# Patient Record
Sex: FEMALE | Race: WHITE | Employment: FULL TIME | URBAN - NONMETROPOLITAN AREA
[De-identification: names, ages, dates, MRNs, and addresses within clinical notes are randomized per-mention and may not be internally consistent; named-entity substitution may affect disease eponyms.]

---

## 2017-04-27 ENCOUNTER — HOSPITAL ENCOUNTER (EMERGENCY)
Age: 42
Discharge: HOME | End: 2017-04-27
Attending: EMERGENCY MEDICINE
Payer: COMMERCIAL

## 2017-04-27 VITALS
OXYGEN SATURATION: 100 % | TEMPERATURE: 98 F | WEIGHT: 155 LBS | DIASTOLIC BLOOD PRESSURE: 85 MMHG | SYSTOLIC BLOOD PRESSURE: 134 MMHG | HEIGHT: 69 IN | HEART RATE: 107 BPM | BODY MASS INDEX: 22.96 KG/M2 | RESPIRATION RATE: 18 BRPM

## 2017-04-27 DIAGNOSIS — M25.572 ACUTE BILATERAL ANKLE PAIN: Primary | ICD-10-CM

## 2017-04-27 DIAGNOSIS — M25.571 ACUTE BILATERAL ANKLE PAIN: Primary | ICD-10-CM

## 2017-04-27 PROCEDURE — 99282 EMERGENCY DEPT VISIT SF MDM: CPT | Performed by: EMERGENCY MEDICINE

## 2017-04-27 PROCEDURE — 99282 EMERGENCY DEPT VISIT SF MDM: CPT

## 2017-04-27 PROCEDURE — 6370000000 HC RX 637 (ALT 250 FOR IP): Performed by: EMERGENCY MEDICINE

## 2017-04-27 RX ORDER — HYDROCODONE BITARTRATE AND ACETAMINOPHEN 5; 325 MG/1; MG/1
2 TABLET ORAL EVERY 6 HOURS PRN
Qty: 10 TABLET | Refills: 0 | Status: SHIPPED | OUTPATIENT
Start: 2017-04-27 | End: 2017-05-04

## 2017-04-27 RX ORDER — PREDNISONE 10 MG/1
TABLET ORAL
Qty: 20 TABLET | Refills: 0 | Status: SHIPPED | OUTPATIENT
Start: 2017-04-27 | End: 2017-06-02 | Stop reason: ALTCHOICE

## 2017-04-27 RX ORDER — HYDROCODONE BITARTRATE AND ACETAMINOPHEN 7.5; 325 MG/1; MG/1
1 TABLET ORAL ONCE
Status: COMPLETED | OUTPATIENT
Start: 2017-04-27 | End: 2017-04-27

## 2017-04-27 RX ORDER — PREDNISONE 20 MG/1
40 TABLET ORAL ONCE
Status: COMPLETED | OUTPATIENT
Start: 2017-04-27 | End: 2017-04-27

## 2017-04-27 RX ORDER — INDOMETHACIN 25 MG/1
50 CAPSULE ORAL ONCE
Status: COMPLETED | OUTPATIENT
Start: 2017-04-27 | End: 2017-04-27

## 2017-04-27 RX ORDER — INDOMETHACIN 50 MG/1
50 CAPSULE ORAL 2 TIMES DAILY WITH MEALS
Qty: 20 CAPSULE | Refills: 0 | Status: SHIPPED | OUTPATIENT
Start: 2017-04-27 | End: 2017-06-02 | Stop reason: ALTCHOICE

## 2017-04-27 RX ADMIN — INDOMETHACIN 50 MG: 25 CAPSULE ORAL at 01:37

## 2017-04-27 RX ADMIN — HYDROCODONE BITARTRATE AND ACETAMINOPHEN 1 TABLET: 7.5; 325 TABLET ORAL at 01:32

## 2017-04-27 RX ADMIN — PREDNISONE 40 MG: 20 TABLET ORAL at 01:32

## 2017-04-27 ASSESSMENT — ENCOUNTER SYMPTOMS
DIARRHEA: 0
PHOTOPHOBIA: 0
NAUSEA: 0
SHORTNESS OF BREATH: 0
ABDOMINAL DISTENTION: 0
CONSTIPATION: 0
BACK PAIN: 0
VOMITING: 0
WHEEZING: 0
COUGH: 0
CHEST TIGHTNESS: 0
COLOR CHANGE: 0
EYE PAIN: 0
SORE THROAT: 0
RHINORRHEA: 0
ABDOMINAL PAIN: 0
TROUBLE SWALLOWING: 0

## 2017-04-27 ASSESSMENT — PAIN DESCRIPTION - FREQUENCY: FREQUENCY: CONTINUOUS

## 2017-04-27 ASSESSMENT — PAIN SCALES - GENERAL
PAINLEVEL_OUTOF10: 8

## 2017-04-27 ASSESSMENT — PAIN DESCRIPTION - LOCATION: LOCATION: FOOT

## 2017-04-27 ASSESSMENT — PAIN DESCRIPTION - PAIN TYPE: TYPE: ACUTE PAIN

## 2017-04-27 ASSESSMENT — PAIN DESCRIPTION - ORIENTATION: ORIENTATION: RIGHT;LEFT

## 2017-06-02 ENCOUNTER — APPOINTMENT (OUTPATIENT)
Dept: GENERAL RADIOLOGY | Age: 42
End: 2017-06-02
Payer: COMMERCIAL

## 2017-06-02 ENCOUNTER — HOSPITAL ENCOUNTER (EMERGENCY)
Age: 42
Discharge: HOME | End: 2017-06-02
Attending: EMERGENCY MEDICINE
Payer: COMMERCIAL

## 2017-06-02 VITALS
HEART RATE: 102 BPM | BODY MASS INDEX: 22.22 KG/M2 | TEMPERATURE: 98.6 F | WEIGHT: 150 LBS | OXYGEN SATURATION: 98 % | DIASTOLIC BLOOD PRESSURE: 98 MMHG | SYSTOLIC BLOOD PRESSURE: 131 MMHG | HEIGHT: 69 IN | RESPIRATION RATE: 24 BRPM

## 2017-06-02 DIAGNOSIS — F17.210 CIGARETTE SMOKER: ICD-10-CM

## 2017-06-02 DIAGNOSIS — R07.9 CHEST PAIN, UNSPECIFIED TYPE: Primary | ICD-10-CM

## 2017-06-02 LAB
ALBUMIN SERPL-MCNC: 4.4 G/DL (ref 3.5–5.2)
ALP BLD-CCNC: 61 U/L (ref 35–104)
ALT SERPL-CCNC: 9 U/L (ref 5–33)
ANION GAP SERPL CALCULATED.3IONS-SCNC: 16 MMOL/L (ref 7–19)
AST SERPL-CCNC: 16 U/L (ref 5–32)
BASOPHILS ABSOLUTE: 0.1 K/UL (ref 0–0.2)
BASOPHILS RELATIVE PERCENT: 1.3 % (ref 0–1)
BILIRUB SERPL-MCNC: <0.2 MG/DL (ref 0.2–1.2)
BUN BLDV-MCNC: 12 MG/DL (ref 6–20)
CALCIUM SERPL-MCNC: 9.6 MG/DL (ref 8.6–10)
CHLORIDE BLD-SCNC: 102 MMOL/L (ref 98–111)
CO2: 19 MMOL/L (ref 22–29)
CREAT SERPL-MCNC: 0.8 MG/DL (ref 0.5–0.9)
D DIMER: <0.27 NG/ML DDU (ref 0–0.48)
EOSINOPHILS ABSOLUTE: 0.9 K/UL (ref 0–0.6)
EOSINOPHILS RELATIVE PERCENT: 9.4 % (ref 0–5)
GFR NON-AFRICAN AMERICAN: >60
GLUCOSE BLD-MCNC: 83 MG/DL (ref 74–109)
HCT VFR BLD CALC: 38.8 % (ref 37–47)
HEMOGLOBIN: 12.9 G/DL (ref 12–16)
LYMPHOCYTES ABSOLUTE: 2.4 K/UL (ref 1.1–4.5)
LYMPHOCYTES RELATIVE PERCENT: 25.1 % (ref 20–40)
MCH RBC QN AUTO: 31.9 PG (ref 27–31)
MCHC RBC AUTO-ENTMCNC: 33.2 G/DL (ref 33–37)
MCV RBC AUTO: 96 FL (ref 81–99)
MONOCYTES ABSOLUTE: 0.8 K/UL (ref 0–0.9)
MONOCYTES RELATIVE PERCENT: 8.4 % (ref 0–10)
NEUTROPHILS ABSOLUTE: 5.3 K/UL (ref 1.5–7.5)
NEUTROPHILS RELATIVE PERCENT: 55.4 % (ref 50–65)
PDW BLD-RTO: 13.6 % (ref 11.5–14.5)
PERFORMED ON: NORMAL
PERFORMED ON: NORMAL
PLATELET # BLD: 386 K/UL (ref 130–400)
PMV BLD AUTO: 9.8 FL (ref 9.4–12.3)
POC TROPONIN I: 0.01 NG/ML (ref 0–0.08)
POC TROPONIN I: 0.03 NG/ML (ref 0–0.08)
POTASSIUM SERPL-SCNC: 4.4 MMOL/L (ref 3.5–5)
RBC # BLD: 4.04 M/UL (ref 4.2–5.4)
SODIUM BLD-SCNC: 137 MMOL/L (ref 136–145)
TOTAL PROTEIN: 7 G/DL (ref 6.6–8.7)
WBC # BLD: 9.6 K/UL (ref 4.8–10.8)

## 2017-06-02 PROCEDURE — 99282 EMERGENCY DEPT VISIT SF MDM: CPT | Performed by: EMERGENCY MEDICINE

## 2017-06-02 PROCEDURE — 36415 COLL VENOUS BLD VENIPUNCTURE: CPT

## 2017-06-02 PROCEDURE — 85025 COMPLETE CBC W/AUTO DIFF WBC: CPT

## 2017-06-02 PROCEDURE — 80053 COMPREHEN METABOLIC PANEL: CPT

## 2017-06-02 PROCEDURE — 85379 FIBRIN DEGRADATION QUANT: CPT

## 2017-06-02 PROCEDURE — 93005 ELECTROCARDIOGRAM TRACING: CPT

## 2017-06-02 PROCEDURE — 71010 XR CHEST PORTABLE: CPT

## 2017-06-02 PROCEDURE — 99285 EMERGENCY DEPT VISIT HI MDM: CPT

## 2017-06-02 PROCEDURE — 84484 ASSAY OF TROPONIN QUANT: CPT

## 2017-06-02 RX ORDER — LEVOTHYROXINE SODIUM 0.15 MG/1
150 TABLET ORAL DAILY
COMMUNITY

## 2017-06-02 RX ORDER — BUPROPION HYDROCHLORIDE 100 MG/1
100 TABLET ORAL DAILY
COMMUNITY

## 2017-06-02 RX ORDER — ASPIRIN 325 MG
325 TABLET ORAL ONCE
Status: DISCONTINUED | OUTPATIENT
Start: 2017-06-02 | End: 2017-06-02

## 2017-06-02 ASSESSMENT — ENCOUNTER SYMPTOMS: SHORTNESS OF BREATH: 1

## 2017-06-02 ASSESSMENT — PAIN SCALES - GENERAL: PAINLEVEL_OUTOF10: 6

## 2017-06-06 LAB
EKG P AXIS: 45 DEGREES
EKG P-R INTERVAL: 114 MS
EKG Q-T INTERVAL: 362 MS
EKG QRS DURATION: 84 MS
EKG QTC CALCULATION (BAZETT): 412 MS
EKG T AXIS: 40 DEGREES

## 2017-09-20 ENCOUNTER — HOSPITAL ENCOUNTER (OUTPATIENT)
Dept: GENERAL RADIOLOGY | Age: 42
Discharge: HOME | End: 2017-09-20
Payer: COMMERCIAL

## 2017-09-20 DIAGNOSIS — M25.511 RIGHT SHOULDER PAIN, UNSPECIFIED CHRONICITY: ICD-10-CM

## 2017-09-20 DIAGNOSIS — M96.1 POSTLAMINECTOMY SYNDROME, UNSPECIFIED REGION: ICD-10-CM

## 2017-09-20 DIAGNOSIS — M47.817 FACET DEGENERATION OF LUMBOSACRAL REGION: ICD-10-CM

## 2017-09-20 DIAGNOSIS — M47.816 OSTEOARTHRITIS OF LUMBAR SPINE, UNSPECIFIED SPINAL OSTEOARTHRITIS COMPLICATION STATUS: ICD-10-CM

## 2017-09-20 PROCEDURE — 72100 X-RAY EXAM L-S SPINE 2/3 VWS: CPT

## 2017-09-20 PROCEDURE — 73030 X-RAY EXAM OF SHOULDER: CPT

## 2017-10-19 ENCOUNTER — HOSPITAL ENCOUNTER (EMERGENCY)
Facility: HOSPITAL | Age: 42
Discharge: HOME | End: 2017-10-19
Attending: EMERGENCY MEDICINE | Admitting: EMERGENCY MEDICINE

## 2017-10-19 VITALS
SYSTOLIC BLOOD PRESSURE: 152 MMHG | HEART RATE: 85 BPM | HEIGHT: 69 IN | OXYGEN SATURATION: 100 % | DIASTOLIC BLOOD PRESSURE: 94 MMHG | RESPIRATION RATE: 15 BRPM | BODY MASS INDEX: 21.48 KG/M2 | WEIGHT: 145 LBS | TEMPERATURE: 98.5 F

## 2017-10-19 DIAGNOSIS — S46.811A STRAIN OF RIGHT TRAPEZIUS MUSCLE, INITIAL ENCOUNTER: Primary | ICD-10-CM

## 2017-10-19 PROCEDURE — 99283 EMERGENCY DEPT VISIT LOW MDM: CPT

## 2017-10-19 RX ORDER — INDOMETHACIN 50 MG/1
50 CAPSULE ORAL
Qty: 21 CAPSULE | Refills: 0 | Status: SHIPPED | OUTPATIENT
Start: 2017-10-19 | End: 2023-02-19

## 2017-10-19 RX ORDER — LEVOTHYROXINE SODIUM 0.15 MG/1
175 TABLET ORAL
COMMUNITY

## 2017-10-19 RX ORDER — HYDROCODONE BITARTRATE AND ACETAMINOPHEN 7.5; 325 MG/1; MG/1
1 TABLET ORAL EVERY 4 HOURS PRN
Qty: 15 TABLET | Refills: 0 | Status: SHIPPED | OUTPATIENT
Start: 2017-10-19 | End: 2023-02-19

## 2017-10-19 RX ORDER — BUPROPION HYDROCHLORIDE 100 MG/1
100 TABLET ORAL
COMMUNITY
End: 2023-02-19

## 2017-10-19 NOTE — ED PROVIDER NOTES
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  Pain is in area on upper back between shoulder and neck.  Finished shift and went home to sleep but when got up and went to work pain worse.  Aches all time but has sudden worsening and stabbing pain when moves neck or shoulder though pain not directly in those areas.    Patient is a 42 y.o. female presenting with shoulder problem.   History provided by:  Patient   used: No    Shoulder Problem   Location:  Shoulder  Shoulder location:  R shoulder  Injury: yes    Time since incident:  1 day  Mechanism of injury comment:  Throwing garbage bags  Pain details:     Quality:  Aching    Radiates to:  Does not radiate    Severity:  Severe    Onset quality:  Sudden    Duration:  1 day    Timing:  Constant    Progression:  Worsening  Dislocation: no    Foreign body present:  No foreign bodies  Tetanus status:  Unknown  Prior injury to area:  No  Relieved by:  Nothing  Worsened by:  Movement  Ineffective treatments:  None tried  Associated symptoms: decreased range of motion    Associated symptoms: no back pain, no fatigue, no fever, no muscle weakness, no neck pain, no numbness, no stiffness, no swelling and no tingling    Risk factors: no concern for non-accidental trauma, no known bone disorder, no frequent fractures and no recent illness        Review of Systems   Constitutional: Negative.  Negative for fatigue and fever.   HENT: Negative.    Respiratory: Negative.    Cardiovascular: Negative.    Genitourinary: Negative.    Musculoskeletal: Positive for myalgias. Negative for back pain, neck pain and stiffness.   Skin: Negative.    Neurological: Negative.    Hematological: Negative.    Psychiatric/Behavioral: Negative.    All other systems reviewed and are negative.      History reviewed. No pertinent past medical history.    No Known Allergies    Past Surgical History:   Procedure  Laterality Date   • APPENDECTOMY     • HYSTERECTOMY     • TOTAL THYROIDECTOMY         History reviewed. No pertinent family history.    Social History     Social History   • Marital status:      Spouse name: N/A   • Number of children: N/A   • Years of education: N/A     Social History Main Topics   • Smoking status: Heavy Tobacco Smoker   • Smokeless tobacco: None   • Alcohol use No   • Drug use: None   • Sexual activity: Not Asked     Other Topics Concern   • None     Social History Narrative   • None       Prior to Admission medications    Medication Sig Start Date End Date Taking? Authorizing Provider   buPROPion (WELLBUTRIN) 100 MG tablet Take 100 mg by mouth.    Historical Provider, MD   HYDROcodone-acetaminophen (NORCO) 7.5-325 MG per tablet Take 1 tablet by mouth Every 4 (Four) Hours As Needed for Moderate Pain . 10/19/17   Savage Javier Jr., MD   indomethacin (INDOCIN) 50 MG capsule Take 1 capsule by mouth 3 (Three) Times a Day With Meals. 10/19/17   Savage Javier Jr., MD   levothyroxine (SYNTHROID, LEVOTHROID) 150 MCG tablet Take  by mouth.    Historical Provider, MD       Medications - No data to display    Vitals:    10/19/17 0700   BP: 152/94   Pulse: 85   Resp: 15   Temp: 98.5 °F (36.9 °C)   SpO2: 100%         Objective   Physical Exam   Constitutional: She is oriented to person, place, and time. She appears well-developed and well-nourished.   HENT:   Head: Normocephalic and atraumatic.   Neck: Normal range of motion. Neck supple.   Musculoskeletal: She exhibits tenderness.   Tender to palpation in upper musculature of right back between shoulder and neck.  ROM in shoulder limited by that pain.  No deformity.   Neurological: She is alert and oriented to person, place, and time. She displays normal reflexes. No cranial nerve deficit. She exhibits normal muscle tone. Coordination normal.   Skin: Skin is warm and dry.   Psychiatric: She has a normal mood and affect. Her behavior is normal.    Nursing note and vitals reviewed.      Procedures         Lab Results (last 24 hours)     ** No results found for the last 24 hours. **          No orders to display       ED Course  ED Course          MDM  Number of Diagnoses or Management Options  Strain of right trapezius muscle, initial encounter: new and does not require workup  Risk of Complications, Morbidity, and/or Mortality  Presenting problems: moderate  Diagnostic procedures: low  Management options: low    Patient Progress  Patient progress: stable      Final diagnoses:   Strain of right trapezius muscle, initial encounter          Savage Javier Jr., MD  10/19/17 0704

## 2017-11-04 ENCOUNTER — APPOINTMENT (OUTPATIENT)
Dept: GENERAL RADIOLOGY | Facility: HOSPITAL | Age: 42
End: 2017-11-04

## 2017-11-04 ENCOUNTER — HOSPITAL ENCOUNTER (INPATIENT)
Facility: HOSPITAL | Age: 42
LOS: 2 days | Discharge: HOME | End: 2017-11-06
Attending: EMERGENCY MEDICINE | Admitting: INTERNAL MEDICINE

## 2017-11-04 DIAGNOSIS — R07.2 PRECORDIAL PAIN: ICD-10-CM

## 2017-11-04 DIAGNOSIS — I21.4 NSTEMI (NON-ST ELEVATED MYOCARDIAL INFARCTION) (HCC): Primary | ICD-10-CM

## 2017-11-04 DIAGNOSIS — I24.9 ACUTE CORONARY SYNDROME (HCC): ICD-10-CM

## 2017-11-04 PROBLEM — Z72.0 TOBACCO ABUSE: Status: ACTIVE | Noted: 2017-11-04

## 2017-11-04 PROBLEM — E78.2 MIXED HYPERLIPIDEMIA: Status: ACTIVE | Noted: 2017-11-04

## 2017-11-04 PROBLEM — I73.00 RAYNAUD'S DISEASE WITHOUT GANGRENE: Status: ACTIVE | Noted: 2017-11-04

## 2017-11-04 LAB
ALBUMIN SERPL-MCNC: 4.5 G/DL (ref 3.5–5)
ALBUMIN/GLOB SERPL: 1.5 G/DL (ref 1.1–2.5)
ALP SERPL-CCNC: 74 U/L (ref 24–120)
ALT SERPL W P-5'-P-CCNC: 24 U/L (ref 0–54)
ANION GAP SERPL CALCULATED.3IONS-SCNC: 18 MMOL/L (ref 4–13)
AST SERPL-CCNC: 29 U/L (ref 7–45)
BASOPHILS # BLD AUTO: 0.01 10*3/MM3 (ref 0–0.2)
BASOPHILS NFR BLD AUTO: 0.1 % (ref 0–2)
BILIRUB SERPL-MCNC: 0.1 MG/DL (ref 0.1–1)
BUN BLD-MCNC: 20 MG/DL (ref 5–21)
BUN/CREAT SERPL: 32.8 (ref 7–25)
CALCIUM SPEC-SCNC: 9.8 MG/DL (ref 8.4–10.4)
CHLORIDE SERPL-SCNC: 105 MMOL/L (ref 98–110)
CO2 SERPL-SCNC: 21 MMOL/L (ref 24–31)
CREAT BLD-MCNC: 0.61 MG/DL (ref 0.5–1.4)
DEPRECATED RDW RBC AUTO: 44.7 FL (ref 40–54)
EOSINOPHIL # BLD AUTO: 0 10*3/MM3 (ref 0–0.7)
EOSINOPHIL NFR BLD AUTO: 0 % (ref 0–4)
ERYTHROCYTE [DISTWIDTH] IN BLOOD BY AUTOMATED COUNT: 12.8 % (ref 12–15)
GFR SERPL CREATININE-BSD FRML MDRD: 107 ML/MIN/1.73
GLOBULIN UR ELPH-MCNC: 3 GM/DL
GLUCOSE BLD-MCNC: 171 MG/DL (ref 70–100)
HCT VFR BLD AUTO: 35.2 % (ref 37–47)
HGB BLD-MCNC: 11.5 G/DL (ref 12–16)
HOLD SPECIMEN: NORMAL
HOLD SPECIMEN: NORMAL
IMM GRANULOCYTES # BLD: 0.05 10*3/MM3 (ref 0–0.03)
IMM GRANULOCYTES NFR BLD: 0.3 % (ref 0–5)
LYMPHOCYTES # BLD AUTO: 0.91 10*3/MM3 (ref 0.72–4.86)
LYMPHOCYTES NFR BLD AUTO: 5.8 % (ref 15–45)
MCH RBC QN AUTO: 31.3 PG (ref 28–32)
MCHC RBC AUTO-ENTMCNC: 32.7 G/DL (ref 33–36)
MCV RBC AUTO: 95.7 FL (ref 82–98)
MONOCYTES # BLD AUTO: 0.49 10*3/MM3 (ref 0.19–1.3)
MONOCYTES NFR BLD AUTO: 3.1 % (ref 4–12)
NEUTROPHILS # BLD AUTO: 14.33 10*3/MM3 (ref 1.87–8.4)
NEUTROPHILS NFR BLD AUTO: 90.7 % (ref 39–78)
PLATELET # BLD AUTO: 362 10*3/MM3 (ref 130–400)
PMV BLD AUTO: 10.9 FL (ref 6–12)
POTASSIUM BLD-SCNC: 3.6 MMOL/L (ref 3.5–5.3)
PROT SERPL-MCNC: 7.5 G/DL (ref 6.3–8.7)
RBC # BLD AUTO: 3.68 10*6/MM3 (ref 4.2–5.4)
SODIUM BLD-SCNC: 144 MMOL/L (ref 135–145)
TROPONIN I SERPL-MCNC: 0.32 NG/ML (ref 0–0.03)
TROPONIN I SERPL-MCNC: 0.34 NG/ML (ref 0–0.03)
WBC NRBC COR # BLD: 15.79 10*3/MM3 (ref 4.8–10.8)
WHOLE BLOOD HOLD SPECIMEN: NORMAL
WHOLE BLOOD HOLD SPECIMEN: NORMAL

## 2017-11-04 PROCEDURE — C1894 INTRO/SHEATH, NON-LASER: HCPCS | Performed by: INTERNAL MEDICINE

## 2017-11-04 PROCEDURE — 84484 ASSAY OF TROPONIN QUANT: CPT | Performed by: EMERGENCY MEDICINE

## 2017-11-04 PROCEDURE — 93458 L HRT ARTERY/VENTRICLE ANGIO: CPT | Performed by: INTERNAL MEDICINE

## 2017-11-04 PROCEDURE — 71010 HC CHEST PA OR AP: CPT

## 2017-11-04 PROCEDURE — 84484 ASSAY OF TROPONIN QUANT: CPT | Performed by: INTERNAL MEDICINE

## 2017-11-04 PROCEDURE — 4A023N7 MEASUREMENT OF CARDIAC SAMPLING AND PRESSURE, LEFT HEART, PERCUTANEOUS APPROACH: ICD-10-PCS | Performed by: INTERNAL MEDICINE

## 2017-11-04 PROCEDURE — 99284 EMERGENCY DEPT VISIT MOD MDM: CPT

## 2017-11-04 PROCEDURE — 0 IOPAMIDOL PER 1 ML: Performed by: INTERNAL MEDICINE

## 2017-11-04 PROCEDURE — B2111ZZ FLUOROSCOPY OF MULTIPLE CORONARY ARTERIES USING LOW OSMOLAR CONTRAST: ICD-10-PCS | Performed by: INTERNAL MEDICINE

## 2017-11-04 PROCEDURE — 25010000002 DIPHENHYDRAMINE PER 50 MG: Performed by: INTERNAL MEDICINE

## 2017-11-04 PROCEDURE — 85025 COMPLETE CBC W/AUTO DIFF WBC: CPT | Performed by: EMERGENCY MEDICINE

## 2017-11-04 PROCEDURE — 93010 ELECTROCARDIOGRAM REPORT: CPT | Performed by: INTERNAL MEDICINE

## 2017-11-04 PROCEDURE — 93005 ELECTROCARDIOGRAM TRACING: CPT

## 2017-11-04 PROCEDURE — 94799 UNLISTED PULMONARY SVC/PX: CPT

## 2017-11-04 PROCEDURE — B2151ZZ FLUOROSCOPY OF LEFT HEART USING LOW OSMOLAR CONTRAST: ICD-10-PCS | Performed by: INTERNAL MEDICINE

## 2017-11-04 PROCEDURE — 25010000002 HEPARIN (PORCINE) PER 1000 UNITS: Performed by: INTERNAL MEDICINE

## 2017-11-04 PROCEDURE — 80053 COMPREHEN METABOLIC PANEL: CPT | Performed by: EMERGENCY MEDICINE

## 2017-11-04 PROCEDURE — 25010000002 FENTANYL CITRATE (PF) 100 MCG/2ML SOLUTION: Performed by: INTERNAL MEDICINE

## 2017-11-04 PROCEDURE — 25010000002 MIDAZOLAM PER 1 MG: Performed by: INTERNAL MEDICINE

## 2017-11-04 PROCEDURE — C1769 GUIDE WIRE: HCPCS | Performed by: INTERNAL MEDICINE

## 2017-11-04 PROCEDURE — 99223 1ST HOSP IP/OBS HIGH 75: CPT | Performed by: INTERNAL MEDICINE

## 2017-11-04 PROCEDURE — 99152 MOD SED SAME PHYS/QHP 5/>YRS: CPT | Performed by: INTERNAL MEDICINE

## 2017-11-04 RX ORDER — INDOMETHACIN 50 MG/1
50 CAPSULE ORAL
Status: DISCONTINUED | OUTPATIENT
Start: 2017-11-05 | End: 2017-11-05

## 2017-11-04 RX ORDER — PRAVASTATIN SODIUM 40 MG
40 TABLET ORAL NIGHTLY
Status: DISCONTINUED | OUTPATIENT
Start: 2017-11-05 | End: 2017-11-05 | Stop reason: CLARIF

## 2017-11-04 RX ORDER — SODIUM CHLORIDE 0.9 % (FLUSH) 0.9 %
10 SYRINGE (ML) INJECTION AS NEEDED
Status: DISCONTINUED | OUTPATIENT
Start: 2017-11-04 | End: 2017-11-06 | Stop reason: HOSPADM

## 2017-11-04 RX ORDER — MIDAZOLAM HYDROCHLORIDE 1 MG/ML
INJECTION INTRAMUSCULAR; INTRAVENOUS AS NEEDED
Status: DISCONTINUED | OUTPATIENT
Start: 2017-11-04 | End: 2017-11-04 | Stop reason: HOSPADM

## 2017-11-04 RX ORDER — SODIUM CHLORIDE 9 MG/ML
100 INJECTION, SOLUTION INTRAVENOUS CONTINUOUS
Status: DISCONTINUED | OUTPATIENT
Start: 2017-11-04 | End: 2017-11-06 | Stop reason: HOSPADM

## 2017-11-04 RX ORDER — NITROGLYCERIN 20 MG/100ML
INJECTION INTRAVENOUS CONTINUOUS PRN
Status: DISCONTINUED | OUTPATIENT
Start: 2017-11-04 | End: 2017-11-04 | Stop reason: HOSPADM

## 2017-11-04 RX ORDER — FENTANYL CITRATE 50 UG/ML
INJECTION, SOLUTION INTRAMUSCULAR; INTRAVENOUS AS NEEDED
Status: DISCONTINUED | OUTPATIENT
Start: 2017-11-04 | End: 2017-11-04 | Stop reason: HOSPADM

## 2017-11-04 RX ORDER — SODIUM CHLORIDE 9 MG/ML
125 INJECTION, SOLUTION INTRAVENOUS CONTINUOUS
Status: DISCONTINUED | OUTPATIENT
Start: 2017-11-04 | End: 2017-11-05

## 2017-11-04 RX ORDER — DIPHENHYDRAMINE HYDROCHLORIDE 50 MG/ML
INJECTION INTRAMUSCULAR; INTRAVENOUS AS NEEDED
Status: DISCONTINUED | OUTPATIENT
Start: 2017-11-04 | End: 2017-11-04 | Stop reason: HOSPADM

## 2017-11-04 RX ORDER — NITROGLYCERIN 0.4 MG/1
0.4 TABLET SUBLINGUAL
COMMUNITY
End: 2017-11-06 | Stop reason: HOSPADM

## 2017-11-04 RX ORDER — NITROGLYCERIN 20 MG/100ML
10-50 INJECTION INTRAVENOUS
Status: DISCONTINUED | OUTPATIENT
Start: 2017-11-04 | End: 2017-11-05

## 2017-11-04 RX ORDER — LEVOTHYROXINE SODIUM 0.15 MG/1
150 TABLET ORAL
Status: DISCONTINUED | OUTPATIENT
Start: 2017-11-05 | End: 2017-11-06 | Stop reason: HOSPADM

## 2017-11-04 RX ORDER — HYDROCODONE BITARTRATE AND ACETAMINOPHEN 7.5; 325 MG/1; MG/1
1 TABLET ORAL EVERY 4 HOURS PRN
Status: DISCONTINUED | OUTPATIENT
Start: 2017-11-04 | End: 2017-11-06 | Stop reason: HOSPADM

## 2017-11-04 RX ORDER — LIDOCAINE HYDROCHLORIDE 20 MG/ML
INJECTION, SOLUTION INFILTRATION; PERINEURAL AS NEEDED
Status: DISCONTINUED | OUTPATIENT
Start: 2017-11-04 | End: 2017-11-04 | Stop reason: HOSPADM

## 2017-11-04 RX ORDER — METHYLPREDNISOLONE 4 MG/1
TABLET ORAL DAILY
COMMUNITY
End: 2023-02-19

## 2017-11-04 RX ORDER — ASPIRIN 81 MG/1
324 TABLET, CHEWABLE ORAL ONCE
Status: COMPLETED | OUTPATIENT
Start: 2017-11-04 | End: 2017-11-05

## 2017-11-04 RX ORDER — NITROGLYCERIN 0.4 MG/1
0.4 TABLET SUBLINGUAL
Status: DISCONTINUED | OUTPATIENT
Start: 2017-11-04 | End: 2017-11-06 | Stop reason: HOSPADM

## 2017-11-04 RX ADMIN — SODIUM CHLORIDE 125 ML/HR: 9 INJECTION, SOLUTION INTRAVENOUS at 20:25

## 2017-11-04 RX ADMIN — NITROGLYCERIN 10 MCG/MIN: 20 INJECTION INTRAVENOUS at 20:29

## 2017-11-05 ENCOUNTER — APPOINTMENT (OUTPATIENT)
Dept: CARDIOLOGY | Facility: HOSPITAL | Age: 42
End: 2017-11-05
Attending: INTERNAL MEDICINE

## 2017-11-05 LAB
AMPHET+METHAMPHET UR QL: POSITIVE
ANION GAP SERPL CALCULATED.3IONS-SCNC: 9 MMOL/L (ref 4–13)
ARTICHOKE IGE QN: 74 MG/DL (ref 0–99)
BARBITURATES UR QL SCN: NEGATIVE
BENZODIAZ UR QL SCN: POSITIVE
BH CV ECHO MEAS - AO MAX PG (FULL): 3.6 MMHG
BH CV ECHO MEAS - AO MAX PG: 10.1 MMHG
BH CV ECHO MEAS - AO MEAN PG (FULL): 2 MMHG
BH CV ECHO MEAS - AO MEAN PG: 5 MMHG
BH CV ECHO MEAS - AO ROOT AREA (BSA CORRECTED): 1.8
BH CV ECHO MEAS - AO ROOT AREA: 8 CM^2
BH CV ECHO MEAS - AO ROOT DIAM: 3.2 CM
BH CV ECHO MEAS - AO V2 MAX: 159 CM/SEC
BH CV ECHO MEAS - AO V2 MEAN: 106 CM/SEC
BH CV ECHO MEAS - AO V2 VTI: 33.4 CM
BH CV ECHO MEAS - AVA(I,A): 2.5 CM^2
BH CV ECHO MEAS - AVA(I,D): 2.5 CM^2
BH CV ECHO MEAS - AVA(V,A): 2.5 CM^2
BH CV ECHO MEAS - AVA(V,D): 2.5 CM^2
BH CV ECHO MEAS - BSA(HAYCOCK): 1.8 M^2
BH CV ECHO MEAS - BSA: 1.8 M^2
BH CV ECHO MEAS - BZI_BMI: 21.4 KILOGRAMS/M^2
BH CV ECHO MEAS - BZI_METRIC_HEIGHT: 175.3 CM
BH CV ECHO MEAS - BZI_METRIC_WEIGHT: 65.8 KG
BH CV ECHO MEAS - CONTRAST EF 4CH: 65.4 ML/M^2
BH CV ECHO MEAS - EDV(CUBED): 93.6 ML
BH CV ECHO MEAS - EDV(MOD-SP4): 72 ML
BH CV ECHO MEAS - EDV(TEICH): 94.4 ML
BH CV ECHO MEAS - EF(CUBED): 77.5 %
BH CV ECHO MEAS - EF(MOD-SP4): 65.4 %
BH CV ECHO MEAS - EF(TEICH): 69.8 %
BH CV ECHO MEAS - ESV(CUBED): 21 ML
BH CV ECHO MEAS - ESV(MOD-SP4): 24.9 ML
BH CV ECHO MEAS - ESV(TEICH): 28.5 ML
BH CV ECHO MEAS - FS: 39.2 %
BH CV ECHO MEAS - IVS/LVPW: 1.1
BH CV ECHO MEAS - IVSD: 0.96 CM
BH CV ECHO MEAS - LA DIMENSION: 3.6 CM
BH CV ECHO MEAS - LA/AO: 1.1
BH CV ECHO MEAS - LAT PEAK E' VEL: 12 CM/SEC
BH CV ECHO MEAS - LV DIASTOLIC VOL/BSA (35-75): 40 ML/M^2
BH CV ECHO MEAS - LV MASS(C)D: 141.4 GRAMS
BH CV ECHO MEAS - LV MASS(C)DI: 78.5 GRAMS/M^2
BH CV ECHO MEAS - LV MAX PG: 6.6 MMHG
BH CV ECHO MEAS - LV MEAN PG: 3 MMHG
BH CV ECHO MEAS - LV SYSTOLIC VOL/BSA (12-30): 13.8 ML/M^2
BH CV ECHO MEAS - LV V1 MAX: 128 CM/SEC
BH CV ECHO MEAS - LV V1 MEAN: 79.7 CM/SEC
BH CV ECHO MEAS - LV V1 VTI: 26.1 CM
BH CV ECHO MEAS - LVIDD: 4.5 CM
BH CV ECHO MEAS - LVIDS: 2.8 CM
BH CV ECHO MEAS - LVLD AP4: 7.8 CM
BH CV ECHO MEAS - LVLS AP4: 5.8 CM
BH CV ECHO MEAS - LVOT AREA (M): 3.1 CM^2
BH CV ECHO MEAS - LVOT AREA: 3.1 CM^2
BH CV ECHO MEAS - LVOT DIAM: 2 CM
BH CV ECHO MEAS - LVPWD: 0.9 CM
BH CV ECHO MEAS - MV A MAX VEL: 74.7 CM/SEC
BH CV ECHO MEAS - MV DEC TIME: 0.16 SEC
BH CV ECHO MEAS - MV E MAX VEL: 97 CM/SEC
BH CV ECHO MEAS - MV E/A: 1.3
BH CV ECHO MEAS - PA MAX PG: 2.7 MMHG
BH CV ECHO MEAS - PA V2 MAX: 82 CM/SEC
BH CV ECHO MEAS - SI(AO): 149.1 ML/M^2
BH CV ECHO MEAS - SI(CUBED): 40.3 ML/M^2
BH CV ECHO MEAS - SI(LVOT): 45.5 ML/M^2
BH CV ECHO MEAS - SI(MOD-SP4): 26.1 ML/M^2
BH CV ECHO MEAS - SI(TEICH): 36.6 ML/M^2
BH CV ECHO MEAS - SV(AO): 268.6 ML
BH CV ECHO MEAS - SV(CUBED): 72.6 ML
BH CV ECHO MEAS - SV(LVOT): 82 ML
BH CV ECHO MEAS - SV(MOD-SP4): 47.1 ML
BH CV ECHO MEAS - SV(TEICH): 65.9 ML
BUN BLD-MCNC: 20 MG/DL (ref 5–21)
BUN/CREAT SERPL: 34.5 (ref 7–25)
CALCIUM SPEC-SCNC: 8.6 MG/DL (ref 8.4–10.4)
CANNABINOIDS SERPL QL: NEGATIVE
CHLORIDE SERPL-SCNC: 108 MMOL/L (ref 98–110)
CHOLEST SERPL-MCNC: 139 MG/DL (ref 130–200)
CO2 SERPL-SCNC: 26 MMOL/L (ref 24–31)
COCAINE UR QL: NEGATIVE
CREAT BLD-MCNC: 0.58 MG/DL (ref 0.5–1.4)
DEPRECATED RDW RBC AUTO: 45.1 FL (ref 40–54)
E/E' RATIO: 8.1
ERYTHROCYTE [DISTWIDTH] IN BLOOD BY AUTOMATED COUNT: 12.9 % (ref 12–15)
GFR SERPL CREATININE-BSD FRML MDRD: 113 ML/MIN/1.73
GLUCOSE BLD-MCNC: 127 MG/DL (ref 70–100)
HCT VFR BLD AUTO: 33.7 % (ref 37–47)
HDLC SERPL-MCNC: 58 MG/DL
HGB BLD-MCNC: 10.9 G/DL (ref 12–16)
LDLC/HDLC SERPL: 1.13 {RATIO}
LEFT ATRIUM VOLUME INDEX: 36 ML/M2
LEFT ATRIUM VOLUME: 57 CM3
MAXIMAL PREDICTED HEART RATE: 176 BPM
MCH RBC QN AUTO: 31.3 PG (ref 28–32)
MCHC RBC AUTO-ENTMCNC: 32.3 G/DL (ref 33–36)
MCV RBC AUTO: 96.8 FL (ref 82–98)
METHADONE UR QL SCN: POSITIVE
OPIATES UR QL: POSITIVE
PCP UR QL SCN: NEGATIVE
PLATELET # BLD AUTO: 327 10*3/MM3 (ref 130–400)
PMV BLD AUTO: 11 FL (ref 6–12)
POTASSIUM BLD-SCNC: 3.6 MMOL/L (ref 3.5–5.3)
RBC # BLD AUTO: 3.48 10*6/MM3 (ref 4.2–5.4)
SODIUM BLD-SCNC: 143 MMOL/L (ref 135–145)
STRESS TARGET HR: 150 BPM
TRIGL SERPL-MCNC: 77 MG/DL (ref 0–149)
TROPONIN I SERPL-MCNC: 0.22 NG/ML (ref 0–0.03)
TROPONIN I SERPL-MCNC: 0.25 NG/ML (ref 0–0.03)
TROPONIN I SERPL-MCNC: 0.29 NG/ML (ref 0–0.03)
WBC NRBC COR # BLD: 12.08 10*3/MM3 (ref 4.8–10.8)

## 2017-11-05 PROCEDURE — 93306 TTE W/DOPPLER COMPLETE: CPT

## 2017-11-05 PROCEDURE — 80307 DRUG TEST PRSMV CHEM ANLYZR: CPT | Performed by: INTERNAL MEDICINE

## 2017-11-05 PROCEDURE — 93010 ELECTROCARDIOGRAM REPORT: CPT | Performed by: INTERNAL MEDICINE

## 2017-11-05 PROCEDURE — 80048 BASIC METABOLIC PNL TOTAL CA: CPT | Performed by: INTERNAL MEDICINE

## 2017-11-05 PROCEDURE — 93306 TTE W/DOPPLER COMPLETE: CPT | Performed by: INTERNAL MEDICINE

## 2017-11-05 PROCEDURE — 85027 COMPLETE CBC AUTOMATED: CPT | Performed by: INTERNAL MEDICINE

## 2017-11-05 PROCEDURE — 80061 LIPID PANEL: CPT | Performed by: INTERNAL MEDICINE

## 2017-11-05 PROCEDURE — 93005 ELECTROCARDIOGRAM TRACING: CPT | Performed by: INTERNAL MEDICINE

## 2017-11-05 PROCEDURE — 84484 ASSAY OF TROPONIN QUANT: CPT | Performed by: INTERNAL MEDICINE

## 2017-11-05 PROCEDURE — 99232 SBSQ HOSP IP/OBS MODERATE 35: CPT | Performed by: INTERNAL MEDICINE

## 2017-11-05 PROCEDURE — 83036 HEMOGLOBIN GLYCOSYLATED A1C: CPT | Performed by: INTERNAL MEDICINE

## 2017-11-05 RX ORDER — ISOSORBIDE MONONITRATE 30 MG/1
30 TABLET, EXTENDED RELEASE ORAL
Status: DISCONTINUED | OUTPATIENT
Start: 2017-11-05 | End: 2017-11-06 | Stop reason: HOSPADM

## 2017-11-05 RX ORDER — LIDOCAINE 50 MG/G
1 PATCH TOPICAL
Status: DISCONTINUED | OUTPATIENT
Start: 2017-11-05 | End: 2017-11-06 | Stop reason: HOSPADM

## 2017-11-05 RX ORDER — ASPIRIN 81 MG/1
81 TABLET ORAL DAILY
Status: DISCONTINUED | OUTPATIENT
Start: 2017-11-05 | End: 2017-11-06 | Stop reason: HOSPADM

## 2017-11-05 RX ORDER — ATORVASTATIN CALCIUM 10 MG/1
10 TABLET, FILM COATED ORAL NIGHTLY
Status: DISCONTINUED | OUTPATIENT
Start: 2017-11-05 | End: 2017-11-06 | Stop reason: HOSPADM

## 2017-11-05 RX ADMIN — NITROGLYCERIN 0.4 MG: 0.4 TABLET SUBLINGUAL at 05:19

## 2017-11-05 RX ADMIN — HYDROCODONE BITARTRATE AND ACETAMINOPHEN 1 TABLET: 7.5; 325 TABLET ORAL at 22:08

## 2017-11-05 RX ADMIN — NITROGLYCERIN 0.4 MG: 0.4 TABLET SUBLINGUAL at 10:11

## 2017-11-05 RX ADMIN — HYDROCODONE BITARTRATE AND ACETAMINOPHEN 1 TABLET: 7.5; 325 TABLET ORAL at 18:21

## 2017-11-05 RX ADMIN — HYDROCODONE BITARTRATE AND ACETAMINOPHEN 1 TABLET: 7.5; 325 TABLET ORAL at 13:59

## 2017-11-05 RX ADMIN — ASPIRIN 81 MG: 81 TABLET ORAL at 09:52

## 2017-11-05 RX ADMIN — HYDROCODONE BITARTRATE AND ACETAMINOPHEN 1 TABLET: 7.5; 325 TABLET ORAL at 09:50

## 2017-11-05 RX ADMIN — ATORVASTATIN CALCIUM 10 MG: 10 TABLET, FILM COATED ORAL at 21:23

## 2017-11-05 RX ADMIN — SODIUM CHLORIDE 100 ML/HR: 9 INJECTION, SOLUTION INTRAVENOUS at 00:19

## 2017-11-05 RX ADMIN — ASPIRIN 81 MG 324 MG: 81 TABLET ORAL at 00:19

## 2017-11-05 RX ADMIN — ISOSORBIDE MONONITRATE 30 MG: 30 TABLET ORAL at 10:50

## 2017-11-05 RX ADMIN — LIDOCAINE 1 PATCH: 50 PATCH CUTANEOUS at 13:37

## 2017-11-05 RX ADMIN — LEVOTHYROXINE SODIUM 150 MCG: 150 TABLET ORAL at 06:25

## 2017-11-05 NOTE — H&P
[facility] - CARDIOLOGY  HISTORY AND PHYSICAL    Date of Admission: 11/4/2017  Primary Care Physician: Juan Dahl DO    Subjective     Chief Complaint: chest pain    History of Present Illness    42-year-old with hyperlipidemia and tobacco abuse, along with significant family history of coronary disease, who presented to the emergency room after recurrent episodes of chest discomfort since [place] morning.  Was described as substernal bubble burning that would have acute onset and intense, severe pain.  Patient attempted antacids for the pain to no avail.  She then took her fiancé's nitroglycerin, with immediate relief.  Pain recurred 3 separate times yesterday requiring nitroglycerin, then more frequently today.  Upon arrival emergency department, she was still having pain requiring nitroglycerin, ultimately being placed on an infusion.  When I evaluated her on an infusion of NTG, she was still complaining of mild and ongoing chest discomfort.    Review of Systems   Constitutional: Negative for fatigue and unexpected weight change.   HENT: Negative for nosebleeds and trouble swallowing.    Respiratory: Negative for wheezing.    Cardiovascular: Negative for leg swelling.   Gastrointestinal: Negative for abdominal distention and blood in stool.   Endocrine: Negative for cold intolerance, heat intolerance, polydipsia, polyphagia and polyuria.   Genitourinary: Negative for hematuria and vaginal bleeding.   Musculoskeletal: Negative for arthralgias and joint swelling.   Skin: Negative for color change and pallor.   Neurological: Negative for syncope.   Hematological: Does not bruise/bleed easily.   Psychiatric/Behavioral: Negative for confusion. The patient is not nervous/anxious.       Otherwise complete ROS reviewed and negative except as mentioned in the HPI.    Past Medical History:   Past Medical History:   Diagnosis Date   • COPD (chronic obstructive pulmonary disease)        Past Surgical History:  Past  "Surgical History:   Procedure Laterality Date   • APPENDECTOMY     • HYSTERECTOMY     • THYROID SURGERY     • TOTAL THYROIDECTOMY         Family History: family history is not on file.    Social History:  reports that she has been smoking Cigarettes.  She has been smoking about 1.00 pack per day. She does not have any smokeless tobacco history on file. She reports that she does not drink alcohol or use illicit drugs.    Medications:  Prior to Admission medications    Medication Sig Start Date End Date Taking? Authorizing Provider   MethylPREDNISolone (MEDROL, TUTU,) 4 MG tablet Take  by mouth Daily. Take as directed on package instructions.   Yes Historical Provider, MD   nitroglycerin (NITROSTAT) 0.4 MG SL tablet Place 0.4 mg under the tongue Every 5 (Five) Minutes As Needed for Chest Pain. Take no more than 3 doses in 15 minutes.   Yes Historical Provider, MD   buPROPion (WELLBUTRIN) 100 MG tablet Take 100 mg by mouth.    Historical Provider, MD   HYDROcodone-acetaminophen (NORCO) 7.5-325 MG per tablet Take 1 tablet by mouth Every 4 (Four) Hours As Needed for Moderate Pain . 10/19/17   Savage Javier Jr., MD   indomethacin (INDOCIN) 50 MG capsule Take 1 capsule by mouth 3 (Three) Times a Day With Meals. 10/19/17   Savage Javier Jr., MD   levothyroxine (SYNTHROID, LEVOTHROID) 150 MCG tablet Take  by mouth.    Historical Provider, MD     Allergies:  No Known Allergies    Objective     Vital Signs: /94  Pulse 85  Temp 97.8 °F (36.6 °C)  Resp 16  Ht 69\" (175.3 cm)  Wt 139 lb (63 kg)  SpO2 99%  BMI 20.53 kg/m2    Physical Exam   Constitutional: No distress.   HENT:   Mouth/Throat: Oropharynx is clear. Pharynx is normal.   Neck: Normal range of motion and thyroid normal. Neck supple. No JVD present. No thyromegaly present.   Cardiovascular: Normal rate, regular rhythm, S1 normal, S2 normal, intact distal pulses and normal pulses.   No extrasystoles are present. PMI is not displaced.    Murmur heard.   " Blowing systolic murmur is present with a grade of 2/6  at the lower left sternal border  Pulmonary/Chest: Effort normal. She has bibasilar rales.   Abdominal: Soft. Bowel sounds are normal. She exhibits no distension. There is no splenomegaly or hepatomegaly. There is no tenderness.   Musculoskeletal: She exhibits no edema or tenderness.   Neurological: She is alert and oriented to person, place, and time.   Skin: Skin is warm and dry.       Results Reviewed:  I have reviewed all laboratory data, with pertinent findings: initial TrI 0.345, glc 171, WBC 15.8 and o/w unremarkable    I have reviewed the chest x-ray report: no acute CP process    I have reviewed telemetry, which shows NSR    I have visualized all the electrocardiograms performed, with my interpretations to follow: NSR, no ischemic changes      Assessment / Plan        Hospital Problem List     * (Principal)NSTEMI (non-ST elevated myocardial infarction)    Overview Signed 11/4/2017  9:01 PM by Luis Colvin MD     Added automatically from request for surgery [id number]         Tobacco abuse    Mixed hyperlipidemia    Raynaud's disease without gangrene        Given on-going discomfort on NTG infusion, will proceed urgently to cardiac catheterization lab  -Further recommendations pending findings of procedure    Code Status: full     I discussed the patients findings and my recommendations with: patient    Estimated length of stay: 1-2 days    Luis Colvin MD   11/04/17   9:24 PM

## 2017-11-05 NOTE — PROGRESS NOTES
[facility] -  Progress Note     LOS: 1 day   Patient Care Team:  Juan Dahl DO as PCP - General (Internal Medicine)    Chief Complaint: Chest pain    Subjective     Interval History: Patient underwent coronary angiography last via radial approach and tolerated the procedure well.  She did have one recurrent bout of chest discomfort overnight, requiring more sublingual nitroglycerin with complete resolution.  She has remained on nitroglycerin infusion all night long.  My initial evaluation this morning, the patient had been doing well and our plans were to send her out to the floor.  However, she then had another acute onset of chest discomfort.  It was noted that her blood pressure was extremely elevated, but was unclear whether despite prior to the onset of pain or came directly afterwards as result of the pain.  Nonetheless, one sublingual nitroglycerin was administered with complete and prompt resolution of the discomfort.  ECG was obtained during pain and showed no obvious ST elevation, but did have nonspecific ST abnormalities in the form of diffuse ST depression, along with multiple PACs accompanying the sinus tachycardia (no malignant arrhythmias noted).    When patient is not having pain, she feels completely normally.  During episodes, she has acute onset of severe burning across her chest associated with jaw pain and dyspnea.    Review of Systems:  Review of Systems   Constitution: Negative for malaise/fatigue.   Eyes: Positive for vision loss in left eye and vision loss in right eye.   Cardiovascular: Negative for chest pain, claudication, dyspnea on exertion, leg swelling, near-syncope, orthopnea, palpitations, paroxysmal nocturnal dyspnea and syncope.   Respiratory: Negative for shortness of breath.    Hematologic/Lymphatic: Does not bruise/bleed easily.   Neurological: Positive for headaches.       Vital Sign Min/Max for last 24 hours  Temp  Min: 97.3 °F (36.3 °C)   Max: 98.4 °F (36.9 °C)   BP  Min: 101/62  Max: 191/127   Pulse  Min: 53  Max: 111   Resp  Min: 14  Max: 18   SpO2  Min: 96 %  Max: 100 %   Flow (L/min)  Min: 3  Max: 3   Weight  Min: 139 lb (63 kg)  Max: 144 lb 4.8 oz (65.5 kg)     Last Weight    11/04/17  2206   Weight: 144 lb 4.8 oz (65.5 kg)       Physical Exam:   Physical Exam   Constitutional: No distress.   Neck: No JVD present.   Cardiovascular: Normal rate, regular rhythm, S1 normal, S2 normal, normal heart sounds, intact distal pulses and normal pulses.    Pulmonary/Chest: Effort normal and breath sounds normal.   Abdominal: Soft. There is no tenderness.   Neurological: She is alert and oriented to person, place, and time.   Skin: Skin is warm and dry.       Medication Review: yes  Current Facility-Administered Medications   Medication Dose Route Frequency Provider Last Rate Last Dose   • aspirin EC tablet 81 mg  81 mg Oral Daily Lusi Colvin MD   81 mg at 11/05/17 0952   • HYDROcodone-acetaminophen (NORCO) 7.5-325 MG per tablet 1 tablet  1 tablet Oral Q4H PRN Luis Colvin MD   1 tablet at 11/05/17 0950   • Influenza Vac Subunit Quad (FLUCELVAX) injection 0.5 mL  0.5 mL Intramuscular During Hospitalization Luis Colvin MD       • isosorbide mononitrate (IMDUR) 24 hr tablet 30 mg  30 mg Oral Q24H Luis Colvin MD   30 mg at 11/05/17 1050   • levothyroxine (SYNTHROID, LEVOTHROID) tablet 150 mcg  150 mcg Oral Q AM Luis Colvin MD   150 mcg at 11/05/17 0625   • nitroglycerin (NITROSTAT) SL tablet 0.4 mg  0.4 mg Sublingual Q5 Min PRN Luis Colvin MD   0.4 mg at 11/05/17 1011   • nitroglycerin 50 mg/250 mL (0.2 mg/mL) infusion  10-50 mcg/min Intravenous Titrated Edgar Barton MD   Stopped at 11/05/17 0830   • pravastatin (PRAVACHOL) tablet 40 mg  40 mg Oral Nightly Luis Colvin MD       • sodium chloride 0.9 % flush 10 mL  10 mL Intravenous PRN Edgar Barton MD       • sodium chloride 0.9 % infusion  125 mL/hr Intravenous Continuous  Edgar Barton  mL/hr at 11/04/17 2025 125 mL/hr at 11/04/17 2025   • sodium chloride 0.9 % infusion  100 mL/hr Intravenous Continuous Luis Colvin MD   Stopped at 11/05/17 0830         Results Review:   I have reviewed all laboratory data, with pertinent findings: Troponin has continued to trend down since admission from 0.345.  BMP within normal limits, the exception of glucose 127.  WBC 12.1, hemoglobin 10.9, hematocrit 33.7, platelets 327.    I have reviewed telemetry, which shows NSR with one 4 beat run of nonsustained VT overnight.  During episode of pain today, noted sinus tachycardia with frequent PACs    ECG performed while patient was having pain was personally interpreted by myself: Sinus tachycardia, frequent PACs.  Nonspecific ST depression diffusely.    Echocardiogram reviewed: Normal LVEF.  No wall motion noted.  Normal size right ventricle.  Assessment/Plan     Principal Problem:    NSTEMI (non-ST elevated myocardial infarction) - etiology still unclear.  Given the appearance on echocardiogram, and responsiveness to nitroglycerin, I suspect this could be vasospastic angina (Prinzmetal's angina).  However, her blood pressure does spike quite remarkably, and it is unclear whether this is the cause of the pain or a direct effect of the pain.  With her history of thyroid problems, pheochromocytoma comes to mind as a possible cause for acute spikes in blood pressure that that would cause chest pain, as part of a multiple endocrine neoplasia (MEN) syndrome.     -We will start Imdur 30 mg daily  -If patient does not respond to Imdur, then we will consider consultation with internal medicine or endocrinology to assist in workup for pheochromocytoma,  including urine catecholamine collection    Active Problems:    Tobacco abuse - emphasized the need for cessation.    Mixed hyperlipidemia LDL well controlled on pravastatin.  Continue pravastatin.    Raynaud's disease without gangrene        Luis  JANNA Colvin MD  11/05/17  12:18 PM

## 2017-11-05 NOTE — ED PROVIDER NOTES
"    [facility]  eMERGENCY dEPARTMENT eNCOUnter      Pt Name: Minnie Bang  MRN: [id number]  Birthdate 1975  Date of evaluation: 11/4/2017      CHIEF COMPLAINT       Chief Complaint   Patient presents with   • Chest Pain       Nurses Notes reviewed and I agree except as noted in the HPI.      HISTORY OF PRESENT ILLNESS    Minnie Bang is a 42 y.o. female who presents   Location/Symptom: Complaining of chest pain.  Timing/Onset: The chest pain started early today.  Context/Setting: Patient has had some chronic neck and shoulder pain.  She saw her the emergency physician here and it was felt to be musculoskeletal.  She was then referred back to her family doctor who also felt it was musculoskeletal.  She was given some trigger point injections but the discomfort continued.  Today she started having this pain in her chest.  It radiated up into her right jaw.  She was little bit short of breath with this.  Quality: Like heartburn, \"horrible pain\"  Duration: It was constant earlier today.  Modifying Factors: Relieved with nitroglycerin.  Severity: Currently a 0 however at worst it was a 10.   The patient actually took her fiancé's nitroglycerin.  She has taken a total of 7 tablets today because of the pain returning.  Nitroglycerin has relieved the pain each time.    She has had episodes of hypertension in the past.  She is a smoker and there is a strong family history of heart disease.    REVIEW OF SYSTEMS     Review of Systems   Constitutional: Negative for chills and fever.   HENT: Negative for ear pain, rhinorrhea and sore throat.    Eyes: Negative for pain, discharge and visual disturbance.   Respiratory: Positive for shortness of breath. Negative for cough.    Cardiovascular: Positive for chest pain. Negative for palpitations.   Gastrointestinal: Negative for abdominal pain, diarrhea, nausea and vomiting.   Genitourinary: Negative for difficulty urinating, dysuria and hematuria. " "  Musculoskeletal: Positive for neck pain. Negative for gait problem and neck stiffness.   Skin: Negative for rash and wound.   Neurological: Negative for dizziness, syncope and headaches.   Psychiatric/Behavioral: Negative for self-injury and suicidal ideas. The patient is not nervous/anxious.          PAST MEDICAL HISTORY     Past Medical History:   Diagnosis Date   • COPD (chronic obstructive pulmonary disease)        SURGICAL HISTORY      has a past surgical history that includes Total thyroidectomy; Hysterectomy; Appendectomy; and Thyroid surgery.    CURRENT MEDICATIONS        Medication List      ASK your doctor about these medications          buPROPion 100 MG tablet   Commonly known as:  WELLBUTRIN       HYDROcodone-acetaminophen 7.5-325 MG per tablet   Commonly known as:  NORCO   Take 1 tablet by mouth Every 4 (Four) Hours As Needed for Moderate Pain .       indomethacin 50 MG capsule   Commonly known as:  INDOCIN   Take 1 capsule by mouth 3 (Three) Times a Day With Meals.       levothyroxine 150 MCG tablet   Commonly known as:  SYNTHROID, LEVOTHROID       MethylPREDNISolone 4 MG tablet   Commonly known as:  MEDROL (TUTU)       nitroglycerin 0.4 MG SL tablet   Commonly known as:  NITROSTAT           ALLERGIES     has No Known Allergies.    FAMILY HISTORY     has no family status information on file.  family history is not on file.    SOCIAL HISTORY      reports that she has been smoking Cigarettes.  She has been smoking about 1.00 pack per day. She does not have any smokeless tobacco history on file. She reports that she does not drink alcohol or use illicit drugs.    PHYSICAL EXAM     INITIAL VITALS:  height is 69\" (175.3 cm) and weight is 139 lb (63 kg). Her temperature is 97.8 °F (36.6 °C). Her blood pressure is 144/89 and her pulse is 93. Her respiration is 16 and oxygen saturation is 98%.    Physical Exam   Constitutional: She is oriented to person, place, and time. She appears well-developed and " well-nourished.   HENT:   Head: Normocephalic and atraumatic.   Right Ear: External ear normal.   Left Ear: External ear normal.   Mouth/Throat: Oropharynx is clear and moist.   Eyes: EOM are normal. Pupils are equal, round, and reactive to light. Right eye exhibits no discharge. Left eye exhibits no discharge. No scleral icterus.   Neck: Normal range of motion. Neck supple.   Cardiovascular: Normal rate and regular rhythm.    No murmur heard.  Pulmonary/Chest: Effort normal and breath sounds normal. No stridor. No respiratory distress. She has no wheezes.   Abdominal: Soft. She exhibits no distension. There is no tenderness. There is no rebound and no guarding.   Musculoskeletal: Normal range of motion. She exhibits no edema or deformity.   Neurological: She is alert and oriented to person, place, and time. No cranial nerve deficit. Coordination normal.   Skin: Skin is warm and dry. No rash (On Exposed Surfaces) noted. She is not diaphoretic.   Psychiatric: She has a normal mood and affect. Her behavior is normal. Thought content normal.   Nursing note and vitals reviewed.        DIFFERENTIAL DIAGNOSIS:    Patient with atypical shoulder and neck pain that may or may not have been a cardiac equivalent.  Protocol was followed in triage and at the time I am seeing the patient the patient's troponin has come back at 0.354.    DIAGNOSTIC RESULTS     EKG: All EKG's are interpreted by the Emergency Department Physician who either signs or Co-signs this chart in the absence of a cardiologist.   EKG visualized interpreted by myself shows sinus rhythm at a rate is 77.  Axis 68, ST-T wave and intervals are all within normal limits.    RADIOLOGY: non-plain film images(s) such as CT, Ultrasound and MRI are read by the radiologist.  Plain radiographic images are visualized and preliminarily interpreted by the emergency physician unless otherwise stated below.  Xr Chest 1 View    Result Date: 11/4/2017  Narrative: EXAMINATION:    XR CHEST 1 VW-  11/4/2017 8:22 PM CDT  HISTORY: Chest pain protocol  Frontal upright radiograph of the chest 11/4/2017 8:00 PM CDT  COMPARISON: None.  FINDINGS: The lungs are clear. The cardiomediastinal silhouette and pulmonary vascularity are within normal limits.  The osseous structures and surrounding soft tissues demonstrate no acute abnormality.      Impression: 1. No radiographic evidence of acute cardiopulmonary process.   This report was finalized on 11/04/2017 20:22 by Dr. George Gonzalez MD.             LABS:   Lab Results (last 24 hours)     Procedure Component Value Units Date/Time    Troponin [[id number]]  (Abnormal) Collected:  11/04/17 1833    Specimen:  Blood from Arm, Left Updated:  11/04/17 1914     Troponin I 0.345 (H) ng/mL     CBC & Differential [[id number]] Collected:  11/04/17 1833    Specimen:  Blood Updated:  11/04/17 1857    Narrative:       The following orders were created for panel order CBC & Differential.  Procedure                               Abnormality         Status                     ---------                               -----------         ------                     CBC Auto Differential[[id number]]        Abnormal            Final result                 Please view results for these tests on the individual orders.    Comprehensive Metabolic Panel [[id number]]  (Abnormal) Collected:  11/04/17 1833    Specimen:  Blood from Arm, Left Updated:  11/04/17 1903     Glucose 171 (H) mg/dL      BUN 20 mg/dL      Creatinine 0.61 mg/dL      Sodium 144 mmol/L      Potassium 3.6 mmol/L      Chloride 105 mmol/L      CO2 21.0 (L) mmol/L      Calcium 9.8 mg/dL      Total Protein 7.5 g/dL      Albumin 4.50 g/dL      ALT (SGPT) 24 U/L      AST (SGOT) 29 U/L      Alkaline Phosphatase 74 U/L      Total Bilirubin 0.1 mg/dL      eGFR Non African Amer 107 mL/min/1.73      Globulin 3.0 gm/dL      A/G Ratio 1.5 g/dL      BUN/Creatinine Ratio 32.8 (H)     Anion Gap 18.0 (H) mmol/L     CBC Auto  "Differential [[id number]]  (Abnormal) Collected:  11/04/17 1833    Specimen:  Blood from Arm, Left Updated:  11/04/17 1857     WBC 15.79 (H) 10*3/mm3      RBC 3.68 (L) 10*6/mm3      Hemoglobin 11.5 (L) g/dL      Hematocrit 35.2 (L) %      MCV 95.7 fL      MCH 31.3 pg      MCHC 32.7 (L) g/dL      RDW 12.8 %      RDW-SD 44.7 fl      MPV 10.9 fL      Platelets 362 10*3/mm3      Neutrophil % 90.7 (H) %      Lymphocyte % 5.8 (L) %      Monocyte % 3.1 (L) %      Eosinophil % 0.0 %      Basophil % 0.1 %      Immature Grans % 0.3 %      Neutrophils, Absolute 14.33 (H) 10*3/mm3      Lymphocytes, Absolute 0.91 10*3/mm3      Monocytes, Absolute 0.49 10*3/mm3      Eosinophils, Absolute 0.00 10*3/mm3      Basophils, Absolute 0.01 10*3/mm3      Immature Grans, Absolute 0.05 (H) 10*3/mm3           EMERGENCY DEPARTMENT COURSE:   Vitals:    Vitals:    11/04/17 1843   BP: 144/89   Pulse: 93   Resp: 16   Temp: 97.8 °F (36.6 °C)   SpO2: 98%   Weight: 139 lb (63 kg)   Height: 69\" (175.3 cm)     Social course stable.  She was seen by cardiology and they will be taking her to the cardiac catheterization laboratory.  The patient was given the following medications:  Medications   aspirin chewable tablet 324 mg (not administered)   nitroglycerin 50 mg/250 mL (0.2 mg/mL) infusion (10 mcg/min Intravenous New Bag 11/4/17 2029)   sodium chloride 0.9 % infusion (125 mL/hr Intravenous New Bag 11/4/17 2025)   sodium chloride 0.9 % flush 10 mL (not administered)       CRITICAL CARE:  CRITICAL CARE: There was a high probability of clinically significant/life threatening deterioration in this patient's condition which required my urgent intervention.  Total critical care time was 30 minutes.  This excludes any time for separately reportable procedures.         CONSULTS:  Dr. Colvin    PROCEDURES:  None    FINAL IMPRESSION      1. NSTEMI (non-ST elevated myocardial infarction)    2. Precordial pain    3. Acute coronary syndrome          DISPOSITION/PLAN "   To cardiac cath lab      PATIENT REFERRED TO:  No follow-up provider specified.    DISCHARGE MEDICATIONS:     Medication List      ASK your doctor about these medications          buPROPion 100 MG tablet   Commonly known as:  WELLBUTRIN       HYDROcodone-acetaminophen 7.5-325 MG per tablet   Commonly known as:  NORCO   Take 1 tablet by mouth Every 4 (Four) Hours As Needed for Moderate Pain .       indomethacin 50 MG capsule   Commonly known as:  INDOCIN   Take 1 capsule by mouth 3 (Three) Times a Day With Meals.       levothyroxine 150 MCG tablet   Commonly known as:  SYNTHROID, LEVOTHROID       MethylPREDNISolone 4 MG tablet   Commonly known as:  MEDROL (TUTU)       nitroglycerin 0.4 MG SL tablet   Commonly known as:  NITROSTAT           (Please note that portions of this note were completed with a voice recognition program.  Efforts were made to edit the dictations but occasionally words are mis-transcribed.)    MD Edgar Gamez MD  11/04/17 7882

## 2017-11-05 NOTE — PLAN OF CARE
Problem: Patient Care Overview (Adult)  Goal: Plan of Care Review  Outcome: Ongoing (interventions implemented as appropriate)    11/05/17 1537   Coping/Psychosocial Response Interventions   Plan Of Care Reviewed With patient   Patient Care Overview   Progress no change   Outcome Evaluation   Outcome Summary/Follow up Plan VSS, s 64-69 on tele, no c/o chest pain;c/o chronic neck pain-prn meds as ordered with moderate relief, right radial cath site soft, pulses palpable, no new issues noted at this time/cont to monitor       Goal: Adult Individualization and Mutuality  Outcome: Ongoing (interventions implemented as appropriate)  Goal: Discharge Needs Assessment  Outcome: Ongoing (interventions implemented as appropriate)    Problem: Fall Risk (Adult)  Goal: Absence of Falls  Outcome: Ongoing (interventions implemented as appropriate)    Problem: Pain, Acute (Adult)  Goal: Acceptable Pain Control/Comfort Level  Outcome: Ongoing (interventions implemented as appropriate)    Problem: Skin Integrity Impairment, Risk/Actual (Adult)  Goal: Skin Integrity/Wound Healing  Outcome: Ongoing (interventions implemented as appropriate)    Problem: Acute Coronary Syndrome (ACS) (Adult)  Goal: Signs and Symptoms of Listed Potential Problems Will be Absent or Manageable (Acute Coronary Syndrome)  Outcome: Ongoing (interventions implemented as appropriate)

## 2017-11-05 NOTE — PLAN OF CARE
Problem: Patient Care Overview (Adult)  Goal: Plan of Care Review    11/05/17 0557   Coping/Psychosocial Response Interventions   Plan Of Care Reviewed With patient   Patient Care Overview   Progress improving         Problem: Fall Risk (Adult)  Goal: Identify Related Risk Factors and Signs and Symptoms  Outcome: Outcome(s) achieved Date Met:  11/05/17    Problem: Pain, Acute (Adult)  Goal: Identify Related Risk Factors and Signs and Symptoms  Outcome: Outcome(s) achieved Date Met:  11/05/17    Problem: Skin Integrity Impairment, Risk/Actual (Adult)  Goal: Identify Related Risk Factors and Signs and Symptoms  Outcome: Outcome(s) achieved Date Met:  11/05/17

## 2017-11-06 VITALS
OXYGEN SATURATION: 100 % | WEIGHT: 149.8 LBS | HEIGHT: 70 IN | DIASTOLIC BLOOD PRESSURE: 82 MMHG | SYSTOLIC BLOOD PRESSURE: 139 MMHG | RESPIRATION RATE: 18 BRPM | BODY MASS INDEX: 21.45 KG/M2 | TEMPERATURE: 98.7 F | HEART RATE: 65 BPM

## 2017-11-06 PROBLEM — I21.4 NSTEMI (NON-ST ELEVATED MYOCARDIAL INFARCTION) (HCC): Status: ACTIVE | Noted: 2017-11-04

## 2017-11-06 LAB
ANION GAP SERPL CALCULATED.3IONS-SCNC: 12 MMOL/L (ref 4–13)
BUN BLD-MCNC: 15 MG/DL (ref 5–21)
BUN/CREAT SERPL: 24.2 (ref 7–25)
CALCIUM SPEC-SCNC: 8.6 MG/DL (ref 8.4–10.4)
CHLORIDE SERPL-SCNC: 104 MMOL/L (ref 98–110)
CO2 SERPL-SCNC: 26 MMOL/L (ref 24–31)
CREAT BLD-MCNC: 0.62 MG/DL (ref 0.5–1.4)
GFR SERPL CREATININE-BSD FRML MDRD: 105 ML/MIN/1.73
GLUCOSE BLD-MCNC: 105 MG/DL (ref 70–100)
HBA1C MFR BLD: 4.8 %
POTASSIUM BLD-SCNC: 3.2 MMOL/L (ref 3.5–5.3)
SODIUM BLD-SCNC: 142 MMOL/L (ref 135–145)

## 2017-11-06 PROCEDURE — 90661 CCIIV3 VAC ABX FR 0.5 ML IM: CPT | Performed by: INTERNAL MEDICINE

## 2017-11-06 PROCEDURE — 99239 HOSP IP/OBS DSCHRG MGMT >30: CPT | Performed by: INTERNAL MEDICINE

## 2017-11-06 PROCEDURE — 80048 BASIC METABOLIC PNL TOTAL CA: CPT | Performed by: INTERNAL MEDICINE

## 2017-11-06 PROCEDURE — G0008 ADMIN INFLUENZA VIRUS VAC: HCPCS | Performed by: INTERNAL MEDICINE

## 2017-11-06 PROCEDURE — 25010000002 INFLUENZA VAC SUBUNIT QUAD 0.5 ML SUSPENSION PREFILLED SYRINGE: Performed by: INTERNAL MEDICINE

## 2017-11-06 RX ORDER — ASPIRIN 81 MG/1
81 TABLET ORAL DAILY
Qty: 30 TABLET | Refills: 11 | Status: SHIPPED | OUTPATIENT
Start: 2017-11-06 | End: 2023-03-06

## 2017-11-06 RX ORDER — NITROGLYCERIN 0.4 MG/1
0.4 TABLET SUBLINGUAL
Qty: 30 TABLET | Refills: 11 | OUTPATIENT
Start: 2017-11-06 | End: 2023-02-19

## 2017-11-06 RX ORDER — ISOSORBIDE MONONITRATE 30 MG/1
30 TABLET, EXTENDED RELEASE ORAL
Qty: 30 TABLET | Refills: 11 | OUTPATIENT
Start: 2017-11-06 | End: 2023-02-19

## 2017-11-06 RX ADMIN — LEVOTHYROXINE SODIUM 150 MCG: 150 TABLET ORAL at 05:07

## 2017-11-06 RX ADMIN — HYDROCODONE BITARTRATE AND ACETAMINOPHEN 1 TABLET: 7.5; 325 TABLET ORAL at 05:07

## 2017-11-06 RX ADMIN — ISOSORBIDE MONONITRATE 30 MG: 30 TABLET ORAL at 09:07

## 2017-11-06 RX ADMIN — A/SINGAPORE/GP1908/2015 IVR-180 (H1N1) (AN A/MICHIGAN/45/2015-LIKE VIRUS), A/SINGAPORE/GP2050/2015 (H3N2) (AN A/HONG KONG/4801/2014 - LIKE VIRUS), B/UTAH/9/2014 (A B/PHUKET/3073/2013-LIKE VIRUS), B/HONG KONG/259/2010 (A B/BRISBANE/60/08-LIKE VIRUS) 0.5 ML: 15; 15; 15; 15 INJECTION, SUSPENSION INTRAMUSCULAR at 09:07

## 2017-11-06 RX ADMIN — ASPIRIN 81 MG: 81 TABLET ORAL at 09:07

## 2017-11-06 RX ADMIN — HYDROCODONE BITARTRATE AND ACETAMINOPHEN 1 TABLET: 7.5; 325 TABLET ORAL at 09:07

## 2017-11-06 NOTE — PLAN OF CARE
Problem: Patient Care Overview (Adult)  Goal: Plan of Care Review  Outcome: Ongoing (interventions implemented as appropriate)    11/06/17 0220   Coping/Psychosocial Response Interventions   Plan Of Care Reviewed With patient   Patient Care Overview   Progress no change   Outcome Evaluation   Outcome Summary/Follow up Plan VSS. Pt has had no c/o chest pain this shift. Pt started on Imdur today. R radial cath site C/D/I with palpable pulses. Will continue to monitor and notify MD of changes.       Goal: Adult Individualization and Mutuality  Outcome: Ongoing (interventions implemented as appropriate)    Problem: Fall Risk (Adult)  Goal: Absence of Falls  Outcome: Ongoing (interventions implemented as appropriate)    Problem: Pain, Acute (Adult)  Goal: Acceptable Pain Control/Comfort Level  Outcome: Ongoing (interventions implemented as appropriate)    Problem: Skin Integrity Impairment, Risk/Actual (Adult)  Goal: Skin Integrity/Wound Healing  Outcome: Ongoing (interventions implemented as appropriate)    Problem: Acute Coronary Syndrome (ACS) (Adult)  Goal: Signs and Symptoms of Listed Potential Problems Will be Absent or Manageable (Acute Coronary Syndrome)  Outcome: Ongoing (interventions implemented as appropriate)

## 2017-11-06 NOTE — PAYOR COMM NOTE
"Robles Singh (42 y.o. Female)     Att  LAKEISHA   [id number]   [facility] phone    Fax          Date of Birth Social Security Number Address Home Phone MRN    1975  [address] [place] [id number] [phone] [id number]    Mu-ism Marital Status          Congregational        Admission Date Admission Type Admitting Provider Attending Provider Department, Room/Bed    11/4/17 Emergency Luis Colvin MD Rains, Martin G, MD [facility] 4B, 430/1    Discharge Date Discharge Disposition Discharge Destination         Home or Self Care             Attending Provider: Luis Colvin MD     Allergies:  No Known Allergies    Isolation:  None   Infection:  None   Code Status:  FULL    Ht:  70\" (177.8 cm)   Wt:  149 lb 12.8 oz (67.9 kg)    Admission Cmt:  None   Principal Problem:  NSTEMI (non-ST elevated myocardial infarction) [I21.4] More...                 Active Insurance as of 11/4/2017     Primary Coverage     Payor Plan Insurance Group Employer/Plan Group    PASSPORT PASSPORT MEDICAID     Payor Plan Address Payor Plan Phone Number Effective From Effective To    [address] [phone] 6/22/2017     [place] [address]       Subscriber Name Subscriber Birth Date Member ID       ROBLES SINGH 1975 [id number]                 Emergency Contacts      (Rel.) Home Phone Work Phone Mobile Phone    Alfredo Montgomery (Significant Other) [phone] -- --        Tavia Bryant RN Registered Nurse Signed  Nursing Note Date of Service: 11/5/2017  5:19 AM         []Hide copied text  []Hover for attribution information  Patient up to BSC, began to c/o chest pain \"just like it felt before,\" and right side jaw pain. PRN SL nitro administered., MD notified, stat EKG and stat troponin obtain. Pt reports pain of 8/10. SBP 180s-190s. CP has since resolved. No further c/o pain. Continue to assess.                      [place] " [facility] CATH LAB  [address] [place] [id number] [phone]             Patient Information   Patient Name MRN Sex  (Age)   Minnie Bang [id number] Female 1975 (42 y.o.)   Race Ethnicity Encounter Category   White or  Not  or  Emergency   Procedures   Coronary angiography   Percutaneous Coronary Intervention    Performed Date   2017      Physicians   Panel Physicians Referring Physician Case Authorizing Physician   Luis Colvin MD (Primary)  Luis Colvin MD   Indications   NSTEMI (non-ST elevated myocardial infarction) [I21.4 (ICD-10-CM)]       Conclusion     [facility]  Date of procedure: 2017      Procedures performed:      1. Selective coronary angiography  2. Left heart catheterization  3. Left ventriculography  4. Supervision of the administration of moderate sedation      Indication: high risk NSTEMI  Premedication: Versed, Fentanyl  Contrast: Isovue 370 - 120 ml to patient  Radiation: Flouro time= 3:38. Air Kerma= 220.66 mGy  Catheters: 5F Norm, 6Fr angled pigtail     Procedural details:  The patient was brought to the cath lab and prepped and draped in the usual fashion. A Seldinger technique was used to place a 5/6 Fr sheath in the right radial artery.  Intra-arterial verapamil, nitroglycerin, and heparin were administered. A 5 Fr Norm catheter was used to engage the left main coronary artery for left system angiography. That was  Then engaged in the ostium of the right coronary artery for right system angiography.  It was then exchanged over a long wire for a 6 Turkmen angled pigtail, which was advanced into the ventricle to perform left ventriculography and assess left ventricular pressures.  The catheter and sheath were then removed and arterial hemostasis was obtained using TR band. There were no obvious complications and the patient was transferred to the ICU in hemodynamically stable  condition.     I supervised the administration of conscious sedation by nursing staff throughout the case.  First dose was given at 2118 and the end of my face-to-face encounter was at 2148, accounting for a total of 30 minutes of supervision.  During the case, continuous pulse oximetry, heart rate, blood pressure, and patient status were monitored.      Findings:     Hemodynamics:  Ev=430/75, IX=255, LVEDP=16, AV grad=negligible     Left ventriculography:  1. The contractility of the left ventricle is normal.  Estimated ejection fraction 60 %.  2. The left ventricle is normal in wall thickness and chamber size.  3. There is no significant mitral regurgitation or aortic insufficiency.     Selective coronary angiography:   Dominance: Right  Left Main coronary artery: Short vessel arising normally from the left cusp that bifurcates.  Angiographically normal.   Left anterior descending artery: Moderate size vessel arising from the distal left main the supplies 3 very small diagonal branches and multiple septal perforators before wrapping the apex.  Angiographically normal.   Left circumflex:  Large vessel arising normally from the left main that is nondominant for posterior circulation, but supplies one small tortuous OM and then to large posterolateral branches.   Right coronary artery:  Moderate to large vessel arising normally from the right cusp that is dominant for posterior circulation, supplying the PDA.  Minor luminal irregularities.      Impression:  1.  Normal coronary arteries.  2.  Normal left ventricular ejection fraction with no wall motion abnormalities identified.  3.  Normal left-sided filling pressures.  4.  Unclear source for chest pain with troponin elevation.  Differential diagnosis includes vasospastic angina, myocarditis, or non-cardiac sources like PE.      Plan:   1.  TR band off in 2 hours  2.  Continue aggressive risk factor modification, including smoking cessation  3.  ICU overnight;  continue NTG infusion for now     Luis Colvin MD         Radiation      Event Details User   9:52 PM Radiation Tracking Cumulative Air Kerma: Total Dose (mGy) = 221.000  Physician: Luis Colvin MD  Dose (mGy) = 221.000  Fluoro Time (mins) = 3.4  DAP (Gy-cm2) = 0.120 CC   Stent Inflated      Event Details User   No information to display   Balloon Inflated      Event Details User   No information to display   Medications   As of 11/04/17 2155   (Filter: BH CV CONTRAST GROUPER Medications Shown)   iopamidol (ISOVUE-370) 76 % injection (mL)   Total dose:  200 mL    Dose Action Route Admin Date/Time    Admin User   200 mL Given Intra-arterial 11/04/17 2151  Luis Colvin MD         Printable Result Report   Result Report    Encounter   View Encounter      PACS Images   Show images for Cardiac Catheterization/Vascular Study   Signed   Electronically signed by Luis Colvin MD on 11/4/17 at 2213 CDT   Encounter-Level Cardiology Documents:   There are no encounter-level cardiology documents.    Link to Procedure Log   Procedure Log      Results Routing Tracking   View Results Routing Information   Cardiac Catheterization/Vascular Study [CATH01] (Order [id number])   Cardiac Cath   Date: 11/4/2017 Department: [address] Released By: Isaac Portlilo, RN Authorizing: Luis Colvin MD   Order History   Inpatient   Date/Time Action Taken User Additional Information   11/04/17 2102 Release Isaac Portillo RN From Order: [id number]   11/04/17 2112 Result Senia Coyle RN In process   11/04/17 2203 Result Luis Colvin MD Final   11/06/17 0630 Complete Ed Camacho    Order Details   Frequency Duration Priority Order Class   Once 1  occurrence STAT Hospital Performed   Start Date/Time   Start Date Start Time   11/04/17 2103   Procedures Performed   Code Procedure Name   CATH03 CORONARY ANGIOGRAPHY   CATH02 PERC CORONARY INTERVENTION   Completion Info   User Date/Time   Ed Camacho 11/06/17 0630    Clinical Indications      ICD-10-CM ICD-9-CM   NSTEMI (non-ST elevated myocardial infarction)  - Primary     I21.4 410.70   Acknowledgement Info   For At Acknowledged By Acknowledged On   Placing Order 11/04/17 2102 Isaac Portillo RN 11/04/17 2102   Reprint Order Requisition   Cardiac Catheterization/Vascular Study (Order #[id number]) on 11/4/17   Encounter-Level Cardiology Documents:   There are no encounter-level cardiology documents.   Encounter   View Encounter   Appointments for this Order   11/4/2017  [facility] Cath Lab   Order Provider Info       Office phone Pager E-mail   Ordering User Isaac Portillo RN -- -- --   Authorizing Provider Luis Colvin [phone] -- --   Attending Provider Luis Colvin [phone] -- --   Billing Provider Luis Colvin [phone] -- --   Linked Charges   No charges linked to this procedure   Order Transmittal Tracking   Cardiac Catheterization/Vascular Study (Order #[id number]) on 11/4/17   Authorized by:  Luis Colvin MD  (NPI: [id number])       Reviewed By List   Luis Colvin MD on 11/4/2017 10:47 PM            History & Physical      Luis Colvin MD at 11/4/2017  9:20 PM              [facility] - CARDIOLOGY  HISTORY AND PHYSICAL    Date of Admission: 11/4/2017  Primary Care Physician: Juan Dahl DO    Subjective     Chief Complaint: chest pain    History of Present Illness    42-year-old with hyperlipidemia and tobacco abuse, along with significant family history of coronary disease, who presented to the emergency room after recurrent episodes of chest discomfort since [place] morning.  Was described as substernal bubble burning that would have acute onset and intense, severe pain.  Patient attempted antacids for the pain to no avail.  She then took her fiancé's nitroglycerin, with immediate relief.  Pain recurred 3 separate times yesterday requiring nitroglycerin, then more frequently today.  Upon arrival emergency department, she was still  having pain requiring nitroglycerin, ultimately being placed on an infusion.  When I evaluated her on an infusion of NTG, she was still complaining of mild and ongoing chest discomfort.    Review of Systems   Constitutional: Negative for fatigue and unexpected weight change.   HENT: Negative for nosebleeds and trouble swallowing.    Respiratory: Negative for wheezing.    Cardiovascular: Negative for leg swelling.   Gastrointestinal: Negative for abdominal distention and blood in stool.   Endocrine: Negative for cold intolerance, heat intolerance, polydipsia, polyphagia and polyuria.   Genitourinary: Negative for hematuria and vaginal bleeding.   Musculoskeletal: Negative for arthralgias and joint swelling.   Skin: Negative for color change and pallor.   Neurological: Negative for syncope.   Hematological: Does not bruise/bleed easily.   Psychiatric/Behavioral: Negative for confusion. The patient is not nervous/anxious.       Otherwise complete ROS reviewed and negative except as mentioned in the HPI.    Past Medical History:   Past Medical History:   Diagnosis Date   • COPD (chronic obstructive pulmonary disease)        Past Surgical History:  Past Surgical History:   Procedure Laterality Date   • APPENDECTOMY     • HYSTERECTOMY     • THYROID SURGERY     • TOTAL THYROIDECTOMY         Family History: family history is not on file.    Social History:  reports that she has been smoking Cigarettes.  She has been smoking about 1.00 pack per day. She does not have any smokeless tobacco history on file. She reports that she does not drink alcohol or use illicit drugs.    Medications:  Prior to Admission medications    Medication Sig Start Date End Date Taking? Authorizing Provider   MethylPREDNISolone (MEDROL, TUTU,) 4 MG tablet Take  by mouth Daily. Take as directed on package instructions.   Yes Historical Provider, MD   nitroglycerin (NITROSTAT) 0.4 MG SL tablet Place 0.4 mg under the tongue Every 5 (Five) Minutes As  "Needed for Chest Pain. Take no more than 3 doses in 15 minutes.   Yes Historical Provider, MD   buPROPion (WELLBUTRIN) 100 MG tablet Take 100 mg by mouth.    Historical Provider, MD   HYDROcodone-acetaminophen (NORCO) 7.5-325 MG per tablet Take 1 tablet by mouth Every 4 (Four) Hours As Needed for Moderate Pain . 10/19/17   Savage Javier Jr., MD   indomethacin (INDOCIN) 50 MG capsule Take 1 capsule by mouth 3 (Three) Times a Day With Meals. 10/19/17   Savage Javier Jr., MD   levothyroxine (SYNTHROID, LEVOTHROID) 150 MCG tablet Take  by mouth.    Historical Provider, MD     Allergies:  No Known Allergies    Objective     Vital Signs: /94  Pulse 85  Temp 97.8 °F (36.6 °C)  Resp 16  Ht 69\" (175.3 cm)  Wt 139 lb (63 kg)  SpO2 99%  BMI 20.53 kg/m2    Physical Exam   Constitutional: No distress.   HENT:   Mouth/Throat: Oropharynx is clear. Pharynx is normal.   Neck: Normal range of motion and thyroid normal. Neck supple. No JVD present. No thyromegaly present.   Cardiovascular: Normal rate, regular rhythm, S1 normal, S2 normal, intact distal pulses and normal pulses.   No extrasystoles are present. PMI is not displaced.    Murmur heard.   Blowing systolic murmur is present with a grade of 2/6  at the lower left sternal border  Pulmonary/Chest: Effort normal. She has bibasilar rales.   Abdominal: Soft. Bowel sounds are normal. She exhibits no distension. There is no splenomegaly or hepatomegaly. There is no tenderness.   Musculoskeletal: She exhibits no edema or tenderness.   Neurological: She is alert and oriented to person, place, and time.   Skin: Skin is warm and dry.       Results Reviewed:  I have reviewed all laboratory data, with pertinent findings: initial TrI 0.345, glc 171, WBC 15.8 and o/w unremarkable    I have reviewed the chest x-ray report: no acute CP process    I have reviewed telemetry, which shows NSR    I have visualized all the electrocardiograms performed, with my interpretations to " "follow: NSR, no ischemic changes      Assessment / Plan        Hospital Problem List     * (Principal)NSTEMI (non-ST elevated myocardial infarction)    Overview Signed 11/4/2017  9:01 PM by Luis Colvin MD     Added automatically from request for surgery [id number]         Tobacco abuse    Mixed hyperlipidemia    Raynaud's disease without gangrene        Given on-going discomfort on NTG infusion, will proceed urgently to cardiac catheterization lab  -Further recommendations pending findings of procedure    Code Status: full     I discussed the patients findings and my recommendations with: patient    Estimated length of stay: 1-2 days    Luis Colvin MD   11/04/17   9:24 PM       Electronically signed by Luis Colvin MD at 11/4/2017  9:54 PM           Emergency Department Notes      Edgar Barton MD at 11/4/2017  8:01 PM              [facility]  eMERGENCY dEPARTMENT eNCOUnter      Pt Name: Minnie Bang  MRN: [id number]  Birthdate 1975  Date of evaluation: 11/4/2017      CHIEF COMPLAINT       Chief Complaint   Patient presents with   • Chest Pain       Nurses Notes reviewed and I agree except as noted in the HPI.      HISTORY OF PRESENT ILLNESS    Minnie Bang is a 42 y.o. female who presents   Location/Symptom: Complaining of chest pain.  Timing/Onset: The chest pain started early today.  Context/Setting: Patient has had some chronic neck and shoulder pain.  She saw her the emergency physician here and it was felt to be musculoskeletal.  She was then referred back to her family doctor who also felt it was musculoskeletal.  She was given some trigger point injections but the discomfort continued.  Today she started having this pain in her chest.  It radiated up into her right jaw.  She was little bit short of breath with this.  Quality: Like heartburn, \"horrible pain\"  Duration: It was constant earlier today.  Modifying Factors: Relieved with nitroglycerin.  Severity: " Currently a 0 however at worst it was a 10.   The patient actually took her fiancé's nitroglycerin.  She has taken a total of 7 tablets today because of the pain returning.  Nitroglycerin has relieved the pain each time.    She has had episodes of hypertension in the past.  She is a smoker and there is a strong family history of heart disease.    REVIEW OF SYSTEMS     Review of Systems   Constitutional: Negative for chills and fever.   HENT: Negative for ear pain, rhinorrhea and sore throat.    Eyes: Negative for pain, discharge and visual disturbance.   Respiratory: Positive for shortness of breath. Negative for cough.    Cardiovascular: Positive for chest pain. Negative for palpitations.   Gastrointestinal: Negative for abdominal pain, diarrhea, nausea and vomiting.   Genitourinary: Negative for difficulty urinating, dysuria and hematuria.   Musculoskeletal: Positive for neck pain. Negative for gait problem and neck stiffness.   Skin: Negative for rash and wound.   Neurological: Negative for dizziness, syncope and headaches.   Psychiatric/Behavioral: Negative for self-injury and suicidal ideas. The patient is not nervous/anxious.          PAST MEDICAL HISTORY     Past Medical History:   Diagnosis Date   • COPD (chronic obstructive pulmonary disease)        SURGICAL HISTORY      has a past surgical history that includes Total thyroidectomy; Hysterectomy; Appendectomy; and Thyroid surgery.    CURRENT MEDICATIONS        Medication List      ASK your doctor about these medications          buPROPion 100 MG tablet   Commonly known as:  WELLBUTRIN       HYDROcodone-acetaminophen 7.5-325 MG per tablet   Commonly known as:  NORCO   Take 1 tablet by mouth Every 4 (Four) Hours As Needed for Moderate Pain .       indomethacin 50 MG capsule   Commonly known as:  INDOCIN   Take 1 capsule by mouth 3 (Three) Times a Day With Meals.       levothyroxine 150 MCG tablet   Commonly known as:  SYNTHROID, LEVOTHROID        "MethylPREDNISolone 4 MG tablet   Commonly known as:  MEDROL (TUTU)       nitroglycerin 0.4 MG SL tablet   Commonly known as:  NITROSTAT           ALLERGIES     has No Known Allergies.    FAMILY HISTORY     has no family status information on file.  family history is not on file.    SOCIAL HISTORY      reports that she has been smoking Cigarettes.  She has been smoking about 1.00 pack per day. She does not have any smokeless tobacco history on file. She reports that she does not drink alcohol or use illicit drugs.    PHYSICAL EXAM     INITIAL VITALS:  height is 69\" (175.3 cm) and weight is 139 lb (63 kg). Her temperature is 97.8 °F (36.6 °C). Her blood pressure is 144/89 and her pulse is 93. Her respiration is 16 and oxygen saturation is 98%.    Physical Exam   Constitutional: She is oriented to person, place, and time. She appears well-developed and well-nourished.   HENT:   Head: Normocephalic and atraumatic.   Right Ear: External ear normal.   Left Ear: External ear normal.   Mouth/Throat: Oropharynx is clear and moist.   Eyes: EOM are normal. Pupils are equal, round, and reactive to light. Right eye exhibits no discharge. Left eye exhibits no discharge. No scleral icterus.   Neck: Normal range of motion. Neck supple.   Cardiovascular: Normal rate and regular rhythm.    No murmur heard.  Pulmonary/Chest: Effort normal and breath sounds normal. No stridor. No respiratory distress. She has no wheezes.   Abdominal: Soft. She exhibits no distension. There is no tenderness. There is no rebound and no guarding.   Musculoskeletal: Normal range of motion. She exhibits no edema or deformity.   Neurological: She is alert and oriented to person, place, and time. No cranial nerve deficit. Coordination normal.   Skin: Skin is warm and dry. No rash (On Exposed Surfaces) noted. She is not diaphoretic.   Psychiatric: She has a normal mood and affect. Her behavior is normal. Thought content normal.   Nursing note and vitals " reviewed.        DIFFERENTIAL DIAGNOSIS:    Patient with atypical shoulder and neck pain that may or may not have been a cardiac equivalent.  Protocol was followed in triage and at the time I am seeing the patient the patient's troponin has come back at 0.354.    DIAGNOSTIC RESULTS     EKG: All EKG's are interpreted by the Emergency Department Physician who either signs or Co-signs this chart in the absence of a cardiologist.   EKG visualized interpreted by myself shows sinus rhythm at a rate is 77.  Axis 68, ST-T wave and intervals are all within normal limits.    RADIOLOGY: non-plain film images(s) such as CT, Ultrasound and MRI are read by the radiologist.  Plain radiographic images are visualized and preliminarily interpreted by the emergency physician unless otherwise stated below.  Xr Chest 1 View    Result Date: 11/4/2017  Narrative: EXAMINATION:   XR CHEST 1 VW-  11/4/2017 8:22 PM CDT  HISTORY: Chest pain protocol  Frontal upright radiograph of the chest 11/4/2017 8:00 PM CDT  COMPARISON: None.  FINDINGS: The lungs are clear. The cardiomediastinal silhouette and pulmonary vascularity are within normal limits.  The osseous structures and surrounding soft tissues demonstrate no acute abnormality.      Impression: 1. No radiographic evidence of acute cardiopulmonary process.   This report was finalized on 11/04/2017 20:22 by Dr. George Gonzalez MD.             LABS:   Lab Results (last 24 hours)     Procedure Component Value Units Date/Time    Troponin [[id number]]  (Abnormal) Collected:  11/04/17 1833    Specimen:  Blood from Arm, Left Updated:  11/04/17 1914     Troponin I 0.345 (H) ng/mL     CBC & Differential [[id number]] Collected:  11/04/17 1833    Specimen:  Blood Updated:  11/04/17 1857    Narrative:       The following orders were created for panel order CBC & Differential.  Procedure                               Abnormality         Status                     ---------                                "-----------         ------                     CBC Auto Differential[[id number]]        Abnormal            Final result                 Please view results for these tests on the individual orders.    Comprehensive Metabolic Panel [[id number]]  (Abnormal) Collected:  11/04/17 1833    Specimen:  Blood from Arm, Left Updated:  11/04/17 1903     Glucose 171 (H) mg/dL      BUN 20 mg/dL      Creatinine 0.61 mg/dL      Sodium 144 mmol/L      Potassium 3.6 mmol/L      Chloride 105 mmol/L      CO2 21.0 (L) mmol/L      Calcium 9.8 mg/dL      Total Protein 7.5 g/dL      Albumin 4.50 g/dL      ALT (SGPT) 24 U/L      AST (SGOT) 29 U/L      Alkaline Phosphatase 74 U/L      Total Bilirubin 0.1 mg/dL      eGFR Non African Amer 107 mL/min/1.73      Globulin 3.0 gm/dL      A/G Ratio 1.5 g/dL      BUN/Creatinine Ratio 32.8 (H)     Anion Gap 18.0 (H) mmol/L     CBC Auto Differential [[id number]]  (Abnormal) Collected:  11/04/17 1833    Specimen:  Blood from Arm, Left Updated:  11/04/17 1857     WBC 15.79 (H) 10*3/mm3      RBC 3.68 (L) 10*6/mm3      Hemoglobin 11.5 (L) g/dL      Hematocrit 35.2 (L) %      MCV 95.7 fL      MCH 31.3 pg      MCHC 32.7 (L) g/dL      RDW 12.8 %      RDW-SD 44.7 fl      MPV 10.9 fL      Platelets 362 10*3/mm3      Neutrophil % 90.7 (H) %      Lymphocyte % 5.8 (L) %      Monocyte % 3.1 (L) %      Eosinophil % 0.0 %      Basophil % 0.1 %      Immature Grans % 0.3 %      Neutrophils, Absolute 14.33 (H) 10*3/mm3      Lymphocytes, Absolute 0.91 10*3/mm3      Monocytes, Absolute 0.49 10*3/mm3      Eosinophils, Absolute 0.00 10*3/mm3      Basophils, Absolute 0.01 10*3/mm3      Immature Grans, Absolute 0.05 (H) 10*3/mm3           EMERGENCY DEPARTMENT COURSE:   Vitals:    Vitals:    11/04/17 1843   BP: 144/89   Pulse: 93   Resp: 16   Temp: 97.8 °F (36.6 °C)   SpO2: 98%   Weight: 139 lb (63 kg)   Height: 69\" (175.3 cm)     Social course stable.  She was seen by cardiology and they will be taking her to the cardiac " catheterization laboratory.  The patient was given the following medications:  Medications   aspirin chewable tablet 324 mg (not administered)   nitroglycerin 50 mg/250 mL (0.2 mg/mL) infusion (10 mcg/min Intravenous New Bag 11/4/17 2029)   sodium chloride 0.9 % infusion (125 mL/hr Intravenous New Bag 11/4/17 2025)   sodium chloride 0.9 % flush 10 mL (not administered)       CRITICAL CARE:  CRITICAL CARE: There was a high probability of clinically significant/life threatening deterioration in this patient's condition which required my urgent intervention.  Total critical care time was 30 minutes.  This excludes any time for separately reportable procedures.         CONSULTS:  Dr. Colvin    PROCEDURES:  None    FINAL IMPRESSION      1. NSTEMI (non-ST elevated myocardial infarction)    2. Precordial pain    3. Acute coronary syndrome          DISPOSITION/PLAN   To cardiac cath lab      PATIENT REFERRED TO:  No follow-up provider specified.    DISCHARGE MEDICATIONS:     Medication List      ASK your doctor about these medications          buPROPion 100 MG tablet   Commonly known as:  WELLBUTRIN       HYDROcodone-acetaminophen 7.5-325 MG per tablet   Commonly known as:  NORCO   Take 1 tablet by mouth Every 4 (Four) Hours As Needed for Moderate Pain .       indomethacin 50 MG capsule   Commonly known as:  INDOCIN   Take 1 capsule by mouth 3 (Three) Times a Day With Meals.       levothyroxine 150 MCG tablet   Commonly known as:  SYNTHROID, LEVOTHROID       MethylPREDNISolone 4 MG tablet   Commonly known as:  MEDROL (TUTU)       nitroglycerin 0.4 MG SL tablet   Commonly known as:  NITROSTAT           (Please note that portions of this note were completed with a voice recognition program.  Efforts were made to edit the dictations but occasionally words are mis-transcribed.)    MD Edgar Gamez MD  11/04/17 2104       Electronically signed by Edgar Barton MD at 11/4/2017  9:04 PM     "    Hospital Medications (active)       Dose Frequency Start End    aspirin EC tablet 81 mg 81 mg Daily 11/5/2017     Sig - Route: Take 1 tablet by mouth Daily. - Oral    atorvastatin (LIPITOR) tablet 10 mg 10 mg Nightly 11/5/2017     Sig - Route: Take 1 tablet by mouth Every Night. - Oral    HYDROcodone-acetaminophen (NORCO) 7.5-325 MG per tablet 1 tablet 1 tablet Every 4 Hours PRN 11/4/2017     Sig - Route: Take 1 tablet by mouth Every 4 (Four) Hours As Needed for Moderate Pain . - Oral    Influenza Vac Subunit Quad (FLUCELVAX) injection 0.5 mL 0.5 mL During Hospitalization 11/5/2017 11/6/2017    Sig - Route: Inject 0.5 mL into the shoulder, thigh, or buttocks During Hospitalization for Immunization. - Intramuscular    Cosign for Ordering: Accepted by Luis Colvin MD on 11/5/2017  4:20 PM    isosorbide mononitrate (IMDUR) 24 hr tablet 30 mg 30 mg Every 24 Hours Scheduled 11/5/2017     Sig - Route: Take 1 tablet by mouth Daily. - Oral    levothyroxine (SYNTHROID, LEVOTHROID) tablet 150 mcg 150 mcg Every Early Morning 11/5/2017     Sig - Route: Take 1 tablet by mouth Every Morning. - Oral    lidocaine (LIDODERM) 5 % 1 patch 1 patch Every 24 Hours Scheduled 11/5/2017 11/8/2017    Sig - Route: Place 1 patch on the skin Daily. - Transdermal    nitroglycerin (NITROSTAT) SL tablet 0.4 mg 0.4 mg Every 5 Minutes PRN 11/4/2017     Sig - Route: Place 1 tablet under the tongue Every 5 (Five) Minutes As Needed for Chest Pain. - Sublingual    sodium chloride 0.9 % flush 10 mL 10 mL As Needed 11/4/2017     Sig - Route: Infuse 10 mL into a venous catheter As Needed for Line Care. - Intravenous    Linked Group 1:  \"And\" Linked Group Details        sodium chloride 0.9 % infusion 100 mL/hr Continuous 11/4/2017     Sig - Route: Infuse 100 mL/hr into a venous catheter Continuous. - Intravenous    nitroglycerin 50 mg/250 mL (0.2 mg/mL) infusion (Discontinued) 10-50 mcg/min Titrated 11/4/2017 11/5/2017    Sig - Route: Infuse 10-50 " mcg/min into a venous catheter Dose Adjusted By Provider As Needed. - Intravenous    pravastatin (PRAVACHOL) tablet 40 mg (Discontinued) 40 mg Nightly 11/5/2017 11/5/2017    Sig - Route: Take 1 tablet by mouth Every Night. - Oral    Reason for Discontinue: Formulary change    sodium chloride 0.9 % infusion (Discontinued) 125 mL/hr Continuous 11/4/2017 11/5/2017    Sig - Route: Infuse 125 mL/hr into a venous catheter Continuous. - Intravenous          Imaging Results (last 72 hours)     Procedure Component Value Units Date/Time    XR Chest 1 View [[id number]] Collected:  11/04/17 2022     Updated:  11/04/17 2025    Narrative:       EXAMINATION:   XR CHEST 1 VW-  11/4/2017 8:22 PM CDT     HISTORY: Chest pain protocol     Frontal upright radiograph of the chest 11/4/2017 8:00 PM CDT     COMPARISON: None.     FINDINGS:   The lungs are clear. The cardiomediastinal silhouette and pulmonary  vascularity are within normal limits.      The osseous structures and surrounding soft tissues demonstrate no acute  abnormality.       Impression:       1. No radiographic evidence of acute cardiopulmonary process.        This report was finalized on 11/04/2017 20:22 by Dr. George Gonzalez MD.             Physician Progress Notes (last 72 hours) (Notes from 11/3/2017  9:35 AM through 11/6/2017  9:35 AM)      Luis Colvin MD at 11/5/2017 12:18 PM  Version 1 of 1           [facility] -  Progress Note     LOS: 1 day   Patient Care Team:  Juan Dahl DO as PCP - General (Internal Medicine)    Chief Complaint: Chest pain    Subjective     Interval History: Patient underwent coronary angiography last via radial approach and tolerated the procedure well.  She did have one recurrent bout of chest discomfort overnight, requiring more sublingual nitroglycerin with complete resolution.  She has remained on nitroglycerin infusion all night long.  My initial evaluation this morning, the patient had been doing well  and our plans were to send her out to the floor.  However, she then had another acute onset of chest discomfort.  It was noted that her blood pressure was extremely elevated, but was unclear whether despite prior to the onset of pain or came directly afterwards as result of the pain.  Nonetheless, one sublingual nitroglycerin was administered with complete and prompt resolution of the discomfort.  ECG was obtained during pain and showed no obvious ST elevation, but did have nonspecific ST abnormalities in the form of diffuse ST depression, along with multiple PACs accompanying the sinus tachycardia (no malignant arrhythmias noted).    When patient is not having pain, she feels completely normally.  During episodes, she has acute onset of severe burning across her chest associated with jaw pain and dyspnea.    Review of Systems:  Review of Systems   Constitution: Negative for malaise/fatigue.   Eyes: Positive for vision loss in left eye and vision loss in right eye.   Cardiovascular: Negative for chest pain, claudication, dyspnea on exertion, leg swelling, near-syncope, orthopnea, palpitations, paroxysmal nocturnal dyspnea and syncope.   Respiratory: Negative for shortness of breath.    Hematologic/Lymphatic: Does not bruise/bleed easily.   Neurological: Positive for headaches.       Vital Sign Min/Max for last 24 hours  Temp  Min: 97.3 °F (36.3 °C)  Max: 98.4 °F (36.9 °C)   BP  Min: 101/62  Max: 191/127   Pulse  Min: 53  Max: 111   Resp  Min: 14  Max: 18   SpO2  Min: 96 %  Max: 100 %   Flow (L/min)  Min: 3  Max: 3   Weight  Min: 139 lb (63 kg)  Max: 144 lb 4.8 oz (65.5 kg)     Last Weight    11/04/17  2206   Weight: 144 lb 4.8 oz (65.5 kg)       Physical Exam:   Physical Exam   Constitutional: No distress.   Neck: No JVD present.   Cardiovascular: Normal rate, regular rhythm, S1 normal, S2 normal, normal heart sounds, intact distal pulses and normal pulses.    Pulmonary/Chest: Effort normal and breath sounds normal.    Abdominal: Soft. There is no tenderness.   Neurological: She is alert and oriented to person, place, and time.   Skin: Skin is warm and dry.       Medication Review: yes  Current Facility-Administered Medications   Medication Dose Route Frequency Provider Last Rate Last Dose   • aspirin EC tablet 81 mg  81 mg Oral Daily Luis Colvin MD   81 mg at 11/05/17 0952   • HYDROcodone-acetaminophen (NORCO) 7.5-325 MG per tablet 1 tablet  1 tablet Oral Q4H PRN Luis Colvin MD   1 tablet at 11/05/17 0950   • Influenza Vac Subunit Quad (FLUCELVAX) injection 0.5 mL  0.5 mL Intramuscular During Hospitalization Luis Colvin MD       • isosorbide mononitrate (IMDUR) 24 hr tablet 30 mg  30 mg Oral Q24H Luis Colvin MD   30 mg at 11/05/17 1050   • levothyroxine (SYNTHROID, LEVOTHROID) tablet 150 mcg  150 mcg Oral Q AM Luis Colvin MD   150 mcg at 11/05/17 0625   • nitroglycerin (NITROSTAT) SL tablet 0.4 mg  0.4 mg Sublingual Q5 Min PRN Luis Colvin MD   0.4 mg at 11/05/17 1011   • nitroglycerin 50 mg/250 mL (0.2 mg/mL) infusion  10-50 mcg/min Intravenous Titrated Edgar Barton MD   Stopped at 11/05/17 0830   • pravastatin (PRAVACHOL) tablet 40 mg  40 mg Oral Nightly Luis Colvin MD       • sodium chloride 0.9 % flush 10 mL  10 mL Intravenous PRN Edgar Barton MD       • sodium chloride 0.9 % infusion  125 mL/hr Intravenous Continuous Edgar Barton  mL/hr at 11/04/17 2025 125 mL/hr at 11/04/17 2025   • sodium chloride 0.9 % infusion  100 mL/hr Intravenous Continuous Luis Colvin MD   Stopped at 11/05/17 0830         Results Review:   I have reviewed all laboratory data, with pertinent findings: Troponin has continued to trend down since admission from 0.345.  BMP within normal limits, the exception of glucose 127.  WBC 12.1, hemoglobin 10.9, hematocrit 33.7, platelets 327.    I have reviewed telemetry, which shows NSR with one 4 beat run of nonsustained VT overnight.  During episode of pain  today, noted sinus tachycardia with frequent PACs    ECG performed while patient was having pain was personally interpreted by myself: Sinus tachycardia, frequent PACs.  Nonspecific ST depression diffusely.    Echocardiogram reviewed: Normal LVEF.  No wall motion noted.  Normal size right ventricle.  Assessment/Plan     Principal Problem:    NSTEMI (non-ST elevated myocardial infarction) - etiology still unclear.  Given the appearance on echocardiogram, and responsiveness to nitroglycerin, I suspect this could be vasospastic angina (Prinzmetal's angina).  However, her blood pressure does spike quite remarkably, and it is unclear whether this is the cause of the pain or a direct effect of the pain.  With her history of thyroid problems, pheochromocytoma comes to mind as a possible cause for acute spikes in blood pressure that that would cause chest pain, as part of a multiple endocrine neoplasia (MEN) syndrome.     -We will start Imdur 30 mg daily  -If patient does not respond to Imdur, then we will consider consultation with internal medicine or endocrinology to assist in workup for pheochromocytoma,  including urine catecholamine collection    Active Problems:    Tobacco abuse - emphasized the need for cessation.    Mixed hyperlipidemia LDL well controlled on pravastatin.  Continue pravastatin.    Raynaud's disease without gangrene        Luis Colvin MD  11/05/17  12:18 PM             Electronically signed by Luis Colvin MD at 11/5/2017 12:25 PM

## 2017-11-06 NOTE — PAYOR COMM NOTE
"[place] [facility] 11-6-17  UR  [phone]    Robles Singh (42 y.o. Female)     Date of Birth Social Security Number Address Home Phone MRN    1975  [address] [place] [id number] [phone] [id number]    Taoism Marital Status          Yazdanism        Admission Date Admission Type Admitting Provider Attending Provider Department, Room/Bed    11/4/17 Emergency Luis Colvin MD  [facility] 4B, 430/1    Discharge Date Discharge Disposition Discharge Destination        11/6/2017 Home or Self Care             Attending Provider: (none)    Allergies:  No Known Allergies    Isolation:  None   Infection:  None   Code Status:  Prior    Ht:  70\" (177.8 cm)   Wt:  149 lb 12.8 oz (67.9 kg)    Admission Cmt:  None   Principal Problem:  NSTEMI (non-ST elevated myocardial infarction) [I21.4] More...                 Active Insurance as of 11/4/2017     Primary Coverage     Payor Plan Insurance Group Employer/Plan Group    PASSPORT PASSPORT MEDICAID     Payor Plan Address Payor Plan Phone Number Effective From Effective To    [address] [phone] 6/22/2017     [place] [address]       Subscriber Name Subscriber Birth Date Member ID       ROBLES SINGH 1975 [id number]                 Emergency Contacts      (Rel.) Home Phone Work Phone Mobile Phone    Alfredo Montgomery (Significant Other) [phone] -- --               Discharge Summary      Luis Colvin MD at 11/6/2017  8:31 AM            [facility] DISCHARGE    Date of Discharge:  11/6/2017    Discharge Diagnosis:   1.  Non-ST elevation myocardial infarction, likely due to vasospastic angina from sinus medication use of decongestants  2.  Mixed hyperlipidemia   3.  Tobacco abuse    Presenting Problem/History of Present Illness  NSTEMI (non-ST elevated myocardial infarction) [I21.4]  NSTEMI (non-ST elevated myocardial infarction) [I21.4]  Patient is a 42-year-old tobacco abuse " and mixed hyperlipidemia who presented emergency department on the night of 11/4/17 after 2 days of stuttering, typical angina.  Pain episodes were severe but relieved with nitroglycerin.  Initial ECG was normal but biomarker was elevated, so due to ongoing pain she was taken urgently to the catheterization laboratory.     Hospital Course  Cardiac catheterization revealed nonobstructive coronary artery disease/near normal coronary arteries.  Left ventriculogram showed normal EF.  Patient was admitted back to the ICU for further monitoring thereafter.  She had 2 further recurrences of severe chest discomfort while on low-dose nitroglycerin infusion, which did respond then to something on nitroglycerin administration.  Echocardiogram showed normal left ventricular wall motion and EF.  She was started on Imdur with no further chest pain occurrences.  Urine drug screen was notable for amphetamines, in addition to medications given during cardiac catheterization.  However, upon interrogation, the patient reported that, due to chronic sinus issues, she had been taking prescription decongestant medications daily for 3 months.  I suspect that the vasoconstrictive component of these medications has induced her vasospastic angina, which should resolve once she has discontinued these medications.  In the meantime, I have instructed her to take daily Imdur as well as sublingual nitroglycerin as needed for any recurrent episodes of chest pain.  She is strongly encouraged to cease tobacco use, both for cardiovascular risk reduction in addition to lessened problems with sinuses.  I also recommended she start daily aspirin and continue her low potency statin, as her LDL is well controlled.  This, in combination with smoking cessation, healthy diet and exercise choices, should lead to adequate risk prevention from a cardiac mass or standpoint.    Procedures Performed  Procedure(s):  Coronary angiography  Percutaneous Coronary  Intervention  11/04 2156 Note By: Luis Colvin MD    Consults:   Consults     No orders found from 10/6/2017 to 11/5/2017.          Cath: normal coronaries  Echo: normal EF    Condition on Discharge:  stable    Physical Exam at Discharge    Vital Signs  Temp:  [97.8 °F (36.6 °C)-98.3 °F (36.8 °C)] 97.8 °F (36.6 °C)  Heart Rate:  [] 70  Resp:  [16-18] 18  BP: (118-161)/() 123/72    Physical Exam:  Physical Exam   Constitutional: No distress.   Neck: No JVD present.   Cardiovascular: Normal rate, regular rhythm, S1 normal, S2 normal, normal heart sounds, intact distal pulses and normal pulses.    Pulmonary/Chest: Effort normal and breath sounds normal.   Abdominal: Soft. There is no tenderness.   Neurological: She is alert and oriented to person, place, and time.   Skin: Skin is warm and dry.       Discharge Disposition  Home or Self Care    Discharge Medications   Minnie Bang   Home Medication Instructions MARSHA:[id number]    Printed on:11/06/17 3637   Medication Information                      aspirin 81 MG EC tablet  Take 1 tablet by mouth Daily.             buPROPion (WELLBUTRIN) 100 MG tablet  Take 100 mg by mouth.             HYDROcodone-acetaminophen (NORCO) 7.5-325 MG per tablet  Take 1 tablet by mouth Every 4 (Four) Hours As Needed for Moderate Pain .             indomethacin (INDOCIN) 50 MG capsule  Take 1 capsule by mouth 3 (Three) Times a Day With Meals.             Influenza Vac Subunit Quad (FLUCELVAX) 0.5 ML suspension prefilled syringe injection  Inject 0.5 mL into the shoulder, thigh, or buttocks 1 (One) Time for 1 dose.             isosorbide mononitrate (IMDUR) 30 MG 24 hr tablet  Take 1 tablet by mouth Daily.             levothyroxine (SYNTHROID, LEVOTHROID) 150 MCG tablet  Take  by mouth.             MethylPREDNISolone (MEDROL, TUTU,) 4 MG tablet  Take  by mouth Daily. Take as directed on package instructions.             nitroglycerin (NITROSTAT) 0.4 MG SL tablet  Place 1  tablet under the tongue Every 5 (Five) Minutes As Needed for Chest Pain. Take no more than 3 doses in 15 minutes.               **Avoid sinus meds with decongestants (pseudoephedrine and phenylephrine). Recommend continue nasal steroids (eg Flonase) but add PO antihistamines.     Discharge Diet: heart healthy    Activity at Discharge: ad buck    Follow-up Appointments  Dr. Luis Colvin, 4-6 weeks    Test Results Pending at Discharge   Order Current Status    Hemoglobin A1c In process        I personally participated in the discharge process with this patient for greater than 30 minutes.    Luis Colvin MD  11/06/17  8:31 AM                 Electronically signed by Luis Colvin MD at 11/6/2017  8:50 AM

## 2019-01-02 RX ORDER — ASPIRIN 81 MG/1
TABLET, DELAYED RELEASE ORAL
Qty: 30 TABLET | Refills: 11 | OUTPATIENT
Start: 2019-01-02

## 2021-03-11 ENCOUNTER — HOSPITAL ENCOUNTER (EMERGENCY)
Age: 46
Discharge: HOME | End: 2021-03-11
Attending: EMERGENCY MEDICINE
Payer: MEDICAID

## 2021-03-11 ENCOUNTER — APPOINTMENT (OUTPATIENT)
Dept: CT IMAGING | Age: 46
End: 2021-03-11
Payer: MEDICAID

## 2021-03-11 ENCOUNTER — APPOINTMENT (OUTPATIENT)
Dept: GENERAL RADIOLOGY | Age: 46
End: 2021-03-11
Payer: MEDICAID

## 2021-03-11 VITALS
HEIGHT: 69 IN | TEMPERATURE: 97.8 F | SYSTOLIC BLOOD PRESSURE: 163 MMHG | HEART RATE: 113 BPM | RESPIRATION RATE: 17 BRPM | OXYGEN SATURATION: 96 % | DIASTOLIC BLOOD PRESSURE: 101 MMHG | WEIGHT: 150 LBS | BODY MASS INDEX: 22.22 KG/M2

## 2021-03-11 DIAGNOSIS — R03.0 ELEVATED BLOOD PRESSURE READING: ICD-10-CM

## 2021-03-11 DIAGNOSIS — R20.2 NUMBNESS AND TINGLING: ICD-10-CM

## 2021-03-11 DIAGNOSIS — R51.9 NONINTRACTABLE HEADACHE, UNSPECIFIED CHRONICITY PATTERN, UNSPECIFIED HEADACHE TYPE: Primary | ICD-10-CM

## 2021-03-11 DIAGNOSIS — F15.10 AMPHETAMINE ABUSE (HCC): ICD-10-CM

## 2021-03-11 DIAGNOSIS — R20.0 NUMBNESS AND TINGLING: ICD-10-CM

## 2021-03-11 DIAGNOSIS — R42 DIZZINESS: ICD-10-CM

## 2021-03-11 DIAGNOSIS — F10.920 ACUTE ALCOHOLIC INTOXICATION WITHOUT COMPLICATION (HCC): ICD-10-CM

## 2021-03-11 DIAGNOSIS — E87.6 HYPOKALEMIA: ICD-10-CM

## 2021-03-11 LAB
ALBUMIN SERPL-MCNC: 4.6 G/DL (ref 3.5–5.2)
ALP BLD-CCNC: 90 U/L (ref 35–104)
ALT SERPL-CCNC: 8 U/L (ref 5–33)
AMPHETAMINE SCREEN, URINE: POSITIVE
ANION GAP SERPL CALCULATED.3IONS-SCNC: 15 MMOL/L (ref 7–19)
APTT: 26 SEC (ref 26–36.2)
AST SERPL-CCNC: 14 U/L (ref 5–32)
BARBITURATE SCREEN URINE: NEGATIVE
BASOPHILS ABSOLUTE: 0 K/UL (ref 0–0.2)
BASOPHILS RELATIVE PERCENT: 0 % (ref 0–1)
BENZODIAZEPINE SCREEN, URINE: NEGATIVE
BILIRUB SERPL-MCNC: <0.2 MG/DL (ref 0.2–1.2)
BUN BLDV-MCNC: 20 MG/DL (ref 6–20)
CALCIUM SERPL-MCNC: 9.3 MG/DL (ref 8.6–10)
CANNABINOID SCREEN URINE: NEGATIVE
CHLORIDE BLD-SCNC: 104 MMOL/L (ref 98–111)
CO2: 23 MMOL/L (ref 22–29)
COCAINE METABOLITE SCREEN URINE: NEGATIVE
CREAT SERPL-MCNC: 0.7 MG/DL (ref 0.5–0.9)
EKG P AXIS: 68 DEGREES
EKG P-R INTERVAL: 150 MS
EKG Q-T INTERVAL: 326 MS
EKG QRS DURATION: 94 MS
EKG QTC CALCULATION (BAZETT): 410 MS
EKG T AXIS: 13 DEGREES
EOSINOPHILS ABSOLUTE: 0 K/UL (ref 0–0.6)
EOSINOPHILS RELATIVE PERCENT: 0 % (ref 0–5)
ETHANOL: 46 MG/DL (ref 0–0.08)
GFR AFRICAN AMERICAN: >59
GFR NON-AFRICAN AMERICAN: >60
GLUCOSE BLD-MCNC: 86 MG/DL (ref 74–109)
GLUCOSE BLD-MCNC: 89 MG/DL (ref 70–99)
HCT VFR BLD CALC: 39.3 % (ref 37–47)
HEMOGLOBIN: 12.8 G/DL (ref 12–16)
IMMATURE GRANULOCYTES #: 0.2 K/UL
INR BLD: 0.94 (ref 0.88–1.18)
LYMPHOCYTES ABSOLUTE: 4 K/UL (ref 1.1–4.5)
LYMPHOCYTES RELATIVE PERCENT: 25 % (ref 20–40)
Lab: ABNORMAL
MCH RBC QN AUTO: 31.4 PG (ref 27–31)
MCHC RBC AUTO-ENTMCNC: 32.6 G/DL (ref 33–37)
MCV RBC AUTO: 96.3 FL (ref 81–99)
MONOCYTES ABSOLUTE: 1 K/UL (ref 0–0.9)
MONOCYTES RELATIVE PERCENT: 6 % (ref 0–10)
NEUTROPHILS ABSOLUTE: 11 K/UL (ref 1.5–7.5)
NEUTROPHILS RELATIVE PERCENT: 69 % (ref 50–65)
OPIATE SCREEN URINE: NEGATIVE
PDW BLD-RTO: 13.5 % (ref 11.5–14.5)
PERFORMED ON: NORMAL
PLATELET # BLD: 415 K/UL (ref 130–400)
PLATELET SLIDE REVIEW: ADEQUATE
PMV BLD AUTO: 9.8 FL (ref 9.4–12.3)
POTASSIUM SERPL-SCNC: 3.2 MMOL/L (ref 3.5–5)
PROTHROMBIN TIME: 12.4 SEC (ref 12–14.6)
RBC # BLD: 4.08 M/UL (ref 4.2–5.4)
RBC # BLD: NORMAL 10*6/UL
SODIUM BLD-SCNC: 142 MMOL/L (ref 136–145)
TOTAL PROTEIN: 7.6 G/DL (ref 6.6–8.7)
TROPONIN: <0.01 NG/ML (ref 0–0.03)
WBC # BLD: 16 K/UL (ref 4.8–10.8)

## 2021-03-11 PROCEDURE — 6360000004 HC RX CONTRAST MEDICATION: Performed by: EMERGENCY MEDICINE

## 2021-03-11 PROCEDURE — 6370000000 HC RX 637 (ALT 250 FOR IP): Performed by: EMERGENCY MEDICINE

## 2021-03-11 PROCEDURE — 85610 PROTHROMBIN TIME: CPT

## 2021-03-11 PROCEDURE — 70496 CT ANGIOGRAPHY HEAD: CPT

## 2021-03-11 PROCEDURE — 82947 ASSAY GLUCOSE BLOOD QUANT: CPT

## 2021-03-11 PROCEDURE — 85730 THROMBOPLASTIN TIME PARTIAL: CPT

## 2021-03-11 PROCEDURE — 82077 ASSAY SPEC XCP UR&BREATH IA: CPT

## 2021-03-11 PROCEDURE — 80307 DRUG TEST PRSMV CHEM ANLYZR: CPT

## 2021-03-11 PROCEDURE — 85025 COMPLETE CBC W/AUTO DIFF WBC: CPT

## 2021-03-11 PROCEDURE — 70450 CT HEAD/BRAIN W/O DYE: CPT

## 2021-03-11 PROCEDURE — 93005 ELECTROCARDIOGRAM TRACING: CPT | Performed by: EMERGENCY MEDICINE

## 2021-03-11 PROCEDURE — 36415 COLL VENOUS BLD VENIPUNCTURE: CPT

## 2021-03-11 PROCEDURE — 70498 CT ANGIOGRAPHY NECK: CPT

## 2021-03-11 PROCEDURE — 71045 X-RAY EXAM CHEST 1 VIEW: CPT

## 2021-03-11 PROCEDURE — 84484 ASSAY OF TROPONIN QUANT: CPT

## 2021-03-11 PROCEDURE — 99282 EMERGENCY DEPT VISIT SF MDM: CPT

## 2021-03-11 PROCEDURE — 80053 COMPREHEN METABOLIC PANEL: CPT

## 2021-03-11 RX ADMIN — IOPAMIDOL 90 ML: 755 INJECTION, SOLUTION INTRAVENOUS at 08:36

## 2021-03-11 RX ADMIN — POTASSIUM BICARBONATE 40 MEQ: 782 TABLET, EFFERVESCENT ORAL at 08:29

## 2021-03-11 ASSESSMENT — ENCOUNTER SYMPTOMS
VOMITING: 0
RHINORRHEA: 0
ABDOMINAL PAIN: 0
SHORTNESS OF BREATH: 0
BACK PAIN: 0
COUGH: 0
NAUSEA: 0
DIARRHEA: 0
PHOTOPHOBIA: 0
SORE THROAT: 0

## 2021-03-11 ASSESSMENT — PAIN DESCRIPTION - PAIN TYPE: TYPE: ACUTE PAIN

## 2021-03-11 NOTE — ED NOTES
Patient placed on cardiac monitor, continuous pulse oximeter, and NIBP monitor. Monitor alarms on.        Geovanny Kevin RN  03/11/21 1499

## 2021-03-11 NOTE — ED PROVIDER NOTES
[facility]  eMERGENCY dEPARTMENT eNCOUnter      Pt Name: Michael Linda  MRN: [id number]  [place] 1975  Date of evaluation: 3/11/2021  Provider: Tamara Valenzuela MD    [address] [place] [address]       Chief Complaint   Patient presents with    Dizziness     x 2 days    Altered Mental Status     pt states she was at work 2 days ago and became dizzy and altered. pt states she went to a clinic and was given 2 shots     Headache     x 2 days         HISTORY OF PRESENT ILLNESS   (Location/Symptom, Timing/Onset,Context/Setting, Quality, Duration, Modifying Factors, Severity)  Note limiting factors. Michael Linda is a 50 y.o. female who presents to the emergency department for headache, dizziness, and right arm numbness. She states 2 days ago she arrived to work at 7 AM and by 8 AM she left due to having a severe headache which she states \"is the worst headache of my life\". She denies any prior history of headaches. No head trauma. She describes his pain is in the right occipital region and has eased up somewhat since 2 days ago. She states later that evening she noticed some numbness type sensation in her right shoulder however she woke up in the middle the night last night noticed that seem that the numbness had extended down to her elbow. She denies any weakness of the extremity. Tells me she feels very off balance like she is drunk however she did ambulate into the ER unassisted. She was seen at [facility] care 2 days ago and given some motrin and 2 shots for HA and was told BP elevated there as well. Tells me she is supposed to take \"isosorbide\" but doesn't take it due to it making her have a HA. No chest pain. Tells me when she stands she sees spots in both her eyes but denies feeling like she is going to pass out or having really any vertigo type symptoms. HPI    NursingNotes were reviewed.     REVIEW OF SYSTEMS    (2-9 systems for level 4, 10 or more for level 5)     Review of Systems   Constitutional: Negative for chills and fever. HENT: Negative for rhinorrhea and sore throat. Eyes: Positive for visual disturbance. Negative for photophobia. Respiratory: Negative for cough and shortness of breath. Cardiovascular: Negative for chest pain and leg swelling. Gastrointestinal: Negative for abdominal pain, diarrhea, nausea and vomiting. Genitourinary: Negative for dysuria, frequency and urgency. Musculoskeletal: Negative for back pain and neck pain. Neurological: Positive for dizziness, numbness and headaches. Negative for syncope, facial asymmetry, speech difficulty and weakness. All other systems reviewed and are negative. PAST MEDICALHISTORY     Past Medical History:   Diagnosis Date    Anxiety     COPD (chronic obstructive pulmonary disease) ([facility] [address]     Thyroid disease          SURGICAL HISTORY       Past Surgical History:   Procedure Laterality Date    APPENDECTOMY      BACK SURGERY      HERNIA REPAIR      HYSTERECTOMY      SHOULDER SURGERY Left     with castro procedure    THYROID SURGERY           CURRENT MEDICATIONS     Discharge Medication List as of 3/11/2021  9:30 AM      CONTINUE these medications which have NOT CHANGED    Details   levothyroxine (SYNTHROID) 150 MCG tablet Take 150 mcg by mouth DailyHistorical Med      buPROPion (WELLBUTRIN) 100 MG tablet Take 100 mg by mouth dailyHistorical Med             ALLERGIES     Patient has no known allergies. FAMILY HISTORY     No family history on file.        SOCIAL HISTORY       Social History     Socioeconomic History    Marital status: Single     Spouse name: Not on file    Number of children: Not on file    Years of education: Not on file    Highest education level: Not on file   Occupational History    Not on file   Social Needs    Financial resource strain: Not on file    Food insecurity     Worry: Not on file     Inability: Not on file    Transportation needs     Medical: Not on file     Non-medical: Not on file   Tobacco Use    Smoking status: Current Every Day Smoker     Packs/day: 1.00     Types: Cigarettes   Substance and Sexual Activity    Alcohol use: Yes     Comment: occasional    Drug use: No    Sexual activity: Not on file   Lifestyle    Physical activity     Days per week: Not on file     Minutes per session: Not on file    Stress: Not on file   Relationships    Social connections     Talks on phone: Not on file     Gets together: Not on file     Attends [employer] service: Not on file     Active member of club or organization: Not on file     Attends meetings of clubs or organizations: Not on file     Relationship status: Not on file    Intimate partner violence     Fear of current or ex partner: Not on file     Emotionally abused: Not on file     Physically abused: Not on file     Forced sexual activity: Not on file   Other Topics Concern    Not on file   Social History Narrative    Not on file       SCREENINGS             PHYSICAL EXAM    (up to 7 for level 4, 8 or more for level 5)     ED Triage Vitals   BP Temp Temp Source Pulse Resp SpO2 Height Weight   03/11/21 0646 03/11/21 0646 03/11/21 0646 03/11/21 0646 03/11/21 0645 03/11/21 0646 03/11/21 0645 03/11/21 0645   (!) 221/127 97.8 °F (36.6 °C) Temporal 113 17 96 % 5' 9\" (1.753 m) 150 lb (68 kg)       Physical Exam  Vitals signs and nursing note reviewed. Constitutional:       General: She is not in acute distress. Appearance: Normal appearance. She is well-developed. She is not ill-appearing or diaphoretic. HENT:      Head: Normocephalic and atraumatic. Right Ear: External ear normal.      Left Ear: External ear normal.      Nose: Nose normal.      Mouth/Throat:      Mouth: Mucous membranes are moist.   Eyes:      Extraocular Movements: Extraocular movements intact. Conjunctiva/sclera: Conjunctivae normal.      Pupils: Pupils are equal, round, and reactive to light. Neck:      Musculoskeletal: Normal range of motion.  No neck rigidity. Trachea: No tracheal deviation. Comments: Unable to reproduce any obvious pain  Cardiovascular:      Rate and Rhythm: Normal rate and regular rhythm. Heart sounds: Normal heart sounds. No murmur. Pulmonary:      Effort: No respiratory distress. Breath sounds: Normal breath sounds. No wheezing or rales. Abdominal:      Palpations: Abdomen is soft. There is no mass. Tenderness: There is no abdominal tenderness. Musculoskeletal: Normal range of motion. Skin:     General: Skin is warm and dry. Neurological:      Mental Status: She is alert and oriented to person, place, and time. GCS: GCS eye subscore is 4. GCS verbal subscore is 5. GCS motor subscore is 6. Cranial Nerves: Cranial nerves are intact. No dysarthria or facial asymmetry. Sensory: Sensory deficit present. Motor: No weakness, abnormal muscle tone or pronator drift. Coordination: Coordination is intact. Finger-Nose-Finger Test normal.      Gait: Gait is intact. Comments: Right arm numbness         DIAGNOSTIC RESULTS     EKG: All EKG's areinterpreted by the Emergency Department Physician who either signs or Co-signs this chart in the absence of a cardiologist.    108 sinus tachycardia T wave inversions and lead III, nondiagnostic EKG    RADIOLOGY:  Non-plain film images such as CT, Ultrasound and MRI are read by the radiologist. Plain radiographic images are visualized and preliminarily interpreted bythe emergency physician with the below findings:        CTA HEAD W WO CONTRAST   Final Result   1. Negative CT angiography of the head. Signed by Dr Elisa Askew on 3/11/2021 8:46 AM      CTA NECK W CONTRAST   Final Result   1. No carotid occlusive disease. Signed by Dr Destiney Chand on 3/11/2021 8:46 AM      CT HEAD WO CONTRAST   Final Result   Impression:    1. No CT evidence of an acute intracranial process.        2. Paranasal sinus disease with right maxilla sinus opacification. Signed by Dr Jovi Schroeder on 3/11/2021 8:42 AM      XR CHEST PORTABLE   Final Result   No active cardiopulmonary disease. A moderate elevation right diaphragm which was not seen in the   previous study in June 2017. This may be secondary to diaphragmatic   paralysis? . Clinical correlation and further follow-up may be   obtained. Signed by Dr Armand Drake on 3/11/2021 7:41 AM              LABS:  Labs Reviewed   CBC WITH AUTO DIFFERENTIAL - Abnormal; Notable for the following components:       Result Value    WBC 16.0 (*)     RBC 4.08 (*)     MCH 31.4 (*)     MCHC 32.6 (*)     Platelets 367 (*)     Neutrophils % 69.0 (*)     Neutrophils Absolute 11.0 (*)     Monocytes Absolute 1.00 (*)     All other components within normal limits   COMPREHENSIVE METABOLIC PANEL - Abnormal; Notable for the following components:    Potassium 3.2 (*)     All other components within normal limits   URINE DRUG SCREEN - Abnormal; Notable for the following components:    Amphetamine Screen, Urine Positive (*)     All other components within normal limits   TROPONIN   PROTIME-INR   APTT   ETHANOL   POCT GLUCOSE       All other labs were within normal range or not returned as of this dictation.     EMERGENCY DEPARTMENT COURSE and DIFFERENTIAL DIAGNOSIS/MDM:   Vitals:    Vitals:    03/11/21 0645 03/11/21 0646 03/11/21 0735   BP:  (!) 221/127 (!) 163/101   Pulse:  113    Resp: 17     Temp:  97.8 °F (36.6 °C)    TempSrc:  Temporal    SpO2:  96%    Weight: 150 lb (68 kg)     Height: 5' 9\" (1.753 m)         MDM  Number of Diagnoses or Management Options     Amount and/or Complexity of Data Reviewed  Clinical lab tests: ordered and reviewed  Tests in the radiology section of CPT®: ordered and reviewed  Independent visualization of images, tracings, or specimens: yes      Quite hypertensive on arrival and anxious, BP now down to 162/98 from 200+, has some right arm numbness but no weakness and otherwise non focal exam, states feels off balance but walked into ER without issue, symptoms started 2 days ago with quite acute onset HA in the AM, given her overall well presentation [facility], but with her dizziness and numbness am proceeding with CTA imaging as well to further gail, 0717    Ct imaging neg, she remains well appearing, etoh 46 and UDS + for amphetamines but pt denies meth use, pt stable for DC and outpt follow up, understands return precautions      CONSULTS:  None    PROCEDURES:  Unless otherwise noted below, none     Procedures    FINAL IMPRESSION      1. Nonintractable headache, unspecified chronicity pattern, unspecified headache type    2. Dizziness    3. Elevated blood pressure reading    4. Acute alcoholic intoxication without complication ([facility] [address]    5. Amphetamine abuse ([facility] [address]    6. Numbness and tingling    7. Hypokalemia          DISPOSITION/PLAN   DISPOSITION Decision To Discharge 03/11/2021 09:30:35 AM      PATIENT REFERRED TO:  AMARIS Kramer - NP  [address] [facility]  [phone]    Schedule an appointment as soon as possible for a visit in 1 week        DISCHARGE MEDICATIONS:  Discharge Medication List as of 3/11/2021  9:30 AM             (Please note that portions of this note were completed with a voice recognition program.  Efforts were made to edit thedictations but occasionally words are mis-transcribed.)    Clyde Coleman MD (electronically signed)  Attending Emergency Physician        Merna Anglin MD  03/11/21 0565

## 2022-02-08 NOTE — DISCHARGE SUMMARY
[facility] HEART GROUP DISCHARGE    Date of Discharge:  11/6/2017    Discharge Diagnosis:   1.  Non-ST elevation myocardial infarction, likely due to vasospastic angina from sinus medication use of decongestants  2.  Mixed hyperlipidemia   3.  Tobacco abuse    Presenting Problem/History of Present Illness  NSTEMI (non-ST elevated myocardial infarction) [I21.4]  NSTEMI (non-ST elevated myocardial infarction) [I21.4]  Patient is a 42-year-old tobacco abuse and mixed hyperlipidemia who presented emergency department on the night of 11/4/17 after 2 days of stuttering, typical angina.  Pain episodes were severe but relieved with nitroglycerin.  Initial ECG was normal but biomarker was elevated, so due to ongoing pain she was taken urgently to the catheterization laboratory.     Hospital Course  Cardiac catheterization revealed nonobstructive coronary artery disease/near normal coronary arteries.  Left ventriculogram showed normal EF.  Patient was admitted back to the ICU for further monitoring thereafter.  She had 2 further recurrences of severe chest discomfort while on low-dose nitroglycerin infusion, which did respond then to something on nitroglycerin administration.  Echocardiogram showed normal left ventricular wall motion and EF.  She was started on Imdur with no further chest pain occurrences.  Urine drug screen was notable for amphetamines, in addition to medications given during cardiac catheterization.  However, upon interrogation, the patient reported that, due to chronic sinus issues, she had been taking prescription decongestant medications daily for 3 months.  I suspect that the vasoconstrictive component of these medications has induced her vasospastic angina, which should resolve once she has discontinued these medications.  In the meantime, I have instructed her to take daily Imdur as well as sublingual nitroglycerin as needed for any recurrent episodes of chest pain.  She is strongly  Recommended observation. encouraged to cease tobacco use, both for cardiovascular risk reduction in addition to lessened problems with sinuses.  I also recommended she start daily aspirin and continue her low potency statin, as her LDL is well controlled.  This, in combination with smoking cessation, healthy diet and exercise choices, should lead to adequate risk prevention from a cardiac mass or standpoint.    Of note, a beta blocker was not prescribed as her resting heart rate was in the low 60's and we do not want to precipitate bradycardia. Furthermore, there is no evidence that beta-blocker has any mortality or morbidity reduction in a patient with NSTEMI due to vasospastic angina.    Procedures Performed  Procedure(s):  Coronary angiography  Percutaneous Coronary Intervention  11/04 2156 Note By: Luis Colvin MD    Consults:   Consults     No orders found from 10/6/2017 to 11/5/2017.          Cath: normal coronaries  Echo: normal EF    Condition on Discharge:  stable    Physical Exam at Discharge    Vital Signs  Temp:  [97.8 °F (36.6 °C)-98.3 °F (36.8 °C)] 97.8 °F (36.6 °C)  Heart Rate:  [] 70  Resp:  [16-18] 18  BP: (118-161)/() 123/72    Physical Exam:  Physical Exam   Constitutional: No distress.   Neck: No JVD present.   Cardiovascular: Normal rate, regular rhythm, S1 normal, S2 normal, normal heart sounds, intact distal pulses and normal pulses.    Pulmonary/Chest: Effort normal and breath sounds normal.   Abdominal: Soft. There is no tenderness.   Neurological: She is alert and oriented to person, place, and time.   Skin: Skin is warm and dry.       Discharge Disposition  Home or Self Care    Discharge Medications   Minnie Bang   Home Medication Instructions MARSHA:[id number]    Printed on:11/06/17 0831   Medication Information                      aspirin 81 MG EC tablet  Take 1 tablet by mouth Daily.             buPROPion (WELLBUTRIN) 100 MG tablet  Take 100 mg by mouth.              HYDROcodone-acetaminophen (NORCO) 7.5-325 MG per tablet  Take 1 tablet by mouth Every 4 (Four) Hours As Needed for Moderate Pain .             indomethacin (INDOCIN) 50 MG capsule  Take 1 capsule by mouth 3 (Three) Times a Day With Meals.             Influenza Vac Subunit Quad (FLUCELVAX) 0.5 ML suspension prefilled syringe injection  Inject 0.5 mL into the shoulder, thigh, or buttocks 1 (One) Time for 1 dose.             isosorbide mononitrate (IMDUR) 30 MG 24 hr tablet  Take 1 tablet by mouth Daily.             levothyroxine (SYNTHROID, LEVOTHROID) 150 MCG tablet  Take  by mouth.             MethylPREDNISolone (MEDROL, TUTU,) 4 MG tablet  Take  by mouth Daily. Take as directed on package instructions.             nitroglycerin (NITROSTAT) 0.4 MG SL tablet  Place 1 tablet under the tongue Every 5 (Five) Minutes As Needed for Chest Pain. Take no more than 3 doses in 15 minutes.               **Avoid sinus meds with decongestants (pseudoephedrine and phenylephrine). Recommend continue nasal steroids (eg Flonase) but add PO antihistamines.     Discharge Diet: heart healthy    Activity at Discharge: ad buck    Follow-up Appointments  Dr. Luis Colvin, 4-6 weeks    Test Results Pending at Discharge   Order Current Status    Hemoglobin A1c In process        I personally participated in the discharge process with this patient for greater than 30 minutes.    Luis Colvin MD  11/06/17  8:31 AM

## 2023-02-03 ENCOUNTER — HOSPITAL ENCOUNTER (EMERGENCY)
Facility: HOSPITAL | Age: 48
Discharge: LEFT BEFORE DISCHARGE | End: 2023-02-03
Attending: FAMILY MEDICINE | Admitting: FAMILY MEDICINE
Payer: COMMERCIAL

## 2023-02-03 VITALS
OXYGEN SATURATION: 99 % | WEIGHT: 178 LBS | SYSTOLIC BLOOD PRESSURE: 130 MMHG | HEIGHT: 69 IN | BODY MASS INDEX: 26.36 KG/M2 | HEART RATE: 99 BPM | DIASTOLIC BLOOD PRESSURE: 89 MMHG | RESPIRATION RATE: 18 BRPM | TEMPERATURE: 97.9 F

## 2023-02-03 PROCEDURE — 99211 OFF/OP EST MAY X REQ PHY/QHP: CPT | Performed by: FAMILY MEDICINE

## 2023-02-03 RX ORDER — SODIUM CHLORIDE 0.9 % (FLUSH) 0.9 %
10 SYRINGE (ML) INJECTION AS NEEDED
Status: DISCONTINUED | OUTPATIENT
Start: 2023-02-03 | End: 2023-02-03 | Stop reason: HOSPADM

## 2023-02-04 NOTE — ED PROVIDER NOTES
Subjective   History of Present Illness    Patient left without being seen after triage.  I have not seen the patient or examine the patient.    Review of Systems    Past Medical History:   Diagnosis Date   • COPD (chronic obstructive pulmonary disease) ([facility])        No Known Allergies    Past Surgical History:   Procedure Laterality Date   • APPENDECTOMY     • CARDIAC CATHETERIZATION N/A 11/4/2017    Procedure: Coronary angiography;  Surgeon: Luis Colvin MD;  Location:  [facility] CATH INVASIVE LOCATION;  Service:    • HYSTERECTOMY     • MO RT/LT HEART CATHETERS N/A 11/4/2017    Procedure: Percutaneous Coronary Intervention;  Surgeon: Luis Colvin MD;  Location:  [facility] CATH INVASIVE LOCATION;  Service: Cardiovascular   • THYROID SURGERY     • TOTAL THYROIDECTOMY         No family history on file.    Social History     Socioeconomic History   • Marital status:    Tobacco Use   • Smoking status: Heavy Smoker     Packs/day: 1.00     Types: Cigarettes   Substance and Sexual Activity   • Alcohol use: No   • Drug use: No   • Sexual activity: Defer           Objective   Physical Exam    Procedures           ED Course                                           MDM    Final diagnoses:   None       ED Disposition  ED Disposition     ED Disposition   LWBS after Triage    Condition   --    Comment   --             No follow-up provider specified.       Medication List      No changes were made to your prescriptions during this visit.          Evert David MD  02/04/23 8855

## 2023-02-19 ENCOUNTER — APPOINTMENT (OUTPATIENT)
Dept: GENERAL RADIOLOGY | Facility: HOSPITAL | Age: 48
End: 2023-02-19
Payer: COMMERCIAL

## 2023-02-19 ENCOUNTER — HOSPITAL ENCOUNTER (EMERGENCY)
Facility: HOSPITAL | Age: 48
Discharge: HOME | End: 2023-02-19
Attending: EMERGENCY MEDICINE | Admitting: EMERGENCY MEDICINE
Payer: COMMERCIAL

## 2023-02-19 VITALS
TEMPERATURE: 98.2 F | WEIGHT: 180 LBS | BODY MASS INDEX: 26.66 KG/M2 | HEART RATE: 79 BPM | OXYGEN SATURATION: 99 % | SYSTOLIC BLOOD PRESSURE: 152 MMHG | DIASTOLIC BLOOD PRESSURE: 92 MMHG | HEIGHT: 69 IN | RESPIRATION RATE: 18 BRPM

## 2023-02-19 DIAGNOSIS — I10 PRIMARY HYPERTENSION: Primary | ICD-10-CM

## 2023-02-19 DIAGNOSIS — R06.00 DYSPNEA, UNSPECIFIED TYPE: ICD-10-CM

## 2023-02-19 LAB
ALBUMIN SERPL-MCNC: 4.4 G/DL (ref 3.5–5.2)
ALBUMIN/GLOB SERPL: 1.4 G/DL
ALP SERPL-CCNC: 118 U/L (ref 39–117)
ALT SERPL W P-5'-P-CCNC: 14 U/L (ref 1–33)
ANION GAP SERPL CALCULATED.3IONS-SCNC: 18 MMOL/L (ref 5–15)
AST SERPL-CCNC: 16 U/L (ref 1–32)
BASOPHILS # BLD AUTO: 0.1 10*3/MM3 (ref 0–0.2)
BASOPHILS NFR BLD AUTO: 0.9 % (ref 0–1.5)
BILIRUB SERPL-MCNC: 0.2 MG/DL (ref 0–1.2)
BUN SERPL-MCNC: 6 MG/DL (ref 6–20)
BUN/CREAT SERPL: 12 (ref 7–25)
CALCIUM SPEC-SCNC: 9.3 MG/DL (ref 8.6–10.5)
CHLORIDE SERPL-SCNC: 101 MMOL/L (ref 98–107)
CO2 SERPL-SCNC: 21 MMOL/L (ref 22–29)
CREAT SERPL-MCNC: 0.5 MG/DL (ref 0.57–1)
D DIMER PPP FEU-MCNC: 0.62 MCGFEU/ML (ref 0–0.5)
DEPRECATED RDW RBC AUTO: 47.2 FL (ref 37–54)
EGFRCR SERPLBLD CKD-EPI 2021: 115.1 ML/MIN/1.73
EOSINOPHIL # BLD AUTO: 0.4 10*3/MM3 (ref 0–0.4)
EOSINOPHIL NFR BLD AUTO: 3.4 % (ref 0.3–6.2)
ERYTHROCYTE [DISTWIDTH] IN BLOOD BY AUTOMATED COUNT: 13.1 % (ref 12.3–15.4)
GLOBULIN UR ELPH-MCNC: 3.1 GM/DL
GLUCOSE SERPL-MCNC: 96 MG/DL (ref 65–99)
HCT VFR BLD AUTO: 38.3 % (ref 34–46.6)
HGB BLD-MCNC: 12.5 G/DL (ref 12–15.9)
IMM GRANULOCYTES # BLD AUTO: 0.06 10*3/MM3 (ref 0–0.05)
IMM GRANULOCYTES NFR BLD AUTO: 0.5 % (ref 0–0.5)
LYMPHOCYTES # BLD AUTO: 3.32 10*3/MM3 (ref 0.7–3.1)
LYMPHOCYTES NFR BLD AUTO: 28.6 % (ref 19.6–45.3)
MAGNESIUM SERPL-MCNC: 1.8 MG/DL (ref 1.6–2.6)
MCH RBC QN AUTO: 31.8 PG (ref 26.6–33)
MCHC RBC AUTO-ENTMCNC: 32.6 G/DL (ref 31.5–35.7)
MCV RBC AUTO: 97.5 FL (ref 79–97)
MONOCYTES # BLD AUTO: 1.19 10*3/MM3 (ref 0.1–0.9)
MONOCYTES NFR BLD AUTO: 10.2 % (ref 5–12)
NEUTROPHILS NFR BLD AUTO: 56.4 % (ref 42.7–76)
NEUTROPHILS NFR BLD AUTO: 6.55 10*3/MM3 (ref 1.7–7)
NRBC BLD AUTO-RTO: 0 /100 WBC (ref 0–0.2)
NT-PROBNP SERPL-MCNC: 325.9 PG/ML (ref 0–450)
PLATELET # BLD AUTO: 430 10*3/MM3 (ref 140–450)
PMV BLD AUTO: 9.8 FL (ref 6–12)
POTASSIUM SERPL-SCNC: 3.3 MMOL/L (ref 3.5–5.2)
PROT SERPL-MCNC: 7.5 G/DL (ref 6–8.5)
RBC # BLD AUTO: 3.93 10*6/MM3 (ref 3.77–5.28)
SODIUM SERPL-SCNC: 140 MMOL/L (ref 136–145)
T4 FREE SERPL-MCNC: 1.11 NG/DL (ref 0.93–1.7)
TROPONIN T SERPL HS-MCNC: 6 NG/L
TSH SERPL DL<=0.05 MIU/L-ACNC: 1.6 UIU/ML (ref 0.27–4.2)
WBC NRBC COR # BLD: 11.62 10*3/MM3 (ref 3.4–10.8)

## 2023-02-19 PROCEDURE — 36415 COLL VENOUS BLD VENIPUNCTURE: CPT

## 2023-02-19 PROCEDURE — 83880 ASSAY OF NATRIURETIC PEPTIDE: CPT | Performed by: EMERGENCY MEDICINE

## 2023-02-19 PROCEDURE — 84439 ASSAY OF FREE THYROXINE: CPT | Performed by: EMERGENCY MEDICINE

## 2023-02-19 PROCEDURE — 80050 GENERAL HEALTH PANEL: CPT | Performed by: EMERGENCY MEDICINE

## 2023-02-19 PROCEDURE — 99283 EMERGENCY DEPT VISIT LOW MDM: CPT

## 2023-02-19 PROCEDURE — 83735 ASSAY OF MAGNESIUM: CPT | Performed by: EMERGENCY MEDICINE

## 2023-02-19 PROCEDURE — 85379 FIBRIN DEGRADATION QUANT: CPT | Performed by: EMERGENCY MEDICINE

## 2023-02-19 PROCEDURE — 71045 X-RAY EXAM CHEST 1 VIEW: CPT

## 2023-02-19 PROCEDURE — 84484 ASSAY OF TROPONIN QUANT: CPT | Performed by: EMERGENCY MEDICINE

## 2023-02-19 RX ORDER — CLONIDINE HYDROCHLORIDE 0.1 MG/1
0.1 TABLET ORAL EVERY 6 HOURS PRN
Qty: 20 TABLET | Refills: 0 | Status: SHIPPED | OUTPATIENT
Start: 2023-02-19

## 2023-02-19 RX ORDER — LISINOPRIL 40 MG/1
40 TABLET ORAL DAILY
COMMUNITY

## 2023-02-19 RX ORDER — CLONIDINE HYDROCHLORIDE 0.1 MG/1
0.2 TABLET ORAL ONCE
Status: COMPLETED | OUTPATIENT
Start: 2023-02-19 | End: 2023-02-19

## 2023-02-19 RX ORDER — SODIUM CHLORIDE 0.9 % (FLUSH) 0.9 %
10 SYRINGE (ML) INJECTION AS NEEDED
Status: DISCONTINUED | OUTPATIENT
Start: 2023-02-19 | End: 2023-02-19 | Stop reason: HOSPADM

## 2023-02-19 RX ORDER — ALBUTEROL SULFATE 90 UG/1
2 AEROSOL, METERED RESPIRATORY (INHALATION) EVERY 4 HOURS PRN
COMMUNITY
End: 2023-04-05 | Stop reason: SDUPTHER

## 2023-02-19 RX ORDER — PROPRANOLOL HYDROCHLORIDE 40 MG/1
40 TABLET ORAL 2 TIMES DAILY
COMMUNITY

## 2023-02-19 RX ADMIN — CLONIDINE HYDROCHLORIDE 0.2 MG: 0.1 TABLET ORAL at 15:34

## 2023-02-19 NOTE — ED PROVIDER NOTES
Subjective   History of Present Illness  Patient presents with a primary complaint of markedly elevated blood pressure and then also some shortness of breath.  She says her blood pressures been consistently high for the past several weeks.  She has been seeing her family doctor and has had medications added and changed over time but still does not seem to be getting her blood pressure under control.  She had blood pressures in the 220s over 100 over the day on Friday at work and then had a blood pressure last night of 250/120 at home.  She finally came today to get checked out.  She also says that she remains consistently short of breath.  She did smoke for 30 years but did quit about 6 to 8 months ago.  However she continues to be short of breath with any exertion.  She denies any cough or congestion or chest pains or palpitations.  She says she been on medications for that also but it has not helped.  She does occasional pain in her jaw and was prescribed nitroglycerin and Imdur by a cardiologist in the past but does not have any of that now.  Her doctor would not prescribe it tell her that she was going to get her in touch with cardiology and pulmonology.  She came in today because the blood pressure was still elevated and she is very short of breath.    History provided by:  Patient   used: No    Hypertension  Severity:  Severe  Onset quality:  Gradual  Timing:  Constant  Progression:  Unchanged  Chronicity:  Chronic  Time since last dose of antihypertensive:  6 hours  Notable PTA blood pressures:  250/120  Context: normal sodium, not caffeine, not drug abuse, not herbal remedies, not medication change, not noncompliance, not OTC medications used and not stress    Relieved by:  Nothing  Worsened by:  Nothing  Ineffective treatments:  None tried  Associated symptoms: anxiety and shortness of breath    Associated symptoms: no abdominal pain, no blurred vision, no chest pain, no confusion, no  dizziness, no ear pain, no epistaxis, no fatigue, no fever, no headaches, no hematuria, no hypokalemia, no loss of consciousness, no nausea, no neck pain, no palpitations, no peripheral edema, no syncope, no tinnitus, not vomiting and no weakness    Risk factors: no alcohol use, no cardiac disease, no cocaine use, no decongestant use, no diabetes, no family history of hypertension, no kidney disease, no obesity, no prior aneurysm, no prior stroke, no PVD and no tobacco use        Review of Systems   Constitutional: Negative.  Negative for fatigue and fever.   HENT: Negative.  Negative for ear pain, nosebleeds and tinnitus.    Eyes: Negative for blurred vision.   Respiratory: Positive for shortness of breath.    Cardiovascular: Negative.  Negative for chest pain, palpitations and syncope.   Gastrointestinal: Negative.  Negative for abdominal pain, nausea and vomiting.   Genitourinary: Negative.  Negative for hematuria.   Musculoskeletal: Negative.  Negative for neck pain.   Skin: Negative.    Neurological: Negative.  Negative for dizziness, loss of consciousness, weakness and headaches.   Hematological: Negative.    Psychiatric/Behavioral: Negative for confusion. The patient is nervous/anxious.    All other systems reviewed and are negative.      Past Medical History:   Diagnosis Date   • COPD (chronic obstructive pulmonary disease) (HCC)    • Hypertension        No Known Allergies    Past Surgical History:   Procedure Laterality Date   • APPENDECTOMY     • CARDIAC CATHETERIZATION N/A 11/4/2017    Procedure: Coronary angiography;  Surgeon: Luis Colvin MD;  Location:  [facility];  Service:    • HYSTERECTOMY     • [place] RT/LT HEART CATHETERS N/A 11/4/2017    Procedure: Percutaneous Coronary Intervention;  Surgeon: Luis Colvin MD;  Location:  [facility];  Service: Cardiovascular   • THYROID SURGERY     • TOTAL THYROIDECTOMY         History reviewed. No pertinent family  history.    Social History     Socioeconomic History   • Marital status:    Tobacco Use   • Smoking status: Heavy Smoker     Packs/day: 0.50     Types: Cigarettes   Substance and Sexual Activity   • Alcohol use: Yes     Comment: occ   • Drug use: No   • Sexual activity: Defer       Prior to Admission medications    Medication Sig Start Date End Date Taking? Authorizing Provider   albuterol sulfate  (90 Base) MCG/ACT inhaler Inhale 2 puffs Every 4 (Four) Hours As Needed for Wheezing.    Galdino Wei MD   aspirin 81 MG EC tablet Take 1 tablet by mouth Daily. 11/6/17   Luis Colvin MD   buPROPion (WELLBUTRIN) 100 MG tablet Take 100 mg by mouth.    Galdino Wei MD   HYDROcodone-acetaminophen (NORCO) 7.5-325 MG per tablet Take 1 tablet by mouth Every 4 (Four) Hours As Needed for Moderate Pain . 10/19/17   Savage Javier Jr., MD   indomethacin (INDOCIN) 50 MG capsule Take 1 capsule by mouth 3 (Three) Times a Day With Meals. 10/19/17   Savage Javier Jr., MD   isosorbide mononitrate (IMDUR) 30 MG 24 hr tablet Take 1 tablet by mouth Daily. 11/6/17   Luis Colvin MD   levothyroxine (SYNTHROID, LEVOTHROID) 150 MCG tablet Take 175 mcg by mouth.    Galdino Wei MD   lisinopril (PRINIVIL,ZESTRIL) 40 MG tablet Take 40 mg by mouth Daily.    Galdino Wei MD   MethylPREDNISolone (MEDROL, TUTU,) 4 MG tablet Take  by mouth Daily. Take as directed on package instructions.    Galdino Wei MD   nitroglycerin (NITROSTAT) 0.4 MG SL tablet Place 1 tablet under the tongue Every 5 (Five) Minutes As Needed for Chest Pain. Take no more than 3 doses in 15 minutes. 11/6/17   Luis Colvin MD   propranolol (INDERAL) 40 MG tablet Take 40 mg by mouth 2 (Two) Times a Day.    Galdino Wei MD       Medications   cloNIDine (CATAPRES) tablet 0.2 mg (0.2 mg Oral Given 2/19/23 1534)       Vitals:    02/19/23 1645   BP: 152/92   Pulse: 79   Resp: 18   Temp: 98.2 °F (36.8 °C)    SpO2: 99%         Objective   Physical Exam  Vitals and nursing note reviewed.   Constitutional:       Appearance: Normal appearance.   Eyes:      Extraocular Movements: Extraocular movements intact.      Pupils: Pupils are equal, round, and reactive to light.   Cardiovascular:      Rate and Rhythm: Normal rate and regular rhythm.   Pulmonary:      Effort: Pulmonary effort is normal.      Breath sounds: Normal breath sounds.   Abdominal:      Palpations: Abdomen is soft.      Tenderness: There is no abdominal tenderness.   Musculoskeletal:         General: Normal range of motion.   Skin:     General: Skin is warm and dry.   Neurological:      General: No focal deficit present.      Mental Status: She is alert and oriented to person, place, and time.   Psychiatric:         Mood and Affect: Mood normal.         Behavior: Behavior normal.         Procedures         Lab Results (last 24 hours)     Procedure Component Value Units Date/Time    CBC & Differential [[id number]]  (Abnormal) Collected: 02/19/23 1538    Specimen: Blood Updated: 02/19/23 1552    Narrative:      The following orders were created for panel order CBC & Differential.  Procedure                               Abnormality         Status                     ---------                               -----------         ------                     CBC Auto Differential[[id number]]        Abnormal            Final result                 Please view results for these tests on the individual orders.    Comprehensive Metabolic Panel [[id number]]  (Abnormal) Collected: 02/19/23 1538    Specimen: Blood Updated: 02/19/23 1614     Glucose 96 mg/dL      BUN 6 mg/dL      Creatinine 0.50 mg/dL      Sodium 140 mmol/L      Potassium 3.3 mmol/L      Chloride 101 mmol/L      CO2 21.0 mmol/L      Calcium 9.3 mg/dL      Total Protein 7.5 g/dL      Albumin 4.4 g/dL      ALT (SGPT) 14 U/L      AST (SGOT) 16 U/L      Alkaline Phosphatase 118 U/L      Total Bilirubin 0.2 mg/dL   "    Globulin 3.1 gm/dL      A/G Ratio 1.4 g/dL      BUN/Creatinine Ratio 12.0     Anion Gap 18.0 mmol/L      eGFR 115.1 mL/min/1.73     Narrative:      GFR Normal >60  Chronic Kidney Disease <60  Kidney Failure <15      BNP [[id number]]  (Normal) Collected: 02/19/23 1538    Specimen: Blood Updated: 02/19/23 1611     proBNP 325.9 pg/mL     Narrative:      Among patients with dyspnea, NT-proBNP is highly sensitive for the detection of acute congestive heart failure. In addition NT-proBNP of <300 pg/ml effectively rules out acute congestive heart failure with 99% negative predictive value.    Results may be falsely decreased if patient taking Biotin.      D-dimer, Quantitative [[id number]]  (Abnormal) Collected: 02/19/23 1538    Specimen: Blood Updated: 02/19/23 1605     D-Dimer, Quantitative 0.62 MCGFEU/mL     Narrative:      According to the assay 's published package insert, a normal (<0.50 MCGFEU/mL) D-dimer result in conjunction with a non-high clinical probability assessment, excludes deep vein thrombosis (DVT) and pulmonary embolism (PE) with high sensitivity.    D-dimer values increase with age and this can make VTE exclusion of an older population difficult. To address this, the American College [employer], based on best available evidence and recent guidelines, recommends that clinicians use age-adjusted D-dimer thresholds in patients greater than 50 years of age with: a) a low probability of PE who do not meet all Pulmonary Embolism Rule Out Criteria, or b) in those with intermediate probability of PE.   The formula for an age-adjusted D-dimer cut-off is \"age/100\".  For example, a 60 year old patient would have an age-adjusted cut-off of 0.60 MCGFEU/mL and an 80 year old 0.80 MCGFEU/mL.    Single High Sensitivity Troponin T [[id number]]  (Normal) Collected: 02/19/23 1538    Specimen: Blood Updated: 02/19/23 1610     HS Troponin T 6 ng/L     Narrative:      High Sensitive Troponin T Reference " Range:  <10.0 ng/L- Negative Female for AMI  <15.0 ng/L- Negative Male for AMI  >=10 - Abnormal Female indicating possible myocardial injury.  >=15 - Abnormal Male indicating possible myocardial injury.   Clinicians would have to utilize clinical acumen, EKG, Troponin, and serial changes to determine if it is an Acute Myocardial Infarction or myocardial injury due to an underlying chronic condition.         Magnesium [[id number]]  (Normal) Collected: 02/19/23 1538    Specimen: Blood Updated: 02/19/23 1608     Magnesium 1.8 mg/dL     TSH [[id number]]  (Normal) Collected: 02/19/23 1538    Specimen: Blood Updated: 02/19/23 1618     TSH 1.600 uIU/mL     T4, Free [[id number]]  (Normal) Collected: 02/19/23 1538    Specimen: Blood Updated: 02/19/23 1617     Free T4 1.11 ng/dL     Narrative:      Results may be falsely increased if patient taking Biotin.      CBC Auto Differential [[id number]]  (Abnormal) Collected: 02/19/23 1538    Specimen: Blood Updated: 02/19/23 1552     WBC 11.62 10*3/mm3      RBC 3.93 10*6/mm3      Hemoglobin 12.5 g/dL      Hematocrit 38.3 %      MCV 97.5 fL      MCH 31.8 pg      MCHC 32.6 g/dL      RDW 13.1 %      RDW-SD 47.2 fl      MPV 9.8 fL      Platelets 430 10*3/mm3      Neutrophil % 56.4 %      Lymphocyte % 28.6 %      Monocyte % 10.2 %      Eosinophil % 3.4 %      Basophil % 0.9 %      Immature Grans % 0.5 %      Neutrophils, Absolute 6.55 10*3/mm3      Lymphocytes, Absolute 3.32 10*3/mm3      Monocytes, Absolute 1.19 10*3/mm3      Eosinophils, Absolute 0.40 10*3/mm3      Basophils, Absolute 0.10 10*3/mm3      Immature Grans, Absolute 0.06 10*3/mm3      nRBC 0.0 /100 WBC           XR Chest 1 View   Final Result   1. No acute radiographic cardiopulmonary process.   This report was finalized on 02/19/2023 15:34 by Dr. Silvia Lawson MD.          ED Course  ED Course as of 02/19/23 1909   Sun Feb 19, 2023 1908 I told the patient her testing here all looks fine with no signs of anything  significantly wrong.  Her blood pressure has come down nicely with treatment here.  I will give her some Catapres to have at home to use as an on needed basis edition follow-up with her family doctor.  I told him that it sounds like she is being treated correctly in terms of gradual advancement of medication to get good control but just cannot take a while to get better control.  She is discharged in stable condition [TR]      ED Course User Index  [TR] Savage Javier Jr., MD          MDM  Number of Diagnoses or Management Options  Dyspnea, unspecified type: new and requires workup  Primary hypertension: new and requires workup     Amount and/or Complexity of Data Reviewed  Clinical lab tests: ordered and reviewed  Tests in the radiology section of CPT®: ordered and reviewed  Tests in the medicine section of CPT®: ordered and reviewed    Risk of Complications, Morbidity, and/or Mortality  Presenting problems: moderate  Diagnostic procedures: moderate  Management options: moderate    Patient Progress  Patient progress: stable      Final diagnoses:   Primary hypertension   Dyspnea, unspecified type          Savage Javier Jr., MD  02/19/23 5757

## 2023-03-03 NOTE — PROGRESS NOTES
"BREANA Mendoza  [facility]   Pulmonary and Critical Care  [address] [place] [address]  Phone: [phone]  Fax: [phone]           Chief Complaint  Shortness of Breath    Subjective    History of Present Illness     Minnie Bang presents to [facility] PULMONARY & CRITICAL CARE MEDICINE   History of Present Illness  Ms Bang is a 47 year old female referred by her PCP for shortness of breath. She has known history of arthritis, hypothyroidism, hypercholesterolemia, elevated triglycerides, hypertension, depression, anxiety, low vitamin D and history of Covid in Nov 2022 & mid Feb 2023. She is a current every day smoker of 1 PPD x 30 years. She quit Nov 6th and now vaping with nicotine. Labs reviewed from 8/2022 and Jan 2023. TSH is elevated with normal T4,free. Vitamin D is deficient on both labs. Total cholesterol is 264 and triglycerides were 417. Her CBC showed normal eosinophils on both labs however they are 300. She was seen by PCP on 2/16/2023 noting she felt she had an asthma attack at her new job.  She began feeling like something stuck in her throat which lead to a feeling of being stabbed in the throat. She then coughed for 20 minutes coughing up phlegm described as rubber consistency and black in color. It was also noted she has had uncontrolled blood pressure and angina with jaw pain. She was referred to the cardiologist as well. She has had a couple of Er visits in the last month as well. She was prescribed Catapres from the Er for her BP. She has never had a pulmonary function study. She has night time awakenings that occur 2-3 times a night. She has panic attacks as well. Once she sits up she coughs and can then get the \"stuff\" up. She reports it tastes like metal.  She denies fever, chills, night sweats. She has no seasonal allergies. She has a cough that is daily and only productive once she coughs long and hard. She has had no " "allergy testing. 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 She has known exposures as being a . She had second hand smoke exposure growing up. She has family history of Asthma with mother who had frequent asthma attacks.  She has no post nasal drip. She has some acid reflux. She dose eat or drinks 2-3 hours prior to bedtime. She does this frequently as she works midnights. Her chest xray last month showed no acute findings. She has tried Advair, Dulera and Airo duo and they all made her throat worse and she coughed all the time. She was rinsing her mouth out routinely with use. She has a rescue inhaler she will use 3-4 times a day. It does help her breathing but she also notes a scratchy feeling in the back of her throat. She gets wheezy and is told frequently they can hear her breathing. She also has jaw pain. She has two cats, a bird and two dogs in the home. The bird is an Amazon Yellow Nape. She has had this bird for 3-4 years.        Objective   Vital Signs:   /90   Pulse 84   Ht 175.3 cm (69\")   Wt 81.6 kg (180 lb)   SpO2 98% Comment: RA  BMI 26.58 kg/m²     Physical Exam  Vitals reviewed.   Constitutional:       Appearance: Normal appearance.   Cardiovascular:      Rate and Rhythm: Normal rate and regular rhythm.   Pulmonary:      Effort: Pulmonary effort is normal.      Breath sounds: Normal breath sounds.   Neurological:      General: No focal deficit present.      Mental Status: She is alert and oriented to person, place, and time.   Psychiatric:         Mood and Affect: Mood normal.         Behavior: Behavior normal.          Result Review :  The following data was reviewed by: BREANA Mendoza on 03/06/2023:    Data reviewed: Radiologic studies CXR   My interpretation of imaging:  As in HPI  My interpretation of labs: as in HPI  XR Chest 1 View (02/19/2023 15:32)      My interpretation of the PFT: none    No results found for this or any previous " visit.        Assessment and Plan   Diagnoses and all orders for this visit:    1. Dyspnea on exertion (Primary)  -     IgE + Allergens (22)  -     CBC & Differential  -     CT Chest Hi Resolution Diagnostic; Future    2. Cough, unspecified type  -     IgE + Allergens (22)  -     CBC & Differential  -     CT Chest Hi Resolution Diagnostic; Future    3. Tobacco abuse    4. Long-term exposure involving bird droppings  -     CT Chest Hi Resolution Diagnostic; Future    5. Family history of asthma    Other orders  -     Fluticasone-Umeclidin-Vilant (Trelegy Ellipta) 200-62.5-25 MCG/ACT aerosol powder ; Inhale 1 puff Daily.  Dispense: 2 each; Refill: 0  -     predniSONE (DELTASONE) 20 MG tablet; Take 1 tablet by mouth Daily.  Dispense: 5 tablet; Refill: 0    Patient had an attack while in the office and improved with use of her rescue inhaler. She also has some underlying issues with depression and anxiety. She is emotional today. She is going to discuss going on medication when she sees her PCP this week. She is also vaping now and encouraged to come off of that as well. She is congratulated about smoking cessation.     Patient is provided the following instructions. She is provided demonstration for Trelegy. She is provided information about pulmonary function study.   Prednisone 20 mg daily for 5 days  Continue rescue inhaler or nebulizer  Can pretreat with either rescue inhaler or Nebulizer prior to bedtime  Do not eat or drink 2-3 hours prior to bedtime   Get scheduled with cardiology and once they do a work up we will plan a pulmonary function study  Discuss with PCP or cardiology about coming off of the Propanolol as this can make pulmonary disease such as asthma worse   Begin Trelegy and do not use the other maintenance inhalers you have on hand. Continue to rinse your mouth after use.   Continue to take Mucinex with a full glass of water avoiding the DM as this can raise your blood pressure.   Schedule for HRCT  given bird exposure.     Follow Up   Return in about 4 weeks (around 4/3/2023).  Patient was given instructions and counseling regarding her condition or for health maintenance advice. Please see specific information pulled into the AVS if appropriate.     Sivan Wise, BREANA  3/6/2023  15:04 CST

## 2023-03-06 ENCOUNTER — OFFICE VISIT (OUTPATIENT)
Dept: PULMONOLOGY | Facility: CLINIC | Age: 48
End: 2023-03-06
Payer: COMMERCIAL

## 2023-03-06 VITALS
OXYGEN SATURATION: 98 % | DIASTOLIC BLOOD PRESSURE: 90 MMHG | HEIGHT: 69 IN | BODY MASS INDEX: 26.66 KG/M2 | SYSTOLIC BLOOD PRESSURE: 148 MMHG | HEART RATE: 84 BPM | WEIGHT: 180 LBS

## 2023-03-06 DIAGNOSIS — Z77.29 LONG-TERM EXPOSURE INVOLVING BIRD DROPPINGS: ICD-10-CM

## 2023-03-06 DIAGNOSIS — Z82.5 FAMILY HISTORY OF ASTHMA: ICD-10-CM

## 2023-03-06 DIAGNOSIS — R06.09 DYSPNEA ON EXERTION: Primary | ICD-10-CM

## 2023-03-06 DIAGNOSIS — R05.9 COUGH, UNSPECIFIED TYPE: ICD-10-CM

## 2023-03-06 DIAGNOSIS — Z72.0 TOBACCO ABUSE: ICD-10-CM

## 2023-03-06 PROCEDURE — 99203 OFFICE O/P NEW LOW 30 MIN: CPT | Performed by: NURSE PRACTITIONER

## 2023-03-06 RX ORDER — FLUTICASONE FUROATE, UMECLIDINIUM BROMIDE AND VILANTEROL TRIFENATATE 200; 62.5; 25 UG/1; UG/1; UG/1
1 POWDER RESPIRATORY (INHALATION) DAILY
Qty: 2 EACH | Refills: 0 | COMMUNITY
Start: 2023-03-06

## 2023-03-06 RX ORDER — ERGOCALCIFEROL (VITAMIN D2) 10 MCG
400 TABLET ORAL DAILY
COMMUNITY

## 2023-03-06 RX ORDER — PREDNISONE 20 MG/1
20 TABLET ORAL DAILY
Qty: 5 TABLET | Refills: 0 | OUTPATIENT
Start: 2023-03-06 | End: 2023-03-20

## 2023-03-06 RX ORDER — PREDNISONE 10 MG/1
TABLET ORAL
Qty: 31 TABLET | Refills: 0 | Status: CANCELLED | OUTPATIENT
Start: 2023-03-06

## 2023-03-06 NOTE — PATIENT INSTRUCTIONS
Prednisone Taper  Continue rescue inhaler or nebulizer  Can pretreat with either rescue inhaler or Nebulizer prior to bedtime  Do not eat or drink 2-3 hours prior to bedtime   Get scheduled with cardiology and once they do a work up we will plan a pulmonary function study  Discuss with PCP or cardiology about coming off of the Propanolol as this can make pulmonary disease such as asthma worse   Begin Trelegy and do not use the other maintenance inhalers you have on hand. Continue to rinse your mouth after use.   Continue to take Mucinex with a full glass of water avoiding the DM as this can raise your blood pressure.   Schedule for cat scan given bird exposure.

## 2023-03-09 LAB
A ALTERNATA IGE QN: <0.1 KU/L
A FUMIGATUS IGE QN: <0.1 KU/L
BASOPHILS # BLD AUTO: 0.1 X10E3/UL (ref 0–0.2)
BASOPHILS NFR BLD AUTO: 1 %
BERMUDA GRASS IGE QN: <0.1 KU/L
BOXELDER IGE QN: <0.1 KU/L
C HERBARUM IGE QN: <0.1 KU/L
CAT DANDER IGE QN: <0.1 KU/L
COMMON RAGWEED IGE QN: <0.1 KU/L
CONV CLASS DESCRIPTION: NORMAL
COTTONWOOD IGE QN: <0.1 KU/L
D FARINAE IGE QN: <0.1 KU/L
D PTERONYSS IGE QN: <0.1 KU/L
DOG DANDER IGE QN: <0.1 KU/L
EOSINOPHIL # BLD AUTO: 0.3 X10E3/UL (ref 0–0.4)
EOSINOPHIL NFR BLD AUTO: 3 %
ERYTHROCYTE [DISTWIDTH] IN BLOOD BY AUTOMATED COUNT: 12.6 % (ref 11.7–15.4)
HCT VFR BLD AUTO: 38 % (ref 34–46.6)
HGB BLD-MCNC: 12.7 G/DL (ref 11.1–15.9)
IGE SERPL-ACNC: 31 IU/ML (ref 6–495)
IMM GRANULOCYTES # BLD AUTO: 0.1 X10E3/UL (ref 0–0.1)
IMM GRANULOCYTES NFR BLD AUTO: 1 %
LYMPHOCYTES # BLD AUTO: 3.8 X10E3/UL (ref 0.7–3.1)
LYMPHOCYTES NFR BLD AUTO: 33 %
MCH RBC QN AUTO: 31.8 PG (ref 26.6–33)
MCHC RBC AUTO-ENTMCNC: 33.4 G/DL (ref 31.5–35.7)
MCV RBC AUTO: 95 FL (ref 79–97)
MONOCYTES # BLD AUTO: 0.9 X10E3/UL (ref 0.1–0.9)
MONOCYTES NFR BLD AUTO: 8 %
MT JUNIPER IGE QN: <0.1 KU/L
NETTLE IGE QN: <0.1 KU/L
NEUTROPHILS # BLD AUTO: 6.4 X10E3/UL (ref 1.4–7)
NEUTROPHILS NFR BLD AUTO: 54 %
P NOTATUM IGE QN: <0.1 KU/L
PLATELET # BLD AUTO: 363 X10E3/UL (ref 150–450)
RBC # BLD AUTO: 3.99 X10E6/UL (ref 3.77–5.28)
ROACH IGE QN: <0.1 KU/L
SALTWORT IGE QN: <0.1 KU/L
SHEEP SORREL IGE QN: <0.1 KU/L
TIMOTHY IGE QN: <0.1 KU/L
WBC # BLD AUTO: 11.6 X10E3/UL (ref 3.4–10.8)
WHITE ASH IGE QN: <0.1 KU/L
WHITE ELM IGE QN: <0.1 KU/L
WHITE MULBERRY IGE QN: <0.1 KU/L
WHITE OAK IGE QN: <0.1 KU/L

## 2023-03-10 ENCOUNTER — OFFICE VISIT (OUTPATIENT)
Dept: CARDIOLOGY | Facility: CLINIC | Age: 48
End: 2023-03-10
Payer: COMMERCIAL

## 2023-03-10 VITALS
DIASTOLIC BLOOD PRESSURE: 85 MMHG | BODY MASS INDEX: 26.81 KG/M2 | HEIGHT: 69 IN | WEIGHT: 181 LBS | SYSTOLIC BLOOD PRESSURE: 156 MMHG | HEART RATE: 64 BPM

## 2023-03-10 DIAGNOSIS — R42 VERTIGO: Primary | ICD-10-CM

## 2023-03-10 DIAGNOSIS — I73.00 RAYNAUD'S DISEASE WITHOUT GANGRENE: ICD-10-CM

## 2023-03-10 DIAGNOSIS — R51.9 NONINTRACTABLE HEADACHE, UNSPECIFIED CHRONICITY PATTERN, UNSPECIFIED HEADACHE TYPE: ICD-10-CM

## 2023-03-10 DIAGNOSIS — R07.9 CHEST PAIN IN ADULT: ICD-10-CM

## 2023-03-10 DIAGNOSIS — Z87.891 EX-SMOKER: ICD-10-CM

## 2023-03-10 DIAGNOSIS — R06.09 DOE (DYSPNEA ON EXERTION): ICD-10-CM

## 2023-03-10 DIAGNOSIS — E78.2 MIXED HYPERLIPIDEMIA: ICD-10-CM

## 2023-03-10 PROBLEM — Z72.0 TOBACCO ABUSE: Status: RESOLVED | Noted: 2017-11-04 | Resolved: 2023-03-10

## 2023-03-10 PROBLEM — I21.4 NSTEMI (NON-ST ELEVATED MYOCARDIAL INFARCTION): Status: RESOLVED | Noted: 2017-11-04 | Resolved: 2023-03-10

## 2023-03-10 PROCEDURE — 1160F RVW MEDS BY RX/DR IN RCRD: CPT | Performed by: INTERNAL MEDICINE

## 2023-03-10 PROCEDURE — 99204 OFFICE O/P NEW MOD 45 MIN: CPT | Performed by: INTERNAL MEDICINE

## 2023-03-10 PROCEDURE — 1159F MED LIST DOCD IN RCRD: CPT | Performed by: INTERNAL MEDICINE

## 2023-03-10 RX ORDER — MECLIZINE HYDROCHLORIDE 25 MG/1
25 TABLET ORAL 3 TIMES DAILY PRN
Qty: 42 TABLET | Refills: 2 | Status: SHIPPED | OUTPATIENT
Start: 2023-03-10

## 2023-03-10 RX ORDER — DILTIAZEM HYDROCHLORIDE 120 MG/1
120 CAPSULE, COATED, EXTENDED RELEASE ORAL DAILY
Qty: 30 CAPSULE | Refills: 11 | Status: SHIPPED | OUTPATIENT
Start: 2023-03-10

## 2023-03-10 RX ORDER — NITROGLYCERIN 0.4 MG/1
TABLET SUBLINGUAL
Qty: 25 TABLET | Refills: 3 | Status: SHIPPED | OUTPATIENT
Start: 2023-03-10

## 2023-03-10 NOTE — PROGRESS NOTES
Minnie Bang  [id number]  1975  47 y.o.  female    Referring Provider: Aidee Padilla APRN    Reason for  Visit:  Initial visit       Subjective     Chest pain both with exertion as well as at rest.  Feels episodes of chest pain occur more often with exertion  Moderate substernal,   Pressure like   Chest pain non pleuritic  Chest pain non positional and non gustatory   Lasts less than 5 minutes    Started more than 6 years ago  Occurs once or twice a week on the average but can be variable in frequency  No associated diaphoresis    No associated nausea  No radiation    Relieved with rest or spontaneously  Not positional    No change with intake of food or antacids  No change with breathing  Mild to moderate associated dyspnea    Similar chest pain episodes in the past     Gets chest pain at night especially if she gets up and goes to bathroom   's NTG helps chest pain     Wheezing like before     Dizzy spells with vertigo   Gets headaches and sees a halo around left eye   Easy fatiguability and increasing tired  Feels energy levels running low      1 PPD x 20 years ex smoker > 6 months ago   No bleeding, excessive bruising, gait instability or fall risks      History of present illness:  Minnie Bang is a 47 y.o. yo female with COPD who presents today for   Chief Complaint   Patient presents with   • Chest Pain     Establish Care - recent ER   .    History  Past Medical History:   Diagnosis Date   • Abnormal ECG 2/20/23   • Anxiety    • Arthritis     back   • Asthma    • COPD (chronic obstructive pulmonary disease) ([facility])    • Depression    • Diastolic dysfunction 2022   • Emphysema of lung ([facility]) 2020   • Hyperlipidemia    • Hypertension    • Pneumonia 2022   ,   Past Surgical History:   Procedure Laterality Date   • APPENDECTOMY     • CARDIAC CATHETERIZATION N/A 11/04/2017    Procedure: Coronary angiography;  Surgeon: Luis Colvin MD;  Location: [facility] CATH INVASIVE LOCATION;  Service:     • HYSTERECTOMY     • MO RT/LT HEART CATHETERS N/A 2017    Procedure: Percutaneous Coronary Intervention;  Surgeon: Luis Colvin MD;  Location:  [facility] CATH INVASIVE LOCATION;  Service: Cardiovascular   • THYROID SURGERY     • TOTAL THYROIDECTOMY     ,   Family History   Problem Relation Age of Onset   • Asthma Mother    • Cancer Mother    • Emphysema Mother    • Cancer Father    • Heart failure Father    • Hypertension Father    ,   Social History     Tobacco Use   • Smoking status: Some Days     Packs/day: 0.25     Years: 30.00     Pack years: 7.50     Types: Cigarettes     Start date: 1991     Last attempt to quit: 2022     Years since quittin.3     Passive exposure: Past   • Tobacco comments:     Patient vapes   Substance Use Topics   • Alcohol use: Not Currently     Comment: occ   • Drug use: No   ,     Medications  Current Outpatient Medications   Medication Sig Dispense Refill   • albuterol sulfate  (90 Base) MCG/ACT inhaler Inhale 2 puffs Every 4 (Four) Hours As Needed for Wheezing.     • Bacillus Coagulans-Inulin (PROBIOTIC FORMULA PO) Take  by mouth.     • cloNIDine (CATAPRES) 0.1 MG tablet Take 1 tablet by mouth Every 6 (Six) Hours As Needed for High Blood Pressure. 20 tablet 0   • Fluticasone-Umeclidin-Vilant (Trelegy Ellipta) 200-62.5-25 MCG/ACT aerosol powder  Inhale 1 puff Daily. 2 each 0   • levothyroxine (SYNTHROID, LEVOTHROID) 150 MCG tablet Take 175 mcg by mouth.     • lisinopril (PRINIVIL,ZESTRIL) 40 MG tablet Take 1 tablet by mouth Daily.     • Omega-3 Fatty Acids (FISH OIL PO) Take  by mouth.     • predniSONE (DELTASONE) 20 MG tablet Take 1 tablet by mouth Daily. 5 tablet 0   • propranolol (INDERAL) 40 MG tablet Take 1 tablet by mouth 2 (Two) Times a Day.     • Vitamin D, Cholecalciferol, (CHOLECALCIFEROL) 10 MCG (400 UNIT) tablet Take 1 tablet by mouth Daily.     • dilTIAZem CD (CARDIZEM CD) 120 MG 24 hr capsule Take 1 capsule by mouth Daily. 30 capsule 11   •  "meclizine (ANTIVERT) 25 MG tablet Take 1 tablet by mouth 3 (Three) Times a Day As Needed for Dizziness. 42 tablet 2   • nitroglycerin (NITROSTAT) 0.4 MG SL tablet 1 under the tongue as needed for angina, may repeat q5mins for up three doses 25 tablet 3     No current facility-administered medications for this visit.       Allergies:  Patient has no known allergies.    Review of Systems  Review of Systems   Constitutional: Positive for malaise/fatigue.   HENT: Negative.    Eyes: Negative.    Cardiovascular: Positive for chest pain and dyspnea on exertion. Negative for claudication, cyanosis, irregular heartbeat, leg swelling, near-syncope, orthopnea, palpitations, paroxysmal nocturnal dyspnea and syncope.   Respiratory: Positive for shortness of breath.    Endocrine: Negative.    Hematologic/Lymphatic: Negative.    Skin: Negative.    Gastrointestinal: Negative for anorexia.   Genitourinary: Negative.    Neurological: Negative.    Psychiatric/Behavioral: Negative.        Objective     Physical Exam:  /85   Pulse 64   Ht 175.3 cm (69\")   Wt 82.1 kg (181 lb)   LMP  (LMP Unknown)   BMI 26.73 kg/m²     Physical Exam  Constitutional:       Appearance: She is well-developed.   HENT:      Head: Normocephalic.   Neck:      Vascular: Normal carotid pulses. No carotid bruit or JVD.      Trachea: No tracheal tenderness or tracheal deviation.   Cardiovascular:      Rate and Rhythm: Regular rhythm.      Pulses: Normal pulses.      Heart sounds: Murmur heard.    Systolic murmur is present with a grade of 2/6.  Pulmonary:      Effort: Pulmonary effort is normal.      Breath sounds: No stridor.   Abdominal:      Palpations: Abdomen is soft.   Musculoskeletal:      Cervical back: No edema.      Right lower le+ Pitting Edema present.      Left lower le+ Pitting Edema present.   Skin:     General: Skin is warm.   Neurological:      Mental Status: She is alert.      Cranial Nerves: No cranial nerve deficit.      " Sensory: No sensory deficit.   Psychiatric:         Speech: Speech normal.         Behavior: Behavior normal.         Results Review:      Results for orders placed during the hospital encounter of 11/04/17    Adult Transthoracic Echo Complete W/ Cont if Necessary Per Protocol    Interpretation Summary  · Structurally normal heart.  · Normal LV wall motion, EF 65%.  · Normal size and function of RV.  · No valvular abnormalities.       Impression:  1.  Normal coronary arteries.  2.  Normal left ventricular ejection fraction with no wall motion abnormalities identified.  3.  Normal left-sided filling pressures.  4.  Unclear source for chest pain with troponin elevation.  Differential diagnosis includes vasospastic angina, myocarditis, or non-cardiac sources like PE.     Plan:   1.  TR band off in 2 hours  2.  Continue aggressive risk factor modification, including smoking cessation  3.  ICU overnight; continue NTG infusion for now     Luis Colvin MD        Time Under Physician Observation    Name Panel Role Time Period   Luis Colvin MD Panel 1 Primary 11/4/2017 2103 - 11/4/2017 2148    Total Sedation Time    Moderate sedation event time was not documented.   Vessel Access    Sheath inserted into right radial .   Sheath Disposition    Hemostasis started on the Artery.  Closure device deployed in the vessel and Radial compression device applied to vessel. Hemostasis achieved successfully. .              ____________________________________________________________________________________________________________________________________________  Health maintenance and recommendations    Low salt/ HTN/ Heart healthy carbohydrate restricted cardiac diet   The patient is advised to reduce or avoid caffeine or other cardiac stimulants.   Minimize or avoid  NSAID-type medications      Monitor for any signs of bleeding including red or dark stools. Fall precautions.   Advised staying uptodate with immunizations per  established standard guidelines.    Offered to give patient  a copy of my notes     Questions were encouraged, asked and answered to the patient's  understanding and satisfaction. Questions if any regarding current medications and side effects, need for refills and importance of compliance to medications stressed.    Reviewed available prior notes, consults, prior visits, laboratory findings, radiology and cardiology relevant reports. Updated chart as applicable. I have reviewed the patient's medical history in detail and updated the computerized patient record as relevant.      Updated patient regarding any new or relevant abnormalities on review of records or any new findings on physical exam. Mentioned to patient about purpose of visit and desirable health short and long term goals and objectives.    Primary to monitor CBC CMP Lipid panel and TSH as applicable    ___________________________________________________________________________________________________________________________________________   Procedures    Assessment & Plan   Diagnoses and all orders for this visit:    1. Vertigo (Primary)  -     Ambulatory Referral to ENT (Otolaryngology)    2. Mixed hyperlipidemia    3. Ex-smoker    4. Raynaud's disease without gangrene    5. Chest pain in adult  -     Adult Stress Echo W/ Cont or Stress Agent if Necessary Per Protocol; Future    6. SORTO (dyspnea on exertion)  -     Adult Transthoracic Echo Complete w/ Color, Spectral and Contrast if necessary per protocol; Future    7. Nonintractable headache, unspecified chronicity pattern, unspecified headache type  -     Ambulatory Referral to Neurology    Other orders  -     meclizine (ANTIVERT) 25 MG tablet; Take 1 tablet by mouth 3 (Three) Times a Day As Needed for Dizziness.  Dispense: 42 tablet; Refill: 2  -     nitroglycerin (NITROSTAT) 0.4 MG SL tablet; 1 under the tongue as needed for angina, may repeat q5mins for up three doses  Dispense: 25 tablet;  Refill: 3  -     dilTIAZem CD (CARDIZEM CD) 120 MG 24 hr capsule; Take 1 capsule by mouth Daily.  Dispense: 30 capsule; Refill: 11          Plan      Use inhaler PRN as wheezing now   She does not want to to ER     Orders Placed This Encounter   Procedures   • Ambulatory Referral to ENT (Otolaryngology)     Referral Priority:   Routine     Referral Type:   Consultation     Referral Reason:   Specialty Services Required     Requested Specialty:   Otolaryngology     Number of Visits Requested:   1   • Ambulatory Referral to Neurology     Referral Priority:   Routine     Referral Type:   Consultation     Referral Reason:   Specialty Services Required     Requested Specialty:   Neurology     Number of Visits Requested:   1   • Adult Transthoracic Echo Complete w/ Color, Spectral and Contrast if necessary per protocol     Standing Status:   Future     Standing Expiration Date:   3/10/2024     Scheduling Instructions:      Myocardial strain to be performed during echocardiogram as long as technically feasible     Order Specific Question:   Reason for exam?     Answer:   Chest Pain     Order Specific Question:   Reason for exam?     Answer:   Dyspnea     Order Specific Question:   Release to patient     Answer:   Routine Release   • Adult Stress Echo W/ Cont or Stress Agent if Necessary Per Protocol     Standing Status:   Future     Standing Expiration Date:   3/9/2024     Order Specific Question:   What stress agent will be used?     Answer:   Dobutamine     Order Specific Question:   Difficulty walking criteria?     Answer:   Musculoskeletal (hips, knees, feet, back, amputee)     Order Specific Question:   Reason for exam?     Answer:   Chest Pain      Requested Prescriptions     Signed Prescriptions Disp Refills   • meclizine (ANTIVERT) 25 MG tablet 42 tablet 2     Sig: Take 1 tablet by mouth 3 (Three) Times a Day As Needed for Dizziness.   • nitroglycerin (NITROSTAT) 0.4 MG SL tablet 25 tablet 3     Si under the  tongue as needed for angina, may repeat q5mins for up three doses   • dilTIAZem CD (CARDIZEM CD) 120 MG 24 hr capsule 30 capsule 11     Sig: Take 1 capsule by mouth Daily.      Patient expressed understanding  Encouraged and answered all questions   Discussed with the patient and all questioned fully answered. She will call me if any problems arise.   Discussed results of prior testing with patient : echo and cath  as well electrocardiogram from 2023    Ad calcium channel blocker both for elevated BP and also for Raynaud's  S/L NTG PRN for chest pain, call me or go to ER if has to use S/L nitroglycerin   Requested Prescriptions     Signed Prescriptions Disp Refills   • meclizine (ANTIVERT) 25 MG tablet 42 tablet 2     Sig: Take 1 tablet by mouth 3 (Three) Times a Day As Needed for Dizziness.   • nitroglycerin (NITROSTAT) 0.4 MG SL tablet 25 tablet 3     Si under the tongue as needed for angina, may repeat q5mins for up three doses   • dilTIAZem CD (CARDIZEM CD) 120 MG 24 hr capsule 30 capsule 11     Sig: Take 1 capsule by mouth Daily.      Check BP and heart rates twice daily initially till blood pressures and heart rates under good control and then at least 3x / week,   If blood pressures continue to be well-controlled then can check week a month  at home and bring a recording for review next visit  If BP >130/85 or < 100/60 persistently over 3 reading 30 mins apart or if heart rates persistently above 100 bpm or less than 55 bpm call sooner for evaluation and advise     Can take PO clonidine 0.1 mg BID prn for BP > 140/90 mm Hg             Return in about 6 weeks (around 2023).

## 2023-03-13 NOTE — PROGRESS NOTES
Spoke with patient and relayed test results. Patient voiced understanding. She is agreeable to having the [facility]PP labs drawn. She will go the same day as her CT scan.

## 2023-03-16 ENCOUNTER — PATIENT ROUNDING (BHMG ONLY) (OUTPATIENT)
Dept: PULMONOLOGY | Facility: CLINIC | Age: 48
End: 2023-03-16
Payer: COMMERCIAL

## 2023-03-16 DIAGNOSIS — Z77.29 LONG-TERM EXPOSURE INVOLVING BIRD DROPPINGS: Primary | ICD-10-CM

## 2023-03-16 DIAGNOSIS — R06.09 DYSPNEA ON EXERTION: ICD-10-CM

## 2023-03-16 NOTE — PROGRESS NOTES
March 16, 2023    Hello, may I speak with Minnie Bang?    My name is Lashon Dougherty     I am  with [facility]  [address]  [facility] [address] [phone].    Before we get started may I verify your date of birth? 1975    I am calling to officially welcome you to our practice and ask about your recent visit. Is this a good time to talk? LVM    Tell me about your visit with us. What things went well?         We're always looking for ways to make our patients' experiences even better. Do you have recommendations on ways we may improve?      Overall were you satisfied with your first visit to our practice?        I appreciate you taking the time to speak with me today. Is there anything else I can do for you?       Thank you, and have a great day.

## 2023-03-17 ENCOUNTER — HOSPITAL ENCOUNTER (OUTPATIENT)
Dept: CT IMAGING | Facility: HOSPITAL | Age: 48
Discharge: HOME | End: 2023-03-17
Admitting: NURSE PRACTITIONER
Payer: COMMERCIAL

## 2023-03-17 DIAGNOSIS — Z77.29 LONG-TERM EXPOSURE INVOLVING BIRD DROPPINGS: ICD-10-CM

## 2023-03-17 DIAGNOSIS — R05.9 COUGH, UNSPECIFIED TYPE: ICD-10-CM

## 2023-03-17 DIAGNOSIS — R06.09 DYSPNEA ON EXERTION: ICD-10-CM

## 2023-03-17 PROCEDURE — 71250 CT THORAX DX C-: CPT

## 2023-03-20 ENCOUNTER — HOSPITAL ENCOUNTER (EMERGENCY)
Facility: HOSPITAL | Age: 48
Discharge: HOME | End: 2023-03-20
Attending: STUDENT IN AN ORGANIZED HEALTH CARE EDUCATION/TRAINING PROGRAM | Admitting: STUDENT IN AN ORGANIZED HEALTH CARE EDUCATION/TRAINING PROGRAM
Payer: COMMERCIAL

## 2023-03-20 ENCOUNTER — APPOINTMENT (OUTPATIENT)
Dept: GENERAL RADIOLOGY | Facility: HOSPITAL | Age: 48
End: 2023-03-20
Payer: COMMERCIAL

## 2023-03-20 ENCOUNTER — APPOINTMENT (OUTPATIENT)
Dept: CT IMAGING | Facility: HOSPITAL | Age: 48
End: 2023-03-20
Payer: COMMERCIAL

## 2023-03-20 VITALS
BODY MASS INDEX: 27.59 KG/M2 | TEMPERATURE: 98.4 F | DIASTOLIC BLOOD PRESSURE: 81 MMHG | HEIGHT: 69 IN | RESPIRATION RATE: 18 BRPM | OXYGEN SATURATION: 96 % | SYSTOLIC BLOOD PRESSURE: 166 MMHG | HEART RATE: 88 BPM | WEIGHT: 186.3 LBS

## 2023-03-20 DIAGNOSIS — R06.02 SHORTNESS OF BREATH: ICD-10-CM

## 2023-03-20 DIAGNOSIS — I10 ESSENTIAL HYPERTENSION: ICD-10-CM

## 2023-03-20 DIAGNOSIS — F17.200 SMOKER: ICD-10-CM

## 2023-03-20 DIAGNOSIS — Z86.79 HISTORY OF HYPERTENSION: ICD-10-CM

## 2023-03-20 DIAGNOSIS — J44.1 COPD WITH ACUTE EXACERBATION: Primary | ICD-10-CM

## 2023-03-20 LAB
ALBUMIN SERPL-MCNC: 4.5 G/DL (ref 3.5–5.2)
ALBUMIN/GLOB SERPL: 1.6 G/DL
ALP SERPL-CCNC: 109 U/L (ref 39–117)
ALT SERPL W P-5'-P-CCNC: 18 U/L (ref 1–33)
ANION GAP SERPL CALCULATED.3IONS-SCNC: 14 MMOL/L (ref 5–15)
ARTERIAL PATENCY WRIST A: POSITIVE
AST SERPL-CCNC: 25 U/L (ref 1–32)
ATMOSPHERIC PRESS: 759 MMHG
BASE EXCESS BLDA CALC-SCNC: 3.6 MMOL/L (ref 0–2)
BDY SITE: ABNORMAL
BILIRUB SERPL-MCNC: 0.2 MG/DL (ref 0–1.2)
BODY TEMPERATURE: 37 C
BUN SERPL-MCNC: 7 MG/DL (ref 6–20)
BUN/CREAT SERPL: 15.6 (ref 7–25)
CALCIUM SPEC-SCNC: 9.2 MG/DL (ref 8.6–10.5)
CHLORIDE SERPL-SCNC: 104 MMOL/L (ref 98–107)
CO2 SERPL-SCNC: 26 MMOL/L (ref 22–29)
CREAT SERPL-MCNC: 0.45 MG/DL (ref 0.57–1)
D DIMER PPP FEU-MCNC: 0.58 MCGFEU/ML (ref 0–0.5)
DEPRECATED RDW RBC AUTO: 51.1 FL (ref 37–54)
EGFRCR SERPLBLD CKD-EPI 2021: 118.1 ML/MIN/1.73
EOSINOPHIL # BLD MANUAL: 0.42 10*3/MM3 (ref 0–0.4)
EOSINOPHIL NFR BLD MANUAL: 5.1 % (ref 0.3–6.2)
ERYTHROCYTE [DISTWIDTH] IN BLOOD BY AUTOMATED COUNT: 14.1 % (ref 12.3–15.4)
GEN 5 2HR TROPONIN T REFLEX: 6 NG/L
GIANT PLATELETS: ABNORMAL
GLOBULIN UR ELPH-MCNC: 2.8 GM/DL
GLUCOSE SERPL-MCNC: 94 MG/DL (ref 65–99)
HCO3 BLDA-SCNC: 26.9 MMOL/L (ref 20–26)
HCT VFR BLD AUTO: 40.5 % (ref 34–46.6)
HGB BLD-MCNC: 13.3 G/DL (ref 12–15.9)
HOLD SPECIMEN: NORMAL
HOLD SPECIMEN: NORMAL
LYMPHOCYTES # BLD MANUAL: 4.08 10*3/MM3 (ref 0.7–3.1)
LYMPHOCYTES NFR BLD MANUAL: 9.2 % (ref 5–12)
Lab: ABNORMAL
MAGNESIUM SERPL-MCNC: 1.8 MG/DL (ref 1.6–2.6)
MCH RBC QN AUTO: 32 PG (ref 26.6–33)
MCHC RBC AUTO-ENTMCNC: 32.8 G/DL (ref 31.5–35.7)
MCV RBC AUTO: 97.4 FL (ref 79–97)
MODALITY: ABNORMAL
MONOCYTES # BLD: 0.75 10*3/MM3 (ref 0.1–0.9)
NEUTROPHILS # BLD AUTO: 2.91 10*3/MM3 (ref 1.7–7)
NEUTROPHILS NFR BLD MANUAL: 35.7 % (ref 42.7–76)
NEUTS VAC BLD QL SMEAR: ABNORMAL
NT-PROBNP SERPL-MCNC: 122 PG/ML (ref 0–450)
PCO2 BLDA: 35.7 MM HG (ref 35–45)
PCO2 TEMP ADJ BLD: 35.7 MM HG (ref 35–45)
PH BLDA: 7.49 PH UNITS (ref 7.35–7.45)
PH, TEMP CORRECTED: 7.49 PH UNITS (ref 7.35–7.45)
PLATELET # BLD AUTO: 305 10*3/MM3 (ref 140–450)
PMV BLD AUTO: 10.2 FL (ref 6–12)
PO2 BLDA: 101 MM HG (ref 83–108)
PO2 TEMP ADJ BLD: 101 MM HG (ref 83–108)
POTASSIUM SERPL-SCNC: 3.5 MMOL/L (ref 3.5–5.2)
PROT SERPL-MCNC: 7.3 G/DL (ref 6–8.5)
QT INTERVAL: 376 MS
QTC INTERVAL: 457 MS
RBC # BLD AUTO: 4.16 10*6/MM3 (ref 3.77–5.28)
SAO2 % BLDCOA: 98.9 % (ref 94–99)
SODIUM SERPL-SCNC: 144 MMOL/L (ref 136–145)
STOMATOCYTES BLD QL SMEAR: ABNORMAL
TROPONIN T DELTA: NORMAL
TROPONIN T SERPL HS-MCNC: <6 NG/L
VARIANT LYMPHS NFR BLD MANUAL: 40.8 % (ref 19.6–45.3)
VARIANT LYMPHS NFR BLD MANUAL: 9.2 % (ref 0–5)
VENTILATOR MODE: ABNORMAL
WBC NRBC COR # BLD: 8.16 10*3/MM3 (ref 3.4–10.8)
WHOLE BLOOD HOLD COAG: NORMAL
WHOLE BLOOD HOLD SPECIMEN: NORMAL

## 2023-03-20 PROCEDURE — 71275 CT ANGIOGRAPHY CHEST: CPT

## 2023-03-20 PROCEDURE — 94664 DEMO&/EVAL PT USE INHALER: CPT

## 2023-03-20 PROCEDURE — 85379 FIBRIN DEGRADATION QUANT: CPT | Performed by: STUDENT IN AN ORGANIZED HEALTH CARE EDUCATION/TRAINING PROGRAM

## 2023-03-20 PROCEDURE — 82803 BLOOD GASES ANY COMBINATION: CPT

## 2023-03-20 PROCEDURE — 93005 ELECTROCARDIOGRAM TRACING: CPT | Performed by: STUDENT IN AN ORGANIZED HEALTH CARE EDUCATION/TRAINING PROGRAM

## 2023-03-20 PROCEDURE — 71045 X-RAY EXAM CHEST 1 VIEW: CPT

## 2023-03-20 PROCEDURE — 96374 THER/PROPH/DIAG INJ IV PUSH: CPT

## 2023-03-20 PROCEDURE — 99284 EMERGENCY DEPT VISIT MOD MDM: CPT

## 2023-03-20 PROCEDURE — 83735 ASSAY OF MAGNESIUM: CPT | Performed by: STUDENT IN AN ORGANIZED HEALTH CARE EDUCATION/TRAINING PROGRAM

## 2023-03-20 PROCEDURE — 80053 COMPREHEN METABOLIC PANEL: CPT | Performed by: STUDENT IN AN ORGANIZED HEALTH CARE EDUCATION/TRAINING PROGRAM

## 2023-03-20 PROCEDURE — 84484 ASSAY OF TROPONIN QUANT: CPT | Performed by: STUDENT IN AN ORGANIZED HEALTH CARE EDUCATION/TRAINING PROGRAM

## 2023-03-20 PROCEDURE — 85025 COMPLETE CBC W/AUTO DIFF WBC: CPT | Performed by: STUDENT IN AN ORGANIZED HEALTH CARE EDUCATION/TRAINING PROGRAM

## 2023-03-20 PROCEDURE — 83880 ASSAY OF NATRIURETIC PEPTIDE: CPT | Performed by: STUDENT IN AN ORGANIZED HEALTH CARE EDUCATION/TRAINING PROGRAM

## 2023-03-20 PROCEDURE — 25010000002 METHYLPREDNISOLONE PER 125 MG: Performed by: STUDENT IN AN ORGANIZED HEALTH CARE EDUCATION/TRAINING PROGRAM

## 2023-03-20 PROCEDURE — 36415 COLL VENOUS BLD VENIPUNCTURE: CPT

## 2023-03-20 PROCEDURE — 36600 WITHDRAWAL OF ARTERIAL BLOOD: CPT

## 2023-03-20 PROCEDURE — 85007 BL SMEAR W/DIFF WBC COUNT: CPT | Performed by: STUDENT IN AN ORGANIZED HEALTH CARE EDUCATION/TRAINING PROGRAM

## 2023-03-20 PROCEDURE — 94640 AIRWAY INHALATION TREATMENT: CPT

## 2023-03-20 PROCEDURE — 96375 TX/PRO/DX INJ NEW DRUG ADDON: CPT

## 2023-03-20 PROCEDURE — 93005 ELECTROCARDIOGRAM TRACING: CPT

## 2023-03-20 PROCEDURE — 25510000001 IOPAMIDOL PER 1 ML: Performed by: STUDENT IN AN ORGANIZED HEALTH CARE EDUCATION/TRAINING PROGRAM

## 2023-03-20 PROCEDURE — 93010 ELECTROCARDIOGRAM REPORT: CPT | Performed by: EMERGENCY MEDICINE

## 2023-03-20 RX ORDER — LABETALOL HYDROCHLORIDE 5 MG/ML
10 INJECTION, SOLUTION INTRAVENOUS ONCE
Status: COMPLETED | OUTPATIENT
Start: 2023-03-20 | End: 2023-03-20

## 2023-03-20 RX ORDER — PREDNISONE 50 MG/1
50 TABLET ORAL DAILY
Qty: 5 TABLET | Refills: 0 | Status: SHIPPED | OUTPATIENT
Start: 2023-03-20 | End: 2023-03-25

## 2023-03-20 RX ORDER — ACETAMINOPHEN 500 MG
1000 TABLET ORAL ONCE
Status: COMPLETED | OUTPATIENT
Start: 2023-03-20 | End: 2023-03-20

## 2023-03-20 RX ORDER — LISINOPRIL 20 MG/1
TABLET ORAL
COMMUNITY
Start: 2022-11-21

## 2023-03-20 RX ORDER — SODIUM CHLORIDE 0.9 % (FLUSH) 0.9 %
10 SYRINGE (ML) INJECTION AS NEEDED
Status: DISCONTINUED | OUTPATIENT
Start: 2023-03-20 | End: 2023-03-20 | Stop reason: HOSPADM

## 2023-03-20 RX ORDER — IPRATROPIUM BROMIDE AND ALBUTEROL SULFATE 2.5; .5 MG/3ML; MG/3ML
3 SOLUTION RESPIRATORY (INHALATION) ONCE
Status: COMPLETED | OUTPATIENT
Start: 2023-03-20 | End: 2023-03-20

## 2023-03-20 RX ORDER — METHYLPREDNISOLONE SODIUM SUCCINATE 125 MG/2ML
125 INJECTION, POWDER, LYOPHILIZED, FOR SOLUTION INTRAMUSCULAR; INTRAVENOUS ONCE
Status: COMPLETED | OUTPATIENT
Start: 2023-03-20 | End: 2023-03-20

## 2023-03-20 RX ADMIN — IPRATROPIUM BROMIDE AND ALBUTEROL SULFATE 3 ML: .5; 3 SOLUTION RESPIRATORY (INHALATION) at 02:05

## 2023-03-20 RX ADMIN — IOPAMIDOL 100 ML: 755 INJECTION, SOLUTION INTRAVENOUS at 03:40

## 2023-03-20 RX ADMIN — METHYLPREDNISOLONE SODIUM SUCCINATE 125 MG: 125 INJECTION, POWDER, FOR SOLUTION INTRAMUSCULAR; INTRAVENOUS at 02:03

## 2023-03-20 RX ADMIN — ACETAMINOPHEN 1000 MG: 500 TABLET, FILM COATED ORAL at 04:20

## 2023-03-20 RX ADMIN — LABETALOL HYDROCHLORIDE 10 MG: 5 INJECTION INTRAVENOUS at 02:29

## 2023-03-20 NOTE — ED PROVIDER NOTES
"EMERGENCY DEPARTMENT ATTENDING NOTE    Patient Name: Minnie Bang    Chief Complaint   Patient presents with   • Shortness of Breath       PATIENT PRESENTATION:  Minnie Bang is a 47 y.o. female with a documented history of COPD and emphysema who presents to the emergency department due to acute shortness of breath.    Patient states that when she woke up this morning she started feeling short of breath progressively worsened overnight which is why she came to the emergency department.  States she feels like she cannot catch her breath or breathe.  She reports she has not examined diagnosis COPD although the chart shows diagnosis and she has been worked up by her PCP for asthma per her report.  Patient denies any chest pain abdominal pain nausea vomiting fevers or chills or recent cough.    PHYSICAL EXAM:   VS: /81 (BP Location: Right arm, Patient Position: Sitting)   Pulse 88   Temp 98.4 °F (36.9 °C) (Oral)   Resp 18   Ht 175.3 cm (69\")   Wt 84.5 kg (186 lb 4.8 oz)   LMP  (LMP Unknown)   SpO2 96%   BMI 27.51 kg/m²   GENERAL: Uncomfortable appearing middle-aged woman sitting up in stretcher crying; rapid breathing; otherwise well-nourished, well-developed, awake, alert, in some distress  EYES: PERRL, sclerae anicteric, extraocular movements grossly intact, symmetric lids  EARS, NOSE, MOUTH, THROAT: atraumatic external nose and ears, moist mucous membranes  NECK: symmetric, trachea midline  RESPIRATORY: Patient clear respiratory distress I hear no lung sounds on the left; right lung sounds clear but some trace end of story wheezes  CARDIOVASCULAR: no murmurs, peripheral pulses 2+ and equal in all extremities  GI: soft, nontender, nondistended  MUSCULOSKELETAL/EXTREMITIES: No pitting lower extremity edema; extremities without obvious deformity  SKIN: warm and dry with no obvious rashes  NEUROLOGIC: moving all 4 extremities symmetrically, CN II-XII grossly intact  PSYCHIATRIC: alert, " "pleasant and cooperative. Appropriate mood and affect.      MEDICAL DECISION MAKING:    Minnie Bang is a 47 y.o. female with recently diagnosed COPD presents emerged department due to acute respiratory distress.    Differential Diagnosis Considered: COPD with acute exacerbation, spontaneous pneumothorax, pneumonia, pulmonary embolism    Labs Ordered:  Labs Reviewed   COMPREHENSIVE METABOLIC PANEL - Abnormal; Notable for the following components:       Result Value    Creatinine 0.45 (*)     All other components within normal limits    Narrative:     GFR Normal >60                  Chronic Kidney Disease <60                  Kidney Failure <15                     D-DIMER, QUANTITATIVE - Abnormal; Notable for the following components:    D-Dimer, Quantitative 0.58 (*)     All other components within normal limits    Narrative:     According to the assay 's published package insert, a normal (<0.50 MCGFEU/mL) D-dimer result in conjunction with a non-high clinical probability assessment, excludes deep vein thrombosis (DVT) and pulmonary embolism (PE) with high sensitivity.                                    D-dimer values increase with age and this can make VTE exclusion of an older population difficult. To address this, the [employer], based on best available evidence and recent guidelines, recommends that clinicians use age-adjusted D-dimer thresholds in patients greater than 50 years of age with: a) a low probability of PE who do not meet all Pulmonary Embolism Rule Out Criteria, or b) in those with intermediate probability of PE.                   The formula for an age-adjusted D-dimer cut-off is \"age/100\".                  For example, a 60 year old patient would have an age-adjusted cut-off of 0.60 MCGFEU/mL and an 80 year old 0.80 MCGFEU/mL.   CBC WITH AUTO DIFFERENTIAL - Abnormal; Notable for the following components:    MCV 97.4 (*)     All other components within " normal limits    Narrative:     The previously reported component NRBC is no longer being reported. Previous result was 0.0 /100 WBC (Reference Range: 0.0-0.2 /100 WBC) on 3/20/2023 at 0224 CDT.   MANUAL DIFFERENTIAL - Abnormal; Notable for the following components:    Neutrophil % 35.7 (*)     Atypical Lymphocyte % 9.2 (*)     Lymphocytes Absolute 4.08 (*)     Eosinophils Absolute 0.42 (*)     All other components within normal limits   BLOOD GAS, ARTERIAL - Abnormal; Notable for the following components:    pH, Arterial 7.485 (*)     HCO3, Arterial 26.9 (*)     Base Excess, Arterial 3.6 (*)     pH, Temp Corrected 7.485 (*)     All other components within normal limits   MAGNESIUM - Normal   TROPONIN - Normal    Narrative:     High Sensitive Troponin T Reference Range:                  <10.0 ng/L- Negative Female for AMI                  <15.0 ng/L- Negative Male for AMI                  >=10 - Abnormal Female indicating possible myocardial injury.                  >=15 - Abnormal Male indicating possible myocardial injury.                   Clinicians would have to utilize clinical acumen, EKG, Troponin, and serial changes to determine if it is an Acute Myocardial Infarction or myocardial injury due to an underlying chronic condition.                                        BNP (IN-HOUSE) - Normal    Narrative:     Among patients with dyspnea, NT-proBNP is highly sensitive for the detection of acute congestive heart failure. In addition NT-proBNP of <300 pg/ml effectively rules out acute congestive heart failure with 99% negative predictive value.                                    Results may be falsely decreased if patient taking Biotin.                     RAINBOW DRAW    Narrative:     The following orders were created for panel order Premier Draw.                  Procedure                               Abnormality         Status                                     ---------                                -----------         ------                                     Green Top (Gel)[[id number]]                                  Final result                               Lavender Top[[id number]]                                     Final result                               Red Top[[id number]]                                          Final result                               Light Blue Top[[id number]]                                   Final result                                                 Please view results for these tests on the individual orders.   BLOOD GAS, ARTERIAL   HIGH SENSITIVITIY TROPONIN T 2HR    Narrative:     High Sensitive Troponin T Reference Range:                  <10.0 ng/L- Negative Female for AMI                  <15.0 ng/L- Negative Male for AMI                  >=10 - Abnormal Female indicating possible myocardial injury.                  >=15 - Abnormal Male indicating possible myocardial injury.                   Clinicians would have to utilize clinical acumen, EKG, Troponin, and serial changes to determine if it is an Acute Myocardial Infarction or myocardial injury due to an underlying chronic condition.                                        GREEN TOP   LAVENDER TOP   RED TOP   LIGHT BLUE TOP   CBC AND DIFFERENTIAL    Narrative:     The following orders were created for panel order CBC & Differential.                  Procedure                               Abnormality         Status                                     ---------                               -----------         ------                                     CBC Auto Differential[[id number]]        Abnormal            Final result                                                 Please view results for these tests on the individual orders.        Imaging Ordered:   XR Chest 1 View   Final Result   1. No active cardiopulmonary disease.   This report was finalized on 03/20/2023 06:19 by Dr. Karmen Teague MD.      CT Angiogram  Chest    (Results Pending)       Internal chart review:   Past Medical History:   Diagnosis Date   • Abnormal ECG 2/20/23   • Anxiety    • Arthritis     back   • Asthma    • COPD (chronic obstructive pulmonary disease) ([facility])    • Depression    • Diastolic dysfunction 2022   • Emphysema of lung ([facility]) 2020   • Hyperlipidemia    • Hypertension    • Pneumonia 2022       Past Surgical History:   Procedure Laterality Date   • APPENDECTOMY     • CARDIAC CATHETERIZATION N/A 11/04/2017    Procedure: Coronary angiography;  Surgeon: Luis Colvin MD;  Location:  [facility] CATH INVASIVE LOCATION;  Service:    • HYSTERECTOMY     • [place] RT/LT HEART CATHETERS N/A 11/04/2017    Procedure: Percutaneous Coronary Intervention;  Surgeon: Luis Colvin MD;  Location:  [facility] CATH INVASIVE LOCATION;  Service: Cardiovascular   • THYROID SURGERY     • TOTAL THYROIDECTOMY         No Known Allergies    No current facility-administered medications for this encounter.    Current Outpatient Medications:   •  albuterol sulfate  (90 Base) MCG/ACT inhaler, Inhale 2 puffs Every 4 (Four) Hours As Needed for Wheezing., Disp: , Rfl:   •  Bacillus Coagulans-Inulin (PROBIOTIC FORMULA PO), Take  by mouth., Disp: , Rfl:   •  cloNIDine (CATAPRES) 0.1 MG tablet, Take 1 tablet by mouth Every 6 (Six) Hours As Needed for High Blood Pressure., Disp: 20 tablet, Rfl: 0  •  dilTIAZem CD (CARDIZEM CD) 120 MG 24 hr capsule, Take 1 capsule by mouth Daily., Disp: 30 capsule, Rfl: 11  •  Fluticasone-Umeclidin-Vilant (Trelegy Ellipta) 200-62.5-25 MCG/ACT aerosol powder , Inhale 1 puff Daily., Disp: 2 each, Rfl: 0  •  levothyroxine (SYNTHROID, LEVOTHROID) 150 MCG tablet, Take 175 mcg by mouth., Disp: , Rfl:   •  lisinopril (PRINIVIL,ZESTRIL) 20 MG tablet, , Disp: , Rfl:   •  lisinopril (PRINIVIL,ZESTRIL) 40 MG tablet, Take 1 tablet by mouth Daily., Disp: , Rfl:   •  meclizine (ANTIVERT) 25 MG tablet, Take 1 tablet by mouth 3 (Three) Times a Day As Needed for  Dizziness., Disp: 42 tablet, Rfl: 2  •  nitroglycerin (NITROSTAT) 0.4 MG SL tablet, 1 under the tongue as needed for angina, may repeat q5mins for up three doses, Disp: 25 tablet, Rfl: 3  •  Omega-3 Fatty Acids (FISH OIL PO), Take  by mouth., Disp: , Rfl:   •  predniSONE (DELTASONE) 50 MG tablet, Take 1 tablet by mouth Daily for 5 days., Disp: 5 tablet, Rfl: 0  •  propranolol (INDERAL) 40 MG tablet, Take 1 tablet by mouth 2 (Two) Times a Day., Disp: , Rfl:   •  Vitamin D, Cholecalciferol, (CHOLECALCIFEROL) 10 MCG (400 UNIT) tablet, Take 1 tablet by mouth Daily., Disp: , Rfl:     My EKG interpretation: Normal sinus rhythm with rate of 89.  No ST elevations ST depression or T wave inversions.    My lab interpretation: ABG with slight alkalosis 7.48 with normal CO2 of 35 and PO2 of 101.  Consistent with likely hyperventilation.  Elevated D-dimer 0.58.  Negative initial and high sensitive troponin.  Normal white blood cell count hemoglobin.  CMP unremarkable.    My imaging interpretation: Chest x-ray with no acute findings.  CT angiogram with no evidence of pulmonary embolism per overnight stat rad read.    Decision rules/scores evaluated: Wells low risk for D-dimer given positive ordered CT angiogram.     ED Course and Re-evaluation: 48yo F with recently diagnosed history of COPD presents emerged department due to acute respiratory distress. On initial physical exam patient clear distress I do not hear left sounds on the left I hear them on the right side concern for possible spontaneous pneumothorax.  Immediately called x-ray to the bedside.  On my review of the x-ray at the bedside I do not see evidence of pneumothorax there is good lung markings distally.  Also no evidence of pneumonia.  Proceed with COPD acutetreatment with IV methylprednisolone as well as DuoNeb.  Patient also is significantly hypertensive on arrival 180/123 so dosed with 10 mg of IV labetalol.  Her blood pressure improved.  During observation  emergency department and on reevaluations patient resting comfortably sleeping no wheezing on exam.  Remained stable without any additional breathing treatments.  Counseled patient regarding COPD exacerbation as well as her hypertension and importance of following up with her primary care provider as well as continue to follow-up with pulmonology as an outpatient which she expressed understanding.  No evidence of pulmonary embolism pneumonia or pneumothorax.  Patient was discharged home with 5 days of 50 mg of prednisone with plan to follow-up with primary care provider within 2 days for further management given return precautions to the emergency department for worsening symptoms.      ED Diagnosis:  COPD with acute exacerbation (HCC); Shortness of breath; Essential hypertension; History of hypertension; Smoker    Disposition: to home  Follow up plan: PCP follow up within 2 days, pulmonology within 2 weeks, return to ED immediately if symptoms worsen        Signed:  Tej Arthur MD  Emergency Medicine Physician    Please note that portions of this note were completed with a voice recognition program.      Tej Arthur MD  03/20/23 0709

## 2023-03-23 ENCOUNTER — HOSPITAL ENCOUNTER (OUTPATIENT)
Dept: CARDIOLOGY | Facility: HOSPITAL | Age: 48
Discharge: HOME | End: 2023-03-23
Admitting: INTERNAL MEDICINE
Payer: COMMERCIAL

## 2023-03-23 VITALS
BODY MASS INDEX: 27.59 KG/M2 | DIASTOLIC BLOOD PRESSURE: 43 MMHG | HEIGHT: 69 IN | WEIGHT: 186.29 LBS | HEART RATE: 62 BPM | SYSTOLIC BLOOD PRESSURE: 96 MMHG

## 2023-03-23 DIAGNOSIS — R07.9 CHEST PAIN IN ADULT: ICD-10-CM

## 2023-03-23 PROCEDURE — 93352 ADMIN ECG CONTRAST AGENT: CPT | Performed by: INTERNAL MEDICINE

## 2023-03-23 PROCEDURE — 93350 STRESS TTE ONLY: CPT

## 2023-03-23 PROCEDURE — 93018 CV STRESS TEST I&R ONLY: CPT | Performed by: INTERNAL MEDICINE

## 2023-03-23 PROCEDURE — 93350 STRESS TTE ONLY: CPT | Performed by: INTERNAL MEDICINE

## 2023-03-23 PROCEDURE — 25510000001 PERFLUTREN 6.52 MG/ML SUSPENSION: Performed by: INTERNAL MEDICINE

## 2023-03-23 PROCEDURE — 0 DOBUTAMINE PER 250 MG: Performed by: INTERNAL MEDICINE

## 2023-03-23 PROCEDURE — 25010000002 ATROPINE SULFATE: Performed by: INTERNAL MEDICINE

## 2023-03-23 PROCEDURE — 93017 CV STRESS TEST TRACING ONLY: CPT

## 2023-03-23 RX ORDER — DOBUTAMINE HYDROCHLORIDE 100 MG/100ML
10 INJECTION INTRAVENOUS CONTINUOUS
Status: DISCONTINUED | OUTPATIENT
Start: 2023-03-23 | End: 2023-03-23

## 2023-03-23 RX ADMIN — PERFLUTREN 8.48 MG: 6.52 INJECTION, SUSPENSION INTRAVENOUS at 14:59

## 2023-03-23 RX ADMIN — DOBUTAMINE HYDROCHLORIDE 10 MCG/KG/MIN: 100 INJECTION INTRAVENOUS at 15:00

## 2023-03-23 RX ADMIN — ATROPINE SULFATE 2 MG: 0.1 INJECTION INTRAVENOUS at 15:18

## 2023-03-24 ENCOUNTER — TELEPHONE (OUTPATIENT)
Dept: CARDIOLOGY | Facility: CLINIC | Age: 48
End: 2023-03-24
Payer: COMMERCIAL

## 2023-03-24 ENCOUNTER — TELEPHONE (OUTPATIENT)
Dept: PULMONOLOGY | Facility: CLINIC | Age: 48
End: 2023-03-24
Payer: COMMERCIAL

## 2023-03-24 LAB
BH CV STRESS BP STAGE 1: NORMAL
BH CV STRESS BP STAGE 2: NORMAL
BH CV STRESS BP STAGE 3: NORMAL
BH CV STRESS BP STAGE 4: NORMAL
BH CV STRESS BP STAGE 5: NORMAL
BH CV STRESS DOB - ATROPINE STAGE 3: 1
BH CV STRESS DOB - ATROPINE STAGE 4: 1
BH CV STRESS DOSE DOBUTAMINE STAGE 1: 10
BH CV STRESS DOSE DOBUTAMINE STAGE 2: 20
BH CV STRESS DOSE DOBUTAMINE STAGE 3: 30
BH CV STRESS DOSE DOBUTAMINE STAGE 4: 40
BH CV STRESS DOSE DOBUTAMINE STAGE 5: 50
BH CV STRESS DURATION MIN STAGE 1: 3
BH CV STRESS DURATION MIN STAGE 2: 3
BH CV STRESS DURATION MIN STAGE 3: 3
BH CV STRESS DURATION MIN STAGE 4: 3
BH CV STRESS DURATION MIN STAGE 5: 1
BH CV STRESS DURATION SEC STAGE 1: 0
BH CV STRESS DURATION SEC STAGE 2: 0
BH CV STRESS DURATION SEC STAGE 3: 0
BH CV STRESS DURATION SEC STAGE 4: 0
BH CV STRESS DURATION SEC STAGE 5: 0
BH CV STRESS ECHO POST STRESS EJECTION FRACTION EF: 65 %
BH CV STRESS HR STAGE 1: 60
BH CV STRESS HR STAGE 2: 58
BH CV STRESS HR STAGE 3: 87
BH CV STRESS HR STAGE 4: 93
BH CV STRESS HR STAGE 5: 96
BH CV STRESS PROTOCOL 1: NORMAL
BH CV STRESS RECOVERY BP: NORMAL MMHG
BH CV STRESS RECOVERY HR: 79 BPM
BH CV STRESS STAGE 1: 1
BH CV STRESS STAGE 2: 2
BH CV STRESS STAGE 3: 3
BH CV STRESS STAGE 4: 4
BH CV STRESS STAGE 5: 5
MAXIMAL PREDICTED HEART RATE: 171 BPM
PERCENT MAX PREDICTED HR: 56.14 %
STRESS BASELINE BP: NORMAL MMHG
STRESS BASELINE HR: 61 BPM
STRESS PERCENT HR: 66 %
STRESS POST EXERCISE DUR MIN: 13 MIN
STRESS POST EXERCISE DUR SEC: 6 SEC
STRESS POST PEAK BP: NORMAL MMHG
STRESS POST PEAK HR: 96 BPM
STRESS TARGET HR: 145 BPM

## 2023-03-24 NOTE — TELEPHONE ENCOUNTER
Steffen Rossi MD Mendiola, Isabella, MA; Jazlyn Bashir MA; Silvia Estevez  If you could let patient know that her stress test is uninterpretable due to failure to achieve target heart rate   Recommend coronary CT angiogram   If she is agreeable I will order this   Also she needs to have a follow-up appointment in our office in the next 4 to 6 weeks with midlevel       Call placed to pt, per Dr. Rossi, to notify her that her stress test came back equivocal (target heart rate was not achieved). Voicemail left letting pt know this, as well as the recommendation to get a CT angiogram. Pt was asked to call back & let us know if she is agreeable to proceed with additional testing & also to set up a f/u appt with a midlevel.      OK for [facility] to notify pt & set up f/u with APRN (needs appt scheduled after 4/5/23, once echo is complete)

## 2023-03-28 DIAGNOSIS — R07.9 CHEST PAIN IN ADULT: Primary | ICD-10-CM

## 2023-03-31 NOTE — PROGRESS NOTES
" BREANA Mendoza  [facility]   Pulmonary and Critical Care  [address] [place] [address]  Phone: [phone]  Fax: [phone]           Chief Complaint  Follow-up (Pt. Here for follow up/Pt. Said that she wakes up and feels like she's drowning and can't breathe)    Subjective    History of Present Illness     Minnie Bang presents to [facility] PULMONARY & CRITICAL CARE MEDICINE   History of Present Illness  Ms Bang is a 47 year old female referred at last visit by her PCP for shortness of breath. She has known history of arthritis, hypothyroidism, hypercholesterolemia, elevated triglycerides, hypertension, depression, anxiety, low vitamin D and history of Covid in Nov 2022 & mid Feb 2023. She is a former smoker of cigarettes now vaping with nicotine. She has night time awakenings that occur 2-3 times a night. She has panic attacks as well.  Once she sits up she coughs and can then get the \"stuff\" up. She reports it tastes like metal. She coughs up a small <pea size dark sputum in the office today. She denies fever, chills, night sweats. She has no seasonal allergies. She has a cough that is daily and only productive once she coughs long and hard.  She has had no allergy testing. She had second hand smoke exposure growing up and paint exposure. She has family history of Asthma with mother who had frequent asthma attacks.  She has no post nasal drip. She has some acid reflux. She has a rescue inhaler she will use 3-4 times a day. She has wheezing. She has two cats, a bird and two dogs in the home. The bird is an Amazon Yellow [place]. She has had this bird for 3-4 years. When she saw me she was given steroids for 5 days and advised to pretreat nightly with either albuterol HFA or nebulizer. Today she reports this is of benefit. She was provided Trelegy and reports it caused her to have a sore throat despite rinsing her mouth. Patient had indeterminate " "stress test as she was unable to reach target heart rate secondary to medications. She is scheduled for a Cardiac CTA. Her echo is to be done today at 1500. She has had two ER visits since last seeing me for \"copd\" exacerbations. She was treated with steroids and antibiotics. Her HRCT showed mild centrilobular emphysema & No CT evidence of interstitial fibrotic lung disease. Her IgE +22 allergens was normal. Her CBC did show an elevated white blood cell count. Her absolute eosinophils were normal however high normal.        Objective   Vital Signs:   /88 (BP Location: Left arm, Patient Position: Sitting, Cuff Size: Adult)   Pulse 77   Ht 175.3 cm (69.02\")   Wt 84 kg (185 lb 3.2 oz)   SpO2 99%   BMI 27.34 kg/m²     Physical Exam  Vitals reviewed.   Constitutional:       Appearance: Normal appearance.   Cardiovascular:      Rate and Rhythm: Normal rate and regular rhythm.   Pulmonary:      Effort: Pulmonary effort is normal.      Breath sounds: Normal breath sounds.      Comments: Coarse breath sounds lower lobes   Neurological:      General: No focal deficit present.      Mental Status: She is alert and oriented to person, place, and time.   Psychiatric:         Mood and Affect: Mood normal.         Behavior: Behavior normal.          Result Review :  The following data was reviewed by: BREANA Mendoza on 04/05/2023:  Common labs    Common Labs 3/6/23 3/20/23 3/20/23 4/3/23 4/3/23     0200 0200 0150 0150   Glucose  94   100 (A)   BUN  7   5 (A)   Creatinine  0.45 (A)   0.48 (A)   Sodium  144   137   Potassium  3.5   3.1 (A)   Chloride  104   98   Calcium  9.2   9.0   Albumin  4.5   4.2   Total Bilirubin  0.2   0.2   Alkaline Phosphatase  109   168 (A)   AST (SGOT)  25   37 (A)   ALT (SGPT)  18   23   WBC 11.6 (A)  8.16 13.75 (A)    Hemoglobin 12.7  13.3 12.0    Hematocrit 38.0  40.5 36.7    Platelets 363  305 279    (A) Abnormal value       Comments are available for some flowsheets but are not " being displayed.           Data reviewed: Radiologic studies HRCT   My interpretation of imaging:  As in HPI  My interpretation of labs: as in HPI   CT Chest Hi Resolution Diagnostic (03/17/2023 15:38)      My interpretation of the PFT: none    No results found for this or any previous visit.        Assessment and Plan   Diagnoses and all orders for this visit:    1. Dyspnea on exertion (Primary)    2. Chronic obstructive pulmonary disease, unspecified COPD type  Comments:  Likely Asthma with COPD overlap. Need complete PFT when not acutely ill and cardiac workup is complete.     3. Cough, unspecified type    4. Tobacco abuse    5. Family history of asthma    6. History of COVID-19    7. Long COVID    8. Pulmonary emphysema, unspecified emphysema type    Other orders  -     albuterol sulfate  (90 Base) MCG/ACT inhaler; Inhale 2 puffs Every 4 (Four) Hours As Needed for Wheezing.  Dispense: 18 g; Refill: 3      I believe she has multiple etiologies going on here.   1. She is on Propanolol that can worsen lung disease such as  Asthma   2. She had Covid twice in the last 6 months with most recent in Feb. She likely has propanolol aggravating underlying asthma, Long Covid aggravating underlying asthma and undiagnosed asthma.    3. Anxiety/ panic attacks that also adds to her symptoms. She has been started on Prozac in the last few weeks and hopeful this will help her with her anxiety / panic attacks. She has been afraid to go to sleep and therefore is not sleeping well at all.   4. ? Underlying heart disease. She had a stress test that was indeterminate. She is to have an Echo today as well as a Cardiac CTA tomorrow. She has 2+ pitting edema of the BLE, states she can not lay flat as she feels as though she is drowning. Of note however her BNP is normal and her CXR showed normal heart size and no pulmonary congestion. She does however had an elevated Right hemidiaphragm. Per report, this was noted on a CXR from  [facility] in March 2021.     She has tried Advair, Dulera and Airo duo and now Trelegy for which they all made her throat worse and she coughed all the time. She was rinsing her mouth out routinely with use. She is currently using albuterol HFA 2-3 times a day and Duonebs 3 times a day.   She has had two more ER visits since seeing me the one time. Most recent was two days ago. She was given Duonebs, Levaquin, Prednisone 50 mg daily x 7 days.     We will see what her Echo and Cardiac CTA show then plan a complete pre/post PFT at the hospital. This needs to be done when she is not acutely ill.   I advised I would reach out to discuss with cardiology about coming off of the Propanolol in favor of a more cardioselective beta blocker. She has a follow up appointment with  Saqib Liang NP on 4/18/23.     Her HRCT did show mild emphysema. Again, she likely has Asthma, long covid, and propanolol use keeping her exacerbated. She could have COPD/ Asthma overlap. She may require a longer tapering treatment of steroids. She may also benefit from diuretic. She is currently on antibiotic from the ER. If no better after current antibiotic and steroids we will plan sputum culture and/or discuss with Dr. Perez for bronchoscopy. I have asked her to return in 2 weeks.       Follow Up   Return in about 2 weeks (around 4/19/2023).  Patient was given instructions and counseling regarding her condition or for health maintenance advice. Please see specific information pulled into the AVS if appropriate.     Sivan Wise, BREANA  4/5/2023  15:22 CDT

## 2023-04-03 ENCOUNTER — HOSPITAL ENCOUNTER (EMERGENCY)
Facility: HOSPITAL | Age: 48
Discharge: HOME | End: 2023-04-03
Attending: FAMILY MEDICINE | Admitting: FAMILY MEDICINE
Payer: COMMERCIAL

## 2023-04-03 ENCOUNTER — APPOINTMENT (OUTPATIENT)
Dept: GENERAL RADIOLOGY | Facility: HOSPITAL | Age: 48
End: 2023-04-03
Payer: COMMERCIAL

## 2023-04-03 VITALS
WEIGHT: 180 LBS | TEMPERATURE: 97.8 F | SYSTOLIC BLOOD PRESSURE: 163 MMHG | OXYGEN SATURATION: 98 % | BODY MASS INDEX: 26.66 KG/M2 | HEIGHT: 69 IN | RESPIRATION RATE: 22 BRPM | DIASTOLIC BLOOD PRESSURE: 91 MMHG | HEART RATE: 69 BPM

## 2023-04-03 DIAGNOSIS — I10 HYPERTENSION, UNSPECIFIED TYPE: ICD-10-CM

## 2023-04-03 DIAGNOSIS — J44.1 COPD WITH ACUTE EXACERBATION: Primary | ICD-10-CM

## 2023-04-03 DIAGNOSIS — R06.02 SHORTNESS OF BREATH AT REST: ICD-10-CM

## 2023-04-03 LAB
ALBUMIN SERPL-MCNC: 4.2 G/DL (ref 3.5–5.2)
ALBUMIN/GLOB SERPL: 1.6 G/DL
ALP SERPL-CCNC: 168 U/L (ref 39–117)
ALT SERPL W P-5'-P-CCNC: 23 U/L (ref 1–33)
ANION GAP SERPL CALCULATED.3IONS-SCNC: 14 MMOL/L (ref 5–15)
AST SERPL-CCNC: 37 U/L (ref 1–32)
BASOPHILS # BLD AUTO: 0.08 10*3/MM3 (ref 0–0.2)
BASOPHILS NFR BLD AUTO: 0.6 % (ref 0–1.5)
BILIRUB SERPL-MCNC: 0.2 MG/DL (ref 0–1.2)
BUN SERPL-MCNC: 5 MG/DL (ref 6–20)
BUN/CREAT SERPL: 10.4 (ref 7–25)
CALCIUM SPEC-SCNC: 9 MG/DL (ref 8.6–10.5)
CHLORIDE SERPL-SCNC: 98 MMOL/L (ref 98–107)
CO2 SERPL-SCNC: 25 MMOL/L (ref 22–29)
CREAT SERPL-MCNC: 0.48 MG/DL (ref 0.57–1)
D-LACTATE SERPL-SCNC: 1.6 MMOL/L (ref 0.5–2)
D-LACTATE SERPL-SCNC: 2.1 MMOL/L (ref 0.5–2)
D-LACTATE SERPL-SCNC: 2.4 MMOL/L (ref 0.5–2)
DEPRECATED RDW RBC AUTO: 51.5 FL (ref 37–54)
EGFRCR SERPLBLD CKD-EPI 2021: 116.3 ML/MIN/1.73
EOSINOPHIL # BLD AUTO: 0.26 10*3/MM3 (ref 0–0.4)
EOSINOPHIL NFR BLD AUTO: 1.9 % (ref 0.3–6.2)
ERYTHROCYTE [DISTWIDTH] IN BLOOD BY AUTOMATED COUNT: 14.3 % (ref 12.3–15.4)
GLOBULIN UR ELPH-MCNC: 2.7 GM/DL
GLUCOSE SERPL-MCNC: 100 MG/DL (ref 65–99)
HCT VFR BLD AUTO: 36.7 % (ref 34–46.6)
HGB BLD-MCNC: 12 G/DL (ref 12–15.9)
HOLD SPECIMEN: NORMAL
IMM GRANULOCYTES # BLD AUTO: 0.06 10*3/MM3 (ref 0–0.05)
IMM GRANULOCYTES NFR BLD AUTO: 0.4 % (ref 0–0.5)
LYMPHOCYTES # BLD AUTO: 2.84 10*3/MM3 (ref 0.7–3.1)
LYMPHOCYTES NFR BLD AUTO: 20.7 % (ref 19.6–45.3)
MCH RBC QN AUTO: 32.1 PG (ref 26.6–33)
MCHC RBC AUTO-ENTMCNC: 32.7 G/DL (ref 31.5–35.7)
MCV RBC AUTO: 98.1 FL (ref 79–97)
MONOCYTES # BLD AUTO: 1.56 10*3/MM3 (ref 0.1–0.9)
MONOCYTES NFR BLD AUTO: 11.3 % (ref 5–12)
NEUTROPHILS NFR BLD AUTO: 65.1 % (ref 42.7–76)
NEUTROPHILS NFR BLD AUTO: 8.95 10*3/MM3 (ref 1.7–7)
NRBC BLD AUTO-RTO: 0 /100 WBC (ref 0–0.2)
NT-PROBNP SERPL-MCNC: 307.3 PG/ML (ref 0–450)
PLATELET # BLD AUTO: 279 10*3/MM3 (ref 140–450)
PMV BLD AUTO: 9.9 FL (ref 6–12)
POTASSIUM SERPL-SCNC: 3.1 MMOL/L (ref 3.5–5.2)
PROT SERPL-MCNC: 6.9 G/DL (ref 6–8.5)
RBC # BLD AUTO: 3.74 10*6/MM3 (ref 3.77–5.28)
SODIUM SERPL-SCNC: 137 MMOL/L (ref 136–145)
TROPONIN T SERPL HS-MCNC: 10 NG/L
WBC NRBC COR # BLD: 13.75 10*3/MM3 (ref 3.4–10.8)
WHOLE BLOOD HOLD COAG: NORMAL
WHOLE BLOOD HOLD SPECIMEN: NORMAL

## 2023-04-03 PROCEDURE — 94799 UNLISTED PULMONARY SVC/PX: CPT

## 2023-04-03 PROCEDURE — 83605 ASSAY OF LACTIC ACID: CPT | Performed by: FAMILY MEDICINE

## 2023-04-03 PROCEDURE — 36415 COLL VENOUS BLD VENIPUNCTURE: CPT

## 2023-04-03 PROCEDURE — 87040 BLOOD CULTURE FOR BACTERIA: CPT | Performed by: FAMILY MEDICINE

## 2023-04-03 PROCEDURE — 25010000002 CEFTRIAXONE PER 250 MG: Performed by: FAMILY MEDICINE

## 2023-04-03 PROCEDURE — 96365 THER/PROPH/DIAG IV INF INIT: CPT

## 2023-04-03 PROCEDURE — 83880 ASSAY OF NATRIURETIC PEPTIDE: CPT | Performed by: FAMILY MEDICINE

## 2023-04-03 PROCEDURE — 25010000002 MAGNESIUM SULFATE 2 GM/50ML SOLUTION: Performed by: FAMILY MEDICINE

## 2023-04-03 PROCEDURE — 25010000002 METHYLPREDNISOLONE PER 125 MG: Performed by: FAMILY MEDICINE

## 2023-04-03 PROCEDURE — 84484 ASSAY OF TROPONIN QUANT: CPT | Performed by: FAMILY MEDICINE

## 2023-04-03 PROCEDURE — 85025 COMPLETE CBC W/AUTO DIFF WBC: CPT | Performed by: FAMILY MEDICINE

## 2023-04-03 PROCEDURE — 80053 COMPREHEN METABOLIC PANEL: CPT | Performed by: FAMILY MEDICINE

## 2023-04-03 PROCEDURE — 94640 AIRWAY INHALATION TREATMENT: CPT

## 2023-04-03 PROCEDURE — 96367 TX/PROPH/DG ADDL SEQ IV INF: CPT

## 2023-04-03 PROCEDURE — 71045 X-RAY EXAM CHEST 1 VIEW: CPT

## 2023-04-03 PROCEDURE — 96375 TX/PRO/DX INJ NEW DRUG ADDON: CPT

## 2023-04-03 PROCEDURE — 99284 EMERGENCY DEPT VISIT MOD MDM: CPT

## 2023-04-03 RX ORDER — PREDNISONE 50 MG/1
50 TABLET ORAL DAILY
Qty: 7 TABLET | Refills: 0 | Status: SHIPPED | OUTPATIENT
Start: 2023-04-03 | End: 2023-04-21

## 2023-04-03 RX ORDER — IPRATROPIUM BROMIDE AND ALBUTEROL SULFATE 2.5; .5 MG/3ML; MG/3ML
3 SOLUTION RESPIRATORY (INHALATION) ONCE
Status: COMPLETED | OUTPATIENT
Start: 2023-04-03 | End: 2023-04-03

## 2023-04-03 RX ORDER — SODIUM CHLORIDE 0.9 % (FLUSH) 0.9 %
10 SYRINGE (ML) INJECTION AS NEEDED
Status: DISCONTINUED | OUTPATIENT
Start: 2023-04-03 | End: 2023-04-03 | Stop reason: HOSPADM

## 2023-04-03 RX ORDER — MAGNESIUM SULFATE HEPTAHYDRATE 40 MG/ML
2 INJECTION, SOLUTION INTRAVENOUS ONCE
Status: COMPLETED | OUTPATIENT
Start: 2023-04-03 | End: 2023-04-03

## 2023-04-03 RX ORDER — BUDESONIDE 0.5 MG/2ML
0.5 INHALANT ORAL ONCE
Status: COMPLETED | OUTPATIENT
Start: 2023-04-03 | End: 2023-04-03

## 2023-04-03 RX ORDER — BUDESONIDE 0.5 MG/2ML
0.5 INHALANT ORAL 2 TIMES DAILY
Qty: 12 ML | Refills: 0 | Status: SHIPPED | OUTPATIENT
Start: 2023-04-03 | End: 2023-04-21

## 2023-04-03 RX ORDER — LEVOFLOXACIN 500 MG/1
500 TABLET, FILM COATED ORAL DAILY
Qty: 7 TABLET | Refills: 0 | Status: SHIPPED | OUTPATIENT
Start: 2023-04-03

## 2023-04-03 RX ORDER — METHYLPREDNISOLONE SODIUM SUCCINATE 125 MG/2ML
125 INJECTION, POWDER, LYOPHILIZED, FOR SOLUTION INTRAMUSCULAR; INTRAVENOUS ONCE
Status: COMPLETED | OUTPATIENT
Start: 2023-04-03 | End: 2023-04-03

## 2023-04-03 RX ORDER — IPRATROPIUM BROMIDE AND ALBUTEROL SULFATE 2.5; .5 MG/3ML; MG/3ML
3 SOLUTION RESPIRATORY (INHALATION) EVERY 4 HOURS PRN
Qty: 360 ML | Refills: 0 | Status: SHIPPED | OUTPATIENT
Start: 2023-04-03

## 2023-04-03 RX ADMIN — METHYLPREDNISOLONE SODIUM SUCCINATE 125 MG: 125 INJECTION, POWDER, FOR SOLUTION INTRAMUSCULAR; INTRAVENOUS at 03:11

## 2023-04-03 RX ADMIN — MAGNESIUM SULFATE HEPTAHYDRATE 2 G: 2 INJECTION, SOLUTION INTRAVENOUS at 03:11

## 2023-04-03 RX ADMIN — BUDESONIDE 0.5 MG: 0.5 SUSPENSION RESPIRATORY (INHALATION) at 03:20

## 2023-04-03 RX ADMIN — CEFTRIAXONE 1 G: 1 INJECTION, POWDER, FOR SOLUTION INTRAMUSCULAR; INTRAVENOUS at 08:05

## 2023-04-03 RX ADMIN — IPRATROPIUM BROMIDE AND ALBUTEROL SULFATE 3 ML: 2.5; .5 SOLUTION RESPIRATORY (INHALATION) at 03:20

## 2023-04-03 NOTE — ED PROVIDER NOTES
HPI:      Patient is a 47-year-old white female with known history of COPD who presents to the emergency room with wheezing and shortness of breath that started at 8 PM on 2023.  Patient has tried home nebulizing treatment but no improvement.  Patient states he has a history of CHF as well.  There is no report of fever or chills.    REVIEW OF SYSTEMS  CONSTITUTIONAL:  No complaints of fever, chills,or weakness  EYES:  No complaints of discharge   ENT: No complaints of sore throat or ear pain  CARDIOVASCULAR:  No complaints of chest pain, palpitations, or swelling  RESPIRATORY: Positive for wheezing and shortness of breath.  GI:  No complaints of abdominal pain, nausea, vomiting, or diarrhea  MUSCULOSKELETAL:  No complaints of back pain  SKIN:  No complaints of rash  NEUROLOGIC:  No complaints of headache, focal weakness, or sensory changes  ENDOCRINE:  No complaints of polyuria or polydipsia  LYMPHATIC:  No complaints of swollen glands  GENITOURINARY: No complaints of urinary frequency or hematuria        PAST MEDICAL HISTORY  Past Medical History:   Diagnosis Date   • Abnormal ECG 23   • Anxiety    • Arthritis     back   • Asthma    • COPD (chronic obstructive pulmonary disease)    • Depression    • Diastolic dysfunction    • Emphysema of lung    • Hyperlipidemia    • Hypertension    • Pneumonia        FAMILY HISTORY  Family History   Problem Relation Age of Onset   • Asthma Mother    • Cancer Mother    • Emphysema Mother    • Cancer Father    • Heart failure Father    • Hypertension Father        SOCIAL HISTORY  Social History     Socioeconomic History   • Marital status:    Tobacco Use   • Smoking status: Some Days     Packs/day: 0.25     Years: 30.00     Pack years: 7.50     Types: Cigarettes     Start date: 1991     Last attempt to quit: 2022     Years since quittin.4     Passive exposure: Past   • Tobacco comments:     Patient vapes   Substance and Sexual Activity   •  Alcohol use: Not Currently     Comment: occ   • Drug use: No   • Sexual activity: Not Currently     Partners: Male     Birth control/protection: Hysterectomy       IMMUNIZATION HISTORY  Deferred to primary care physician.    SURGICAL HISTORY  Past Surgical History:   Procedure Laterality Date   • APPENDECTOMY     • CARDIAC CATHETERIZATION N/A 11/04/2017    Procedure: Coronary angiography;  Surgeon: Luis Colvin MD;  Location:  [facility];  Service:    • HYSTERECTOMY     • NE RT/LT HEART CATHETERS N/A 11/04/2017    Procedure: Percutaneous Coronary Intervention;  Surgeon: Luis Colvin MD;  Location:  [facility];  Service: Cardiovascular   • THYROID SURGERY     • TOTAL THYROIDECTOMY         CURRENT MEDICATIONS    Current Facility-Administered Medications:   •  cefTRIAXone (ROCEPHIN) 1 g in sodium chloride 0.9 % 100 mL IVPB, 1 g, Intravenous, Once, Savage Calderon Jr., MD  •  sodium chloride 0.9 % flush 10 mL, 10 mL, Intravenous, PRN, Savage Calderon Jr., MD  •  sodium chloride 0.9 % flush 10 mL, 10 mL, Intravenous, PRN, Savage Calderon Jr., MD    Current Outpatient Medications:   •  albuterol sulfate  (90 Base) MCG/ACT inhaler, Inhale 2 puffs Every 4 (Four) Hours As Needed for Wheezing., Disp: , Rfl:   •  Bacillus Coagulans-Inulin (PROBIOTIC FORMULA PO), Take  by mouth., Disp: , Rfl:   •  budesonide (Pulmicort) 0.5 MG/2ML nebulizer solution, Take 2 mL by nebulization 2 (Two) Times a Day for 3 days., Disp: 12 mL, Rfl: 0  •  cloNIDine (CATAPRES) 0.1 MG tablet, Take 1 tablet by mouth Every 6 (Six) Hours As Needed for High Blood Pressure., Disp: 20 tablet, Rfl: 0  •  dilTIAZem CD (CARDIZEM CD) 120 MG 24 hr capsule, Take 1 capsule by mouth Daily., Disp: 30 capsule, Rfl: 11  •  Fluticasone-Umeclidin-Vilant (Trelegy Ellipta) 200-62.5-25 MCG/ACT aerosol powder , Inhale 1 puff Daily., Disp: 2 each, Rfl: 0  •  ipratropium-albuterol (DUO-NEB) 0.5-2.5 mg/3 ml  "nebulizer, Take 3 mL by nebulization Every 4 (Four) Hours As Needed for Wheezing., Disp: 360 mL, Rfl: 0  •  levoFLOXacin (LEVAQUIN) 500 MG tablet, Take 1 tablet by mouth Daily., Disp: 7 tablet, Rfl: 0  •  levothyroxine (SYNTHROID, LEVOTHROID) 150 MCG tablet, Take 175 mcg by mouth., Disp: , Rfl:   •  lisinopril (PRINIVIL,ZESTRIL) 20 MG tablet, , Disp: , Rfl:   •  lisinopril (PRINIVIL,ZESTRIL) 40 MG tablet, Take 1 tablet by mouth Daily., Disp: , Rfl:   •  meclizine (ANTIVERT) 25 MG tablet, Take 1 tablet by mouth 3 (Three) Times a Day As Needed for Dizziness., Disp: 42 tablet, Rfl: 2  •  nitroglycerin (NITROSTAT) 0.4 MG SL tablet, 1 under the tongue as needed for angina, may repeat q5mins for up three doses, Disp: 25 tablet, Rfl: 3  •  Omega-3 Fatty Acids (FISH OIL PO), Take  by mouth., Disp: , Rfl:   •  predniSONE (DELTASONE) 50 MG tablet, Take 1 tablet by mouth Daily., Disp: 7 tablet, Rfl: 0  •  propranolol (INDERAL) 40 MG tablet, Take 1 tablet by mouth 2 (Two) Times a Day., Disp: , Rfl:   •  Vitamin D, Cholecalciferol, (CHOLECALCIFEROL) 10 MCG (400 UNIT) tablet, Take 1 tablet by mouth Daily., Disp: , Rfl:     ALLERGIES  No Known Allergies      Respiratory Exam    VITAL SIGNS:   BP (!) 162/108   Pulse 64   Temp 98 °F (36.7 °C) (Oral)   Resp 22   Ht 175.3 cm (69\")   Wt 81.6 kg (180 lb)   LMP  (LMP Unknown)   SpO2 94%   BMI 26.58 kg/m²     Constitutional: Patient is alert and in no distress.  Patient with mild generalized chest breathing discomfort.    ENT: There is a normal pharynx with no acute erythema or exudate and oral mucosa is moist.  Nose is clear with no drainage.  Tympanic membranes intact and non-erythemic    Cardiovascular: S1-S2 regular rate and rhythm no murmur rubs or gallops    Respiratory: Bilateral wheezing inspiratory and expiratory is noted.  With decreased breath sounds in both lung bases.    Abdomen: Soft nontender bowel sounds are normal in all 4 quadrants there is no rebound or guarding " noted.  There is no abdominal distention or hepatosplenomegaly.    Genitourinary: Patient is voiding appropriately.    Integument: No acute lesions noted color appears to be normal.    Corey Coma Scale: Total score 15    Neurological: Patient is alert and oriented x4 in no acute findings noted.  Speech is fluent and cognition is normal.  No evidence of acute CVA.  Cranial nerves II through XII intact.  Patient with normal motor function as well as reflexes and sensation    Psychiatric: Normal affect and mood      RADIOLOGY/PROCEDURES    XR Chest 1 View   Final Result   1. No active cardiopulmonary disease.   This report was finalized on 04/03/2023 06:08 by Dr. Karmen Teague MD.            FUTURE APPOINTMENTS     Future Appointments   Date Time Provider Department Center   4/5/2023  9:00 AM Sivan Wise APRN [facility]   4/5/2023  3:00 PM [facility] ECHO ROOM 3 [facility] CARDI [facility]   4/6/2023  2:00 PM P[facility] CT 2 [facility] CAT [facility]   4/18/2023  2:30 PM Saqib Liang APRN [facility]  [facility]   5/3/2023  9:15 AM Debbie Adam APRN [facility]   7/14/2023  1:00 PM Domitila Duncan MD [facility]              COURSE & MEDICAL DECISION MAKING  It was great improvement after DuoNeb plus Pulmicort with IV Solu-Medrol.  The wheezing has significantly decreased in nature.  There is still expiratory wheezing at the end of patient's expiration.  Patient does not have a pneumonia on x-ray.  And overall labs are stable.  Patient does not have an admission criteria.  We will treat the patient for a COPD exacerbation with a antibiotic and steroid.    Patient's level of risk: Moderate risk    CRITICAL CARE    CRITICAL CARE: No    CRITICAL CARE TIME: None      Also Old charts were reviewed per [facility] EMR.  Pertinent details are summarized above.  All laboratory, radiologic, and EKG studies that were performed in the Emergency Department were a necessary part of the evaluation needed to exclude unstable or  emergent medical  conditions:     Patient was hemodynamically and neurologically stable in the ED.   Pertinent studies were reviewed as above.     Recent Results (from the past 24 hour(s))   Green Top (Gel)    Collection Time: 04/03/23  1:50 AM   Result Value Ref Range    Extra Tube Hold for add-ons.    Lavender Top    Collection Time: 04/03/23  1:50 AM   Result Value Ref Range    Extra Tube hold for add-on    Red Top    Collection Time: 04/03/23  1:50 AM   Result Value Ref Range    Extra Tube Hold for add-ons.    Gray Top    Collection Time: 04/03/23  1:50 AM   Result Value Ref Range    Extra Tube Hold for add-ons.    Light Blue Top    Collection Time: 04/03/23  1:50 AM   Result Value Ref Range    Extra Tube Hold for add-ons.    Comprehensive Metabolic Panel    Collection Time: 04/03/23  1:50 AM    Specimen: Blood   Result Value Ref Range    Glucose 100 (H) 65 - 99 mg/dL    BUN 5 (L) 6 - 20 mg/dL    Creatinine 0.48 (L) 0.57 - 1.00 mg/dL    Sodium 137 136 - 145 mmol/L    Potassium 3.1 (L) 3.5 - 5.2 mmol/L    Chloride 98 98 - 107 mmol/L    CO2 25.0 22.0 - 29.0 mmol/L    Calcium 9.0 8.6 - 10.5 mg/dL    Total Protein 6.9 6.0 - 8.5 g/dL    Albumin 4.2 3.5 - 5.2 g/dL    ALT (SGPT) 23 1 - 33 U/L    AST (SGOT) 37 (H) 1 - 32 U/L    Alkaline Phosphatase 168 (H) 39 - 117 U/L    Total Bilirubin 0.2 0.0 - 1.2 mg/dL    Globulin 2.7 gm/dL    A/G Ratio 1.6 g/dL    BUN/Creatinine Ratio 10.4 7.0 - 25.0    Anion Gap 14.0 5.0 - 15.0 mmol/L    eGFR 116.3 >60.0 mL/min/1.73   BNP    Collection Time: 04/03/23  1:50 AM    Specimen: Blood   Result Value Ref Range    proBNP 307.3 0.0 - 450.0 pg/mL   Single High Sensitivity Troponin T    Collection Time: 04/03/23  1:50 AM    Specimen: Blood   Result Value Ref Range    HS Troponin T 10 (H) <10 ng/L   Lactic Acid, Plasma    Collection Time: 04/03/23  1:50 AM    Specimen: Blood   Result Value Ref Range    Lactate 2.4 (C) 0.5 - 2.0 mmol/L   CBC Auto Differential    Collection Time: 04/03/23  1:50 AM    Specimen:  Blood   Result Value Ref Range    WBC 13.75 (H) 3.40 - 10.80 10*3/mm3    RBC 3.74 (L) 3.77 - 5.28 10*6/mm3    Hemoglobin 12.0 12.0 - 15.9 g/dL    Hematocrit 36.7 34.0 - 46.6 %    MCV 98.1 (H) 79.0 - 97.0 fL    MCH 32.1 26.6 - 33.0 pg    MCHC 32.7 31.5 - 35.7 g/dL    RDW 14.3 12.3 - 15.4 %    RDW-SD 51.5 37.0 - 54.0 fl    MPV 9.9 6.0 - 12.0 fL    Platelets 279 140 - 450 10*3/mm3    Neutrophil % 65.1 42.7 - 76.0 %    Lymphocyte % 20.7 19.6 - 45.3 %    Monocyte % 11.3 5.0 - 12.0 %    Eosinophil % 1.9 0.3 - 6.2 %    Basophil % 0.6 0.0 - 1.5 %    Immature Grans % 0.4 0.0 - 0.5 %    Neutrophils, Absolute 8.95 (H) 1.70 - 7.00 10*3/mm3    Lymphocytes, Absolute 2.84 0.70 - 3.10 10*3/mm3    Monocytes, Absolute 1.56 (H) 0.10 - 0.90 10*3/mm3    Eosinophils, Absolute 0.26 0.00 - 0.40 10*3/mm3    Basophils, Absolute 0.08 0.00 - 0.20 10*3/mm3    Immature Grans, Absolute 0.06 (H) 0.00 - 0.05 10*3/mm3    nRBC 0.0 0.0 - 0.2 /100 WBC   STAT Lactic Acid, Reflex    Collection Time: 04/03/23  4:54 AM    Specimen: Blood   Result Value Ref Range    Lactate 2.1 (C) 0.5 - 2.0 mmol/L       The patient received:  Medications   sodium chloride 0.9 % flush 10 mL (has no administration in time range)   sodium chloride 0.9 % flush 10 mL (has no administration in time range)   cefTRIAXone (ROCEPHIN) 1 g in sodium chloride 0.9 % 100 mL IVPB (has no administration in time range)   ipratropium-albuterol (DUO-NEB) nebulizer solution 3 mL (3 mL Nebulization Given 4/3/23 0320)   budesonide (PULMICORT) nebulizer solution 0.5 mg (0.5 mg Nebulization Given 4/3/23 0320)   magnesium sulfate 2g/50 mL (PREMIX) infusion (0 g Intravenous Stopped 4/3/23 0331)   methylPREDNISolone sodium succinate (SOLU-Medrol) injection 125 mg (125 mg Intravenous Given 4/3/23 0311)            ED Disposition     ED Disposition   Discharge    Condition   Stable    Comment   --                 I have reviewed the patient’s prescription history via a prescription monitoring program.   This information is consistent with my knowledge of the patient’s controlled substance use history.    Patient evaluate during Coronavirus Pandemic. Isolation practices followed according to [facility] policy.     FINAL IMPRESSION   Diagnosis Plan   1. COPD with acute exacerbation        2. Hypertension, unspecified type        3. Shortness of breath at rest              MD Kvng Arredondo Jr, Thomas Mark Jr., MD  04/03/23 0755

## 2023-04-05 ENCOUNTER — OFFICE VISIT (OUTPATIENT)
Dept: PULMONOLOGY | Facility: CLINIC | Age: 48
End: 2023-04-05
Payer: COMMERCIAL

## 2023-04-05 ENCOUNTER — HOSPITAL ENCOUNTER (OUTPATIENT)
Dept: CARDIOLOGY | Facility: HOSPITAL | Age: 48
Discharge: HOME | End: 2023-04-05
Admitting: INTERNAL MEDICINE
Payer: COMMERCIAL

## 2023-04-05 VITALS
OXYGEN SATURATION: 99 % | WEIGHT: 185.2 LBS | HEIGHT: 69 IN | SYSTOLIC BLOOD PRESSURE: 156 MMHG | HEART RATE: 77 BPM | DIASTOLIC BLOOD PRESSURE: 88 MMHG | BODY MASS INDEX: 27.43 KG/M2

## 2023-04-05 DIAGNOSIS — R06.09 DOE (DYSPNEA ON EXERTION): ICD-10-CM

## 2023-04-05 DIAGNOSIS — Z72.0 TOBACCO ABUSE: ICD-10-CM

## 2023-04-05 DIAGNOSIS — Z86.16 HISTORY OF COVID-19: ICD-10-CM

## 2023-04-05 DIAGNOSIS — J44.9 CHRONIC OBSTRUCTIVE PULMONARY DISEASE, UNSPECIFIED COPD TYPE: Primary | ICD-10-CM

## 2023-04-05 DIAGNOSIS — R05.9 COUGH, UNSPECIFIED TYPE: ICD-10-CM

## 2023-04-05 DIAGNOSIS — J43.9 PULMONARY EMPHYSEMA, UNSPECIFIED EMPHYSEMA TYPE: ICD-10-CM

## 2023-04-05 DIAGNOSIS — U09.9 LONG COVID: ICD-10-CM

## 2023-04-05 DIAGNOSIS — Z82.5 FAMILY HISTORY OF ASTHMA: ICD-10-CM

## 2023-04-05 DIAGNOSIS — R06.09 DYSPNEA ON EXERTION: ICD-10-CM

## 2023-04-05 PROCEDURE — 93306 TTE W/DOPPLER COMPLETE: CPT

## 2023-04-05 PROCEDURE — 99215 OFFICE O/P EST HI 40 MIN: CPT | Performed by: NURSE PRACTITIONER

## 2023-04-05 PROCEDURE — 1160F RVW MEDS BY RX/DR IN RCRD: CPT | Performed by: NURSE PRACTITIONER

## 2023-04-05 PROCEDURE — 1159F MED LIST DOCD IN RCRD: CPT | Performed by: NURSE PRACTITIONER

## 2023-04-05 PROCEDURE — 93356 MYOCRD STRAIN IMG SPCKL TRCK: CPT

## 2023-04-05 PROCEDURE — 93306 TTE W/DOPPLER COMPLETE: CPT | Performed by: INTERNAL MEDICINE

## 2023-04-05 PROCEDURE — 93356 MYOCRD STRAIN IMG SPCKL TRCK: CPT | Performed by: INTERNAL MEDICINE

## 2023-04-05 RX ORDER — ALBUTEROL SULFATE 90 UG/1
2 AEROSOL, METERED RESPIRATORY (INHALATION) EVERY 4 HOURS PRN
Qty: 18 G | Refills: 3 | Status: SHIPPED | OUTPATIENT
Start: 2023-04-05

## 2023-04-06 ENCOUNTER — HOSPITAL ENCOUNTER (OUTPATIENT)
Dept: CT IMAGING | Facility: HOSPITAL | Age: 48
Discharge: HOME | End: 2023-04-06
Payer: COMMERCIAL

## 2023-04-06 VITALS
WEIGHT: 190.4 LBS | TEMPERATURE: 97.6 F | RESPIRATION RATE: 20 BRPM | HEART RATE: 60 BPM | DIASTOLIC BLOOD PRESSURE: 77 MMHG | BODY MASS INDEX: 28.2 KG/M2 | OXYGEN SATURATION: 96 % | HEIGHT: 69 IN | SYSTOLIC BLOOD PRESSURE: 118 MMHG

## 2023-04-06 DIAGNOSIS — R07.9 CHEST PAIN IN ADULT: ICD-10-CM

## 2023-04-06 DIAGNOSIS — R93.89 ABNORMAL FINDINGS ON DIAGNOSTIC IMAGING OF CARDIOVASCULAR SYSTEM: Primary | ICD-10-CM

## 2023-04-06 DIAGNOSIS — R93.89 ABNORMAL FINDINGS ON DIAGNOSTIC IMAGING OF CARDIOVASCULAR SYSTEM: ICD-10-CM

## 2023-04-06 LAB
CREAT SERPL-MCNC: 0.57 MG/DL (ref 0.57–1)
EGFRCR SERPLBLD CKD-EPI 2021: 111.6 ML/MIN/1.73

## 2023-04-06 PROCEDURE — 25510000001 IOPAMIDOL PER 1 ML: Performed by: INTERNAL MEDICINE

## 2023-04-06 PROCEDURE — 75574 CT ANGIO HRT W/3D IMAGE: CPT | Performed by: INTERNAL MEDICINE

## 2023-04-06 PROCEDURE — 75574 CT ANGIO HRT W/3D IMAGE: CPT

## 2023-04-06 PROCEDURE — 0502T HC CORONARY FFR DERIVED CTA DATA PREP & TRANSMIS: CPT

## 2023-04-06 PROCEDURE — 82565 ASSAY OF CREATININE: CPT | Performed by: INTERNAL MEDICINE

## 2023-04-06 PROCEDURE — 0503T HC CORONARY FFR CTA DATA ALYS & GNRJ ESTIMATED FFR MODEL: CPT

## 2023-04-06 PROCEDURE — 0504T CT CORONARY FFR CTA DATA ALYS & GNRJ ESTIMATED FFR MODEL: CPT | Performed by: INTERNAL MEDICINE

## 2023-04-06 RX ORDER — SODIUM CHLORIDE 0.9 % (FLUSH) 0.9 %
10 SYRINGE (ML) INJECTION AS NEEDED
Status: DISCONTINUED | OUTPATIENT
Start: 2023-04-06 | End: 2023-04-07 | Stop reason: HOSPADM

## 2023-04-06 RX ORDER — METOPROLOL TARTRATE 50 MG/1
50 TABLET, FILM COATED ORAL ONCE AS NEEDED
Status: DISCONTINUED | OUTPATIENT
Start: 2023-04-06 | End: 2023-04-07 | Stop reason: HOSPADM

## 2023-04-06 RX ORDER — SODIUM CHLORIDE 0.9 % (FLUSH) 0.9 %
3 SYRINGE (ML) INJECTION EVERY 12 HOURS SCHEDULED
Status: DISCONTINUED | OUTPATIENT
Start: 2023-04-06 | End: 2023-04-07 | Stop reason: HOSPADM

## 2023-04-06 RX ORDER — METOPROLOL TARTRATE 100 MG/1
100 TABLET ORAL ONCE AS NEEDED
Status: DISCONTINUED | OUTPATIENT
Start: 2023-04-06 | End: 2023-04-07 | Stop reason: HOSPADM

## 2023-04-06 RX ORDER — LIDOCAINE HYDROCHLORIDE 10 MG/ML
5 INJECTION, SOLUTION EPIDURAL; INFILTRATION; INTRACAUDAL; PERINEURAL AS NEEDED
Status: DISCONTINUED | OUTPATIENT
Start: 2023-04-06 | End: 2023-04-07 | Stop reason: HOSPADM

## 2023-04-06 RX ADMIN — IOPAMIDOL 100 ML: 755 INJECTION, SOLUTION INTRAVENOUS at 13:54

## 2023-04-07 LAB
BH CV ECHO LEFT VENTRICLE GLOBAL LONGITUDINAL STRAIN: -18.8 %
BH CV ECHO MEAS - AO MAX PG: 26.2 MMHG
BH CV ECHO MEAS - AO MEAN PG: 10.7 MMHG
BH CV ECHO MEAS - AO ROOT DIAM: 2.8 CM
BH CV ECHO MEAS - AO V2 MAX: 256 CM/SEC
BH CV ECHO MEAS - AO V2 VTI: 43.4 CM
BH CV ECHO MEAS - AVA(I,D): 2.27 CM2
BH CV ECHO MEAS - EDV(CUBED): 133.4 ML
BH CV ECHO MEAS - EDV(MOD-SP4): 93.2 ML
BH CV ECHO MEAS - EF(MOD-SP4): 68.2 %
BH CV ECHO MEAS - ESV(CUBED): 21.5 ML
BH CV ECHO MEAS - ESV(MOD-SP4): 29.6 ML
BH CV ECHO MEAS - FS: 45.6 %
BH CV ECHO MEAS - IVS/LVPW: 0.87 CM
BH CV ECHO MEAS - IVSD: 1.1 CM
BH CV ECHO MEAS - LA DIMENSION: 3.6 CM
BH CV ECHO MEAS - LAT PEAK E' VEL: 9.4 CM/SEC
BH CV ECHO MEAS - LV DIASTOLIC VOL/BSA (35-75): 46.6 CM2
BH CV ECHO MEAS - LV MASS(C)D: 236.4 GRAMS
BH CV ECHO MEAS - LV MAX PG: 6.7 MMHG
BH CV ECHO MEAS - LV MEAN PG: 4 MMHG
BH CV ECHO MEAS - LV SYSTOLIC VOL/BSA (12-30): 14.8 CM2
BH CV ECHO MEAS - LV V1 MAX: 129 CM/SEC
BH CV ECHO MEAS - LV V1 VTI: 28.4 CM
BH CV ECHO MEAS - LVIDD: 5.1 CM
BH CV ECHO MEAS - LVIDS: 2.8 CM
BH CV ECHO MEAS - LVOT AREA: 3.5 CM2
BH CV ECHO MEAS - LVOT DIAM: 2.1 CM
BH CV ECHO MEAS - LVPWD: 1.26 CM
BH CV ECHO MEAS - MED PEAK E' VEL: 10.3 CM/SEC
BH CV ECHO MEAS - MV A MAX VEL: 80.4 CM/SEC
BH CV ECHO MEAS - MV DEC TIME: 0.2 MSEC
BH CV ECHO MEAS - MV E MAX VEL: 103 CM/SEC
BH CV ECHO MEAS - MV E/A: 1.28
BH CV ECHO MEAS - RAP SYSTOLE: 5 MMHG
BH CV ECHO MEAS - RVSP: 25.4 MMHG
BH CV ECHO MEAS - SI(MOD-SP4): 31.8 ML/M2
BH CV ECHO MEAS - SV(LVOT): 98.4 ML
BH CV ECHO MEAS - SV(MOD-SP4): 63.6 ML
BH CV ECHO MEAS - TR MAX PG: 20.4 MMHG
BH CV ECHO MEAS - TR MAX VEL: 226 CM/SEC
BH CV ECHO MEASUREMENTS AVERAGE E/E' RATIO: 10.46
LEFT ATRIUM VOLUME INDEX: 32.4 ML/M2
LEFT ATRIUM VOLUME: 64.8 ML
MAXIMAL PREDICTED HEART RATE: 171 BPM
STRESS TARGET HR: 145 BPM

## 2023-04-08 LAB — BACTERIA SPEC AEROBE CULT: NORMAL

## 2023-04-10 ENCOUNTER — APPOINTMENT (OUTPATIENT)
Dept: GENERAL RADIOLOGY | Facility: HOSPITAL | Age: 48
End: 2023-04-10
Payer: COMMERCIAL

## 2023-04-10 ENCOUNTER — HOSPITAL ENCOUNTER (EMERGENCY)
Facility: HOSPITAL | Age: 48
Discharge: HOME | End: 2023-04-10
Admitting: EMERGENCY MEDICINE
Payer: COMMERCIAL

## 2023-04-10 VITALS
BODY MASS INDEX: 26.96 KG/M2 | HEART RATE: 84 BPM | SYSTOLIC BLOOD PRESSURE: 104 MMHG | HEIGHT: 69 IN | WEIGHT: 182 LBS | TEMPERATURE: 97.7 F | OXYGEN SATURATION: 100 % | DIASTOLIC BLOOD PRESSURE: 87 MMHG | RESPIRATION RATE: 19 BRPM

## 2023-04-10 DIAGNOSIS — R07.9 CHEST PAIN, UNSPECIFIED TYPE: Primary | ICD-10-CM

## 2023-04-10 DIAGNOSIS — E87.6 CHRONIC HYPOKALEMIA: ICD-10-CM

## 2023-04-10 DIAGNOSIS — J44.9 CHRONIC OBSTRUCTIVE PULMONARY DISEASE, UNSPECIFIED COPD TYPE: ICD-10-CM

## 2023-04-10 LAB
ALBUMIN SERPL-MCNC: 3.8 G/DL (ref 3.5–5.2)
ALBUMIN/GLOB SERPL: 1.5 G/DL
ALP SERPL-CCNC: 113 U/L (ref 39–117)
ALT SERPL W P-5'-P-CCNC: 18 U/L (ref 1–33)
ANION GAP SERPL CALCULATED.3IONS-SCNC: 14 MMOL/L (ref 5–15)
APTT PPP: 26.5 SECONDS (ref 24.1–35)
AST SERPL-CCNC: 23 U/L (ref 1–32)
BASOPHILS # BLD AUTO: 0.06 10*3/MM3 (ref 0–0.2)
BASOPHILS NFR BLD AUTO: 0.6 % (ref 0–1.5)
BILIRUB SERPL-MCNC: 0.4 MG/DL (ref 0–1.2)
BUN SERPL-MCNC: 8 MG/DL (ref 6–20)
BUN/CREAT SERPL: 17.4 (ref 7–25)
CALCIUM SPEC-SCNC: 8.5 MG/DL (ref 8.6–10.5)
CHLORIDE SERPL-SCNC: 95 MMOL/L (ref 98–107)
CO2 SERPL-SCNC: 30 MMOL/L (ref 22–29)
CREAT SERPL-MCNC: 0.46 MG/DL (ref 0.57–1)
DEPRECATED RDW RBC AUTO: 54.4 FL (ref 37–54)
EGFRCR SERPLBLD CKD-EPI 2021: 117.5 ML/MIN/1.73
EOSINOPHIL # BLD AUTO: 0.23 10*3/MM3 (ref 0–0.4)
EOSINOPHIL NFR BLD AUTO: 2.1 % (ref 0.3–6.2)
ERYTHROCYTE [DISTWIDTH] IN BLOOD BY AUTOMATED COUNT: 15.2 % (ref 12.3–15.4)
GEN 5 2HR TROPONIN T REFLEX: 7 NG/L
GLOBULIN UR ELPH-MCNC: 2.5 GM/DL
GLUCOSE SERPL-MCNC: 90 MG/DL (ref 65–99)
HCT VFR BLD AUTO: 37.4 % (ref 34–46.6)
HGB BLD-MCNC: 12 G/DL (ref 12–15.9)
IMM GRANULOCYTES # BLD AUTO: 0.22 10*3/MM3 (ref 0–0.05)
IMM GRANULOCYTES NFR BLD AUTO: 2 % (ref 0–0.5)
INR PPP: 0.88 (ref 0.91–1.09)
LYMPHOCYTES # BLD AUTO: 3.23 10*3/MM3 (ref 0.7–3.1)
LYMPHOCYTES NFR BLD AUTO: 30 % (ref 19.6–45.3)
MCH RBC QN AUTO: 31.7 PG (ref 26.6–33)
MCHC RBC AUTO-ENTMCNC: 32.1 G/DL (ref 31.5–35.7)
MCV RBC AUTO: 98.7 FL (ref 79–97)
MONOCYTES # BLD AUTO: 1.13 10*3/MM3 (ref 0.1–0.9)
MONOCYTES NFR BLD AUTO: 10.5 % (ref 5–12)
NEUTROPHILS NFR BLD AUTO: 5.89 10*3/MM3 (ref 1.7–7)
NEUTROPHILS NFR BLD AUTO: 54.8 % (ref 42.7–76)
NRBC BLD AUTO-RTO: 0 /100 WBC (ref 0–0.2)
PLATELET # BLD AUTO: 261 10*3/MM3 (ref 140–450)
PMV BLD AUTO: 9.7 FL (ref 6–12)
POTASSIUM SERPL-SCNC: 2.8 MMOL/L (ref 3.5–5.2)
PROT SERPL-MCNC: 6.3 G/DL (ref 6–8.5)
PROTHROMBIN TIME: 12 SECONDS (ref 11.8–14.8)
RBC # BLD AUTO: 3.79 10*6/MM3 (ref 3.77–5.28)
SODIUM SERPL-SCNC: 139 MMOL/L (ref 136–145)
TROPONIN T DELTA: -1 NG/L
TROPONIN T SERPL HS-MCNC: 8 NG/L
WBC NRBC COR # BLD: 10.76 10*3/MM3 (ref 3.4–10.8)

## 2023-04-10 PROCEDURE — 99284 EMERGENCY DEPT VISIT MOD MDM: CPT

## 2023-04-10 PROCEDURE — 85610 PROTHROMBIN TIME: CPT | Performed by: NURSE PRACTITIONER

## 2023-04-10 PROCEDURE — 93005 ELECTROCARDIOGRAM TRACING: CPT | Performed by: EMERGENCY MEDICINE

## 2023-04-10 PROCEDURE — 93005 ELECTROCARDIOGRAM TRACING: CPT | Performed by: NURSE PRACTITIONER

## 2023-04-10 PROCEDURE — 36415 COLL VENOUS BLD VENIPUNCTURE: CPT

## 2023-04-10 PROCEDURE — 93010 ELECTROCARDIOGRAM REPORT: CPT | Performed by: STUDENT IN AN ORGANIZED HEALTH CARE EDUCATION/TRAINING PROGRAM

## 2023-04-10 PROCEDURE — 80053 COMPREHEN METABOLIC PANEL: CPT | Performed by: NURSE PRACTITIONER

## 2023-04-10 PROCEDURE — 85730 THROMBOPLASTIN TIME PARTIAL: CPT | Performed by: NURSE PRACTITIONER

## 2023-04-10 PROCEDURE — 96374 THER/PROPH/DIAG INJ IV PUSH: CPT

## 2023-04-10 PROCEDURE — 84484 ASSAY OF TROPONIN QUANT: CPT | Performed by: NURSE PRACTITIONER

## 2023-04-10 PROCEDURE — 85025 COMPLETE CBC W/AUTO DIFF WBC: CPT | Performed by: NURSE PRACTITIONER

## 2023-04-10 PROCEDURE — 71045 X-RAY EXAM CHEST 1 VIEW: CPT

## 2023-04-10 PROCEDURE — 25010000002 DEXAMETHASONE PER 1 MG: Performed by: NURSE PRACTITIONER

## 2023-04-10 RX ORDER — SODIUM CHLORIDE 0.9 % (FLUSH) 0.9 %
10 SYRINGE (ML) INJECTION AS NEEDED
Status: DISCONTINUED | OUTPATIENT
Start: 2023-04-10 | End: 2023-04-10 | Stop reason: HOSPADM

## 2023-04-10 RX ORDER — POTASSIUM CHLORIDE 1.5 G/1.77G
40 POWDER, FOR SOLUTION ORAL ONCE
Status: COMPLETED | OUTPATIENT
Start: 2023-04-10 | End: 2023-04-10

## 2023-04-10 RX ORDER — DEXAMETHASONE SODIUM PHOSPHATE 10 MG/ML
8 INJECTION INTRAMUSCULAR; INTRAVENOUS ONCE
Status: COMPLETED | OUTPATIENT
Start: 2023-04-10 | End: 2023-04-10

## 2023-04-10 RX ADMIN — POTASSIUM CHLORIDE 40 MEQ: 1.5 POWDER, FOR SOLUTION ORAL at 18:09

## 2023-04-10 RX ADMIN — DEXAMETHASONE SODIUM PHOSPHATE 8 MG: 10 INJECTION INTRAMUSCULAR; INTRAVENOUS at 19:51

## 2023-04-11 ENCOUNTER — TELEPHONE (OUTPATIENT)
Dept: PULMONOLOGY | Facility: CLINIC | Age: 48
End: 2023-04-11
Payer: COMMERCIAL

## 2023-04-11 DIAGNOSIS — R06.09 DYSPNEA ON EXERTION: Primary | ICD-10-CM

## 2023-04-11 DIAGNOSIS — R05.9 COUGH, UNSPECIFIED TYPE: ICD-10-CM

## 2023-04-11 LAB
QT INTERVAL: 422 MS
QT INTERVAL: 438 MS
QT INTERVAL: 448 MS
QTC INTERVAL: 465 MS
QTC INTERVAL: 474 MS
QTC INTERVAL: 480 MS

## 2023-04-11 NOTE — TELEPHONE ENCOUNTER
Very complicated case. Was awaiting cardiac testing when Otilia saw her last week. She needs to be on maintenance therapy and not frequent prednisone. She has tried several inhalers already. She needs to discuss this with Otilia tomorrow when she returns so Otilia can review her other cardiac tests that were pending before. Please forward to Otilia to address tomorrow. See where she went to the ER and see if we can get records sent over too please. No [facility] or [facility] ER encounter that I could find.

## 2023-04-11 NOTE — TELEPHONE ENCOUNTER
Left message for patient to call back and sent My Chart message asking for the patient to call the office.

## 2023-04-11 NOTE — TELEPHONE ENCOUNTER
"Patient was at the ER last night because she had \"elevated BP and her chest was hurting\". She states she is having shortness of breath, \"feels like she is drowning\" and \"completely worn out\". She states she always has a cough and occasionally will cough up white sputum.  She was told by the ER to call here to see about getting a prescription for a longer tapering dose of steroids.   Patient states she is on DuoNebs three times a day and Albuterol HFA prn. She had to stop the Trelegy because it made her throat sore.   Advised patient to call her PCP regarding her Potassium levels and she voiced understanding.  Patient is aware the Provider is not in the office at this time. Patient informed this problem will be addressed as soon as possible but it may be the next day. Patient is aware to go to the ER for severe or urgent complaints. Patient voiced understanding and is agreeable.   Please advise.   "

## 2023-04-11 NOTE — TELEPHONE ENCOUNTER
----- Message from Minnie Bang sent at 4/11/2023  8:56 AM CDT -----  Regarding: Steroids  Contact: [phone]  That should have said Dr. Wise  Yes this is for Dr Esquivel this is Roro Bang I'm a patient of hers I was in the emergency room again last night with not being able to breathe my chest was hurting and they gave me only a shot of steroids and told me to contact Dr jackson about her maybe calling me in a longer dose of steroids that me and her had previously spoke about and I just wanted to see if she could call that in and they also said I needed potassium called in. Thank you. I do use [facility].

## 2023-04-12 NOTE — TELEPHONE ENCOUNTER
She needs to keep her follow up with Cardiology and discuss coming off of the Propanolol. I would like to go ahead and send her for a Complete pre/post PFT to be done at the hospital prior to follow up with me on 4/21/2023. She has tried numerous inhalers that have all seemed to cause her to cough and be worse. Make sure she has quit vaping.

## 2023-04-13 NOTE — TELEPHONE ENCOUNTER
Order placed yesterday.   Please schedule complete pre/post at the hospital prior to follow up. If unable to get in before she follows up with me then move my appointment back. Thank you.

## 2023-04-13 NOTE — TELEPHONE ENCOUNTER
Message relayed and patient voiced understanding. She states she has not been vaping or smoking. She is agreeable to have the PFT.

## 2023-04-15 NOTE — ED PROVIDER NOTES
Subjective   History of Present Illness  Patient is a 47-year-old female who presents to the ER with chief complaints of shortness of breath.  Patient has chronic shortness of breath and wheezing.  She has history of COPD and asthma.  She is followed by pulmonology for this issue.  Patient describes having worsening issues for the past 3 months.  She states that she has no energy.  She has had elevated blood pressures.  She describes having diffuse swelling at times.  She reports chest discomfort from her chronic coughing.  Patient states pulmonology believes some of her issue is related to her heart.  She had a recent CT coronary angiogram which revealed that a bicuspid aortic valve is recommended due to thickening of the aortic valve.  Patient's ejection fraction is 61 to 65%.  Please see complete report for details.  Patient states that she had COVID in November and 2021 as well as February of this year.  Patient states that she is just never fully improved.  She states that she is awaiting pulmonary function studies once all of her cardiac work-up has been performed.  She has been evaluated by Dr. Rossi with cardiology.  Patient denies any chest pain currently.  Past medical history significant for anxiety, arthritis, asthma, COPD, depression, diastolic dysfunction, emphysema, hyperlipidemia, hypertension.        Review of Systems   Constitutional: Negative.  Negative for fever.   HENT: Positive for congestion.    Eyes: Negative.    Respiratory: Positive for cough, shortness of breath and wheezing.    Cardiovascular: Positive for chest pain.   Gastrointestinal: Negative.  Negative for abdominal pain, diarrhea, nausea and vomiting.   Genitourinary: Negative.  Negative for dysuria.   Musculoskeletal: Negative.  Negative for back pain.   Skin: Negative.    All other systems reviewed and are negative.      Past Medical History:   Diagnosis Date   • Abnormal ECG 2/20/23   • Anxiety    • Arthritis     back   • Asthma     • COPD (chronic obstructive pulmonary disease)    • Depression    • Diastolic dysfunction    • Emphysema of lung    • Hyperlipidemia    • Hypertension    • Pneumonia        No Known Allergies    Past Surgical History:   Procedure Laterality Date   • APPENDECTOMY     • CARDIAC CATHETERIZATION N/A 2017    Procedure: Coronary angiography;  Surgeon: Luis Colvin MD;  Location:  [facility];  Service:    • HYSTERECTOMY     • SC RT/LT HEART CATHETERS N/A 2017    Procedure: Percutaneous Coronary Intervention;  Surgeon: Luis Colvin MD;  Location:  [facility];  Service: Cardiovascular   • THYROID SURGERY     • TOTAL THYROIDECTOMY         Family History   Problem Relation Age of Onset   • Asthma Mother    • Cancer Mother    • Emphysema Mother    • Cancer Father    • Heart failure Father    • Hypertension Father        Social History     Socioeconomic History   • Marital status:    Tobacco Use   • Smoking status: Some Days     Packs/day: 0.25     Years: 30.00     Pack years: 7.50     Types: Cigarettes     Start date: 1991     Last attempt to quit: 2022     Years since quittin.4     Passive exposure: Past   • Tobacco comments:     Patient vapes-2023 reports quit    Substance and Sexual Activity   • Alcohol use: Not Currently     Comment: occ   • Drug use: No   • Sexual activity: Not Currently     Partners: Male     Birth control/protection: Hysterectomy           Objective   Physical Exam  Vitals and nursing note reviewed.   Constitutional:       Appearance: She is well-developed.   HENT:      Head: Normocephalic and atraumatic.      Nose: Nose normal.      Mouth/Throat:      Mouth: Mucous membranes are moist.   Eyes:      Conjunctiva/sclera: Conjunctivae normal.      Pupils: Pupils are equal, round, and reactive to light.   Cardiovascular:      Rate and Rhythm: Normal rate and regular rhythm.      Heart sounds: Normal heart sounds.    Pulmonary:      Effort: Pulmonary effort is normal.      Breath sounds: Rales present.   Abdominal:      General: Bowel sounds are normal.      Palpations: Abdomen is soft.   Musculoskeletal:         General: Normal range of motion.      Cervical back: Normal range of motion and neck supple.   Skin:     General: Skin is warm and dry.      Capillary Refill: Capillary refill takes less than 2 seconds.   Neurological:      General: No focal deficit present.      Mental Status: She is alert and oriented to person, place, and time.   Psychiatric:         Mood and Affect: Mood normal.         Behavior: Behavior normal.         Procedures           ED Course                                           Medical Decision Making  Patient is a 47-year-old female who presents to the ER with chief complaints of shortness of breath.  Patient has chronic shortness of breath and wheezing.  She has history of COPD and asthma.  She is followed by pulmonology for this issue.  Patient describes having worsening issues for the past 3 months.  She states that she has no energy.  She has had elevated blood pressures.  She describes having diffuse swelling at times.  She reports chest discomfort from her chronic coughing.  Patient states pulmonology believes some of her issue is related to her heart.  She had a recent CT coronary angiogram which revealed that a bicuspid aortic valve is recommended due to thickening of the aortic valve.  Patient's ejection fraction is 61 to 65%.  Please see complete report for details.  Patient states that she had COVID in November and 2021 as well as February of this year.  Patient states that she is just never fully improved.  She states that she is awaiting pulmonary function studies once all of her cardiac work-up has been performed.  She has been evaluated by Dr. Rossi with cardiology.  Patient denies any chest pain currently.  Past medical history significant for anxiety, arthritis, asthma, COPD,  depression, diastolic dysfunction, emphysema, hyperlipidemia, hypertension.  Differential diagnosis: COPD exacerbation, pneumonia, ACS, and other    Labs Reviewed  COMPREHENSIVE METABOLIC PANEL - Abnormal; Notable for the following components:     Creatinine                    0.46 (*)               Potassium                     2.8 (*)                Chloride                      95 (*)                 CO2                           30.0 (*)               Calcium                       8.5 (*)             All other components within normal limits         Narrative: GFR Normal >60                  Chronic Kidney Disease <60                  Kidney Failure <15                    PROTIME-INR - Abnormal; Notable for the following components:     INR                           0.88 (*)            All other components within normal limits  CBC WITH AUTO DIFFERENTIAL - Abnormal; Notable for the following components:     MCV                           98.7 (*)               RDW-SD                        54.4 (*)               Immature Grans %              2.0 (*)                Lymphocytes, Absolute         3.23 (*)               Monocytes, Absolute           1.13 (*)               Immature Grans, Absolute      0.22 (*)            All other components within normal limits  APTT - Normal  TROPONIN - Normal         Narrative: High Sensitive Troponin T Reference Range:                  <10.0 ng/L- Negative Female for AMI                  <15.0 ng/L- Negative Male for AMI                  >=10 - Abnormal Female indicating possible myocardial injury.                  >=15 - Abnormal Male indicating possible myocardial injury.                   Clinicians would have to utilize clinical acumen, EKG, Troponin, and serial changes to determine if it is an Acute Myocardial Infarction or myocardial injury due to an underlying chronic condition.                                       HIGH SENSITIVITIY TROPONIN T 2HR - Normal          Narrative: High Sensitive Troponin T Reference Range:                  <10.0 ng/L- Negative Female for AMI                  <15.0 ng/L- Negative Male for AMI                  >=10 - Abnormal Female indicating possible myocardial injury.                  >=15 - Abnormal Male indicating possible myocardial injury.                   Clinicians would have to utilize clinical acumen, EKG, Troponin, and serial changes to determine if it is an Acute Myocardial Infarction or myocardial injury due to an underlying chronic condition.                                       CBC AND DIFFERENTIAL  XR Chest 1 View   Final Result    1. No acute disease.              This report was finalized on 04/10/2023 15:37 by Dr. Joce Mustafa MD.  Patient had 2 negative troponins.  Xray is negative for acute findings. Discussed with Dr. Colvin on call for cardiology. Feels patient is stable for discharge given the negative troponins and no active chest pain. She will need to f/u with Dr. Rossi for re-evaluation. Patient received a dose of steroids in the ER. She has hypokalemia which is chronic for her and she takes medication for this issue. We discussed to double her dose of potassium for a few days and then begin taking as prescribed. She will need to f/u with pcp for re-evaluation. She has been on steroids off and on for the past several months. We will let her pcp/pulmonology decide about further steroids. She will need to continue breathing treatments every 4 hours for the next few days and then prn.    Chest pain, unspecified type: chronic illness or injury  Chronic obstructive pulmonary disease, unspecified COPD type: chronic illness or injury  Amount and/or Complexity of Data Reviewed  Labs: ordered. Decision-making details documented in ED Course.  Radiology: ordered. Decision-making details documented in ED Course.  ECG/medicine tests: ordered. Decision-making details documented in ED Course.  Discussion of management or test  interpretation with external provider(s): Discussed case with Dr. Colvin on call for cardiology    Risk  Prescription drug management.          Final diagnoses:   Chest pain, unspecified type   Chronic obstructive pulmonary disease, unspecified COPD type   Chronic hypokalemia       ED Disposition  ED Disposition     ED Disposition   Discharge    Condition   Good    Comment   --             No follow-up provider specified.       Medication List      No changes were made to your prescriptions during this visit.          May Headley, APRN  04/15/23 1445

## 2023-04-18 ENCOUNTER — OFFICE VISIT (OUTPATIENT)
Dept: CARDIOLOGY | Facility: CLINIC | Age: 48
End: 2023-04-18
Payer: COMMERCIAL

## 2023-04-18 ENCOUNTER — TRANSCRIBE ORDERS (OUTPATIENT)
Dept: PULMONOLOGY | Facility: CLINIC | Age: 48
End: 2023-04-18
Payer: COMMERCIAL

## 2023-04-18 ENCOUNTER — PREP FOR SURGERY (OUTPATIENT)
Dept: OTHER | Facility: HOSPITAL | Age: 48
End: 2023-04-18
Payer: COMMERCIAL

## 2023-04-18 VITALS
WEIGHT: 185 LBS | HEART RATE: 76 BPM | BODY MASS INDEX: 27.4 KG/M2 | HEIGHT: 69 IN | SYSTOLIC BLOOD PRESSURE: 128 MMHG | DIASTOLIC BLOOD PRESSURE: 77 MMHG | OXYGEN SATURATION: 96 %

## 2023-04-18 DIAGNOSIS — R06.09 DOE (DYSPNEA ON EXERTION): ICD-10-CM

## 2023-04-18 DIAGNOSIS — R06.09 DYSPNEA ON EXERTION: Primary | ICD-10-CM

## 2023-04-18 DIAGNOSIS — I10 PRIMARY HYPERTENSION: ICD-10-CM

## 2023-04-18 DIAGNOSIS — E78.2 MIXED HYPERLIPIDEMIA: ICD-10-CM

## 2023-04-18 DIAGNOSIS — R07.9 CHEST PAIN IN ADULT: Primary | ICD-10-CM

## 2023-04-18 DIAGNOSIS — R05.9 COUGH, UNSPECIFIED TYPE: ICD-10-CM

## 2023-04-18 DIAGNOSIS — Z87.891 EX-SMOKER: ICD-10-CM

## 2023-04-18 DIAGNOSIS — J41.8 MIXED SIMPLE AND MUCOPURULENT CHRONIC BRONCHITIS: ICD-10-CM

## 2023-04-18 RX ORDER — SODIUM CHLORIDE 9 MG/ML
40 INJECTION, SOLUTION INTRAVENOUS AS NEEDED
OUTPATIENT
Start: 2023-04-18

## 2023-04-18 RX ORDER — CARVEDILOL 12.5 MG/1
12.5 TABLET ORAL 2 TIMES DAILY
Qty: 60 TABLET | Refills: 11 | Status: SHIPPED | OUTPATIENT
Start: 2023-04-18

## 2023-04-18 RX ORDER — SODIUM CHLORIDE 0.9 % (FLUSH) 0.9 %
3 SYRINGE (ML) INJECTION EVERY 12 HOURS SCHEDULED
OUTPATIENT
Start: 2023-04-18

## 2023-04-18 RX ORDER — SODIUM CHLORIDE 0.9 % (FLUSH) 0.9 %
10 SYRINGE (ML) INJECTION AS NEEDED
OUTPATIENT
Start: 2023-04-18

## 2023-04-18 RX ORDER — ISOSORBIDE MONONITRATE 30 MG/1
30 TABLET, EXTENDED RELEASE ORAL DAILY
Qty: 30 TABLET | Refills: 11 | Status: SHIPPED | OUTPATIENT
Start: 2023-04-18

## 2023-04-18 RX ORDER — NITROGLYCERIN 0.4 MG/1
TABLET SUBLINGUAL
Qty: 25 TABLET | Refills: 3 | Status: SHIPPED | OUTPATIENT
Start: 2023-04-18

## 2023-04-18 NOTE — H&P (VIEW-ONLY)
Subjective:     Encounter Date: 04/18/2023      Patient ID: Minnie Bang is a 47 y.o. female with COPD, hypertension, hyperlipidemia and former smoker    Chief Complaint: ER follow up  Hypertension  This is a chronic problem. The current episode started more than 1 year ago. The problem is unchanged. Associated symptoms include chest pain, malaise/fatigue, peripheral edema and shortness of breath. Pertinent negatives include no orthopnea, palpitations or PND. Risk factors for coronary artery disease include dyslipidemia, obesity and smoking/tobacco exposure. Current antihypertension treatment includes calcium channel blockers, ACE inhibitors and beta blockers.        Patient presents today for management of chest pain. Patient had an admission to [facility] ER 2017 for NSTEMI. She was taken to cath lab and underwent a LHC that showed normal coronary arteries. She was most recently seen by Dr Rossi 3/10/2023 for evaluation of chest pain. Stress Test was done 3/23/2023 that showed LVEF 56-60% patient was unable to reach targeted heart rate so CTA coronary artery was ordered. CTA showed 40 to 50% underlying stenosis noted of the proximal left anterior descending coronary artery, Proximal right coronary artery has soft plaque with 50 to 60% stenosis. FFR was done that showed gradual tapering of CT FFR from mid to distal LAD, and abnormal CT FFR of the distal LCx that may suggest hemodynamically significant stenosis. Echo was done 4/7/2023 that showed LVEF 61-65%, normal LV diastolic dysfunction, bicuspid aortic valve suggested with no significant valve disease.   Patient has had about 5 ER visits most often her chief complaint is shortness of breath. She has had multiple HS troponin's run that have been normal, BNP has remained normal, CXR showed no acute disease.  She has a history of COPD and follows with pulmonology who suggests that part of her issues likely are related to asthma, long covid. They are hoping to  get her under better control and then plan to order PFTs.   Today she reports that she has continued to feel awful. She reports severe dizziness. She reports that rolling over in the bed she is very dizzy. She reports that she has been nauseated. She had two bites of breakfast this am then vomited up. She reports that she has continued to wake up feeling like she is drowning and not able to get a deep breath. She reports that she is waking herself up snoring in the middle of the night. She reports that she is unable to climb stairs due to the shortness of breath. She reports that she has quit vaping and smoking. She reports that she had some chest pain a couple. She reports that she woke up with chest pain that radiated up to her jaw last night. She took a NTG and it resolved in a couple of seconds after taking the NTG. She reports that her BP has been running elevated at home.       The following portions of the patient's history were reviewed and updated as appropriate: allergies, current medications, past family history, past medical history, past social history, past surgical history and problem list.    No Known Allergies    Current Outpatient Medications:   •  albuterol sulfate  (90 Base) MCG/ACT inhaler, Inhale 2 puffs Every 4 (Four) Hours As Needed for Wheezing., Disp: 18 g, Rfl: 3  •  Bacillus Coagulans-Inulin (PROBIOTIC FORMULA PO), Take  by mouth., Disp: , Rfl:   •  cloNIDine (CATAPRES) 0.1 MG tablet, Take 1 tablet by mouth Every 6 (Six) Hours As Needed for High Blood Pressure., Disp: 20 tablet, Rfl: 0  •  dilTIAZem CD (CARDIZEM CD) 120 MG 24 hr capsule, Take 1 capsule by mouth Daily., Disp: 30 capsule, Rfl: 11  •  Fluticasone-Umeclidin-Vilant (Trelegy Ellipta) 200-62.5-25 MCG/ACT aerosol powder , Inhale 1 puff Daily., Disp: 2 each, Rfl: 0  •  ipratropium-albuterol (DUO-NEB) 0.5-2.5 mg/3 ml nebulizer, Take 3 mL by nebulization Every 4 (Four) Hours As Needed for Wheezing., Disp: 360 mL, Rfl: 0  •   levoFLOXacin (LEVAQUIN) 500 MG tablet, Take 1 tablet by mouth Daily., Disp: 7 tablet, Rfl: 0  •  levothyroxine (SYNTHROID, LEVOTHROID) 150 MCG tablet, Take 175 mcg by mouth., Disp: , Rfl:   •  lisinopril (PRINIVIL,ZESTRIL) 40 MG tablet, Take 1 tablet by mouth Daily., Disp: , Rfl:   •  meclizine (ANTIVERT) 25 MG tablet, Take 1 tablet by mouth 3 (Three) Times a Day As Needed for Dizziness., Disp: 42 tablet, Rfl: 2  •  nitroglycerin (NITROSTAT) 0.4 MG SL tablet, 1 under the tongue as needed for angina, may repeat q5mins for up three doses, Disp: 25 tablet, Rfl: 3  •  Omega-3 Fatty Acids (FISH OIL PO), Take  by mouth., Disp: , Rfl:   •  predniSONE (DELTASONE) 50 MG tablet, Take 1 tablet by mouth Daily., Disp: 7 tablet, Rfl: 0  •  Vitamin D, Cholecalciferol, (CHOLECALCIFEROL) 10 MCG (400 UNIT) tablet, Take 1 tablet by mouth Daily., Disp: , Rfl:   •  budesonide (Pulmicort) 0.5 MG/2ML nebulizer solution, Take 2 mL by nebulization 2 (Two) Times a Day for 3 days., Disp: 12 mL, Rfl: 0  •  carvedilol (COREG) 12.5 MG tablet, Take 1 tablet by mouth 2 (Two) Times a Day., Disp: 60 tablet, Rfl: 11  •  isosorbide mononitrate (IMDUR) 30 MG 24 hr tablet, Take 1 tablet by mouth Daily., Disp: 30 tablet, Rfl: 11  Past Medical History:   Diagnosis Date   • Abnormal ECG 23   • Anxiety    • Arthritis     back   • Asthma    • COPD (chronic obstructive pulmonary disease)    • Depression    • Diastolic dysfunction    • Emphysema of lung    • Hyperlipidemia    • Hypertension    • Mixed simple and mucopurulent chronic bronchitis 2023   • Pneumonia      Social History     Socioeconomic History   • Marital status:    Tobacco Use   • Smoking status: Former     Packs/day: 1.00     Years: 30.00     Pack years: 30.00     Types: Cigarettes     Start date: 1991     Quit date: 2022     Years since quittin.4     Passive exposure: Past   • Tobacco comments:     Patient vapes-2023 reports quit    Vaping Use   •  Vaping Use: Never used   Substance and Sexual Activity   • Alcohol use: Not Currently     Comment: occ   • Drug use: No   • Sexual activity: Not Currently     Partners: Male     Birth control/protection: Hysterectomy       Review of Systems   Constitutional: Positive for malaise/fatigue.   HENT: Negative for nosebleeds.    Cardiovascular: Positive for chest pain, dyspnea on exertion and leg swelling. Negative for irregular heartbeat, near-syncope, orthopnea, palpitations, paroxysmal nocturnal dyspnea and syncope.   Respiratory: Positive for cough, shortness of breath and wheezing.    Hematologic/Lymphatic: Bruises/bleeds easily.   Gastrointestinal: Positive for nausea and vomiting.   Genitourinary: Negative for hematuria.   Neurological: Positive for dizziness. Negative for weakness.   Psychiatric/Behavioral: The patient is nervous/anxious.    All other systems reviewed and are negative.         Objective:     Vitals reviewed.   Constitutional:       General: Not in acute distress.     Appearance: Normal appearance. Well-developed. Obese.   Eyes:      Pupils: Pupils are equal, round, and reactive to light.   HENT:      Head: Normocephalic and atraumatic.      Nose: Nose normal.   Neck:      Vascular: No carotid bruit.   Pulmonary:      Effort: Pulmonary effort is normal. No respiratory distress.      Breath sounds: Normal breath sounds. No wheezing. No rales.   Cardiovascular:      Normal rate. Regular rhythm.      Murmurs: There is no murmur.   Edema:     Peripheral edema absent.   Abdominal:      General: There is no distension.      Palpations: Abdomen is soft.   Musculoskeletal: Normal range of motion.      Cervical back: Normal range of motion and neck supple. Skin:     General: Skin is warm.      Findings: No erythema or rash.   Neurological:      General: No focal deficit present.      Mental Status: Alert and oriented to person, place, and time.   Psychiatric:         Attention and Perception: Attention  "normal.         Mood and Affect: Mood is anxious. Affect is tearful.         Speech: Speech normal.         Behavior: Behavior normal.         Thought Content: Thought content normal.         Judgment: Judgment normal.         /77 (BP Location: Left arm, Patient Position: Sitting, Cuff Size: Large Adult)   Pulse 76   Ht 175.3 cm (69.02\")   Wt 83.9 kg (185 lb)   LMP  (LMP Unknown)   SpO2 96%   BMI 27.31 kg/m²     Procedures    Lab Review:       Results for orders placed during the hospital encounter of 04/05/23    Adult Transthoracic Echo Complete w/ Color, Spectral and Contrast if necessary per protocol    Interpretation Summary  •  Left ventricular systolic function is normal. Left ventricular ejection fraction appears to be 61 - 65%.  •  Left ventricular diastolic function was normal.  •  Normal global longitudinal LV strain (GLS) = -18.8%  •  The aortic valve is abnormal in structure. The aortic valve exhibits sclerosis. A bicuspid aortic valve is suggested. There is mild calcification of the aortic valve. There is thickening of the aortic valve  •  No aortic valve regurgitation or stenosis is present.  •  Estimated right ventricular systolic pressure from tricuspid regurgitation is normal (<35 mmHg).    Stress Test 3/23/2023:  Interpretation Summary     •  Left ventricular systolic function is normal. Left ventricular ejection fraction appears to be 56 - 60%.  •  Patient achieved only 56% of maximal predicted heart rate likely due to effective heart rate control from medications  •  Uninterpretable stress test due to failure to achieve heart rate anywhere close to target  •  Recommend other modalities for evaluation such as coronary CTA     Cardiac CT angiography  4/6/2023  Impression:      Total calcium score (using SUNSHINE calcium score calculator): 303.6  Left main 1.6, .8, LCx 0 and .2  This is markedly elevated and at the 99th percentile for matched population       CT coronary " angiogram     Normal left main coronary artery  Dense focal calcification of proximal left anterior descending coronary artery  40 to 50% underlying stenosis noted of the proximal left anterior descending coronary artery  No significant disease of diagonal branch noted  Moderate misregistration artifact noted of left circumflex coronary artery  No obstructive disease noted of left circumflex coronary artery or obtuse marginal branches  Proximal right coronary artery has soft plaque with 50 to 60% stenosis          Detailed findings with coronary flow modelling as referenced above      Left main: Normal  LAD: Gradual tapering of CT FFR from mid to distal left anterior descending coronary artery suggestive of stenosis and likely due to any focal disease  LCx: Abnormal CT FFR of distal left circumflex coronary artery that may suggest hemodynamically significant stenosis  Normal CT FFR of obtuse marginal branch  RCA: No significant abnormality        Impression: Gradual tapering of CT FFR from mid to distal left anterior descending coronary artery  Abnormal CT FFR distal left circumflex coronary artery that may suggest focal significant stenosis      Results for orders placed during the hospital encounter of 04/05/23    Adult Transthoracic Echo Complete w/ Color, Spectral and Contrast if necessary per protocol    Interpretation Summary  •  Left ventricular systolic function is normal. Left ventricular ejection fraction appears to be 61 - 65%.  •  Left ventricular diastolic function was normal.  •  Normal global longitudinal LV strain (GLS) = -18.8%  •  The aortic valve is abnormal in structure. The aortic valve exhibits sclerosis. A bicuspid aortic valve is suggested. There is mild calcification of the aortic valve. There is thickening of the aortic valve  •  No aortic valve regurgitation or stenosis is present.  •  Estimated right ventricular systolic pressure from tricuspid regurgitation is normal (<35 mmHg).    Lab  Results   Component Value Date    CHOL 139 11/05/2017    TRIG 77 11/05/2017    HDL 58 11/05/2017    LDL 74 11/05/2017       I have personally reviewed echo, stress test, CTA coronary artery with FFR, labs, ER notes and past office notes prior to patients visit  Assessment:          Diagnosis Plan   1. Chest pain in adult  Case Request Cath Lab: Left Heart Cath      2. SORTO (dyspnea on exertion)  Case Request Cath Lab: Left Heart Cath      3. Primary hypertension        4. Mixed simple and mucopurulent chronic bronchitis        5. Mixed hyperlipidemia  Lipid Panel      6. Ex-smoker               Plan:   1. Chest pain: Patient has had multiple rule out HS troponin levels that have remained normal over multiple ER visits. Stress Test was done 3/23/2023 that showed LVEF 56-60% patient was unable to reach targeted heart rate so CTA coronary artery was ordered. CTA showed 40 to 50% underlying stenosis noted of the proximal left anterior descending coronary artery, Proximal right coronary artery has soft plaque with 50 to 60% stenosis. FFR was done that showed gradual tapering of CT FFR from mid to distal LAD, and abnormal CT FFR of the distal LCx that may suggest hemodynamically significant stenosis. Patient has continued to have chest pain that is relieved by NTG. Will start imdur today. Will order LHC for patient. Will switch propanolol to carvedilol due to it being more cardioselective.    2. Dyspnea on exertion: Will evaluate further with LHC. Echo showed LVEF 61-65%, normal LV diastolic dysfunction, bicuspid aortic valve suggested with no significant valve disease. Patient has a history of COPD and is following with pulmonology who suggests that part of her issues likely are related to asthma, long covid. They are hoping to get her under better control and then plan to order PFTs.    3. Hypertension: well controlled in office today. Patient reports that it is not well controlled at home. Will switch propanolol to  carvedilol due to it being more cardioselective. Recommend to continue to monitor.     4. COPD: Follows with pulmonology who suggests that part of her issues likely are related to asthma, long covid. They are hoping to get her under better control and then plan to order PFTs.    5. Hyperlipidemia: No recent lipid panel. Will order to be done when patient presents for [facility].     6. Former smoker: patient reports that she has quit smoking and vaping.       I spent 44 minutes caring for Minnie on this date of service. This time includes time spent by me in the following activities:preparing for the visit, reviewing tests, obtaining and/or reviewing a separately obtained history, performing a medically appropriate examination and/or evaluation , counseling and educating the patient/family/caregiver, ordering medications, tests, or procedures and documenting information in the medical record     I spent 2 minutes on the separately reported service of EKG. This time is not included in the time used to support the E/M service also reported today.

## 2023-04-18 NOTE — PROGRESS NOTES
Subjective:     Encounter Date: 04/18/2023      Patient ID: Minnie Bang is a 47 y.o. female with COPD, hypertension, hyperlipidemia and former smoker    Chief Complaint: ER follow up  Hypertension  This is a chronic problem. The current episode started more than 1 year ago. The problem is unchanged. Associated symptoms include chest pain, malaise/fatigue, peripheral edema and shortness of breath. Pertinent negatives include no orthopnea, palpitations or PND. Risk factors for coronary artery disease include dyslipidemia, obesity and smoking/tobacco exposure. Current antihypertension treatment includes calcium channel blockers, ACE inhibitors and beta blockers.        Patient presents today for management of chest pain. Patient had an admission to [facility] ER 2017 for NSTEMI. She was taken to cath lab and underwent a LHC that showed normal coronary arteries. She was most recently seen by Dr Rossi 3/10/2023 for evaluation of chest pain. Stress Test was done 3/23/2023 that showed LVEF 56-60% patient was unable to reach targeted heart rate so CTA coronary artery was ordered. CTA showed 40 to 50% underlying stenosis noted of the proximal left anterior descending coronary artery, Proximal right coronary artery has soft plaque with 50 to 60% stenosis. FFR was done that showed gradual tapering of CT FFR from mid to distal LAD, and abnormal CT FFR of the distal LCx that may suggest hemodynamically significant stenosis. Echo was done 4/7/2023 that showed LVEF 61-65%, normal LV diastolic dysfunction, bicuspid aortic valve suggested with no significant valve disease.   Patient has had about 5 ER visits most often her chief complaint is shortness of breath. She has had multiple HS troponin's run that have been normal, BNP has remained normal, CXR showed no acute disease.  She has a history of COPD and follows with pulmonology who suggests that part of her issues likely are related to asthma, long covid. They are hoping to  get her under better control and then plan to order PFTs.   Today she reports that she has continued to feel awful. She reports severe dizziness. She reports that rolling over in the bed she is very dizzy. She reports that she has been nauseated. She had two bites of breakfast this am then vomited up. She reports that she has continued to wake up feeling like she is drowning and not able to get a deep breath. She reports that she is waking herself up snoring in the middle of the night. She reports that she is unable to climb stairs due to the shortness of breath. She reports that she has quit vaping and smoking. She reports that she had some chest pain a couple. She reports that she woke up with chest pain that radiated up to her jaw last night. She took a NTG and it resolved in a couple of seconds after taking the NTG. She reports that her BP has been running elevated at home.       The following portions of the patient's history were reviewed and updated as appropriate: allergies, current medications, past family history, past medical history, past social history, past surgical history and problem list.    No Known Allergies    Current Outpatient Medications:   •  albuterol sulfate  (90 Base) MCG/ACT inhaler, Inhale 2 puffs Every 4 (Four) Hours As Needed for Wheezing., Disp: 18 g, Rfl: 3  •  Bacillus Coagulans-Inulin (PROBIOTIC FORMULA PO), Take  by mouth., Disp: , Rfl:   •  cloNIDine (CATAPRES) 0.1 MG tablet, Take 1 tablet by mouth Every 6 (Six) Hours As Needed for High Blood Pressure., Disp: 20 tablet, Rfl: 0  •  dilTIAZem CD (CARDIZEM CD) 120 MG 24 hr capsule, Take 1 capsule by mouth Daily., Disp: 30 capsule, Rfl: 11  •  Fluticasone-Umeclidin-Vilant (Trelegy Ellipta) 200-62.5-25 MCG/ACT aerosol powder , Inhale 1 puff Daily., Disp: 2 each, Rfl: 0  •  ipratropium-albuterol (DUO-NEB) 0.5-2.5 mg/3 ml nebulizer, Take 3 mL by nebulization Every 4 (Four) Hours As Needed for Wheezing., Disp: 360 mL, Rfl: 0  •   levoFLOXacin (LEVAQUIN) 500 MG tablet, Take 1 tablet by mouth Daily., Disp: 7 tablet, Rfl: 0  •  levothyroxine (SYNTHROID, LEVOTHROID) 150 MCG tablet, Take 175 mcg by mouth., Disp: , Rfl:   •  lisinopril (PRINIVIL,ZESTRIL) 40 MG tablet, Take 1 tablet by mouth Daily., Disp: , Rfl:   •  meclizine (ANTIVERT) 25 MG tablet, Take 1 tablet by mouth 3 (Three) Times a Day As Needed for Dizziness., Disp: 42 tablet, Rfl: 2  •  nitroglycerin (NITROSTAT) 0.4 MG SL tablet, 1 under the tongue as needed for angina, may repeat q5mins for up three doses, Disp: 25 tablet, Rfl: 3  •  Omega-3 Fatty Acids (FISH OIL PO), Take  by mouth., Disp: , Rfl:   •  predniSONE (DELTASONE) 50 MG tablet, Take 1 tablet by mouth Daily., Disp: 7 tablet, Rfl: 0  •  Vitamin D, Cholecalciferol, (CHOLECALCIFEROL) 10 MCG (400 UNIT) tablet, Take 1 tablet by mouth Daily., Disp: , Rfl:   •  budesonide (Pulmicort) 0.5 MG/2ML nebulizer solution, Take 2 mL by nebulization 2 (Two) Times a Day for 3 days., Disp: 12 mL, Rfl: 0  •  carvedilol (COREG) 12.5 MG tablet, Take 1 tablet by mouth 2 (Two) Times a Day., Disp: 60 tablet, Rfl: 11  •  isosorbide mononitrate (IMDUR) 30 MG 24 hr tablet, Take 1 tablet by mouth Daily., Disp: 30 tablet, Rfl: 11  Past Medical History:   Diagnosis Date   • Abnormal ECG 23   • Anxiety    • Arthritis     back   • Asthma    • COPD (chronic obstructive pulmonary disease)    • Depression    • Diastolic dysfunction    • Emphysema of lung    • Hyperlipidemia    • Hypertension    • Mixed simple and mucopurulent chronic bronchitis 2023   • Pneumonia      Social History     Socioeconomic History   • Marital status:    Tobacco Use   • Smoking status: Former     Packs/day: 1.00     Years: 30.00     Pack years: 30.00     Types: Cigarettes     Start date: 1991     Quit date: 2022     Years since quittin.4     Passive exposure: Past   • Tobacco comments:     Patient vapes-2023 reports quit    Vaping Use   •  Vaping Use: Never used   Substance and Sexual Activity   • Alcohol use: Not Currently     Comment: occ   • Drug use: No   • Sexual activity: Not Currently     Partners: Male     Birth control/protection: Hysterectomy       Review of Systems   Constitutional: Positive for malaise/fatigue.   HENT: Negative for nosebleeds.    Cardiovascular: Positive for chest pain, dyspnea on exertion and leg swelling. Negative for irregular heartbeat, near-syncope, orthopnea, palpitations, paroxysmal nocturnal dyspnea and syncope.   Respiratory: Positive for cough, shortness of breath and wheezing.    Hematologic/Lymphatic: Bruises/bleeds easily.   Gastrointestinal: Positive for nausea and vomiting.   Genitourinary: Negative for hematuria.   Neurological: Positive for dizziness. Negative for weakness.   Psychiatric/Behavioral: The patient is nervous/anxious.    All other systems reviewed and are negative.         Objective:     Vitals reviewed.   Constitutional:       General: Not in acute distress.     Appearance: Normal appearance. Well-developed. Obese.   Eyes:      Pupils: Pupils are equal, round, and reactive to light.   HENT:      Head: Normocephalic and atraumatic.      Nose: Nose normal.   Neck:      Vascular: No carotid bruit.   Pulmonary:      Effort: Pulmonary effort is normal. No respiratory distress.      Breath sounds: Normal breath sounds. No wheezing. No rales.   Cardiovascular:      Normal rate. Regular rhythm.      Murmurs: There is no murmur.   Edema:     Peripheral edema absent.   Abdominal:      General: There is no distension.      Palpations: Abdomen is soft.   Musculoskeletal: Normal range of motion.      Cervical back: Normal range of motion and neck supple. Skin:     General: Skin is warm.      Findings: No erythema or rash.   Neurological:      General: No focal deficit present.      Mental Status: Alert and oriented to person, place, and time.   Psychiatric:         Attention and Perception: Attention  "normal.         Mood and Affect: Mood is anxious. Affect is tearful.         Speech: Speech normal.         Behavior: Behavior normal.         Thought Content: Thought content normal.         Judgment: Judgment normal.         /77 (BP Location: Left arm, Patient Position: Sitting, Cuff Size: Large Adult)   Pulse 76   Ht 175.3 cm (69.02\")   Wt 83.9 kg (185 lb)   LMP  (LMP Unknown)   SpO2 96%   BMI 27.31 kg/m²     Procedures    Lab Review:       Results for orders placed during the hospital encounter of 04/05/23    Adult Transthoracic Echo Complete w/ Color, Spectral and Contrast if necessary per protocol    Interpretation Summary  •  Left ventricular systolic function is normal. Left ventricular ejection fraction appears to be 61 - 65%.  •  Left ventricular diastolic function was normal.  •  Normal global longitudinal LV strain (GLS) = -18.8%  •  The aortic valve is abnormal in structure. The aortic valve exhibits sclerosis. A bicuspid aortic valve is suggested. There is mild calcification of the aortic valve. There is thickening of the aortic valve  •  No aortic valve regurgitation or stenosis is present.  •  Estimated right ventricular systolic pressure from tricuspid regurgitation is normal (<35 mmHg).    Stress Test 3/23/2023:  Interpretation Summary     •  Left ventricular systolic function is normal. Left ventricular ejection fraction appears to be 56 - 60%.  •  Patient achieved only 56% of maximal predicted heart rate likely due to effective heart rate control from medications  •  Uninterpretable stress test due to failure to achieve heart rate anywhere close to target  •  Recommend other modalities for evaluation such as coronary CTA     Cardiac CT angiography  4/6/2023  Impression:      Total calcium score (using SUNSHINE calcium score calculator): 303.6  Left main 1.6, .8, LCx 0 and .2  This is markedly elevated and at the 99th percentile for matched population       CT coronary " angiogram     Normal left main coronary artery  Dense focal calcification of proximal left anterior descending coronary artery  40 to 50% underlying stenosis noted of the proximal left anterior descending coronary artery  No significant disease of diagonal branch noted  Moderate misregistration artifact noted of left circumflex coronary artery  No obstructive disease noted of left circumflex coronary artery or obtuse marginal branches  Proximal right coronary artery has soft plaque with 50 to 60% stenosis          Detailed findings with coronary flow modelling as referenced above      Left main: Normal  LAD: Gradual tapering of CT FFR from mid to distal left anterior descending coronary artery suggestive of stenosis and likely due to any focal disease  LCx: Abnormal CT FFR of distal left circumflex coronary artery that may suggest hemodynamically significant stenosis  Normal CT FFR of obtuse marginal branch  RCA: No significant abnormality        Impression: Gradual tapering of CT FFR from mid to distal left anterior descending coronary artery  Abnormal CT FFR distal left circumflex coronary artery that may suggest focal significant stenosis      Results for orders placed during the hospital encounter of 04/05/23    Adult Transthoracic Echo Complete w/ Color, Spectral and Contrast if necessary per protocol    Interpretation Summary  •  Left ventricular systolic function is normal. Left ventricular ejection fraction appears to be 61 - 65%.  •  Left ventricular diastolic function was normal.  •  Normal global longitudinal LV strain (GLS) = -18.8%  •  The aortic valve is abnormal in structure. The aortic valve exhibits sclerosis. A bicuspid aortic valve is suggested. There is mild calcification of the aortic valve. There is thickening of the aortic valve  •  No aortic valve regurgitation or stenosis is present.  •  Estimated right ventricular systolic pressure from tricuspid regurgitation is normal (<35 mmHg).    Lab  Results   Component Value Date    CHOL 139 11/05/2017    TRIG 77 11/05/2017    HDL 58 11/05/2017    LDL 74 11/05/2017       I have personally reviewed echo, stress test, CTA coronary artery with FFR, labs, ER notes and past office notes prior to patients visit  Assessment:          Diagnosis Plan   1. Chest pain in adult  Case Request Cath Lab: Left Heart Cath      2. SORTO (dyspnea on exertion)  Case Request Cath Lab: Left Heart Cath      3. Primary hypertension        4. Mixed simple and mucopurulent chronic bronchitis        5. Mixed hyperlipidemia  Lipid Panel      6. Ex-smoker               Plan:   1. Chest pain: Patient has had multiple rule out HS troponin levels that have remained normal over multiple ER visits. Stress Test was done 3/23/2023 that showed LVEF 56-60% patient was unable to reach targeted heart rate so CTA coronary artery was ordered. CTA showed 40 to 50% underlying stenosis noted of the proximal left anterior descending coronary artery, Proximal right coronary artery has soft plaque with 50 to 60% stenosis. FFR was done that showed gradual tapering of CT FFR from mid to distal LAD, and abnormal CT FFR of the distal LCx that may suggest hemodynamically significant stenosis. Patient has continued to have chest pain that is relieved by NTG. Will start imdur today. Will order LHC for patient. Will switch propanolol to carvedilol due to it being more cardioselective.    2. Dyspnea on exertion: Will evaluate further with LHC. Echo showed LVEF 61-65%, normal LV diastolic dysfunction, bicuspid aortic valve suggested with no significant valve disease. Patient has a history of COPD and is following with pulmonology who suggests that part of her issues likely are related to asthma, long covid. They are hoping to get her under better control and then plan to order PFTs.    3. Hypertension: well controlled in office today. Patient reports that it is not well controlled at home. Will switch propanolol to  carvedilol due to it being more cardioselective. Recommend to continue to monitor.     4. COPD: Follows with pulmonology who suggests that part of her issues likely are related to asthma, long covid. They are hoping to get her under better control and then plan to order PFTs.    5. Hyperlipidemia: No recent lipid panel. Will order to be done when patient presents for [facility].     6. Former smoker: patient reports that she has quit smoking and vaping.       I spent 44 minutes caring for Minnie on this date of service. This time includes time spent by me in the following activities:preparing for the visit, reviewing tests, obtaining and/or reviewing a separately obtained history, performing a medically appropriate examination and/or evaluation , counseling and educating the patient/family/caregiver, ordering medications, tests, or procedures and documenting information in the medical record     I spent 2 minutes on the separately reported service of EKG. This time is not included in the time used to support the E/M service also reported today.

## 2023-04-20 NOTE — PROGRESS NOTES
BREANA Mendoza  [facility]   Pulmonary and Critical Care  [address] [place] [address]  Phone: [phone]  Fax: [phone]           Chief Complaint  COPD (Pt here for follow up) and Dizziness (Pt has been experiencing dizziness recently, more sob and feeling of light headedness )    Subjective    History of Present Illness     Minnie Bang presents to [facility] PULMONARY & CRITICAL CARE MEDICINE   History of Present Illness  Ms Bang is a 48 year old female with shortness of breath. She has known history of arthritis, hypothyroidism, hypercholesterolemia, elevated triglycerides, hypertension, depression, anxiety, panic attacks, low vitamin D and history of Covid in Nov 2022 & mid Feb 2023. She is a former smoker of cigarettes and she quit vaping two weeks ago. She has been to the ER again since last seen a couple of weeks ago. She has also followed up with Cardiology who has placed her on Imdur, changed her off of Propanolol and plans for heart cath on May 1st. HRCT showed mild centrilobular emphysema & No CT evidence of interstitial fibrotic lung disease. Her IgE +22 allergens was normal. Her CBC did show an elevated white blood cell count. Her absolute eosinophils were normal however high normal. Echo with EF of 61-65%. Cardiac CTA as below. She continues to have night time awakenings, productive cough- tastes like metal. She denies fever, chills, night sweats.  Family history of Asthma with mother who had frequent asthma attacks.  She has no post nasal drip.  She has some acid reflux.  She had her PFT today that showed normal spirometry with decrease in midlfows. Post bronchodilator she had worseing of her FEV1 and midflows. Lung volumes are normal. Diffusion capacity is mild-border moderately impaired and likely related to her mild emphysema noted on her PFT. Her Inspiratory curve is flat. She is noting swelling and skin cracking open on her  "feet.        Objective   Vital Signs:   BP (!) 190/88 (BP Location: Left arm, Patient Position: Sitting, Cuff Size: Adult)   Pulse 104   Ht 175.3 cm (69\")   Wt 81.6 kg (180 lb)   SpO2 98%   BMI 26.58 kg/m²     Physical Exam  Vitals reviewed.   Constitutional:       Appearance: Normal appearance.   Cardiovascular:      Rate and Rhythm: Normal rate and regular rhythm.   Pulmonary:      Effort: Pulmonary effort is normal.      Breath sounds: Normal breath sounds.   Neurological:      General: No focal deficit present.      Mental Status: She is alert and oriented to person, place, and time.   Psychiatric:         Mood and Affect: Mood normal.         Behavior: Behavior normal.          Result Review :  The following data was reviewed by: BREANA Mendoza on 04/21/2023:  Common labs        4/3/2023    01:50 4/6/2023    13:14 4/10/2023    16:16   Common Labs   Glucose 100    90     BUN 5    8     Creatinine 0.48   0.57   0.46     Sodium 137    139     Potassium 3.1    2.8     Chloride 98    95     Calcium 9.0    8.5     Albumin 4.2    3.8     Total Bilirubin 0.2    0.4     Alkaline Phosphatase 168    113     AST (SGOT) 37    23     ALT (SGPT) 23    18     WBC 13.75    10.76     Hemoglobin 12.0    12.0     Hematocrit 36.7    37.4     Platelets 279    261       Data reviewed: Radiologic studies Cardiac CTA and Echo    My interpretation of imaging:  As below and in HPI  My interpretation of labs: none   Adult Transthoracic Echo Complete w/ Color, Spectral and Contrast if necessary per protocol (04/05/2023 15:54)  CT Coronary FFR CTA Data DALLIN & [facility] Estimated FFR Model (04/06/2023 13:54)  Impression: Gradual tapering of CT FFR from mid to distal left anterior descending coronary artery  Abnormal CT FFR distal left circumflex coronary artery that may suggest focal significant stenosis    My interpretation of the PFT: as below    Results for orders placed during the hospital encounter of 04/21/23    Pulmonary " Function Test    [facility] - Pulmonary Function Test    [address]  [place]  [place]  [address]  [phone]    Patient : Minnie Bang  MRN : [id number]  CSN : [id number]  Pulmonologist : Ti Beavers MD  Date : 4/22/2023    ______________________________________________________________________    Interpretation :  1.  Spirometry reveals a decrease in midflows and peak expiratory flow, and otherwise is within normal limits.  2.  There is actually some worsening of spirometry postbronchodilator so that postbronchodilator reveals a low normal FVC and a mild decrease in the patient's FEV1.  There is also worsening of midflows.  3.  Lung volumes reveal a decrease in expiratory reserve volume, and otherwise are within normal limits.  4.  There is a moderate diffusion impairment which when corrected for alveolar volume is still a mild bordering on moderate diffusion impairment.  5.  There is some flattening of the inspiratory limb of the flow volume loop which could be seen with a variable extrathoracic upper airway obstruction, clinical correlation is advised.  6.  Arterial blood gases on room air are within normal limits.      Ti Beavers MD          Assessment and Plan   Diagnoses and all orders for this visit:    1. Family history of asthma (Primary)    2. Dyspnea on exertion    3. Cough, unspecified type    4. History of COVID-19    5. Abnormal PFT  -     Ambulatory Referral to ENT (Otolaryngology)    6. Elevated hemidiaphragm  Comments:  right         Given her persistent symptoms she was asked to return today with complete PFT. This has shown essentially normal with worsening post bronchodilator and mild bordering moderate diffusion impairment likely secondary to her emphysema. Inspiratory curve is flattened. She does feel like her symptoms are mostly related to her upper chest/ neck area. She is agreeable to be referred to ENT. I am appreciative of Cardiology adjusting  her beta blocker. We discussed waiting to see what her heart chat shows on 5/1/23. We discussed again that she likely has multiple factors playing a role in her symptoms. I do not feel, after her PFTs that she has Asthma given worsening post bronchodilator on spirometry. Her FEV1 dropped by 7 and her midflows had a significant decrease. She could still have some Long covid playing a roll. Her anxiety and panic attacks continue to add to her symptoms as well. Her PFTs would also lead to why she has not had any benefit from most inhalers and in fact they have made her throat sore/ worse. If nothing is discovered following her heart cath and visit with ENT then we can look at possible bronchoscopy with Dr. Perez.       Follow Up   Return in about 6 weeks (around 6/2/2023).  Patient was given instructions and counseling regarding her condition or for health maintenance advice. Please see specific information pulled into the AVS if appropriate.     Sivan Wise, BREANA  4/23/2023  12:16 CDT   no

## 2023-04-21 ENCOUNTER — HOSPITAL ENCOUNTER (OUTPATIENT)
Dept: PULMONOLOGY | Facility: HOSPITAL | Age: 48
Discharge: HOME | End: 2023-04-21
Payer: COMMERCIAL

## 2023-04-21 ENCOUNTER — OFFICE VISIT (OUTPATIENT)
Dept: PULMONOLOGY | Facility: CLINIC | Age: 48
End: 2023-04-21
Payer: COMMERCIAL

## 2023-04-21 VITALS
WEIGHT: 180 LBS | HEIGHT: 69 IN | OXYGEN SATURATION: 98 % | BODY MASS INDEX: 26.66 KG/M2 | DIASTOLIC BLOOD PRESSURE: 88 MMHG | SYSTOLIC BLOOD PRESSURE: 190 MMHG | HEART RATE: 104 BPM

## 2023-04-21 DIAGNOSIS — R05.9 COUGH, UNSPECIFIED TYPE: ICD-10-CM

## 2023-04-21 DIAGNOSIS — Z82.5 FAMILY HISTORY OF ASTHMA: Primary | ICD-10-CM

## 2023-04-21 DIAGNOSIS — R06.09 DYSPNEA ON EXERTION: ICD-10-CM

## 2023-04-21 DIAGNOSIS — Z86.16 HISTORY OF COVID-19: ICD-10-CM

## 2023-04-21 DIAGNOSIS — J98.6 ELEVATED HEMIDIAPHRAGM: Chronic | ICD-10-CM

## 2023-04-21 DIAGNOSIS — R94.2 ABNORMAL PFT: ICD-10-CM

## 2023-04-21 PROBLEM — J43.9 PULMONARY EMPHYSEMA: Chronic | Status: ACTIVE | Noted: 2023-04-21

## 2023-04-21 LAB
ARTERIAL PATENCY WRIST A: POSITIVE
ATMOSPHERIC PRESS: 749 MMHG
BASE EXCESS BLDA CALC-SCNC: -0.9 MMOL/L (ref 0–2)
BDY SITE: ABNORMAL
BODY TEMPERATURE: 37 C
HCO3 BLDA-SCNC: 24 MMOL/L (ref 20–26)
Lab: ABNORMAL
MODALITY: ABNORMAL
PCO2 BLDA: 39.8 MM HG (ref 35–45)
PCO2 TEMP ADJ BLD: 39.8 MM HG (ref 35–45)
PH BLDA: 7.39 PH UNITS (ref 7.35–7.45)
PH, TEMP CORRECTED: 7.39 PH UNITS (ref 7.35–7.45)
PO2 BLDA: 89.6 MM HG (ref 83–108)
PO2 TEMP ADJ BLD: 89.6 MM HG (ref 83–108)
SAO2 % BLDCOA: 98 % (ref 94–99)
VENTILATOR MODE: ABNORMAL

## 2023-04-21 PROCEDURE — 94726 PLETHYSMOGRAPHY LUNG VOLUMES: CPT | Performed by: INTERNAL MEDICINE

## 2023-04-21 PROCEDURE — 94729 DIFFUSING CAPACITY: CPT | Performed by: INTERNAL MEDICINE

## 2023-04-21 PROCEDURE — 94726 PLETHYSMOGRAPHY LUNG VOLUMES: CPT

## 2023-04-21 PROCEDURE — 94060 EVALUATION OF WHEEZING: CPT | Performed by: INTERNAL MEDICINE

## 2023-04-21 PROCEDURE — 94060 EVALUATION OF WHEEZING: CPT

## 2023-04-21 PROCEDURE — 36600 WITHDRAWAL OF ARTERIAL BLOOD: CPT

## 2023-04-21 PROCEDURE — 82803 BLOOD GASES ANY COMBINATION: CPT

## 2023-04-21 PROCEDURE — 94729 DIFFUSING CAPACITY: CPT

## 2023-04-21 RX ORDER — ALBUTEROL SULFATE 2.5 MG/3ML
2.5 SOLUTION RESPIRATORY (INHALATION) ONCE
Status: COMPLETED | OUTPATIENT
Start: 2023-04-21 | End: 2023-04-21

## 2023-04-21 RX ADMIN — ALBUTEROL SULFATE 2.5 MG: 2.5 SOLUTION RESPIRATORY (INHALATION) at 12:00

## 2023-04-23 ENCOUNTER — TELEPHONE (OUTPATIENT)
Dept: PULMONOLOGY | Facility: CLINIC | Age: 48
End: 2023-04-23
Payer: COMMERCIAL

## 2023-04-23 DIAGNOSIS — R05.9 COUGH, UNSPECIFIED TYPE: Primary | ICD-10-CM

## 2023-04-23 NOTE — TELEPHONE ENCOUNTER
Please call patient and let her know that while we are awaiting her ENT referral and heart cath, I would like to get a sputum culture on her as well as an Alpha 1. Please see if she could come by the office for those tests when she is back in [place] next. Place the orders and I will sign them. Thank you.

## 2023-04-24 ENCOUNTER — TELEPHONE (OUTPATIENT)
Dept: PULMONOLOGY | Facility: CLINIC | Age: 48
End: 2023-04-24
Payer: COMMERCIAL

## 2023-04-24 NOTE — TELEPHONE ENCOUNTER
Hypersentivity panel is an overdue result.   I don't see that she has went to get this done.       Can we cancel the order?

## 2023-05-01 ENCOUNTER — HOSPITAL ENCOUNTER (OUTPATIENT)
Facility: HOSPITAL | Age: 48
Setting detail: HOSPITAL OUTPATIENT SURGERY
Discharge: HOME | End: 2023-05-01
Attending: INTERNAL MEDICINE | Admitting: INTERNAL MEDICINE
Payer: COMMERCIAL

## 2023-05-01 VITALS
DIASTOLIC BLOOD PRESSURE: 73 MMHG | BODY MASS INDEX: 28.25 KG/M2 | HEIGHT: 68 IN | WEIGHT: 186.4 LBS | HEART RATE: 77 BPM | SYSTOLIC BLOOD PRESSURE: 115 MMHG | TEMPERATURE: 98.1 F | RESPIRATION RATE: 15 BRPM

## 2023-05-01 DIAGNOSIS — R07.9 CHEST PAIN IN ADULT: ICD-10-CM

## 2023-05-01 DIAGNOSIS — E78.2 MIXED HYPERLIPIDEMIA: ICD-10-CM

## 2023-05-01 DIAGNOSIS — R06.09 DOE (DYSPNEA ON EXERTION): ICD-10-CM

## 2023-05-01 LAB
ANION GAP SERPL CALCULATED.3IONS-SCNC: 13 MMOL/L (ref 5–15)
BASOPHILS # BLD AUTO: 0.07 10*3/MM3 (ref 0–0.2)
BASOPHILS NFR BLD AUTO: 0.6 % (ref 0–1.5)
BUN SERPL-MCNC: 6 MG/DL (ref 6–20)
BUN/CREAT SERPL: 7.7 (ref 7–25)
CALCIUM SPEC-SCNC: 8.5 MG/DL (ref 8.6–10.5)
CHLORIDE SERPL-SCNC: 99 MMOL/L (ref 98–107)
CHOLEST SERPL-MCNC: 305 MG/DL (ref 0–200)
CO2 SERPL-SCNC: 28 MMOL/L (ref 22–29)
CREAT SERPL-MCNC: 0.78 MG/DL (ref 0.57–1)
DEPRECATED RDW RBC AUTO: 55.3 FL (ref 37–54)
EGFRCR SERPLBLD CKD-EPI 2021: 92.7 ML/MIN/1.73
EOSINOPHIL # BLD AUTO: 0.25 10*3/MM3 (ref 0–0.4)
EOSINOPHIL NFR BLD AUTO: 2.3 % (ref 0.3–6.2)
ERYTHROCYTE [DISTWIDTH] IN BLOOD BY AUTOMATED COUNT: 14.9 % (ref 12.3–15.4)
GLUCOSE SERPL-MCNC: 119 MG/DL (ref 65–99)
HCT VFR BLD AUTO: 37.9 % (ref 34–46.6)
HDLC SERPL-MCNC: 62 MG/DL (ref 40–60)
HGB BLD-MCNC: 12.4 G/DL (ref 12–15.9)
IMM GRANULOCYTES # BLD AUTO: 0.07 10*3/MM3 (ref 0–0.05)
IMM GRANULOCYTES NFR BLD AUTO: 0.6 % (ref 0–0.5)
INR PPP: 1.02 (ref 0.91–1.09)
LDLC SERPL CALC-MCNC: 175 MG/DL (ref 0–100)
LDLC/HDLC SERPL: 2.79 {RATIO}
LYMPHOCYTES # BLD AUTO: 2.44 10*3/MM3 (ref 0.7–3.1)
LYMPHOCYTES NFR BLD AUTO: 22.3 % (ref 19.6–45.3)
MAGNESIUM SERPL-MCNC: 1.5 MG/DL (ref 1.6–2.6)
MCH RBC QN AUTO: 32.7 PG (ref 26.6–33)
MCHC RBC AUTO-ENTMCNC: 32.7 G/DL (ref 31.5–35.7)
MCV RBC AUTO: 100 FL (ref 79–97)
MONOCYTES # BLD AUTO: 1.2 10*3/MM3 (ref 0.1–0.9)
MONOCYTES NFR BLD AUTO: 11 % (ref 5–12)
NEUTROPHILS NFR BLD AUTO: 6.92 10*3/MM3 (ref 1.7–7)
NEUTROPHILS NFR BLD AUTO: 63.2 % (ref 42.7–76)
NRBC BLD AUTO-RTO: 0 /100 WBC (ref 0–0.2)
PLATELET # BLD AUTO: 338 10*3/MM3 (ref 140–450)
PMV BLD AUTO: 9.8 FL (ref 6–12)
POTASSIUM SERPL-SCNC: 2.7 MMOL/L (ref 3.5–5.2)
PROTHROMBIN TIME: 13.5 SECONDS (ref 11.8–14.8)
RBC # BLD AUTO: 3.79 10*6/MM3 (ref 3.77–5.28)
SODIUM SERPL-SCNC: 140 MMOL/L (ref 136–145)
TRIGL SERPL-MCNC: 350 MG/DL (ref 0–150)
VLDLC SERPL-MCNC: 68 MG/DL (ref 5–40)
WBC NRBC COR # BLD: 10.95 10*3/MM3 (ref 3.4–10.8)

## 2023-05-01 PROCEDURE — 80061 LIPID PANEL: CPT | Performed by: NURSE PRACTITIONER

## 2023-05-01 PROCEDURE — 85025 COMPLETE CBC W/AUTO DIFF WBC: CPT | Performed by: NURSE PRACTITIONER

## 2023-05-01 PROCEDURE — 83735 ASSAY OF MAGNESIUM: CPT | Performed by: INTERNAL MEDICINE

## 2023-05-01 PROCEDURE — 85610 PROTHROMBIN TIME: CPT | Performed by: NURSE PRACTITIONER

## 2023-05-01 PROCEDURE — 80048 BASIC METABOLIC PNL TOTAL CA: CPT | Performed by: NURSE PRACTITIONER

## 2023-05-01 RX ORDER — POTASSIUM CHLORIDE 1.5 G/1.77G
20 POWDER, FOR SOLUTION ORAL ONCE
Status: COMPLETED | OUTPATIENT
Start: 2023-05-01 | End: 2023-05-01

## 2023-05-01 RX ORDER — SODIUM CHLORIDE 0.9 % (FLUSH) 0.9 %
10 SYRINGE (ML) INJECTION AS NEEDED
Status: DISCONTINUED | OUTPATIENT
Start: 2023-05-01 | End: 2023-05-01 | Stop reason: HOSPADM

## 2023-05-01 RX ORDER — SODIUM CHLORIDE 9 MG/ML
40 INJECTION, SOLUTION INTRAVENOUS AS NEEDED
Status: DISCONTINUED | OUTPATIENT
Start: 2023-05-01 | End: 2023-05-01 | Stop reason: HOSPADM

## 2023-05-01 RX ORDER — POTASSIUM CHLORIDE 20 MEQ/1
20 TABLET, EXTENDED RELEASE ORAL 2 TIMES DAILY
Qty: 180 TABLET | Refills: 3 | Status: SHIPPED | OUTPATIENT
Start: 2023-05-01

## 2023-05-01 RX ORDER — ASPIRIN 81 MG/1
TABLET, CHEWABLE ORAL
Status: COMPLETED
Start: 2023-05-01 | End: 2023-05-01

## 2023-05-01 RX ORDER — ASPIRIN 81 MG/1
324 TABLET, CHEWABLE ORAL ONCE
Status: COMPLETED | OUTPATIENT
Start: 2023-05-01 | End: 2023-05-01

## 2023-05-01 RX ORDER — SODIUM CHLORIDE 0.9 % (FLUSH) 0.9 %
3 SYRINGE (ML) INJECTION EVERY 12 HOURS SCHEDULED
Status: DISCONTINUED | OUTPATIENT
Start: 2023-05-01 | End: 2023-05-01 | Stop reason: HOSPADM

## 2023-05-01 RX ADMIN — ASPIRIN 324 MG: 81 TABLET, CHEWABLE ORAL at 14:24

## 2023-05-01 RX ADMIN — POTASSIUM CHLORIDE 20 MEQ: 1.5 POWDER, FOR SOLUTION ORAL at 15:05

## 2023-05-01 NOTE — INTERVAL H&P NOTE
H&P reviewed. The patient was examined and there are no changes to the H&P.      Recommend cardiac catheterization, selective coronary angiography, left ventriculography and percutaneous coronary intervention with application of arteriotomy hemostatic closure device.    I discussed cardiac catheterization, the procedure, risks (including bleeding, infection, vascular damage [including minor oozing, bruising, bleeding, and up to and including but not limited to the need for vascular surgery, emergency cardiothoracic surgery, contrast reaction, renal failure, respiratory failure, heart attack, stroke, arrhythmia and even death), benefits, and alternatives and the patient has voiced understanding and is willing to proceed.    Adequate pre-hydration and post cardiac catheterization hydration.  Premedications as required and indicated for cardiac catheterization.    No contraindication to drug eluting stent placement if required  Further recommendations pending results of cardiac catheterization

## 2023-05-02 ENCOUNTER — PREP FOR SURGERY (OUTPATIENT)
Dept: OTHER | Facility: HOSPITAL | Age: 48
End: 2023-05-02
Payer: COMMERCIAL

## 2023-05-02 DIAGNOSIS — R93.1 ABNORMAL CARDIAC CT ANGIOGRAPHY: Primary | ICD-10-CM

## 2023-05-02 RX ORDER — SODIUM CHLORIDE 0.9 % (FLUSH) 0.9 %
10 SYRINGE (ML) INJECTION EVERY 12 HOURS SCHEDULED
OUTPATIENT
Start: 2023-05-02

## 2023-05-02 RX ORDER — ASPIRIN 325 MG
325 TABLET ORAL ONCE
OUTPATIENT
Start: 2023-05-02 | End: 2023-05-02

## 2023-05-02 RX ORDER — NITROGLYCERIN 0.4 MG/1
0.4 TABLET SUBLINGUAL
OUTPATIENT
Start: 2023-05-02

## 2023-05-02 RX ORDER — SODIUM CHLORIDE 0.9 % (FLUSH) 0.9 %
10 SYRINGE (ML) INJECTION AS NEEDED
OUTPATIENT
Start: 2023-05-02

## 2023-05-02 RX ORDER — ONDANSETRON 2 MG/ML
4 INJECTION INTRAMUSCULAR; INTRAVENOUS EVERY 6 HOURS PRN
OUTPATIENT
Start: 2023-05-02

## 2023-05-02 RX ORDER — SODIUM CHLORIDE 9 MG/ML
40 INJECTION, SOLUTION INTRAVENOUS AS NEEDED
OUTPATIENT
Start: 2023-05-02

## 2023-05-02 RX ORDER — ASPIRIN 81 MG/1
81 TABLET ORAL DAILY
OUTPATIENT
Start: 2023-05-03

## 2023-05-02 RX ORDER — SODIUM CHLORIDE 9 MG/ML
1-3 INJECTION, SOLUTION INTRAVENOUS CONTINUOUS
OUTPATIENT
Start: 2023-05-02

## 2023-05-03 ENCOUNTER — OFFICE VISIT (OUTPATIENT)
Dept: OTOLARYNGOLOGY | Facility: CLINIC | Age: 48
End: 2023-05-03
Payer: COMMERCIAL

## 2023-05-03 VITALS — HEART RATE: 88 BPM | DIASTOLIC BLOOD PRESSURE: 115 MMHG | SYSTOLIC BLOOD PRESSURE: 200 MMHG | TEMPERATURE: 97.2 F

## 2023-05-03 DIAGNOSIS — R94.2 ABNORMAL PFT: ICD-10-CM

## 2023-05-03 DIAGNOSIS — R42 VERTIGO: Primary | ICD-10-CM

## 2023-05-03 DIAGNOSIS — I10 HYPERTENSION, UNSPECIFIED TYPE: ICD-10-CM

## 2023-05-03 DIAGNOSIS — H91.93 DECREASED HEARING OF BOTH EARS: ICD-10-CM

## 2023-05-03 RX ORDER — CARVEDILOL 12.5 MG/1
25 TABLET ORAL 2 TIMES DAILY
Qty: 60 TABLET | Refills: 11 | Status: SHIPPED | OUTPATIENT
Start: 2023-05-03

## 2023-05-03 RX ORDER — CLONIDINE HYDROCHLORIDE 0.1 MG/1
0.1 TABLET ORAL 2 TIMES DAILY PRN
Qty: 60 TABLET | Refills: 3 | Status: SHIPPED | OUTPATIENT
Start: 2023-05-03

## 2023-05-03 NOTE — PROGRESS NOTES
BREANA Matos  W ENT [facility] EAR NOSE & THROAT  [address], [address]  [facility] [address]  Fax [phone]  Phone [phone]      Visit Type: NEW PATIENT   Chief Complaint   Patient presents with   • Dizziness        HPI  Minnie Bang is a 48 y.o. female who presents for evaluation of vertigo.  Her symptoms began in February.  She describes the vertigo as a spinning sensation.  Her symptoms are aggravated by laying down, sitting up, and rolling from one side to the other.  The vertigo lasts seconds to minutes in duration.  Laying still and closing her eyes improves her symptoms.  She reports some associated nausea.  She also reports some decrease in hearing over the last couple months.  This is localized to both the ears.  She denies otalgia, otorrhea, ear pressure, ear fullness, or tinnitus.    She was also referred for evaluation of a possible upper airway obstruction.  Abnormal PFT on 4/22/2023 showed some flattening of the inspiratory limb of the flow volume loop.  The patient has had complaints of globus sensation, throat clearing, and hoarseness for the last 6 months.  She has an appointment scheduled with Dr. Padilla on 6/22/2023 for further evaluation of this.      Past Medical History:   Diagnosis Date   • Abnormal ECG 2/20/23   • Anxiety    • Arthritis     back   • Asthma    • COPD (chronic obstructive pulmonary disease)    • Depression    • Diastolic dysfunction 2022   • Emphysema of lung 2020   • Hyperlipidemia    • Hypertension    • Mixed simple and mucopurulent chronic bronchitis 4/18/2023   • NSTEMI (non-ST elevated myocardial infarction) ([facility])    • Pneumonia 2022   • Pulmonary emphysema 4/21/2023       Past Surgical History:   Procedure Laterality Date   • APPENDECTOMY     • CARDIAC CATHETERIZATION N/A 11/04/2017    Procedure: Coronary angiography;  Surgeon: Luis Colvin MD;  Location: [facility] CATH INVASIVE LOCATION;   Service:    • HYSTERECTOMY     • UT RT/LT HEART CATHETERS N/A 11/04/2017    Procedure: Percutaneous Coronary Intervention;  Surgeon: Luis Colvin MD;  Location: [facility] CATH INVASIVE LOCATION;  Service: Cardiovascular   • THYROID SURGERY     • TOTAL THYROIDECTOMY         Family History: Her family history includes Asthma in her mother; Cancer in her father and mother; Emphysema in her mother; Heart failure in her father; Hypertension in her father.     Social History: She  reports that she quit smoking about 5 months ago. Her smoking use included cigarettes. She started smoking about 32 years ago. She has a 30.00 pack-year smoking history. She has been exposed to tobacco smoke. She does not have any smokeless tobacco history on file. She reports that she does not currently use alcohol. She reports that she does not use drugs.    Home Medications:  Vitamin D (Cholecalciferol), albuterol sulfate HFA, carvedilol, cloNIDine, dilTIAZem CD, ipratropium-albuterol, isosorbide mononitrate, levothyroxine, lisinopril, meclizine, nitroglycerin, and potassium chloride    Allergies:  She has No Known Allergies.       Vital Signs:   Temp:  [97.2 °F (36.2 °C)] 97.2 °F (36.2 °C)  Heart Rate:  [88] 88  BP: (200)/(115) 200/115  ENT Physical Exam  Constitutional  Appearance: patient appears well-developed, patient is cooperative;  Communication/Voice: communication appropriate for developmental age; voice quality is hoarse;  Constitutional comments: Tearful, anxious  Head and Face  Appearance: head appears normal and face appears atraumatic;  Ear  Auricles: right auricle normal; left auricle normal;  External Mastoids: right external mastoid normal; left external mastoid normal;  Ear Canals: right ear canal normal; left ear canal normal;  Tympanic Membranes: right tympanic membrane normal; left tympanic membrane normal;  Nose  External Nose: nares patent bilaterally;  Oral Cavity/Oropharynx  Lips: normal;  Respiratory  Inspection:  breathing unlabored;  Neurovestibular  Mental Status: alert and oriented;  Psychiatric: affect is appropriate;         Result Review    RESULTS REVIEW    I have reviewed the patients old records in the chart.          Assessment & Plan    Diagnoses and all orders for this visit:    1. Vertigo (Primary)  -     Ambulatory Referral to Physical Therapy Evaluate and treat, Vestibular    2. Decreased hearing of both ears  -     Comprehensive Hearing Test; Future    3. Abnormal PFT    4. Hypertension, unspecified type       Referral to vestibular rehab.  Probable BPPV.  We will also follow-up with audiogram for evaluation of complaints of decreased hearing.  She was instructed to keep her scheduled appointment with Dr. Padilla due to abnormal PFT findings.    The patient was extremely hypertensive while in the office today.  I have recommended that she proceed to the ER for further evaluation and management.  The patient reports she has been seen in the ER for this numerous times over the last few months and has been told to just take clonidine.  She refuses to be seen in the ER today and states that she will go home and take clonidine.  She reports her blood pressure is being monitored by her PCP and cardiologist.    Return in about 6 weeks (around 6/14/2023) for Recheck, Next scheduled follow up, With Audio.      Debbie Adam, APRN   05/03/23

## 2023-05-04 ENCOUNTER — TELEPHONE (OUTPATIENT)
Dept: PULMONOLOGY | Facility: CLINIC | Age: 48
End: 2023-05-04
Payer: COMMERCIAL

## 2023-05-04 PROBLEM — R93.1 ABNORMAL CARDIAC CT ANGIOGRAPHY: Status: ACTIVE | Noted: 2023-05-04

## 2023-05-04 NOTE — TELEPHONE ENCOUNTER
I noticed that the patient was scheduled for an alpha tomorrow morning but I have already done an alpha for this patient on 04/21/2023 but the results are not back yet.  I tried to call the patient to let her know that she isnt getting that test tomorrow but had to leave a message for her to call back. The sputum cups are up front for the patient to .

## 2023-05-05 DIAGNOSIS — Z82.5 FAMILY HISTORY OF ASTHMA: ICD-10-CM

## 2023-05-11 ENCOUNTER — PATIENT MESSAGE (OUTPATIENT)
Dept: CARDIOLOGY | Facility: CLINIC | Age: 48
End: 2023-05-11
Payer: COMMERCIAL

## 2023-05-11 RX ORDER — CLONIDINE HYDROCHLORIDE 0.1 MG/1
0.1 TABLET ORAL 4 TIMES DAILY PRN
Qty: 360 TABLET | Refills: 3 | Status: SHIPPED | OUTPATIENT
Start: 2023-05-11

## 2023-05-11 RX ORDER — CARVEDILOL 25 MG/1
25 TABLET ORAL 2 TIMES DAILY
Qty: 180 TABLET | Refills: 3 | Status: SHIPPED | OUTPATIENT
Start: 2023-05-11

## 2023-05-16 PROBLEM — I10 PRIMARY HYPERTENSION: Status: ACTIVE | Noted: 2023-05-16

## 2023-05-16 NOTE — PROGRESS NOTES
Chief Complaint  Hypertension (4wk F/U)    Subjective          Minnie Bang presents to [facility] CARDIOLOGY [id number] for complaints of uncontrolled hypertension.  She has hypertension, bicuspid aortic valve, COPD, asthma and former tobacco use.  Continues to report intermittent chest pain, increased shortness of breath and intermittent bilateral lower extremity edema.  Patient denies palpitations, dizziness, syncope, orthopnea, PND or decreased stamina.  Patient denies any signs of bleeding.  She had an abnormal CT angiogram of the coronary arteries on 4/6/2023 and is pending left heart catheterization on 5/31/2023.    Hypertension  This is a chronic problem. The current episode started more than 1 year ago. The problem is uncontrolled. Associated symptoms include anxiety, chest pain, peripheral edema and shortness of breath. Pertinent negatives include no blurred vision, headaches, malaise/fatigue, neck pain, orthopnea, palpitations, PND or sweats. Risk factors for coronary artery disease include smoking/tobacco exposure. Current antihypertension treatment includes calcium channel blockers, beta blockers, ACE inhibitors and central alpha agonists.     Objective     Current Outpatient Medications:     albuterol sulfate  (90 Base) MCG/ACT inhaler, Inhale 2 puffs Every 4 (Four) Hours As Needed for Wheezing., Disp: 18 g, Rfl: 3    carvedilol (COREG) 25 MG tablet, Take 1 tablet by mouth 2 (Two) Times a Day., Disp: 180 tablet, Rfl: 3    cloNIDine (CATAPRES) 0.1 MG tablet, Take 1 tablet by mouth 4 (Four) Times a Day As Needed for High Blood Pressure (Blood Pressure >140/90)., Disp: 360 tablet, Rfl: 3    dilTIAZem CD (CARDIZEM CD) 120 MG 24 hr capsule, Take 1 capsule by mouth Daily., Disp: 30 capsule, Rfl: 11    ipratropium-albuterol (DUO-NEB) 0.5-2.5 mg/3 ml nebulizer, Take 3 mL by nebulization Every 4 (Four) Hours As Needed for Wheezing., Disp: 360 mL, Rfl: 0    isosorbide mononitrate  "(IMDUR) 30 MG 24 hr tablet, Take 1 tablet by mouth Daily., Disp: 30 tablet, Rfl: 11    levothyroxine (SYNTHROID, LEVOTHROID) 150 MCG tablet, Take 175 mcg by mouth., Disp: , Rfl:     lisinopril (PRINIVIL,ZESTRIL) 40 MG tablet, Take 1 tablet by mouth Daily., Disp: , Rfl:     meclizine (ANTIVERT) 25 MG tablet, Take 1 tablet by mouth 3 (Three) Times a Day As Needed for Dizziness., Disp: 42 tablet, Rfl: 2    nitroglycerin (NITROSTAT) 0.4 MG SL tablet, 1 under the tongue as needed for angina, may repeat q5mins for up three doses, Disp: 25 tablet, Rfl: 3    potassium chloride (K-DUR,KLOR-CON) 20 MEQ CR tablet, Take 1 tablet by mouth 2 (Two) Times a Day., Disp: 180 tablet, Rfl: 3    Vitamin D, Cholecalciferol, (CHOLECALCIFEROL) 10 MCG (400 UNIT) tablet, Take 1 tablet by mouth Daily., Disp: , Rfl:     hydroCHLOROthiazide (HYDRODIURIL) 25 MG tablet, Take 1 tablet by mouth Daily., Disp: 30 tablet, Rfl: 11  Vital Signs:   BP (!) 202/110   Pulse 96   Ht 172.7 cm (68\")   Wt 83 kg (183 lb)   SpO2 99%   BMI 27.83 kg/m²     Vitals and nursing note reviewed.   Constitutional:       General: Not in acute distress.     Appearance: Normal and healthy appearance. Well-developed, normal weight and not in distress. Not diaphoretic.   Eyes:      General: Lids are normal.         Right eye: No discharge.         Left eye: No discharge.      Conjunctiva/sclera: Conjunctivae normal.      Pupils: Pupils are equal, round, and reactive to light.   HENT:      Head: Normocephalic and atraumatic.      Jaw: There is normal jaw occlusion.      Right Ear: External ear normal.      Left Ear: External ear normal.      Nose: Nose normal.   Neck:      Thyroid: No thyromegaly.      Vascular: No carotid bruit, JVD or JVR. JVD normal.      Trachea: Trachea normal. No tracheal deviation.   Pulmonary:      Effort: Pulmonary effort is normal. No respiratory distress.      Breath sounds: Normal breath sounds. No decreased breath sounds. No wheezing. No " rhonchi. No rales.   Chest:      Chest wall: Not tender to palpatation.   Cardiovascular:      PMI at left midclavicular line. Normal rate. Regular rhythm. Normal S1. Normal S2.       Murmurs: There is no murmur.      No gallop.  No click. No rub.   Pulses:     Intact distal pulses. No decreased pulses.   Edema:     Peripheral edema absent.   Abdominal:      General: Bowel sounds are normal. There is no distension.      Palpations: Abdomen is soft.      Tenderness: There is no abdominal tenderness.   Musculoskeletal: Normal range of motion.         General: No tenderness or deformity.      Cervical back: Normal range of motion and neck supple. Skin:     General: Skin is warm and dry.      Coloration: Skin is not pale.      Findings: No erythema or rash.   Neurological:      General: No focal deficit present.      Mental Status: Alert, oriented to person, place, and time and oriented to person, place and time.   Psychiatric:         Attention and Perception: Attention and perception normal.         Mood and Affect: Mood and affect normal.         Speech: Speech normal.         Behavior: Behavior normal.         Thought Content: Thought content normal.         Cognition and Memory: Cognition and memory normal.         Judgment: Judgment normal.      Result Review :   The following data was reviewed by: BREANA Jeff on 05/17/2023:  Common labs          4/6/2023    13:14 4/10/2023    16:16 5/1/2023    14:06   Common Labs   Glucose  90   119     BUN  8   6     Creatinine 0.57   0.46   0.78     Sodium  139   140     Potassium  2.8   2.7     Chloride  95   99     Calcium  8.5   8.5     Albumin  3.8      Total Bilirubin  0.4      Alkaline Phosphatase  113      AST (SGOT)  23      ALT (SGPT)  18      WBC  10.76   10.95     Hemoglobin  12.0   12.4     Hematocrit  37.4   37.9     Platelets  261   338     Total Cholesterol   305     Triglycerides   350     HDL Cholesterol   62     LDL Cholesterol    175       Data  reviewed : Cardiology studies 2d echo 4/7/23 and CT angiogram of the coronary arteries 4/6/23           Assessment and Plan    Diagnoses and all orders for this visit:    1. Primary hypertension (Primary)- blood pressures remain elevated. Start HCTZ 25 mg daily.  Continue carvedilol, lisinopril and diltiazem. Monitor and record daily blood pressure. Report readings consistently higher than 130/80 or consistently lower than 100/60.     2. Abnormal cardiac CT angiography- left heart catheterization scheduled for 5/31/23.         Follow Up   Return in 7 weeks (on 7/5/2023) for Next scheduled follow up.  Patient was given instructions and counseling regarding her condition or for health maintenance advice. Please see specific information pulled into the AVS if appropriate.

## 2023-05-16 NOTE — H&P (VIEW-ONLY)
Chief Complaint  Hypertension (4wk F/U)    Subjective          Minnie Bang presents to [facility] CARDIOLOGY [id number] for complaints of uncontrolled hypertension.  She has hypertension, bicuspid aortic valve, COPD, asthma and former tobacco use.  Continues to report intermittent chest pain, increased shortness of breath and intermittent bilateral lower extremity edema.  Patient denies palpitations, dizziness, syncope, orthopnea, PND or decreased stamina.  Patient denies any signs of bleeding.  She had an abnormal CT angiogram of the coronary arteries on 4/6/2023 and is pending left heart catheterization on 5/31/2023.    Hypertension  This is a chronic problem. The current episode started more than 1 year ago. The problem is uncontrolled. Associated symptoms include anxiety, chest pain, peripheral edema and shortness of breath. Pertinent negatives include no blurred vision, headaches, malaise/fatigue, neck pain, orthopnea, palpitations, PND or sweats. Risk factors for coronary artery disease include smoking/tobacco exposure. Current antihypertension treatment includes calcium channel blockers, beta blockers, ACE inhibitors and central alpha agonists.     Objective     Current Outpatient Medications:     albuterol sulfate  (90 Base) MCG/ACT inhaler, Inhale 2 puffs Every 4 (Four) Hours As Needed for Wheezing., Disp: 18 g, Rfl: 3    carvedilol (COREG) 25 MG tablet, Take 1 tablet by mouth 2 (Two) Times a Day., Disp: 180 tablet, Rfl: 3    cloNIDine (CATAPRES) 0.1 MG tablet, Take 1 tablet by mouth 4 (Four) Times a Day As Needed for High Blood Pressure (Blood Pressure >140/90)., Disp: 360 tablet, Rfl: 3    dilTIAZem CD (CARDIZEM CD) 120 MG 24 hr capsule, Take 1 capsule by mouth Daily., Disp: 30 capsule, Rfl: 11    ipratropium-albuterol (DUO-NEB) 0.5-2.5 mg/3 ml nebulizer, Take 3 mL by nebulization Every 4 (Four) Hours As Needed for Wheezing., Disp: 360 mL, Rfl: 0    isosorbide mononitrate  "(IMDUR) 30 MG 24 hr tablet, Take 1 tablet by mouth Daily., Disp: 30 tablet, Rfl: 11    levothyroxine (SYNTHROID, LEVOTHROID) 150 MCG tablet, Take 175 mcg by mouth., Disp: , Rfl:     lisinopril (PRINIVIL,ZESTRIL) 40 MG tablet, Take 1 tablet by mouth Daily., Disp: , Rfl:     meclizine (ANTIVERT) 25 MG tablet, Take 1 tablet by mouth 3 (Three) Times a Day As Needed for Dizziness., Disp: 42 tablet, Rfl: 2    nitroglycerin (NITROSTAT) 0.4 MG SL tablet, 1 under the tongue as needed for angina, may repeat q5mins for up three doses, Disp: 25 tablet, Rfl: 3    potassium chloride (K-DUR,KLOR-CON) 20 MEQ CR tablet, Take 1 tablet by mouth 2 (Two) Times a Day., Disp: 180 tablet, Rfl: 3    Vitamin D, Cholecalciferol, (CHOLECALCIFEROL) 10 MCG (400 UNIT) tablet, Take 1 tablet by mouth Daily., Disp: , Rfl:     hydroCHLOROthiazide (HYDRODIURIL) 25 MG tablet, Take 1 tablet by mouth Daily., Disp: 30 tablet, Rfl: 11  Vital Signs:   BP (!) 202/110   Pulse 96   Ht 172.7 cm (68\")   Wt 83 kg (183 lb)   SpO2 99%   BMI 27.83 kg/m²     Vitals and nursing note reviewed.   Constitutional:       General: Not in acute distress.     Appearance: Normal and healthy appearance. Well-developed, normal weight and not in distress. Not diaphoretic.   Eyes:      General: Lids are normal.         Right eye: No discharge.         Left eye: No discharge.      Conjunctiva/sclera: Conjunctivae normal.      Pupils: Pupils are equal, round, and reactive to light.   HENT:      Head: Normocephalic and atraumatic.      Jaw: There is normal jaw occlusion.      Right Ear: External ear normal.      Left Ear: External ear normal.      Nose: Nose normal.   Neck:      Thyroid: No thyromegaly.      Vascular: No carotid bruit, JVD or JVR. JVD normal.      Trachea: Trachea normal. No tracheal deviation.   Pulmonary:      Effort: Pulmonary effort is normal. No respiratory distress.      Breath sounds: Normal breath sounds. No decreased breath sounds. No wheezing. No " rhonchi. No rales.   Chest:      Chest wall: Not tender to palpatation.   Cardiovascular:      PMI at left midclavicular line. Normal rate. Regular rhythm. Normal S1. Normal S2.       Murmurs: There is no murmur.      No gallop.  No click. No rub.   Pulses:     Intact distal pulses. No decreased pulses.   Edema:     Peripheral edema absent.   Abdominal:      General: Bowel sounds are normal. There is no distension.      Palpations: Abdomen is soft.      Tenderness: There is no abdominal tenderness.   Musculoskeletal: Normal range of motion.         General: No tenderness or deformity.      Cervical back: Normal range of motion and neck supple. Skin:     General: Skin is warm and dry.      Coloration: Skin is not pale.      Findings: No erythema or rash.   Neurological:      General: No focal deficit present.      Mental Status: Alert, oriented to person, place, and time and oriented to person, place and time.   Psychiatric:         Attention and Perception: Attention and perception normal.         Mood and Affect: Mood and affect normal.         Speech: Speech normal.         Behavior: Behavior normal.         Thought Content: Thought content normal.         Cognition and Memory: Cognition and memory normal.         Judgment: Judgment normal.      Result Review :   The following data was reviewed by: BREANA Jeff on 05/17/2023:  Common labs          4/6/2023    13:14 4/10/2023    16:16 5/1/2023    14:06   Common Labs   Glucose  90   119     BUN  8   6     Creatinine 0.57   0.46   0.78     Sodium  139   140     Potassium  2.8   2.7     Chloride  95   99     Calcium  8.5   8.5     Albumin  3.8      Total Bilirubin  0.4      Alkaline Phosphatase  113      AST (SGOT)  23      ALT (SGPT)  18      WBC  10.76   10.95     Hemoglobin  12.0   12.4     Hematocrit  37.4   37.9     Platelets  261   338     Total Cholesterol   305     Triglycerides   350     HDL Cholesterol   62     LDL Cholesterol    175       Data  reviewed : Cardiology studies 2d echo 4/7/23 and CT angiogram of the coronary arteries 4/6/23           Assessment and Plan    Diagnoses and all orders for this visit:    1. Primary hypertension (Primary)- blood pressures remain elevated. Start HCTZ 25 mg daily.  Continue carvedilol, lisinopril and diltiazem. Monitor and record daily blood pressure. Report readings consistently higher than 130/80 or consistently lower than 100/60.     2. Abnormal cardiac CT angiography- left heart catheterization scheduled for 5/31/23.         Follow Up   Return in 7 weeks (on 7/5/2023) for Next scheduled follow up.  Patient was given instructions and counseling regarding her condition or for health maintenance advice. Please see specific information pulled into the AVS if appropriate.

## 2023-05-17 ENCOUNTER — OFFICE VISIT (OUTPATIENT)
Dept: CARDIOLOGY | Facility: CLINIC | Age: 48
End: 2023-05-17
Payer: COMMERCIAL

## 2023-05-17 VITALS
DIASTOLIC BLOOD PRESSURE: 110 MMHG | BODY MASS INDEX: 27.74 KG/M2 | SYSTOLIC BLOOD PRESSURE: 202 MMHG | HEART RATE: 96 BPM | OXYGEN SATURATION: 99 % | HEIGHT: 68 IN | WEIGHT: 183 LBS

## 2023-05-17 DIAGNOSIS — I10 PRIMARY HYPERTENSION: Primary | ICD-10-CM

## 2023-05-17 DIAGNOSIS — R93.1 ABNORMAL CARDIAC CT ANGIOGRAPHY: ICD-10-CM

## 2023-05-17 PROCEDURE — 1159F MED LIST DOCD IN RCRD: CPT | Performed by: NURSE PRACTITIONER

## 2023-05-17 PROCEDURE — 3077F SYST BP >= 140 MM HG: CPT | Performed by: NURSE PRACTITIONER

## 2023-05-17 PROCEDURE — 99214 OFFICE O/P EST MOD 30 MIN: CPT | Performed by: NURSE PRACTITIONER

## 2023-05-17 PROCEDURE — 3080F DIAST BP >= 90 MM HG: CPT | Performed by: NURSE PRACTITIONER

## 2023-05-17 PROCEDURE — 1160F RVW MEDS BY RX/DR IN RCRD: CPT | Performed by: NURSE PRACTITIONER

## 2023-05-17 RX ORDER — HYDROCHLOROTHIAZIDE 25 MG/1
25 TABLET ORAL DAILY
Qty: 30 TABLET | Refills: 11 | Status: SHIPPED | OUTPATIENT
Start: 2023-05-17

## 2023-05-31 ENCOUNTER — HOSPITAL ENCOUNTER (OUTPATIENT)
Facility: HOSPITAL | Age: 48
Setting detail: HOSPITAL OUTPATIENT SURGERY
Discharge: HOME | End: 2023-05-31
Attending: INTERNAL MEDICINE | Admitting: INTERNAL MEDICINE
Payer: COMMERCIAL

## 2023-05-31 VITALS
OXYGEN SATURATION: 100 % | HEIGHT: 69 IN | HEART RATE: 77 BPM | BODY MASS INDEX: 27.08 KG/M2 | WEIGHT: 182.8 LBS | DIASTOLIC BLOOD PRESSURE: 100 MMHG | SYSTOLIC BLOOD PRESSURE: 168 MMHG | TEMPERATURE: 97.8 F | RESPIRATION RATE: 17 BRPM

## 2023-05-31 DIAGNOSIS — R93.1 ABNORMAL CARDIAC CT ANGIOGRAPHY: ICD-10-CM

## 2023-05-31 LAB
ANION GAP SERPL CALCULATED.3IONS-SCNC: 22 MMOL/L (ref 5–15)
BASOPHILS # BLD AUTO: 0.12 10*3/MM3 (ref 0–0.2)
BASOPHILS NFR BLD AUTO: 1.1 % (ref 0–1.5)
BUN SERPL-MCNC: 6 MG/DL (ref 6–20)
BUN/CREAT SERPL: 9.8 (ref 7–25)
CALCIUM SPEC-SCNC: 8.8 MG/DL (ref 8.6–10.5)
CHLORIDE SERPL-SCNC: 100 MMOL/L (ref 98–107)
CO2 SERPL-SCNC: 19 MMOL/L (ref 22–29)
CREAT SERPL-MCNC: 0.61 MG/DL (ref 0.57–1)
DEPRECATED RDW RBC AUTO: 49.7 FL (ref 37–54)
EGFRCR SERPLBLD CKD-EPI 2021: 109.1 ML/MIN/1.73
EOSINOPHIL # BLD AUTO: 0.27 10*3/MM3 (ref 0–0.4)
EOSINOPHIL NFR BLD AUTO: 2.5 % (ref 0.3–6.2)
ERYTHROCYTE [DISTWIDTH] IN BLOOD BY AUTOMATED COUNT: 13.6 % (ref 12.3–15.4)
GLUCOSE SERPL-MCNC: 132 MG/DL (ref 65–99)
HCT VFR BLD AUTO: 38.4 % (ref 34–46.6)
HGB BLD-MCNC: 12.5 G/DL (ref 12–15.9)
IMM GRANULOCYTES # BLD AUTO: 0.07 10*3/MM3 (ref 0–0.05)
IMM GRANULOCYTES NFR BLD AUTO: 0.7 % (ref 0–0.5)
INR PPP: 0.94 (ref 0.91–1.09)
LYMPHOCYTES # BLD AUTO: 3.29 10*3/MM3 (ref 0.7–3.1)
LYMPHOCYTES NFR BLD AUTO: 30.7 % (ref 19.6–45.3)
MCH RBC QN AUTO: 32.8 PG (ref 26.6–33)
MCHC RBC AUTO-ENTMCNC: 32.6 G/DL (ref 31.5–35.7)
MCV RBC AUTO: 100.8 FL (ref 79–97)
MONOCYTES # BLD AUTO: 1.2 10*3/MM3 (ref 0.1–0.9)
MONOCYTES NFR BLD AUTO: 11.2 % (ref 5–12)
NEUTROPHILS NFR BLD AUTO: 5.77 10*3/MM3 (ref 1.7–7)
NEUTROPHILS NFR BLD AUTO: 53.8 % (ref 42.7–76)
NRBC BLD AUTO-RTO: 0 /100 WBC (ref 0–0.2)
PLATELET # BLD AUTO: 303 10*3/MM3 (ref 140–450)
PMV BLD AUTO: 10.7 FL (ref 6–12)
POTASSIUM SERPL-SCNC: 3.3 MMOL/L (ref 3.5–5.2)
PROTHROMBIN TIME: 12.7 SECONDS (ref 11.8–14.8)
RBC # BLD AUTO: 3.81 10*6/MM3 (ref 3.77–5.28)
SODIUM SERPL-SCNC: 141 MMOL/L (ref 136–145)
WBC NRBC COR # BLD: 10.72 10*3/MM3 (ref 3.4–10.8)

## 2023-05-31 PROCEDURE — 80048 BASIC METABOLIC PNL TOTAL CA: CPT | Performed by: INTERNAL MEDICINE

## 2023-05-31 PROCEDURE — C1760 CLOSURE DEV, VASC: HCPCS | Performed by: INTERNAL MEDICINE

## 2023-05-31 PROCEDURE — C1887 CATHETER, GUIDING: HCPCS | Performed by: INTERNAL MEDICINE

## 2023-05-31 PROCEDURE — 93571 IV DOP VEL&/PRESS C FLO 1ST: CPT | Performed by: INTERNAL MEDICINE

## 2023-05-31 PROCEDURE — 99153 MOD SED SAME PHYS/QHP EA: CPT | Performed by: INTERNAL MEDICINE

## 2023-05-31 PROCEDURE — C1769 GUIDE WIRE: HCPCS | Performed by: INTERNAL MEDICINE

## 2023-05-31 PROCEDURE — 93572 IV DOP VEL&/PRESS C FLO EA: CPT | Performed by: INTERNAL MEDICINE

## 2023-05-31 PROCEDURE — 99152 MOD SED SAME PHYS/QHP 5/>YRS: CPT | Performed by: INTERNAL MEDICINE

## 2023-05-31 PROCEDURE — 25010000002 MIDAZOLAM PER 1 MG: Performed by: INTERNAL MEDICINE

## 2023-05-31 PROCEDURE — C1894 INTRO/SHEATH, NON-LASER: HCPCS | Performed by: INTERNAL MEDICINE

## 2023-05-31 PROCEDURE — 85610 PROTHROMBIN TIME: CPT | Performed by: INTERNAL MEDICINE

## 2023-05-31 PROCEDURE — 93458 L HRT ARTERY/VENTRICLE ANGIO: CPT | Performed by: INTERNAL MEDICINE

## 2023-05-31 PROCEDURE — 25010000002 DIPHENHYDRAMINE PER 50 MG: Performed by: INTERNAL MEDICINE

## 2023-05-31 PROCEDURE — 85025 COMPLETE CBC W/AUTO DIFF WBC: CPT | Performed by: INTERNAL MEDICINE

## 2023-05-31 PROCEDURE — 25510000001 IOPAMIDOL PER 1 ML: Performed by: INTERNAL MEDICINE

## 2023-05-31 PROCEDURE — 25010000002 FENTANYL CITRATE (PF) 50 MCG/ML SOLUTION: Performed by: INTERNAL MEDICINE

## 2023-05-31 RX ORDER — ACETAMINOPHEN 325 MG/1
650 TABLET ORAL EVERY 4 HOURS PRN
Status: DISCONTINUED | OUTPATIENT
Start: 2023-05-31 | End: 2023-05-31 | Stop reason: HOSPADM

## 2023-05-31 RX ORDER — SODIUM CHLORIDE 9 MG/ML
40 INJECTION, SOLUTION INTRAVENOUS AS NEEDED
Status: DISCONTINUED | OUTPATIENT
Start: 2023-05-31 | End: 2023-05-31 | Stop reason: HOSPADM

## 2023-05-31 RX ORDER — FENTANYL CITRATE 50 UG/ML
INJECTION, SOLUTION INTRAMUSCULAR; INTRAVENOUS
Status: DISCONTINUED | OUTPATIENT
Start: 2023-05-31 | End: 2023-05-31 | Stop reason: HOSPADM

## 2023-05-31 RX ORDER — ASPIRIN 81 MG/1
81 TABLET ORAL DAILY
Status: DISCONTINUED | OUTPATIENT
Start: 2023-06-01 | End: 2023-05-31 | Stop reason: HOSPADM

## 2023-05-31 RX ORDER — SODIUM CHLORIDE 0.9 % (FLUSH) 0.9 %
10 SYRINGE (ML) INJECTION AS NEEDED
Status: DISCONTINUED | OUTPATIENT
Start: 2023-05-31 | End: 2023-05-31 | Stop reason: HOSPADM

## 2023-05-31 RX ORDER — LIDOCAINE HYDROCHLORIDE 20 MG/ML
INJECTION, SOLUTION INFILTRATION; PERINEURAL
Status: DISCONTINUED | OUTPATIENT
Start: 2023-05-31 | End: 2023-05-31 | Stop reason: HOSPADM

## 2023-05-31 RX ORDER — NITROGLYCERIN 0.4 MG/1
0.4 TABLET SUBLINGUAL
Status: DISCONTINUED | OUTPATIENT
Start: 2023-05-31 | End: 2023-05-31 | Stop reason: HOSPADM

## 2023-05-31 RX ORDER — SODIUM CHLORIDE 0.9 % (FLUSH) 0.9 %
10 SYRINGE (ML) INJECTION EVERY 12 HOURS SCHEDULED
Status: DISCONTINUED | OUTPATIENT
Start: 2023-05-31 | End: 2023-05-31 | Stop reason: HOSPADM

## 2023-05-31 RX ORDER — MIDAZOLAM HYDROCHLORIDE 1 MG/ML
INJECTION INTRAMUSCULAR; INTRAVENOUS
Status: DISCONTINUED | OUTPATIENT
Start: 2023-05-31 | End: 2023-05-31 | Stop reason: HOSPADM

## 2023-05-31 RX ORDER — SODIUM CHLORIDE 9 MG/ML
1-3 INJECTION, SOLUTION INTRAVENOUS CONTINUOUS
Status: DISCONTINUED | OUTPATIENT
Start: 2023-05-31 | End: 2023-05-31 | Stop reason: HOSPADM

## 2023-05-31 RX ORDER — ASPIRIN 325 MG
TABLET ORAL
Status: DISCONTINUED
Start: 2023-05-31 | End: 2023-05-31 | Stop reason: HOSPADM

## 2023-05-31 RX ORDER — DIPHENHYDRAMINE HYDROCHLORIDE 50 MG/ML
INJECTION INTRAMUSCULAR; INTRAVENOUS
Status: DISCONTINUED | OUTPATIENT
Start: 2023-05-31 | End: 2023-05-31 | Stop reason: HOSPADM

## 2023-05-31 RX ORDER — ONDANSETRON 2 MG/ML
4 INJECTION INTRAMUSCULAR; INTRAVENOUS EVERY 6 HOURS PRN
Status: DISCONTINUED | OUTPATIENT
Start: 2023-05-31 | End: 2023-05-31 | Stop reason: HOSPADM

## 2023-05-31 RX ORDER — ASPIRIN 325 MG
325 TABLET ORAL ONCE
Status: COMPLETED | OUTPATIENT
Start: 2023-05-31 | End: 2023-05-31

## 2023-05-31 RX ORDER — SODIUM CHLORIDE 9 MG/ML
100 INJECTION, SOLUTION INTRAVENOUS CONTINUOUS
Status: DISCONTINUED | OUTPATIENT
Start: 2023-05-31 | End: 2023-05-31 | Stop reason: HOSPADM

## 2023-05-31 RX ADMIN — SODIUM CHLORIDE 1 ML/KG/HR: 9 INJECTION, SOLUTION INTRAVENOUS at 11:32

## 2023-05-31 RX ADMIN — ASPIRIN 325 MG: 325 TABLET, FILM COATED ORAL at 11:32

## 2023-06-02 ENCOUNTER — OFFICE VISIT (OUTPATIENT)
Dept: PULMONOLOGY | Facility: CLINIC | Age: 48
End: 2023-06-02

## 2023-06-02 VITALS
HEART RATE: 91 BPM | BODY MASS INDEX: 27.25 KG/M2 | SYSTOLIC BLOOD PRESSURE: 110 MMHG | DIASTOLIC BLOOD PRESSURE: 72 MMHG | OXYGEN SATURATION: 98 % | WEIGHT: 184 LBS | HEIGHT: 69 IN

## 2023-06-02 DIAGNOSIS — J98.6 ELEVATED HEMIDIAPHRAGM: ICD-10-CM

## 2023-06-02 DIAGNOSIS — R05.9 COUGH, UNSPECIFIED TYPE: ICD-10-CM

## 2023-06-02 DIAGNOSIS — R06.09 DYSPNEA ON EXERTION: ICD-10-CM

## 2023-06-02 DIAGNOSIS — J43.9 PULMONARY EMPHYSEMA, UNSPECIFIED EMPHYSEMA TYPE: Primary | Chronic | ICD-10-CM

## 2023-06-02 DIAGNOSIS — R94.2 DIFFUSION CAPACITY OF LUNG (DL), DECREASED: ICD-10-CM

## 2023-06-02 PROCEDURE — 1160F RVW MEDS BY RX/DR IN RCRD: CPT | Performed by: NURSE PRACTITIONER

## 2023-06-02 PROCEDURE — 3074F SYST BP LT 130 MM HG: CPT | Performed by: NURSE PRACTITIONER

## 2023-06-02 PROCEDURE — 3078F DIAST BP <80 MM HG: CPT | Performed by: NURSE PRACTITIONER

## 2023-06-02 PROCEDURE — 99214 OFFICE O/P EST MOD 30 MIN: CPT | Performed by: NURSE PRACTITIONER

## 2023-06-02 PROCEDURE — 1159F MED LIST DOCD IN RCRD: CPT | Performed by: NURSE PRACTITIONER

## 2023-06-02 RX ORDER — LEVOTHYROXINE SODIUM 175 UG/1
TABLET ORAL
COMMUNITY
Start: 2023-05-26

## 2023-06-02 NOTE — PROGRESS NOTES
BREANA Mendoza  [facility]   Pulmonary and Critical Care  [address] [place] [address]  Phone: [phone]  Fax: [phone]           Chief Complaint  Chronic obstructive pulmonary disease, unspecified COPD type (Pt here for follow up. States there has been no impovement) and Shortness of Breath    Subjective    History of Present Illness     Minnie Bang presents to [facility] PULMONARY & CRITICAL CARE MEDICINE   History of Present Illness  Ms Bang is a 48 year old female with shortness of breath. She has known history of arthritis, hypothyroidism, hypercholesterolemia, elevated triglycerides, hypertension, depression, anxiety, panic attacks, low vitamin D and history of Covid in Nov 2022 & mid Feb 2023. Family history of asthma with mother. She is a former smoker of cigarettes and vapes. HRCT showed mild centrilobular emphysema no evidence of interstitial fibrotic lung disease. Her IgE +22 allergens was normal.  Her absolute eosinophils were normal however high normal. She continues to have night time awakenings, productive cough- tastes like metal. She takes her thyroid medication faithfully. She denies fever, chills. She has had night sweats in the last 3 weeks. She has no post nasal drip. She has acid reflux. She feels like she has something in her throat all the time. Her Inspiratory curve is flat and she was referred to ENT with appointment in July. Her heart cath showed coronary artery disease but did not required stenting. Most inhalers have made her throat sore/ worse. She has been throwing up every morning for the last two weeks. She has had diarrhea for the last 3 weeks. She has not been to the PCP about this. She has been having vertigo as well. She tells she is no better. She is very emotional today.            Objective   Vital Signs:   /72 (BP Location: Right arm, Patient Position: Sitting, Cuff Size: Adult)   Pulse 91   Ht  "175.3 cm (69\")   Wt 83.5 kg (184 lb)   SpO2 98%   BMI 27.17 kg/m²     Physical Exam  Vitals reviewed.   Constitutional:       Appearance: Normal appearance.   Cardiovascular:      Rate and Rhythm: Normal rate and regular rhythm.   Pulmonary:      Effort: Pulmonary effort is normal.      Breath sounds: Normal breath sounds.   Neurological:      General: No focal deficit present.      Mental Status: She is alert and oriented to person, place, and time.   Psychiatric:         Mood and Affect: Mood normal.         Behavior: Behavior normal.        Result Review :  The following data was reviewed by: BREANA Mendoza on 06/02/2023:    My interpretation of imaging:  No new   My interpretation of labs: No new   Cardiac Catheterization/Vascular Study (05/31/2023 14:07)     My interpretation of the PFT : no new     Results for orders placed during the hospital encounter of 04/21/23    Pulmonary Function Test    [facility] - Pulmonary Function Test    [address] [place] [address]  [id number]  [phone]    Patient : Minnie Bang  MRN : [id number]  CSN : [id number]  Pulmonologist : Ti Beavers MD  Date : 4/22/2023    ______________________________________________________________________    Interpretation :  1.  Spirometry reveals a decrease in midflows and peak expiratory flow, and otherwise is within normal limits.  2.  There is actually some worsening of spirometry postbronchodilator so that postbronchodilator reveals a low normal FVC and a mild decrease in the patient's FEV1.  There is also worsening of midflows.  3.  Lung volumes reveal a decrease in expiratory reserve volume, and otherwise are within normal limits.  4.  There is a moderate diffusion impairment which when corrected for alveolar volume is still a mild bordering on moderate diffusion impairment.  5.  There is some flattening of the inspiratory limb of the flow volume loop which could be seen with a variable " extrathoracic upper airway obstruction, clinical correlation is advised.  6.  Arterial blood gases on room air are within normal limits.      Ti Beavers MD          Assessment and Plan   Diagnoses and all orders for this visit:    1. Pulmonary emphysema, unspecified emphysema type (Primary)    2. Cough, unspecified type    3. Dyspnea on exertion    4. Elevated hemidiaphragm    5. Diffusion capacity of lung (dl), decreased      Her heart cath is noted to have shown no intervention needed with non-obstructive coronary disease however there is also mention of coronary artery with 70% stenosis and RCA with 50-60% stenosis. The report mentions being on Aspirin therapy however she has not started this as she states no one told her. I have advised her to contact their office in regards to treatment and to keep her follow up with them.     Her CTA showed only mild emphysema, PFTs normal with diffusion impairment. Lab work thus far has been negative. She actually had worsening on her spirometry following bronchodilator so no asthma. She is not improving symptom wise. She has seen ENT in regards to her Vertigo however the NP wants her to see Dr. Mendez Padilla for the flattened inspiratory curve on her PFT. This appointment has been moved to July. I would like for her to see Dr. Norris to see what else we can offer. Her symptoms seem to be more significant than what her findings are presenting. We had previously discussed that is If nothing is discovered following her heart cath and visit with ENT then we might consider a possible bronchoscopy. I would also defer work up of the metal taste in her mouth to her PCP and/or ENT as this could be related to her hypothyroidism. She also has a fullness in her throat that can be assessed by ENT.     She mentions today that she has had 2-3 weeks of diarrhea & vomiting but has not seen her PCP. She is encouraged to contact them today or Monday for a follow up on this.         Alpha 1: MM      Follow Up   Return in about 2 months (around 8/2/2023) for Must be with Dr. Norris .  Patient was given instructions and counseling regarding her condition or for health maintenance advice. Please see specific information pulled into the AVS if appropriate.     Sivan Wise, BREANA  6/2/2023  18:13 CDT

## 2023-06-08 ENCOUNTER — TRANSCRIBE ORDERS (OUTPATIENT)
Dept: ADMINISTRATIVE | Facility: HOSPITAL | Age: 48
End: 2023-06-08
Payer: COMMERCIAL

## 2023-06-08 DIAGNOSIS — R10.13 EPIGASTRIC PAIN: Primary | ICD-10-CM

## 2023-07-06 PROBLEM — I25.10 NON-OCCLUSIVE CORONARY ARTERY DISEASE: Status: ACTIVE | Noted: 2023-05-04

## 2023-07-06 PROBLEM — R60.0 BILATERAL LOWER EXTREMITY EDEMA: Status: ACTIVE | Noted: 2023-07-06

## 2023-07-06 PROBLEM — Q24.5 CORONARY-MYOCARDIAL BRIDGE: Status: ACTIVE | Noted: 2023-07-06

## 2023-07-25 ENCOUNTER — HOSPITAL ENCOUNTER (OUTPATIENT)
Dept: NUCLEAR MEDICINE | Facility: HOSPITAL | Age: 48
Discharge: HOME | End: 2023-07-25
Payer: COMMERCIAL

## 2023-07-25 DIAGNOSIS — R10.13 ABDOMINAL PAIN, EPIGASTRIC: ICD-10-CM

## 2023-07-25 PROCEDURE — 78226 HEPATOBILIARY SYSTEM IMAGING: CPT

## 2023-07-25 PROCEDURE — 0 TECHNETIUM TC 99M MEBROFENIN KIT: Performed by: NURSE PRACTITIONER

## 2023-07-25 PROCEDURE — A9537 TC99M MEBROFENIN: HCPCS | Performed by: NURSE PRACTITIONER

## 2023-07-25 RX ORDER — KIT FOR THE PREPARATION OF TECHNETIUM TC 99M MEBROFENIN 45 MG/10ML
1 INJECTION, POWDER, LYOPHILIZED, FOR SOLUTION INTRAVENOUS
Status: COMPLETED | OUTPATIENT
Start: 2023-07-25 | End: 2023-07-25

## 2023-07-25 RX ADMIN — MEBROFENIN 1 DOSE: 45 INJECTION, POWDER, LYOPHILIZED, FOR SOLUTION INTRAVENOUS at 12:55

## 2023-08-22 ENCOUNTER — OFFICE VISIT (OUTPATIENT)
Dept: SURGERY | Age: 48
End: 2023-08-22
Payer: MEDICAID

## 2023-08-22 VITALS
DIASTOLIC BLOOD PRESSURE: 70 MMHG | HEIGHT: 69 IN | WEIGHT: 186 LBS | OXYGEN SATURATION: 93 % | TEMPERATURE: 97.1 F | BODY MASS INDEX: 27.55 KG/M2 | SYSTOLIC BLOOD PRESSURE: 108 MMHG

## 2023-08-22 DIAGNOSIS — K21.9 CHEST PAIN DUE TO GERD: Primary | ICD-10-CM

## 2023-08-22 DIAGNOSIS — R07.9 CHEST PAIN DUE TO GERD: Primary | ICD-10-CM

## 2023-08-22 DIAGNOSIS — R10.13 EPIGASTRIC PAIN: ICD-10-CM

## 2023-08-22 PROCEDURE — 99204 OFFICE O/P NEW MOD 45 MIN: CPT | Performed by: SURGERY

## 2023-08-22 RX ORDER — ALBUTEROL SULFATE 90 UG/1
AEROSOL, METERED RESPIRATORY (INHALATION)
COMMUNITY
Start: 2023-08-09

## 2023-08-22 RX ORDER — DILTIAZEM HYDROCHLORIDE 120 MG/1
CAPSULE, EXTENDED RELEASE ORAL
COMMUNITY
Start: 2023-08-09

## 2023-08-22 RX ORDER — MECLIZINE HYDROCHLORIDE 25 MG/1
TABLET ORAL
COMMUNITY
Start: 2023-05-26

## 2023-08-22 RX ORDER — LISINOPRIL 40 MG/1
TABLET ORAL
COMMUNITY
Start: 2023-08-09

## 2023-08-22 RX ORDER — CARVEDILOL 25 MG/1
TABLET ORAL
COMMUNITY
Start: 2023-08-09

## 2023-08-22 RX ORDER — PANTOPRAZOLE SODIUM 40 MG/1
TABLET, DELAYED RELEASE ORAL
COMMUNITY
Start: 2023-08-09

## 2023-08-22 RX ORDER — POTASSIUM CHLORIDE 20 MEQ/1
TABLET, EXTENDED RELEASE ORAL
COMMUNITY
Start: 2023-08-09

## 2023-08-22 RX ORDER — CLONIDINE HYDROCHLORIDE 0.1 MG/1
TABLET ORAL
COMMUNITY
Start: 2023-08-09

## 2023-08-22 RX ORDER — HYDROCHLOROTHIAZIDE 25 MG/1
TABLET ORAL
COMMUNITY
Start: 2023-08-09

## 2023-08-22 RX ORDER — ONDANSETRON 8 MG/1
TABLET, ORALLY DISINTEGRATING ORAL
COMMUNITY
Start: 2023-06-06

## 2023-08-22 RX ORDER — SIMVASTATIN 10 MG
TABLET ORAL
COMMUNITY
Start: 2023-08-09

## 2023-08-22 RX ORDER — LEVOTHYROXINE SODIUM 175 UG/1
TABLET ORAL
COMMUNITY
Start: 2023-08-09

## 2023-08-22 RX ORDER — FLUOXETINE 10 MG/1
CAPSULE ORAL
COMMUNITY
Start: 2023-08-09

## 2023-08-22 RX ORDER — CEPHALEXIN 500 MG/1
CAPSULE ORAL
COMMUNITY
Start: 2023-08-08

## 2023-08-22 RX ORDER — NITROGLYCERIN 0.4 MG/1
TABLET SUBLINGUAL
COMMUNITY
Start: 2023-07-07

## 2023-08-22 ASSESSMENT — ENCOUNTER SYMPTOMS
ABDOMINAL DISTENTION: 0
EYE PAIN: 0
NAUSEA: 1
CHEST TIGHTNESS: 0
ABDOMINAL PAIN: 1
DIARRHEA: 1
VOMITING: 1
WHEEZING: 1
CONSTIPATION: 0
COUGH: 0
EYE REDNESS: 0
SORE THROAT: 0
COLOR CHANGE: 0
BACK PAIN: 1
SHORTNESS OF BREATH: 1

## 2023-08-24 PROBLEM — Q23.81 BICUSPID AORTIC VALVE: Status: ACTIVE | Noted: 2023-08-24

## 2023-08-24 PROBLEM — Q23.1 BICUSPID AORTIC VALVE: Status: ACTIVE | Noted: 2023-08-24

## 2023-09-05 ENCOUNTER — HOSPITAL ENCOUNTER (OUTPATIENT)
Dept: WOUND CARE | Age: 48
Discharge: HOME | End: 2023-09-05
Payer: MEDICAID

## 2023-09-05 ENCOUNTER — TELEPHONE (OUTPATIENT)
Dept: SURGERY | Age: 48
End: 2023-09-05

## 2023-09-05 VITALS
BODY MASS INDEX: 27.55 KG/M2 | WEIGHT: 186 LBS | DIASTOLIC BLOOD PRESSURE: 91 MMHG | TEMPERATURE: 97.5 F | HEIGHT: 69 IN | HEART RATE: 93 BPM | RESPIRATION RATE: 20 BRPM | SYSTOLIC BLOOD PRESSURE: 153 MMHG

## 2023-09-05 DIAGNOSIS — L97.212 NON-PRESSURE CHRONIC ULCER OF RIGHT CALF WITH FAT LAYER EXPOSED (HCC): Chronic | ICD-10-CM

## 2023-09-05 PROCEDURE — 99214 OFFICE O/P EST MOD 30 MIN: CPT | Performed by: SURGERY

## 2023-09-05 RX ORDER — ISOSORBIDE MONONITRATE 30 MG/1
30 TABLET, EXTENDED RELEASE ORAL DAILY
COMMUNITY
Start: 2023-08-09

## 2023-09-05 RX ORDER — DOXYCYCLINE HYCLATE 100 MG/1
100 CAPSULE ORAL 2 TIMES DAILY
COMMUNITY
Start: 2023-08-29

## 2023-09-05 ASSESSMENT — PAIN DESCRIPTION - ORIENTATION: ORIENTATION: RIGHT

## 2023-09-05 ASSESSMENT — PAIN - FUNCTIONAL ASSESSMENT: PAIN_FUNCTIONAL_ASSESSMENT: PREVENTS OR INTERFERES SOME ACTIVE ACTIVITIES AND ADLS

## 2023-09-05 ASSESSMENT — PAIN SCALES - GENERAL: PAINLEVEL_OUTOF10: 10

## 2023-09-05 ASSESSMENT — PAIN DESCRIPTION - PAIN TYPE: TYPE: ACUTE PAIN

## 2023-09-05 ASSESSMENT — PAIN DESCRIPTION - ONSET: ONSET: ON-GOING

## 2023-09-05 ASSESSMENT — PAIN DESCRIPTION - DESCRIPTORS: DESCRIPTORS: BURNING;STABBING

## 2023-09-05 ASSESSMENT — PAIN DESCRIPTION - LOCATION: LOCATION: LEG

## 2023-09-05 ASSESSMENT — PAIN DESCRIPTION - FREQUENCY: FREQUENCY: INTERMITTENT

## 2023-09-05 NOTE — PROGRESS NOTES
Status Old drainage noted 09/05/23 1048   Wound Cleansed Soap and water 09/05/23 1048   Wound Length (cm) 4.1 cm 09/05/23 1048   Wound Width (cm) 1 cm 09/05/23 1048   Wound Depth (cm) 0.2 cm 09/05/23 1048   Wound Surface Area (cm^2) 4.1 cm^2 09/05/23 1048   Wound Volume (cm^3) 0.82 cm^3 09/05/23 1048   Distance Tunneling (cm) 0 cm 09/05/23 1048   Tunneling Position ___ O'Clock 0 09/05/23 1048   Undermining Starts ___ O'Clock 0 09/05/23 1048   Undermining Ends___ O'Clock 0 09/05/23 1048   Undermining Maxium Distance (cm) 0 09/05/23 1048   Wound Assessment Slough;Pink/red 09/05/23 1048   Drainage Amount Moderate (25-50%) 09/05/23 1048   Drainage Description Serosanguinous;Purulent 09/05/23 1048   Odor None 09/05/23 1048   Rebekah-wound Assessment Blanchable erythema; Intact 09/05/23 1048   Margins Defined edges 09/05/23 1048   Wound Thickness Description not for Pressure Injury Full thickness 09/05/23 1048   Number of days: 0          The patients pain isPain Level: 10 Pain Type: Acute pain 5. Please refer to nursing measurements and assessment regarding wound pre and post debridement. Plan for wound - Dress per physician order    Treatment:     Compression : Yes   Offloading : No   Dressing : SEE AVS   Additional Therapy : aquacel AG+ coflex   Discussed appropriate home care of this wound. Wound redressed. Patient instructions were given. Follow up: 1 week.   Recommend no smoking  Offloading instructions given        Electronically signed by Bridget Moise MD on 9/5/23 at 11:26 AM CDT

## 2023-09-05 NOTE — TELEPHONE ENCOUNTER
Called patient and left vm that we has a message Jovi Tran had been trying to schedule an appt with her and unable to reach or have a call back. Told patient to call central scheduling to schedule appt. Left message for her to call our office with any questions regarding same.

## 2023-09-05 NOTE — DISCHARGE INSTRUCTIONS
[address] and [facility] Therapy   Physician Orders and Discharge Instructions  [address]  [place], [facility]  Telephone: [phone]    NAME:  Latha Moore  YOB: 1975  MEDICAL RECORD NUMBER:  [id number]  DATE:  9/5/2023    Discharge condition: Stable    Discharge to: Home    Left via:Private automobile    Accompanied by: Self    Dressing Orders: Right Lower Ext Ulcer  Aquacel Ag to open areas  Calamine Coflex Unnaboot, Keep clean and Dry  Elevate feet to level or above heart 3-4 times daily and as needed to for 30 minutes to reduce swelling  Remove wraps and call office if- Wraps get wet, Cause increased pain, Slide down or you are unable to make your next scheduled appointment   Multi Vitamin, High Protein diet as tolerated     [facility] follow up visit _____________1 week________________  (Please note your next appointment above and if you are unable to keep, kindly give a 24 hour notice. Thank you.)    If you experience any of the following, please call the [facility] during business hours:    * Increase in Pain  * Temperature over 101  * Increase in drainage from your wound  * Drainage with a foul odor  * Bleeding  * Increase in swelling  * Need for compression bandage changes due to slippage, breakthrough drainage. If you need medical attention outside of the business hours of the [address] [place], [place] [address] [employer] please contact your PCP or go to the nearest emergency room.

## 2023-09-05 NOTE — PLAN OF CARE
Problem: Discharge Planning  Goal: Discharge to home or other facility with appropriate resources  Outcome: Progressing     Problem: Pain  Goal: Verbalizes/displays adequate comfort level or baseline comfort level  Outcome: Progressing     Problem: Wound:  Goal: Will show signs of wound healing; wound closure and no evidence of infection  Description: Will show signs of wound healing; wound closure and no evidence of infection  Outcome: Progressing     Problem: Venous:  Goal: Signs of wound healing will improve  Description: Signs of wound healing will improve  Outcome: Progressing     Problem: Weight control:  Goal: Ability to maintain an optimal weight for height and age will be supported  Description: Ability to maintain an optimal weight for height and age will be supported  Outcome: Progressing     Problem: Falls - Risk of:  Goal: Will remain free from falls  Description: Will remain free from falls  Outcome: Progressing

## 2023-09-07 RX ORDER — LIDOCAINE HYDROCHLORIDE 20 MG/ML
JELLY TOPICAL ONCE
OUTPATIENT
Start: 2023-09-07 | End: 2023-09-07

## 2023-09-07 RX ORDER — CLOBETASOL PROPIONATE 0.5 MG/G
OINTMENT TOPICAL ONCE
OUTPATIENT
Start: 2023-09-07 | End: 2023-09-07

## 2023-09-07 RX ORDER — SODIUM CHLOR/HYPOCHLOROUS ACID 0.033 %
SOLUTION, IRRIGATION IRRIGATION ONCE
OUTPATIENT
Start: 2023-09-07 | End: 2023-09-07

## 2023-09-07 RX ORDER — GENTAMICIN SULFATE 1 MG/G
OINTMENT TOPICAL ONCE
OUTPATIENT
Start: 2023-09-07 | End: 2023-09-07

## 2023-09-07 RX ORDER — GINSENG 100 MG
CAPSULE ORAL ONCE
OUTPATIENT
Start: 2023-09-07 | End: 2023-09-07

## 2023-09-07 RX ORDER — IBUPROFEN 200 MG
TABLET ORAL ONCE
OUTPATIENT
Start: 2023-09-07 | End: 2023-09-07

## 2023-09-07 RX ORDER — BACITRACIN ZINC AND POLYMYXIN B SULFATE 500; 1000 [USP'U]/G; [USP'U]/G
OINTMENT TOPICAL ONCE
OUTPATIENT
Start: 2023-09-07 | End: 2023-09-07

## 2023-09-07 RX ORDER — LIDOCAINE HYDROCHLORIDE 40 MG/ML
SOLUTION TOPICAL ONCE
OUTPATIENT
Start: 2023-09-07 | End: 2023-09-07

## 2023-09-07 RX ORDER — LIDOCAINE 50 MG/G
OINTMENT TOPICAL ONCE
OUTPATIENT
Start: 2023-09-07 | End: 2023-09-07

## 2023-09-07 RX ORDER — BETAMETHASONE DIPROPIONATE 0.05 %
OINTMENT (GRAM) TOPICAL ONCE
OUTPATIENT
Start: 2023-09-07 | End: 2023-09-07

## 2023-09-07 RX ORDER — LIDOCAINE 40 MG/G
CREAM TOPICAL ONCE
OUTPATIENT
Start: 2023-09-07 | End: 2023-09-07

## 2023-09-13 ENCOUNTER — TELEPHONE (OUTPATIENT)
Dept: WOUND CARE | Age: 48
End: 2023-09-13

## 2023-09-13 NOTE — TELEPHONE ENCOUNTER
We have called and left messages for patient since she missed her appointment yesterday. She has a compression wrap on that needs to be removed.

## 2023-09-14 ENCOUNTER — OFFICE VISIT (OUTPATIENT)
Dept: GASTROENTEROLOGY | Age: 48
End: 2023-09-14
Payer: MEDICAID

## 2023-09-14 VITALS
OXYGEN SATURATION: 96 % | HEART RATE: 104 BPM | HEIGHT: 69 IN | WEIGHT: 181 LBS | BODY MASS INDEX: 26.81 KG/M2 | SYSTOLIC BLOOD PRESSURE: 135 MMHG | DIASTOLIC BLOOD PRESSURE: 80 MMHG

## 2023-09-14 DIAGNOSIS — K21.9 CHRONIC GERD: ICD-10-CM

## 2023-09-14 DIAGNOSIS — K52.9 CHRONIC DIARRHEA: Primary | ICD-10-CM

## 2023-09-14 DIAGNOSIS — R09.89 GLOBUS SENSATION: ICD-10-CM

## 2023-09-14 PROCEDURE — 99204 OFFICE O/P NEW MOD 45 MIN: CPT | Performed by: NURSE PRACTITIONER

## 2023-09-14 ASSESSMENT — ENCOUNTER SYMPTOMS
NAUSEA: 1
ABDOMINAL DISTENTION: 0
COUGH: 0
CONSTIPATION: 0
DIARRHEA: 1
SHORTNESS OF BREATH: 0
RECTAL PAIN: 0
ANAL BLEEDING: 0
VOMITING: 1
BLOOD IN STOOL: 0
ABDOMINAL PAIN: 1
TROUBLE SWALLOWING: 1
CHOKING: 0

## 2023-09-22 DIAGNOSIS — J44.9 CHRONIC OBSTRUCTIVE PULMONARY DISEASE, UNSPECIFIED COPD TYPE: ICD-10-CM

## 2023-09-22 DIAGNOSIS — J43.9 PULMONARY EMPHYSEMA, UNSPECIFIED EMPHYSEMA TYPE: Primary | ICD-10-CM

## 2023-09-22 RX ORDER — ALBUTEROL SULFATE 90 UG/1
2 AEROSOL, METERED RESPIRATORY (INHALATION) EVERY 4 HOURS PRN
Qty: 6.7 G | Refills: 0 | Status: SHIPPED | OUTPATIENT
Start: 2023-09-22

## 2023-09-22 RX ORDER — ALBUTEROL SULFATE 90 UG/1
AEROSOL, METERED RESPIRATORY (INHALATION)
Refills: 3 | Status: CANCELLED | OUTPATIENT
Start: 2023-09-22

## 2023-09-22 NOTE — TELEPHONE ENCOUNTER
Rx Refill Note  Requested Prescriptions     Pending Prescriptions Disp Refills    albuterol sulfate  (90 Base) MCG/ACT inhaler 6.7 g 2     Sig: Inhale 2 puffs Every 4 (Four) Hours As Needed for Wheezing.      Last office visit with prescribing clinician: 6/2/2023   Last telemedicine visit with prescribing clinician: Visit date not found   Next office visit with prescribing clinician: Visit date not found                         Would you like a call back once the refill request has been completed: [] Yes [] No    If the office needs to give you a call back, can they leave a voicemail: [] Yes [] No    Aidee Kwan MA  09/22/23, 13:11 CDT

## 2023-10-31 NOTE — PROGRESS NOTES
YOB: 1975  Location: [facility]  Location Address: [address] [place] [address], [facility], [address] [place] [address]  Location Phone: [phone]    Chief Complaint   Patient presents with    Abnormal PFT     Pt feels like she has something hung in throat, throat clearing, coughing, trouble swallowing, getting choked on foods, pills, and liquids        History of Present Illness  Minnie Bang is a 48 y.o. female.  Minnie Bang is here for evaluation of ENT complaints. The patient has had problems with hoarseness, dysphagia, a globus sensation, cough, and excessive mucous. The patient also has shortness of breath and wheezing.  The symptoms are not localized to a particular location. The patient has had moderate symptoms. The symptoms have been present for the last 1 year The symptoms are aggravated by tobacco use. The symptoms are improved by smoking cessation. The patient states she quit smoking for 8 months and now smokes a pack every 3-4 days. She denies reflux. She has had an abnormal PFT and Dr. Chase has referred for a scope.     [facility] - Pulmonary Function Test     [address] [place] [address].  [place] [id number]  [phone]     Patient : Minnie Bang   MRN : [id number]  CSN : [id number]  Pulmonologist : Ti Beavers MD  Date : 2023     ______________________________________________________________________     Interpretation :  1.  Spirometry reveals a decrease in midflows and peak expiratory flow, and otherwise is within normal limits.  2.  There is actually some worsening of spirometry postbronchodilator so that postbronchodilator reveals a low normal FVC and a mild decrease in the patient's FEV1.  There is also worsening of midflows.  3.  Lung volumes reveal a decrease in expiratory reserve volume, and otherwise are within normal limits.  4.  There is a moderate diffusion impairment which when corrected for alveolar volume is still a mild bordering on moderate  diffusion impairment.  5.  There is some flattening of the inspiratory limb of the flow volume loop which could be seen with a variable extrathoracic upper airway obstruction, clinical correlation is advised.  6.  Arterial blood gases on room air are within normal limits.        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    Depression     Diastolic dysfunction 2022    Emphysema of lung 2020    Hyperlipidemia     Hypertension     Mixed simple and mucopurulent chronic bronchitis 4/18/2023    NSTEMI (non-ST elevated myocardial infarction) (HCC)     Pneumonia 2022    Pulmonary emphysema 4/21/2023       Past Surgical History:   Procedure Laterality Date    APPENDECTOMY      CARDIAC CATHETERIZATION N/A 11/04/2017    Procedure: Coronary angiography;  Surgeon: Luis Colvin MD;  Location:  [facility] CATH INVASIVE LOCATION;  Service:     CARDIAC CATHETERIZATION N/A 5/31/2023    Procedure: Left Heart Cath;  Surgeon: Steffen Rossi MD;  Location:  [facility] CATH INVASIVE LOCATION;  Service: Cardiology;  Laterality: N/A;    HYSTERECTOMY      WY RT/LT HEART CATHETERS N/A 11/04/2017    Procedure: Percutaneous Coronary Intervention;  Surgeon: Luis Colvin MD;  Location:  [facility] CATH INVASIVE LOCATION;  Service: Cardiovascular    THYROID SURGERY      TOTAL THYROIDECTOMY         Outpatient Medications Marked as Taking for the 11/2/23 encounter (Office Visit) with Mendez Padilla MD   Medication Sig Dispense Refill    albuterol sulfate  (90 Base) MCG/ACT inhaler Inhale 2 puffs Every 4 (Four) Hours As Needed for Wheezing. 6.7 g 0    carvedilol (COREG) 25 MG tablet Take 1 tablet by mouth 2 (Two) Times a Day. 180 tablet 3    cloNIDine (CATAPRES) 0.1 MG tablet Take 1 tablet by mouth 4 (Four) Times a Day As  Needed for High Blood Pressure (Blood Pressure >140/90). 360 tablet 3    dilTIAZem CD (CARDIZEM CD) 120 MG 24 hr capsule Take 1 capsule by mouth Daily. 30 capsule 11    hydroCHLOROthiazide (HYDRODIURIL) 25 MG tablet Take 1 tablet by mouth Daily. 30 tablet 11    ipratropium-albuterol (DUO-NEB) 0.5-2.5 mg/3 ml nebulizer Take 3 mL by nebulization Every 4 (Four) Hours As Needed for Wheezing. 360 mL 0    isosorbide mononitrate (IMDUR) 30 MG 24 hr tablet Take 1 tablet by mouth Daily. 30 tablet 11    levothyroxine (SYNTHROID, LEVOTHROID) 150 MCG tablet Take 175 mcg by mouth.      levothyroxine (SYNTHROID, LEVOTHROID) 175 MCG tablet       lisinopril (PRINIVIL,ZESTRIL) 40 MG tablet Take 1 tablet by mouth Daily.      meclizine (ANTIVERT) 25 MG tablet Take 1 tablet by mouth 3 (Three) Times a Day As Needed for Dizziness. 42 tablet 2    nitroglycerin (NITROSTAT) 0.4 MG SL tablet 1 under the tongue as needed for angina, may repeat q5mins for up three doses 25 tablet 3    potassium chloride (K-DUR,KLOR-CON) 20 MEQ CR tablet Take 1 tablet by mouth 2 (Two) Times a Day. 180 tablet 3    Vitamin D, Cholecalciferol, (CHOLECALCIFEROL) 10 MCG (400 UNIT) tablet Take 1 tablet by mouth Daily.         Patient has no known allergies.    Family History   Problem Relation Age of Onset    Asthma Mother     Cancer Mother     Emphysema Mother     Cancer Father     Heart failure Father     Hypertension Father        Social History     Socioeconomic History    Marital status:    Tobacco Use    Smoking status: Every Day     Packs/day: 0.50     Years: 30.00     Additional pack years: 0.00     Total pack years: 15.00     Types: Cigarettes     Start date: 1991     Last attempt to quit: 2022     Years since quittin.9     Passive exposure: Past   Vaping Use    Vaping Use: Never used   Substance and Sexual Activity    Alcohol use: Yes     Comment: occ    Drug use: No    Sexual activity: Not Currently     Partners: Male     Birth  control/protection: Hysterectomy       Review of Systems   Constitutional: Negative.    HENT:  Positive for sore throat, trouble swallowing and voice change.    Eyes: Negative.    Respiratory:  Positive for shortness of breath and wheezing.    Cardiovascular: Negative.    Gastrointestinal: Negative.    Endocrine: Negative.    Genitourinary: Negative.    Musculoskeletal: Negative.    Skin: Negative.    Allergic/Immunologic: Negative.    Neurological: Negative.    Hematological: Negative.        Vitals:    11/02/23 1454   BP: 122/92   Pulse: 104   Temp: 98.4 °F (36.9 °C)       Body mass index is 25.55 kg/m².    Objective     Physical Exam  CONSTITUTIONAL: well nourished, well-developed, alert, oriented, in no acute distress     COMMUNICATION AND VOICE: able to communicate normally, normal voice quality    HEAD: normocephalic, no lesions, atraumatic, no tenderness, no masses     FACE: appearance normal, no lesions, no tenderness, no deformities, facial motion symmetric    EYES: ocular motility normal, eyelids normal, orbits normal, no proptosis, conjunctiva normal , pupils equal, round     EARS:  Hearing: hearing to conversational voice intact bilaterally   External Ears: normal bilaterally, no lesions    NOSE: External Nose: external nasal structure normal, no tenderness on palpation, no nasal discharge, no lesions, no evidence of trauma, nostrils patent     ORAL:  Lips: upper and lower lips without lesion     LARYNX:  See endoscopy    NECK:  Inspection and Palpation: neck appearance normal, no masses or tenderness    CHEST/RESPIRATORY: normal respiratory effort     CARDIOVASCULAR: no cyanosis or edema     NEUROLOGICAL/PSYCHIATRIC: oriented to time, place and person, mood normal, affect appropriate, CN II-XII intact grossly   Assessment & Plan   Diagnoses and all orders for this visit:    1. Laryngopharyngeal reflux (Primary)    2. Vocal cord polyps    3. Tobacco use disorder, continuous      * Surgery not found *  No  orders of the defined types were placed in this encounter.    No follow-ups on file.       There are no Patient Instructions on file for this visit.

## 2023-11-02 ENCOUNTER — OFFICE VISIT (OUTPATIENT)
Dept: OTOLARYNGOLOGY | Facility: CLINIC | Age: 48
End: 2023-11-02
Payer: COMMERCIAL

## 2023-11-02 VITALS
TEMPERATURE: 98.4 F | SYSTOLIC BLOOD PRESSURE: 122 MMHG | BODY MASS INDEX: 25.62 KG/M2 | DIASTOLIC BLOOD PRESSURE: 92 MMHG | HEART RATE: 104 BPM | WEIGHT: 173 LBS | HEIGHT: 69 IN

## 2023-11-02 DIAGNOSIS — J38.1 VOCAL CORD POLYPS: ICD-10-CM

## 2023-11-02 DIAGNOSIS — K21.9 LARYNGOPHARYNGEAL REFLUX: Primary | ICD-10-CM

## 2023-11-02 DIAGNOSIS — F17.209 TOBACCO USE DISORDER, CONTINUOUS: ICD-10-CM

## 2023-11-02 RX ORDER — OMEPRAZOLE 40 MG/1
40 CAPSULE, DELAYED RELEASE ORAL 2 TIMES DAILY
Qty: 60 CAPSULE | Refills: 3 | Status: SHIPPED | OUTPATIENT
Start: 2023-11-02 | End: 2024-03-01

## 2023-11-02 RX ORDER — VARENICLINE TARTRATE 0.5 MG/1
0.5 TABLET, FILM COATED ORAL 2 TIMES DAILY
Qty: 60 TABLET | Refills: 0 | Status: SHIPPED | OUTPATIENT
Start: 2023-11-02

## 2023-11-02 RX ORDER — VARENICLINE TARTRATE 1 MG/1
1 TABLET, FILM COATED ORAL 2 TIMES DAILY
Qty: 60 TABLET | Refills: 3 | Status: SHIPPED | OUTPATIENT
Start: 2023-11-02 | End: 2023-12-02

## 2023-11-02 NOTE — PROGRESS NOTES
OPERATIVE NOTE:  Minnie Bang    DATE OF PROCEDURE: 11/2/2023    PROCEDURE:   Flexible Fiberoptic Laryngoscopy    ANESTHESIA:  None    REASON FOR PROCEDURE:  Procedure was recommended for persistant hoarseness and suspicious clinical behavior  Risks, benefits and alternatives were discussed.      DETAILS of OPERATION:  The patient was seated in the exam chair.  A flexible fiberoptic laryngoscopy was performed through the oral cavity.  The scope was introduced into the oral cavity and directed to the level of the glottis, examining the structures of the oropharynx, base of tongue, vallecula, supraglottic larynx, glottic larynx, and hypopharynx.      FINDINGS:  Mucosal surfaces:   The mucosal surfaces demonstrated normal mucosa surfaces with moderately severe inflammation    Base of tongue:  The base of tongue was found to have no mass or lesion.    Epiglottis:  The epiglottis was found to have no mass or lesion.    Aryepiglottic fold:  The AE folds were found to have no mass or lesion.    False Vocal Fold:  The false cords were found to have no mass or lesion.    True Vocal Cord:  The true vocal cords were found to have bilateral polyposis right greater than left without obvious evidence of neoplasm otherwise.  Both true vocal cords adduct and abduct normally    Arytenoid:   The arytenoids were found to have Moderately severe interarytenoid edema with erythema.    Hypopharynx:  The hypopharynx was found to have no mass or lesion.    The patient tolerated procedure well.

## 2023-11-03 ENCOUNTER — TELEPHONE (OUTPATIENT)
Dept: OTOLARYNGOLOGY | Facility: CLINIC | Age: 48
End: 2023-11-03

## 2023-11-03 NOTE — TELEPHONE ENCOUNTER
The [facility] received a fax that requires your attention. The document has been indexed to the patient’s chart for your review.      Reason for sending:  RCVD PRIOR AUTH REQ FOR OMEPRAZOLE.     Documents Description: RX PRIOR AUTH 11-02-23    Name of Sender: [facility]     Date Indexed: 11/03/23    Notes (if needed):

## 2023-11-06 DIAGNOSIS — J43.9 PULMONARY EMPHYSEMA, UNSPECIFIED EMPHYSEMA TYPE: ICD-10-CM

## 2023-11-06 DIAGNOSIS — J44.9 CHRONIC OBSTRUCTIVE PULMONARY DISEASE, UNSPECIFIED COPD TYPE: ICD-10-CM

## 2023-11-06 RX ORDER — ALBUTEROL SULFATE 90 UG/1
2 AEROSOL, METERED RESPIRATORY (INHALATION) EVERY 4 HOURS PRN
Refills: 0 | OUTPATIENT
Start: 2023-11-06

## 2023-11-06 RX ORDER — NITROGLYCERIN 0.4 MG/1
TABLET SUBLINGUAL
Qty: 25 TABLET | Refills: 3 | Status: SHIPPED | OUTPATIENT
Start: 2023-11-06

## 2023-11-06 NOTE — TELEPHONE ENCOUNTER
Rx Refill Note  Requested Prescriptions     Refused Prescriptions Disp Refills    albuterol sulfate  (90 Base) MCG/ACT inhaler [Pharmacy Med Name: ALBUTEROL SULFATE HFA HFA AEROSOL SOLN]  0     Sig: Inhale 2 puffs Every 4 (Four) Hours As Needed for Wheezing.      Last office visit with prescribing clinician: 6/2/2023   Last telemedicine visit with prescribing clinician: Visit date not found   Next office visit with prescribing clinician: Visit date not found   {TIP  Encounters:23}    {TIP  Please add Last Relevant Lab Date if appropriate:23}              {TIP  Is Refill Pharmacy correct?:23}    Would you like a call back once the refill request has been completed: [] Yes [] No    If the office needs to give you a call back, can they leave a voicemail: [] Yes [] No    Aidee Kwan MA  11/06/23, 13:36 CST

## 2023-11-07 RX ORDER — CLONIDINE HYDROCHLORIDE 0.1 MG/1
TABLET ORAL
Qty: 60 TABLET | Refills: 0 | Status: SHIPPED | OUTPATIENT
Start: 2023-11-07

## 2023-11-22 ENCOUNTER — HOSPITAL ENCOUNTER (OUTPATIENT)
Dept: CT IMAGING | Facility: HOSPITAL | Age: 48
Discharge: HOME | End: 2023-11-22
Admitting: OTOLARYNGOLOGY
Payer: COMMERCIAL

## 2023-11-22 LAB — CREAT BLDA-MCNC: 0.9 MG/DL (ref 0.6–1.3)

## 2023-11-22 PROCEDURE — 25510000001 IOPAMIDOL 61 % SOLUTION: Performed by: OTOLARYNGOLOGY

## 2023-11-22 PROCEDURE — 82565 ASSAY OF CREATININE: CPT

## 2023-11-22 PROCEDURE — 70492 CT SFT TSUE NCK W/O & W/DYE: CPT

## 2023-11-22 RX ADMIN — IOPAMIDOL 100 ML: 612 INJECTION, SOLUTION INTRAVENOUS at 15:02

## 2023-11-28 NOTE — PROGRESS NOTES
YOB: 1975  Location: [facility]  Location Address: [address] [place] [address],  3, [address] [place] [address]  Location Phone: [phone]    Chief Complaint   Patient presents with    discuss Ct results     Cough     Non stop, and clearing of throat    Difficulty Swallowing       History of Present Illness  Minnie Bang is a 48 y.o. female.  Minnie Bang is here for follow up of ENT complaints. The patient has had problems with dysphagia associated with hoarseness and occasional awakening at night associated with difficulty breathing.  She is taking Chantix and has a quit date to stop smoking.  The patient has had moderate to severe symptoms. The symptoms have been present for the last several months she also complains of the cyst on her forehead and difficulty sleeping at night associated with snoring and questionable apneic periods.  She is wondered if she has sleep apnea.    EPWORTH 12      Study Result    Narrative & Impression   EXAMINATION: CT SOFT TISSUE NECK W WO CONTRAST-      2023 2:34 PM CST     HISTORY: Neck mass, nonpulsatile; K21.2-Nmgixf-mwcpjiqrbf reflux disease  without esophagitis; J38.1-Polyp of vocal cord and larynx;  F17.209-Nicotine dependence, unspecified, with unspecified  nicotine-induced disorders     In order to have a CT radiation dose as low as reasonably achievable  Automated Exposure Control was utilized for adjustment of the mA and/or  KV according to patient size.     DLP in mGycm= 378.     CT soft tissue neck without and with IV contrast injection.  Axial, sagittal, and coronal sequences.     No comparison.     Noncontrast:  Mild bilateral carotid artery calcification.  No soft tissue or salivary gland calcification is seen.  Moderate degenerative disc space narrowing and endplate spurring at  C5-C6 and C6/C7.  Moderate facet arthropathy greater on the RIGHT as compared with the  LEFT side.  LEFT convex scoliosis noted.      Postcontrast:  Essentially clear paranasal sinuses.  Mild mucosal thickening within the floor of the RIGHT maxillary sinus.  Clear mastoid air cells.  Normal nasopharynx and parapharyngeal spaces.  Normal parotid and submandibular glands.     Normal prevertebral soft tissues.  No soft tissue mass or lymphadenopathy.  The thyroid gland shows no abnormality.     IMPRESSION:  1. No acute abnormality is seen.                                      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    Depression     Diastolic dysfunction 2022    Emphysema of lung 2020    Hyperlipidemia     Hypertension     Mixed simple and mucopurulent chronic bronchitis 04/18/2023    Non-occlusive coronary artery disease 05/04/2023    NSTEMI (non-ST elevated myocardial infarction) (HCC)     Pneumonia 2022    Pulmonary emphysema 04/21/2023       Past Surgical History:   Procedure Laterality Date    APPENDECTOMY      CARDIAC CATHETERIZATION N/A 11/04/2017    Procedure: Coronary angiography;  Surgeon: Luis Colvin MD;  Location:  [facility];  Service:     CARDIAC CATHETERIZATION N/A 5/31/2023    Procedure: Left Heart Cath;  Surgeon: Steffen Rossi MD;  Location:  [facility];  Service: Cardiology;  Laterality: N/A;    HYSTERECTOMY      AL RT/LT HEART CATHETERS N/A 11/04/2017    Procedure: Percutaneous Coronary Intervention;  Surgeon: Luis Colvin MD;  Location:  [facility];  Service: Cardiovascular    THYROID SURGERY      TOTAL THYROIDECTOMY         Outpatient Medications Marked as Taking for the 11/30/23 encounter (Office Visit) with Mendez Padilla MD   Medication Sig Dispense Refill    albuterol sulfate  (90 Base) MCG/ACT inhaler Inhale 2 puffs Every 4  (Four) Hours As Needed for Wheezing. 6.7 g 0    carvedilol (COREG) 25 MG tablet Take 1 tablet by mouth 2 (Two) Times a Day. 180 tablet 3    cloNIDine (CATAPRES) 0.1 MG tablet TAKE 1 TABLET BY MOUTH 2 (TWO) TIMES A DAY AS NEEDED FOR HIGH BLOOD PRESSURE (BLOOD PRESSURE >140/90). 60 tablet 0    dilTIAZem CD (CARDIZEM CD) 120 MG 24 hr capsule Take 1 capsule by mouth Daily. 30 capsule 11    FLUoxetine (PROzac) 10 MG capsule       hydroCHLOROthiazide (HYDRODIURIL) 25 MG tablet Take 1 tablet by mouth Daily. 30 tablet 11    ipratropium-albuterol (DUO-NEB) 0.5-2.5 mg/3 ml nebulizer Take 3 mL by nebulization Every 4 (Four) Hours As Needed for Wheezing. 360 mL 0    isosorbide mononitrate (IMDUR) 30 MG 24 hr tablet Take 1 tablet by mouth Daily. 30 tablet 11    levothyroxine (SYNTHROID, LEVOTHROID) 150 MCG tablet Take 175 mcg by mouth.      levothyroxine (SYNTHROID, LEVOTHROID) 175 MCG tablet       lisinopril (PRINIVIL,ZESTRIL) 40 MG tablet Take 1 tablet by mouth Daily.      meclizine (ANTIVERT) 25 MG tablet Take 1 tablet by mouth 3 (Three) Times a Day As Needed for Dizziness. 42 tablet 2    nitroglycerin (NITROSTAT) 0.4 MG SL tablet PLACE 1 TABLET UNDER THE TONGUE AS NEEDED FOR ANGINA, MAY REPEAT EVERY 5 MINUTES FOR UP THREE DOSES 25 tablet 3    omeprazole (priLOSEC) 40 MG capsule Take 1 capsule by mouth 2 (Two) Times a Day for 120 days. Take 30 minutes to 1 hour before meals 60 capsule 3    pantoprazole (PROTONIX) 40 MG EC tablet       potassium chloride (K-DUR,KLOR-CON) 20 MEQ CR tablet Take 1 tablet by mouth 2 (Two) Times a Day. 180 tablet 3    simvastatin (ZOCOR) 10 MG tablet       varenicline (Chantix Continuing Month Jorge) 1 MG tablet Take 1 tablet by mouth 2 (Two) Times a Day for 30 days. 60 tablet 3    varenicline (Chantix) 0.5 MG tablet Take 1 tablet by mouth 2 (Two) Times a Day. 60 tablet 0    Vitamin D, Cholecalciferol, (CHOLECALCIFEROL) 10 MCG (400 UNIT) tablet Take 1 tablet by mouth Daily.         Patient has no  known allergies.    Family History   Problem Relation Age of Onset    Asthma Mother     Cancer Mother     Emphysema Mother     Cancer Father     Heart failure Father     Hypertension Father        Social History     Socioeconomic History    Marital status:    Tobacco Use    Smoking status: Every Day     Packs/day: 0.50     Years: 30.00     Additional pack years: 0.00     Total pack years: 15.00     Types: Cigarettes     Start date: 1991     Last attempt to quit: 2022     Years since quittin.0     Passive exposure: Past    Tobacco comments:     Less than 1/2 pack a day    Vaping Use    Vaping Use: Never used   Substance and Sexual Activity    Alcohol use: Yes     Comment: occ    Drug use: No    Sexual activity: Not Currently     Partners: Male     Birth control/protection: Hysterectomy       Review of Systems  Negative except for HPI.  Vitals:    23 1038   BP: 162/82   Pulse: 103   Temp: 98.2 °F (36.8 °C)       Body mass index is 25.1 kg/m².    Objective     Physical Exam  CONSTITUTIONAL: well nourished, well-developed, alert, oriented, in no acute distress     COMMUNICATION AND VOICE: able to communicate normally, normal voice quality    HEAD: normocephalic, no lesions, atraumatic, no tenderness, no masses     FACE: appearance normal, 1.5 cm cystic lesion of the right forehead/medial brow, no tenderness, no deformities, facial motion symmetric    EYES: ocular motility normal, eyelids normal, orbits normal, no proptosis, conjunctiva normal , pupils equal, round     EARS:  Hearing: hearing to conversational voice intact bilaterally   External Ears: normal bilaterally, no lesions    NOSE:  External Nose: external nasal structure normal, no tenderness on palpation, no nasal discharge, no lesions, no evidence of trauma, nostrils patent     ORAL:  Lips: upper and lower lips without lesion     LARYNX:  See endoscopy    NECK:  Inspection and Palpation: neck appearance normal, no masses or  tenderness    CHEST/RESPIRATORY: normal respiratory effort     CARDIOVASCULAR: no cyanosis or edema     NEUROLOGICAL/PSYCHIATRIC: oriented to time, place and person, mood normal, affect appropriate, CN II-XII intact grossly     Assessment & Plan     Diagnoses and all orders for this visit:    1. Vocal cord polyps (Primary)    2. Laryngopharyngeal reflux    3. Neoplasm of uncertain behavior    4. Tobacco use disorder, continuous        * Surgery not found *  No orders of the defined types were placed in this encounter.    Return in about 5 weeks (around 1/4/2024).       Patient Instructions   Continue PPI  Discontinue tobacco use-continue Chantix  Direct laryngoscopy with excision true vocal cord polyps/bx in January with follow-up preoperatively  Discontinue milk and dairy  Call return for problems

## 2023-11-30 ENCOUNTER — OFFICE VISIT (OUTPATIENT)
Dept: OTOLARYNGOLOGY | Facility: CLINIC | Age: 48
End: 2023-11-30
Payer: COMMERCIAL

## 2023-11-30 ENCOUNTER — OFFICE VISIT (OUTPATIENT)
Dept: NEUROLOGY | Facility: CLINIC | Age: 48
End: 2023-11-30
Payer: COMMERCIAL

## 2023-11-30 VITALS
HEIGHT: 69 IN | BODY MASS INDEX: 25.18 KG/M2 | WEIGHT: 170 LBS | SYSTOLIC BLOOD PRESSURE: 162 MMHG | DIASTOLIC BLOOD PRESSURE: 82 MMHG | TEMPERATURE: 98.2 F | HEART RATE: 103 BPM

## 2023-11-30 VITALS
HEIGHT: 69 IN | BODY MASS INDEX: 25.48 KG/M2 | WEIGHT: 172 LBS | HEART RATE: 108 BPM | DIASTOLIC BLOOD PRESSURE: 82 MMHG | OXYGEN SATURATION: 97 % | SYSTOLIC BLOOD PRESSURE: 134 MMHG

## 2023-11-30 DIAGNOSIS — D48.9 NEOPLASM OF UNCERTAIN BEHAVIOR: ICD-10-CM

## 2023-11-30 DIAGNOSIS — G43.809 OTHER MIGRAINE WITHOUT STATUS MIGRAINOSUS, NOT INTRACTABLE: ICD-10-CM

## 2023-11-30 DIAGNOSIS — F17.209 TOBACCO USE DISORDER, CONTINUOUS: ICD-10-CM

## 2023-11-30 DIAGNOSIS — G47.10 HYPERSOMNOLENCE: Primary | ICD-10-CM

## 2023-11-30 DIAGNOSIS — K21.9 LARYNGOPHARYNGEAL REFLUX: ICD-10-CM

## 2023-11-30 DIAGNOSIS — J38.1 VOCAL CORD POLYPS: Primary | ICD-10-CM

## 2023-11-30 DIAGNOSIS — G24.3 CERVICAL DYSTONIA: ICD-10-CM

## 2023-11-30 RX ORDER — SIMVASTATIN 10 MG
10 TABLET ORAL NIGHTLY
COMMUNITY
Start: 2023-11-06

## 2023-11-30 RX ORDER — TOPIRAMATE 50 MG/1
TABLET, FILM COATED ORAL
Qty: 60 TABLET | Refills: 2 | Status: SHIPPED | OUTPATIENT
Start: 2023-11-30

## 2023-11-30 RX ORDER — FLUOXETINE 10 MG/1
10 CAPSULE ORAL DAILY
COMMUNITY
Start: 2023-11-06

## 2023-11-30 RX ORDER — PANTOPRAZOLE SODIUM 40 MG/1
40 TABLET, DELAYED RELEASE ORAL DAILY
COMMUNITY
Start: 2023-11-06

## 2023-11-30 NOTE — PATIENT INSTRUCTIONS
Continue PPI  Discontinue tobacco use-continue Chantix  Direct laryngoscopy with excision true vocal cord polyps/bx in January with follow-up preoperatively  Discontinue milk and dairy  Call return for problems

## 2023-11-30 NOTE — PROGRESS NOTES
Subjective        Minnie Bang presents to [facility]    History of Present Illness    The patient is a 48-year-old female with a history of chronic obstructive pulmonary disease, depression, hyperlipidemia, hypertension, and nonocclusive coronary artery disease who was referred for an evaluation of migraines.    She has experienced headaches before, but they are always present. Everything makes it worse, but she can not find anything that makes them better. When she lies down to sleep, she feels like she has to cup her hand to keep her head from touching a pillow because it makes her scalp hurt. She can not brush her hair. The pain is mostly in the back of her head. She is scared she is going to mess her liver up because she takes a lot of ibuprofen. She takes 3 Aleve 3 times a day. She wakes up with headaches. It feels like a thousand needles shoot up the back of her head to the point that it brings her to her knees, but it lasts just for a second. The needle like pain occurs 3 to 4 times a day. She fell the other night becoming dizzy while coming out of the laundry mat and fell, hitting her face on the concrete. She hit both of her eyes and had a knot on her head. She fell in the garage last year in 2022, due to her feet slipping out from under her on the shop floor and as she fell backwards she hit her head. She believes she sustained whiplash when she fell. She has a lot of movement of her neck. Her pain has been going on since she had COVID-19 the second time. She did not experience her pain the first time she was diagnosed with COVID-19.     She denies any family history of headaches.     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    CTS (carpal tunnel syndrome) 2019    Depression      Diastolic dysfunction 2022    Emphysema of lung 2020    Hyperlipidemia     Hypertension     Mixed simple and mucopurulent chronic bronchitis 04/18/2023    Non-occlusive coronary artery disease 05/04/2023    NSTEMI (non-ST elevated myocardial infarction) (HCC)     Pneumonia 2022    Pulmonary emphysema 04/21/2023    Shingles 2018          Current Outpatient Medications:     levothyroxine (SYNTHROID, LEVOTHROID) 150 MCG tablet, Take 1 tablet by mouth Daily., Disp: , Rfl:     albuterol sulfate  (90 Base) MCG/ACT inhaler, Inhale 2 puffs Every 4 (Four) Hours As Needed for Wheezing., Disp: 6.7 g, Rfl: 0    carvedilol (COREG) 25 MG tablet, Take 1 tablet by mouth 2 (Two) Times a Day., Disp: 180 tablet, Rfl: 3    cloNIDine (CATAPRES) 0.1 MG tablet, TAKE 1 TABLET BY MOUTH 2 (TWO) TIMES A DAY AS NEEDED FOR HIGH BLOOD PRESSURE (BLOOD PRESSURE >140/90)., Disp: 60 tablet, Rfl: 0    dilTIAZem CD (CARDIZEM CD) 120 MG 24 hr capsule, Take 1 capsule by mouth Daily., Disp: 30 capsule, Rfl: 11    FLUoxetine (PROzac) 10 MG capsule, , Disp: , Rfl:     hydroCHLOROthiazide (HYDRODIURIL) 25 MG tablet, Take 1 tablet by mouth Daily., Disp: 30 tablet, Rfl: 11    ipratropium-albuterol (DUO-NEB) 0.5-2.5 mg/3 ml nebulizer, Take 3 mL by nebulization Every 4 (Four) Hours As Needed for Wheezing., Disp: 360 mL, Rfl: 0    isosorbide mononitrate (IMDUR) 30 MG 24 hr tablet, Take 1 tablet by mouth Daily., Disp: 30 tablet, Rfl: 11    lisinopril (PRINIVIL,ZESTRIL) 40 MG tablet, Take 1 tablet by mouth Daily., Disp: , Rfl:     meclizine (ANTIVERT) 25 MG tablet, Take 1 tablet by mouth 3 (Three) Times a Day As Needed for Dizziness., Disp: 42 tablet, Rfl: 2    nitroglycerin (NITROSTAT) 0.4 MG SL tablet, PLACE 1 TABLET UNDER THE TONGUE AS NEEDED FOR ANGINA, MAY REPEAT EVERY 5 MINUTES FOR UP THREE DOSES, Disp: 25 tablet, Rfl: 3    omeprazole (priLOSEC) 40 MG capsule, Take 1 capsule by mouth 2 (Two) Times a Day for 120 days. Take 30 minutes to 1 hour  "before meals, Disp: 60 capsule, Rfl: 3    pantoprazole (PROTONIX) 40 MG EC tablet, , Disp: , Rfl:     potassium chloride (K-DUR,KLOR-CON) 20 MEQ CR tablet, Take 1 tablet by mouth 2 (Two) Times a Day., Disp: 180 tablet, Rfl: 3    simvastatin (ZOCOR) 10 MG tablet, , Disp: , Rfl:     varenicline (Chantix Continuing Month Jorge) 1 MG tablet, Take 1 tablet by mouth 2 (Two) Times a Day for 30 days., Disp: 60 tablet, Rfl: 3    varenicline (Chantix) 0.5 MG tablet, Take 1 tablet by mouth 2 (Two) Times a Day., Disp: 60 tablet, Rfl: 0    Vitamin D, Cholecalciferol, (CHOLECALCIFEROL) 10 MCG (400 UNIT) tablet, Take 1 tablet by mouth Daily., Disp: , Rfl:        Objective   Vital Signs:   Ht 175.3 cm (69\")   Wt 78 kg (172 lb)   BMI 25.40 kg/m²     Physical Exam  Constitutional:       General: She is awake.   Eyes:      Extraocular Movements: Extraocular movements intact.      Pupils: Pupils are equal, round, and reactive to light.   Neurological:      Mental Status: She is alert.      Motor: Motor strength is normal.     Deep Tendon Reflexes: Reflexes are normal and symmetric.   Psychiatric:         Speech: Speech normal.        Neurological Exam  Mental Status  Awake and alert. Oriented to person, place and time. Recent and remote memory are intact. Speech is normal. Language is fluent with no aphasia. Attention and concentration are normal. Fund of knowledge is appropriate for level of education.    Cranial Nerves  CN II: Visual fields full to confrontation.  CN III, IV, VI: Extraocular movements intact bilaterally. Pupils equal round and reactive to light bilaterally.  CN V: Facial sensation is normal.  CN VII: Full and symmetric facial movement.  CN IX, X: Palate elevates symmetrically  CN XI: Shoulder shrug strength is normal.  CN XII: Tongue midline without atrophy or fasciculations.  Leftward torticollis with some anterocollis  Right shoulder elevation.    Motor  Normal muscle bulk throughout. Normal muscle tone. No " abnormal involuntary movements. Strength is 5/5 throughout all four extremities.    Sensory  Light touch is normal in upper and lower extremities.     Reflexes  Deep tendon reflexes are 2+ and symmetric in all four extremities.    Coordination  Right: Finger-to-nose normal.Left: Finger-to-nose normal.    Gait  Casual gait is normal including stance, stride, and arm swing.      Result Review :              CT HEAD WO CONTRAST (03/11/2021 09:34 EST)        Assessment and Plan     The patient is a 48-year-old female with hypertension, chronic obstructive pulmonary disease, and hyperlipidemia who was referred for headache. The pain is mostly posterior and on exam, she very clearly has cervical dystonia, which I think is likely causing pain in those muscles. I would like to do Botox to help her cervical dystonia and this may eventually help the head pain as well. However, I am also going to start her on some Topamax for now to see if this will help her headache. Her Hopkins sleepiness scale was 16. She does have hypersomnolence and snores. I am going to order a sleep study on her as this could also explain her waking up with headaches.    Plan:  1. Auth for Botox for cervical dystonia.   2. I will start topiramate 25 mg, 2 times a day for 1 week. Thereafter she will take topiramate 50 mg, 2 times a day.   3. I will order a sleep study to be completed.   4. She will follow-up in 6 weeks.       Follow Up   No follow-ups on file.  Patient was given instructions and counseling regarding her condition or for health maintenance advice. Please see specific information pulled into the AVS if appropriate.         Transcribed from ambient dictation for Domitila Duncan MD by Carlie Wheeler.  11/30/23   18:18 CST    Patient or patient representative verbalized consent to the visit recording.  I have personally performed the services described in this document as transcribed by the above individual, and it is both accurate and  complete.

## 2023-11-30 NOTE — PROGRESS NOTES
OPERATIVE NOTE:  Minnie Bang    DATE OF PROCEDURE: 11/30/2023    PROCEDURE:   Flexible Fiberoptic Laryngoscopy    ANESTHESIA:  None    REASON FOR PROCEDURE:  Procedure was recommend for persistant hoarseness  Risks, benefits and alternatives were discussed.      DETAILS of OPERATION:  The patient was seated in the exam chair.  A flexible fiberoptic laryngoscopy was performed through the oral cavity.  The scope was introduced into the oral cavity and directed to the level of the glottis, examining the structures of the oropharynx, base of tongue, vallecula, supraglottic larynx, glottic larynx, and hypopharynx.      FINDINGS:  Mucosal surfaces:   The mucosal surfaces demonstrated normal mucosa surfaces with moderate inflammation throughout with clear but thick secretions    Base of tongue:  The base of tongue was found to have no mass or lesion.    Epiglottis:  The epiglottis was found to have  no mass or lesion.    Aryepligottic fold:  The AE folds were found to have no mass or lesion.    False Vocal Fold:  The false cords were found to have mild erythema.    True Vocal Cord:  The true vocal cords were found to have bilateral polyposis right greater than left without obvious evidence of neoplasm otherwise.  Both true vocal cords adduct and abduct normally .    Arytenoid:   The arytenoids were found to have moderate inter-arytenoid edema with erythema.    Hypopharynx:  The hypopharynx was found to have no mass or lesion.    The patient tolerated procedure well.

## 2023-12-18 ENCOUNTER — TELEPHONE (OUTPATIENT)
Dept: NEUROLOGY | Facility: CLINIC | Age: 48
End: 2023-12-18
Payer: COMMERCIAL

## 2023-12-18 NOTE — TELEPHONE ENCOUNTER
I LEFT MS. SINGH A VOICEMAIL LETTING HER KNOW THAT HER BOTOX IS APPROVED & SCHEDULED FOR 02/02/2024 @ 11:15. I ALSO EXPLAINED THAT SHE IS STILL SCHEDULED FOR HER FOLLOW-UP ON 01/25/2024 AS WELL.

## 2024-01-17 RX ORDER — CLONIDINE HYDROCHLORIDE 0.1 MG/1
TABLET ORAL
Qty: 60 TABLET | Refills: 0 | Status: SHIPPED | OUTPATIENT
Start: 2024-01-17

## 2024-01-24 ENCOUNTER — TELEPHONE (OUTPATIENT)
Dept: NEUROLOGY | Facility: CLINIC | Age: 49
End: 2024-01-24
Payer: COMMERCIAL

## 2024-02-20 ENCOUNTER — TELEPHONE (OUTPATIENT)
Dept: NEUROLOGY | Facility: CLINIC | Age: 49
End: 2024-02-20
Payer: COMMERCIAL

## 2024-02-20 NOTE — TELEPHONE ENCOUNTER
I left a message asking if Ms. Bang would like to reschedule her Botox appt. I wanted to make sure she still wanted to have the injections before putting her back on the schedule; I noticed that she had cancelled her last 2 Botox appts.

## 2024-02-26 ENCOUNTER — APPOINTMENT (OUTPATIENT)
Dept: GENERAL RADIOLOGY | Facility: HOSPITAL | Age: 49
End: 2024-02-26
Payer: COMMERCIAL

## 2024-02-26 ENCOUNTER — APPOINTMENT (OUTPATIENT)
Dept: CT IMAGING | Facility: HOSPITAL | Age: 49
End: 2024-02-26
Payer: COMMERCIAL

## 2024-02-26 ENCOUNTER — HOSPITAL ENCOUNTER (EMERGENCY)
Facility: HOSPITAL | Age: 49
Discharge: HOME | End: 2024-02-26
Admitting: STUDENT IN AN ORGANIZED HEALTH CARE EDUCATION/TRAINING PROGRAM
Payer: COMMERCIAL

## 2024-02-26 VITALS
DIASTOLIC BLOOD PRESSURE: 118 MMHG | TEMPERATURE: 98.4 F | HEIGHT: 69 IN | WEIGHT: 175 LBS | BODY MASS INDEX: 25.92 KG/M2 | RESPIRATION RATE: 18 BRPM | OXYGEN SATURATION: 95 % | SYSTOLIC BLOOD PRESSURE: 153 MMHG | HEART RATE: 113 BPM

## 2024-02-26 DIAGNOSIS — S01.81XA FACIAL LACERATION, INITIAL ENCOUNTER: ICD-10-CM

## 2024-02-26 DIAGNOSIS — S20.212A CONTUSION OF LEFT CHEST WALL, INITIAL ENCOUNTER: ICD-10-CM

## 2024-02-26 DIAGNOSIS — S00.03XA HEMATOMA OF SCALP, INITIAL ENCOUNTER: Primary | ICD-10-CM

## 2024-02-26 DIAGNOSIS — S50.01XA CONTUSION OF RIGHT ELBOW, INITIAL ENCOUNTER: ICD-10-CM

## 2024-02-26 DIAGNOSIS — S80.00XA CONTUSION OF KNEE, UNSPECIFIED LATERALITY, INITIAL ENCOUNTER: ICD-10-CM

## 2024-02-26 DIAGNOSIS — S30.1XXA CONTUSION OF ABDOMINAL WALL, INITIAL ENCOUNTER: ICD-10-CM

## 2024-02-26 LAB
ALBUMIN SERPL-MCNC: 4.3 G/DL (ref 3.5–5.2)
ALBUMIN/GLOB SERPL: 1.4 G/DL
ALP SERPL-CCNC: 282 U/L (ref 39–117)
ALT SERPL W P-5'-P-CCNC: 54 U/L (ref 1–33)
ANION GAP SERPL CALCULATED.3IONS-SCNC: 16 MMOL/L (ref 5–15)
AST SERPL-CCNC: 155 U/L (ref 1–32)
BASOPHILS # BLD AUTO: 0.1 10*3/MM3 (ref 0–0.2)
BASOPHILS NFR BLD AUTO: 1.1 % (ref 0–1.5)
BILIRUB SERPL-MCNC: 0.3 MG/DL (ref 0–1.2)
BILIRUB UR QL STRIP: NEGATIVE
BUN SERPL-MCNC: 5 MG/DL (ref 6–20)
BUN/CREAT SERPL: 8.3 (ref 7–25)
CALCIUM SPEC-SCNC: 8.9 MG/DL (ref 8.6–10.5)
CHLORIDE SERPL-SCNC: 96 MMOL/L (ref 98–107)
CLARITY UR: CLEAR
CO2 SERPL-SCNC: 25 MMOL/L (ref 22–29)
COLOR UR: YELLOW
CREAT SERPL-MCNC: 0.6 MG/DL (ref 0.57–1)
DEPRECATED RDW RBC AUTO: 50.3 FL (ref 37–54)
EGFRCR SERPLBLD CKD-EPI 2021: 109.5 ML/MIN/1.73
EOSINOPHIL # BLD AUTO: 0.08 10*3/MM3 (ref 0–0.4)
EOSINOPHIL NFR BLD AUTO: 0.8 % (ref 0.3–6.2)
ERYTHROCYTE [DISTWIDTH] IN BLOOD BY AUTOMATED COUNT: 13.9 % (ref 12.3–15.4)
ETHANOL UR QL: 0.15 %
GLOBULIN UR ELPH-MCNC: 3 GM/DL
GLUCOSE SERPL-MCNC: 93 MG/DL (ref 65–99)
GLUCOSE UR STRIP-MCNC: NEGATIVE MG/DL
HCT VFR BLD AUTO: 39.3 % (ref 34–46.6)
HGB BLD-MCNC: 13.5 G/DL (ref 12–15.9)
HGB UR QL STRIP.AUTO: NEGATIVE
HOLD SPECIMEN: NORMAL
HOLD SPECIMEN: NORMAL
IMM GRANULOCYTES # BLD AUTO: 0.05 10*3/MM3 (ref 0–0.05)
IMM GRANULOCYTES NFR BLD AUTO: 0.5 % (ref 0–0.5)
INR PPP: 0.92 (ref 0.91–1.09)
KETONES UR QL STRIP: NEGATIVE
LEUKOCYTE ESTERASE UR QL STRIP.AUTO: NEGATIVE
LYMPHOCYTES # BLD AUTO: 2.74 10*3/MM3 (ref 0.7–3.1)
LYMPHOCYTES NFR BLD AUTO: 28.8 % (ref 19.6–45.3)
MAGNESIUM SERPL-MCNC: 1.6 MG/DL (ref 1.6–2.6)
MCH RBC QN AUTO: 33.8 PG (ref 26.6–33)
MCHC RBC AUTO-ENTMCNC: 34.4 G/DL (ref 31.5–35.7)
MCV RBC AUTO: 98.5 FL (ref 79–97)
MONOCYTES # BLD AUTO: 0.96 10*3/MM3 (ref 0.1–0.9)
MONOCYTES NFR BLD AUTO: 10.1 % (ref 5–12)
NEUTROPHILS NFR BLD AUTO: 5.58 10*3/MM3 (ref 1.7–7)
NEUTROPHILS NFR BLD AUTO: 58.7 % (ref 42.7–76)
NITRITE UR QL STRIP: NEGATIVE
NRBC BLD AUTO-RTO: 0.2 /100 WBC (ref 0–0.2)
PH UR STRIP.AUTO: 6 [PH] (ref 5–8)
PLATELET # BLD AUTO: 310 10*3/MM3 (ref 140–450)
PMV BLD AUTO: 10.2 FL (ref 6–12)
POTASSIUM SERPL-SCNC: 2.7 MMOL/L (ref 3.5–5.2)
PROT SERPL-MCNC: 7.3 G/DL (ref 6–8.5)
PROT UR QL STRIP: NEGATIVE
PROTHROMBIN TIME: 12.4 SECONDS (ref 11.8–14.8)
RBC # BLD AUTO: 3.99 10*6/MM3 (ref 3.77–5.28)
SODIUM SERPL-SCNC: 137 MMOL/L (ref 136–145)
SP GR UR STRIP: 1.01 (ref 1–1.03)
TROPONIN T SERPL HS-MCNC: 10 NG/L
UROBILINOGEN UR QL STRIP: NORMAL
WBC NRBC COR # BLD AUTO: 9.51 10*3/MM3 (ref 3.4–10.8)

## 2024-02-26 PROCEDURE — 72125 CT NECK SPINE W/O DYE: CPT

## 2024-02-26 PROCEDURE — 96375 TX/PRO/DX INJ NEW DRUG ADDON: CPT

## 2024-02-26 PROCEDURE — 25810000003 LACTATED RINGERS SOLUTION: Performed by: NURSE PRACTITIONER

## 2024-02-26 PROCEDURE — 25010000002 POTASSIUM CHLORIDE 10 MEQ/100ML SOLUTION: Performed by: NURSE PRACTITIONER

## 2024-02-26 PROCEDURE — 84484 ASSAY OF TROPONIN QUANT: CPT | Performed by: NURSE PRACTITIONER

## 2024-02-26 PROCEDURE — 93005 ELECTROCARDIOGRAM TRACING: CPT | Performed by: NURSE PRACTITIONER

## 2024-02-26 PROCEDURE — P9612 CATHETERIZE FOR URINE SPEC: HCPCS

## 2024-02-26 PROCEDURE — 80053 COMPREHEN METABOLIC PANEL: CPT | Performed by: NURSE PRACTITIONER

## 2024-02-26 PROCEDURE — 73700 CT LOWER EXTREMITY W/O DYE: CPT

## 2024-02-26 PROCEDURE — 99285 EMERGENCY DEPT VISIT HI MDM: CPT

## 2024-02-26 PROCEDURE — 25510000001 IOPAMIDOL 61 % SOLUTION: Performed by: NURSE PRACTITIONER

## 2024-02-26 PROCEDURE — 73080 X-RAY EXAM OF ELBOW: CPT

## 2024-02-26 PROCEDURE — 82077 ASSAY SPEC XCP UR&BREATH IA: CPT | Performed by: NURSE PRACTITIONER

## 2024-02-26 PROCEDURE — 81003 URINALYSIS AUTO W/O SCOPE: CPT | Performed by: NURSE PRACTITIONER

## 2024-02-26 PROCEDURE — 0 HYDROMORPHONE 1 MG/ML SOLUTION: Performed by: NURSE PRACTITIONER

## 2024-02-26 PROCEDURE — 71260 CT THORAX DX C+: CPT

## 2024-02-26 PROCEDURE — 96376 TX/PRO/DX INJ SAME DRUG ADON: CPT

## 2024-02-26 PROCEDURE — 93010 ELECTROCARDIOGRAM REPORT: CPT | Performed by: EMERGENCY MEDICINE

## 2024-02-26 PROCEDURE — 36415 COLL VENOUS BLD VENIPUNCTURE: CPT

## 2024-02-26 PROCEDURE — 83735 ASSAY OF MAGNESIUM: CPT | Performed by: NURSE PRACTITIONER

## 2024-02-26 PROCEDURE — 25010000002 ONDANSETRON PER 1 MG: Performed by: NURSE PRACTITIONER

## 2024-02-26 PROCEDURE — 73200 CT UPPER EXTREMITY W/O DYE: CPT

## 2024-02-26 PROCEDURE — 96365 THER/PROPH/DIAG IV INF INIT: CPT

## 2024-02-26 PROCEDURE — 71045 X-RAY EXAM CHEST 1 VIEW: CPT

## 2024-02-26 PROCEDURE — 85025 COMPLETE CBC W/AUTO DIFF WBC: CPT | Performed by: NURSE PRACTITIONER

## 2024-02-26 PROCEDURE — 85610 PROTHROMBIN TIME: CPT | Performed by: NURSE PRACTITIONER

## 2024-02-26 PROCEDURE — 74177 CT ABD & PELVIS W/CONTRAST: CPT

## 2024-02-26 PROCEDURE — 70450 CT HEAD/BRAIN W/O DYE: CPT

## 2024-02-26 PROCEDURE — 73562 X-RAY EXAM OF KNEE 3: CPT

## 2024-02-26 RX ORDER — CYCLOBENZAPRINE HCL 10 MG
10 TABLET ORAL 3 TIMES DAILY PRN
Qty: 20 TABLET | Refills: 0 | Status: SHIPPED | OUTPATIENT
Start: 2024-02-26

## 2024-02-26 RX ORDER — LIDOCAINE HYDROCHLORIDE AND EPINEPHRINE BITARTRATE 20; .01 MG/ML; MG/ML
10 INJECTION, SOLUTION SUBCUTANEOUS ONCE
Status: COMPLETED | OUTPATIENT
Start: 2024-02-26 | End: 2024-02-26

## 2024-02-26 RX ORDER — OXYCODONE HYDROCHLORIDE AND ACETAMINOPHEN 5; 325 MG/1; MG/1
1 TABLET ORAL EVERY 6 HOURS PRN
Qty: 12 TABLET | Refills: 0 | Status: SHIPPED | OUTPATIENT
Start: 2024-02-26

## 2024-02-26 RX ORDER — ONDANSETRON 2 MG/ML
4 INJECTION INTRAMUSCULAR; INTRAVENOUS ONCE
Status: COMPLETED | OUTPATIENT
Start: 2024-02-26 | End: 2024-02-26

## 2024-02-26 RX ORDER — OXYCODONE HYDROCHLORIDE AND ACETAMINOPHEN 5; 325 MG/1; MG/1
1 TABLET ORAL EVERY 6 HOURS PRN
Qty: 12 TABLET | Refills: 0 | Status: SHIPPED | OUTPATIENT
Start: 2024-02-26 | End: 2024-02-26

## 2024-02-26 RX ORDER — POTASSIUM CHLORIDE 7.45 MG/ML
10 INJECTION INTRAVENOUS ONCE
Status: COMPLETED | OUTPATIENT
Start: 2024-02-26 | End: 2024-02-26

## 2024-02-26 RX ADMIN — SODIUM CHLORIDE, POTASSIUM CHLORIDE, SODIUM LACTATE AND CALCIUM CHLORIDE 1000 ML: 600; 310; 30; 20 INJECTION, SOLUTION INTRAVENOUS at 15:42

## 2024-02-26 RX ADMIN — HYDROMORPHONE HYDROCHLORIDE 1 MG: 1 INJECTION, SOLUTION INTRAMUSCULAR; INTRAVENOUS; SUBCUTANEOUS at 15:40

## 2024-02-26 RX ADMIN — LIDOCAINE HYDROCHLORIDE,EPINEPHRINE BITARTRATE 10 ML: 20; .01 INJECTION, SOLUTION INFILTRATION; PERINEURAL at 18:44

## 2024-02-26 RX ADMIN — HYDROMORPHONE HYDROCHLORIDE 1 MG: 1 INJECTION, SOLUTION INTRAMUSCULAR; INTRAVENOUS; SUBCUTANEOUS at 17:52

## 2024-02-26 RX ADMIN — IOPAMIDOL 100 ML: 612 INJECTION, SOLUTION INTRAVENOUS at 17:21

## 2024-02-26 RX ADMIN — POTASSIUM CHLORIDE 10 MEQ: 10 INJECTION, SOLUTION INTRAVENOUS at 16:38

## 2024-02-26 RX ADMIN — ONDANSETRON 4 MG: 2 INJECTION INTRAMUSCULAR; INTRAVENOUS at 15:39

## 2024-02-26 NOTE — ED NOTES
Abrasions noted to right elbow, bilateral knees. Noted dressing to head, bleeding controlled at this time. Will address head wound after CT scans completed.

## 2024-02-26 NOTE — ED PROVIDER NOTES
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 0.2 cm likely degenerative. Prevertebral soft tissues are    normal.         The visualized soft tissues of the neck are unremarkable.         Visualized lung apices are clear.         IMPRESSION:         Multilevel mid and lower cervical spondylosis and facet arthropathy    right, greater than left. No fracture is seen.         This report was signed and finalized on 2/26/2024 5:21 PM by Nicho Barreto.          CT Cervical Spine Without Contrast   Final Result    1. Chronic changes and no acute intracranial abnormality.    2. Right frontal scalp soft tissue defect.              CT CERVICAL SPINE:         TECHNIQUE: Unenhanced CT images of the cervical spine were obtained with    multiplanar reformats.         FINDINGS:         The facet joints are aligned bilaterally. There is multilevel facet    arthropathy right greater than left. There is spondylosis at C5-C6 and    C6-C7 with disc base narrowing and spurring of the endplates. No    fracture is seen. There is mild degenerative anterior listhesis at C4-C5     measuring 0.2 cm likely degenerative. Prevertebral soft tissues are    normal.         The visualized soft tissues of the neck are unremarkable.         Visualized lung apices are clear.         IMPRESSION:         Multilevel mid and lower cervical spondylosis and facet arthropathy    right, greater than left. No fracture is seen.         This report was signed and finalized on 2/26/2024 5:21 PM by Nicho Barreto.          CT Abdomen Pelvis With Contrast   Final Result    1. No active disease in the chest and no visualized fracture.              ABDOMEN AND PELVIS:    LIVER/BILE DUCTS: There is diffuse fatty infiltration of the liver.    Gallbladder and bile ducts are unremarkable.         KIDNEYS/URETERS: Bilateral kidneys and ureters are unremarkable. There    is no hydronephrosis.         ADRENAL: Unremarkable.         SPLEEN: Unremarkable.         PANCREAS: Unremarkable.         MESENTERY: Unremarkable with no free fluid or free air.         VASCULATURE: There is atherosclerosis of the abdominal aorta without    aneurysm. Abdominal vasculature is grossly intact.         GI TRACT: There is no         PELVIS: Urinary bladder is distended without wall thickening. The pelvic    portion of the GI tract is unremarkable.         SOFT TISSUES: Normal appearance of the overlying abdominal  and pelvic    soft tissues.          BONES: No acute or aggressive bony lesion. No acute fracture identified.                   IMPRESSION:    1. No evidence of trauma to the abdomen or pelvis.         This report was signed and finalized on 2/26/2024 5:41 PM by Nicho Barreto.          CT Chest With Contrast Diagnostic   Final Result    1. No active disease in the chest and no visualized fracture.              ABDOMEN AND PELVIS:    LIVER/BILE DUCTS: There is diffuse fatty infiltration of the liver.    Gallbladder and bile ducts are unremarkable.         KIDNEYS/URETERS: Bilateral kidneys and ureters are unremarkable.  There    is no hydronephrosis.         ADRENAL: Unremarkable.         SPLEEN: Unremarkable.         PANCREAS: Unremarkable.         MESENTERY: Unremarkable with no free fluid or free air.         VASCULATURE: There is atherosclerosis of the abdominal aorta without    aneurysm. Abdominal vasculature is grossly intact.         GI TRACT: There is no         PELVIS: Urinary bladder is distended without wall thickening. The pelvic    portion of the GI tract is unremarkable.         SOFT TISSUES: Normal appearance of the overlying abdominal  and pelvic    soft tissues.          BONES: No acute or aggressive bony lesion. No acute fracture identified.                   IMPRESSION:    1. No evidence of trauma to the abdomen or pelvis.         This report was signed and finalized on 2/26/2024 5:41 PM by Nicho Barreto.          CT Upper Extremity Right Without Contrast   Final Result    1. No acute bony abnormality.         This report was signed and finalized on 2/26/2024 5:10 PM by Dr. Joce Mustafa MD.          CT Lower Extremity Left Without Contrast   Final Result         1.  No fracture or dislocation identified.    2.  Trace amount of joint fluid and edema in the subcutaneous soft    tissues anteriorly.    3.  Tiny linear radiodensities in the subcutaneous soft tissues    anteriorly.         This report was signed and finalized on 2/26/2024 5:28 PM by Nicho Barreto.          XR Elbow 3+ View Right   Final Result         1. Chronic appearing deformity at the distal humerus seen only on the    lateral view however if there is a concern for acute fracture a CT could    be performed for further evaluation.    2. Dorsal soft tissue swelling and edema.         This report was signed and finalized on 2/26/2024 4:15 PM by Nicho Barreto.          XR Knee 3 View Bilateral   Final Result    1. Subtle cortical irregularity along the lateral aspect of the lateral    tibial plateau may represent fracture.  Clinical correlation is    recommended. MRI could be performed for further evaluation.    2. Small 0.2 cm densities in the subcutaneous soft tissue anteriorly on    the lateral view.         Right knee: Mineralization is normal. No acute fracture or cortical    irregularity is seen. No joint effusion or radiopaque foreign body is    seen. Soft tissues are unremarkable.         IMPRESSION:    1. Unremarkable exam.         This report was signed and finalized on 2/26/2024 4:20 PM by Nicho Barreto.          XR Chest 1 View   Final Result    1. No acute cardiopulmonary findings.         This report was signed and finalized on 2/26/2024 4:16 PM by Nicho Barreto.          Labs Reviewed  COMPREHENSIVE METABOLIC PANEL - Abnormal; Notable for the following components:     BUN                           5 (*)                  Potassium                     2.7 (*)                Chloride                      96 (*)                 ALT (SGPT)                    54 (*)                 AST (SGOT)                    155 (*)                Alkaline Phosphatase          282 (*)                Anion Gap                     16.0 (*)            All other components within normal limits         Narrative: GFR Normal >60                  Chronic Kidney Disease <60                  Kidney Failure <15                    CBC WITH AUTO DIFFERENTIAL - Abnormal; Notable for the following components:     MCV                           98.5 (*)               MCH                           33.8 (*)               Monocytes, Absolute           0.96 (*)            All other components within normal limits  PROTIME-INR - Normal  URINALYSIS W/ MICROSCOPIC IF INDICATED (NO CULTURE) - Normal         Narrative: Urine microscopic not indicated.  SINGLE HSTROPONIN T - Normal         Narrative: High Sensitive Troponin T Reference Range:                  <14.0 ng/L- Negative Female for AMI                  <22.0 ng/L- Negative Male for AMI                   >=14 - Abnormal Female indicating possible myocardial injury.                  >=22 - Abnormal Male indicating possible myocardial injury.                   Clinicians would have to utilize clinical acumen, EKG, Troponin, and serial changes to determine if it is an Acute Myocardial Infarction or myocardial injury due to an underlying chronic condition.                                       MAGNESIUM - Normal  ETHANOL         Narrative: Not for legal purposes. Chain of Custody not followed.   RAINBOW DRAW         Narrative: The following orders were created for panel order [place] Draw.                  Procedure                               Abnormality         Status                                     ---------                               -----------         ------                                     Red Top[[id number]]                                          Final result                               [place]anc[place] Top[[id number]]                                         In process                                                   Please view results for these tests on the individual orders.  CBC AND DIFFERENTIAL         Narrative: The following orders were created for panel order CBC & Differential.                  Procedure                               Abnormality         Status                                     ---------                               -----------         ------                                     CBC Auto Differential[[id number]]        Abnormal            Final result                                                 Please view results for these tests on the individual orders.  RED TOP  GRAY TOP  Potassium was replaced with 10 mEq IV.  Patient is on potassium at home.CT of the head was negative for any acute intracranial abnormality.  CT cervical spine was negative for any acute fracture.  CT of the right elbow was negative for acute fracture.  CT of the left knee was negative for any acute  fracture.  CT of the abdomen pelvis negative for any acute intra-abdominal injury.  CT of the chest is negative for any acute findings.  No pneumothorax or rib fractures.  Laceration to the right forehead repaired without difficulty.  Patient has already had her tetanus immunization is up-to-date.  She is having a lot of pain to her left lateral chest wall.  She denies any shortness of breath.  She has increased pain with movement.  No pneumothorax noted.  Advised the patient care for her laceration.  Advised to ice all the affected areas.  She is to return the emergency department in 5 days for suture removal.  Return the emergency department before she has redness, drainage, increased swelling to the area.  Sidney report completed and there is no signs of suspicious activity.  Will write the patient some pain medication.  Reviewed side effects and potential for abuse.  Patient is in agreement with the care plan will be discharged shortly in stable condition.      Problems Addressed:  Contusion of abdominal wall, initial encounter: complicated acute illness or injury  Contusion of knee, unspecified laterality, initial encounter: complicated acute illness or injury  Contusion of left chest wall, initial encounter: complicated acute illness or injury  Contusion of right elbow, initial encounter: complicated acute illness or injury  Facial laceration, initial encounter: complicated acute illness or injury  Hematoma of scalp, initial encounter: complicated acute illness or injury    Amount and/or Complexity of Data Reviewed  Labs: ordered. Decision-making details documented in ED Course.  Radiology: ordered. Decision-making details documented in ED Course.  ECG/medicine tests: ordered. Decision-making details documented in ED Course.    Risk  Prescription drug management.         Final diagnoses:   Hematoma of scalp, initial encounter   Facial laceration, initial encounter   Contusion of left chest wall, initial  encounter   Contusion of abdominal wall, initial encounter   Contusion of knee, unspecified laterality, initial encounter   Contusion of right elbow, initial encounter          Leah Neal, APRN  02/26/24 1904

## 2024-02-27 LAB
QT INTERVAL: 394 MS
QTC INTERVAL: 489 MS

## 2024-02-27 NOTE — DISCHARGE INSTRUCTIONS
Return to ER if symptoms worsen   Ice to areas  Clean wound twice daily with soap and water and apply bacitracin and nonstick dressing.  Turn the emergency department in 5 days for suture removal.  Return the emergency department for has increased swelling, drainage, redness to the wound.  Follow-up with your primary care doctor this week.

## 2024-04-12 DIAGNOSIS — G43.809 OTHER MIGRAINE WITHOUT STATUS MIGRAINOSUS, NOT INTRACTABLE: ICD-10-CM

## 2024-04-12 RX ORDER — TOPIRAMATE 50 MG/1
50 TABLET, FILM COATED ORAL 2 TIMES DAILY
Qty: 60 TABLET | Refills: 1 | Status: SHIPPED | OUTPATIENT
Start: 2024-04-12

## 2024-04-13 RX ORDER — DILTIAZEM HYDROCHLORIDE 120 MG/1
120 CAPSULE, COATED, EXTENDED RELEASE ORAL DAILY
Qty: 30 CAPSULE | Refills: 0 | Status: SHIPPED | OUTPATIENT
Start: 2024-04-13

## 2024-04-15 ENCOUNTER — TELEPHONE (OUTPATIENT)
Dept: OTOLARYNGOLOGY | Facility: CLINIC | Age: 49
End: 2024-04-15
Payer: COMMERCIAL

## 2024-04-15 NOTE — TELEPHONE ENCOUNTER
----- Message from bA Sánchez sent at 4/15/2024  8:09 AM CDT -----  Regarding: FW: Appointment Request  Contact: [phone]    ----- Message -----  From: Minnie Bang  Sent: 4/12/2024   2:33 PM CDT  To: [facility]  Subject: Appointment Request                              Appointment Request From: Minnie Bang    With Provider: Mendez Padilla [[facility]]    Preferred Date Range: 4/15/2024 – 4/19/2024    Preferred Times: Monday Afternoon, Tuesday Afternoon, Wednesday Afternoon, Thursday Afternoon, Friday Afternoon    Reason for visit: Follow-up    Comments:  My throat and a surgical procedure he had scheduled for me. I had alot of things happe. All at once and was unable to get it done so I had to cancel but my throat is gotten much worse

## 2024-05-23 ENCOUNTER — HOSPITAL ENCOUNTER (INPATIENT)
Facility: HOSPITAL | Age: 49
LOS: 2 days | Discharge: LEFT BEFORE DISCHARGE | DRG: 641 | End: 2024-05-25
Attending: INTERNAL MEDICINE | Admitting: INTERNAL MEDICINE
Payer: COMMERCIAL

## 2024-05-23 ENCOUNTER — APPOINTMENT (OUTPATIENT)
Dept: CT IMAGING | Facility: HOSPITAL | Age: 49
DRG: 641 | End: 2024-05-23
Payer: COMMERCIAL

## 2024-05-23 DIAGNOSIS — R13.10 DYSPHAGIA, UNSPECIFIED TYPE: ICD-10-CM

## 2024-05-23 DIAGNOSIS — E83.42 HYPOMAGNESEMIA: ICD-10-CM

## 2024-05-23 DIAGNOSIS — E87.6 HYPOKALEMIA: Primary | ICD-10-CM

## 2024-05-23 DIAGNOSIS — R11.10 VOMITING, UNSPECIFIED VOMITING TYPE, UNSPECIFIED WHETHER NAUSEA PRESENT: ICD-10-CM

## 2024-05-23 LAB
ALBUMIN SERPL-MCNC: 3.7 G/DL (ref 3.5–5.2)
ALBUMIN/GLOB SERPL: 1.2 G/DL
ALP SERPL-CCNC: 440 U/L (ref 39–117)
ALT SERPL W P-5'-P-CCNC: 53 U/L (ref 1–33)
ANION GAP SERPL CALCULATED.3IONS-SCNC: 20 MMOL/L (ref 5–15)
APTT PPP: 28.5 SECONDS (ref 24.5–36)
AST SERPL-CCNC: 163 U/L (ref 1–32)
BASOPHILS # BLD AUTO: 0.13 10*3/MM3 (ref 0–0.2)
BASOPHILS NFR BLD AUTO: 1 % (ref 0–1.5)
BILIRUB SERPL-MCNC: 0.5 MG/DL (ref 0–1.2)
BUN SERPL-MCNC: 2 MG/DL (ref 6–20)
BUN/CREAT SERPL: 4.8 (ref 7–25)
CALCIUM SPEC-SCNC: 8.2 MG/DL (ref 8.6–10.5)
CHLORIDE SERPL-SCNC: 92 MMOL/L (ref 98–107)
CHOLEST SERPL-MCNC: 233 MG/DL (ref 0–200)
CO2 SERPL-SCNC: 26 MMOL/L (ref 22–29)
CREAT SERPL-MCNC: 0.42 MG/DL (ref 0.57–1)
CRP SERPL-MCNC: 1.11 MG/DL (ref 0–0.5)
DEPRECATED RDW RBC AUTO: 51.9 FL (ref 37–54)
EGFRCR SERPLBLD CKD-EPI 2021: 118.6 ML/MIN/1.73
EOSINOPHIL # BLD AUTO: 0.16 10*3/MM3 (ref 0–0.4)
EOSINOPHIL NFR BLD AUTO: 1.2 % (ref 0.3–6.2)
ERYTHROCYTE [DISTWIDTH] IN BLOOD BY AUTOMATED COUNT: 13.8 % (ref 12.3–15.4)
ERYTHROCYTE [SEDIMENTATION RATE] IN BLOOD: 22 MM/HR (ref 0–30)
ETHANOL UR QL: 0.15 %
GLOBULIN UR ELPH-MCNC: 3.1 GM/DL
GLUCOSE BLDC GLUCOMTR-MCNC: 150 MG/DL (ref 70–130)
GLUCOSE SERPL-MCNC: 91 MG/DL (ref 65–99)
HCT VFR BLD AUTO: 36.6 % (ref 34–46.6)
HDLC SERPL-MCNC: 49 MG/DL (ref 40–60)
HETEROPH AB SER QL LA: NEGATIVE
HGB BLD-MCNC: 12.6 G/DL (ref 12–15.9)
IMM GRANULOCYTES # BLD AUTO: 0.09 10*3/MM3 (ref 0–0.05)
IMM GRANULOCYTES NFR BLD AUTO: 0.7 % (ref 0–0.5)
INR PPP: 0.91 (ref 0.91–1.09)
LDLC SERPL CALC-MCNC: 93 MG/DL (ref 0–100)
LDLC/HDLC SERPL: 1.51 {RATIO}
LYMPHOCYTES # BLD AUTO: 3.26 10*3/MM3 (ref 0.7–3.1)
LYMPHOCYTES NFR BLD AUTO: 25 % (ref 19.6–45.3)
MAGNESIUM SERPL-MCNC: 1.4 MG/DL (ref 1.6–2.6)
MCH RBC QN AUTO: 35.1 PG (ref 26.6–33)
MCHC RBC AUTO-ENTMCNC: 34.4 G/DL (ref 31.5–35.7)
MCV RBC AUTO: 101.9 FL (ref 79–97)
MONOCYTES # BLD AUTO: 1.43 10*3/MM3 (ref 0.1–0.9)
MONOCYTES NFR BLD AUTO: 11 % (ref 5–12)
NEUTROPHILS NFR BLD AUTO: 61.1 % (ref 42.7–76)
NEUTROPHILS NFR BLD AUTO: 7.98 10*3/MM3 (ref 1.7–7)
NRBC BLD AUTO-RTO: 0 /100 WBC (ref 0–0.2)
PLATELET # BLD AUTO: 397 10*3/MM3 (ref 140–450)
PMV BLD AUTO: 10.2 FL (ref 6–12)
POTASSIUM SERPL-SCNC: 2.2 MMOL/L (ref 3.5–5.2)
PROCALCITONIN SERPL-MCNC: 0.07 NG/ML (ref 0–0.25)
PROT SERPL-MCNC: 6.8 G/DL (ref 6–8.5)
PROTHROMBIN TIME: 12.7 SECONDS (ref 11.8–14.8)
RBC # BLD AUTO: 3.59 10*6/MM3 (ref 3.77–5.28)
S PYO AG THROAT QL: NEGATIVE
SODIUM SERPL-SCNC: 138 MMOL/L (ref 136–145)
TRIGL SERPL-MCNC: 550 MG/DL (ref 0–150)
TSH SERPL DL<=0.05 MIU/L-ACNC: 0.25 UIU/ML (ref 0.27–4.2)
VLDLC SERPL-MCNC: 91 MG/DL (ref 5–40)
WBC NRBC COR # BLD AUTO: 13.05 10*3/MM3 (ref 3.4–10.8)

## 2024-05-23 PROCEDURE — 93005 ELECTROCARDIOGRAM TRACING: CPT

## 2024-05-23 PROCEDURE — 25010000002 ONDANSETRON PER 1 MG: Performed by: INTERNAL MEDICINE

## 2024-05-23 PROCEDURE — 80061 LIPID PANEL: CPT | Performed by: INTERNAL MEDICINE

## 2024-05-23 PROCEDURE — 93010 ELECTROCARDIOGRAM REPORT: CPT | Performed by: INTERNAL MEDICINE

## 2024-05-23 PROCEDURE — 70491 CT SOFT TISSUE NECK W/DYE: CPT

## 2024-05-23 PROCEDURE — 25810000003 LACTATED RINGERS PER 1000 ML: Performed by: INTERNAL MEDICINE

## 2024-05-23 PROCEDURE — 25010000002 POTASSIUM CHLORIDE 10 MEQ/100ML SOLUTION

## 2024-05-23 PROCEDURE — 25010000002 MAGNESIUM SULFATE 2 GM/50ML SOLUTION

## 2024-05-23 PROCEDURE — 85610 PROTHROMBIN TIME: CPT

## 2024-05-23 PROCEDURE — 25010000002 ENOXAPARIN PER 10 MG: Performed by: INTERNAL MEDICINE

## 2024-05-23 PROCEDURE — 82077 ASSAY SPEC XCP UR&BREATH IA: CPT | Performed by: INTERNAL MEDICINE

## 2024-05-23 PROCEDURE — 25010000002 DEXAMETHASONE PER 1 MG

## 2024-05-23 PROCEDURE — 85730 THROMBOPLASTIN TIME PARTIAL: CPT

## 2024-05-23 PROCEDURE — 87880 STREP A ASSAY W/OPTIC: CPT

## 2024-05-23 PROCEDURE — 86140 C-REACTIVE PROTEIN: CPT

## 2024-05-23 PROCEDURE — 86308 HETEROPHILE ANTIBODY SCREEN: CPT

## 2024-05-23 PROCEDURE — 99285 EMERGENCY DEPT VISIT HI MDM: CPT

## 2024-05-23 PROCEDURE — 82746 ASSAY OF FOLIC ACID SERUM: CPT | Performed by: INTERNAL MEDICINE

## 2024-05-23 PROCEDURE — 82607 VITAMIN B-12: CPT | Performed by: INTERNAL MEDICINE

## 2024-05-23 PROCEDURE — 25010000002 MAGNESIUM SULFATE 2 GM/50ML SOLUTION: Performed by: HOSPITALIST

## 2024-05-23 PROCEDURE — 87081 CULTURE SCREEN ONLY: CPT

## 2024-05-23 PROCEDURE — 85652 RBC SED RATE AUTOMATED: CPT

## 2024-05-23 PROCEDURE — 82948 REAGENT STRIP/BLOOD GLUCOSE: CPT

## 2024-05-23 PROCEDURE — 83735 ASSAY OF MAGNESIUM: CPT

## 2024-05-23 PROCEDURE — 84145 PROCALCITONIN (PCT): CPT

## 2024-05-23 PROCEDURE — 80050 GENERAL HEALTH PANEL: CPT

## 2024-05-23 PROCEDURE — 36415 COLL VENOUS BLD VENIPUNCTURE: CPT

## 2024-05-23 PROCEDURE — 25510000001 IOPAMIDOL 61 % SOLUTION

## 2024-05-23 RX ORDER — OXYCODONE HYDROCHLORIDE AND ACETAMINOPHEN 5; 325 MG/1; MG/1
1 TABLET ORAL EVERY 6 HOURS PRN
Status: DISCONTINUED | OUTPATIENT
Start: 2024-05-23 | End: 2024-05-25

## 2024-05-23 RX ORDER — DILTIAZEM HYDROCHLORIDE 120 MG/1
120 CAPSULE, COATED, EXTENDED RELEASE ORAL DAILY
Status: DISCONTINUED | OUTPATIENT
Start: 2024-05-23 | End: 2024-05-25 | Stop reason: HOSPADM

## 2024-05-23 RX ORDER — SODIUM CHLORIDE 9 MG/ML
40 INJECTION, SOLUTION INTRAVENOUS AS NEEDED
Status: DISCONTINUED | OUTPATIENT
Start: 2024-05-23 | End: 2024-05-25 | Stop reason: HOSPADM

## 2024-05-23 RX ORDER — BISACODYL 10 MG
10 SUPPOSITORY, RECTAL RECTAL DAILY PRN
Status: DISCONTINUED | OUTPATIENT
Start: 2024-05-23 | End: 2024-05-25 | Stop reason: HOSPADM

## 2024-05-23 RX ORDER — CLONIDINE HYDROCHLORIDE 0.1 MG/1
0.1 TABLET ORAL AS NEEDED
COMMUNITY

## 2024-05-23 RX ORDER — POTASSIUM CHLORIDE 7.45 MG/ML
10 INJECTION INTRAVENOUS
Status: DISPENSED | OUTPATIENT
Start: 2024-05-23 | End: 2024-05-23

## 2024-05-23 RX ORDER — PANTOPRAZOLE SODIUM 40 MG/1
40 TABLET, DELAYED RELEASE ORAL
Status: DISCONTINUED | OUTPATIENT
Start: 2024-05-24 | End: 2024-05-25 | Stop reason: HOSPADM

## 2024-05-23 RX ORDER — SODIUM CHLORIDE 0.9 % (FLUSH) 0.9 %
10 SYRINGE (ML) INJECTION EVERY 12 HOURS SCHEDULED
Status: DISCONTINUED | OUTPATIENT
Start: 2024-05-23 | End: 2024-05-25 | Stop reason: HOSPADM

## 2024-05-23 RX ORDER — TOPIRAMATE 25 MG/1
50 TABLET ORAL 2 TIMES DAILY
Status: DISCONTINUED | OUTPATIENT
Start: 2024-05-23 | End: 2024-05-25 | Stop reason: HOSPADM

## 2024-05-23 RX ORDER — CARVEDILOL 25 MG/1
25 TABLET ORAL 2 TIMES DAILY
Status: DISCONTINUED | OUTPATIENT
Start: 2024-05-23 | End: 2024-05-25 | Stop reason: HOSPADM

## 2024-05-23 RX ORDER — LEVOTHYROXINE SODIUM 0.15 MG/1
150 TABLET ORAL
Status: DISCONTINUED | OUTPATIENT
Start: 2024-05-24 | End: 2024-05-24

## 2024-05-23 RX ORDER — MAGNESIUM SULFATE HEPTAHYDRATE 40 MG/ML
2 INJECTION, SOLUTION INTRAVENOUS
Status: COMPLETED | OUTPATIENT
Start: 2024-05-23 | End: 2024-05-24

## 2024-05-23 RX ORDER — LEVOTHYROXINE SODIUM 0.15 MG/1
150 TABLET ORAL DAILY
Status: DISCONTINUED | OUTPATIENT
Start: 2024-05-23 | End: 2024-05-23 | Stop reason: SDUPTHER

## 2024-05-23 RX ORDER — POTASSIUM CHLORIDE 1.5 G/1.58G
40 POWDER, FOR SOLUTION ORAL EVERY 4 HOURS
Status: COMPLETED | OUTPATIENT
Start: 2024-05-23 | End: 2024-05-24

## 2024-05-23 RX ORDER — NITROGLYCERIN 0.4 MG/1
0.4 TABLET SUBLINGUAL
Status: DISCONTINUED | OUTPATIENT
Start: 2024-05-23 | End: 2024-05-25 | Stop reason: HOSPADM

## 2024-05-23 RX ORDER — PANTOPRAZOLE SODIUM 40 MG/1
40 TABLET, DELAYED RELEASE ORAL DAILY
Status: DISCONTINUED | OUTPATIENT
Start: 2024-05-23 | End: 2024-05-23

## 2024-05-23 RX ORDER — ENOXAPARIN SODIUM 100 MG/ML
40 INJECTION SUBCUTANEOUS NIGHTLY
Status: DISCONTINUED | OUTPATIENT
Start: 2024-05-23 | End: 2024-05-25 | Stop reason: HOSPADM

## 2024-05-23 RX ORDER — OMEGA-3S/DHA/EPA/FISH OIL/D3 300MG-1000
400 CAPSULE ORAL DAILY
Status: DISCONTINUED | OUTPATIENT
Start: 2024-05-23 | End: 2024-05-25 | Stop reason: HOSPADM

## 2024-05-23 RX ORDER — IPRATROPIUM BROMIDE AND ALBUTEROL SULFATE 2.5; .5 MG/3ML; MG/3ML
3 SOLUTION RESPIRATORY (INHALATION) EVERY 4 HOURS PRN
Status: DISCONTINUED | OUTPATIENT
Start: 2024-05-23 | End: 2024-05-25 | Stop reason: HOSPADM

## 2024-05-23 RX ORDER — LISINOPRIL 10 MG/1
40 TABLET ORAL DAILY
Status: DISCONTINUED | OUTPATIENT
Start: 2024-05-23 | End: 2024-05-25 | Stop reason: HOSPADM

## 2024-05-23 RX ORDER — SODIUM CHLORIDE 0.9 % (FLUSH) 0.9 %
10 SYRINGE (ML) INJECTION AS NEEDED
Status: DISCONTINUED | OUTPATIENT
Start: 2024-05-23 | End: 2024-05-25 | Stop reason: HOSPADM

## 2024-05-23 RX ORDER — AMOXICILLIN 250 MG
2 CAPSULE ORAL 2 TIMES DAILY PRN
Status: DISCONTINUED | OUTPATIENT
Start: 2024-05-23 | End: 2024-05-25 | Stop reason: HOSPADM

## 2024-05-23 RX ORDER — CLONIDINE HYDROCHLORIDE 0.1 MG/1
0.1 TABLET ORAL EVERY 12 HOURS PRN
Status: DISCONTINUED | OUTPATIENT
Start: 2024-05-23 | End: 2024-05-25 | Stop reason: HOSPADM

## 2024-05-23 RX ORDER — SODIUM CHLORIDE, SODIUM LACTATE, POTASSIUM CHLORIDE, CALCIUM CHLORIDE 600; 310; 30; 20 MG/100ML; MG/100ML; MG/100ML; MG/100ML
50 INJECTION, SOLUTION INTRAVENOUS CONTINUOUS
Status: DISCONTINUED | OUTPATIENT
Start: 2024-05-23 | End: 2024-05-25 | Stop reason: HOSPADM

## 2024-05-23 RX ORDER — ISOSORBIDE MONONITRATE 30 MG/1
30 TABLET, EXTENDED RELEASE ORAL DAILY
Status: DISCONTINUED | OUTPATIENT
Start: 2024-05-23 | End: 2024-05-25 | Stop reason: HOSPADM

## 2024-05-23 RX ORDER — POLYETHYLENE GLYCOL 3350 17 G/17G
17 POWDER, FOR SOLUTION ORAL DAILY PRN
Status: DISCONTINUED | OUTPATIENT
Start: 2024-05-23 | End: 2024-05-25 | Stop reason: HOSPADM

## 2024-05-23 RX ORDER — MECLIZINE HYDROCHLORIDE 25 MG/1
25 TABLET ORAL 3 TIMES DAILY PRN
Status: DISCONTINUED | OUTPATIENT
Start: 2024-05-23 | End: 2024-05-25 | Stop reason: HOSPADM

## 2024-05-23 RX ORDER — ONDANSETRON 2 MG/ML
4 INJECTION INTRAMUSCULAR; INTRAVENOUS EVERY 4 HOURS PRN
Status: DISCONTINUED | OUTPATIENT
Start: 2024-05-23 | End: 2024-05-25 | Stop reason: HOSPADM

## 2024-05-23 RX ORDER — FLUOXETINE 10 MG/1
10 CAPSULE ORAL DAILY
Status: DISCONTINUED | OUTPATIENT
Start: 2024-05-23 | End: 2024-05-25 | Stop reason: HOSPADM

## 2024-05-23 RX ORDER — CYCLOBENZAPRINE HCL 10 MG
10 TABLET ORAL 3 TIMES DAILY PRN
Status: DISCONTINUED | OUTPATIENT
Start: 2024-05-23 | End: 2024-05-25 | Stop reason: HOSPADM

## 2024-05-23 RX ORDER — MAGNESIUM SULFATE HEPTAHYDRATE 40 MG/ML
2 INJECTION, SOLUTION INTRAVENOUS ONCE
Status: COMPLETED | OUTPATIENT
Start: 2024-05-23 | End: 2024-05-23

## 2024-05-23 RX ORDER — DEXAMETHASONE SODIUM PHOSPHATE 10 MG/ML
10 INJECTION INTRAMUSCULAR; INTRAVENOUS ONCE
Status: COMPLETED | OUTPATIENT
Start: 2024-05-23 | End: 2024-05-23

## 2024-05-23 RX ORDER — ALUMINA, MAGNESIA, AND SIMETHICONE 2400; 2400; 240 MG/30ML; MG/30ML; MG/30ML
15 SUSPENSION ORAL EVERY 6 HOURS PRN
Status: DISCONTINUED | OUTPATIENT
Start: 2024-05-23 | End: 2024-05-25 | Stop reason: HOSPADM

## 2024-05-23 RX ORDER — ATORVASTATIN CALCIUM 10 MG/1
10 TABLET, FILM COATED ORAL DAILY
Status: DISCONTINUED | OUTPATIENT
Start: 2024-05-23 | End: 2024-05-25 | Stop reason: HOSPADM

## 2024-05-23 RX ORDER — BISACODYL 5 MG/1
5 TABLET, DELAYED RELEASE ORAL DAILY PRN
Status: DISCONTINUED | OUTPATIENT
Start: 2024-05-23 | End: 2024-05-25 | Stop reason: HOSPADM

## 2024-05-23 RX ADMIN — TOPICAL ANESTHETIC 2 SPRAY: 200 SPRAY DENTAL; PERIODONTAL at 14:35

## 2024-05-23 RX ADMIN — SODIUM CHLORIDE, POTASSIUM CHLORIDE, SODIUM LACTATE AND CALCIUM CHLORIDE 100 ML/HR: 600; 310; 30; 20 INJECTION, SOLUTION INTRAVENOUS at 19:52

## 2024-05-23 RX ADMIN — BENZOCAINE 6 MG-MENTHOL 10 MG LOZENGES 1 EACH: at 19:56

## 2024-05-23 RX ADMIN — Medication 10 ML: at 21:23

## 2024-05-23 RX ADMIN — CARVEDILOL 25 MG: 25 TABLET, FILM COATED ORAL at 21:22

## 2024-05-23 RX ADMIN — OXYCODONE HYDROCHLORIDE AND ACETAMINOPHEN 1 TABLET: 5; 325 TABLET ORAL at 19:56

## 2024-05-23 RX ADMIN — POTASSIUM CHLORIDE 40 MEQ: 1.5 POWDER, FOR SOLUTION ORAL at 19:52

## 2024-05-23 RX ADMIN — TOPIRAMATE 50 MG: 25 TABLET, FILM COATED ORAL at 21:22

## 2024-05-23 RX ADMIN — NYSTATIN 500000 UNITS: 100000 SUSPENSION ORAL at 21:23

## 2024-05-23 RX ADMIN — ENOXAPARIN SODIUM 40 MG: 100 INJECTION SUBCUTANEOUS at 21:21

## 2024-05-23 RX ADMIN — DEXAMETHASONE SODIUM PHOSPHATE 10 MG: 10 INJECTION INTRAMUSCULAR; INTRAVENOUS at 14:41

## 2024-05-23 RX ADMIN — MAGNESIUM SULFATE HEPTAHYDRATE 2 G: 2 INJECTION, SOLUTION INTRAVENOUS at 16:03

## 2024-05-23 RX ADMIN — POTASSIUM CHLORIDE 10 MEQ: 7.46 INJECTION, SOLUTION INTRAVENOUS at 16:03

## 2024-05-23 RX ADMIN — MAGNESIUM SULFATE HEPTAHYDRATE 2 G: 2 INJECTION, SOLUTION INTRAVENOUS at 23:11

## 2024-05-23 RX ADMIN — IOPAMIDOL 100 ML: 612 INJECTION, SOLUTION INTRAVENOUS at 15:43

## 2024-05-23 RX ADMIN — POTASSIUM CHLORIDE 40 MEQ: 1.5 POWDER, FOR SOLUTION ORAL at 23:21

## 2024-05-23 RX ADMIN — MECLIZINE HYDROCLORIDE 25 MG: 25 TABLET ORAL at 23:28

## 2024-05-23 RX ADMIN — CLONIDINE HYDROCHLORIDE 0.1 MG: 0.1 TABLET ORAL at 23:44

## 2024-05-23 RX ADMIN — ONDANSETRON 4 MG: 2 INJECTION INTRAMUSCULAR; INTRAVENOUS at 20:27

## 2024-05-23 NOTE — ED PROVIDER NOTES
Subjective   History of Present Illness  Patient is a 49-year-old female who presents emergency department with complaints of sore throat and dysphagia.  Patient reports that symptoms have been going on for a while, but have worsened since last night.  She states that she feels like she cannot swallow anything.  States that she has had some vomiting episodes.  She is supposed to be following with ENT on May 28 to discuss surgery for polyps in her throat.  She has followed with Dr. Padilla in the past regarding polyps.  Patient reports that she has felt feverish, but is unsure of any definite fevers.  Patient states that she was seen at [facility] this morning and they have sent her here for further evaluation.  She did test negative for strep at [facility].        Review of Systems   HENT:  Positive for sore throat and trouble swallowing.    Gastrointestinal:  Positive for vomiting.   All other systems reviewed and are negative.      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    CTS (carpal tunnel syndrome) 2019    Depression     Diastolic dysfunction 2022    Emphysema of lung 2020    Hyperlipidemia     Hypertension     Mixed simple and mucopurulent chronic bronchitis 04/18/2023    Non-occlusive coronary artery disease 05/04/2023    NSTEMI (non-ST elevated myocardial infarction) (HCC)     Pneumonia 2022    Pulmonary emphysema 04/21/2023    Shingles 2018       No Known Allergies    Past Surgical History:   Procedure Laterality Date    APPENDECTOMY      CARDIAC CATHETERIZATION N/A 11/04/2017    Procedure: Coronary angiography;  Surgeon: Luis Colvin MD;  Location:  [facility] CATH INVASIVE LOCATION;  Service:     CARDIAC CATHETERIZATION N/A 05/31/2023    Procedure: Left Heart Cath;  Surgeon: Steffen Rossi MD;  Location:   [facility] CATH INVAS[facility] LOCATION;  Service: Cardiology;  Laterality: N/A;    CARPAL TUNNEL RELEASE  2019    HYSTERECTOMY      NC RT/LT HEART CATHETERS N/A 2017    Procedure: Percutaneous Coronary Intervention;  Surgeon: Luis Colvin MD;  Location:  [facility] CATH INVASIVE LOCATION;  Service: Cardiovascular    THYROID SURGERY      TOTAL THYROIDECTOMY         Family History   Problem Relation Age of Onset    Asthma Mother     Cancer Mother     Emphysema Mother     Cancer Father     Heart failure Father     Hypertension Father        Social History     Socioeconomic History    Marital status:    Tobacco Use    Smoking status: Every Day     Current packs/day: 0.00     Average packs/day: 0.5 packs/day for 31.8 years (15.9 ttl pk-yrs)     Types: Cigarettes     Start date: 1991     Last attempt to quit: 2022     Years since quittin.5     Passive exposure: Past    Tobacco comments:     Less than 1/2 pack a day    Vaping Use    Vaping status: Never Used   Substance and Sexual Activity    Alcohol use: Yes     Comment: occ    Drug use: No    Sexual activity: Not Currently     Partners: Male     Birth control/protection: Hysterectomy           Objective   Physical Exam  Vitals and nursing note reviewed.   Constitutional:       Appearance: Normal appearance. She is normal weight. She is not ill-appearing or toxic-appearing.   HENT:      Head: Normocephalic.      Nose: Nose normal.      Mouth/Throat:      Pharynx: Uvula midline. Pharyngeal swelling and posterior oropharyngeal erythema present.      Tonsils: No tonsillar exudate. 2+ on the right. 2+ on the left.   Cardiovascular:      Rate and Rhythm: Regular rhythm. Tachycardia present.      Pulses: Normal pulses.      Heart sounds: Normal heart sounds.   Pulmonary:      Effort: Pulmonary effort is normal.      Breath sounds: Normal breath sounds.   Abdominal:      General: Abdomen is flat. Bowel sounds are normal. There is no distension.      Palpations:  Abdomen is soft.      Tenderness: There is no abdominal tenderness.   Musculoskeletal:         General: Normal range of motion.      Cervical back: Normal range of motion and neck supple.   Skin:     General: Skin is warm and dry.   Neurological:      General: No focal deficit present.      Mental Status: She is alert and oriented to person, place, and time. Mental status is at baseline.   Psychiatric:         Mood and Affect: Mood normal.         Behavior: Behavior normal.         Thought Content: Thought content normal.         Judgment: Judgment normal.         Procedures           ED Course  ED Course as of 05/23/24 1612   Thu May 23, 2024   1548 Labs reviewed.  CBC with leukocytosis of 13.05, stable H&H.  CMP significant for potassium 2.2.  ALT, AST, alk phos elevated which appears to be chronic.  Magnesium 1.4.  Sed rate normal, CRP mildly elevated.  Procalcitonin normal.  PTT and PT/INR unremarkable.  Negative strep screen, negative mono.   [KR]   1549 IV potassium and magnesium replacement has been ordered.  Patient has also been given IV Decadron and HurriCaine spray in the ER for symptomatic relief. [KR]   1549 CT soft tissue neck reviewed by radiologist.  This revealed no acute findings. [KR]   1604 Patient will need admission for hypokalemia. Call placed to on call hospitalist. Patient agreeable to plan. [KR]   1612 Spoke with on-call hospitalist, Dr. White.  He has agreed to except this patient for further management.  Patient will be admitted under Dr. Toney's service.  Patient is agreeable plan. [KR]      ED Course User Index  [KR] Cesia Hough APRN                                             Medical Decision Making  Problems Addressed:  Dysphagia, unspecified type: complicated acute illness or injury  Hypokalemia: complicated acute illness or injury  Hypomagnesemia: complicated acute illness or injury  Vomiting, unspecified vomiting type, unspecified whether nausea present: complicated  acute illness or injury    Amount and/or Complexity of Data Reviewed  Labs: ordered.  Radiology: ordered.  ECG/medicine tests: ordered.    Risk  OTC drugs.  Prescription drug management.  Decision regarding hospitalization.        Final diagnoses:   Hypokalemia   Dysphagia, unspecified type   Vomiting, unspecified vomiting type, unspecified whether nausea present   Hypomagnesemia       ED Disposition  ED Disposition       ED Disposition   Decision to Admit    Condition   --    Comment   Level of Care: Telemetry [5]   Diagnosis: Hypokalemia [286303]   Admitting Physician: CHINYERE HAMILTON [[id number]]   Certification: I Certify That Inpatient Hospital Services Are Medically Necessary For Greater Than 2 Midnights                 No follow-up provider specified.       Medication List      No changes were made to your prescriptions during this visit.            Cesia Hough, APRN  05/23/24 4113

## 2024-05-23 NOTE — H&P
"4         [facility] Medicine Services  HISTORY AND PHYSICAL    Date of Admission: 5/23/2024  Primary Care Physician: Aidee Padilla APRN    Subjective   Primary Historian: Patient    Chief Complaint: Sore throat with difficulty swallowing    History of Present Illness    Patient is a 49-year-old woman who I am asked to admit for hypokalemia.  Potassium is 2.2.  Her magnesium is also 1.4  Evaluation of the sore throat which patient came in includes a strep antigen test that is negative, beta strep culture throat swab has been sent but pending  Monospot test is negative  White count is 13 with predominance of neutrophils, patient has normal procalcitonin and C-reactive protein is slightly elevated at 1.1.  Calcium is 8.2.  Albumin is 3.7.  Patient was initiated on replacement of electrolytes.  EKG showed sinus tachycardia with nonspecific ST-T wave changes.    During my encounter did not appear to be forthright in giving me the information.  Patient basically said she has sore throat this has been going on for several weeks now.  She had seen ENT and she is scheduled supposedly to have surgery for a polyp in February but she put this to a back burner due to several health issues that occurred with family members.    I am told by NP Cesia that she has an appointment to see ENT this coming Tuesday.  The reason I am asked to admit this patient is because of multiple electrolyte abnormalities.  She told me that she has been unable to eat for 8 months but when I asked her if she lost weight, she could not tell me directly.  She also mentioned to me she feels puffy in her face.  She told me that she every day is like throwing up in the morning.  Laying down makes her feel drowning but she does not have any gross fluid retention neither  She exclaimed that she feels like something is in her her throat on the left side.  (Something stuck\"  She had to wake up nightly because of dry mouth.  She " "has to take sips of water.  She described it as having needles at the back of her throat.    She quit smoking on March 31.  She told me she hardly ever drink but when I asked details about what her last drink was last night and she told me that she drinks nightly.  She further told me she has \"whole list of medications\" she could not even name anything and could not tell me when she had last seen her primary care provider or cardiologist.    I got the impression that all her symptoms been going on chronically.      Review of Systems     No reported chest pain, shortness of breath or difficulty breathing  No urinary or bowel disturbance  No passing out  No dizziness or lightheadedness no complaint of headache    Otherwise complete ROS reviewed and negative except as mentioned in the HPI.    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    CTS (carpal tunnel syndrome) 2019    Depression     Diastolic dysfunction 2022    Emphysema of lung 2020    Hyperlipidemia     Hypertension     Mixed simple and mucopurulent chronic bronchitis 04/18/2023    Non-occlusive coronary artery disease 05/04/2023    NSTEMI (non-ST elevated myocardial infarction) (HCC)     Pneumonia 2022    Pulmonary emphysema 04/21/2023    Shingles 2018     Past Surgical History:  Past Surgical History:   Procedure Laterality Date    APPENDECTOMY      CARDIAC CATHETERIZATION N/A 11/04/2017    Procedure: Coronary angiography;  Surgeon: Luis Colvin MD;  Location:  [facility] CATH INVASIVE LOCATION;  Service:     CARDIAC CATHETERIZATION N/A 05/31/2023    Procedure: Left Heart Cath;  Surgeon: Steffen Rossi MD;  Location:  [facility] CATH INVASIVE LOCATION;  Service: Cardiology;  Laterality: N/A;    CARPAL TUNNEL RELEASE  2019    HYSTERECTOMY      MA RT/LT " HEART CATHETERS N/A 11/04/2017    Procedure: Percutaneous Coronary Intervention;  Surgeon: Luis Colvin MD;  Location: [facility] CATH INVASIVE LOCATION;  Service: Cardiovascular    THYROID SURGERY      TOTAL THYROIDECTOMY       Social History:  reports that she has been smoking cigarettes. She started smoking about 33 years ago. She has a 15.9 pack-year smoking history. She has been exposed to tobacco smoke. She does not have any smokeless tobacco history on file. She reports current alcohol use. She reports that she does not use drugs.    Family History: family history includes Asthma in her mother; Cancer in her father and mother; Emphysema in her mother; Heart failure in her father; Hypertension in her father.       Allergies:  No Known Allergies    Medications:  Prior to Admission medications    Medication Sig Start Date End Date Taking? Authorizing Provider   albuterol sulfate  (90 Base) MCG/ACT inhaler Inhale 2 puffs Every 4 (Four) Hours As Needed for Wheezing. 9/22/23   Sivan Wise APRN   carvedilol (COREG) 25 MG tablet Take 1 tablet by mouth 2 (Two) Times a Day. 5/11/23   Seema Hendricks APRN   cloNIDine (CATAPRES) 0.1 MG tablet TAKE 1 TABLET BY MOUTH 2 (TWO) TIMES A DAY AS NEEDED FOR HIGH BLOOD PRESSURE (BLOOD PRESSURE >140/90). 1/17/24   Steffen Rossi MD   cyclobenzaprine (FLEXERIL) 10 MG tablet Take 1 tablet by mouth 3 (Three) Times a Day As Needed for Muscle Spasms. 2/26/24   Leah Neal APRN   dilTIAZem CD (CARDIZEM CD) 120 MG 24 hr capsule TAKE 1 CAPSULE BY MOUTH DAILY. 4/13/24   Steffen Rossi MD   FLUoxetine (PROzac) 10 MG capsule Take 1 capsule by mouth Daily. 11/6/23   Provider, MD Galdino   hydroCHLOROthiazide (HYDRODIURIL) 25 MG tablet Take 1 tablet by mouth Daily. 5/17/23   Seema Hendricks APRN   ipratropium-albuterol (DUO-NEB) 0.5-2.5 mg/3 ml nebulizer Take 3 mL by nebulization Every 4 (Four) Hours As Needed for Wheezing. 4/3/23   Savage Calderon  MD Lele   isosorbide mononitrate (IMDUR) 30 MG 24 hr tablet Take 1 tablet by mouth Daily. 4/18/23   Saqib Liang APRN   levothyroxine (SYNTHROID, LEVOTHROID) 150 MCG tablet Take 1 tablet by mouth Daily.    Galdino Wei MD   lisinopril (PRINIVIL,ZESTRIL) 40 MG tablet Take 1 tablet by mouth Daily.    Galdino Wei MD   meclizine (ANTIVERT) 25 MG tablet Take 1 tablet by mouth 3 (Three) Times a Day As Needed for Dizziness. 3/10/23   Steffen Rossi MD   nitroglycerin (NITROSTAT) 0.4 MG SL tablet PLACE 1 TABLET UNDER THE TONGUE AS NEEDED FOR ANGINA, MAY REPEAT EVERY 5 MINUTES FOR UP THREE DOSES 11/6/23   Seema Hendricks APRN   oxyCODONE-acetaminophen (PERCOCET) 5-325 MG per tablet Take 1 tablet by mouth Every 6 (Six) Hours As Needed for Moderate Pain. 2/26/24   Leah Neal APRN   pantoprazole (PROTONIX) 40 MG EC tablet Take 1 tablet by mouth Daily. 11/6/23   Galdino Wei MD   potassium chloride (K-DUR,KLOR-CON) 20 MEQ CR tablet Take 1 tablet by mouth 2 (Two) Times a Day.  Patient not taking: Reported on 11/30/2023 5/1/23   Steffen Rossi MD   simvastatin (ZOCOR) 10 MG tablet Take 1 tablet by mouth Every Night. 11/6/23   Galdino Wei MD   topiramate (TOPAMAX) 50 MG tablet Take 1 tablet by mouth 2 (Two) Times a Day. 4/12/24   Domitila Duncan MD   varenicline (Chantix) 0.5 MG tablet Take 1 tablet by mouth 2 (Two) Times a Day. 11/2/23   Tin Barcenas APRN   Vitamin D, Cholecalciferol, (CHOLECALCIFEROL) 10 MCG (400 UNIT) tablet Take 1 tablet by mouth Daily.    Galdino Wei MD     I have utilized all available immediate resources to obtain, update, or review the patient's current medications (including all prescriptions, over-the-counter products, herbals, cannabis/cannabidiol products, and vitamin/mineral/dietary (nutritional) supplements).    Objective     Vital Signs: /90 (Patient Position: Sitting)   Pulse 113   Temp 98.1 °F (36.7 °C) (Oral)   Resp 18   " Ht 175.3 cm (69\")   Wt 79.8 kg (176 lb)   LMP  (LMP Unknown)   SpO2 96%   BMI 25.99 kg/m²   Physical Exam   GEN: Awake, alert, interactive, in NAD  HEENT: Atraumatic, PERRLA, EOMI, Anicteric, Trachea midline  No gross tonsillopharyngeal congestion, to side for tongue is whitish coat.  Lungs: CTAB, no wheezing/rales/rhonchi  Heart: RRR, +S1/s2, no rub  ABD: soft, nt/nd, +BS, no guarding/rebound  Extremities: atraumatic, no cyanosis, no edema  Skin: no rashes or lesions  Neuro: AAOx3, no focal deficits  Psych: normal mood & affect    Results Reviewed:  Lab Results (last 24 hours)       Procedure Component Value Units Date/Time    Magnesium [[id number]]  (Abnormal) Collected: 05/23/24 1440    Specimen: Blood Updated: 05/23/24 1524     Magnesium 1.4 mg/dL     Procalcitonin [[id number]]  (Normal) Collected: 05/23/24 1440    Specimen: Blood Updated: 05/23/24 1514     Procalcitonin 0.07 ng/mL     Narrative:      As a Marker for Sepsis (Non-Neonates):    1. <0.5 ng/mL represents a low risk of severe sepsis and/or septic shock.  2. >2 ng/mL represents a high risk of severe sepsis and/or septic shock.    As a Marker for Lower Respiratory Tract Infections that require antibiotic therapy:    PCT on Admission    Antibiotic Therapy       6-12 Hrs later    >0.5                Strongly Recommended  >0.25 - <0.5        Recommended   0.1 - 0.25          Discouraged              Remeasure/reassess PCT  <0.1                Strongly Discouraged     Remeasure/reassess PCT    As 28 day mortality risk marker: \"Change in Procalcitonin Result\" (>80% or <=80%) if Day 0 (or Day 1) and Day 4 values are available. Refer to http://www.[facility]-pct-calculator.com    Change in PCT <=80%  A decrease of PCT levels below or equal to 80% defines a positive change in PCT test result representing a higher risk for 28-day all-cause mortality of patients diagnosed with severe sepsis for septic shock.    Change in PCT >80%  A decrease of PCT levels of " more than 80% defines a negative change in PCT result representing a lower risk for 28-day all-cause mortality of patients diagnosed with severe sepsis or septic shock.       Rapid Strep A Screen - Swab, Throat [[id number]]  (Normal) Collected: 05/23/24 1446    Specimen: Swab from Throat Updated: 05/23/24 1513     Strep A Ag Negative    Beta Strep Culture, Throat - Swab, Throat [[id number]] Collected: 05/23/24 1446    Specimen: Swab from Throat Updated: 05/23/24 1513    Comprehensive Metabolic Panel [[id number]]  (Abnormal) Collected: 05/23/24 1440    Specimen: Blood Updated: 05/23/24 1512     Glucose 91 mg/dL      BUN 2 mg/dL      Creatinine 0.42 mg/dL      Sodium 138 mmol/L      Potassium 2.2 mmol/L      Comment: Slight hemolysis detected by analyzer. Result may be falsely elevated.        Chloride 92 mmol/L      CO2 26.0 mmol/L      Calcium 8.2 mg/dL      Total Protein 6.8 g/dL      Albumin 3.7 g/dL      ALT (SGPT) 53 U/L      AST (SGOT) 163 U/L      Alkaline Phosphatase 440 U/L      Total Bilirubin 0.5 mg/dL      Globulin 3.1 gm/dL      A/G Ratio 1.2 g/dL      BUN/Creatinine Ratio 4.8     Anion Gap 20.0 mmol/L      eGFR 118.6 mL/min/1.73     Narrative:      GFR Normal >60  Chronic Kidney Disease <60  Kidney Failure <15      Protime-INR [[id number]]  (Normal) Collected: 05/23/24 1440    Specimen: Blood Updated: 05/23/24 1512     Protime 12.7 Seconds      INR 0.91    aPTT [[id number]]  (Normal) Collected: 05/23/24 1440    Specimen: Blood Updated: 05/23/24 1512     PTT 28.5 seconds     C-reactive Protein [[id number]]  (Abnormal) Collected: 05/23/24 1440    Specimen: Blood Updated: 05/23/24 1508     C-Reactive Protein 1.11 mg/dL     Sedimentation Rate [[id number]]  (Normal) Collected: 05/23/24 1440    Specimen: Blood Updated: 05/23/24 1501     Sed Rate 22 mm/hr     Mononucleosis Screen [[id number]]  (Normal) Collected: 05/23/24 1440    Specimen: Blood Updated: 05/23/24 1457     Monospot Negative    CBC & Differential  [[id number]]  (Abnormal) Collected: 05/23/24 1440    Specimen: Blood Updated: 05/23/24 1450    Narrative:      The following orders were created for panel order CBC & Differential.  Procedure                               Abnormality         Status                     ---------                               -----------         ------                     CBC Auto Differential[[id number]]        Abnormal            Final result                 Please view results for these tests on the individual orders.    CBC Auto Differential [[id number]]  (Abnormal) Collected: 05/23/24 1440    Specimen: Blood Updated: 05/23/24 1450     WBC 13.05 10*3/mm3      RBC 3.59 10*6/mm3      Hemoglobin 12.6 g/dL      Hematocrit 36.6 %      .9 fL      MCH 35.1 pg      MCHC 34.4 g/dL      RDW 13.8 %      RDW-SD 51.9 fl      MPV 10.2 fL      Platelets 397 10*3/mm3      Neutrophil % 61.1 %      Lymphocyte % 25.0 %      Monocyte % 11.0 %      Eosinophil % 1.2 %      Basophil % 1.0 %      Immature Grans % 0.7 %      Neutrophils, Absolute 7.98 10*3/mm3      Lymphocytes, Absolute 3.26 10*3/mm3      Monocytes, Absolute 1.43 10*3/mm3      Eosinophils, Absolute 0.16 10*3/mm3      Basophils, Absolute 0.13 10*3/mm3      Immature Grans, Absolute 0.09 10*3/mm3      nRBC 0.0 /100 WBC           Imaging Results (Last 24 Hours)       Procedure Component Value Units Date/Time    CT Soft Tissue Neck With Contrast [[id number]] Collected: 05/23/24 1549     Updated: 05/23/24 5044    Narrative:      EXAMINATION: CT SOFT TISSUE NECK W CONTRAST-      5/23/2024 2:36 PM     HISTORY: dysphagia; E87.6-Hypokalemia; R13.10-Dysphagia, unspecified     In order to have a CT radiation dose as low as reasonably achievable  Automated Exposure Control was utilized for adjustment of the mA and/or  KV according to patient size.     CT Dose DLP = 185.01 mGy.cm.  (If there are multiple studies performed at the same time this  represents the total dose).     Neck CT with IV  contrast injection.  Axial, sagittal, and coronal sequences.     Clear paranasal sinuses.     Normal appearance of the nasopharynx.  Normal parapharyngeal spaces.     No discrete tonsillar mass or enlargement.  Normal epiglottis.  Normal prevertebral soft tissues.     Normal and symmetric parotid and submandibular glands.     Diminutive or absent thyroid gland.     Clear lung apices.     No soft tissue mass or lymphadenopathy.     Moderate cervical spine degenerative disc and endplate change at C5-C6  and C6/C7.       Impression:      1. No acute abnormality is seen.     This report was signed and finalized on 5/23/2024 3:51 PM by Dr. Joce Mustafa MD.             I have personally reviewed and interpreted the radiology studies and ECG obtained at time of admission.     Assessment / Plan   Assessment:   Active Hospital Problems    Diagnosis     **Hypokalemia          Medical Decision Making  Number and Complexity of problems:   Hypokalemia, hypomagnesemia, mild hypocalcemia  COPD - no exacerbation  Tobacco abuse in remission  Hyperlipidemia  Hypertension  Hypothyroidism  Macrocytosis without anemia  ? Thrush - trail of nystatin      Treatment Plan  The patient will be admitted to my service here at [facility].  Electrolyte replacement per protocol, monitor and follow  Continue telemetry-patient at risk for arrhythmia given abnormalities.    Will monitor for any airway compromise.  Will monitor for change in clinical status that will require immediate/emergent consultation with ENT or changing course of action.    I think the primary issue would be to correct electrolytes    I am not certain how she would have lost potassium and magnesium sides the fact that she is on hydrochlorothiazide if truly taking.  There is a potassium twice daily from November 30, 2023 but may not be accurate at this time.    Will give some fluids    Resume meds accordingly     bronchodilator    Conditions and Status  Fair  MDM  Data  External documents reviewed: Reviewed epic record  Cardiac tracing (EKG, telemetry) interpretation: See HPI  Radiology interpretation: Reviewed as outlined above  Labs reviewed: Yes  Any tests that were considered but not ordered: None     Decision rules/scores evaluated (example [id number], Wells, etc): None     Discussed with: Patient     Care Planning  Shared decision making: Patient  Code status and discussions: Full code    Disposition  Social Determinants of Health that impact treatment or disposition: None identified at this time  Estimated length of stay is to be determined  .     I confirmed that the patient's advanced care plan is present, code status is documented, and a surrogate decision maker is listed in the patient's medical record.     The patient's surrogate decision maker is family.     The patient was seen and examined by me on   Electronically signed by Johan Toney MD, 05/23/24, 16:26 CDT.

## 2024-05-24 LAB
FOLATE SERPL-MCNC: 2.67 NG/ML (ref 4.78–24.2)
MAGNESIUM SERPL-MCNC: 2.8 MG/DL (ref 1.6–2.6)
POTASSIUM SERPL-SCNC: 2.6 MMOL/L (ref 3.5–5.2)
POTASSIUM SERPL-SCNC: 2.7 MMOL/L (ref 3.5–5.2)
VIT B12 BLD-MCNC: 716 PG/ML (ref 211–946)

## 2024-05-24 PROCEDURE — 83735 ASSAY OF MAGNESIUM: CPT | Performed by: HOSPITALIST

## 2024-05-24 PROCEDURE — 25010000002 THIAMINE PER 100 MG: Performed by: HOSPITALIST

## 2024-05-24 PROCEDURE — 84132 ASSAY OF SERUM POTASSIUM: CPT | Performed by: INTERNAL MEDICINE

## 2024-05-24 PROCEDURE — 25010000002 MAGNESIUM SULFATE 2 GM/50ML SOLUTION: Performed by: HOSPITALIST

## 2024-05-24 PROCEDURE — 25010000002 ENOXAPARIN PER 10 MG: Performed by: INTERNAL MEDICINE

## 2024-05-24 PROCEDURE — 25810000003 LACTATED RINGERS PER 1000 ML: Performed by: INTERNAL MEDICINE

## 2024-05-24 PROCEDURE — 25010000002 POTASSIUM CHLORIDE 10 MEQ/100ML SOLUTION: Performed by: HOSPITALIST

## 2024-05-24 PROCEDURE — 25010000002 THIAMINE HCL 200 MG/2ML SOLUTION: Performed by: HOSPITALIST

## 2024-05-24 RX ORDER — FOLIC ACID 1 MG/1
1 TABLET ORAL DAILY
Status: DISCONTINUED | OUTPATIENT
Start: 2024-05-24 | End: 2024-05-25 | Stop reason: HOSPADM

## 2024-05-24 RX ORDER — LORAZEPAM 1 MG/1
1 TABLET ORAL
Status: DISCONTINUED | OUTPATIENT
Start: 2024-05-24 | End: 2024-05-25 | Stop reason: HOSPADM

## 2024-05-24 RX ORDER — THIAMINE HYDROCHLORIDE 100 MG/ML
200 INJECTION, SOLUTION INTRAMUSCULAR; INTRAVENOUS EVERY 8 HOURS SCHEDULED
Status: DISCONTINUED | OUTPATIENT
Start: 2024-05-24 | End: 2024-05-25 | Stop reason: HOSPADM

## 2024-05-24 RX ORDER — POTASSIUM CHLORIDE 7.45 MG/ML
10 INJECTION INTRAVENOUS
Status: DISPENSED | OUTPATIENT
Start: 2024-05-24 | End: 2024-05-24

## 2024-05-24 RX ORDER — MULTIPLE VITAMINS W/ MINERALS TAB 9MG-400MCG
1 TAB ORAL DAILY
Status: DISCONTINUED | OUTPATIENT
Start: 2024-05-24 | End: 2024-05-25 | Stop reason: HOSPADM

## 2024-05-24 RX ORDER — POTASSIUM CHLORIDE 750 MG/1
40 CAPSULE, EXTENDED RELEASE ORAL EVERY 4 HOURS
Status: COMPLETED | OUTPATIENT
Start: 2024-05-24 | End: 2024-05-25

## 2024-05-24 RX ORDER — ACETAMINOPHEN 325 MG/1
650 TABLET ORAL EVERY 6 HOURS PRN
Status: DISCONTINUED | OUTPATIENT
Start: 2024-05-24 | End: 2024-05-25 | Stop reason: HOSPADM

## 2024-05-24 RX ADMIN — POTASSIUM CHLORIDE 10 MEQ: 7.46 INJECTION, SOLUTION INTRAVENOUS at 10:49

## 2024-05-24 RX ADMIN — TOPIRAMATE 50 MG: 25 TABLET, FILM COATED ORAL at 20:19

## 2024-05-24 RX ADMIN — BENZOCAINE 6 MG-MENTHOL 10 MG LOZENGES 1 EACH: at 01:08

## 2024-05-24 RX ADMIN — CARVEDILOL 25 MG: 25 TABLET, FILM COATED ORAL at 08:42

## 2024-05-24 RX ADMIN — MAGNESIUM SULFATE HEPTAHYDRATE 2 G: 2 INJECTION, SOLUTION INTRAVENOUS at 01:08

## 2024-05-24 RX ADMIN — BENZOCAINE 6 MG-MENTHOL 10 MG LOZENGES 1 EACH: at 16:17

## 2024-05-24 RX ADMIN — Medication 10 ML: at 08:43

## 2024-05-24 RX ADMIN — LORAZEPAM 1 MG: 1 TABLET ORAL at 03:02

## 2024-05-24 RX ADMIN — POTASSIUM CHLORIDE 10 MEQ: 7.46 INJECTION, SOLUTION INTRAVENOUS at 06:27

## 2024-05-24 RX ADMIN — MAGNESIUM SULFATE HEPTAHYDRATE 2 G: 2 INJECTION, SOLUTION INTRAVENOUS at 03:01

## 2024-05-24 RX ADMIN — PANTOPRAZOLE SODIUM 40 MG: 40 TABLET, DELAYED RELEASE ORAL at 06:07

## 2024-05-24 RX ADMIN — THIAMINE HYDROCHLORIDE 200 MG: 100 INJECTION, SOLUTION INTRAMUSCULAR; INTRAVENOUS at 21:50

## 2024-05-24 RX ADMIN — OXYCODONE HYDROCHLORIDE AND ACETAMINOPHEN 1 TABLET: 5; 325 TABLET ORAL at 20:18

## 2024-05-24 RX ADMIN — POTASSIUM CHLORIDE 10 MEQ: 7.46 INJECTION, SOLUTION INTRAVENOUS at 12:14

## 2024-05-24 RX ADMIN — POTASSIUM CHLORIDE 40 MEQ: 750 CAPSULE, EXTENDED RELEASE ORAL at 21:50

## 2024-05-24 RX ADMIN — POTASSIUM CHLORIDE 40 MEQ: 750 CAPSULE, EXTENDED RELEASE ORAL at 17:51

## 2024-05-24 RX ADMIN — FLUOXETINE 10 MG: 10 CAPSULE ORAL at 08:43

## 2024-05-24 RX ADMIN — THIAMINE HYDROCHLORIDE 200 MG: 100 INJECTION, SOLUTION INTRAMUSCULAR; INTRAVENOUS at 17:57

## 2024-05-24 RX ADMIN — POTASSIUM CHLORIDE 40 MEQ: 1.5 POWDER, FOR SOLUTION ORAL at 03:02

## 2024-05-24 RX ADMIN — THIAMINE HYDROCHLORIDE 200 MG: 100 INJECTION, SOLUTION INTRAMUSCULAR; INTRAVENOUS at 06:07

## 2024-05-24 RX ADMIN — ATORVASTATIN CALCIUM 10 MG: 10 TABLET, FILM COATED ORAL at 08:42

## 2024-05-24 RX ADMIN — ACETAMINOPHEN 650 MG: 325 TABLET, FILM COATED ORAL at 12:14

## 2024-05-24 RX ADMIN — NYSTATIN 500000 UNITS: 100000 SUSPENSION ORAL at 20:19

## 2024-05-24 RX ADMIN — CHOLECALCIFEROL TAB 10 MCG (400 UNIT) 400 UNITS: 10 TAB at 08:42

## 2024-05-24 RX ADMIN — BENZOCAINE 6 MG-MENTHOL 10 MG LOZENGES 1 EACH: at 17:52

## 2024-05-24 RX ADMIN — Medication 10 ML: at 20:19

## 2024-05-24 RX ADMIN — FOLIC ACID 1 MG: 1 TABLET ORAL at 08:42

## 2024-05-24 RX ADMIN — ENOXAPARIN SODIUM 40 MG: 100 INJECTION SUBCUTANEOUS at 20:19

## 2024-05-24 RX ADMIN — DILTIAZEM HYDROCHLORIDE 120 MG: 120 CAPSULE, EXTENDED RELEASE ORAL at 08:42

## 2024-05-24 RX ADMIN — CARVEDILOL 25 MG: 25 TABLET, FILM COATED ORAL at 20:19

## 2024-05-24 RX ADMIN — BENZOCAINE 6 MG-MENTHOL 10 MG LOZENGES 1 EACH: at 20:19

## 2024-05-24 RX ADMIN — POTASSIUM CHLORIDE 10 MEQ: 7.46 INJECTION, SOLUTION INTRAVENOUS at 13:18

## 2024-05-24 RX ADMIN — SODIUM CHLORIDE, POTASSIUM CHLORIDE, SODIUM LACTATE AND CALCIUM CHLORIDE 100 ML/HR: 600; 310; 30; 20 INJECTION, SOLUTION INTRAVENOUS at 06:07

## 2024-05-24 RX ADMIN — OXYCODONE HYDROCHLORIDE AND ACETAMINOPHEN 1 TABLET: 5; 325 TABLET ORAL at 03:02

## 2024-05-24 RX ADMIN — POTASSIUM CHLORIDE 10 MEQ: 7.46 INJECTION, SOLUTION INTRAVENOUS at 08:42

## 2024-05-24 RX ADMIN — LORAZEPAM 1 MG: 1 TABLET ORAL at 01:08

## 2024-05-24 RX ADMIN — NYSTATIN 500000 UNITS: 100000 SUSPENSION ORAL at 17:52

## 2024-05-24 RX ADMIN — LEVOTHYROXINE SODIUM 150 MCG: 150 TABLET ORAL at 06:07

## 2024-05-24 RX ADMIN — LISINOPRIL 40 MG: 10 TABLET ORAL at 08:42

## 2024-05-24 RX ADMIN — Medication 1 TABLET: at 08:42

## 2024-05-24 RX ADMIN — NYSTATIN 500000 UNITS: 100000 SUSPENSION ORAL at 08:42

## 2024-05-24 RX ADMIN — TOPIRAMATE 50 MG: 25 TABLET, FILM COATED ORAL at 08:42

## 2024-05-24 NOTE — PLAN OF CARE
Goal Outcome Evaluation:      Pt A&O x4, RA, Up Ad Yarely. Pt denies any SI throughout the day, but does endorse feeling down about alcohol dependency and health issues. Pt potassium remains low, additional replacement per protocol ordered. Pt is not activly showing signs of withdrawal but is unable to state when last ETOH consumption occurred despite ETOH level on admission. HR NSR/ST

## 2024-05-24 NOTE — PLAN OF CARE
Goal Outcome Evaluation:  Plan of Care Reviewed With: patient        Progress: no change  Outcome Evaluation: Pt new admission from ER right before start of shift. Pt had multiple complaints throught night, nausea/vomiting, headache, painful throat. Treated with PRN medication with relief noted. Also during shift pt states that she does not remember driving herself to the ER and unsure of where she parked her car, patient blood alcohol 0.154, pt initially denied alcohol use but upon further questioning pt states that she normally drinks atleast 4-5 shots of whiskey everyday, but does not remember drinking anything yesterday. Pt also screened as a low risk for suicide upon admission, while do daily screenings. MD aware and orders placed accordingly.

## 2024-05-24 NOTE — PAYOR COMM NOTE
"ADMIT INPT 5-23-24  UR  [phone]    Robles Singh (49 y.o. Female)       Date of Birth   1975    Social Security Number       Address   [address] [facility] [address]    Home Phone   [phone]    MRN   [id number]       [facility]    Marital Status                               Admission Date   5/23/24    Admission Type   Emergency    Admitting Provider   Johan Toney MD    Attending Provider   Johan Toney MD    Department, Room/Bed   [facility] 4B, 402/1       Discharge Date       Discharge Disposition       Discharge Destination                                 Attending Provider: Johan Toney MD    Allergies: No Known Allergies    Isolation: None   Infection: None   Code Status: CPR    Ht: 175.3 cm (69\")   Wt: 77.4 kg (170 lb 9.6 oz)    Admission Cmt: None   Principal Problem: Hypokalemia [E87.6]                   Active Insurance as of 5/23/2024       Primary Coverage       Payor Plan Insurance Group Employer/Plan Group    [employer] [place] [employer] [place]ICA[place]        Payor Plan Address Payor Plan Phone Number Payor Plan Fax Number Effective Dates    [address] [address] [phone]  6/8/2020 - None Entered    [facility] [id number]         Subscriber Name Subscriber Birth Date Member ID       ROBLES SINGH 1975 [id number]                     Emergency Contacts        (Rel.) Home Phone Work Phone Mobile Phone    Kirill Montgomery (Spouse) [phone] -- --                 History & Physical        Johan Toney MD at 05/23/24 1626          4         [facility]  HISTORY AND PHYSICAL    Date of Admission: 5/23/2024  Primary Care Physician: Aidee Padilla APRN    Subjective   Primary Historian: Patient    Chief Complaint: Sore throat with difficulty swallowing    History of Present Illness    Patient is a 49-year-old woman who I am asked to admit for " "hypokalemia.  Potassium is 2.2.  Her magnesium is also 1.4  Evaluation of the sore throat which patient came in includes a strep antigen test that is negative, beta strep culture throat swab has been sent but pending  Monospot test is negative  White count is 13 with predominance of neutrophils, patient has normal procalcitonin and C-reactive protein is slightly elevated at 1.1.  Calcium is 8.2.  Albumin is 3.7.  Patient was initiated on replacement of electrolytes.  EKG showed sinus tachycardia with nonspecific ST-T wave changes.    During my encounter did not appear to be forthright in giving me the information.  Patient basically said she has sore throat this has been going on for several weeks now.  She had seen ENT and she is scheduled supposedly to have surgery for a polyp in February but she put this to a back burner due to several health issues that occurred with family members.    I am told by NANCY Callaway that she has an appointment to see ENT this coming Tuesday.  The reason I am asked to admit this patient is because of multiple electrolyte abnormalities.  She told me that she has been unable to eat for 8 months but when I asked her if she lost weight, she could not tell me directly.  She also mentioned to me she feels puffy in her face.  She told me that she every day is like throwing up in the morning.  Laying down makes her feel drowning but she does not have any gross fluid retention neither  She exclaimed that she feels like something is in her her throat on the left side.  (Something stuck\"  She had to wake up nightly because of dry mouth.  She has to take sips of water.  She described it as having needles at the back of her throat.    She quit smoking on March 31.  She told me she hardly ever drink but when I asked details about what her last drink was last night and she told me that she drinks nightly.  She further told me she has \"whole list of medications\" she could not even name anything and could " not tell me when she had last seen her primary care provider or cardiologist.    I got the impression that all her symptoms been going on chronically.      Review of Systems     No reported chest pain, shortness of breath or difficulty breathing  No urinary or bowel disturbance  No passing out  No dizziness or lightheadedness no complaint of headache    Otherwise complete ROS reviewed and negative except as mentioned in the HPI.    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    CTS (carpal tunnel syndrome) 2019    Depression     Diastolic dysfunction 2022    Emphysema of lung 2020    Hyperlipidemia     Hypertension     Mixed simple and mucopurulent chronic bronchitis 04/18/2023    Non-occlusive coronary artery disease 05/04/2023    NSTEMI (non-ST elevated myocardial infarction) (HCC)     Pneumonia 2022    Pulmonary emphysema 04/21/2023    Shingles 2018     Past Surgical History:  Past Surgical History:   Procedure Laterality Date    APPENDECTOMY      CARDIAC CATHETERIZATION N/A 11/04/2017    Procedure: Coronary angiography;  Surgeon: Luis Colvin MD;  Location:  [facility] CATH [facility]VASIVE LOCATION;  Service:     CARDIAC CATHETERIZATION N/A 05/31/2023    Procedure: Left Heart Cath;  Surgeon: Steffen Rossi MD;  Location:  [facility] CATH INVASIVE LOCATION;  Service: Cardiology;  Laterality: N/A;    CARPAL TUNNEL RELEASE  2019    HYSTERECTOMY      ME RT/LT HEART CATHETERS N/A 11/04/2017    Procedure: Percutaneous Coronary Intervention;  Surgeon: Luis Colvin MD;  Location:  [facility] CATH INVASIVE LOCATION;  Service: Cardiovascular    THYROID SURGERY      TOTAL THYROIDECTOMY       Social History:  reports that she has been smoking cigarettes. She started smoking about 33 years ago. She has a 15.9 pack-year smoking  history. She has been exposed to tobacco smoke. She does not have any smokeless tobacco history on file. She reports current alcohol use. She reports that she does not use drugs.    Family History: family history includes Asthma in her mother; Cancer in her father and mother; Emphysema in her mother; Heart failure in her father; Hypertension in her father.       Allergies:  No Known Allergies    Medications:  Prior to Admission medications    Medication Sig Start Date End Date Taking? Authorizing Provider   albuterol sulfate  (90 Base) MCG/ACT inhaler Inhale 2 puffs Every 4 (Four) Hours As Needed for Wheezing. 9/22/23   Sivan Wise APRN   carvedilol (COREG) 25 MG tablet Take 1 tablet by mouth 2 (Two) Times a Day. 5/11/23   Seema Hendricks APRN   cloNIDine (CATAPRES) 0.1 MG tablet TAKE 1 TABLET BY MOUTH 2 (TWO) TIMES A DAY AS NEEDED FOR HIGH BLOOD PRESSURE (BLOOD PRESSURE >140/90). 1/17/24   Steffen Rossi MD   cyclobenzaprine (FLEXERIL) 10 MG tablet Take 1 tablet by mouth 3 (Three) Times a Day As Needed for Muscle Spasms. 2/26/24   Leah Neal APRN   dilTIAZem CD (CARDIZEM CD) 120 MG 24 hr capsule TAKE 1 CAPSULE BY MOUTH DAILY. 4/13/24   Steffen Rossi MD   FLUoxetine (PROzac) 10 MG capsule Take 1 capsule by mouth Daily. 11/6/23   ProviderGaldino MD   hydroCHLOROthiazide (HYDRODIURIL) 25 MG tablet Take 1 tablet by mouth Daily. 5/17/23   Seema Hendricks APRN   ipratropium-albuterol (DUO-NEB) 0.5-2.5 mg/3 ml nebulizer Take 3 mL by nebulization Every 4 (Four) Hours As Needed for Wheezing. 4/3/23   Savage Calderon Jr., MD   isosorbide mononitrate (IMDUR) 30 MG 24 hr tablet Take 1 tablet by mouth Daily. 4/18/23   Saqib Liang APRN   levothyroxine (SYNTHROID, LEVOTHROID) 150 MCG tablet Take 1 tablet by mouth Daily.    Galdino Wei MD   lisinopril (PRINIVIL,ZESTRIL) 40 MG tablet Take 1 tablet by mouth Daily.    Provider, MD Galdino   meclizine (ANTIVERT) 25 MG  "tablet Take 1 tablet by mouth 3 (Three) Times a Day As Needed for Dizziness. 3/10/23   Steffen Rossi MD   nitroglycerin (NITROSTAT) 0.4 MG SL tablet PLACE 1 TABLET UNDER THE TONGUE AS NEEDED FOR ANGINA, MAY REPEAT EVERY 5 MINUTES FOR UP THREE DOSES 11/6/23   Seema Hendricks APRN   oxyCODONE-acetaminophen (PERCOCET) 5-325 MG per tablet Take 1 tablet by mouth Every 6 (Six) Hours As Needed for Moderate Pain. 2/26/24   Leah Neal APRN   pantoprazole (PROTONIX) 40 MG EC tablet Take 1 tablet by mouth Daily. 11/6/23   Galdino Wei MD   potassium chloride (K-DUR,KLOR-CON) 20 MEQ CR tablet Take 1 tablet by mouth 2 (Two) Times a Day.  Patient not taking: Reported on 11/30/2023 5/1/23   Steffen Rossi MD   simvastatin (ZOCOR) 10 MG tablet Take 1 tablet by mouth Every Night. 11/6/23   Galdino Wei MD   topiramate (TOPAMAX) 50 MG tablet Take 1 tablet by mouth 2 (Two) Times a Day. 4/12/24   Domitila Duncan MD   varenicline (Chantix) 0.5 MG tablet Take 1 tablet by mouth 2 (Two) Times a Day. 11/2/23   Tin Barcenas APRN   Vitamin D, Cholecalciferol, (CHOLECALCIFEROL) 10 MCG (400 UNIT) tablet Take 1 tablet by mouth Daily.    ProviderGaldino MD     I have utilized all available immediate resources to obtain, update, or review the patient's current medications (including all prescriptions, over-the-counter products, herbals, cannabis/cannabidiol products, and vitamin/mineral/dietary (nutritional) supplements).    Objective     Vital Signs: /90 (Patient Position: Sitting)   Pulse 113   Temp 98.1 °F (36.7 °C) (Oral)   Resp 18   Ht 175.3 cm (69\")   Wt 79.8 kg (176 lb)   LMP  (LMP Unknown)   SpO2 96%   BMI 25.99 kg/m²   Physical Exam   GEN: Awake, alert, interactive, in NAD  HEENT: Atraumatic, PERRLA, EOMI, Anicteric, Trachea midline  No gross tonsillopharyngeal congestion, to side for tongue is whitish coat.  Lungs: CTAB, no wheezing/rales/rhonchi  Heart: RRR, +S1/s2, no rub  ABD: " "soft, nt/nd, +BS, no guarding/rebound  Extremities: atraumatic, no cyanosis, no edema  Skin: no rashes or lesions  Neuro: AAOx3, no focal deficits  Psych: normal mood & affect    Results Reviewed:  Lab Results (last 24 hours)       Procedure Component Value Units Date/Time    Magnesium [[id number]]  (Abnormal) Collected: 05/23/24 1440    Specimen: Blood Updated: 05/23/24 1524     Magnesium 1.4 mg/dL     Procalcitonin [[id number]]  (Normal) Collected: 05/23/24 1440    Specimen: Blood Updated: 05/23/24 1514     Procalcitonin 0.07 ng/mL     Narrative:      As a Marker for Sepsis (Non-Neonates):    1. <0.5 ng/mL represents a low risk of severe sepsis and/or septic shock.  2. >2 ng/mL represents a high risk of severe sepsis and/or septic shock.    As a Marker for Lower Respiratory Tract Infections that require antibiotic therapy:    PCT on Admission    Antibiotic Therapy       6-12 Hrs later    >0.5                Strongly Recommended  >0.25 - <0.5        Recommended   0.1 - 0.25          Discouraged              Remeasure/reassess PCT  <0.1                Strongly Discouraged     Remeasure/reassess PCT    As 28 day mortality risk marker: \"Change in Procalcitonin Result\" (>80% or <=80%) if Day 0 (or Day 1) and Day 4 values are available. Refer to http://www.[employer]OUND[employer]-pct-calculator.com    Change in PCT <=80%  A decrease of PCT levels below or equal to 80% defines a positive change in PCT test result representing a higher risk for 28-day all-cause mortality of patients diagnosed with severe sepsis for septic shock.    Change in PCT >80%  A decrease of PCT levels of more than 80% defines a negative change in PCT result representing a lower risk for 28-day all-cause mortality of patients diagnosed with severe sepsis or septic shock.       Rapid Strep A Screen - Swab, Throat [[id number]]  (Normal) Collected: 05/23/24 1446    Specimen: Swab from Throat Updated: 05/23/24 1513     Strep A Ag Negative    Beta Strep Culture, Throat - " Swab, Throat [[id number]] Collected: 05/23/24 1446    Specimen: Swab from Throat Updated: 05/23/24 1513    Comprehensive Metabolic Panel [[id number]]  (Abnormal) Collected: 05/23/24 1440    Specimen: Blood Updated: 05/23/24 1512     Glucose 91 mg/dL      BUN 2 mg/dL      Creatinine 0.42 mg/dL      Sodium 138 mmol/L      Potassium 2.2 mmol/L      Comment: Slight hemolysis detected by analyzer. Result may be falsely elevated.        Chloride 92 mmol/L      CO2 26.0 mmol/L      Calcium 8.2 mg/dL      Total Protein 6.8 g/dL      Albumin 3.7 g/dL      ALT (SGPT) 53 U/L      AST (SGOT) 163 U/L      Alkaline Phosphatase 440 U/L      Total Bilirubin 0.5 mg/dL      Globulin 3.1 gm/dL      A/G Ratio 1.2 g/dL      BUN/Creatinine Ratio 4.8     Anion Gap 20.0 mmol/L      eGFR 118.6 mL/min/1.73     Narrative:      GFR Normal >60  Chronic Kidney Disease <60  Kidney Failure <15      Protime-INR [[id number]]  (Normal) Collected: 05/23/24 1440    Specimen: Blood Updated: 05/23/24 1512     Protime 12.7 Seconds      INR 0.91    aPTT [[id number]]  (Normal) Collected: 05/23/24 1440    Specimen: Blood Updated: 05/23/24 1512     PTT 28.5 seconds     C-reactive Protein [[id number]]  (Abnormal) Collected: 05/23/24 1440    Specimen: Blood Updated: 05/23/24 1508     C-Reactive Protein 1.11 mg/dL     Sedimentation Rate [[id number]]  (Normal) Collected: 05/23/24 1440    Specimen: Blood Updated: 05/23/24 1501     Sed Rate 22 mm/hr     Mononucleosis Screen [[id number]]  (Normal) Collected: 05/23/24 1440    Specimen: Blood Updated: 05/23/24 1457     Monospot Negative    CBC & Differential [[id number]]  (Abnormal) Collected: 05/23/24 1440    Specimen: Blood Updated: 05/23/24 1450    Narrative:      The following orders were created for panel order CBC & Differential.  Procedure                               Abnormality         Status                     ---------                               -----------         ------                     CBC Auto  Differential[[id number]]        Abnormal            Final result                 Please view results for these tests on the individual orders.    CBC Auto Differential [[id number]]  (Abnormal) Collected: 05/23/24 1440    Specimen: Blood Updated: 05/23/24 1450     WBC 13.05 10*3/mm3      RBC 3.59 10*6/mm3      Hemoglobin 12.6 g/dL      Hematocrit 36.6 %      .9 fL      MCH 35.1 pg      MCHC 34.4 g/dL      RDW 13.8 %      RDW-SD 51.9 fl      MPV 10.2 fL      Platelets 397 10*3/mm3      Neutrophil % 61.1 %      Lymphocyte % 25.0 %      Monocyte % 11.0 %      Eosinophil % 1.2 %      Basophil % 1.0 %      Immature Grans % 0.7 %      Neutrophils, Absolute 7.98 10*3/mm3      Lymphocytes, Absolute 3.26 10*3/mm3      Monocytes, Absolute 1.43 10*3/mm3      Eosinophils, Absolute 0.16 10*3/mm3      Basophils, Absolute 0.13 10*3/mm3      Immature Grans, Absolute 0.09 10*3/mm3      nRBC 0.0 /100 WBC           Imaging Results (Last 24 Hours)       Procedure Component Value Units Date/Time    CT Soft Tissue Neck With Contrast [[id number]] Collected: 05/23/24 1549     Updated: 05/23/24 1554    Narrative:      EXAMINATION: CT SOFT TISSUE NECK W CONTRAST-      5/23/2024 2:36 PM     HISTORY: dysphagia; E87.6-Hypokalemia; R13.10-Dysphagia, unspecified     In order to have a CT radiation dose as low as reasonably achievable  Automated Exposure Control was utilized for adjustment of the mA and/or  KV according to patient size.     CT Dose DLP = 185.01 mGy.cm.  (If there are multiple studies performed at the same time this  represents the total dose).     Neck CT with IV contrast injection.  Axial, sagittal, and coronal sequences.     Clear paranasal sinuses.     Normal appearance of the nasopharynx.  Normal parapharyngeal spaces.     No discrete tonsillar mass or enlargement.  Normal epiglottis.  Normal prevertebral soft tissues.     Normal and symmetric parotid and submandibular glands.     Diminutive or absent thyroid gland.      Clear lung apices.     No soft tissue mass or lymphadenopathy.     Moderate cervical spine degenerative disc and endplate change at C5-C6  and C6/C7.       Impression:      1. No acute abnormality is seen.     This report was signed and finalized on 5/23/2024 3:51 PM by Dr. Joce Mustafa MD.             I have personally reviewed and interpreted the radiology studies and ECG obtained at time of admission.     Assessment / Plan   Assessment:   Active Hospital Problems    Diagnosis     **Hypokalemia          Medical Decision Making  Number and Complexity of problems:   Hypokalemia, hypomagnesemia, mild hypocalcemia  COPD - no exacerbation  Tobacco abuse in remission  Hyperlipidemia  Hypertension  Hypothyroidism  Macrocytosis without anemia  ? Thrush - trail of nystatin      Treatment Plan  The patient will be admitted to my service here at [facility].  Electrolyte replacement per protocol, monitor and follow  Continue telemetry-patient at risk for arrhythmia given abnormalities.    Will monitor for any airway compromise.  Will monitor for change in clinical status that will require immediate/emergent consultation with ENT or changing course of action.    I think the primary issue would be to correct electrolytes    I am not certain how she would have lost potassium and magnesium sides the fact that she is on hydrochlorothiazide if truly taking.  There is a potassium twice daily from November 30, 2023 but may not be accurate at this time.    Will give some fluids    Resume meds accordingly     bronchodilator    Conditions and Status  Fair  MDM Data  External documents reviewed: Reviewed epic record  Cardiac tracing (EKG, telemetry) interpretation: See HPI  Radiology interpretation: Reviewed as outlined above  Labs reviewed: Yes  Any tests that were considered but not ordered: None     Decision rules/scores evaluated (example [id number], Wells, etc): None     Discussed with: Patient     Care  Planning  Shared decision making: Patient  Code status and discussions: Full code    Disposition  Social Determinants of Health that impact treatment or disposition: None identified at this time  Estimated length of stay is to be determined  .     I confirmed that the patient's advanced care plan is present, code status is documented, and a surrogate decision maker is listed in the patient's medical record.     The patient's surrogate decision maker is family.     The patient was seen and examined by me on   Electronically signed by Johan Toney MD, 05/23/24, 16:26 CDT.                Electronically signed by Johan Toney MD at 05/23/24 1722          Emergency Department Notes        Cesia Hough APRN at 05/23/24 1419          Subjective   History of Present Illness  Patient is a 49-year-old female who presents emergency department with complaints of sore throat and dysphagia.  Patient reports that symptoms have been going on for a while, but have worsened since last night.  She states that she feels like she cannot swallow anything.  States that she has had some vomiting episodes.  She is supposed to be following with ENT on May 28 to discuss surgery for polyps in her throat.  She has followed with Dr. Padilla in the past regarding polyps.  Patient reports that she has felt feverish, but is unsure of any definite fevers.  Patient states that she was seen at [facility] this morning and they have sent her here for further evaluation.  She did test negative for strep at [facility].        Review of Systems   HENT:  Positive for sore throat and trouble swallowing.    Gastrointestinal:  Positive for vomiting.   All other systems reviewed and are negative.      Past Medical History:   Diagnosis Date    Abnormal ECG 02/20/2023    Anxiety     Arthritis     back    Asthma     Bicuspid aortic valve 08/24/2023    COPD (chronic obstructive pulmonary disease)     Coronary-myocardial bridge  2023    CTS (carpal tunnel syndrome) 2019    Depression     Diastolic dysfunction     Emphysema of lung     Hyperlipidemia     Hypertension     Mixed simple and mucopurulent chronic bronchitis 2023    Non-occlusive coronary artery disease 2023    NSTEMI (non-ST elevated myocardial infarction) (HCC)     Pneumonia     Pulmonary emphysema 2023    Shingles        No Known Allergies    Past Surgical History:   Procedure Laterality Date    APPENDECTOMY      CARDIAC CATHETERIZATION N/A 2017    Procedure: Coronary angiography;  Surgeon: Luis Colvin MD;  Location:  [facility] CATH INVASIVE LOC[facility];  Service:     CARDIAC CATHETERIZATION N/A 2023    Procedure: Left Heart Cath;  Surgeon: Steffen Rossi MD;  Location:  [facility] CATH INVAS[facility] LOCATION;  Service: Cardiology;  Laterality: N/A;    CARPAL TUNNEL RELEASE  2019    HYSTERECTOMY      [place] RT/LT HEART CATHETERS N/A 2017    Procedure: Percutaneous Coronary Intervention;  Surgeon: Luis Colvin MD;  Location:  [facility] CATH INVASIVE LOCATION;  Service: Cardiovascular    THYROID SURGERY      TOTAL THYROIDECTOMY         Family History   Problem Relation Age of Onset    Asthma Mother     Cancer Mother     Emphysema Mother     Cancer Father     Heart failure Father     Hypertension Father        Social History     Socioeconomic History    Marital status:    Tobacco Use    Smoking status: Every Day     Current packs/day: 0.00     Average packs/day: 0.5 packs/day for 31.8 years (15.9 ttl pk-yrs)     Types: Cigarettes     Start date: 1991     Last attempt to quit: 2022     Years since quittin.5     Passive exposure: Past    Tobacco comments:     Less than 1/2 pack a day    Vaping Use    Vaping status: Never Used   Substance and Sexual Activity    Alcohol use: Yes     Comment: occ    Drug use: No    Sexual activity: Not Currently     Partners: Male     Birth control/protection: Hysterectomy           Objective    Physical Exam  Vitals and nursing note reviewed.   Constitutional:       Appearance: Normal appearance. She is normal weight. She is not ill-appearing or toxic-appearing.   HENT:      Head: Normocephalic.      Nose: Nose normal.      Mouth/Throat:      Pharynx: Uvula midline. Pharyngeal swelling and posterior oropharyngeal erythema present.      Tonsils: No tonsillar exudate. 2+ on the right. 2+ on the left.   Cardiovascular:      Rate and Rhythm: Regular rhythm. Tachycardia present.      Pulses: Normal pulses.      Heart sounds: Normal heart sounds.   Pulmonary:      Effort: Pulmonary effort is normal.      Breath sounds: Normal breath sounds.   Abdominal:      General: Abdomen is flat. Bowel sounds are normal. There is no distension.      Palpations: Abdomen is soft.      Tenderness: There is no abdominal tenderness.   Musculoskeletal:         General: Normal range of motion.      Cervical back: Normal range of motion and neck supple.   Skin:     General: Skin is warm and dry.   Neurological:      General: No focal deficit present.      Mental Status: She is alert and oriented to person, place, and time. Mental status is at baseline.   Psychiatric:         Mood and Affect: Mood normal.         Behavior: Behavior normal.         Thought Content: Thought content normal.         Judgment: Judgment normal.         Procedures          ED Course  ED Course as of 05/23/24 1612   Thu May 23, 2024   1548 Labs reviewed.  CBC with leukocytosis of 13.05, stable H&H.  CMP significant for potassium 2.2.  ALT, AST, alk phos elevated which appears to be chronic.  Magnesium 1.4.  Sed rate normal, CRP mildly elevated.  Procalcitonin normal.  PTT and PT/INR unremarkable.  Negative strep screen, negative mono.   [KR]   1549 IV potassium and magnesium replacement has been ordered.  Patient has also been given IV Decadron and HurriCaine spray in the ER for symptomatic relief. [KR]   1549 CT soft tissue neck reviewed by radiologist.   This revealed no acute findings. [KR]   1604 Patient will need admission for hypokalemia. Call placed to on call hospitalist. Patient agreeable to plan. [KR]   1612 Spoke with on-call hospitalist, Dr. White.  He has agreed to except this patient for further management.  Patient will be admitted under Dr. Toney's service.  Patient is agreeable plan. [KR]      ED Course User Index  [KR] Cesia Hough APRN                                             Medical Decision Making  Problems Addressed:  Dysphagia, unspecified type: complicated acute illness or injury  Hypokalemia: complicated acute illness or injury  Hypomagnesemia: complicated acute illness or injury  Vomiting, unspecified vomiting type, unspecified whether nausea present: complicated acute illness or injury    Amount and/or Complexity of Data Reviewed  Labs: ordered.  Radiology: ordered.  ECG/medicine tests: ordered.    Risk  OTC drugs.  Prescription drug management.  Decision regarding hospitalization.        Final diagnoses:   Hypokalemia   Dysphagia, unspecified type   Vomiting, unspecified vomiting type, unspecified whether nausea present   Hypomagnesemia       ED Disposition  ED Disposition       ED Disposition   Decision to Admit    Condition   --    Comment   Level of Care: Telemetry [5]   Diagnosis: Hypokalemia [756553]   Admitting Physician: CHINYERE TONEY [[id number]]   Certification: I Certify That Inpatient Hospital Services Are Medically Necessary For Greater Than 2 Midnights                 No follow-up provider specified.       Medication List      No changes were made to your prescriptions during this visit.            Cesia Hough APRN  05/23/24 1613      Electronically signed by Cesia Hough APRN at 05/23/24 1613       Facility-Administered Medications as of 5/24/2024   Medication Dose Route Frequency Provider Last Rate Last Admin    aluminum-magnesium hydroxide-simethicone (MAALOX MAX) 400-400-40 MG/5ML suspension 15 mL  15  mL Oral Q6H PRN Johan Toney MD        atorvastatin (LIPITOR) tablet 10 mg  10 mg Oral Daily Johan Toney MD        [COMPLETED] benzocaine (HURRICAINE) 20 % liquid solution 2 spray  2 spray Mouth/Throat Once Cesia Hough APRN   2 spray at 05/23/24 1435    benzocaine-menthol (CHLORASEPTIC) lozenge 1 each  1 lozenge Mouth/Throat Q2H PRN Cesia Hough APRN   1 each at 05/24/24 0108    sennosides-docusate (PERICOLACE) 8.6-50 MG per tablet 2 tablet  2 tablet Oral BID PRN Johan Toney MD        And    polyethylene glycol (MIRALAX) packet 17 g  17 g Oral Daily PRN Johan Toney MD        And    bisacodyl (DULCOLAX) EC tablet 5 mg  5 mg Oral Daily PRN Johan Toney MD        And    bisacodyl (DULCOLAX) suppository 10 mg  10 mg Rectal Daily PRN Johan Toney MD        Calcium Replacement - Follow Nurse / BPA Driven Protocol   Does not apply PRN Johan Toney MD        carvedilol (COREG) tablet 25 mg  25 mg Oral BID Johan Toney MD   25 mg at 05/23/24 2122    cholecalciferol (VITAMIN D3) tablet 400 Units  400 Units Oral Daily Johan Toney MD        cloNIDine (CATAPRES) tablet 0.1 mg  0.1 mg Oral Q12H PRN Johan Toney MD   0.1 mg at 05/23/24 2344    cyclobenzaprine (FLEXERIL) tablet 10 mg  10 mg Oral TID PRN Johan Toney MD        [COMPLETED] dexAMETHasone (DECADRON) injection 10 mg  10 mg Intravenous Once Cesia Hough APRN   10 mg at 05/23/24 1441    dilTIAZem CD (CARDIZEM CD) 24 hr capsule 120 mg  120 mg Oral Daily Johan Toney MD        Enoxaparin Sodium (LOVENOX) syringe 40 mg  40 mg Subcutaneous Nightly Johan Toney MD   40 mg at 05/23/24 2121    FLUoxetine (PROzac) capsule 10 mg  10 mg Oral Daily Johan Toney MD        folic acid (FOLVITE) tablet 1 mg  1 mg Oral Daily Masha Avina MD        [COMPLETED] iopamidol (ISOVUE-300) 61 %  injection 100 mL  100 mL Intravenous Once in imaging Cesia Hough APRN   100 mL at 05/23/24 1543    ipratropium-albuterol (DUO-NEB) nebulizer solution 3 mL  3 mL Nebulization Q4H PRN Johan Toney MD        isosorbide mononitrate (IMDUR) 24 hr tablet 30 mg  30 mg Oral Daily Johan Toney MD        lactated ringers infusion  100 mL/hr Intravenous Continuous Johan Toney  mL/hr at 05/23/24 1952 100 mL/hr at 05/23/24 1952    levothyroxine (SYNTHROID, LEVOTHROID) tablet 150 mcg  150 mcg Oral Q AM Johan Toney MD        lisinopril (PRINIVIL,ZESTRIL) tablet 40 mg  40 mg Oral Daily Johan Toney MD        LORazepam (ATIVAN) tablet 1 mg  1 mg Oral Q1H PRN Masha Avina MD   1 mg at 05/24/24 0302    Magnesium Standard Dose Replacement - Follow Nurse / BPA Driven Protocol   Does not apply PRN Johan Toney MD        [COMPLETED] magnesium sulfate 2g/50 mL (PREMIX) infusion  2 g Intravenous Once Cesia Hough APRN   2 g at 05/23/24 1603    [COMPLETED] magnesium sulfate 2g/50 mL (PREMIX) infusion  2 g Intravenous Q2H Masha Avina MD   2 g at 05/24/24 0301    meclizine (ANTIVERT) tablet 25 mg  25 mg Oral TID PRN Johan Toney MD   25 mg at 05/23/24 2328    multivitamin with minerals 1 tablet  1 tablet Oral Daily Masha Avina MD        nitroglycerin (NITROSTAT) SL tablet 0.4 mg  0.4 mg Sublingual Q5 Min PRN Johan Toney MD        nystatin (MYCOSTATIN) 100,000 unit/mL suspension 500,000 Units  5 mL Oral 4x Daily Johan Toney MD   500,000 Units at 05/23/24 2123    ondansetron (ZOFRAN) injection 4 mg  4 mg Intravenous Q4H PRN Feng Johan Riego, MD   4 mg at 05/23/24 2027    oxyCODONE-acetaminophen (PERCOCET) 5-325 MG per tablet 1 tablet  1 tablet Oral Q6H Johan Flanagan MD   1 tablet at 05/24/24 0302    pantoprazole (PROTONIX) EC tablet 40 mg  40 mg Oral Q AM Feng,  "Johan Escobedo MD        Phosphorus Replacement - Follow Nurse / BPA Driven Protocol   Does not apply Johan Flanagan MD        [COMPLETED] potassium chloride (KLOR-CON) packet 40 mEq  40 mEq Oral Q4H Johan Toney MD   40 mEq at 24 0302    [] potassium chloride 10 mEq in 100 mL IVPB  10 mEq Intravenous Q1H Cesia Hough APRN 100 mL/hr at 24 1603 10 mEq at 24 1603    Potassium Replacement - Follow Nurse / BPA Driven Protocol   Does not apply Johan Flanagan MD        sodium chloride 0.9 % flush 10 mL  10 mL Intravenous PRJohan Gonzalez MD        sodium chloride 0.9 % flush 10 mL  10 mL Intravenous Q12H Johan Toney MD   10 mL at 24    sodium chloride 0.9 % flush 10 mL  10 mL Intravenous PRN Johan Toney MD        sodium chloride 0.9 % infusion 40 mL  40 mL Intravenous PRN Johan Toney MD        thiamine (B-1) injection 200 mg  200 mg Intravenous Q8H Masha Avina MD        Followed by    [START ON 2024] thiamine (VITAMIN B-1) tablet 100 mg  100 mg Oral Daily Masha Avina MD        topiramate (TOPAMAX) tablet 50 mg  50 mg Oral BID Johan Toney MD   50 mg at 24     Orders (all)        Start     Ordered    24 0900  thiamine (VITAMIN B-1) tablet 100 mg  Daily        Placed in \"Followed by\" Linked Group    24 0004    24 0900  folic acid (FOLVITE) tablet 1 mg  Daily         24 0004    24 0900  multivitamin with minerals 1 tablet  Daily         24 0004    24 0600  levothyroxine (SYNTHROID, LEVOTHROID) tablet 150 mcg  Every Early Morning         24 1903    24 0600  pantoprazole (PROTONIX) EC tablet 40 mg  Every Early Morning         24 1903    24 0600  thiamine (B-1) injection 200 mg  Every 8 Hours Scheduled        Placed in \"Followed by\" Linked Group    24 0004    24 0536  " Potassium  Once         05/24/24 0536    05/24/24 0041  LORazepam (ATIVAN) tablet 1 mg  Every 1 Hour PRN         05/24/24 0041    05/24/24 0003  Initiate Alcohol Withdrawal Protocol  Once         05/24/24 0004    05/24/24 0003  Vital Signs  Per Hospital Policy         05/24/24 0004    05/24/24 0003  Continuous Pulse Oximetry  Continuous         05/24/24 0004    05/24/24 0003  Obtain Baseline Clinical [place] Withdrawal Assessment - Ar (CIWA-Ar), Sedation Scale & Vital Signs  Once        Comments: Follow CIWA Scoring Reference Guide    05/24/24 0004    05/24/24 0003  Clinical [place] Withdrawal Assessment (CIWA-Ar)  Per Hospital Policy         05/24/24 0004    05/24/24 0003  If CIWA-Ar Score Less Than 8 For 3 Consecutive Assessments, Monitor Every 4 Hours & Discontinue Assessment When CIWA-Ar Less Than 8 for 24 Hours  Per Hospital Policy        Comments: Contact Provider to Restart Protocol if Any Symptoms Reappear    05/24/24 0004    05/24/24 0003  Seizure Precautions  Continuous         05/24/24 0004    05/24/24 0003  Notify Provider - Withdrawal  Continuous        Comments: Open Order Report to View Parameters Requiring Provider Notification    05/24/24 0004    05/24/24 0003  Notify Provider of Abnormal Lab Results  Continuous        Comments: Open Order Report to View Parameters Requiring Provider Notification    05/24/24 0004    05/24/24 0003  Notify Provider - Vitals  Continuous        Comments: Open Order Report to View Parameters Requiring Provider Notification    05/24/24 0004    05/24/24 0003  Safety Precautions  Continuous         05/24/24 0004    05/24/24 0000  Magnesium  Morning Draw         05/23/24 2213 05/23/24 2300  magnesium sulfate 2g/50 mL (PREMIX) infusion  Every 2 Hours         05/23/24 2213 05/23/24 2100  carvedilol (COREG) tablet 25 mg  2 Times Daily         05/23/24 1735 05/23/24 2100  topiramate (TOPAMAX) tablet 50 mg  2 Times Daily         05/23/24 1735 05/23/24 2100  sodium  chloride 0.9 % flush 10 mL  Every 12 Hours Scheduled         05/23/24 1735    05/23/24 2100  Enoxaparin Sodium (LOVENOX) syringe 40 mg  Nightly         05/23/24 1919 05/23/24 2000  Vital Signs  Every 4 Hours       05/23/24 1735 05/23/24 1940  Inpatient Case Management  Consult  Once        Provider:  (Not yet assigned)    05/23/24 1940 05/23/24 1940  Inpatient Nutrition Consult  Once        Provider:  (Not yet assigned)    05/23/24 1940 05/23/24 1930  potassium chloride (KLOR-CON) packet 40 mEq  Every 4 Hours         05/23/24 1844 05/23/24 1851  POC Glucose Once  PROCEDURE ONCE        Comments: Complete no more than 45 minutes prior to patient eating      05/23/24 1840 05/23/24 1800  Oral Care  2 Times Daily       05/23/24 1735 05/23/24 1800  nystatin (MYCOSTATIN) 100,000 unit/mL suspension 500,000 Units  4 Times Daily         05/23/24 1735    05/23/24 1751  dilTIAZem CD (CARDIZEM CD) 24 hr capsule 120 mg  Daily         05/23/24 1735    05/23/24 1751  FLUoxetine (PROzac) capsule 10 mg  Daily         05/23/24 1735    05/23/24 1751  isosorbide mononitrate (IMDUR) 24 hr tablet 30 mg  Daily         05/23/24 1735    05/23/24 1751  levothyroxine (SYNTHROID, LEVOTHROID) tablet 150 mcg  Daily,   Status:  Discontinued         05/23/24 1735    05/23/24 1751  lisinopril (PRINIVIL,ZESTRIL) tablet 40 mg  Daily         05/23/24 1735    05/23/24 1751  pantoprazole (PROTONIX) EC tablet 40 mg  Daily,   Status:  Discontinued         05/23/24 1735    05/23/24 1751  atorvastatin (LIPITOR) tablet 10 mg  Daily         05/23/24 1735    05/23/24 1751  cholecalciferol (VITAMIN D3) tablet 400 Units  Daily         05/23/24 1735    05/23/24 1751  lactated ringers infusion  Continuous         05/23/24 1735    05/23/24 1736  Intake & Output  Every Shift       05/23/24 1735    05/23/24 1736  Weigh Patient  Once         05/23/24 1735    05/23/24 1736  Insert Peripheral IV  Once         05/23/24 1735    05/23/24  "1736  Saline Lock & Maintain IV Access  Continuous         05/23/24 1735    05/23/24 1736  Ethanol  Once         05/23/24 1735    05/23/24 1736  Diet: Liquid; Clear Liquid; Fluid Consistency: Thin (IDDSI 0)  Diet Effective Now         05/23/24 1735    05/23/24 1735  ipratropium-albuterol (DUO-NEB) nebulizer solution 3 mL  Every 4 Hours PRN         05/23/24 1735    05/23/24 1735  meclizine (ANTIVERT) tablet 25 mg  3 Times Daily PRN         05/23/24 1735    05/23/24 1735  nitroglycerin (NITROSTAT) SL tablet 0.4 mg  Every 5 Minutes PRN         05/23/24 1735    05/23/24 1735  oxyCODONE-acetaminophen (PERCOCET) 5-325 MG per tablet 1 tablet  Every 6 Hours PRN         05/23/24 1735    05/23/24 1735  sodium chloride 0.9 % flush 10 mL  As Needed         05/23/24 1735    05/23/24 1735  sodium chloride 0.9 % infusion 40 mL  As Needed         05/23/24 1735    05/23/24 1735  Pharmacy to Dose enoxaparin (LOVENOX)  Continuous PRN,   Status:  Discontinued         05/23/24 1735    05/23/24 1735  Potassium Replacement - Follow Nurse / BPA Driven Protocol  As Needed         05/23/24 1735    05/23/24 1735  Magnesium Standard Dose Replacement - Follow Nurse / BPA Driven Protocol  As Needed         05/23/24 1735    05/23/24 1735  Phosphorus Replacement - Follow Nurse / BPA Driven Protocol  As Needed         05/23/24 1735    05/23/24 1735  Calcium Replacement - Follow Nurse / BPA Driven Protocol  As Needed         05/23/24 1735    05/23/24 1735  sennosides-docusate (PERICOLACE) 8.6-50 MG per tablet 2 tablet  2 Times Daily PRN        Placed in \"And\" Linked Group    05/23/24 1735    05/23/24 1735  polyethylene glycol (MIRALAX) packet 17 g  Daily PRN        Placed in \"And\" Linked Group    05/23/24 1735    05/23/24 1735  bisacodyl (DULCOLAX) EC tablet 5 mg  Daily PRN        Placed in \"And\" Linked Group    05/23/24 1735    05/23/24 1735  bisacodyl (DULCOLAX) suppository 10 mg  Daily PRN        Placed in \"And\" Linked Group    05/23/24 1735    " 05/23/24 1735  aluminum-magnesium hydroxide-simethicone (MAALOX MAX) 400-400-40 MG/5ML suspension 15 mL  Every 6 Hours PRN         05/23/24 1735    05/23/24 1735  ondansetron (ZOFRAN) injection 4 mg  Every 4 Hours PRN         05/23/24 1735    05/23/24 1735  cloNIDine (CATAPRES) tablet 0.1 mg  Every 12 Hours PRN         05/23/24 1735    05/23/24 1735  cyclobenzaprine (FLEXERIL) tablet 10 mg  3 Times Daily PRN         05/23/24 1735    05/23/24 1720  TSH  Once         05/23/24 1719    05/23/24 1720  Lipid Panel  Once         05/23/24 1719    05/23/24 1720  Vitamin B12  Once         05/23/24 1719    05/23/24 1720  Folate  Once         05/23/24 1719    05/23/24 1706  Code Status and Medical Interventions:  Continuous         05/23/24 1708    05/23/24 1613  Cardiac Monitoring  Continuous        Comments: Follow Standing Orders As Outlined in Process Instructions (Open Order Report to View Full Instructions)    05/23/24 1612    05/23/24 1612  Inpatient Admission  Once         05/23/24 1612    05/23/24 1604  benzocaine-menthol (CHLORASEPTIC) lozenge 1 each  Every 2 Hours PRN         05/23/24 1604    05/23/24 1541  magnesium sulfate 2g/50 mL (PREMIX) infusion  Once         05/23/24 1525    05/23/24 1531  iopamidol (ISOVUE-300) 61 % injection 100 mL  Once in Imaging         05/23/24 1515    05/23/24 1530  potassium chloride 10 mEq in 100 mL IVPB  Every 1 Hour         05/23/24 1514    05/23/24 1515  ECG 12 Lead Electrolyte Imbalance  STAT         05/23/24 1514    05/23/24 1514  Beta Strep Culture, Throat - Swab, Throat  Once         05/23/24 1513    05/23/24 1514  Magnesium  Once         05/23/24 1514    05/23/24 1448  sodium chloride 0.9 % bolus 1,000 mL  Once,   Status:  Discontinued         05/23/24 1432    05/23/24 1438  dexAMETHasone (DECADRON) injection 10 mg  Once         05/23/24 1422    05/23/24 1434  benzocaine (HURRICAINE) 20 % liquid solution 2 spray  Once         05/23/24 1418    05/23/24 1419  Insert Peripheral  "IV  Once        Placed in \"And\" Linked Group    05/23/24 1418    05/23/24 1419  Mononucleosis Screen  STAT         05/23/24 1418    05/23/24 1419  Rapid Strep A Screen - Swab, Throat  Once         05/23/24 1418    05/23/24 1418  sodium chloride 0.9 % flush 10 mL  As Needed        Placed in \"And\" Linked Group    05/23/24 1418    05/23/24 1418  CBC & Differential  Once         05/23/24 1418    05/23/24 1418  Comprehensive Metabolic Panel  Once         05/23/24 1418    05/23/24 1418  Protime-INR  Once         05/23/24 1418    05/23/24 1418  aPTT  Once         05/23/24 1418    05/23/24 1418  C-reactive Protein  Once         05/23/24 1418    05/23/24 1418  Sedimentation Rate  Once         05/23/24 1418    05/23/24 1418  Procalcitonin  Once         05/23/24 1418    05/23/24 1418  CT Soft Tissue Neck With Contrast  1 Time Imaging         05/23/24 1418    05/23/24 1418  CBC Auto Differential  PROCEDURE ONCE         05/23/24 1418    05/23/24 0000  Telemetry Scan  Once         05/23/24 0000    Unscheduled  Up With Assistance  As Needed       05/23/24 1735    Unscheduled  Obtain Pre & Post Sedation Scores With Every Lorazepam Dose - Hold For POSS Greater Than 2 or RASS of -3 or Less  As Needed       05/24/24 0004    --  cloNIDine (CATAPRES) 0.1 MG tablet  As Needed         05/23/24 1749                  Physician Progress Notes (last 72 hours)  Notes from 05/21/24 0605 through 05/24/24 0605   No notes of this type exist for this encounter.       Consult Notes (last 72 hours)  Notes from 05/21/24 0605 through 05/24/24 0605   No notes of this type exist for this encounter.        Mariaelena Blunt, RN   Registered Nurse  Nursing     Plan of Care     Signed     Date of Service: 05/24/24 0422  Creation Time: 05/24/24 0422     Signed         Goal Outcome Evaluation:  Plan of Care Reviewed With: patient  Progress: no change  Outcome Evaluation: Pt new admission from ER right before start of shift. Pt had multiple complaints throught " night, nausea/vomiting, headache, painful throat. Treated with PRN medication with relief noted. Also during shift pt states that she does not remember driving herself to the ER and unsure of where she parked her car, patient blood alcohol 0.154, pt initially denied alcohol use but upon further questioning pt states that she normally drinks atleast 4-5 shots of whiskey everyday, but does not remember drinking anything yesterday. Pt also screened as a low risk for suicide upon admission, while do daily screenings. MD aware and orders placed accordingly.

## 2024-05-24 NOTE — CASE MANAGEMENT/SOCIAL WORK
"Continued Stay Note  [facility]     Patient Name: Minnie Bang  MRN: [id number]  Today's Date: 5/24/2024    Admit Date: 5/23/2024    Plan: Home   Discharge Plan       Row Name 05/24/24 1027       Plan    Plan Home    Patient/Family in Agreement with Plan yes    Final Discharge Disposition Code 01 - home or self-care    Final Note SW spoke to pt about low risk suicide consult.  She denies being suicidal or having a plan to harm herself.  She does state she is depressed due to multiple stress factors in her life (both in-laws are very sick/terminal).  She states she is living in a camper in their driveway.  Pt states she has issues with her spouse (\"alcoholic and mean\"); however, she denied feeling unsafe around him.  Pt did not want help finding another living situation. Pt is trying to quit drinking.  SW went over chemical dependency/rehab options however, she states she cannot commit to rehab at this time due to her responsiblities with family and dogs.  SW gave her information on depression/mental health.  Pt has multiple health complaints and no PCP.  SW gave her information on local PCP's and encouraged her to make an appt. to discuss health issues and mental health.  Pt's best friend was in room and is very supportive.                   Discharge Codes    No documentation.                       DARIELA Driscoll    "

## 2024-05-24 NOTE — PROGRESS NOTES
[facility]  INPATIENT PROGRESS NOTE    Patient Name: Minnie Bang  Date of Admission: 5/23/2024  Today's Date: 05/24/24  Length of Stay: 1  Primary Care Physician: Aidee Padilla APRN    Subjective   Chief Complaint: Follow-up  HPI   Potassium persistently low despite correction protocol.  Will do another round.  Magnesium normalized  Patient so far tolerating diet.    The earlier note I made today in anticipation of discharge as outlined below.  Discharge plan postponed because of persistent type Po kalemia  Patient was apologetic for not being forthright when I saw her yesterday in the emergency room.  She now admits that she drinks alcohol after testing her blood alcohol level at 0.154.  Social service Christine joined me at bedside.  She gave some community resources.  Her friend kenna at bedside.  She allowed her to listen as she is one of her support group.     She felt that this is the best time for her to go home because her  is not at home and she has to support through Medicaid.     My goal at present time is to get her potassium corrected.  She is on replacement protocol.  It is expected that around 1400-hour repeat level will be done based on completed protocol.  I will consider replacing her already if still requiring.  I am willing to write her Librium for short period.                   She had flexible fiberoptic laryngoscopy by Dr. Padilla on November 30, 2023.  FINDINGS:  Mucosal surfaces:   The mucosal surfaces demonstrated normal mucosa surfaces with moderate inflammation throughout with clear but thick secretions     Base of tongue:  The base of tongue was found to have no mass or lesion.     Epiglottis:  The epiglottis was found to have  no mass or lesion.     Aryepligottic fold:  The AE folds were found to have no mass or lesion.     False Vocal Fold:  The false cords were found to have mild erythema.     True Vocal Cord:  The  true vocal cords were found to have bilateral polyposis right greater than left without obvious evidence of neoplasm otherwise.  Both true vocal cords adduct and abduct normally .     Arytenoid:   The arytenoids were found to have moderate inter-arytenoid edema with erythema.     Hypopharynx:  The hypopharynx was found to have no mass or lesion.     The patient tolerated procedure well.        Review of Systems   All pertinent negatives and positives are as above. All other systems have been reviewed and are negative unless otherwise stated.     Objective    Temp:  [97.7 °F (36.5 °C)-98.6 °F (37 °C)] 98.6 °F (37 °C)  Heart Rate:  [] 92  Resp:  [16] 16  BP: (102-177)/() 109/77  Physical Exam  Tearful on encounter with  Christine  GEN: Awake, alert, interactive, in NAD  HEENT: Atraumatic, PERRLA, EOMI, Anicteric, Trachea midline  No gross tonsillopharyngeal congestion, to side for tongue is whitish coat.  Lungs: CTAB, no wheezing/rales/rhonchi  Heart: RRR, +S1/s2, no rub  ABD: soft, nt/nd, +BS, no guarding/rebound  Extremities: atraumatic, no cyanosis, no edema  Skin: no rashes or lesions  Neuro: AAOx3, no focal deficits  Psych: normal mood & affect  Results Review:  I have reviewed the labs, radiology results, and diagnostic studies.    Laboratory Data:   Results from last 7 days   Lab Units 05/23/24  1440   WBC 10*3/mm3 13.05*   HEMOGLOBIN g/dL 12.6   HEMATOCRIT % 36.6   PLATELETS 10*3/mm3 397        Results from last 7 days   Lab Units 05/24/24  1512 05/24/24  0319 05/23/24  1440   SODIUM mmol/L  --   --  138   POTASSIUM mmol/L 2.7* 2.6* 2.2*   CHLORIDE mmol/L  --   --  92*   CO2 mmol/L  --   --  26.0   BUN mg/dL  --   --  2*   CREATININE mg/dL  --   --  0.42*   CALCIUM mg/dL  --   --  8.2*   BILIRUBIN mg/dL  --   --  0.5   ALK PHOS U/L  --   --  440*   ALT (SGPT) U/L  --   --  53*   AST (SGOT) U/L  --   --  163*   GLUCOSE mg/dL  --   --  91       Culture Data:   No results found for:  "\"BLOODCX\", \"URINECX\", \"WOUNDCX\", \"MRSACX\", \"RESPCX\", \"STOOLCX\"    Radiology Data:   Imaging Results (Last 24 Hours)       ** No results found for the last 24 hours. **            I have reviewed the patient's current medications.     Assessment/Plan   Assessment  Active Hospital Problems    Diagnosis     **Hypokalemia            Medical Decision Making  Number and Complexity of problems:   Hypokalemia, hypomagnesemia, mild hypocalcemia  COPD - no exacerbation  Tobacco abuse in remission  Hyperlipidemia  Hypertension  Hypothyroidism-noted overcorrected TSH on levothyroxine (0.249) I will discontinue levothyroxine for now  Macrocytosis without anemia  ? Thrush - trial of nystatin  Folic acid deficiency  Alcoholism-no evidence of withdrawal while.  Monitor.  On CIWA protocol  Hoarseness of voice-history of true vocal cord polyposis; no evidence of airway compromise.           Treatment Plan  Blood pressure improved on current medication  As needed antiemetic  Continue on prophylaxis replacement of thiamine.  Replace folic acid    Medications reviewed  atorvastatin, 10 mg, Oral, Daily  carvedilol, 25 mg, Oral, BID  cholecalciferol, 400 Units, Oral, Daily  dilTIAZem CD, 120 mg, Oral, Daily  enoxaparin, 40 mg, Subcutaneous, Nightly  FLUoxetine, 10 mg, Oral, Daily  folic acid, 1 mg, Oral, Daily  isosorbide mononitrate, 30 mg, Oral, Daily  levothyroxine, 150 mcg, Oral, Q AM  lisinopril, 40 mg, Oral, Daily  multivitamin with minerals, 1 tablet, Oral, Daily  nystatin, 5 mL, Oral, 4x Daily  pantoprazole, 40 mg, Oral, Q AM  sodium chloride, 10 mL, Intravenous, Q12H  thiamine (B-1) IV, 200 mg, Intravenous, Q8H   Followed by  [START ON 5/29/2024] thiamine, 100 mg, Oral, Daily  topiramate, 50 mg, Oral, BID      Social service.  Patient community resources for alcoholism.      Conditions and Status  Fair  [facility] Data  External documents reviewed: Reviewed epic record  Cardiac tracing (EKG, telemetry) interpretation: See HPI  Radiology " interpretation: Reviewed as outlined above  Labs reviewed: Yes  Any tests that were considered but not ordered: None     Decision rules/scores evaluated (example [id number], Wells, etc): None     Discussed with: Patient, friend Gail POLK and social service Christine     Care Planning  Shared decision making: Patient  Code status and discussions: Full code     Disposition  Social Determinants of Health that impact treatment or disposition: None identified at this time  Estimated length of stay is to be determined  .      I confirmed that the patient's advanced care plan is present, code status is documented, and a surrogate decision maker is listed in the patient's medical record.      The patient's surrogate decision maker is family.      Electronically signed by Johan Toney MD, 05/24/24, 16:23 CDT.

## 2024-05-24 NOTE — CONSULTS
Inpatient Nutrition Services  Patient Name:  Minnie Bang  YOB: 1975  MRN: [id number]  Admit Date:  5/23/2024  Assessment Date:  5/24/2024     Reason for Assessment       Row Name 05/24/24 1143          Reason for Assessment    Reason For Assessment physician consult;other (see comments)  per Nurse Admission Screen     Diagnosis hematological/related complications               Nutrition/Diet History       Row Name 05/24/24 1143          Nutrition/Diet History    Typical Intake (Food/Fluid/EN/PN) Pt denied food allergies. Reported CLD tolerated today; no N/V. Pt advanced to full liquids for lunch; reviewed items provided on FLD. Pt reported no weight changes. Current weight 170.5lb. Pt reported BM immediately following PO this morning. Pt has been eating ice, popsicles, pickles, olives, spciy foods, and cake (once) in the last 2 months. Pt reported loss of taste with COVID and has not fully recovered since. RD advised MVI, vitamin C, and Zn. Pt aware that smoking cigarettes can alter and hinder taste and smell. Pt has nystatin on MAR as well to help with questionable thrush. Will continue to monitor while inpatient.     Food Intolerance(s) None     Factors Affecting Nutritional Intake appetite;altered gastrointestinal function;restricted diet;other (see comments)  altered taste, smell               Labs/Tests/Procedures/Meds       Row Name 05/24/24 1148          Labs/Procedures/Meds    Lab Results Reviewed reviewed     Lab Results Comments K+, Cl, Glu, Cr, Mg, LFTs, TG, cholesterol (HDL and LDL ok; total number high due to TG), WBC        Diagnostic Tests/Procedures    Diagnostic Test/Procedure Reviewed reviewed        Medications    Pertinent Medications Reviewed reviewed     Pertinent Medications Comments See MAR          Physical Findings       Row Name 05/24/24 1150          Physical Findings    Overall Physical Appearance Room air, BM 5/24 per pt, no edema, skin intact, Lex Score 22                Estimated/Assessed Needs - Anthropometrics       Row Name 05/24/24 1151          Anthropometrics    Weight for Calculation 77.4 kg (170 lb 9.6 oz)        Estimated/Assessed Needs    Additional Documentation Fluid Requirements (Group);KCAL/KG (Group);Protein Requirements (Group)        KCAL/KG    KCAL/KG 25 Kcal/Kg (kcal);30 Kcal/Kg (kcal)     25 Kcal/Kg (kcal) 1934.6     30 Kcal/Kg (kcal) 2321.52        Protein Requirements    Weight Used For Protein Calculations 77.4 kg (170 lb 10.2 oz)     Est Protein Requirement Amount (gms/kg) 1.2 gm protein     Estimated Protein Requirements (gms/day) 92.88        Fluid Requirements    Fluid Requirements (mL/day) 1935     RDA Method (mL) 1935               Nutrition Prescription Ordered       Row Name 05/24/24 1151          Nutrition Prescription PO    Current PO Diet Regular;Full Liquid                    Evaluation of Received Nutrient/Fluid Intake       Row Name 05/24/24 1151          Nutrient/Fluid Evaluation    Number of Days Evaluated Other (comment)  admission < 24 hr        Fluid Intake Evaluation    Oral Fluid (mL) 440  admit to present total        PO Evaluation    Number of Meals 1     % PO Intake 100 CLD breakfast today              Problem/Interventions:   Problem 1       Row Name 05/24/24 1152          Nutrition Diagnoses Problem 1    Problem 1 Inadequate Intake/Infusion     Inadequate Intake Type Oral     Macronutrient Kcal     Micronutrient Vitamin;Mineral     Etiology (related to) Medical Diagnosis;Factors Affecting Nutrition     Gastrointestinal Diarrhea;Vomiting;Nausea;Other (comment)  N/V prior to admit     Reported/Observed By Patient     Appetite Poor at this Time     Oral Altered Taste;Other (comment);Mouth Pain  altered smell     Best Intake of Salty Snacks;Other  pickles, olives, spicy fods, popsicles, ice     Signs/Symptoms (evidenced by) Report of Mnimal PO Intake;Clear Liquid Diet;Other (comment)  CLD on admit, transition to FLD at this  time                          Intervention Goal       Row Name 05/24/24 1153          Intervention Goal    General Disease management/therapy;Meet nutritional needs for age/condition;Reduce/improve symptoms     PO Establish PO;Tolerate PO;Advance diet;Meet estimated needs     Weight Maintain weight                    Nutrition Intervention       Row Name 05/24/24 1154          Nutrition Intervention    RD/Tech Action Follow Tx progress;Care plan reviewd;Encourage intake                    Nutrition Prescription       Row Name 05/24/24 1154          Nutrition Prescription PO    PO Prescription Other (comment)  advance diet as tolerated        Other Orders    Other MVI with minerals on MAR at present; recommended continuing Zn and vitamin C long-term and MVI daily until PO resumes to normal                    Education/Evaluation       Row Name 05/24/24 1150          Education    Education No discharge needs identified at this time        Monitor/Evaluation    Monitor Per protocol                     Electronically signed by:  Park Milton RDN, LD  05/24/24 11:54 CDT

## 2024-05-25 ENCOUNTER — APPOINTMENT (OUTPATIENT)
Dept: GENERAL RADIOLOGY | Facility: HOSPITAL | Age: 49
DRG: 641 | End: 2024-05-25
Payer: COMMERCIAL

## 2024-05-25 VITALS
SYSTOLIC BLOOD PRESSURE: 104 MMHG | BODY MASS INDEX: 25.27 KG/M2 | TEMPERATURE: 98.2 F | RESPIRATION RATE: 20 BRPM | HEIGHT: 69 IN | OXYGEN SATURATION: 97 % | WEIGHT: 170.6 LBS | DIASTOLIC BLOOD PRESSURE: 61 MMHG | HEART RATE: 80 BPM

## 2024-05-25 LAB
ANION GAP SERPL CALCULATED.3IONS-SCNC: 8 MMOL/L (ref 5–15)
ARTERIAL PATENCY WRIST A: POSITIVE
ATMOSPHERIC PRESS: 748 MMHG
BACTERIA SPEC AEROBE CULT: NORMAL
BASE EXCESS BLDA CALC-SCNC: 5.3 MMOL/L (ref 0–2)
BDY SITE: ABNORMAL
BODY TEMPERATURE: 37
BUN SERPL-MCNC: 4 MG/DL (ref 6–20)
BUN/CREAT SERPL: 8.3 (ref 7–25)
CALCIUM SPEC-SCNC: 8 MG/DL (ref 8.6–10.5)
CHLORIDE SERPL-SCNC: 103 MMOL/L (ref 98–107)
CO2 SERPL-SCNC: 34 MMOL/L (ref 22–29)
CREAT SERPL-MCNC: 0.48 MG/DL (ref 0.57–1)
EGFRCR SERPLBLD CKD-EPI 2021: 114.8 ML/MIN/1.73
GAS FLOW AIRWAY: 4.5 LPM
GLUCOSE SERPL-MCNC: 100 MG/DL (ref 65–99)
HCO3 BLDA-SCNC: 31 MMOL/L (ref 20–26)
Lab: ABNORMAL
MAGNESIUM SERPL-MCNC: 2.2 MG/DL (ref 1.6–2.6)
MODALITY: ABNORMAL
PCO2 BLDA: 49.5 MM HG (ref 35–45)
PCO2 TEMP ADJ BLD: 49.5 MM HG (ref 35–45)
PH BLDA: 7.41 PH UNITS (ref 7.35–7.45)
PH, TEMP CORRECTED: 7.41 PH UNITS (ref 7.35–7.45)
PO2 BLDA: 73.5 MM HG (ref 83–108)
PO2 TEMP ADJ BLD: 73.5 MM HG (ref 83–108)
POTASSIUM SERPL-SCNC: 3.1 MMOL/L (ref 3.5–5.2)
SAO2 % BLDCOA: 95.4 % (ref 94–99)
SODIUM SERPL-SCNC: 145 MMOL/L (ref 136–145)
VENTILATOR MODE: ABNORMAL

## 2024-05-25 PROCEDURE — 25010000002 THIAMINE HCL 200 MG/2ML SOLUTION: Performed by: HOSPITALIST

## 2024-05-25 PROCEDURE — 36600 WITHDRAWAL OF ARTERIAL BLOOD: CPT

## 2024-05-25 PROCEDURE — 83735 ASSAY OF MAGNESIUM: CPT | Performed by: INTERNAL MEDICINE

## 2024-05-25 PROCEDURE — 82803 BLOOD GASES ANY COMBINATION: CPT

## 2024-05-25 PROCEDURE — 25010000002 ONDANSETRON PER 1 MG: Performed by: INTERNAL MEDICINE

## 2024-05-25 PROCEDURE — 80048 BASIC METABOLIC PNL TOTAL CA: CPT | Performed by: INTERNAL MEDICINE

## 2024-05-25 RX ORDER — POTASSIUM CHLORIDE 750 MG/1
20 CAPSULE, EXTENDED RELEASE ORAL 2 TIMES DAILY
Qty: 8 CAPSULE | Refills: 0 | Status: SHIPPED | OUTPATIENT
Start: 2024-05-25 | End: 2024-05-27

## 2024-05-25 RX ORDER — LIDOCAINE HYDROCHLORIDE 20 MG/ML
5 SOLUTION OROPHARYNGEAL EVERY 4 HOURS PRN
Status: DISCONTINUED | OUTPATIENT
Start: 2024-05-25 | End: 2024-05-25 | Stop reason: HOSPADM

## 2024-05-25 RX ORDER — LIDOCAINE HYDROCHLORIDE 20 MG/ML
5 SOLUTION OROPHARYNGEAL ONCE
Status: COMPLETED | OUTPATIENT
Start: 2024-05-25 | End: 2024-05-25

## 2024-05-25 RX ADMIN — OXYCODONE HYDROCHLORIDE AND ACETAMINOPHEN 1 TABLET: 5; 325 TABLET ORAL at 02:19

## 2024-05-25 RX ADMIN — ONDANSETRON 4 MG: 2 INJECTION INTRAMUSCULAR; INTRAVENOUS at 04:22

## 2024-05-25 RX ADMIN — THIAMINE HYDROCHLORIDE 200 MG: 100 INJECTION, SOLUTION INTRAMUSCULAR; INTRAVENOUS at 06:21

## 2024-05-25 RX ADMIN — POTASSIUM CHLORIDE 40 MEQ: 750 CAPSULE, EXTENDED RELEASE ORAL at 02:19

## 2024-05-25 RX ADMIN — LIDOCAINE HYDROCHLORIDE 5 ML: 20 SOLUTION ORAL; TOPICAL at 03:53

## 2024-05-25 NOTE — PAYOR COMM NOTE
"Ref:   [id number]     [facility]  FAX  [phone]     Robles Singh (49 y.o. Female)       Date of Birth   1975    Social Security Number       Address   [address] [facility] [address]    Home Phone   [phone]    MRN   [id number]       [facility]    Marital Status                               Admission Date   5/23/24    Admission Type   Emergency    Admitting Provider   Johan Toney MD    Attending Provider       Department, Room/Bed   [facility] 4B, 402/1       Discharge Date   5/25/2024    Discharge Disposition   Left Against Medical Advice    Discharge Destination                                 Attending Provider: (none)   Allergies: No Known Allergies    Isolation: None   Infection: None   Code Status: Prior    Ht: 175.3 cm (69\")   Wt: 77.4 kg (170 lb 9.6 oz)    Admission Cmt: None   Principal Problem: Hypokalemia [E87.6]                   Active Insurance as of 5/23/2024       Primary Coverage       Payor Plan Insurance Group Employer/Plan Group    [employer] [place] [employer] [place]        Payor Plan Address Payor Plan Phone Number Payor Plan Fax Number Effective Dates    [address] [address] [phone]  6/8/2020 - None Entered    [facility] [address]         Subscriber Name Subscriber Birth Date Member ID       ROBLES SINGH 1975 [id number]                     Emergency Contacts        (Rel.) Home Phone Work Phone Mobile Phone    Kirill Montgomery (Spouse) [phone] -- --                 Discharge Summary        Johan Toney MD at 05/25/24 0918          Patient decided to leave AGAINST MEDICAL ADVICE  I had my encounter earlier today.  Potassium is improved but still has not normalized at 3.1.  I wrote her a prescription for potassium as courtesy.    During my encounter earlier due to the concerns raised by nursing staff and patient, I have ordered for speech therapy evaluation and ENT " evaluation.  Patient is very emotional and decided to leave AGAINST MEDICAL ADVICE.    Note that her symptoms described been going on for 8 months.  She has an appointment with Dr. Padilla this coming Tuesday as she narrated to me.    Patient is also an alcoholic with detectable alcohol level.  She was not in any withdrawal during this hospitalization.  Note that patient was not forthright on giving any information.    She also had hypoxia.  She was placed on supplemental oxygen.  She was 100% on 2 L at the time of visit.  She maintained her oxygen saturation in the mid to upper 90s during encounter on 2 L nasal cannula.    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  She complained of sore throat which cultures did not show any beta-hemolytic strep    She had hypomagnesemia which was corrected  TSH is abnormal.  Her record indicates she was taking levothyroxine at 150 mcg.  I stopped this while in the hospital.    Patient will require close follow-up with primary care provider.      Vitals:    05/25/24 0720   BP: 104/61   Pulse: 80   Resp: 20   Temp: 98.2 °F (36.8 °C)   SpO2: 97%   Her complaint is more than what actual physical exam can support  GEN: Awake, alert, interactive, in NAD  HEENT: Atraumatic, PERRLA, EOMI, Anicteric, Trachea midline  No gross tonsillopharyngeal congestion,  Lungs: CTAB, no wheezing/rales/rhonchi  Heart: RRR, +S1/s2, no rub  ABD: soft, nt/nd, +BS, no guarding/rebound  Extremities: atraumatic, no cyanosis, no edema  Skin: no rashes or lesions  Neuro: AAOx3, no focal deficits  Psych: normal mood & affect    Discharge AMA.  Risk of leaving AGAINST MEDICAL ADVICE explained by nursing staff.  She signed AMA form.  Potassium prescribed  Patient strongly advised to follow-up with primary care provider.  Total cumulative time spent for the day greater than 30 minutes.    Electronically signed by Johan Toney MD at 05/25/24 0967       Discharge  Order (From admission, onward)       Start     Ordered    05/25/24 0911  Discharge patient  Once        Expected Discharge Date: 05/25/24   Discharge Disposition: Left Against Medical Advice   Physician of Record for Attribution - Please select from Treatment Team: CHINYERE HAMILTON [[id number]]   Review needed by CMO to determine Physician of Record: No      Question Answer Comment   Physician of Record for Attribution - Please select from Treatment Team CHINYERE HAMILTON    Review needed by CMO to determine Physician of Record No        05/25/24 0911

## 2024-05-25 NOTE — SIGNIFICANT NOTE
Patient oxygen level noted to be 84% on room air, patient in no distress at this time. Dr Eng notified with orders received for ABG, portable chest xray, and PRN oxygen. Patient placed on oxygen and titrated to 5LPM via nasal cannula with oxygen saturation now between 92-97%.

## 2024-05-25 NOTE — NURSING NOTE
Patient tele box went missing. Patient went home AMA this early morning. We have found out that the tele monitor was not returned to the monitor room. Her Nurse, Silvia said she found the monitor at bedside table when she gave her AMA paper and forgot to take it out from the room. I have called the patient cell phone number and went to voice mail. I left a message to bring it back if she accidentally took it home with her.     Update on the case; I was able to speak with the patient on the phone for the second time I called her. Patient said, she did not took the monitor box home with her. Housekeeping did not see it on the bed when they cleaned the room. House supervisor made aware.  made aware on it.

## 2024-05-25 NOTE — NURSING NOTE
Pt became agitated this morning that she felt like we were not going to do anything for her throat pain. MD had just rounded on the pt and ordered a swallow eval and ENT consult. This was explained to the pt, she continued to rant that she wanted to go home to her dogs and she would just follow up with her ENT on Tuesday. Pt ripped her tele box off and set it on the night stand and began getting dressed. AMA paperwork filled out and explained to the patient. Pt signed and gathered her belongings. Pt walked to the main entrance where she went to her vehicle. After arriving back to the unit it was found that the pt's tele box was no longer in the room. EVS was already cleaning the room. Monitor room and Charge RN notified that tele box was missing.

## 2024-05-25 NOTE — PROGRESS NOTES
[facility]  INPATIENT PROGRESS NOTE    Patient Name: Minnie Bang  Date of Admission: 5/23/2024  Today's Date: 05/25/24  Length of Stay: 2  Primary Care Physician: Aidee Padilla APRN    Subjective   Chief Complaint: Follow-up  HPI   Overnight, patient reportedly had problem with swallowing.  Patient also had desaturation.  Blood gas reviewed showed PaO2 > 70 on 4.5 L supplemental oxygen  Potassium remains low.  She has been on replacement.    I reviewed the nurse's' message to me good Morning. Could you come see this patient first thing this morning please. Overnight pt's breathing breathing has become restricted and pt desaturated into the low 80's. required 5L to get her to rebound into the 90's. We have been able to wean her to 2L. Pt has complained of severe throat pain without relief from tylenol, percocet, nystatin, chloraseptic and minimal relief from viscous lidocaine. Pt is unable to swallow pills at this time. The last pill she had she felt it got stuck and struggled for 30 mins to get it to pass. Additionally, her potassium is now 3.1 however she didn't seem to respond to the 6 IV bags she got yesterday but did respond to the oral capsules. She says she can't even attempt to swallow the capsules this morning and did not tolerate the liquid previously without vomiting. How would you like to replace her potassium? Thanks Belt phone [phone]     Patient has a standing order for electrolyte replacement since admission.      Another nurse's message to me:  Re: Ms Bang Potassium only came up to 2.7. She did however have food delivered because she wanted more than full liquids. She was able to eat Soup, Baked Potato and part of a sandwich from [employer]. :)       Patient has not been forthright at the very beginning when I saw her in the emergency room.  She admits this yesterday.          Review of Systems   All pertinent negatives and  "positives are as above. All other systems have been reviewed and are negative unless otherwise stated.     Objective    Temp:  [97.7 °F (36.5 °C)-98.9 °F (37.2 °C)] 98 °F (36.7 °C)  Heart Rate:  [] 84  Resp:  [16-20] 20  BP: (102-134)/(64-92) 118/69  Physical Exam  Patient as previously described to have more symptoms than what is actually seen on physical exam.      GEN: Awake, alert, interactive, in NAD  HEENT: Atraumatic, PERRLA, EOMI, Anicteric, Trachea midline  No gross tonsillopharyngeal congestion,  Lungs: CTAB, no wheezing/rales/rhonchi  Heart: RRR, +S1/s2, no rub  ABD: soft, nt/nd, +BS, no guarding/rebound  Extremities: atraumatic, no cyanosis, no edema  Skin: no rashes or lesions  Neuro: AAOx3, no focal deficits  Psych: normal mood & affect    Results Review:  I have reviewed the labs, radiology results, and diagnostic studies.    Laboratory Data:   Results from last 7 days   Lab Units 05/23/24  1440   WBC 10*3/mm3 13.05*   HEMOGLOBIN g/dL 12.6   HEMATOCRIT % 36.6   PLATELETS 10*3/mm3 397        Results from last 7 days   Lab Units 05/25/24  0143 05/24/24  1512 05/24/24  0319 05/23/24  1440   SODIUM mmol/L 145  --   --  138   POTASSIUM mmol/L 3.1* 2.7* 2.6* 2.2*   CHLORIDE mmol/L 103  --   --  92*   CO2 mmol/L 34.0*  --   --  26.0   BUN mg/dL 4*  --   --  2*   CREATININE mg/dL 0.48*  --   --  0.42*   CALCIUM mg/dL 8.0*  --   --  8.2*   BILIRUBIN mg/dL  --   --   --  0.5   ALK PHOS U/L  --   --   --  440*   ALT (SGPT) U/L  --   --   --  53*   AST (SGOT) U/L  --   --   --  163*   GLUCOSE mg/dL 100*  --   --  91       Culture Data:   No results found for: \"BLOODCX\", \"URINECX\", \"WOUNDCX\", \"MRSACX\", \"RESPCX\", \"STOOLCX\"    Radiology Data:   Imaging Results (Last 24 Hours)       ** No results found for the last 24 hours. **            I have reviewed the patient's current medications.     Assessment/Plan   Assessment  Active Hospital Problems    Diagnosis     **Hypokalemia        Medical Decision " Making  Number and Complexity of problems:   Hypokalemia, hypomagnesemia, mild hypocalcemia  COPD - no exacerbation  Tobacco abuse in remission  Hyperlipidemia  Hypertension  Hypothyroidism-noted overcorrected TSH on levothyroxine (0.249) I will discontinue levothyroxine for now  Macrocytosis without anemia  ? Thrush - trial of nystatin  Folic acid deficiency  Alcoholism-no evidence of withdrawal while.  Monitor.  On CIWA protocol  Hoarseness of voice-history of true vocal cord polyposis; no evidence of airway compromise  Hypoxia-resolved    Treatment Plan  Blood pressure improved on current medication  As needed antiemetic  Continue on prophylaxis replacement of thiamine.  Replace folic acid    To address patient's concern, I consulted speech speech therapy for evaluation of swallowing  I also ordered for ENT consult.    I noticed during the entire encounter with her that her O2 saturation was 100% on oxygen supplement and maintained in the upper 90s without supplemental oxygen.    I reviewed the CT of soft tissue neck.  There is no acute process in.    Conditions and Status  Stable     MDM Data  External documents reviewed: Reviewed epic record  Cardiac tracing (EKG, telemetry) interpretation: -  Radiology interpretation: Reviewed as mentioned above  Labs reviewed: Reviewed  Any tests that were considered but not ordered: None     Decision rules/scores evaluated (example [id number], Wells, etc): None     Discussed with: Patient and nurse Silvia at bedside  Care Planning  Shared decision making: Patient  Code status and discussions: Full code    Disposition  Social Determinants of Health that impact treatment or disposition: None identified at this time  I expect the patient to be discharged to (to be determined following consultation with speech and ENT)     Electronically signed by Johan Toney MD, 05/25/24, 08:27 CDT.

## 2024-05-25 NOTE — SIGNIFICANT NOTE
When giving pt PO medication, pt felt like it got stuck in her throat. Pt was able to swallow, given carbonated beverage and states that it improved. Pt states that her throat has become more painful, voice sounds more hoarse and is having more upper airway wheezing. Pt able so swallow without any difficulty at this time. Dr Eng notified and orders received for one time dose of viscous lidocaine 5ml and if that is effective may place PRN order for every four hours.

## 2024-05-25 NOTE — DISCHARGE SUMMARY
Patient decided to leave AGAINST MEDICAL ADVICE  I had my encounter earlier today.  Potassium is improved but still has not normalized at 3.1.  I wrote her a prescription for potassium as courtesy.    During my encounter earlier due to the concerns raised by nursing staff and patient, I have ordered for speech therapy evaluation and ENT evaluation.  Patient is very emotional and decided to leave AGAINST MEDICAL ADVICE.    Note that her symptoms described been going on for 8 months.  She has an appointment with Dr. Padilla this coming Tuesday as she narrated to me.    Patient is also an alcoholic with detectable alcohol level.  She was not in any withdrawal during this hospitalization.  Note that patient was not forthright on giving any information.    She also had hypoxia.  She was placed on supplemental oxygen.  She was 100% on 2 L at the time of visit.  She maintained her oxygen saturation in the mid to upper 90s during encounter on 2 L nasal cannula.    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  She complained of sore throat which cultures did not show any beta-hemolytic strep    She had hypomagnesemia which was corrected  TSH is abnormal.  Her record indicates she was taking levothyroxine at 150 mcg.  I stopped this while in the hospital.    Patient will require close follow-up with primary care provider.      Vitals:    05/25/24 0720   BP: 104/61   Pulse: 80   Resp: 20   Temp: 98.2 °F (36.8 °C)   SpO2: 97%   Her complaint is more than what actual physical exam can support  GEN: Awake, alert, interactive, in NAD  HEENT: Atraumatic, PERRLA, EOMI, Anicteric, Trachea midline  No gross tonsillopharyngeal congestion,  Lungs: CTAB, no wheezing/rales/rhonchi  Heart: RRR, +S1/s2, no rub  ABD: soft, nt/nd, +BS, no guarding/rebound  Extremities: atraumatic, no cyanosis, no edema  Skin: no rashes or lesions  Neuro: AAOx3, no focal deficits  Psych: normal mood & affect    Discharge AMA.   Risk of leaving AGAINST MEDICAL ADVICE explained by nursing staff.  She signed AMA form.  Potassium prescribed  Patient strongly advised to follow-up with primary care provider.  Total cumulative time spent for the day greater than 30 minutes.

## 2024-05-27 LAB
QT INTERVAL: 384 MS
QTC INTERVAL: 504 MS

## 2024-05-28 ENCOUNTER — OFFICE VISIT (OUTPATIENT)
Dept: OTOLARYNGOLOGY | Facility: CLINIC | Age: 49
End: 2024-05-28
Payer: COMMERCIAL

## 2024-05-28 VITALS
HEART RATE: 100 BPM | HEIGHT: 69 IN | WEIGHT: 171.13 LBS | TEMPERATURE: 97.8 F | SYSTOLIC BLOOD PRESSURE: 160 MMHG | DIASTOLIC BLOOD PRESSURE: 110 MMHG | BODY MASS INDEX: 25.35 KG/M2

## 2024-05-28 DIAGNOSIS — J38.1 VOCAL CORD POLYPS: Primary | ICD-10-CM

## 2024-05-28 DIAGNOSIS — K21.9 LARYNGOPHARYNGEAL REFLUX: ICD-10-CM

## 2024-05-28 DIAGNOSIS — R13.10 DYSPHAGIA, UNSPECIFIED TYPE: ICD-10-CM

## 2024-05-28 DIAGNOSIS — D48.9 NEOPLASM OF UNCERTAIN BEHAVIOR: ICD-10-CM

## 2024-05-28 DIAGNOSIS — F17.209 TOBACCO USE DISORDER, CONTINUOUS: ICD-10-CM

## 2024-05-28 DIAGNOSIS — E89.0 S/P THYROIDECTOMY: ICD-10-CM

## 2024-05-28 PROCEDURE — 3077F SYST BP >= 140 MM HG: CPT | Performed by: NURSE PRACTITIONER

## 2024-05-28 PROCEDURE — 3080F DIAST BP >= 90 MM HG: CPT | Performed by: NURSE PRACTITIONER

## 2024-05-28 PROCEDURE — 99214 OFFICE O/P EST MOD 30 MIN: CPT | Performed by: NURSE PRACTITIONER

## 2024-05-28 PROCEDURE — 31575 DIAGNOSTIC LARYNGOSCOPY: CPT | Performed by: OTOLARYNGOLOGY

## 2024-05-28 PROCEDURE — 1160F RVW MEDS BY RX/DR IN RCRD: CPT | Performed by: NURSE PRACTITIONER

## 2024-05-28 PROCEDURE — 1159F MED LIST DOCD IN RCRD: CPT | Performed by: NURSE PRACTITIONER

## 2024-05-28 RX ORDER — LEVOTHYROXINE SODIUM 125 MCG
125 TABLET ORAL DAILY
Qty: 30 TABLET | Refills: 1 | Status: SHIPPED | OUTPATIENT
Start: 2024-05-28 | End: 2024-06-27

## 2024-05-28 NOTE — PATIENT INSTRUCTIONS
DIRECT LARYNGOSCOPY WITH BIOPSY: The risks and benefits were explained including but not limited to pain, bleeding, infection, (including possible mediastinitis), the risks of the general anesthesia, pain, temporary or permanent hoarseness, airway loss, and/or tooth injury. Questions were answered. No guarantees were made or implied.

## 2024-05-28 NOTE — PROGRESS NOTES
YOB: 1975  Location: [facility]  Location Address: [address] [place] [address], [facility] [address], [address] [place] [address]  Location Phone: [phone]    Chief Complaint   Patient presents with   • Throat problem     States throat is getting worse and has not gotten any better. She quit smoking.   • Difficulty Swallowing     Gets choked when drinking, throws up often, having issues eating        History of Present Illness  Minnie Bang is a 49 y.o. female.  Minnie Bang is here for follow up of ENT complaints. The patient has had problems with hoarseness, difficulty swallowing and nausea/vomiting  Patient has previously been diagnosed with vocal cord polyps, she was scheduled for direct laryngoscopy with biopsy in January, but states she had to cancel the appointment   She states the hoarseness and difficulty swallowing has progressively worsened     Patient has seen gastroenterology in the past, but has not had endoscopy performed        CT Soft Tissue Neck With Contrast (2024 15:43)     TSH (2024 14:40)      Past Medical History:   Diagnosis Date   • Abnormal ECG 2023   • Anxiety    • Arthritis     back   • Asthma    • Bicuspid aortic valve 2023   • COPD (chronic obstructive pulmonary disease)    • Coronary-myocardial bridge 2023   • CTS (carpal tunnel syndrome)    • Dental disease    • Depression    • Diastolic dysfunction    • Disease of thyroid gland    • Dizziness    • Emphysema of lung    • GERD (gastroesophageal reflux disease)    • Headache    • Hyperlipidemia    • Hypertension    • Mixed simple and mucopurulent chronic bronchitis 2023   • Non-occlusive coronary artery disease 2023   • NSTEMI (non-ST elevated myocardial infarction) (HCC)    • Pneumonia    • Pulmonary emphysema 2023   • Shingles        Past Surgical History:   Procedure Laterality Date   • APPENDECTOMY     • CARDIAC CATHETERIZATION N/A 2017    Procedure:  Coronary angiography;  Surgeon: Luis Colvin MD;  Location:  [facility];  Service:    • CARDIAC CATHETERIZATION N/A 2023    Procedure: Left Heart Cath;  Surgeon: Steffen Rossi MD;  Location:  [facility];  Service: Cardiology;  Laterality: N/A;   • CARPAL TUNNEL RELEASE     • HYSTERECTOMY     • LA RT/LT HEART CATHETERS N/A 2017    Procedure: Percutaneous Coronary Intervention;  Surgeon: Luis Colvin MD;  Location:  [facility];  Service: Cardiovascular   • THYROID SURGERY     • TOTAL THYROIDECTOMY         No outpatient medications have been marked as taking for the 24 encounter (Office Visit) with Mendez Padilla MD.       Patient has no known allergies.    Family History   Problem Relation Age of Onset   • Asthma Mother    • Cancer Mother    • Emphysema Mother    • Cancer Father    • Heart failure Father    • Hypertension Father        Social History     Socioeconomic History   • Marital status:    Tobacco Use   • Smoking status: Former     Current packs/day: 0.00     Average packs/day: 0.7 packs/day for 45.9 years (30.0 ttl pk-yrs)     Types: Cigarettes     Start date: 1991     Quit date: 3/1/2024     Years since quittin.2     Passive exposure: Past   • Tobacco comments:     Less than 1/2 pack a day    Vaping Use   • Vaping status: Never Used   Substance and Sexual Activity   • Alcohol use: Not Currently     Comment: occ   • Drug use: No   • Sexual activity: Not Currently     Partners: Male     Birth control/protection: Hysterectomy       Review of Systems   Constitutional:  Positive for fatigue.   HENT:  Positive for sore throat, trouble swallowing and voice change.    Respiratory:  Positive for shortness of breath.        Vitals:    24 1427   BP: (!) 160/110   Pulse: 100   Temp: 97.8 °F (36.6 °C)       Body mass index is 25.27 kg/m².    Objective     Physical Exam  Vitals reviewed.   HENT:      Head:  Normocephalic.      Comments: Voice raspy        Right Ear: External ear normal.      Left Ear: External ear normal.      Nose: Nose normal.      Mouth/Throat:      Lips: Pink.      Mouth: Mucous membranes are dry.   Neurological:      Mental Status: She is alert.     Dr. Padilla has examined and assessed the patient and agrees with current treatment plan      Assessment & Plan   Problems Addressed this Visit          ENT    Vocal cord polyps - Primary    Relevant Orders    Case Request (Completed)    CBC and Differential    ECG 12 Lead    XR Chest 2 View       Gastrointestinal Abdominal     Dysphagia    Relevant Orders    Case Request (Completed)    CBC and Differential    ECG 12 Lead    XR Chest 2 View    Ambulatory Referral to Gastroenterology       Hematology and Neoplasia    Neoplasm of uncertain behavior    Relevant Orders    Case Request (Completed)    CBC and Differential    ECG 12 Lead    XR Chest 2 View       Tobacco    Tobacco use disorder, continuous    Relevant Orders    Case Request (Completed)    CBC and Differential    ECG 12 Lead    XR Chest 2 View     Other Visit Diagnoses       Laryngopharyngeal reflux        Relevant Orders    Ambulatory Referral to Gastroenterology    S/P thyroidectomy        Relevant Orders    TSH          Diagnoses         Codes Comments    Vocal cord polyps    -  Primary ICD-10-CM: J38.1  ICD-9-CM: 478.4     Laryngopharyngeal reflux     ICD-10-CM: K21.9  ICD-9-CM: 478.79     Neoplasm of uncertain behavior     ICD-10-CM: D48.9  ICD-9-CM: 238.9     Tobacco use disorder, continuous     ICD-10-CM: F17.209  ICD-9-CM: 305.1     Dysphagia, unspecified type     ICD-10-CM: R13.10  ICD-9-CM: 787.20     S/P thyroidectomy     ICD-10-CM: E89.0  ICD-9-CM: 246.8           DIRECT LARYNGOSCOPY WITH BIOPSY OF VOCAL CORD POLYPS WITH POSSIBLE TRACHEOSTOMY (N/A), POSSIBLE TRACHEOSTOMY (N/A)  Orders Placed This Encounter   Procedures   • XR Chest 2 View     Standing Status:   Future     Standing  Expiration Date:   5/28/2025     Order Specific Question:   Reason for Exam:     Answer:   preop testing     Order Specific Question:   Release to patient     Answer:   Routine Release [[id number]]   • TSH     Standing Status:   Future     Standing Expiration Date:   5/28/2025     Order Specific Question:   Release to patient     Answer:   Routine Release [[id number]]   • Ambulatory Referral to Gastroenterology     Referral Priority:   Urgent     Referral Type:   Consultation     Referral Reason:   Specialty Services Required     Referred to Provider:   Gordy Wang MD     Requested Specialty:   Gastroenterology     Number of Visits Requested:   1   • Provide Patient With Instructions on NPO Status   • ECG 12 Lead     Standing Status:   Future     Standing Expiration Date:   5/28/2025     Order Specific Question:   Reason for Exam:     Answer:   preop testing     Order Specific Question:   Release to patient     Answer:   Routine Release [[id number]]   • CBC and Differential     Standing Status:   Future     Standing Expiration Date:   5/28/2025     Order Specific Question:   Manual Differential     Answer:   No     Order Specific Question:   Release to patient     Answer:   Routine Release [[id number]]     No follow-ups on file.     Risks vs benefits of direct laryngoscopy with biopsy discussed patient wishes to proceed     Patient Instructions   DIRECT LARYNGOSCOPY WITH BIOPSY: The risks and benefits were explained including but not limited to pain, bleeding, infection, (including possible mediastinitis), the risks of the general anesthesia, pain, temporary or permanent hoarseness, airway loss, and/or tooth injury. Questions were answered. No guarantees were made or implied.

## 2024-05-28 NOTE — H&P (VIEW-ONLY)
YOB: 1975  Location: [facility]  Location Address: [address] [place] [address], [facility] [address], [address] [place] [address]  Location Phone: [phone]    Chief Complaint   Patient presents with   • Throat problem     States throat is getting worse and has not gotten any better. She quit smoking.   • Difficulty Swallowing     Gets choked when drinking, throws up often, having issues eating        History of Present Illness  Minnie Bang is a 49 y.o. female.  Minnie Bang is here for follow up of ENT complaints. The patient has had problems with hoarseness, difficulty swallowing and nausea/vomiting  Patient has previously been diagnosed with vocal cord polyps, she was scheduled for direct laryngoscopy with biopsy in January, but states she had to cancel the appointment   She states the hoarseness and difficulty swallowing has progressively worsened     Patient has seen gastroenterology in the past, but has not had endoscopy performed        CT Soft Tissue Neck With Contrast (2024 15:43)     TSH (2024 14:40)      Past Medical History:   Diagnosis Date   • Abnormal ECG 2023   • Anxiety    • Arthritis     back   • Asthma    • Bicuspid aortic valve 2023   • COPD (chronic obstructive pulmonary disease)    • Coronary-myocardial bridge 2023   • CTS (carpal tunnel syndrome)    • Dental disease    • Depression    • Diastolic dysfunction    • Disease of thyroid gland    • Dizziness    • Emphysema of lung    • GERD (gastroesophageal reflux disease)    • Headache    • Hyperlipidemia    • Hypertension    • Mixed simple and mucopurulent chronic bronchitis 2023   • Non-occlusive coronary artery disease 2023   • NSTEMI (non-ST elevated myocardial infarction) (HCC)    • Pneumonia    • Pulmonary emphysema 2023   • Shingles        Past Surgical History:   Procedure Laterality Date   • APPENDECTOMY     • CARDIAC CATHETERIZATION N/A 2017    Procedure:  Coronary angiography;  Surgeon: Luis Colvin MD;  Location:  [facility];  Service:    • CARDIAC CATHETERIZATION N/A 2023    Procedure: Left Heart Cath;  Surgeon: Steffen Rossi MD;  Location:  [facility];  Service: Cardiology;  Laterality: N/A;   • CARPAL TUNNEL RELEASE     • HYSTERECTOMY     • [place] RT/LT HEART CATHETERS N/A 2017    Procedure: Percutaneous Coronary Intervention;  Surgeon: Luis Colvin MD;  Location:  [facility];  Service: Cardiovascular   • THYROID SURGERY     • TOTAL THYROIDECTOMY         No outpatient medications have been marked as taking for the 24 encounter (Office Visit) with Mendez Padilla MD.       Patient has no known allergies.    Family History   Problem Relation Age of Onset   • Asthma Mother    • Cancer Mother    • Emphysema Mother    • Cancer Father    • Heart failure Father    • Hypertension Father        Social History     Socioeconomic History   • Marital status:    Tobacco Use   • Smoking status: Former     Current packs/day: 0.00     Average packs/day: 0.7 packs/day for 45.9 years (30.0 ttl pk-yrs)     Types: Cigarettes     Start date: 1991     Quit date: 3/1/2024     Years since quittin.2     Passive exposure: Past   • Tobacco comments:     Less than 1/2 pack a day    Vaping Use   • Vaping status: Never Used   Substance and Sexual Activity   • Alcohol use: Not Currently     Comment: occ   • Drug use: No   • Sexual activity: Not Currently     Partners: Male     Birth control/protection: Hysterectomy       Review of Systems   Constitutional:  Positive for fatigue.   HENT:  Positive for sore throat, trouble swallowing and voice change.    Respiratory:  Positive for shortness of breath.        Vitals:    24 1427   BP: (!) 160/110   Pulse: 100   Temp: 97.8 °F (36.6 °C)       Body mass index is 25.27 kg/m².    Objective     Physical Exam  Vitals reviewed.   HENT:      Head:  Normocephalic.      Comments: Voice raspy        Right Ear: External ear normal.      Left Ear: External ear normal.      Nose: Nose normal.      Mouth/Throat:      Lips: Pink.      Mouth: Mucous membranes are dry.   Neurological:      Mental Status: She is alert.     Dr. Padilla has examined and assessed the patient and agrees with current treatment plan      Assessment & Plan   Problems Addressed this Visit          ENT    Vocal cord polyps - Primary    Relevant Orders    Case Request (Completed)    CBC and Differential    ECG 12 Lead    XR Chest 2 View       Gastrointestinal Abdominal     Dysphagia    Relevant Orders    Case Request (Completed)    CBC and Differential    ECG 12 Lead    XR Chest 2 View    Ambulatory Referral to Gastroenterology       Hematology and Neoplasia    Neoplasm of uncertain behavior    Relevant Orders    Case Request (Completed)    CBC and Differential    ECG 12 Lead    XR Chest 2 View       Tobacco    Tobacco use disorder, continuous    Relevant Orders    Case Request (Completed)    CBC and Differential    ECG 12 Lead    XR Chest 2 View     Other Visit Diagnoses       Laryngopharyngeal reflux        Relevant Orders    Ambulatory Referral to Gastroenterology    S/P thyroidectomy        Relevant Orders    TSH          Diagnoses         Codes Comments    Vocal cord polyps    -  Primary ICD-10-CM: J38.1  ICD-9-CM: 478.4     Laryngopharyngeal reflux     ICD-10-CM: K21.9  ICD-9-CM: 478.79     Neoplasm of uncertain behavior     ICD-10-CM: D48.9  ICD-9-CM: 238.9     Tobacco use disorder, continuous     ICD-10-CM: F17.209  ICD-9-CM: 305.1     Dysphagia, unspecified type     ICD-10-CM: R13.10  ICD-9-CM: 787.20     S/P thyroidectomy     ICD-10-CM: E89.0  ICD-9-CM: 246.8           DIRECT LARYNGOSCOPY WITH BIOPSY OF VOCAL CORD POLYPS WITH POSSIBLE TRACHEOSTOMY (N/A), POSSIBLE TRACHEOSTOMY (N/A)  Orders Placed This Encounter   Procedures   • XR Chest 2 View     Standing Status:   Future     Standing  Expiration Date:   5/28/2025     Order Specific Question:   Reason for Exam:     Answer:   preop testing     Order Specific Question:   Release to patient     Answer:   Routine Release [[id number]]   • TSH     Standing Status:   Future     Standing Expiration Date:   5/28/2025     Order Specific Question:   Release to patient     Answer:   Routine Release [[id number]]   • Ambulatory Referral to Gastroenterology     Referral Priority:   Urgent     Referral Type:   Consultation     Referral Reason:   Specialty Services Required     Referred to Provider:   Gordy Wang MD     Requested Specialty:   Gastroenterology     Number of Visits Requested:   1   • Provide Patient With Instructions on NPO Status   • ECG 12 Lead     Standing Status:   Future     Standing Expiration Date:   5/28/2025     Order Specific Question:   Reason for Exam:     Answer:   preop testing     Order Specific Question:   Release to patient     Answer:   Routine Release [[id number]]   • CBC and Differential     Standing Status:   Future     Standing Expiration Date:   5/28/2025     Order Specific Question:   Manual Differential     Answer:   No     Order Specific Question:   Release to patient     Answer:   Routine Release [[id number]]     No follow-ups on file.     Risks vs benefits of direct laryngoscopy with biopsy discussed patient wishes to proceed     Patient Instructions   DIRECT LARYNGOSCOPY WITH BIOPSY: The risks and benefits were explained including but not limited to pain, bleeding, infection, (including possible mediastinitis), the risks of the general anesthesia, pain, temporary or permanent hoarseness, airway loss, and/or tooth injury. Questions were answered. No guarantees were made or implied.

## 2024-05-28 NOTE — PROGRESS NOTES
OPERATIVE NOTE:  Minnie Bang    DATE OF PROCEDURE: 5/28/2024    PROCEDURE:   Flexible Fiberoptic Laryngoscopy    ANESTHESIA:  None    REASON FOR PROCEDURE:  Procedure was recommended for persistant hoarseness, suspicious clinical behavior, and globus sensation  Risks, benefits and alternatives were discussed.      DETAILS of OPERATION:  The patient was seated in the exam chair.  A flexible fiberoptic laryngoscopy was performed through the oral cavity.  The scope was introduced into the oral cavity and directed to the level of the glottis, examining the structures of the oropharynx, base of tongue, vallecula, supraglottic larynx, glottic larynx, and hypopharynx.      FINDINGS:  Mucosal surfaces:   The mucosal surfaces demonstrated normal mucosa surfaces with moderate inflammation with clear but thickened secretions    Base of tongue:  The base of tongue was found to have moderate lymphoid hyperplasia.    Epiglottis:  The epiglottis was found to have no mass or lesion.    Aryepiglottic fold:  The AE folds were found to have no mass or lesion.    False Vocal Fold:  The false cords were found to have no mass or lesion.    True Vocal Cord:  The true vocal cords were found to have bilateral polyposis right greater than left without obvious evidence of neoplasm otherwise but asymmetry was more significant than previous evaluation.  Both true vocal cords adduct and abduct normally .     Arytenoid:   The arytenoids were found to have moderate inter-arytenoid edema with erythema.    Hypopharynx:  The hypopharynx was found to have no mass or lesion.    The patient tolerated procedure well.

## 2024-05-30 ENCOUNTER — HOSPITAL ENCOUNTER (OUTPATIENT)
Facility: HOSPITAL | Age: 49
Setting detail: HOSPITAL OUTPATIENT SURGERY
Discharge: HOME | End: 2024-05-30
Attending: OTOLARYNGOLOGY | Admitting: OTOLARYNGOLOGY
Payer: COMMERCIAL

## 2024-05-30 ENCOUNTER — ANESTHESIA EVENT (OUTPATIENT)
Dept: PERIOP | Facility: HOSPITAL | Age: 49
End: 2024-05-30
Payer: COMMERCIAL

## 2024-05-30 ENCOUNTER — HOSPITAL ENCOUNTER (OUTPATIENT)
Dept: GENERAL RADIOLOGY | Facility: HOSPITAL | Age: 49
Discharge: HOME | End: 2024-05-30
Payer: COMMERCIAL

## 2024-05-30 ENCOUNTER — ANESTHESIA (OUTPATIENT)
Dept: PERIOP | Facility: HOSPITAL | Age: 49
End: 2024-05-30
Payer: COMMERCIAL

## 2024-05-30 VITALS
RESPIRATION RATE: 20 BRPM | SYSTOLIC BLOOD PRESSURE: 160 MMHG | BODY MASS INDEX: 26.92 KG/M2 | WEIGHT: 171.52 LBS | HEART RATE: 92 BPM | HEIGHT: 67 IN | TEMPERATURE: 99 F | OXYGEN SATURATION: 97 % | DIASTOLIC BLOOD PRESSURE: 101 MMHG

## 2024-05-30 DIAGNOSIS — D48.9 NEOPLASM OF UNCERTAIN BEHAVIOR: ICD-10-CM

## 2024-05-30 DIAGNOSIS — J38.1 VOCAL CORD POLYPS: ICD-10-CM

## 2024-05-30 DIAGNOSIS — F17.209 TOBACCO USE DISORDER, CONTINUOUS: ICD-10-CM

## 2024-05-30 DIAGNOSIS — R13.10 DYSPHAGIA, UNSPECIFIED TYPE: ICD-10-CM

## 2024-05-30 PROCEDURE — 88305 TISSUE EXAM BY PATHOLOGIST: CPT | Performed by: OTOLARYNGOLOGY

## 2024-05-30 PROCEDURE — 31540 LARYNGOSCOPY W/EXC OF TUMOR: CPT | Performed by: OTOLARYNGOLOGY

## 2024-05-30 PROCEDURE — 88321 CONSLTJ&REPRT SLD PREP ELSWR: CPT

## 2024-05-30 PROCEDURE — 25010000002 FENTANYL CITRATE (PF) 50 MCG/ML SOLUTION: Performed by: NURSE ANESTHETIST, CERTIFIED REGISTERED

## 2024-05-30 PROCEDURE — 25010000002 PROPOFOL 10 MG/ML EMULSION: Performed by: NURSE ANESTHETIST, CERTIFIED REGISTERED

## 2024-05-30 PROCEDURE — 25010000002 LABETALOL 5 MG/ML SOLUTION: Performed by: ANESTHESIOLOGY

## 2024-05-30 PROCEDURE — 25010000002 DROPERIDOL PER 5 MG: Performed by: NURSE ANESTHETIST, CERTIFIED REGISTERED

## 2024-05-30 PROCEDURE — 25010000002 MIDAZOLAM PER 1 MG: Performed by: ANESTHESIOLOGY

## 2024-05-30 PROCEDURE — 25010000002 EPINEPHRINE PER 0.1 MG: Performed by: OTOLARYNGOLOGY

## 2024-05-30 PROCEDURE — 71046 X-RAY EXAM CHEST 2 VIEWS: CPT

## 2024-05-30 PROCEDURE — 25810000003 LACTATED RINGERS PER 1000 ML: Performed by: OTOLARYNGOLOGY

## 2024-05-30 PROCEDURE — 25010000002 DEXAMETHASONE PER 1 MG: Performed by: NURSE ANESTHETIST, CERTIFIED REGISTERED

## 2024-05-30 PROCEDURE — 25010000002 ONDANSETRON PER 1 MG: Performed by: NURSE ANESTHETIST, CERTIFIED REGISTERED

## 2024-05-30 RX ORDER — ONDANSETRON 2 MG/ML
INJECTION INTRAMUSCULAR; INTRAVENOUS AS NEEDED
Status: DISCONTINUED | OUTPATIENT
Start: 2024-05-30 | End: 2024-05-30 | Stop reason: SURG

## 2024-05-30 RX ORDER — HYDROCODONE BITARTRATE AND ACETAMINOPHEN 5; 325 MG/1; MG/1
1 TABLET ORAL EVERY 8 HOURS PRN
Qty: 8 TABLET | Refills: 0 | Status: SHIPPED | OUTPATIENT
Start: 2024-05-30 | End: 2024-06-13

## 2024-05-30 RX ORDER — HYDROCODONE BITARTRATE AND ACETAMINOPHEN 5; 325 MG/1; MG/1
1 TABLET ORAL ONCE AS NEEDED
Status: DISCONTINUED | OUTPATIENT
Start: 2024-05-30 | End: 2024-05-30 | Stop reason: HOSPADM

## 2024-05-30 RX ORDER — FENTANYL CITRATE 50 UG/ML
INJECTION, SOLUTION INTRAMUSCULAR; INTRAVENOUS AS NEEDED
Status: DISCONTINUED | OUTPATIENT
Start: 2024-05-30 | End: 2024-05-30 | Stop reason: SURG

## 2024-05-30 RX ORDER — SODIUM CHLORIDE, SODIUM LACTATE, POTASSIUM CHLORIDE, CALCIUM CHLORIDE 600; 310; 30; 20 MG/100ML; MG/100ML; MG/100ML; MG/100ML
100 INJECTION, SOLUTION INTRAVENOUS CONTINUOUS
Status: DISCONTINUED | OUTPATIENT
Start: 2024-05-30 | End: 2024-05-30 | Stop reason: HOSPADM

## 2024-05-30 RX ORDER — FENTANYL CITRATE 50 UG/ML
50 INJECTION, SOLUTION INTRAMUSCULAR; INTRAVENOUS
Status: DISCONTINUED | OUTPATIENT
Start: 2024-05-30 | End: 2024-05-30 | Stop reason: HOSPADM

## 2024-05-30 RX ORDER — IBUPROFEN 600 MG/1
600 TABLET ORAL EVERY 6 HOURS PRN
Status: DISCONTINUED | OUTPATIENT
Start: 2024-05-30 | End: 2024-05-30 | Stop reason: HOSPADM

## 2024-05-30 RX ORDER — ONDANSETRON 4 MG/1
4 TABLET, ORALLY DISINTEGRATING ORAL ONCE AS NEEDED
Status: DISCONTINUED | OUTPATIENT
Start: 2024-05-30 | End: 2024-05-30 | Stop reason: HOSPADM

## 2024-05-30 RX ORDER — SODIUM CHLORIDE 9 MG/ML
40 INJECTION, SOLUTION INTRAVENOUS AS NEEDED
Status: DISCONTINUED | OUTPATIENT
Start: 2024-05-30 | End: 2024-05-30 | Stop reason: HOSPADM

## 2024-05-30 RX ORDER — DROPERIDOL 2.5 MG/ML
INJECTION, SOLUTION INTRAMUSCULAR; INTRAVENOUS AS NEEDED
Status: DISCONTINUED | OUTPATIENT
Start: 2024-05-30 | End: 2024-05-30 | Stop reason: SURG

## 2024-05-30 RX ORDER — ATRACURIUM BESYLATE 10 MG/ML
INJECTION, SOLUTION INTRAVENOUS AS NEEDED
Status: DISCONTINUED | OUTPATIENT
Start: 2024-05-30 | End: 2024-05-30 | Stop reason: SURG

## 2024-05-30 RX ORDER — IPRATROPIUM BROMIDE AND ALBUTEROL SULFATE 2.5; .5 MG/3ML; MG/3ML
3 SOLUTION RESPIRATORY (INHALATION) ONCE AS NEEDED
Status: COMPLETED | OUTPATIENT
Start: 2024-05-30 | End: 2024-05-30

## 2024-05-30 RX ORDER — SODIUM CHLORIDE 0.9 % (FLUSH) 0.9 %
3-10 SYRINGE (ML) INJECTION AS NEEDED
Status: DISCONTINUED | OUTPATIENT
Start: 2024-05-30 | End: 2024-05-30 | Stop reason: HOSPADM

## 2024-05-30 RX ORDER — DROPERIDOL 2.5 MG/ML
0.62 INJECTION, SOLUTION INTRAMUSCULAR; INTRAVENOUS ONCE AS NEEDED
Status: DISCONTINUED | OUTPATIENT
Start: 2024-05-30 | End: 2024-05-30 | Stop reason: HOSPADM

## 2024-05-30 RX ORDER — ONDANSETRON 2 MG/ML
4 INJECTION INTRAMUSCULAR; INTRAVENOUS ONCE AS NEEDED
Status: DISCONTINUED | OUTPATIENT
Start: 2024-05-30 | End: 2024-05-30 | Stop reason: HOSPADM

## 2024-05-30 RX ORDER — LABETALOL HYDROCHLORIDE 5 MG/ML
5 INJECTION, SOLUTION INTRAVENOUS
Status: DISCONTINUED | OUTPATIENT
Start: 2024-05-30 | End: 2024-05-30 | Stop reason: HOSPADM

## 2024-05-30 RX ORDER — SODIUM CHLORIDE, SODIUM LACTATE, POTASSIUM CHLORIDE, CALCIUM CHLORIDE 600; 310; 30; 20 MG/100ML; MG/100ML; MG/100ML; MG/100ML
1000 INJECTION, SOLUTION INTRAVENOUS CONTINUOUS
Status: DISCONTINUED | OUTPATIENT
Start: 2024-05-30 | End: 2024-05-30 | Stop reason: HOSPADM

## 2024-05-30 RX ORDER — FLUMAZENIL 0.1 MG/ML
0.2 INJECTION INTRAVENOUS AS NEEDED
Status: DISCONTINUED | OUTPATIENT
Start: 2024-05-30 | End: 2024-05-30 | Stop reason: HOSPADM

## 2024-05-30 RX ORDER — DEXAMETHASONE SODIUM PHOSPHATE 4 MG/ML
INJECTION, SOLUTION INTRA-ARTICULAR; INTRALESIONAL; INTRAMUSCULAR; INTRAVENOUS; SOFT TISSUE AS NEEDED
Status: DISCONTINUED | OUTPATIENT
Start: 2024-05-30 | End: 2024-05-30 | Stop reason: SURG

## 2024-05-30 RX ORDER — MIDAZOLAM HYDROCHLORIDE 1 MG/ML
1 INJECTION INTRAMUSCULAR; INTRAVENOUS
Status: DISCONTINUED | OUTPATIENT
Start: 2024-05-30 | End: 2024-05-30 | Stop reason: HOSPADM

## 2024-05-30 RX ORDER — EPINEPHRINE 1 MG/ML
INJECTION, SOLUTION, CONCENTRATE INTRAVENOUS AS NEEDED
Status: DISCONTINUED | OUTPATIENT
Start: 2024-05-30 | End: 2024-05-30 | Stop reason: HOSPADM

## 2024-05-30 RX ORDER — SODIUM CHLORIDE 0.9 % (FLUSH) 0.9 %
3 SYRINGE (ML) INJECTION EVERY 12 HOURS SCHEDULED
Status: DISCONTINUED | OUTPATIENT
Start: 2024-05-30 | End: 2024-05-30 | Stop reason: HOSPADM

## 2024-05-30 RX ORDER — HYDROCODONE BITARTRATE AND ACETAMINOPHEN 10; 325 MG/1; MG/1
1 TABLET ORAL EVERY 4 HOURS PRN
Status: DISCONTINUED | OUTPATIENT
Start: 2024-05-30 | End: 2024-05-30 | Stop reason: HOSPADM

## 2024-05-30 RX ORDER — SODIUM CHLORIDE 0.9 % (FLUSH) 0.9 %
3 SYRINGE (ML) INJECTION AS NEEDED
Status: DISCONTINUED | OUTPATIENT
Start: 2024-05-30 | End: 2024-05-30 | Stop reason: HOSPADM

## 2024-05-30 RX ORDER — LIDOCAINE HYDROCHLORIDE 10 MG/ML
0.5 INJECTION, SOLUTION EPIDURAL; INFILTRATION; INTRACAUDAL; PERINEURAL ONCE AS NEEDED
Status: DISCONTINUED | OUTPATIENT
Start: 2024-05-30 | End: 2024-05-30 | Stop reason: HOSPADM

## 2024-05-30 RX ORDER — AMOXICILLIN 500 MG/1
500 CAPSULE ORAL 3 TIMES DAILY
Qty: 21 CAPSULE | Refills: 0 | Status: SHIPPED | OUTPATIENT
Start: 2024-05-30 | End: 2024-06-06

## 2024-05-30 RX ORDER — LIDOCAINE HYDROCHLORIDE 20 MG/ML
INJECTION, SOLUTION EPIDURAL; INFILTRATION; INTRACAUDAL; PERINEURAL AS NEEDED
Status: DISCONTINUED | OUTPATIENT
Start: 2024-05-30 | End: 2024-05-30 | Stop reason: SURG

## 2024-05-30 RX ORDER — NALOXONE HCL 0.4 MG/ML
0.4 VIAL (ML) INJECTION AS NEEDED
Status: DISCONTINUED | OUTPATIENT
Start: 2024-05-30 | End: 2024-05-30 | Stop reason: HOSPADM

## 2024-05-30 RX ORDER — PROPOFOL 10 MG/ML
VIAL (ML) INTRAVENOUS AS NEEDED
Status: DISCONTINUED | OUTPATIENT
Start: 2024-05-30 | End: 2024-05-30 | Stop reason: SURG

## 2024-05-30 RX ORDER — SCOLOPAMINE TRANSDERMAL SYSTEM 1 MG/1
1 PATCH, EXTENDED RELEASE TRANSDERMAL ONCE
Status: DISCONTINUED | OUTPATIENT
Start: 2024-05-30 | End: 2024-05-30 | Stop reason: HOSPADM

## 2024-05-30 RX ORDER — HYDROCODONE BITARTRATE AND ACETAMINOPHEN 5; 325 MG/1; MG/1
1 TABLET ORAL EVERY 4 HOURS PRN
Status: DISCONTINUED | OUTPATIENT
Start: 2024-05-30 | End: 2024-05-30 | Stop reason: HOSPADM

## 2024-05-30 RX ADMIN — LABETALOL HYDROCHLORIDE 5 MG: 5 INJECTION, SOLUTION INTRAVENOUS at 13:23

## 2024-05-30 RX ADMIN — DROPERIDOL 0.62 MG: 2.5 INJECTION, SOLUTION INTRAMUSCULAR; INTRAVENOUS at 12:10

## 2024-05-30 RX ADMIN — FENTANYL CITRATE 100 MCG: 50 INJECTION, SOLUTION INTRAMUSCULAR; INTRAVENOUS at 12:14

## 2024-05-30 RX ADMIN — FENTANYL CITRATE 100 MCG: 50 INJECTION, SOLUTION INTRAMUSCULAR; INTRAVENOUS at 12:10

## 2024-05-30 RX ADMIN — ONDANSETRON 4 MG: 2 INJECTION INTRAMUSCULAR; INTRAVENOUS at 12:10

## 2024-05-30 RX ADMIN — DEXAMETHASONE SODIUM PHOSPHATE 4 MG: 4 INJECTION, SOLUTION INTRAMUSCULAR; INTRAVENOUS at 12:34

## 2024-05-30 RX ADMIN — SCOPALAMINE 1 PATCH: 1 PATCH, EXTENDED RELEASE TRANSDERMAL at 11:58

## 2024-05-30 RX ADMIN — PROPOFOL 200 MG: 10 INJECTION, EMULSION INTRAVENOUS at 12:10

## 2024-05-30 RX ADMIN — LABETALOL HYDROCHLORIDE 5 MG: 5 INJECTION, SOLUTION INTRAVENOUS at 13:05

## 2024-05-30 RX ADMIN — MIDAZOLAM 1 MG: 1 INJECTION INTRAMUSCULAR; INTRAVENOUS at 11:58

## 2024-05-30 RX ADMIN — DEXAMETHASONE SODIUM PHOSPHATE 8 MG: 4 INJECTION, SOLUTION INTRAMUSCULAR; INTRAVENOUS at 12:43

## 2024-05-30 RX ADMIN — SODIUM CHLORIDE, POTASSIUM CHLORIDE, SODIUM LACTATE AND CALCIUM CHLORIDE 1000 ML: 600; 310; 30; 20 INJECTION, SOLUTION INTRAVENOUS at 08:55

## 2024-05-30 RX ADMIN — IPRATROPIUM BROMIDE AND ALBUTEROL SULFATE 3 ML: .5; 3 SOLUTION RESPIRATORY (INHALATION) at 14:31

## 2024-05-30 RX ADMIN — ATRACURIUM BESYLATE 30 MG: 10 INJECTION, SOLUTION INTRAVENOUS at 12:10

## 2024-05-30 RX ADMIN — LIDOCAINE HYDROCHLORIDE 200 MG: 20 INJECTION, SOLUTION EPIDURAL; INFILTRATION; INTRACAUDAL; PERINEURAL at 12:10

## 2024-05-30 NOTE — OP NOTE
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     Direct laryngoscopy was performed and no masses, lesions, ulcerations or other abnormalities were noted throughout the upper aerodigestive tract examination except for the previously noted polypoid changes of the vocal folds.  Moderate edema was noted throughout with polypoid formation in both vocal folds right greater than left.  Also there was moderate edema of the right false vocal fold.  Multiple biopsies were taken and polyps removed and all was submitted for permanent pathologic examination including left true vocal cord, right true vocal cord and right false vocal cord.  Minimal bleeding was encountered which was controlled with 1-1000 epinephrine on [employer] neurosurgical pledgets.  Patient tolerated the procedure well.     The patient was transported upon extubation to the postanesthesia care unit in stable condition.    SPECIMENS:  Specimens        ID Source Type Tests Collected By Collected At Frozen?    A Larynx Tissue TISSUE PATHOLOGY EXAM   Mendez Padilla MD 5/30/24 1220 No    Description: RIGHT TRUE vocal cord    B Larynx Tissue TISSUE PATHOLOGY EXAM   Mendez Padilla MD 5/30/24 1221 No    Description: RIGHT FALSE vocal cord    C Larynx Tissue TISSUE PATHOLOGY EXAM   Mendez Padilla MD 5/30/24 1232 No    Description: LEFT TRUE vocal chord            COMPLICATIONS: NONE    ESTIMATED BLOOD LOSS:  Minimal    Mendez Padilla MD  5/30/2024

## 2024-05-30 NOTE — ANESTHESIA PROCEDURE NOTES
Airway  Urgency: elective    Date/Time: 5/30/2024 12:13 PM  Airway not difficult    General Information and Staff    Patient location during procedure: OR  CRNA/CAA: Cali Morrell CRNA    Indications and Patient Condition  Indications for airway management: airway protection    Preoxygenated: yes  MILS maintained throughout  Mask difficulty assessment: 2 - vent by mask + OA or adjuvant +/- NMBA    Final Airway Details  Final airway type: endotracheal airway      Successful airway: ETT  Cuffed: yes   Successful intubation technique: video laryngoscopy  Endotracheal tube insertion site: oral  Blade: Glidescope  Blade size: 3  ETT size (mm): 5.5  Cormack-Lehane Classification: grade I - full view of glottis  Placement verified by: chest auscultation and capnometry   Cuff volume (mL): 5  Measured from: lips  ETT/EBT  to lips (cm): 20  Number of attempts at approach: 1  Assessment: lips, teeth, and gum same as pre-op    Additional Comments  B [place], SRNA

## 2024-05-30 NOTE — ANESTHESIA POSTPROCEDURE EVALUATION
"Patient: Minnie Bang    Procedure Summary       Date: 05/30/24 Room / Location:  [facility] OR 02 /  [facility]    Anesthesia Start: 1208 Anesthesia Stop: 1255    Procedures:       DIRECT LARYNGOSCOPY WITH BIOPSY OF VOCAL CORD POLYPS WITH POSSIBLE TRACHEOSTOMY      POSSIBLE TRACHEOSTOMY (Neck) Diagnosis:       Vocal cord polyps      Neoplasm of uncertain behavior      Tobacco use disorder, continuous      Dysphagia, unspecified type      (Vocal cord polyps [J38.1])      (Neoplasm of uncertain behavior [D48.9])      (Tobacco use disorder, continuous [F17.209])      (Dysphagia, unspecified type [R13.10])    Surgeons: Mendez Padilla MD Provider: Cali Morrell CRNA    Anesthesia Type: general ASA Status: 3            Anesthesia Type: general    Vitals  No vitals data found for the desired time range.          Post Anesthesia Care and Evaluation    Patient location during evaluation: PACU  Patient participation: complete - patient participated  Level of consciousness: awake and alert  Pain management: adequate    Airway patency: patent  Anesthetic complications: No anesthetic complications    Cardiovascular status: acceptable  Respiratory status: acceptable  Hydration status: acceptable    Comments: Blood pressure (!) 177/119, pulse 98, temperature 97.6 °F (36.4 °C), temperature source Temporal, resp. rate 20, height 171 cm (67.32\"), weight 77.8 kg (171 lb 8.3 oz), SpO2 98%, not currently breastfeeding.    Pt discharged from PACU based on jazlyn score >8    "

## 2024-05-30 NOTE — ANESTHESIA PREPROCEDURE EVALUATION
Anesthesia Evaluation     Patient summary reviewed   no history of anesthetic complications:   NPO Solid Status: > 8 hours             Airway   Mallampati: II  Dental    (+) poor dentition        Pulmonary    (+) COPD, asthma,shortness of breath  Cardiovascular   Exercise tolerance: good (4-7 METS)    (+) hypertension, valvular problems/murmurs, past MI , CAD, SORTO    ROS comment: Cath with nonobstructive disease 2023: Mid left anterior descending coronary artery has moderate myocardial bridging  Mild atherosclerotic changes      Neuro/Psych  GI/Hepatic/Renal/Endo    (+) GERD, thyroid problem     Musculoskeletal     Abdominal    Substance History      OB/GYN          Other   arthritis,                       Anesthesia Plan    ASA 3     general     (Obvious increased inspiratory work of breathing; pt consents for trach-will discuss with surgeon )  intravenous induction     Anesthetic plan, risks, benefits, and alternatives have been provided, discussed and informed consent has been obtained with: patient.        CODE STATUS:

## 2024-06-03 ENCOUNTER — TELEPHONE (OUTPATIENT)
Dept: OTOLARYNGOLOGY | Facility: CLINIC | Age: 49
End: 2024-06-03

## 2024-06-03 NOTE — TELEPHONE ENCOUNTER
The [facility] received a fax that requires your attention. The document has been indexed to the patient’s chart for your review.      Reason for sending: NEEDS REVIEW    Documents Description: PRIOR AUTHORIZATION REQUEST    Name of Sender: MATT PEREIRA    Date Indexed: 06/03/24

## 2024-06-04 NOTE — TELEPHONE ENCOUNTER
The [facility] received a fax that requires your attention. The document has been indexed to the patient’s chart for your review.      Reason for sending: PRIOR AUTH FOLLOW UP FOR MEDICATION SYNTHROID    Documents Description: RX PA F/U [facility]_05/30/24    Name of Sender: [facility]    Date Indexed: 06/04/24

## 2024-06-05 LAB
CYTO UR: NORMAL
LAB AP CASE REPORT: NORMAL
LAB AP DIAGNOSIS COMMENT: NORMAL
Lab: NORMAL
PATH REPORT.FINAL DX SPEC: NORMAL
PATH REPORT.GROSS SPEC: NORMAL

## 2024-06-06 ENCOUNTER — TELEPHONE (OUTPATIENT)
Dept: OTOLARYNGOLOGY | Facility: CLINIC | Age: 49
End: 2024-06-06
Payer: COMMERCIAL

## 2024-06-06 NOTE — TELEPHONE ENCOUNTER
----- Message from Mendez Padilla sent at 6/6/2024  1:27 PM CDT -----  Please call the patient regarding her abnormal result.  Biopsies were benign with chronic inflammatory changes

## 2024-06-07 NOTE — TELEPHONE ENCOUNTER
The [facility] received a fax that requires your attention. The document has been indexed to the patient’s chart for your review.      Reason for sending: PRIOR AUTH REQUEST FOR SYNTHROID HAS BEEN APPROVED, AUTH EFFECTIVE 6/3/24-6/3/25, PRIOR AUTH REF# [id number]    Documents Description: RX PA [facility]_06/03/24    Name of Sender: [employer]    Date Indexed: 06/07/24

## 2024-06-11 ENCOUNTER — OFFICE VISIT (OUTPATIENT)
Dept: GASTROENTEROLOGY | Facility: CLINIC | Age: 49
End: 2024-06-11
Payer: COMMERCIAL

## 2024-06-11 VITALS
OXYGEN SATURATION: 99 % | DIASTOLIC BLOOD PRESSURE: 72 MMHG | BODY MASS INDEX: 26.52 KG/M2 | HEIGHT: 68 IN | HEART RATE: 100 BPM | WEIGHT: 175 LBS | TEMPERATURE: 97.3 F | SYSTOLIC BLOOD PRESSURE: 110 MMHG

## 2024-06-11 DIAGNOSIS — R10.10 PAIN OF UPPER ABDOMEN: ICD-10-CM

## 2024-06-11 DIAGNOSIS — R12 HEARTBURN: Primary | ICD-10-CM

## 2024-06-11 DIAGNOSIS — R19.7 DIARRHEA, UNSPECIFIED TYPE: ICD-10-CM

## 2024-06-11 DIAGNOSIS — Z80.0 FAMILY HX OF COLON CANCER: ICD-10-CM

## 2024-06-11 PROCEDURE — 3074F SYST BP LT 130 MM HG: CPT | Performed by: NURSE PRACTITIONER

## 2024-06-11 PROCEDURE — 1160F RVW MEDS BY RX/DR IN RCRD: CPT | Performed by: NURSE PRACTITIONER

## 2024-06-11 PROCEDURE — 99214 OFFICE O/P EST MOD 30 MIN: CPT | Performed by: NURSE PRACTITIONER

## 2024-06-11 PROCEDURE — 3078F DIAST BP <80 MM HG: CPT | Performed by: NURSE PRACTITIONER

## 2024-06-11 PROCEDURE — 1159F MED LIST DOCD IN RCRD: CPT | Performed by: NURSE PRACTITIONER

## 2024-06-11 RX ORDER — PANTOPRAZOLE SODIUM 40 MG/1
40 TABLET, DELAYED RELEASE ORAL 2 TIMES DAILY
Qty: 60 TABLET | Refills: 2 | Status: SHIPPED | OUTPATIENT
Start: 2024-06-11

## 2024-06-11 NOTE — H&P (VIEW-ONLY)
"[facility] GASTROENTEROLOGY - OFFICE NOTE    6/11/2024    Minnie Bang   1975    Primary Physician: Aidee Padilla APRN      Referring Provider: Qing Ray     Chief Complaint   Patient presents with    Heartburn         HISTORY OF PRESENT ILLNESS:     Minnie Bang is a 49 y.o. female presents  with reflux and dysphagia.         GERD  Heartburn occurs daily. This has been for at least 1 year. Occurs at night and when bending over.  Has been on protonix daily for at least 1 year.  Has also used tums that helps for a short period. Tried otc zantac that did not help.  Drinking milk helps. Has noted upper abdominal pain x 4 -5 days, pressure.   No tobacco, quit 3/2024. Caffeine: \" drink mountain dew all day\".  No certain food triggers. No weight loss.         Dysphagia   This started 1 year. This was with pills and solid foods that has resolved.        Diarrhea  This started 6 mo ago. Having 6 stools per day. Can't remember last solid stool. Tried imodium that does not help. Fiber supplement did not help.  No new meds.  Recent antibiotic. No rectal bleeding.         She was seen by [facility] 2023. EGD and colonoscopy was recommended but not done. Has history of chronic gerd and colon polyp.       No history of egd or colonoscopy.   Father had colon cancer in his 60's.       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    CTS (carpal tunnel syndrome) 2019    Dental disease     Depression     Diastolic dysfunction 2022    Dizziness     Emphysema of lung 2020    GERD (gastroesophageal reflux disease)     Headache     Hyperlipidemia     Hypertension     Hypothyroidism     Mixed simple and mucopurulent chronic bronchitis 04/18/2023    Non-occlusive coronary artery disease 05/04/2023    NSTEMI (non-ST " elevated myocardial infarction) (HCC)     Pneumonia 2022    Pulmonary emphysema 04/21/2023    Shingles 2018    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       Past Surgical History:   Procedure Laterality Date    APPENDECTOMY      CARDIAC CATHETERIZATION N/A 11/04/2017    Procedure: Coronary angiography;  Surgeon: Luis Colvin MD;  Location:  [facility] CATH [facility]VAS[facility];  Service:     CARDIAC CATHETERIZATION N/A 05/31/2023    Procedure: Left Heart Cath;  Surgeon: Steffen Rossi MD;  Location:  [facility] CATH [facility];  Service: Cardiology;  Laterality: N/A;    CARPAL TUNNEL RELEASE  2019    HYSTERECTOMY      PANENDOSCOPY N/A 5/30/2024    Procedure: DIRECT LARYNGOSCOPY WITH BIOPSY OF VOCAL CORD POLYPS WITH POSSIBLE TRACHEOSTOMY;  Surgeon: Mendez Padilla MD;  Location:  [facility] OR;  Service: ENT;  Laterality: N/A;    MD RT/LT HEART CATHETERS N/A 11/04/2017    Procedure: Percutaneous Coronary Intervention;  Surgeon: Luis Colvin MD;  Location:  [facility] CATH INVAS[facility];  Service: Cardiovascular    THYROID SURGERY      TOTAL THYROIDECTOMY      TRACHEOSTOMY N/A 5/30/2024    Procedure: POSSIBLE TRACHEOSTOMY;  Surgeon: Mendez Padilla MD;  Location:  [facility] OR;  Service: ENT;  Laterality: N/A;       Outpatient Medications Marked as Taking for the 6/11/24 encounter (Office Visit) with Rakel Padilla APRN   Medication Sig Dispense Refill    albuterol sulfate  (90 Base) MCG/ACT inhaler Inhale 2 puffs Every 4 (Four) Hours As Needed for Wheezing. 6.7 g 0    carvedilol (COREG) 25 MG tablet Take 1 tablet by mouth 2 (Two) Times a Day. 180 tablet 3    cloNIDine (CATAPRES) 0.1 MG tablet Take 1 tablet by mouth As Needed for High Blood Pressure (for blood pressure >140/90).      dilTIAZem CD (CARDIZEM CD) 120 MG 24 hr capsule TAKE 1 CAPSULE BY MOUTH DAILY. 30 capsule 0    FLUoxetine (PROzac) 20 MG capsule Take 1 capsule by mouth Daily.      ipratropium-albuterol (DUO-NEB) 0.5-2.5 mg/3 ml nebulizer Take 3 mL by  nebulization Every 4 (Four) Hours As Needed for Wheezing. 360 mL 0    lisinopril (PRINIVIL,ZESTRIL) 40 MG tablet Take 1 tablet by mouth Daily.      meclizine (ANTIVERT) 25 MG tablet Take 1 tablet by mouth 3 (Three) Times a Day As Needed for Dizziness. 42 tablet 2    nitroglycerin (NITROSTAT) 0.4 MG SL tablet PLACE 1 TABLET UNDER THE TONGUE AS NEEDED FOR ANGINA, MAY REPEAT EVERY 5 MINUTES FOR UP THREE DOSES 25 tablet 3    pantoprazole (PROTONIX) 40 MG EC tablet Take 1 tablet by mouth 2 (Two) Times a Day. 60 tablet 2    POTASSIUM PO Take  by mouth Daily.      simvastatin (ZOCOR) 10 MG tablet Take 1 tablet by mouth Every Night.      Synthroid 125 MCG tablet Take 1 tablet by mouth Daily for 30 days. 30 tablet 1    topiramate (TOPAMAX) 50 MG tablet Take 1 tablet by mouth 2 (Two) Times a Day. 60 tablet 1    [DISCONTINUED] pantoprazole (PROTONIX) 40 MG EC tablet Take 1 tablet by mouth Daily.         No Known Allergies    Social History     Socioeconomic History    Marital status:    Tobacco Use    Smoking status: Former     Current packs/day: 0.00     Average packs/day: 0.7 packs/day for 45.9 years (30.0 ttl pk-yrs)     Types: Cigarettes     Start date: 1991     Quit date: 3/1/2024     Years since quittin.2     Passive exposure: Past    Smokeless tobacco: Never    Tobacco comments:     Less than 1/2 pack a day    Vaping Use    Vaping status: Never Used   Substance and Sexual Activity    Alcohol use: Yes     Comment: daily    Drug use: No    Sexual activity: Not Currently     Partners: Male     Birth control/protection: Hysterectomy       Family History   Problem Relation Age of Onset    Asthma Mother     Cancer Mother     Emphysema Mother     Cancer Father     Heart failure Father     Hypertension Father        Review of Systems   Constitutional:  Negative for chills, fever and unexpected weight change.   Respiratory:  Negative for shortness of breath.    Cardiovascular:  Negative for chest pain.  "  Gastrointestinal:  Negative for abdominal distention, abdominal pain, nausea and vomiting.        Vitals:    06/11/24 1027   BP: 110/72   Pulse: 100   Temp: 97.3 °F (36.3 °C)   SpO2: 99%   Weight: 79.4 kg (175 lb)   Height: 171.5 cm (67.5\")      Body mass index is 27 kg/m².    Physical Exam  Vitals reviewed.   Constitutional:       General: She is not in acute distress.  Cardiovascular:      Rate and Rhythm: Normal rate and regular rhythm.      Heart sounds: Normal heart sounds.   Pulmonary:      Effort: Pulmonary effort is normal.      Breath sounds: Normal breath sounds.   Abdominal:      General: Bowel sounds are normal. There is no distension.      Palpations: Abdomen is soft.      Tenderness: There is abdominal tenderness (mild upper abdomen.).   Skin:     General: Skin is warm and dry.   Neurological:      Mental Status: She is alert.         Results for orders placed or performed during the hospital encounter of 05/30/24   Tissue Pathology Exam    Specimen: A: Larynx; Tissue    B: Larynx; Tissue    C: Larynx; Tissue   Result Value Ref Range    Note to Patients       This report may contain a detailed description of human tissue sent by a health care provider to the laboratory for pathologic evaluation. The content of this report is essential for diagnosis and may provide important critical findings. This information may be unfamiliar to patients to review without a medical professional present. It is advised that the patient review this report in the presence of a health care provider who can answer questions and explain the results.      Case Report       Surgical Pathology Report                         Case: [id number]                                  Authorizing Provider:  Mendez Padilla MD    Collected:           05/30/2024 12:20 PM          Ordering Location:     [facility] OR  Received:            05/31/2024 07:46 AM          Pathologist:           Raiza Wheeler MD              " "                                          Specimens:   1) - Larynx, RIGHT TRUE vocal cord                                                                  2) - Larynx, RIGHT FALSE vocal cord                                                                 3) - Larynx, LEFT TRUE vocal chord                                                         Final Diagnosis       1.  Right true vocal cord, biopsy:  A.  Fragments of benign squamous mucosa with submucosa exhibiting acanthosis, parakeratosis, acute and chronic inflammation and reactive squamous atypia..  B.  Areas of ulceration with associated granulation tissue response.    2.  Right false vocal cord, biopsy: Fragments of benign squamous mucosa with subepithelial fibrous stroma exhibiting acanthosis, acute and chronic inflammation and reactive squamous atypia.    3.  Left true vocal cord, biopsy: Fragments of benign squamous mucosa with subepithelial fibrous stroma exhibiting acanthosis, parakeratosis, acute and chronic inflammation and reactive squamous atypia.      Comment       To rule out the possibility of dysplasia, extradepartment of consultation was obtained from the [facility].  The consultant reports that the atypia present is compatible with reactive atypia.  Please refer to the complete pathology report from the [facility] which is appended.      Gross Description       1. Larynx.   Received in a formalin filled container labeled with the patient's name, date of birth, and \"right true vocal cord\".  The specimen consists of a Telfa pad with multiple mucosal covered tissue fragments aggregating to 0.9 x 0.8 x 0.2 cm.  The mucosal surfaces appear blue-gray, slightly raised and pale.  The fragments are totally submitted in block 1A.    2. Larynx.   Received in a formalin filled container labeled with the patient's name, date of birth, and \"right false vocal cord\".  The specimen consists of a Telfa pad with yellow-tan, mucosal covered tissue fragments " "measuring 0.1 x 0.1 by less than 0.1 cm and 0.4 x 0.3 x 0.2 cm.  The mucosal surface is marked by a yellow-tan, polypoid area measuring 0.4 x 0.3 cm.  The fragments are totally submitted in block 2A.    3. Larynx.   Received in a formalin filled container labeled with the patient's name, date of birth, and \"left true vocal cord\".  The specimen consists of 3 yellow to gray, mucosa covered tissue fragments aggregating to 0.6 x 0.4 x 0.1 cm.  The surfaces appear slightly pale and raised and the fragments are totally submitted in block 3A.        Microscopic Description       Microscopic examination was performed on all specimens.             ASSESSMENT AND PLAN    Assessment & Plan     Diagnoses and all orders for this visit:    1. Heartburn (Primary)  -     Case Request; Standing  -     Case Request    2. Pain of upper abdomen  -     Case Request; Standing  -     Case Request    3. Diarrhea, unspecified type  -     Clostridioides difficile Toxin - Stool, Per Rectum; Future  -     Gastrointestinal Panel, PCR - Stool, Per Rectum; Future  -     Ova & Parasite Examination - Stool, Per Rectum; Future    4. Family hx of colon cancer    Other orders  -     pantoprazole (PROTONIX) 40 MG EC tablet; Take 1 tablet by mouth 2 (Two) Times a Day.  Dispense: 60 tablet; Refill: 2  -     Implement Anesthesia Orders Day of Procedure; Standing  -     Obtain Informed Consent; Standing        ESOPHAGOGASTRODUODENOSCOPY WITH ANESTHESIA (N/A)  Risk, benefits, and alternatives of endoscopy were explained in full.  They understand that there is a risk of bleeding, perforation, and infection.  The risk of perforation goes up with esophageal dilation.  Other options to evaluate UGI complaints could involve barium swallow or UGI series, but these would be diagnostic tests only.  Patient was given time to ask questions.  I answered them to their satisfaction and they are agreeable to proceeding             In regards to heartburn, I discussed non " pharmaceutical treatment of gerd.  This includes gradually losing weight to achieve ideal body wt., elevation of the head of bed by 4-6 inches, nothing to eat or drink 3 hours prior to lying down, avoiding tight clothing, stress reduction, tobacco cessation, reduction of alcohol intake, and dietary restrictions (avoiding caffeine, coffee, fatty foods, mints, chocolate, spicy foods and tomato based sauces as much as possible). I recommend increase protonix to twice daily and take pepcid before bedtime.       In regards to upper abdominal pain, differential diagnoses discussed.   I recommend emergency room if worsening or severe symptoms. Increase ppi to twice daily.     In regards to diarrhea,  I recommend check stool studies. I recommend trial of probiotic daily. I also recommend colonoscopy. She wants to hold on colonoscopy for now. She will let us know when ready for colonoscopy.       No follow-ups on file.            There are no Patient Instructions on file for this visit.      Rakel Padilla, APRN

## 2024-06-11 NOTE — PROGRESS NOTES
"[facility] GASTROENTEROLOGY - OFFICE NOTE    6/11/2024    Minnie Bang   1975    Primary Physician: Aidee Padilla APRN      Referring Provider: Qing Ray     Chief Complaint   Patient presents with    Heartburn         HISTORY OF PRESENT ILLNESS:     Minnie Bang is a 49 y.o. female presents  with reflux and dysphagia.         GERD  Heartburn occurs daily. This has been for at least 1 year. Occurs at night and when bending over.  Has been on protonix daily for at least 1 year.  Has also used tums that helps for a short period. Tried otc zantac that did not help.  Drinking milk helps. Has noted upper abdominal pain x 4 -5 days, pressure.   No tobacco, quit 3/2024. Caffeine: \" drink mountain dew all day\".  No certain food triggers. No weight loss.         Dysphagia   This started 1 year. This was with pills and solid foods that has resolved.        Diarrhea  This started 6 mo ago. Having 6 stools per day. Can't remember last solid stool. Tried imodium that does not help. Fiber supplement did not help.  No new meds.  Recent antibiotic. No rectal bleeding.         She was seen by [facility] 2023. EGD and colonoscopy was recommended but not done. Has history of chronic gerd and colon polyp.       No history of egd or colonoscopy.   Father had colon cancer in his 60's.       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    CTS (carpal tunnel syndrome) 2019    Dental disease     Depression     Diastolic dysfunction 2022    Dizziness     Emphysema of lung 2020    GERD (gastroesophageal reflux disease)     Headache     Hyperlipidemia     Hypertension     Hypothyroidism     Mixed simple and mucopurulent chronic bronchitis 04/18/2023    Non-occlusive coronary artery disease 05/04/2023    NSTEMI (non-ST " elevated myocardial infarction) (HCC)     Pneumonia 2022    Pulmonary emphysema 04/21/2023    Shingles 2018    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       Past Surgical History:   Procedure Laterality Date    APPENDECTOMY      CARDIAC CATHETERIZATION N/A 11/04/2017    Procedure: Coronary angiography;  Surgeon: Luis Colvin MD;  Location:  [facility] CATH [facility]VASIVE LOC[facility];  Service:     CARDIAC CATHETERIZATION N/A 05/31/2023    Procedure: Left Heart Cath;  Surgeon: Steffen Rossi MD;  Location:  [facility] CATH INVASIVE LOCATION;  Service: Cardiology;  Laterality: N/A;    CARPAL TUNNEL RELEASE  2019    HYSTERECTOMY      PANENDOSCOPY N/A 5/30/2024    Procedure: DIRECT LARYNGOSCOPY WITH BIOPSY OF VOCAL CORD POLYPS WITH POSSIBLE TRACHEOSTOMY;  Surgeon: Mendez Padilla MD;  Location:  [facility] OR;  Service: ENT;  Laterality: N/A;    ME RT/LT HEART CATHETERS N/A 11/04/2017    Procedure: Percutaneous Coronary Intervention;  Surgeon: Luis Colvin MD;  Location:  [facility] CATH INVASIVE LOCATION;  Service: Cardiovascular    THYROID SURGERY      TOTAL THYROIDECTOMY      TRACHEOSTOMY N/A 5/30/2024    Procedure: POSSIBLE TRACHEOSTOMY;  Surgeon: Mendez Padilla MD;  Location:  [facility] OR;  Service: ENT;  Laterality: N/A;       Outpatient Medications Marked as Taking for the 6/11/24 encounter (Office Visit) with Rakel Padilla APRN   Medication Sig Dispense Refill    albuterol sulfate  (90 Base) MCG/ACT inhaler Inhale 2 puffs Every 4 (Four) Hours As Needed for Wheezing. 6.7 g 0    carvedilol (COREG) 25 MG tablet Take 1 tablet by mouth 2 (Two) Times a Day. 180 tablet 3    cloNIDine (CATAPRES) 0.1 MG tablet Take 1 tablet by mouth As Needed for High Blood Pressure (for blood pressure >140/90).      dilTIAZem CD (CARDIZEM CD) 120 MG 24 hr capsule TAKE 1 CAPSULE BY MOUTH DAILY. 30 capsule 0    FLUoxetine (PROzac) 20 MG capsule Take 1 capsule by mouth Daily.      ipratropium-albuterol (DUO-NEB) 0.5-2.5 mg/3 ml nebulizer Take 3 mL by  nebulization Every 4 (Four) Hours As Needed for Wheezing. 360 mL 0    lisinopril (PRINIVIL,ZESTRIL) 40 MG tablet Take 1 tablet by mouth Daily.      meclizine (ANTIVERT) 25 MG tablet Take 1 tablet by mouth 3 (Three) Times a Day As Needed for Dizziness. 42 tablet 2    nitroglycerin (NITROSTAT) 0.4 MG SL tablet PLACE 1 TABLET UNDER THE TONGUE AS NEEDED FOR ANGINA, MAY REPEAT EVERY 5 MINUTES FOR UP THREE DOSES 25 tablet 3    pantoprazole (PROTONIX) 40 MG EC tablet Take 1 tablet by mouth 2 (Two) Times a Day. 60 tablet 2    POTASSIUM PO Take  by mouth Daily.      simvastatin (ZOCOR) 10 MG tablet Take 1 tablet by mouth Every Night.      Synthroid 125 MCG tablet Take 1 tablet by mouth Daily for 30 days. 30 tablet 1    topiramate (TOPAMAX) 50 MG tablet Take 1 tablet by mouth 2 (Two) Times a Day. 60 tablet 1    [DISCONTINUED] pantoprazole (PROTONIX) 40 MG EC tablet Take 1 tablet by mouth Daily.         No Known Allergies    Social History     Socioeconomic History    Marital status:    Tobacco Use    Smoking status: Former     Current packs/day: 0.00     Average packs/day: 0.7 packs/day for 45.9 years (30.0 ttl pk-yrs)     Types: Cigarettes     Start date: 1991     Quit date: 3/1/2024     Years since quittin.2     Passive exposure: Past    Smokeless tobacco: Never    Tobacco comments:     Less than 1/2 pack a day    Vaping Use    Vaping status: Never Used   Substance and Sexual Activity    Alcohol use: Yes     Comment: daily    Drug use: No    Sexual activity: Not Currently     Partners: Male     Birth control/protection: Hysterectomy       Family History   Problem Relation Age of Onset    Asthma Mother     Cancer Mother     Emphysema Mother     Cancer Father     Heart failure Father     Hypertension Father        Review of Systems   Constitutional:  Negative for chills, fever and unexpected weight change.   Respiratory:  Negative for shortness of breath.    Cardiovascular:  Negative for chest pain.  "  Gastrointestinal:  Negative for abdominal distention, abdominal pain, nausea and vomiting.        Vitals:    06/11/24 1027   BP: 110/72   Pulse: 100   Temp: 97.3 °F (36.3 °C)   SpO2: 99%   Weight: 79.4 kg (175 lb)   Height: 171.5 cm (67.5\")      Body mass index is 27 kg/m².    Physical Exam  Vitals reviewed.   Constitutional:       General: She is not in acute distress.  Cardiovascular:      Rate and Rhythm: Normal rate and regular rhythm.      Heart sounds: Normal heart sounds.   Pulmonary:      Effort: Pulmonary effort is normal.      Breath sounds: Normal breath sounds.   Abdominal:      General: Bowel sounds are normal. There is no distension.      Palpations: Abdomen is soft.      Tenderness: There is abdominal tenderness (mild upper abdomen.).   Skin:     General: Skin is warm and dry.   Neurological:      Mental Status: She is alert.         Results for orders placed or performed during the hospital encounter of 05/30/24   Tissue Pathology Exam    Specimen: A: Larynx; Tissue    B: Larynx; Tissue    C: Larynx; Tissue   Result Value Ref Range    Note to Patients       This report may contain a detailed description of human tissue sent by a health care provider to the laboratory for pathologic evaluation. The content of this report is essential for diagnosis and may provide important critical findings. This information may be unfamiliar to patients to review without a medical professional present. It is advised that the patient review this report in the presence of a health care provider who can answer questions and explain the results.      Case Report       Surgical Pathology Report                         Case: [id number]                                  Authorizing Provider:  Mendez Padilla MD    Collected:           05/30/2024 12:20 PM          Ordering Location:     [facility] OR  Received:            05/31/2024 07:46 AM          Pathologist:           Raiza Wheeler MD              " "                                          Specimens:   1) - Larynx, RIGHT TRUE vocal cord                                                                  2) - Larynx, RIGHT FALSE vocal cord                                                                 3) - Larynx, LEFT TRUE vocal chord                                                         Final Diagnosis       1.  Right true vocal cord, biopsy:  A.  Fragments of benign squamous mucosa with submucosa exhibiting acanthosis, parakeratosis, acute and chronic inflammation and reactive squamous atypia..  B.  Areas of ulceration with associated granulation tissue response.    2.  Right false vocal cord, biopsy: Fragments of benign squamous mucosa with subepithelial fibrous stroma exhibiting acanthosis, acute and chronic inflammation and reactive squamous atypia.    3.  Left true vocal cord, biopsy: Fragments of benign squamous mucosa with subepithelial fibrous stroma exhibiting acanthosis, parakeratosis, acute and chronic inflammation and reactive squamous atypia.      Comment       To rule out the possibility of dysplasia, extradepartment of consultation was obtained from the [facility].  The consultant reports that the atypia present is compatible with reactive atypia.  Please refer to the complete pathology report from the [facility] which is appended.      Gross Description       1. Larynx.   Received in a formalin filled container labeled with the patient's name, date of birth, and \"right true vocal cord\".  The specimen consists of a Telfa pad with multiple mucosal covered tissue fragments aggregating to 0.9 x 0.8 x 0.2 cm.  The mucosal surfaces appear blue-gray, slightly raised and pale.  The fragments are totally submitted in block 1A.    2. Larynx.   Received in a formalin filled container labeled with the patient's name, date of birth, and \"right false vocal cord\".  The specimen consists of a Telfa pad with yellow-tan, mucosal covered tissue fragments " "measuring 0.1 x 0.1 by less than 0.1 cm and 0.4 x 0.3 x 0.2 cm.  The mucosal surface is marked by a yellow-tan, polypoid area measuring 0.4 x 0.3 cm.  The fragments are totally submitted in block 2A.    3. Larynx.   Received in a formalin filled container labeled with the patient's name, date of birth, and \"left true vocal cord\".  The specimen consists of 3 yellow to gray, mucosa covered tissue fragments aggregating to 0.6 x 0.4 x 0.1 cm.  The surfaces appear slightly pale and raised and the fragments are totally submitted in block 3A.        Microscopic Description       Microscopic examination was performed on all specimens.             ASSESSMENT AND PLAN    Assessment & Plan     Diagnoses and all orders for this visit:    1. Heartburn (Primary)  -     Case Request; Standing  -     Case Request    2. Pain of upper abdomen  -     Case Request; Standing  -     Case Request    3. Diarrhea, unspecified type  -     Clostridioides difficile Toxin - Stool, Per Rectum; Future  -     Gastrointestinal Panel, PCR - Stool, Per Rectum; Future  -     Ova & Parasite Examination - Stool, Per Rectum; Future    4. Family hx of colon cancer    Other orders  -     pantoprazole (PROTONIX) 40 MG EC tablet; Take 1 tablet by mouth 2 (Two) Times a Day.  Dispense: 60 tablet; Refill: 2  -     Implement Anesthesia Orders Day of Procedure; Standing  -     Obtain Informed Consent; Standing        ESOPHAGOGASTRODUODENOSCOPY WITH ANESTHESIA (N/A)  Risk, benefits, and alternatives of endoscopy were explained in full.  They understand that there is a risk of bleeding, perforation, and infection.  The risk of perforation goes up with esophageal dilation.  Other options to evaluate UGI complaints could involve barium swallow or UGI series, but these would be diagnostic tests only.  Patient was given time to ask questions.  I answered them to their satisfaction and they are agreeable to proceeding             In regards to heartburn, I discussed non " pharmaceutical treatment of gerd.  This includes gradually losing weight to achieve ideal body wt., elevation of the head of bed by 4-6 inches, nothing to eat or drink 3 hours prior to lying down, avoiding tight clothing, stress reduction, tobacco cessation, reduction of alcohol intake, and dietary restrictions (avoiding caffeine, coffee, fatty foods, mints, chocolate, spicy foods and tomato based sauces as much as possible). I recommend increase protonix to twice daily and take pepcid before bedtime.       In regards to upper abdominal pain, differential diagnoses discussed.   I recommend emergency room if worsening or severe symptoms. Increase ppi to twice daily.     In regards to diarrhea,  I recommend check stool studies. I recommend trial of probiotic daily. I also recommend colonoscopy. She wants to hold on colonoscopy for now. She will let us know when ready for colonoscopy.       No follow-ups on file.            There are no Patient Instructions on file for this visit.      Rakel Padilla, APRN

## 2024-06-13 ENCOUNTER — OFFICE VISIT (OUTPATIENT)
Dept: NEUROLOGY | Facility: CLINIC | Age: 49
End: 2024-06-13
Payer: COMMERCIAL

## 2024-06-13 VITALS
DIASTOLIC BLOOD PRESSURE: 84 MMHG | SYSTOLIC BLOOD PRESSURE: 146 MMHG | BODY MASS INDEX: 27.31 KG/M2 | OXYGEN SATURATION: 97 % | HEIGHT: 67 IN | WEIGHT: 174 LBS | HEART RATE: 110 BPM

## 2024-06-13 DIAGNOSIS — G24.3 CERVICAL DYSTONIA: ICD-10-CM

## 2024-06-13 DIAGNOSIS — G43.809 OTHER MIGRAINE WITHOUT STATUS MIGRAINOSUS, NOT INTRACTABLE: Primary | ICD-10-CM

## 2024-06-13 RX ORDER — TOPIRAMATE 50 MG/1
50 TABLET, FILM COATED ORAL 2 TIMES DAILY
Qty: 60 TABLET | Refills: 5 | Status: SHIPPED | OUTPATIENT
Start: 2024-06-13

## 2024-06-13 NOTE — PROGRESS NOTES
Subjective        Minnie Bang presents to [facility]    History of Present Illness  49-year-old female here for follow-up.  Topamax definitely helped her headaches initially.  However they have been worsening recently.  Happening at least 2-3 times a week.  They can wake her up at night.                   Current Outpatient Medications:     albuterol sulfate  (90 Base) MCG/ACT inhaler, Inhale 2 puffs Every 4 (Four) Hours As Needed for Wheezing., Disp: 6.7 g, Rfl: 0    carvedilol (COREG) 25 MG tablet, Take 1 tablet by mouth 2 (Two) Times a Day., Disp: 180 tablet, Rfl: 3    cloNIDine (CATAPRES) 0.1 MG tablet, Take 1 tablet by mouth As Needed for High Blood Pressure (for blood pressure >140/90)., Disp: , Rfl:     dilTIAZem CD (CARDIZEM CD) 120 MG 24 hr capsule, TAKE 1 CAPSULE BY MOUTH DAILY., Disp: 30 capsule, Rfl: 0    FLUoxetine (PROzac) 20 MG capsule, Take 1 capsule by mouth Daily., Disp: , Rfl:     ipratropium-albuterol (DUO-NEB) 0.5-2.5 mg/3 ml nebulizer, Take 3 mL by nebulization Every 4 (Four) Hours As Needed for Wheezing., Disp: 360 mL, Rfl: 0    lisinopril (PRINIVIL,ZESTRIL) 40 MG tablet, Take 1 tablet by mouth Daily., Disp: , Rfl:     meclizine (ANTIVERT) 25 MG tablet, Take 1 tablet by mouth 3 (Three) Times a Day As Needed for Dizziness., Disp: 42 tablet, Rfl: 2    nitroglycerin (NITROSTAT) 0.4 MG SL tablet, PLACE 1 TABLET UNDER THE TONGUE AS NEEDED FOR ANGINA, MAY REPEAT EVERY 5 MINUTES FOR UP THREE DOSES, Disp: 25 tablet, Rfl: 3    pantoprazole (PROTONIX) 40 MG EC tablet, Take 1 tablet by mouth 2 (Two) Times a Day., Disp: 60 tablet, Rfl: 2    POTASSIUM PO, Take  by mouth Daily., Disp: , Rfl:     simvastatin (ZOCOR) 10 MG tablet, Take 1 tablet by mouth Every Night., Disp: , Rfl:     Synthroid 125 MCG tablet, Take 1 tablet by mouth Daily for 30 days., Disp: 30 tablet, Rfl: 1    topiramate (TOPAMAX) 50 MG tablet, Take 1 tablet by mouth 2 (Two) Times a Day., Disp:  "60 tablet, Rfl: 1       Objective   Vital Signs:   /84   Pulse 110   Ht 170.2 cm (67\")   Wt 78.9 kg (174 lb)   SpO2 97%   BMI 27.25 kg/m²     Physical Exam  Constitutional:       General: She is awake.   Eyes:      Extraocular Movements: Extraocular movements intact.   Neurological:      Mental Status: She is alert.   Psychiatric:         Speech: Speech normal.      Neurological Exam  Mental Status  Awake and alert. Speech is normal. Language is fluent with no aphasia.    Cranial Nerves  CN III, IV, VI: Extraocular movements intact bilaterally.  CN VII: Full and symmetric facial movement.    Gait  Casual gait is normal including stance, stride, and arm swing.      Result Review :              Chemodenervation for Cervical Dystonia    ICD10: G24.3    Procedure:  Botox Injection   The patient was placed in a seated position. The site of pain and procedure were confirmed with the patient prior to starting the procedure. The affected muscles were identified and marked. The skin was prepped with isopropyl alcohol.  Following sterile technique and using EMG guidance and 26 gauge - 1 inch needle was slowly advanced through the skin and subcutaneous tissues toward the targeted muscles.    The following muscles were injected:    L SC 25 units  R SC 25 units  L trap 25 units  R trap 25 units    100 units injected.     EMG was performed and medically necessary to accurately identify the muscles that were injected. With accurate muscle identification the patient receives maximum benefit from the least amount of Botox. Accurate muscle localization is not possible without the use of EMG.          Assessment and Plan     The patient is a 48-year-old female with hypertension, chronic obstructive pulmonary disease, and hyperlipidemia who was referred for headache. The pain is mostly posterior and on exam, she very clearly has cervical dystonia, which I think is likely causing pain in those muscles.  Started Topamax at " last visit with a plan to get authorization for Botox for dystonia.  Topamax initially helped but now is not as effective.  Botox injections today for the cervical dystonia.  Will continue the Topamax for now and may change in the future depending on how well the Botox works.  Also she continues to have hypersomnolence.  I am concerned about NATHALY especially as she has headaches upon waking at times.  Sleep study was ordered previously but has not been scheduled.    Plan:     Botox today as above.  Continue Topamax 50 mg twice daily  Will ensure she gets scheduled for sleep study.  Follow-up 3 months    Follow Up   No follow-ups on file.  Patient was given instructions and counseling regarding her condition or for health maintenance advice. Please see specific information pulled into the AVS if appropriate.

## 2024-06-14 PROBLEM — R12 HEARTBURN: Status: ACTIVE | Noted: 2024-06-11

## 2024-06-14 PROBLEM — R10.10 PAIN OF UPPER ABDOMEN: Status: ACTIVE | Noted: 2024-06-11

## 2024-06-18 ENCOUNTER — TELEPHONE (OUTPATIENT)
Dept: GASTROENTEROLOGY | Facility: CLINIC | Age: 49
End: 2024-06-18
Payer: COMMERCIAL

## 2024-06-18 ENCOUNTER — PREP FOR SURGERY (OUTPATIENT)
Dept: OTHER | Facility: HOSPITAL | Age: 49
End: 2024-06-18
Payer: COMMERCIAL

## 2024-06-18 NOTE — TELEPHONE ENCOUNTER
Rosalva, see message below. Please tell her I tried to call but she did not answer. We discussed colonoscopy at her office visit but she wanted to hold on that. If she is wanting to go ahead and schedule then we can do that but I wanted her stool studies competed first and I see where she has not  turned those in. So she needs to go ahead and complete stool studies but we will need to move her procedures out  because I will not have the stool results before 6/20. Just let me know , thank you         ----- Message from Didier WOODSON sent at 6/18/2024 12:20 PM CDT -----  Regarding: FW: Colonoscopy  Contact: [phone]    ----- Message -----  From: Minnie Daily  Sent: 6/18/2024  12:08 PM CDT  To: [facility] Clinical Pool  Subject: Colonoscopy                                      I am suppose to be having a colonoscopy on the 20th yet I haven't received any instruction as far as a clean. I received one for the endoscopy but not the colonoscopy. Minnie daily

## 2024-06-18 NOTE — TELEPHONE ENCOUNTER
----- Message from Didier WOODSON sent at 6/18/2024 12:20 PM CDT -----  Regarding: FW: Colonoscopy  Contact: [phone]    ----- Message -----  From: Minnie Daily  Sent: 6/18/2024  12:08 PM CDT  To: [facility] Clinical Pool  Subject: Colonoscopy                                      I am suppose to be having a colonoscopy on the 20th yet I haven't received any instruction as far as a clean. I received one for the endoscopy but not the colonoscopy. Minnie daily

## 2024-06-28 ENCOUNTER — HOSPITAL ENCOUNTER (OUTPATIENT)
Dept: SLEEP CENTER | Age: 49
Discharge: HOME | End: 2024-06-30
Payer: MEDICAID

## 2024-06-28 NOTE — PROGRESS NOTES
Ms.Nichole Lafleur presented today in the sleep center for a Home Sleep Test (HST).  Ms. Lafleur was instructed on the device and was requested to wear the unit for two nights.  was asked to have the HST monitor back by 10AM on  07/01/2024. The patient acknowledged she understood. The HST device was tested and was in working order.

## 2024-06-29 PROCEDURE — G0399 HOME SLEEP TEST/TYPE 3 PORTA: HCPCS

## 2024-06-30 PROCEDURE — G0399 HOME SLEEP TEST/TYPE 3 PORTA: HCPCS

## 2024-07-02 ENCOUNTER — LAB (OUTPATIENT)
Dept: LAB | Facility: HOSPITAL | Age: 49
End: 2024-07-02
Payer: COMMERCIAL

## 2024-07-02 ENCOUNTER — TELEPHONE (OUTPATIENT)
Dept: GASTROENTEROLOGY | Facility: CLINIC | Age: 49
End: 2024-07-02
Payer: COMMERCIAL

## 2024-07-02 DIAGNOSIS — R19.7 DIARRHEA, UNSPECIFIED TYPE: ICD-10-CM

## 2024-07-02 LAB
ADV 40+41 DNA STL QL NAA+NON-PROBE: NOT DETECTED
ASTRO TYP 1-8 RNA STL QL NAA+NON-PROBE: NOT DETECTED
C CAYETANENSIS DNA STL QL NAA+NON-PROBE: NOT DETECTED
C COLI+JEJ+UPSA DNA STL QL NAA+NON-PROBE: NOT DETECTED
C DIFF GDH + TOXINS A+B STL QL IA.RAPID: NEGATIVE
C DIFF TOX GENS STL QL NAA+PROBE: POSITIVE
CRYPTOSP DNA STL QL NAA+NON-PROBE: NOT DETECTED
E HISTOLYT DNA STL QL NAA+NON-PROBE: NOT DETECTED
EAEC PAA PLAS AGGR+AATA ST NAA+NON-PRB: NOT DETECTED
EC STX1+STX2 GENES STL QL NAA+NON-PROBE: NOT DETECTED
EPEC EAE GENE STL QL NAA+NON-PROBE: NOT DETECTED
ETEC LTA+ST1A+ST1B TOX ST NAA+NON-PROBE: NOT DETECTED
G LAMBLIA DNA STL QL NAA+NON-PROBE: NOT DETECTED
NOROVIRUS GI+II RNA STL QL NAA+NON-PROBE: NOT DETECTED
P SHIGELLOIDES DNA STL QL NAA+NON-PROBE: NOT DETECTED
RVA RNA STL QL NAA+NON-PROBE: NOT DETECTED
S ENT+BONG DNA STL QL NAA+NON-PROBE: NOT DETECTED
SAPO I+II+IV+V RNA STL QL NAA+NON-PROBE: NOT DETECTED
SHIGELLA SP+EIEC IPAH ST NAA+NON-PROBE: NOT DETECTED
V CHOL+PARA+VUL DNA STL QL NAA+NON-PROBE: NOT DETECTED
V CHOLERAE DNA STL QL NAA+NON-PROBE: NOT DETECTED
Y ENTEROCOL DNA STL QL NAA+NON-PROBE: NOT DETECTED

## 2024-07-02 PROCEDURE — 87449 NOS EACH ORGANISM AG IA: CPT

## 2024-07-02 PROCEDURE — 87507 IADNA-DNA/RNA PROBE TQ 12-25: CPT

## 2024-07-02 PROCEDURE — 87493 C DIFF AMPLIFIED PROBE: CPT

## 2024-07-02 PROCEDURE — 87209 SMEAR COMPLEX STAIN: CPT

## 2024-07-02 PROCEDURE — 87177 OVA AND PARASITES SMEARS: CPT

## 2024-07-02 RX ORDER — VANCOMYCIN HYDROCHLORIDE 125 MG/1
125 CAPSULE ORAL 4 TIMES DAILY
Qty: 40 CAPSULE | Refills: 0 | Status: SHIPPED | OUTPATIENT
Start: 2024-07-02 | End: 2024-07-12

## 2024-07-02 NOTE — TELEPHONE ENCOUNTER
Kalee, please let her know that her stool sample came back positive for C. difficile.  This is an infection that needs to be treated.  This can be the cause of diarrhea.  I will send a prescription for vancomycin to her pharmacy.  She is scheduled for EGD and colonoscopy on July 8.  The colonoscopy will have to be canceled due to the infection.  We can have her follow-up in the office in 3 to 4 weeks in regards to the diarrhea.  Can you get her an appointment in 3 to 4 weeks?  Thank you

## 2024-07-08 ENCOUNTER — ANESTHESIA (OUTPATIENT)
Dept: GASTROENTEROLOGY | Facility: HOSPITAL | Age: 49
End: 2024-07-08
Payer: COMMERCIAL

## 2024-07-08 ENCOUNTER — HOSPITAL ENCOUNTER (OUTPATIENT)
Facility: HOSPITAL | Age: 49
Setting detail: HOSPITAL OUTPATIENT SURGERY
Discharge: HOME | End: 2024-07-08
Attending: INTERNAL MEDICINE | Admitting: INTERNAL MEDICINE
Payer: COMMERCIAL

## 2024-07-08 ENCOUNTER — ANESTHESIA EVENT (OUTPATIENT)
Dept: GASTROENTEROLOGY | Facility: HOSPITAL | Age: 49
End: 2024-07-08
Payer: COMMERCIAL

## 2024-07-08 VITALS
SYSTOLIC BLOOD PRESSURE: 163 MMHG | RESPIRATION RATE: 21 BRPM | BODY MASS INDEX: 25.18 KG/M2 | HEART RATE: 96 BPM | OXYGEN SATURATION: 99 % | TEMPERATURE: 97.2 F | HEIGHT: 69 IN | WEIGHT: 170 LBS | DIASTOLIC BLOOD PRESSURE: 109 MMHG

## 2024-07-08 LAB
O+P SPEC MICRO: NORMAL
O+P STL CONC: NORMAL

## 2024-07-08 PROCEDURE — 25810000003 SODIUM CHLORIDE 0.9 % SOLUTION: Performed by: ANESTHESIOLOGY

## 2024-07-08 PROCEDURE — 43248 EGD GUIDE WIRE INSERTION: CPT | Performed by: INTERNAL MEDICINE

## 2024-07-08 PROCEDURE — 25010000002 PROPOFOL 10 MG/ML EMULSION: Performed by: NURSE ANESTHETIST, CERTIFIED REGISTERED

## 2024-07-08 RX ORDER — SODIUM CHLORIDE 9 MG/ML
500 INJECTION, SOLUTION INTRAVENOUS CONTINUOUS PRN
Status: DISCONTINUED | OUTPATIENT
Start: 2024-07-08 | End: 2024-07-08 | Stop reason: HOSPADM

## 2024-07-08 RX ORDER — PROPOFOL 10 MG/ML
VIAL (ML) INTRAVENOUS AS NEEDED
Status: DISCONTINUED | OUTPATIENT
Start: 2024-07-08 | End: 2024-07-08 | Stop reason: SURG

## 2024-07-08 RX ORDER — LIDOCAINE HYDROCHLORIDE 10 MG/ML
0.5 INJECTION, SOLUTION EPIDURAL; INFILTRATION; INTRACAUDAL; PERINEURAL ONCE AS NEEDED
Status: DISCONTINUED | OUTPATIENT
Start: 2024-07-08 | End: 2024-07-08 | Stop reason: HOSPADM

## 2024-07-08 RX ORDER — DEXMEDETOMIDINE HYDROCHLORIDE 4 UG/ML
INJECTION, SOLUTION INTRAVENOUS AS NEEDED
Status: DISCONTINUED | OUTPATIENT
Start: 2024-07-08 | End: 2024-07-08 | Stop reason: SURG

## 2024-07-08 RX ORDER — SODIUM CHLORIDE 9 MG/ML
40 INJECTION, SOLUTION INTRAVENOUS AS NEEDED
Status: CANCELLED | OUTPATIENT
Start: 2024-07-08

## 2024-07-08 RX ORDER — SODIUM CHLORIDE 0.9 % (FLUSH) 0.9 %
10 SYRINGE (ML) INJECTION AS NEEDED
Status: DISCONTINUED | OUTPATIENT
Start: 2024-07-08 | End: 2024-07-08 | Stop reason: HOSPADM

## 2024-07-08 RX ORDER — SODIUM CHLORIDE 0.9 % (FLUSH) 0.9 %
10 SYRINGE (ML) INJECTION AS NEEDED
Status: CANCELLED | OUTPATIENT
Start: 2024-07-08

## 2024-07-08 RX ORDER — ONDANSETRON 2 MG/ML
4 INJECTION INTRAMUSCULAR; INTRAVENOUS ONCE AS NEEDED
Status: DISCONTINUED | OUTPATIENT
Start: 2024-07-08 | End: 2024-07-08 | Stop reason: HOSPADM

## 2024-07-08 RX ORDER — PANTOPRAZOLE SODIUM 40 MG/1
40 TABLET, DELAYED RELEASE ORAL 2 TIMES DAILY
Qty: 60 TABLET | Refills: 2 | Status: SHIPPED | OUTPATIENT
Start: 2024-07-08

## 2024-07-08 RX ORDER — SODIUM CHLORIDE 9 MG/ML
100 INJECTION, SOLUTION INTRAVENOUS CONTINUOUS
Status: CANCELLED | OUTPATIENT
Start: 2024-07-08

## 2024-07-08 RX ORDER — LIDOCAINE HYDROCHLORIDE 20 MG/ML
INJECTION, SOLUTION EPIDURAL; INFILTRATION; INTRACAUDAL; PERINEURAL AS NEEDED
Status: DISCONTINUED | OUTPATIENT
Start: 2024-07-08 | End: 2024-07-08 | Stop reason: SURG

## 2024-07-08 RX ORDER — SODIUM CHLORIDE 0.9 % (FLUSH) 0.9 %
10 SYRINGE (ML) INJECTION EVERY 12 HOURS SCHEDULED
Status: CANCELLED | OUTPATIENT
Start: 2024-07-08

## 2024-07-08 RX ADMIN — DEXMEDETOMIDINE HYDROCHLORIDE IN 0.9% SODIUM CHLORIDE 12 MCG: 4 INJECTION INTRAVENOUS at 11:21

## 2024-07-08 RX ADMIN — SODIUM CHLORIDE: 9 INJECTION, SOLUTION INTRAVENOUS at 11:21

## 2024-07-08 RX ADMIN — LIDOCAINE HYDROCHLORIDE 200 MG: 20 INJECTION, SOLUTION EPIDURAL; INFILTRATION; INTRACAUDAL; PERINEURAL at 11:26

## 2024-07-08 RX ADMIN — PROPOFOL 100 MG: 10 INJECTION, EMULSION INTRAVENOUS at 11:26

## 2024-07-08 RX ADMIN — SODIUM CHLORIDE 500 ML: 9 INJECTION, SOLUTION INTRAVENOUS at 10:27

## 2024-07-08 NOTE — ANESTHESIA POSTPROCEDURE EVALUATION
"Patient: Minnie Bang    Procedure Summary       Date: 07/08/24 Room / Location:  [facility] 5 /  [facility]    Anesthesia Start: 1121 Anesthesia Stop: 1136    Procedure: ESOPHAGOGASTRODUODENOSCOPY WITH ANESTHESIA Diagnosis:       Heartburn      Pain of upper abdomen      (Heartburn [R12])      (Pain of upper abdomen [R10.10])    Surgeons: Gordy Wang MD Provider: Cali Morrell CRNA    Anesthesia Type: MAC ASA Status: 3            Anesthesia Type: MAC    Vitals  Vitals Value Taken Time   /108 07/08/24 1136   Temp     Pulse 99 07/08/24 1136   Resp 22 07/08/24 1135   SpO2 98 % 07/08/24 1136   Vitals shown include unfiled device data.        Post Anesthesia Care and Evaluation    Patient location during evaluation: PHASE II  Patient participation: complete - patient participated  Level of consciousness: awake and alert  Pain management: adequate    Airway patency: patent  Anesthetic complications: No anesthetic complications    Cardiovascular status: acceptable  Respiratory status: acceptable  Hydration status: acceptable    Comments: Blood pressure (!) 159/116, pulse 101, temperature 97.2 °F (36.2 °C), temperature source Temporal, resp. rate 22, height 174 cm (68.5\"), weight 77.1 kg (170 lb), SpO2 99%, not currently breastfeeding.    Pt discharged from PACU based on jazlyn score >8    "

## 2024-07-08 NOTE — INTERVAL H&P NOTE
H&P reviewed. The patient was examined and there are no changes to the H&P.      She started taking vancomycin 3 days ago and still has similar symptoms.  That is diarrhea.  She states she was off and on antibiotics over the last 1 year for multiple different things.  She presents today for evaluation reflux and intermittent dysphagia.

## 2024-07-08 NOTE — ANESTHESIA PREPROCEDURE EVALUATION
Anesthesia Evaluation     Patient summary reviewed and Nursing notes reviewed   NPO Solid Status: > 8 hours  NPO Liquid Status: > 8 hours           Airway   Mallampati: I  Neck ROM: full  No difficulty expected  Dental - normal exam     Pulmonary - normal exam   (+) pneumonia resolved , COPD moderate, asthma,shortness of breath  Cardiovascular - normal exam  Exercise tolerance: poor (<4 METS)    Patient on routine beta blocker and Beta blocker given within 24 hours of surgery    (+) hypertension well controlled 2 medications or greater, valvular problems/murmurs AS, past MI , CAD, SORTO, hyperlipidemia    ROS comment: Bicuspic Aortic valve    Neuro/Psych  (+) headaches, dizziness/light headedness, numbness, psychiatric history Anxiety and Depression  GI/Hepatic/Renal/Endo    (+) GERD, thyroid problem hypothyroidism    Musculoskeletal     (+) arthralgias  Abdominal  - normal exam   Substance History      OB/GYN          Other   arthritis,                 Anesthesia Plan    ASA 3     MAC   total IV anesthesia  intravenous induction     Anesthetic plan, risks, benefits, and alternatives have been provided, discussed and informed consent has been obtained with: patient.    CODE STATUS:

## 2024-07-09 DIAGNOSIS — G47.33 OSA (OBSTRUCTIVE SLEEP APNEA): Primary | ICD-10-CM

## 2024-07-10 ENCOUNTER — TELEPHONE (OUTPATIENT)
Dept: SLEEP CENTER | Age: 49
End: 2024-07-10

## 2024-07-10 NOTE — TELEPHONE ENCOUNTER
Left voicemail with Ms.Nichole Lafleur about her HST performed  06/29/2024 and 06/30/2024.  The HST on 06/29/2024 revealed an AHI of 8.8 and the study on 06/30/2024 revealed an AHI of 7.0.  An AHI between 5 and 14.9 is considered to be within the mild range.    The interpreting physician wrote orders for an APAP with a minimum pressure of 8cm/H2O and a maximum pressure of 20cm/H2O.  Orders, documentation and insurance information were sent to [employer].  Results were routed to the ordering provider, Lea Alcantar M.D.

## 2024-07-11 DIAGNOSIS — G47.10 HYPERSOMNOLENCE: Primary | ICD-10-CM

## 2024-07-11 DIAGNOSIS — G47.10 HYPERSOMNOLENCE: ICD-10-CM

## 2024-07-15 ENCOUNTER — TELEPHONE (OUTPATIENT)
Dept: GASTROENTEROLOGY | Facility: CLINIC | Age: 49
End: 2024-07-15
Payer: COMMERCIAL

## 2024-07-15 NOTE — TELEPHONE ENCOUNTER
I recommend that she go to the ER today for evaluation today. Also ask who is her pcp.           ----- Message from Rosalva HERMOSILLO sent at 7/15/2024  8:18 AM CDT -----  Regarding: FW: C Diff  Contact: [phone]    ----- Message -----  From: Minnie Bang  Sent: 7/15/2024   8:08 AM CDT  To: [facility] Clinical Pool  Subject: C Diff                                           So I am now on my last day of meds for the c diff however I am still so very sick. I am throwing up, diarrhea 10 us times a day. I don't have the energy to get out of the bed or even shower. I am scared I'm going to die. I have 4 grandchildren under the age of one yet I don't have the energy to even go see them. When I do get up my whole body aches. All I drink is water and I can't even keep it down most the time. Please tell me something I can do to feel just a little better. I can't except that this is all I have left. I can't even go to the store,laugh,cough that I don't use the bathroom on myself. [place]  so you can understand where this is less than ideal. I'm so tired of being sick. I used to be so energetic. Thank you Minnie Bang

## 2024-07-29 ENCOUNTER — APPOINTMENT (OUTPATIENT)
Dept: GENERAL RADIOLOGY | Facility: HOSPITAL | Age: 49
DRG: 392 | End: 2024-07-29
Payer: COMMERCIAL

## 2024-07-29 ENCOUNTER — TELEPHONE (OUTPATIENT)
Dept: GASTROENTEROLOGY | Facility: CLINIC | Age: 49
End: 2024-07-29
Payer: COMMERCIAL

## 2024-07-29 ENCOUNTER — APPOINTMENT (OUTPATIENT)
Dept: ULTRASOUND IMAGING | Facility: HOSPITAL | Age: 49
DRG: 392 | End: 2024-07-29
Payer: COMMERCIAL

## 2024-07-29 ENCOUNTER — APPOINTMENT (OUTPATIENT)
Dept: CT IMAGING | Facility: HOSPITAL | Age: 49
DRG: 392 | End: 2024-07-29
Payer: COMMERCIAL

## 2024-07-29 ENCOUNTER — HOSPITAL ENCOUNTER (INPATIENT)
Facility: HOSPITAL | Age: 49
LOS: 8 days | Discharge: HOME | DRG: 392 | End: 2024-08-06
Attending: EMERGENCY MEDICINE | Admitting: INTERNAL MEDICINE
Payer: COMMERCIAL

## 2024-07-29 DIAGNOSIS — R19.7 DIARRHEA, UNSPECIFIED TYPE: ICD-10-CM

## 2024-07-29 DIAGNOSIS — K52.9 COLITIS: Primary | ICD-10-CM

## 2024-07-29 DIAGNOSIS — E87.6 HYPOKALEMIA: ICD-10-CM

## 2024-07-29 DIAGNOSIS — R16.0 HEPATOMEGALY: ICD-10-CM

## 2024-07-29 DIAGNOSIS — K51.00 PANCOLITIS: ICD-10-CM

## 2024-07-29 DIAGNOSIS — Z74.09 IMPAIRED MOBILITY: ICD-10-CM

## 2024-07-29 LAB
ALBUMIN SERPL-MCNC: 3.4 G/DL (ref 3.5–5.2)
ALBUMIN/GLOB SERPL: 1 G/DL
ALP SERPL-CCNC: 621 U/L (ref 39–117)
ALT SERPL W P-5'-P-CCNC: 43 U/L (ref 1–33)
ANION GAP SERPL CALCULATED.3IONS-SCNC: 20 MMOL/L (ref 5–15)
AST SERPL-CCNC: 125 U/L (ref 1–32)
BASOPHILS # BLD AUTO: 0.09 10*3/MM3 (ref 0–0.2)
BASOPHILS NFR BLD AUTO: 1.1 % (ref 0–1.5)
BILIRUB SERPL-MCNC: 0.9 MG/DL (ref 0–1.2)
BILIRUB UR QL STRIP: NEGATIVE
BUN SERPL-MCNC: 3 MG/DL (ref 6–20)
BUN/CREAT SERPL: 6.7 (ref 7–25)
CALCIUM SPEC-SCNC: 8.5 MG/DL (ref 8.6–10.5)
CHLORIDE SERPL-SCNC: 92 MMOL/L (ref 98–107)
CLARITY UR: CLEAR
CO2 SERPL-SCNC: 25 MMOL/L (ref 22–29)
COLOR UR: ABNORMAL
CREAT SERPL-MCNC: 0.45 MG/DL (ref 0.57–1)
D-LACTATE SERPL-SCNC: 4.2 MMOL/L (ref 0.5–2)
D-LACTATE SERPL-SCNC: 4.3 MMOL/L (ref 0.5–2)
D-LACTATE SERPL-SCNC: 5.3 MMOL/L (ref 0.5–2)
D-LACTATE SERPL-SCNC: 7.4 MMOL/L (ref 0.5–2)
DEPRECATED RDW RBC AUTO: 59.6 FL (ref 37–54)
EGFRCR SERPLBLD CKD-EPI 2021: 116.6 ML/MIN/1.73
EOSINOPHIL # BLD AUTO: 0.15 10*3/MM3 (ref 0–0.4)
EOSINOPHIL NFR BLD AUTO: 1.8 % (ref 0.3–6.2)
ERYTHROCYTE [DISTWIDTH] IN BLOOD BY AUTOMATED COUNT: 15.4 % (ref 12.3–15.4)
GLOBULIN UR ELPH-MCNC: 3.4 GM/DL
GLUCOSE SERPL-MCNC: 102 MG/DL (ref 65–99)
GLUCOSE UR STRIP-MCNC: NEGATIVE MG/DL
HCT VFR BLD AUTO: 36.7 % (ref 34–46.6)
HGB BLD-MCNC: 12.5 G/DL (ref 12–15.9)
HGB UR QL STRIP.AUTO: NEGATIVE
IMM GRANULOCYTES # BLD AUTO: 0.1 10*3/MM3 (ref 0–0.05)
IMM GRANULOCYTES NFR BLD AUTO: 1.2 % (ref 0–0.5)
INR PPP: 0.91 (ref 0.91–1.09)
KETONES UR QL STRIP: NEGATIVE
LEUKOCYTE ESTERASE UR QL STRIP.AUTO: NEGATIVE
LIPASE SERPL-CCNC: 37 U/L (ref 13–60)
LYMPHOCYTES # BLD AUTO: 2.35 10*3/MM3 (ref 0.7–3.1)
LYMPHOCYTES NFR BLD AUTO: 28.1 % (ref 19.6–45.3)
MAGNESIUM SERPL-MCNC: 1.6 MG/DL (ref 1.6–2.6)
MAGNESIUM SERPL-MCNC: 1.6 MG/DL (ref 1.6–2.6)
MCH RBC QN AUTO: 35.3 PG (ref 26.6–33)
MCHC RBC AUTO-ENTMCNC: 34.1 G/DL (ref 31.5–35.7)
MCV RBC AUTO: 103.7 FL (ref 79–97)
MONOCYTES # BLD AUTO: 0.94 10*3/MM3 (ref 0.1–0.9)
MONOCYTES NFR BLD AUTO: 11.2 % (ref 5–12)
NEUTROPHILS NFR BLD AUTO: 4.74 10*3/MM3 (ref 1.7–7)
NEUTROPHILS NFR BLD AUTO: 56.6 % (ref 42.7–76)
NITRITE UR QL STRIP: NEGATIVE
NRBC BLD AUTO-RTO: 0 /100 WBC (ref 0–0.2)
PH UR STRIP.AUTO: 6.5 [PH] (ref 5–8)
PLATELET # BLD AUTO: 285 10*3/MM3 (ref 140–450)
PMV BLD AUTO: 10.3 FL (ref 6–12)
POTASSIUM SERPL-SCNC: 2.3 MMOL/L (ref 3.5–5.2)
POTASSIUM SERPL-SCNC: 2.4 MMOL/L (ref 3.5–5.2)
POTASSIUM SERPL-SCNC: 2.6 MMOL/L (ref 3.5–5.2)
PROCALCITONIN SERPL-MCNC: 36.34 NG/ML (ref 0–0.25)
PROT SERPL-MCNC: 6.8 G/DL (ref 6–8.5)
PROT UR QL STRIP: NEGATIVE
PROTHROMBIN TIME: 12.7 SECONDS (ref 11.8–14.8)
RBC # BLD AUTO: 3.54 10*6/MM3 (ref 3.77–5.28)
SODIUM SERPL-SCNC: 137 MMOL/L (ref 136–145)
SP GR UR STRIP: 1.01 (ref 1–1.03)
UROBILINOGEN UR QL STRIP: ABNORMAL
WBC NRBC COR # BLD AUTO: 8.37 10*3/MM3 (ref 3.4–10.8)

## 2024-07-29 PROCEDURE — 25810000003 SODIUM CHLORIDE 0.9 % SOLUTION: Performed by: PHYSICIAN ASSISTANT

## 2024-07-29 PROCEDURE — 83690 ASSAY OF LIPASE: CPT | Performed by: EMERGENCY MEDICINE

## 2024-07-29 PROCEDURE — 93005 ELECTROCARDIOGRAM TRACING: CPT | Performed by: PHYSICIAN ASSISTANT

## 2024-07-29 PROCEDURE — 83605 ASSAY OF LACTIC ACID: CPT | Performed by: EMERGENCY MEDICINE

## 2024-07-29 PROCEDURE — 0 HYDROMORPHONE 1 MG/ML SOLUTION: Performed by: EMERGENCY MEDICINE

## 2024-07-29 PROCEDURE — 74174 CTA ABD&PLVS W/CONTRAST: CPT

## 2024-07-29 PROCEDURE — 25010000002 MORPHINE PER 10 MG: Performed by: EMERGENCY MEDICINE

## 2024-07-29 PROCEDURE — 85610 PROTHROMBIN TIME: CPT | Performed by: PHYSICIAN ASSISTANT

## 2024-07-29 PROCEDURE — 25010000002 ONDANSETRON PER 1 MG: Performed by: EMERGENCY MEDICINE

## 2024-07-29 PROCEDURE — 25010000002 METRONIDAZOLE 500 MG/100ML SOLUTION: Performed by: FAMILY MEDICINE

## 2024-07-29 PROCEDURE — 81003 URINALYSIS AUTO W/O SCOPE: CPT | Performed by: EMERGENCY MEDICINE

## 2024-07-29 PROCEDURE — 71045 X-RAY EXAM CHEST 1 VIEW: CPT

## 2024-07-29 PROCEDURE — 76705 ECHO EXAM OF ABDOMEN: CPT

## 2024-07-29 PROCEDURE — 25010000002 SODIUM CHLORIDE 0.9 % WITH KCL 20 MEQ 20-0.9 MEQ/L-% SOLUTION: Performed by: FAMILY MEDICINE

## 2024-07-29 PROCEDURE — 84132 ASSAY OF SERUM POTASSIUM: CPT | Performed by: PHYSICIAN ASSISTANT

## 2024-07-29 PROCEDURE — 87040 BLOOD CULTURE FOR BACTERIA: CPT | Performed by: EMERGENCY MEDICINE

## 2024-07-29 PROCEDURE — 25010000002 ONDANSETRON PER 1 MG: Performed by: FAMILY MEDICINE

## 2024-07-29 PROCEDURE — 25010000002 LABETALOL 5 MG/ML SOLUTION: Performed by: FAMILY MEDICINE

## 2024-07-29 PROCEDURE — 99285 EMERGENCY DEPT VISIT HI MDM: CPT

## 2024-07-29 PROCEDURE — 99222 1ST HOSP IP/OBS MODERATE 55: CPT | Performed by: INTERNAL MEDICINE

## 2024-07-29 PROCEDURE — 25010000002 MORPHINE SULFATE (PF) 2 MG/ML SOLUTION: Performed by: FAMILY MEDICINE

## 2024-07-29 PROCEDURE — 25010000002 VANCOMYCIN 10 G RECONSTITUTED SOLUTION: Performed by: PHYSICIAN ASSISTANT

## 2024-07-29 PROCEDURE — 93010 ELECTROCARDIOGRAM REPORT: CPT | Performed by: HOSPITALIST

## 2024-07-29 PROCEDURE — 83735 ASSAY OF MAGNESIUM: CPT | Performed by: PHYSICIAN ASSISTANT

## 2024-07-29 PROCEDURE — 36415 COLL VENOUS BLD VENIPUNCTURE: CPT | Performed by: EMERGENCY MEDICINE

## 2024-07-29 PROCEDURE — 25010000002 MAGNESIUM SULFATE 2 GM/50ML SOLUTION: Performed by: FAMILY MEDICINE

## 2024-07-29 PROCEDURE — 85025 COMPLETE CBC W/AUTO DIFF WBC: CPT | Performed by: EMERGENCY MEDICINE

## 2024-07-29 PROCEDURE — 25010000002 LABETALOL 5 MG/ML SOLUTION: Performed by: PHYSICIAN ASSISTANT

## 2024-07-29 PROCEDURE — 84132 ASSAY OF SERUM POTASSIUM: CPT | Performed by: FAMILY MEDICINE

## 2024-07-29 PROCEDURE — 84145 PROCALCITONIN (PCT): CPT | Performed by: EMERGENCY MEDICINE

## 2024-07-29 PROCEDURE — 83735 ASSAY OF MAGNESIUM: CPT | Performed by: FAMILY MEDICINE

## 2024-07-29 PROCEDURE — 25510000001 IOPAMIDOL PER 1 ML: Performed by: EMERGENCY MEDICINE

## 2024-07-29 PROCEDURE — 25010000002 PIPERACILLIN SOD-TAZOBACTAM PER 1 G: Performed by: PHYSICIAN ASSISTANT

## 2024-07-29 PROCEDURE — 80053 COMPREHEN METABOLIC PANEL: CPT | Performed by: EMERGENCY MEDICINE

## 2024-07-29 PROCEDURE — 25010000002 LORAZEPAM PER 2 MG: Performed by: FAMILY MEDICINE

## 2024-07-29 RX ORDER — MECLIZINE HYDROCHLORIDE 25 MG/1
25 TABLET ORAL 3 TIMES DAILY PRN
Status: DISCONTINUED | OUTPATIENT
Start: 2024-07-29 | End: 2024-08-06 | Stop reason: HOSPADM

## 2024-07-29 RX ORDER — CARVEDILOL 25 MG/1
25 TABLET ORAL 2 TIMES DAILY
Status: DISCONTINUED | OUTPATIENT
Start: 2024-07-29 | End: 2024-07-31

## 2024-07-29 RX ORDER — ONDANSETRON 2 MG/ML
4 INJECTION INTRAMUSCULAR; INTRAVENOUS EVERY 6 HOURS PRN
Status: DISCONTINUED | OUTPATIENT
Start: 2024-07-29 | End: 2024-08-06 | Stop reason: HOSPADM

## 2024-07-29 RX ORDER — CHLORDIAZEPOXIDE HYDROCHLORIDE 25 MG/1
25 CAPSULE, GELATIN COATED ORAL EVERY 8 HOURS SCHEDULED
Status: DISPENSED | OUTPATIENT
Start: 2024-07-29 | End: 2024-08-01

## 2024-07-29 RX ORDER — POTASSIUM CHLORIDE 750 MG/1
40 CAPSULE, EXTENDED RELEASE ORAL
Status: DISPENSED | OUTPATIENT
Start: 2024-07-29 | End: 2024-07-29

## 2024-07-29 RX ORDER — MAGNESIUM SULFATE HEPTAHYDRATE 40 MG/ML
2 INJECTION, SOLUTION INTRAVENOUS ONCE
Status: COMPLETED | OUTPATIENT
Start: 2024-07-29 | End: 2024-07-29

## 2024-07-29 RX ORDER — LORAZEPAM 1 MG/1
2 TABLET ORAL
Status: DISCONTINUED | OUTPATIENT
Start: 2024-07-29 | End: 2024-08-02

## 2024-07-29 RX ORDER — POTASSIUM CHLORIDE 20 MEQ/1
20 TABLET, EXTENDED RELEASE ORAL 2 TIMES DAILY
COMMUNITY

## 2024-07-29 RX ORDER — NITROGLYCERIN 0.4 MG/1
0.4 TABLET SUBLINGUAL
Status: DISCONTINUED | OUTPATIENT
Start: 2024-07-29 | End: 2024-08-06 | Stop reason: HOSPADM

## 2024-07-29 RX ORDER — ACETAMINOPHEN 325 MG/1
650 TABLET ORAL EVERY 4 HOURS PRN
Status: DISCONTINUED | OUTPATIENT
Start: 2024-07-29 | End: 2024-08-06 | Stop reason: HOSPADM

## 2024-07-29 RX ORDER — LORAZEPAM 2 MG/ML
2 INJECTION INTRAMUSCULAR
Status: DISCONTINUED | OUTPATIENT
Start: 2024-07-29 | End: 2024-08-02

## 2024-07-29 RX ORDER — DILTIAZEM HYDROCHLORIDE 120 MG/1
120 CAPSULE, COATED, EXTENDED RELEASE ORAL DAILY
Status: DISCONTINUED | OUTPATIENT
Start: 2024-07-30 | End: 2024-08-06 | Stop reason: HOSPADM

## 2024-07-29 RX ORDER — LORAZEPAM 1 MG/1
1 TABLET ORAL
Status: DISCONTINUED | OUTPATIENT
Start: 2024-07-29 | End: 2024-08-02

## 2024-07-29 RX ORDER — ATORVASTATIN CALCIUM 10 MG/1
10 TABLET, FILM COATED ORAL DAILY
Status: DISCONTINUED | OUTPATIENT
Start: 2024-07-30 | End: 2024-08-06 | Stop reason: HOSPADM

## 2024-07-29 RX ORDER — IPRATROPIUM BROMIDE AND ALBUTEROL SULFATE 2.5; .5 MG/3ML; MG/3ML
3 SOLUTION RESPIRATORY (INHALATION) EVERY 4 HOURS PRN
Status: DISCONTINUED | OUTPATIENT
Start: 2024-07-29 | End: 2024-08-06 | Stop reason: HOSPADM

## 2024-07-29 RX ORDER — FOLIC ACID 1 MG/1
1 TABLET ORAL DAILY
Status: DISCONTINUED | OUTPATIENT
Start: 2024-07-29 | End: 2024-08-06 | Stop reason: HOSPADM

## 2024-07-29 RX ORDER — NITROGLYCERIN 0.4 MG/1
0.4 TABLET SUBLINGUAL
COMMUNITY

## 2024-07-29 RX ORDER — SODIUM CHLORIDE AND POTASSIUM CHLORIDE 150; 900 MG/100ML; MG/100ML
50 INJECTION, SOLUTION INTRAVENOUS CONTINUOUS
Status: DISCONTINUED | OUTPATIENT
Start: 2024-07-29 | End: 2024-08-03

## 2024-07-29 RX ORDER — SODIUM CHLORIDE 0.9 % (FLUSH) 0.9 %
10 SYRINGE (ML) INJECTION EVERY 12 HOURS SCHEDULED
Status: DISCONTINUED | OUTPATIENT
Start: 2024-07-29 | End: 2024-08-06 | Stop reason: HOSPADM

## 2024-07-29 RX ORDER — MORPHINE SULFATE 2 MG/ML
2 INJECTION, SOLUTION INTRAMUSCULAR; INTRAVENOUS EVERY 4 HOURS PRN
Status: DISPENSED | OUTPATIENT
Start: 2024-07-29 | End: 2024-08-03

## 2024-07-29 RX ORDER — LABETALOL HYDROCHLORIDE 5 MG/ML
10 INJECTION, SOLUTION INTRAVENOUS ONCE
Status: COMPLETED | OUTPATIENT
Start: 2024-07-29 | End: 2024-07-29

## 2024-07-29 RX ORDER — HYDROCODONE BITARTRATE AND ACETAMINOPHEN 5; 325 MG/1; MG/1
1 TABLET ORAL EVERY 6 HOURS PRN
Status: DISCONTINUED | OUTPATIENT
Start: 2024-07-29 | End: 2024-08-06 | Stop reason: HOSPADM

## 2024-07-29 RX ORDER — HYDRALAZINE HYDROCHLORIDE 20 MG/ML
5 INJECTION INTRAMUSCULAR; INTRAVENOUS EVERY 4 HOURS PRN
Status: DISCONTINUED | OUTPATIENT
Start: 2024-07-29 | End: 2024-08-06 | Stop reason: HOSPADM

## 2024-07-29 RX ORDER — VANCOMYCIN/0.9 % SOD CHLORIDE 1.5G/250ML
20 PLASTIC BAG, INJECTION (ML) INTRAVENOUS ONCE
Status: DISCONTINUED | OUTPATIENT
Start: 2024-07-29 | End: 2024-07-29

## 2024-07-29 RX ORDER — ACETAMINOPHEN 160 MG/5ML
650 SOLUTION ORAL EVERY 4 HOURS PRN
Status: DISCONTINUED | OUTPATIENT
Start: 2024-07-29 | End: 2024-08-06 | Stop reason: HOSPADM

## 2024-07-29 RX ORDER — SODIUM CHLORIDE 0.9 % (FLUSH) 0.9 %
10 SYRINGE (ML) INJECTION AS NEEDED
Status: DISCONTINUED | OUTPATIENT
Start: 2024-07-29 | End: 2024-08-06 | Stop reason: HOSPADM

## 2024-07-29 RX ORDER — CLONIDINE HYDROCHLORIDE 0.1 MG/1
0.1 TABLET ORAL EVERY 6 HOURS PRN
Status: DISCONTINUED | OUTPATIENT
Start: 2024-07-29 | End: 2024-08-06 | Stop reason: HOSPADM

## 2024-07-29 RX ORDER — ACETAMINOPHEN 650 MG/1
650 SUPPOSITORY RECTAL EVERY 4 HOURS PRN
Status: DISCONTINUED | OUTPATIENT
Start: 2024-07-29 | End: 2024-08-06 | Stop reason: HOSPADM

## 2024-07-29 RX ORDER — LORAZEPAM 2 MG/ML
1 INJECTION INTRAMUSCULAR
Status: DISCONTINUED | OUTPATIENT
Start: 2024-07-29 | End: 2024-08-02

## 2024-07-29 RX ORDER — LISINOPRIL 20 MG/1
40 TABLET ORAL DAILY
Status: DISCONTINUED | OUTPATIENT
Start: 2024-07-30 | End: 2024-07-30

## 2024-07-29 RX ORDER — SODIUM CHLORIDE 9 MG/ML
40 INJECTION, SOLUTION INTRAVENOUS AS NEEDED
Status: DISCONTINUED | OUTPATIENT
Start: 2024-07-29 | End: 2024-08-06 | Stop reason: HOSPADM

## 2024-07-29 RX ORDER — FLUOXETINE HYDROCHLORIDE 20 MG/1
20 CAPSULE ORAL DAILY
Status: DISCONTINUED | OUTPATIENT
Start: 2024-07-30 | End: 2024-08-06 | Stop reason: HOSPADM

## 2024-07-29 RX ORDER — LISINOPRIL 20 MG/1
40 TABLET ORAL ONCE
Status: DISCONTINUED | OUTPATIENT
Start: 2024-07-29 | End: 2024-07-30

## 2024-07-29 RX ORDER — ONDANSETRON 4 MG/1
4 TABLET, ORALLY DISINTEGRATING ORAL EVERY 6 HOURS PRN
Status: DISCONTINUED | OUTPATIENT
Start: 2024-07-29 | End: 2024-08-06 | Stop reason: HOSPADM

## 2024-07-29 RX ORDER — HYDROCHLOROTHIAZIDE 25 MG/1
25 TABLET ORAL DAILY
COMMUNITY
End: 2024-08-06 | Stop reason: HOSPADM

## 2024-07-29 RX ORDER — MULTIPLE VITAMINS W/ MINERALS TAB 9MG-400MCG
1 TAB ORAL DAILY
Status: DISCONTINUED | OUTPATIENT
Start: 2024-07-29 | End: 2024-08-06 | Stop reason: HOSPADM

## 2024-07-29 RX ORDER — DILTIAZEM HYDROCHLORIDE 120 MG/1
120 CAPSULE, COATED, EXTENDED RELEASE ORAL DAILY
COMMUNITY

## 2024-07-29 RX ORDER — ONDANSETRON 2 MG/ML
4 INJECTION INTRAMUSCULAR; INTRAVENOUS ONCE
Status: COMPLETED | OUTPATIENT
Start: 2024-07-29 | End: 2024-07-29

## 2024-07-29 RX ORDER — TOPIRAMATE 25 MG/1
50 TABLET ORAL 2 TIMES DAILY
Status: DISCONTINUED | OUTPATIENT
Start: 2024-07-29 | End: 2024-08-06 | Stop reason: HOSPADM

## 2024-07-29 RX ORDER — PANTOPRAZOLE SODIUM 40 MG/1
40 TABLET, DELAYED RELEASE ORAL
Status: DISCONTINUED | OUTPATIENT
Start: 2024-07-29 | End: 2024-08-06 | Stop reason: HOSPADM

## 2024-07-29 RX ORDER — METRONIDAZOLE 500 MG/100ML
500 INJECTION, SOLUTION INTRAVENOUS EVERY 8 HOURS
Status: DISCONTINUED | OUTPATIENT
Start: 2024-07-29 | End: 2024-07-31

## 2024-07-29 RX ORDER — LEVOTHYROXINE SODIUM 0.12 MG/1
125 TABLET ORAL
Status: DISCONTINUED | OUTPATIENT
Start: 2024-07-30 | End: 2024-08-06 | Stop reason: HOSPADM

## 2024-07-29 RX ADMIN — ONDANSETRON 4 MG: 2 INJECTION INTRAMUSCULAR; INTRAVENOUS at 12:25

## 2024-07-29 RX ADMIN — PIPERACILLIN SODIUM AND TAZOBACTAM SODIUM 3.38 G: 3; .375 INJECTION, POWDER, LYOPHILIZED, FOR SOLUTION INTRAVENOUS at 13:28

## 2024-07-29 RX ADMIN — THIAMINE HCL TAB 100 MG 100 MG: 100 TAB at 22:55

## 2024-07-29 RX ADMIN — Medication 1 TABLET: at 20:55

## 2024-07-29 RX ADMIN — ONDANSETRON 4 MG: 2 INJECTION INTRAMUSCULAR; INTRAVENOUS at 20:17

## 2024-07-29 RX ADMIN — Medication 10 ML: at 20:56

## 2024-07-29 RX ADMIN — HYDROMORPHONE HYDROCHLORIDE 1 MG: 1 INJECTION, SOLUTION INTRAMUSCULAR; INTRAVENOUS; SUBCUTANEOUS at 14:51

## 2024-07-29 RX ADMIN — LABETALOL HYDROCHLORIDE 10 MG: 5 INJECTION, SOLUTION INTRAVENOUS at 15:46

## 2024-07-29 RX ADMIN — IOPAMIDOL 100 ML: 755 INJECTION, SOLUTION INTRAVENOUS at 13:49

## 2024-07-29 RX ADMIN — POTASSIUM CHLORIDE 40 MEQ: 750 CAPSULE, EXTENDED RELEASE ORAL at 20:56

## 2024-07-29 RX ADMIN — METRONIDAZOLE 500 MG: 500 INJECTION, SOLUTION INTRAVENOUS at 22:55

## 2024-07-29 RX ADMIN — PANTOPRAZOLE SODIUM 40 MG: 40 TABLET, DELAYED RELEASE ORAL at 20:56

## 2024-07-29 RX ADMIN — POTASSIUM CHLORIDE 40 MEQ: 750 CAPSULE, EXTENDED RELEASE ORAL at 15:48

## 2024-07-29 RX ADMIN — LORAZEPAM 1 MG: 2 INJECTION INTRAMUSCULAR; INTRAVENOUS at 15:46

## 2024-07-29 RX ADMIN — CHLORDIAZEPOXIDE HYDROCHLORIDE 25 MG: 25 CAPSULE ORAL at 22:55

## 2024-07-29 RX ADMIN — LABETALOL HYDROCHLORIDE 10 MG: 5 INJECTION, SOLUTION INTRAVENOUS at 13:27

## 2024-07-29 RX ADMIN — SODIUM CHLORIDE 1500 MG: 9 INJECTION, SOLUTION INTRAVENOUS at 14:56

## 2024-07-29 RX ADMIN — TOPIRAMATE 50 MG: 25 TABLET, FILM COATED ORAL at 20:56

## 2024-07-29 RX ADMIN — POTASSIUM CHLORIDE AND SODIUM CHLORIDE 125 ML/HR: 900; 150 INJECTION, SOLUTION INTRAVENOUS at 22:39

## 2024-07-29 RX ADMIN — SODIUM CHLORIDE, POTASSIUM CHLORIDE, SODIUM LACTATE AND CALCIUM CHLORIDE 2367 ML: 600; 310; 30; 20 INJECTION, SOLUTION INTRAVENOUS at 13:28

## 2024-07-29 RX ADMIN — MORPHINE SULFATE 4 MG: 4 INJECTION, SOLUTION INTRAMUSCULAR; INTRAVENOUS at 12:25

## 2024-07-29 RX ADMIN — FIDAXOMICIN 200 MG: 200 TABLET, FILM COATED ORAL at 22:55

## 2024-07-29 RX ADMIN — MORPHINE SULFATE 2 MG: 2 INJECTION, SOLUTION INTRAMUSCULAR; INTRAVENOUS at 23:02

## 2024-07-29 RX ADMIN — CARVEDILOL 25 MG: 25 TABLET, FILM COATED ORAL at 20:55

## 2024-07-29 RX ADMIN — POTASSIUM CHLORIDE AND SODIUM CHLORIDE 125 ML/HR: 900; 150 INJECTION, SOLUTION INTRAVENOUS at 15:45

## 2024-07-29 RX ADMIN — FOLIC ACID 1 MG: 1 TABLET ORAL at 22:55

## 2024-07-29 RX ADMIN — MAGNESIUM SULFATE HEPTAHYDRATE 2 G: 2 INJECTION, SOLUTION INTRAVENOUS at 15:43

## 2024-07-29 RX ADMIN — HYDROCODONE BITARTRATE AND ACETAMINOPHEN 1 TABLET: 5; 325 TABLET ORAL at 19:34

## 2024-07-29 NOTE — ED NOTES
Nursing report ED to floor  Minnie Bang  49 y.o.  female    HPI:   Chief Complaint   Patient presents with    Abdominal Pain       Admitting doctor:   Jc Murillo MD    Consulting provider(s):  Consults       Date and Time Order Name Status Description    7/29/2024  3:12 PM Inpatient Gastroenterology Consult               Admitting diagnosis:   The primary encounter diagnosis was Colitis. Diagnoses of Diarrhea, unspecified type, Hypokalemia, and Hepatomegaly were also pertinent to this visit.    Code status:   Current Code Status       Date Active Code Status Order ID Comments User Context       7/29/2024 1514 CPR (Attempt to Resuscitate) [id number]  Jc Murillo MD ED        Question Answer    Code Status (Patient has no pulse and is not breathing) CPR (Attempt to Resuscitate)    Medical Interventions (Patient has pulse or is breathing) Full Support    Level Of Support Discussed With Patient                    Allergies:   Patient has no known allergies.    Intake and Output  No intake or output data in the 24 hours ending 07/29/24 1515    Weight:       07/29/24  1141   Weight: 78.9 kg (174 lb)       Most recent vitals:   Vitals:    07/29/24 1137 07/29/24 1141 07/29/24 1247 07/29/24 1321   BP: (!) 183/119  (!) 163/117 (!) 142/101   BP Location:   Left arm    Patient Position:   Sitting    Pulse: 119  98 96   Resp:   23    Temp:       SpO2:   96% 93%   Weight:  78.9 kg (174 lb)     Height:         Oxygen Therapy: .    Active LDAs/IV Access:   Lines, Drains & Airways       Active LDAs       Name Placement date Placement time Site Days    Peripheral IV 07/29/24 1225 Anterior;Distal;Left Forearm 07/29/24  1225  Forearm  less than 1                    Labs (abnormal labs have a star):   Labs Reviewed   COMPREHENSIVE METABOLIC PANEL - Abnormal; Notable for the following components:       Result Value    Glucose 102 (*)     BUN 3 (*)     Creatinine 0.45 (*)     Potassium 2.3 (*)     Chloride 92 (*)      "Calcium 8.5 (*)     Albumin 3.4 (*)     ALT (SGPT) 43 (*)     AST (SGOT) 125 (*)     Alkaline Phosphatase 621 (*)     BUN/Creatinine Ratio 6.7 (*)     Anion Gap 20.0 (*)     All other components within normal limits    Narrative:     GFR Normal >60  Chronic Kidney Disease <60  Kidney Failure <15     URINALYSIS W/ CULTURE IF INDICATED - Abnormal; Notable for the following components:    Color, UA Dark Yellow (*)     All other components within normal limits    Narrative:     In absence of clinical symptoms, the presence of pyuria, bacteria, and/or nitrites on the urinalysis result does not correlate with infection.  Urine microscopic not indicated.   PROCALCITONIN - Abnormal; Notable for the following components:    Procalcitonin 36.34 (*)     All other components within normal limits    Narrative:     As a Marker for Sepsis (Non-Neonates):    1. <0.5 ng/mL represents a low risk of severe sepsis and/or septic shock.  2. >2 ng/mL represents a high risk of severe sepsis and/or septic shock.    As a Marker for Lower Respiratory Tract Infections that require antibiotic therapy:    PCT on Admission    Antibiotic Therapy       6-12 Hrs later    >0.5                Strongly Recommended  >0.25 - <0.5        Recommended   0.1 - 0.25          Discouraged              Remeasure/reassess PCT  <0.1                Strongly Discouraged     Remeasure/reassess PCT    As 28 day mortality risk marker: \"Change in Procalcitonin Result\" (>80% or <=80%) if Day 0 (or Day 1) and Day 4 values are available. Refer to http://www.[facility]-pct-calculator.com    Change in PCT <=80%  A decrease of PCT levels below or equal to 80% defines a positive change in PCT test result representing a higher risk for 28-day all-cause mortality of patients diagnosed with severe sepsis for septic shock.    Change in PCT >80%  A decrease of PCT levels of more than 80% defines a negative change in PCT result representing a lower risk for 28-day all-cause mortality of " patients diagnosed with severe sepsis or septic shock.      LACTIC ACID, PLASMA - Abnormal; Notable for the following components:    Lactate 7.4 (*)     All other components within normal limits   CBC WITH AUTO DIFFERENTIAL - Abnormal; Notable for the following components:    RBC 3.54 (*)     .7 (*)     MCH 35.3 (*)     RDW-SD 59.6 (*)     Immature Grans % 1.2 (*)     Monocytes, Absolute 0.94 (*)     Immature Grans, Absolute 0.10 (*)     All other components within normal limits   POTASSIUM - Abnormal; Notable for the following components:    Potassium 2.4 (*)     All other components within normal limits   LIPASE - Normal   MAGNESIUM - Normal   PROTIME-INR - Normal   BLOOD CULTURE   BLOOD CULTURE   GASTROINTESTINAL PANEL, PCR (PREFERRED) DOES NOT INCLUDE CDIFF   LACTIC ACID, REFLEX   POTASSIUM   MAGNESIUM   CBC AND DIFFERENTIAL    Narrative:     The following orders were created for panel order CBC & Differential.  Procedure                               Abnormality         Status                     ---------                               -----------         ------                     CBC Auto Differential[[id number]]        Abnormal            Final result                 Please view results for these tests on the individual orders.       Meds given in ED:   Medications   sodium chloride 0.9 % flush 10 mL (has no administration in time range)   Potassium Replacement - Follow Nurse / BPA Driven Protocol (has no administration in time range)   vancomycin IVPB 1500 mg in 0.9% NaCl (Premix) 500 mL (1,500 mg Intravenous New Bag 7/29/24 1456)   magnesium sulfate 2g/50 mL (PREMIX) infusion (has no administration in time range)   sodium chloride 0.9 % flush 10 mL (has no administration in time range)   sodium chloride 0.9 % flush 10 mL (has no administration in time range)   sodium chloride 0.9 % infusion 40 mL (has no administration in time range)   sodium chloride 0.9 % with KCl 20 mEq/L infusion (has no  administration in time range)   acetaminophen (TYLENOL) tablet 650 mg (has no administration in time range)     Or   acetaminophen (TYLENOL) 160 MG/5ML oral solution 650 mg (has no administration in time range)     Or   acetaminophen (TYLENOL) suppository 650 mg (has no administration in time range)   HYDROcodone-acetaminophen (NORCO) 5-325 MG per tablet 1 tablet (has no administration in time range)   ondansetron ODT (ZOFRAN-ODT) disintegrating tablet 4 mg (has no administration in time range)     Or   ondansetron (ZOFRAN) injection 4 mg (has no administration in time range)   hydrALAZINE (APRESOLINE) injection 5 mg (has no administration in time range)   morphine injection 4 mg (4 mg Intravenous Given 7/29/24 1225)   ondansetron (ZOFRAN) injection 4 mg (4 mg Intravenous Given 7/29/24 1225)   labetalol (NORMODYNE,TRANDATE) injection 10 mg (10 mg Intravenous Given 7/29/24 1327)   piperacillin-tazobactam (ZOSYN) 3.375 g IVPB in 100 mL NS MBP (CD) (0 g Intravenous Stopped 7/29/24 1451)   sepsis fluid LR bolus 2,367 mL (2,367 mL Intravenous New Bag 7/29/24 1328)   iopamidol (ISOVUE-370) 76 % injection 100 mL (100 mL Intravenous Given 7/29/24 1349)   HYDROmorphone (DILAUDID) injection 1 mg (1 mg Intravenous Given 7/29/24 1451)     sodium chloride 0.9 % with KCl 20 mEq, 100 mL/hr         NIH Stroke Scale:       Isolation/Infection(s):  No active isolations   C.difficile     COVID Testing  Collected .  Resulted .    Nursing report ED to floor:  Mental status: .  Ambulatory status: .  Precautions: .    ED nurse phone extentsion- ..

## 2024-07-29 NOTE — H&P
[facility] Medicine Services  HISTORY AND PHYSICAL    Date of Admission: 7/29/2024  Primary Care Physician: Provider, No Known    Subjective   Primary Historian: Patient    Chief Complaint: Diarrhea and vomiting    History of Present Illness  49-year-old female with history of hypertension, alcohol abuse disorder, coronary artery disease, chronic obstructive pulmonary disease, former smoker, comes to the hospital reporting diarrhea that has been present for approximately 2 months.  She describes diarrhea as liquid, several times per day, and occasional associated vomiting.  She states that approximately 2 weeks ago started developing abdominal pain, described as cramps, located mostly in the lower abdomen; she also reports stabbing quality pain over the right posterior rib cage that increases with inspiration and motion, and has been present since yesterday.  Patient states that she is trying to quit alcohol, but usually takes 1 or 2 drinks shots to avoid withdrawal tremors.      On 7/08/2024, patient had upper endoscopy that showed normal results.  She was having diarrhea and for this reason C. difficile testing was performed; toxin was positive but the antigen was negative.  He was treated with vancomycin oral for 10 days.  Diarrhea persisted.    Review of Systems   Otherwise complete ROS reviewed and negative except as mentioned in the HPI.    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    CTS (carpal tunnel syndrome) 2019    Dental disease     Depression     Diastolic dysfunction 2022    Dizziness     Emphysema of lung 2020    GERD (gastroesophageal reflux disease)     Headache     Hyperlipidemia     Hypertension     Hypothyroidism     Mixed simple and  mucopurulent chronic bronchitis 04/18/2023    Non-occlusive coronary artery disease 05/04/2023    NSTEMI (non-ST elevated myocardial infarction) (HCC)     Pneumonia 2022    Pulmonary emphysema 04/21/2023    Shingles 2018    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     Past Surgical History:  Past Surgical History:   Procedure Laterality Date    APPENDECTOMY      CARDIAC CATHETERIZATION N/A 11/04/2017    Procedure: Coronary angiography;  Surgeon: Luis Colvin MD;  Location: [facility] CATH INVASIVE LOCATION;  Service:     CARDIAC CATHETERIZATION N/A 05/31/2023    Procedure: Left Heart Cath;  Surgeon: Steffen Rossi MD;  Location: [facility] CATH INVASIVE LOCATION;  Service: Cardiology;  Laterality: N/A;    CARPAL TUNNEL RELEASE  2019    ENDOSCOPY N/A 7/8/2024    Procedure: ESOPHAGOGASTRODUODENOSCOPY WITH ANESTHESIA;  Surgeon: Gordy Wang MD;  Location: [facility] ENDOSCOPY;  Service: Gastroenterology;  Laterality: N/A;  pre: dysphagia.   post: esophagus dilated.   no PCP    HYSTERECTOMY      PANENDOSCOPY N/A 5/30/2024    Procedure: DIRECT LARYNGOSCOPY WITH BIOPSY OF VOCAL CORD POLYPS WITH POSSIBLE TRACHEOSTOMY;  Surgeon: Mendez Padilla MD;  Location: [facility] OR;  Service: ENT;  Laterality: N/A;    WI RT/LT HEART CATHETERS N/A 11/04/2017    Procedure: Percutaneous Coronary Intervention;  Surgeon: Luis Colvin MD;  Location: [facility] CATH INVASIVE LOCATION;  Service: Cardiovascular    THYROID SURGERY      TOTAL THYROIDECTOMY      TRACHEOSTOMY N/A 5/30/2024    Procedure: POSSIBLE TRACHEOSTOMY;  Surgeon: Mendez Padilla MD;  Location: [facility] OR;  Service: ENT;  Laterality: N/A;     Social History:  reports that she quit smoking about 4 months ago. Her smoking use included cigarettes. She started smoking about 33 years ago. She has a 30 pack-year smoking history. She has been exposed to tobacco smoke. She has never used smokeless tobacco. She reports current alcohol use. She reports that she does not use drugs.    Family  History: family history includes Asthma in her mother; Cancer in her father and mother; Emphysema in her mother; Heart failure in her father; Hypertension in her father.   Reviewed    Allergies:  No Known Allergies    Medications:  Prior to Admission medications    Medication Sig Start Date End Date Taking? Authorizing Provider   albuterol sulfate  (90 Base) MCG/ACT inhaler Inhale 2 puffs Every 4 (Four) Hours As Needed for Wheezing. 9/22/23   Sivan Wise APRN   carvedilol (COREG) 25 MG tablet Take 1 tablet by mouth 2 (Two) Times a Day. 5/11/23   Seema Hendricks APRN   cloNIDine (CATAPRES) 0.1 MG tablet Take 1 tablet by mouth As Needed for High Blood Pressure (for blood pressure >140/90).    Galdino Wei MD   dilTIAZem CD (CARDIZEM CD) 120 MG 24 hr capsule TAKE 1 CAPSULE BY MOUTH DAILY. 4/13/24   Steffen Rossi MD   FLUoxetine (PROzac) 20 MG capsule Take 1 capsule by mouth Daily. 11/6/23   Galdino Wei MD   ipratropium-albuterol (DUO-NEB) 0.5-2.5 mg/3 ml nebulizer Take 3 mL by nebulization Every 4 (Four) Hours As Needed for Wheezing. 4/3/23   Savage Calderon Jr., MD   lisinopril (PRINIVIL,ZESTRIL) 40 MG tablet Take 1 tablet by mouth Daily.    Galdino Wei MD   meclizine (ANTIVERT) 25 MG tablet Take 1 tablet by mouth 3 (Three) Times a Day As Needed for Dizziness. 3/10/23   Steffen Rossi MD   nitroglycerin (NITROSTAT) 0.4 MG SL tablet PLACE 1 TABLET UNDER THE TONGUE AS NEEDED FOR ANGINA, MAY REPEAT EVERY 5 MINUTES FOR UP THREE DOSES 11/6/23   Seema Hendricks APRN   pantoprazole (PROTONIX) 40 MG EC tablet Take 1 tablet by mouth 2 (Two) Times a Day. 7/8/24   Gordy Wang MD   POTASSIUM PO Take  by mouth Daily.    Galdino Wei MD   simvastatin (ZOCOR) 10 MG tablet Take 1 tablet by mouth Every Night. 11/6/23   Galdino Wei MD   Synthroid 125 MCG tablet Take 1 tablet by mouth Daily for 30 days. 5/28/24 7/8/24  Qing Ray APRN   topkevinmate  "(TOPAMAX) 50 MG tablet Take 1 tablet by mouth 2 (Two) Times a Day. 6/13/24   Domitila Duncan MD     I have utilized all available immediate resources to obtain, update, or review the patient's current medications (including all prescriptions, over-the-counter products, herbals, cannabis/cannabidiol products, and vitamin/mineral/dietary (nutritional) supplements).    Objective     Vital Signs: BP (!) 142/101   Pulse 96   Temp 98.1 °F (36.7 °C)   Resp 23   Ht 172.7 cm (68\")   Wt 78.9 kg (174 lb)   LMP  (LMP Unknown)   SpO2 93%   BMI 26.46 kg/m²   Physical Exam   Constitutional:       Appearance: Anxious, in distress due to pain, no respiratory distress, alert, oriented x 3.  HENT:      Head: Normocephalic and atraumatic.      Nose: Nose normal.      Mouth/Throat:      Mouth: Mucous membranes are moist.      Pharynx: Oropharynx is clear.   Eyes:      Extraocular Movements: Extraocular movements intact.      Conjunctiva/sclera: Conjunctivae normal.      Pupils: Pupils are equal, round, and reactive to light.   Cardiovascular:      Rate and Rhythm: Normal rate and regular rhythm.      Pulses: Normal pulses.   Pulmonary:      Effort: No respiratory distress.      Breath sounds: Normal breath sounds. No wheezing, rhonchi or rales.   Abdominal:      General: Abdomen is flat.  Tender to palpation in mesogastric area, with no peritoneal signs bowel sounds are normal.      Palpations: Abdomen is soft.      Tenderness: There is no guarding or rebound.   Musculoskeletal:         General: Normal range of motion.      Cervical back: Normal range of motion and neck supple.   Extremities:  No lower extremity edema.  Skin:     Capillary Refill: Capillary refill takes less than 2 seconds.      Coloration: Skin is not jaundiced.      Findings: No rash.   Neurological: No significant tremors.     General: No focal deficit present.      Mental Status: Patient is alert, oriented to place time and person.     Sensory: No sensory " deficit.      Motor: No weakness.      Coordination: Coordination normal.   Psychiatric:         Mood and Affect: Anxious     Behavior: Behavior normal.           Results Reviewed:  Lab Results (last 24 hours)       Procedure Component Value Units Date/Time    Magnesium [[id number]]  (Normal) Collected: 07/29/24 1334    Specimen: Blood Updated: 07/29/24 1520     Magnesium 1.6 mg/dL     Potassium [[id number]]  (Abnormal) Collected: 07/29/24 1334    Specimen: Blood Updated: 07/29/24 1356     Potassium 2.4 mmol/L      Comment: Slight hemolysis detected by analyzer. Result may be falsely elevated.       Protime-INR [[id number]]  (Normal) Collected: 07/29/24 1230    Specimen: Blood Updated: 07/29/24 1330     Protime 12.7 Seconds      INR 0.91    Magnesium [[id number]]  (Normal) Collected: 07/29/24 1230    Specimen: Blood Updated: 07/29/24 1328     Magnesium 1.6 mg/dL     Comprehensive Metabolic Panel [[id number]]  (Abnormal) Collected: 07/29/24 1230    Specimen: Blood Updated: 07/29/24 1318     Glucose 102 mg/dL      BUN 3 mg/dL      Creatinine 0.45 mg/dL      Sodium 137 mmol/L      Potassium 2.3 mmol/L      Chloride 92 mmol/L      CO2 25.0 mmol/L      Calcium 8.5 mg/dL      Total Protein 6.8 g/dL      Albumin 3.4 g/dL      ALT (SGPT) 43 U/L      AST (SGOT) 125 U/L      Alkaline Phosphatase 621 U/L      Total Bilirubin 0.9 mg/dL      Globulin 3.4 gm/dL      A/G Ratio 1.0 g/dL      BUN/Creatinine Ratio 6.7     Anion Gap 20.0 mmol/L      eGFR 116.6 mL/min/1.73     Narrative:      GFR Normal >60  Chronic Kidney Disease <60  Kidney Failure <15      Procalcitonin [[id number]]  (Abnormal) Collected: 07/29/24 1230    Specimen: Blood Updated: 07/29/24 1315     Procalcitonin 36.34 ng/mL     Narrative:      As a Marker for Sepsis (Non-Neonates):    1. <0.5 ng/mL represents a low risk of severe sepsis and/or septic shock.  2. >2 ng/mL represents a high risk of severe sepsis and/or septic shock.    As a Marker for Lower Respiratory Tract  "Infections that require antibiotic therapy:    PCT on Admission    Antibiotic Therapy       6-12 Hrs later    >0.5                Strongly Recommended  >0.25 - <0.5        Recommended   0.1 - 0.25          Discouraged              Remeasure/reassess PCT  <0.1                Strongly Discouraged     Remeasure/reassess PCT    As 28 day mortality risk marker: \"Change in Procalcitonin Result\" (>80% or <=80%) if Day 0 (or Day 1) and Day 4 values are available. Refer to http://www.[facility]-pct-calculator.com    Change in PCT <=80%  A decrease of PCT levels below or equal to 80% defines a positive change in PCT test result representing a higher risk for 28-day all-cause mortality of patients diagnosed with severe sepsis for septic shock.    Change in PCT >80%  A decrease of PCT levels of more than 80% defines a negative change in PCT result representing a lower risk for 28-day all-cause mortality of patients diagnosed with severe sepsis or septic shock.       Lactic Acid, Plasma [[id number]]  (Abnormal) Collected: 07/29/24 1230    Specimen: Blood Updated: 07/29/24 1310     Lactate 7.4 mmol/L     Lipase [[id number]]  (Normal) Collected: 07/29/24 1230    Specimen: Blood Updated: 07/29/24 1305     Lipase 37 U/L     Urinalysis With Culture If Indicated - Urine, Clean Catch [[id number]]  (Abnormal) Collected: 07/29/24 1246    Specimen: Urine, Clean Catch Updated: 07/29/24 1258     Color, UA Dark Yellow     Appearance, UA Clear     pH, UA 6.5     Specific Gravity, UA 1.010     Glucose, UA Negative     Ketones, UA Negative     Bilirubin, UA Negative     Blood, UA Negative     Protein, UA Negative     Leuk Esterase, UA Negative     Nitrite, UA Negative     Urobilinogen, UA 0.2 E.U./dL    Narrative:      In absence of clinical symptoms, the presence of pyuria, bacteria, and/or nitrites on the urinalysis result does not correlate with infection.  Urine microscopic not indicated.    Blood Culture - Blood, Arm, Right [[id number]] Collected: " 07/29/24 1230    Specimen: Blood from Arm, Right Updated: 07/29/24 1248    Blood Culture - Blood, Arm, Left [[id number]] Collected: 07/29/24 1230    Specimen: Blood from Arm, Left Updated: 07/29/24 1247    CBC & Differential [[id number]]  (Abnormal) Collected: 07/29/24 1230    Specimen: Blood Updated: 07/29/24 1246    Narrative:      The following orders were created for panel order CBC & Differential.  Procedure                               Abnormality         Status                     ---------                               -----------         ------                     CBC Auto Differential[[id number]]        Abnormal            Final result                 Please view results for these tests on the individual orders.    CBC Auto Differential [[id number]]  (Abnormal) Collected: 07/29/24 1230    Specimen: Blood Updated: 07/29/24 1246     WBC 8.37 10*3/mm3      RBC 3.54 10*6/mm3      Hemoglobin 12.5 g/dL      Hematocrit 36.7 %      .7 fL      MCH 35.3 pg      MCHC 34.1 g/dL      RDW 15.4 %      RDW-SD 59.6 fl      MPV 10.3 fL      Platelets 285 10*3/mm3      Neutrophil % 56.6 %      Lymphocyte % 28.1 %      Monocyte % 11.2 %      Eosinophil % 1.8 %      Basophil % 1.1 %      Immature Grans % 1.2 %      Neutrophils, Absolute 4.74 10*3/mm3      Lymphocytes, Absolute 2.35 10*3/mm3      Monocytes, Absolute 0.94 10*3/mm3      Eosinophils, Absolute 0.15 10*3/mm3      Basophils, Absolute 0.09 10*3/mm3      Immature Grans, Absolute 0.10 10*3/mm3      nRBC 0.0 /100 WBC           Imaging Results (Last 24 Hours)       Procedure Component Value Units Date/Time    CT Angiogram Abdomen Pelvis [[id number]] Collected: 07/29/24 1410     Updated: 07/29/24 1434    Narrative:      EXAMINATION: CT ANGIOGRAM ABDOMEN PELVIS-      7/29/2024 12:24 PM     HISTORY: ab pain elevated lactic acid     In order to have a CT radiation dose as low as reasonably achievable  Automated Exposure Control was utilized for adjustment of the mA  and/or  KV according to patient size.     Total DLP = 919.59 mGy.cm     The CT angiography of the abdominal pelvis is performed before and after  intravenous contrast enhancement.     The images are acquired in axial plane and subsequent 2D reconstruction  in coronal and sagittal planes and 3D maximum intensity projection image  reconstruction.     Comparison is made with the previous study dated 2/26/2024.     There are atheromatous changes of the abdominal aorta iliac and femoral  arteries.     There is no aneurysmal dilatation of the abdominal aorta or iliac  arteries.     A mixed plaque is seen at the origin of the celiac trunk with less than  50% diameter stenosis. There is subsequent normal branches.     There is normal origin course and caliber of the superior mesenteric  artery. Normal branches.     Small calcific plaques are seen in the proximal renal arteries  bilaterally. No flow-limiting stenosis. No aneurysmal dilatation. There  is an accessory small left renal artery arising below the level of the  main renal artery.     There is high-grade stenosis at the origin of the inferior mesenteric  artery from the left anterolateral margin of the distal abdominal aorta.  A small and attenuated inferior mesenteric artery is opacified with a  subsequent small and diminutive left colonic and superior rectal  arteries.     There are calcific plaques throughout the course of both common,  internal and external iliac arteries. No flow-limiting stenosis.     Normal size common femoral artery is seen dividing into normal appearing  superficial and deep femoral arteries.     There is no evidence of contrast extravasation seen in the stomach,  small and large bowel.     The lung bases included in the study appear unremarkable.     Limited visualized cardiomediastinal structures show atheromatous change  in the coronary arteries. No significant cardiomegaly.     There is severe fatty infiltration of the liver. There is  moderate  hepatomegaly. The liver measures 21.5 cm in craniocaudal dimension. No  focal intrinsic abnormality. The spleen is normal.     No radiopaque gallstones.     Pancreas is normal.     Moderate lobulation of the left adrenal gland. No discrete mass. Right  adrenal gland is normal.     Mild lobulation of the renal contour bilaterally. No mass. No calculi.  No hydronephrosis. Both ureters appear normal and nondilated. The  urinary bladder is poorly distended with moderate wall thickening. No  visible intrinsic abnormality.     There are small fat-containing femoral hernias bilaterally.     There is evidence of prior ventral hernia repair surgery.     The stomach is poorly distended. No focal abnormality. Duodenum is  normal. Fluid-filled nondilated nondistended small bowel is seen.  Moderate diffuse thickening of the wall and edema of the mucosa of the  entire colon most pronounced in the proximal colon including the  ascending and transverse colon. Distal colon is significantly  decompressed with moderate wall thickening. There is no significant  surrounding peritoneal fat infiltration.     No evidence of abdominal or pelvic lymphadenopathy.     Images reviewed in bone window show chronic degenerative changes of the  lumbar and limited visualized thoracic spine. Severe degeneration of  disc L5-S1.       Impression:      1. Acute or subacute nonspecific colitis.  2. Severe hepatic steatosis and hepatomegaly.  3. The remaining abdominal pelvis is unremarkable similar to the  previous study.     This report was signed and finalized on 7/29/2024 2:31 PM by Dr. Karmen Teague MD.             I have personally reviewed and interpreted the radiology studies and ECG obtained at time of admission.     Assessment / Plan   Assessment:   Active Hospital Problems    Diagnosis     **Pancolitis        Acute to subacute pancolitis  Questionable C. difficile colitis, possible recurrence?  Hypertensive urgency  Thoracic  pain  Alcohol abuse disorder at risk for withdrawal  Anxiety disorder  COPD  Former smoker  Macrocytosis, likely from alcohol use        Lactate 7.4.  Lipase 37.  Hemoglobin 12.5, white blood cell count 8370, hematocrit 36.7.  .7  Platelet count 285,000    Potassium 2.3.  Repeat value 2.4.    Creatinine 0.45, BUN 20.    Glucose 102.    Magnesium 1.6.      Alkaline phosphatase elevated 621.  Bilirubin normal.  Albumin 3.4.  , ALT 43.      CT scan report  1. Acute or subacute nonspecific colitis.  2. Severe hepatic steatosis and hepatomegaly.  3. The remaining abdominal pelvis is unremarkable similar to the  previous study.    Treatment Plan  The patient will be admitted to my service here at [facility].      Ordered to replace potassium 40 mill equivalents p.o. every 2 hours x 3 doses.  IV fluids, normal saline with 20 meq  KCl at 100 mL/h.  Magnesium sulfate 2 g IV 1 dose.    Due to thoracic pain, will order chest x-ray now.    Elevated alkaline phosphatase above 600, AST/ALT consistent with alcohol abuse.  Will order liver ultrasound.  Ordered PT/INR.    Regarding colitis, Flagyl IV.  Will consult infectious disease specialist for input on management of pancolitis.  Patient received 10 days of vancomycin p.o. for C. difficile toxin positive/antigen negative test.  Continue IV vancomycin.    Thiamine, folic acid, multivitamins, Librium taper, Ativan IV as needed for withdrawal signs or symptoms.  CIWA protocol.    For blood pressure management, may be a component of withdrawal associated.  Gave Ativan per protocol, labetalol IV 10 mg.  Clonidine will be added to her regimen as needed.  Restart antihypertensive medications.  Patient stated she vomited and is unsure if medications were taken properly.    SCDs for DVT prophylaxis.      Medical Decision Making  Number and Complexity of problems: 7, moderate complexity  Differential Diagnosis: See above    Conditions and Status        Condition  is unchanged.     [facility] Data  External documents reviewed: No  Cardiac tracing (EKG, telemetry) interpretation: Sinus rhythm  Radiology interpretation: Radiology reports reviewed  Labs reviewed: Yes  Any tests that were considered but not ordered: No     Decision rules/scores evaluated (example [id number], Wells, etc): None     Discussed with: Patient     Care Planning  Shared decision making: With patient  Code status and discussions: Full code    Disposition  Social Determinants of Health that impact treatment or disposition: Alcohol use  Estimated length of stay is 2 days.     I confirmed that the patient's advanced care plan is present, code status is documented, and a surrogate decision maker is listed in the patient's medical record.     The patient's surrogate decision maker is family member.     The patient was seen and examined by me on 7/29/2024 at 3:10 PM.    Electronically signed by Jc Murillo MD, 07/29/24, 15:38 CDT.

## 2024-07-29 NOTE — ED PROVIDER NOTES
Subjective   History of Present Illness    Patient is a pleasant 49-year-old female chief complaint of worsening abdominal pain with nausea, vomiting, diarrhea and now fever.  The patient describes that she has been symptomatic for at least 1 year.  She has had chronic diarrhea, abdominal pain with nausea and vomiting.  She sought medical attention with a gastroenterologist as of late and had a stool study completed.  She sees Dr. Wang's group.  Upon her stool study results, her C. difficile toxin is positive but her C. difficile antigen is negative.  Ova and parasites are negative.  She did receive a round of vancomycin orally which she completed about 10 days ago.  Patient reports her symptoms have worsened with increased amounts of vomiting and diarrhea.  In the past 5 days, she has had fever measured at 102 in the wee hours of the morning.  She last taken her temperature this morning.  She had taken acetaminophen to help.  She reports abdominal pain is severe.  With her worsening symptoms, she came to the ER to be further evaluated.  Patient complains that her urine looks like tea and she is concerned she is dehydrated.  She has had new onset bilateral flank pain as well.    Review of Systems   Constitutional:  Positive for activity change and fever.   HENT: Negative.     Respiratory: Negative.     Cardiovascular: Negative.    Gastrointestinal:  Positive for abdominal pain.   Genitourinary: Negative.    Neurological: Negative.    Psychiatric/Behavioral: Negative.     All other systems reviewed and are negative.      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    CTS (carpal tunnel syndrome) 2019    Dental disease     Depression     Diastolic dysfunction 2022    Dizziness     Emphysema of lung 2020    GERD  (gastroesophageal reflux disease)     Headache     Hyperlipidemia     Hypertension     Hypothyroidism     Mixed simple and mucopurulent chronic bronchitis 04/18/2023    Non-occlusive coronary artery disease 05/04/2023    NSTEMI (non-ST elevated myocardial infarction) (HCC)     Pneumonia 2022    Pulmonary emphysema 04/21/2023    Shingles 2018    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       No Known Allergies    Past Surgical History:   Procedure Laterality Date    APPENDECTOMY      CARDIAC CATHETERIZATION N/A 11/04/2017    Procedure: Coronary angiography;  Surgeon: Luis Colvin MD;  Location:  [facility] CATH INVASIVE LOCATION;  Service:     CARDIAC CATHETERIZATION N/A 05/31/2023    Procedure: Left Heart Cath;  Surgeon: Steffen Rossi MD;  Location: [facility] CATH INVASIVE LOCATION;  Service: Cardiology;  Laterality: N/A;    CARPAL TUNNEL RELEASE  2019    ENDOSCOPY N/A 7/8/2024    Procedure: ESOPHAGOGASTRODUODENOSCOPY WITH ANESTHESIA;  Surgeon: Gordy Wang MD;  Location: [facility] ENDOSCOPY;  Service: Gastroenterology;  Laterality: N/A;  pre: dysphagia.   post: esophagus dilated.   no PCP    HYSTERECTOMY      PANENDOSCOPY N/A 5/30/2024    Procedure: DIRECT LARYNGOSCOPY WITH BIOPSY OF VOCAL CORD POLYPS WITH POSSIBLE TRACHEOSTOMY;  Surgeon: Mendez Padilla MD;  Location: [facility] OR;  Service: ENT;  Laterality: N/A;    HI RT/LT HEART CATHETERS N/A 11/04/2017    Procedure: Percutaneous Coronary Intervention;  Surgeon: Luis Colvin MD;  Location: [facility] CATH INVASIVE LOCATION;  Service: Cardiovascular    THYROID SURGERY      TOTAL THYROIDECTOMY      TRACHEOSTOMY N/A 5/30/2024    Procedure: POSSIBLE TRACHEOSTOMY;  Surgeon: Mendez Padilla MD;  Location: [facility] OR;  Service: ENT;  Laterality: N/A;       Family History   Problem Relation Age of Onset    Asthma Mother     Cancer Mother     Emphysema Mother     Cancer Father     Heart failure Father     Hypertension Father        Social History     Socioeconomic History     Marital status:    Tobacco Use    Smoking status: Former     Current packs/day: 0.00     Average packs/day: 0.7 packs/day for 45.9 years (30.0 ttl pk-yrs)     Types: Cigarettes     Start date: 1991     Quit date: 3/1/2024     Years since quittin.4     Passive exposure: Past    Smokeless tobacco: Never    Tobacco comments:     Less than 1/2 pack a day    Vaping Use    Vaping status: Never Used   Substance and Sexual Activity    Alcohol use: Yes     Comment: daily    Drug use: No    Sexual activity: Not Currently     Partners: Male     Birth control/protection: Hysterectomy       Prior to Admission medications    Medication Sig Start Date End Date Taking? Authorizing Provider   albuterol sulfate  (90 Base) MCG/ACT inhaler Inhale 2 puffs Every 4 (Four) Hours As Needed for Wheezing. 23   Sivan Wise APRN   carvedilol (COREG) 25 MG tablet Take 1 tablet by mouth 2 (Two) Times a Day. 23   Seema Hendricks APRN   cloNIDine (CATAPRES) 0.1 MG tablet Take 1 tablet by mouth As Needed for High Blood Pressure (for blood pressure >140/90).    Galdino Wei MD   dilTIAZem CD (CARDIZEM CD) 120 MG 24 hr capsule TAKE 1 CAPSULE BY MOUTH DAILY. 24   Steffen Rossi MD   FLUoxetine (PROzac) 20 MG capsule Take 1 capsule by mouth Daily. 23   Galdino Wei MD   ipratropium-albuterol (DUO-NEB) 0.5-2.5 mg/3 ml nebulizer Take 3 mL by nebulization Every 4 (Four) Hours As Needed for Wheezing. 4/3/23   Savage Calderon Jr., MD   lisinopril (PRINIVIL,ZESTRIL) 40 MG tablet Take 1 tablet by mouth Daily.    Galdino Wei MD   meclizine (ANTIVERT) 25 MG tablet Take 1 tablet by mouth 3 (Three) Times a Day As Needed for Dizziness. 3/10/23   Steffen Rossi MD   nitroglycerin (NITROSTAT) 0.4 MG SL tablet PLACE 1 TABLET UNDER THE TONGUE AS NEEDED FOR ANGINA, MAY REPEAT EVERY 5 MINUTES FOR UP THREE DOSES 23   Seema Hendricks APRN   pantoprazole (PROTONIX) 40 MG EC  "tablet Take 1 tablet by mouth 2 (Two) Times a Day. 7/8/24   Gordy Wang MD   POTASSIUM PO Take  by mouth Daily.    ProviderGaldino MD   simvastatin (ZOCOR) 10 MG tablet Take 1 tablet by mouth Every Night. 11/6/23   ProviderGaldino MD   Synthroid 125 MCG tablet Take 1 tablet by mouth Daily for 30 days. 5/28/24 7/8/24  Qing Ray APRN   topiramate (TOPAMAX) 50 MG tablet Take 1 tablet by mouth 2 (Two) Times a Day. 6/13/24   Domitila Duncan MD       Medications   sodium chloride 0.9 % flush 10 mL (has no administration in time range)   Potassium Replacement - Follow Nurse / BPA Driven Protocol (has no administration in time range)   vancomycin IVPB 1500 mg in 0.9% NaCl (Premix) 500 mL (has no administration in time range)   morphine injection 4 mg (4 mg Intravenous Given 7/29/24 1225)   ondansetron (ZOFRAN) injection 4 mg (4 mg Intravenous Given 7/29/24 1225)   labetalol (NORMODYNE,TRANDATE) injection 10 mg (10 mg Intravenous Given 7/29/24 1327)   piperacillin-tazobactam (ZOSYN) 3.375 g IVPB in 100 mL NS MBP (CD) (0 g Intravenous Stopped 7/29/24 1451)   sepsis fluid LR bolus 2,367 mL (2,367 mL Intravenous New Bag 7/29/24 1328)   iopamidol (ISOVUE-370) 76 % injection 100 mL (100 mL Intravenous Given 7/29/24 1349)   HYDROmorphone (DILAUDID) injection 1 mg (1 mg Intravenous Given 7/29/24 1451)       BP (!) 142/101   Pulse 96   Temp 98.1 °F (36.7 °C)   Resp 23   Ht 172.7 cm (68\")   Wt 78.9 kg (174 lb)   LMP  (LMP Unknown)   SpO2 93%   BMI 26.46 kg/m²       Objective   Physical Exam  Vitals and nursing note reviewed.   Constitutional:       General: She is not in acute distress.     Appearance: She is well-developed. She is not diaphoretic.   HENT:      Head: Normocephalic and atraumatic.      Mouth/Throat:      Mouth: Mucous membranes are moist.   Eyes:      Extraocular Movements: Extraocular movements intact.      Conjunctiva/sclera: Conjunctivae normal.      Pupils: Pupils are equal, round, " and reactive to light.   Neck:      Trachea: No tracheal deviation.   Cardiovascular:      Rate and Rhythm: Regular rhythm. Tachycardia present.      Heart sounds: Normal heart sounds. No murmur heard.  Pulmonary:      Effort: Pulmonary effort is normal.      Breath sounds: Normal breath sounds.   Abdominal:      General: A surgical scar is present. Bowel sounds are normal. There is no distension.      Palpations: Abdomen is soft. There is no mass.      Tenderness: There is generalized no abdominal tenderness. There is no guarding or rebound.   Musculoskeletal:         General: Normal range of motion.      Cervical back: Normal range of motion and neck supple.   Skin:     General: Skin is warm and dry.   Neurological:      Mental Status: She is alert and oriented to person, place, and time.   Psychiatric:         Mood and Affect: Mood is anxious. Affect is tearful.         Behavior: Behavior normal.         Thought Content: Thought content normal.         Judgment: Judgment normal.         Procedures         Lab Results (last 24 hours)       Procedure Component Value Units Date/Time    CBC & Differential [[id number]]  (Abnormal) Collected: 07/29/24 1230    Specimen: Blood Updated: 07/29/24 1246    Narrative:      The following orders were created for panel order CBC & Differential.  Procedure                               Abnormality         Status                     ---------                               -----------         ------                     CBC Auto Differential[[id number]]        Abnormal            Final result                 Please view results for these tests on the individual orders.    Comprehensive Metabolic Panel [[id number]]  (Abnormal) Collected: 07/29/24 1230    Specimen: Blood Updated: 07/29/24 1318     Glucose 102 mg/dL      BUN 3 mg/dL      Creatinine 0.45 mg/dL      Sodium 137 mmol/L      Potassium 2.3 mmol/L      Chloride 92 mmol/L      CO2 25.0 mmol/L      Calcium 8.5 mg/dL      Total  "Protein 6.8 g/dL      Albumin 3.4 g/dL      ALT (SGPT) 43 U/L      AST (SGOT) 125 U/L      Alkaline Phosphatase 621 U/L      Total Bilirubin 0.9 mg/dL      Globulin 3.4 gm/dL      A/G Ratio 1.0 g/dL      BUN/Creatinine Ratio 6.7     Anion Gap 20.0 mmol/L      eGFR 116.6 mL/min/1.73     Narrative:      GFR Normal >60  Chronic Kidney Disease <60  Kidney Failure <15      Lipase [[id number]]  (Normal) Collected: 07/29/24 1230    Specimen: Blood Updated: 07/29/24 1305     Lipase 37 U/L     Procalcitonin [[id number]]  (Abnormal) Collected: 07/29/24 1230    Specimen: Blood Updated: 07/29/24 1315     Procalcitonin 36.34 ng/mL     Narrative:      As a Marker for Sepsis (Non-Neonates):    1. <0.5 ng/mL represents a low risk of severe sepsis and/or septic shock.  2. >2 ng/mL represents a high risk of severe sepsis and/or septic shock.    As a Marker for Lower Respiratory Tract Infections that require antibiotic therapy:    PCT on Admission    Antibiotic Therapy       6-12 Hrs later    >0.5                Strongly Recommended  >0.25 - <0.5        Recommended   0.1 - 0.25          Discouraged              Remeasure/reassess PCT  <0.1                Strongly Discouraged     Remeasure/reassess PCT    As 28 day mortality risk marker: \"Change in Procalcitonin Result\" (>80% or <=80%) if Day 0 (or Day 1) and Day 4 values are available. Refer to http://www.bras-pct-calculator.com    Change in PCT <=80%  A decrease of PCT levels below or equal to 80% defines a positive change in PCT test result representing a higher risk for 28-day all-cause mortality of patients diagnosed with severe sepsis for septic shock.    Change in PCT >80%  A decrease of PCT levels of more than 80% defines a negative change in PCT result representing a lower risk for 28-day all-cause mortality of patients diagnosed with severe sepsis or septic shock.       Lactic Acid, Plasma [[id number]]  (Abnormal) Collected: 07/29/24 1230    Specimen: Blood Updated: 07/29/24 " 1310     Lactate 7.4 mmol/L     Blood Culture - Blood, Arm, Right [[id number]] Collected: 07/29/24 1230    Specimen: Blood from Arm, Right Updated: 07/29/24 1248    Blood Culture - Blood, Arm, Left [[id number]] Collected: 07/29/24 1230    Specimen: Blood from Arm, Left Updated: 07/29/24 1247    CBC Auto Differential [[id number]]  (Abnormal) Collected: 07/29/24 1230    Specimen: Blood Updated: 07/29/24 1246     WBC 8.37 10*3/mm3      RBC 3.54 10*6/mm3      Hemoglobin 12.5 g/dL      Hematocrit 36.7 %      .7 fL      MCH 35.3 pg      MCHC 34.1 g/dL      RDW 15.4 %      RDW-SD 59.6 fl      MPV 10.3 fL      Platelets 285 10*3/mm3      Neutrophil % 56.6 %      Lymphocyte % 28.1 %      Monocyte % 11.2 %      Eosinophil % 1.8 %      Basophil % 1.1 %      Immature Grans % 1.2 %      Neutrophils, Absolute 4.74 10*3/mm3      Lymphocytes, Absolute 2.35 10*3/mm3      Monocytes, Absolute 0.94 10*3/mm3      Eosinophils, Absolute 0.15 10*3/mm3      Basophils, Absolute 0.09 10*3/mm3      Immature Grans, Absolute 0.10 10*3/mm3      nRBC 0.0 /100 WBC     Magnesium [[id number]]  (Normal) Collected: 07/29/24 1230    Specimen: Blood Updated: 07/29/24 1328     Magnesium 1.6 mg/dL     Protime-INR [[id number]]  (Normal) Collected: 07/29/24 1230    Specimen: Blood Updated: 07/29/24 1330     Protime 12.7 Seconds      INR 0.91    Urinalysis With Culture If Indicated - Urine, Clean Catch [[id number]]  (Abnormal) Collected: 07/29/24 1246    Specimen: Urine, Clean Catch Updated: 07/29/24 1258     Color, UA Dark Yellow     Appearance, UA Clear     pH, UA 6.5     Specific Gravity, UA 1.010     Glucose, UA Negative     Ketones, UA Negative     Bilirubin, UA Negative     Blood, UA Negative     Protein, UA Negative     Leuk Esterase, UA Negative     Nitrite, UA Negative     Urobilinogen, UA 0.2 E.U./dL    Narrative:      In absence of clinical symptoms, the presence of pyuria, bacteria, and/or nitrites on the urinalysis result does not correlate with  infection.  Urine microscopic not indicated.    Potassium [[id number]]  (Abnormal) Collected: 07/29/24 1334    Specimen: Blood Updated: 07/29/24 1356     Potassium 2.4 mmol/L      Comment: Slight hemolysis detected by analyzer. Result may be falsely elevated.               CT Angiogram Abdomen Pelvis    Result Date: 7/29/2024  Narrative: EXAMINATION: CT ANGIOGRAM ABDOMEN PELVIS-   7/29/2024 12:24 PM  HISTORY: ab pain elevated lactic acid  In order to have a CT radiation dose as low as reasonably achievable Automated Exposure Control was utilized for adjustment of the mA and/or KV according to patient size.  Total DLP = 919.59 mGy.cm  The CT angiography of the abdominal pelvis is performed before and after intravenous contrast enhancement.  The images are acquired in axial plane and subsequent 2D reconstruction in coronal and sagittal planes and 3D maximum intensity projection image reconstruction.  Comparison is made with the previous study dated 2/26/2024.  There are atheromatous changes of the abdominal aorta iliac and femoral arteries.  There is no aneurysmal dilatation of the abdominal aorta or iliac arteries.  A mixed plaque is seen at the origin of the celiac trunk with less than 50% diameter stenosis. There is subsequent normal branches.  There is normal origin course and caliber of the superior mesenteric artery. Normal branches.  Small calcific plaques are seen in the proximal renal arteries bilaterally. No flow-limiting stenosis. No aneurysmal dilatation. There is an accessory small left renal artery arising below the level of the main renal artery.  There is high-grade stenosis at the origin of the inferior mesenteric artery from the left anterolateral margin of the distal abdominal aorta. A small and attenuated inferior mesenteric artery is opacified with a subsequent small and diminutive left colonic and superior rectal arteries.  There are calcific plaques throughout the course of both common, internal  and external iliac arteries. No flow-limiting stenosis.  Normal size common femoral artery is seen dividing into normal appearing superficial and deep femoral arteries.  There is no evidence of contrast extravasation seen in the stomach, small and large bowel.  The lung bases included in the study appear unremarkable.  Limited visualized cardiomediastinal structures show atheromatous change in the coronary arteries. No significant cardiomegaly.  There is severe fatty infiltration of the liver. There is moderate hepatomegaly. The liver measures 21.5 cm in craniocaudal dimension. No focal intrinsic abnormality. The spleen is normal.  No radiopaque gallstones.  Pancreas is normal.  Moderate lobulation of the left adrenal gland. No discrete mass. Right adrenal gland is normal.  Mild lobulation of the renal contour bilaterally. No mass. No calculi. No hydronephrosis. Both ureters appear normal and nondilated. The urinary bladder is poorly distended with moderate wall thickening. No visible intrinsic abnormality.  There are small fat-containing femoral hernias bilaterally.  There is evidence of prior ventral hernia repair surgery.  The stomach is poorly distended. No focal abnormality. Duodenum is normal. Fluid-filled nondilated nondistended small bowel is seen. Moderate diffuse thickening of the wall and edema of the mucosa of the entire colon most pronounced in the proximal colon including the ascending and transverse colon. Distal colon is significantly decompressed with moderate wall thickening. There is no significant surrounding peritoneal fat infiltration.  No evidence of abdominal or pelvic lymphadenopathy.  Images reviewed in bone window show chronic degenerative changes of the lumbar and limited visualized thoracic spine. Severe degeneration of disc L5-S1.      Impression: 1. Acute or subacute nonspecific colitis. 2. Severe hepatic steatosis and hepatomegaly. 3. The remaining abdominal pelvis is unremarkable  similar to the previous study.  This report was signed and finalized on 7/29/2024 2:31 PM by Dr. Karmen Teague MD.       ED Course  ED Course as of 07/29/24 1454   Mon Jul 29, 2024   4314 I did speak with Dr. White, hospitalist on-call, who is gracious toadmit the patient under their services. [TK]      ED Course User Index  [TK] Humza Villanueva PA          [facility]      Final diagnoses:   Colitis   Diarrhea, unspecified type   Hypokalemia   Hepatomegaly       Disposition: Patient will be admitted under hospitalists' services.     Humza Villanueva PA  07/29/24 1459

## 2024-07-29 NOTE — TELEPHONE ENCOUNTER
I spoke with her this morning. She is still feeling really bad. Had been running temperature, diarrhea, n/v. Reports feeling so bad than she can't get to the Emergency Room. I recommended that she call an ambulance if she feels that bad.  I told her that she needs to be evaluated at the Emergency Room. She most likely could be dehydrated. She did tell me last week she was going to the Emergency Room. I had called her after that to check on her and left message for her to call us back. She agrees to got to emergency room today. I have asked her to call us back with progress after emergency room visit.

## 2024-07-30 ENCOUNTER — APPOINTMENT (OUTPATIENT)
Dept: GENERAL RADIOLOGY | Facility: HOSPITAL | Age: 49
DRG: 392 | End: 2024-07-30
Payer: COMMERCIAL

## 2024-07-30 LAB
ADV 40+41 DNA STL QL NAA+NON-PROBE: NOT DETECTED
ALBUMIN SERPL-MCNC: 2.8 G/DL (ref 3.5–5.2)
ALBUMIN/GLOB SERPL: 1.2 G/DL
ALP SERPL-CCNC: 473 U/L (ref 39–117)
ALT SERPL W P-5'-P-CCNC: 33 U/L (ref 1–33)
ANION GAP SERPL CALCULATED.3IONS-SCNC: 12 MMOL/L (ref 5–15)
ANISOCYTOSIS BLD QL: NORMAL
AST SERPL-CCNC: 120 U/L (ref 1–32)
ASTRO TYP 1-8 RNA STL QL NAA+NON-PROBE: NOT DETECTED
BASOPHILS # BLD MANUAL: 0 10*3/MM3 (ref 0–0.2)
BASOPHILS NFR BLD MANUAL: 0 % (ref 0–1.5)
BILIRUB SERPL-MCNC: 0.8 MG/DL (ref 0–1.2)
BUN SERPL-MCNC: 4 MG/DL (ref 6–20)
BUN/CREAT SERPL: 9.3 (ref 7–25)
C CAYETANENSIS DNA STL QL NAA+NON-PROBE: NOT DETECTED
C COLI+JEJ+UPSA DNA STL QL NAA+NON-PROBE: NOT DETECTED
C DIFF GDH + TOXINS A+B STL QL IA.RAPID: NEGATIVE
C DIFF TOX GENS STL QL NAA+PROBE: POSITIVE
CALCIUM SPEC-SCNC: 7.5 MG/DL (ref 8.6–10.5)
CHLORIDE SERPL-SCNC: 101 MMOL/L (ref 98–107)
CO2 SERPL-SCNC: 27 MMOL/L (ref 22–29)
CREAT SERPL-MCNC: 0.43 MG/DL (ref 0.57–1)
CRYPTOSP DNA STL QL NAA+NON-PROBE: NOT DETECTED
D-LACTATE SERPL-SCNC: 3.1 MMOL/L (ref 0.5–2)
D-LACTATE SERPL-SCNC: 3.5 MMOL/L (ref 0.5–2)
DEPRECATED RDW RBC AUTO: 63.1 FL (ref 37–54)
E HISTOLYT DNA STL QL NAA+NON-PROBE: NOT DETECTED
EAEC PAA PLAS AGGR+AATA ST NAA+NON-PRB: NOT DETECTED
EC STX1+STX2 GENES STL QL NAA+NON-PROBE: NOT DETECTED
EGFRCR SERPLBLD CKD-EPI 2021: 117.9 ML/MIN/1.73
EOSINOPHIL # BLD MANUAL: 0.24 10*3/MM3 (ref 0–0.4)
EOSINOPHIL NFR BLD MANUAL: 3 % (ref 0.3–6.2)
EPEC EAE GENE STL QL NAA+NON-PROBE: DETECTED
ERYTHROCYTE [DISTWIDTH] IN BLOOD BY AUTOMATED COUNT: 16 % (ref 12.3–15.4)
ETEC LTA+ST1A+ST1B TOX ST NAA+NON-PROBE: NOT DETECTED
G LAMBLIA DNA STL QL NAA+NON-PROBE: NOT DETECTED
GLOBULIN UR ELPH-MCNC: 2.4 GM/DL
GLUCOSE SERPL-MCNC: 103 MG/DL (ref 65–99)
HCT VFR BLD AUTO: 29.7 % (ref 34–46.6)
HGB BLD-MCNC: 9.7 G/DL (ref 12–15.9)
INR PPP: 1.02 (ref 0.91–1.09)
LYMPHOCYTES # BLD MANUAL: 2.98 10*3/MM3 (ref 0.7–3.1)
LYMPHOCYTES NFR BLD MANUAL: 11 % (ref 5–12)
MACROCYTES BLD QL SMEAR: NORMAL
MAGNESIUM SERPL-MCNC: 2 MG/DL (ref 1.6–2.6)
MCH RBC QN AUTO: 34.9 PG (ref 26.6–33)
MCHC RBC AUTO-ENTMCNC: 32.7 G/DL (ref 31.5–35.7)
MCV RBC AUTO: 106.8 FL (ref 79–97)
MONOCYTES # BLD: 0.89 10*3/MM3 (ref 0.1–0.9)
NEUTROPHILS # BLD AUTO: 3.95 10*3/MM3 (ref 1.7–7)
NEUTROPHILS NFR BLD MANUAL: 48 % (ref 42.7–76)
NEUTS BAND NFR BLD MANUAL: 1 % (ref 0–5)
NOROVIRUS GI+II RNA STL QL NAA+NON-PROBE: NOT DETECTED
P SHIGELLOIDES DNA STL QL NAA+NON-PROBE: NOT DETECTED
PLAT MORPH BLD: NORMAL
PLATELET # BLD AUTO: 217 10*3/MM3 (ref 140–450)
PMV BLD AUTO: 10.4 FL (ref 6–12)
POIKILOCYTOSIS BLD QL SMEAR: NORMAL
POTASSIUM SERPL-SCNC: 3.1 MMOL/L (ref 3.5–5.2)
POTASSIUM SERPL-SCNC: 3.6 MMOL/L (ref 3.5–5.2)
PROT SERPL-MCNC: 5.2 G/DL (ref 6–8.5)
PROTHROMBIN TIME: 13.8 SECONDS (ref 11.8–14.8)
QT INTERVAL: 406 MS
QTC INTERVAL: 512 MS
RBC # BLD AUTO: 2.78 10*6/MM3 (ref 3.77–5.28)
RVA RNA STL QL NAA+NON-PROBE: NOT DETECTED
S ENT+BONG DNA STL QL NAA+NON-PROBE: NOT DETECTED
SAPO I+II+IV+V RNA STL QL NAA+NON-PROBE: NOT DETECTED
SHIGELLA SP+EIEC IPAH ST NAA+NON-PROBE: NOT DETECTED
SODIUM SERPL-SCNC: 140 MMOL/L (ref 136–145)
STOMATOCYTES BLD QL SMEAR: NORMAL
V CHOL+PARA+VUL DNA STL QL NAA+NON-PROBE: NOT DETECTED
V CHOLERAE DNA STL QL NAA+NON-PROBE: NOT DETECTED
VARIANT LYMPHS NFR BLD MANUAL: 37 % (ref 19.6–45.3)
WBC MORPH BLD: NORMAL
WBC NRBC COR # BLD AUTO: 8.06 10*3/MM3 (ref 3.4–10.8)
Y ENTEROCOL DNA STL QL NAA+NON-PROBE: NOT DETECTED

## 2024-07-30 PROCEDURE — 97162 PT EVAL MOD COMPLEX 30 MIN: CPT

## 2024-07-30 PROCEDURE — 74018 RADEX ABDOMEN 1 VIEW: CPT

## 2024-07-30 PROCEDURE — 25010000002 LORAZEPAM PER 2 MG: Performed by: FAMILY MEDICINE

## 2024-07-30 PROCEDURE — 87507 IADNA-DNA/RNA PROBE TQ 12-25: CPT | Performed by: INTERNAL MEDICINE

## 2024-07-30 PROCEDURE — 25010000002 ONDANSETRON PER 1 MG: Performed by: FAMILY MEDICINE

## 2024-07-30 PROCEDURE — 85007 BL SMEAR W/DIFF WBC COUNT: CPT | Performed by: FAMILY MEDICINE

## 2024-07-30 PROCEDURE — 84132 ASSAY OF SERUM POTASSIUM: CPT | Performed by: FAMILY MEDICINE

## 2024-07-30 PROCEDURE — 83605 ASSAY OF LACTIC ACID: CPT | Performed by: EMERGENCY MEDICINE

## 2024-07-30 PROCEDURE — 25010000002 MORPHINE PER 10 MG: Performed by: FAMILY MEDICINE

## 2024-07-30 PROCEDURE — 85025 COMPLETE CBC W/AUTO DIFF WBC: CPT | Performed by: FAMILY MEDICINE

## 2024-07-30 PROCEDURE — 80053 COMPREHEN METABOLIC PANEL: CPT | Performed by: FAMILY MEDICINE

## 2024-07-30 PROCEDURE — 25010000002 MORPHINE SULFATE (PF) 2 MG/ML SOLUTION: Performed by: FAMILY MEDICINE

## 2024-07-30 PROCEDURE — 87493 C DIFF AMPLIFIED PROBE: CPT | Performed by: INTERNAL MEDICINE

## 2024-07-30 PROCEDURE — 99233 SBSQ HOSP IP/OBS HIGH 50: CPT | Performed by: INTERNAL MEDICINE

## 2024-07-30 PROCEDURE — 83735 ASSAY OF MAGNESIUM: CPT | Performed by: FAMILY MEDICINE

## 2024-07-30 PROCEDURE — 25010000002 SODIUM CHLORIDE 0.9 % WITH KCL 20 MEQ 20-0.9 MEQ/L-% SOLUTION: Performed by: FAMILY MEDICINE

## 2024-07-30 PROCEDURE — 25010000002 METRONIDAZOLE 500 MG/100ML SOLUTION: Performed by: FAMILY MEDICINE

## 2024-07-30 PROCEDURE — 99223 1ST HOSP IP/OBS HIGH 75: CPT | Performed by: INTERNAL MEDICINE

## 2024-07-30 PROCEDURE — 85610 PROTHROMBIN TIME: CPT | Performed by: FAMILY MEDICINE

## 2024-07-30 PROCEDURE — 87449 NOS EACH ORGANISM AG IA: CPT | Performed by: INTERNAL MEDICINE

## 2024-07-30 RX ORDER — LISINOPRIL 20 MG/1
20 TABLET ORAL DAILY
Status: DISCONTINUED | OUTPATIENT
Start: 2024-07-30 | End: 2024-08-06 | Stop reason: HOSPADM

## 2024-07-30 RX ORDER — POTASSIUM CHLORIDE 750 MG/1
40 CAPSULE, EXTENDED RELEASE ORAL ONCE
Status: COMPLETED | OUTPATIENT
Start: 2024-07-30 | End: 2024-07-30

## 2024-07-30 RX ADMIN — Medication 10 ML: at 22:13

## 2024-07-30 RX ADMIN — LEVOTHYROXINE SODIUM 125 MCG: 125 TABLET ORAL at 06:10

## 2024-07-30 RX ADMIN — PANTOPRAZOLE SODIUM 40 MG: 40 TABLET, DELAYED RELEASE ORAL at 16:54

## 2024-07-30 RX ADMIN — POTASSIUM CHLORIDE 40 MEQ: 750 CAPSULE, EXTENDED RELEASE ORAL at 08:34

## 2024-07-30 RX ADMIN — LORAZEPAM 1 MG: 2 INJECTION INTRAMUSCULAR; INTRAVENOUS at 12:29

## 2024-07-30 RX ADMIN — MORPHINE SULFATE 2 MG: 2 INJECTION, SOLUTION INTRAMUSCULAR; INTRAVENOUS at 11:54

## 2024-07-30 RX ADMIN — POTASSIUM CHLORIDE AND SODIUM CHLORIDE 100 ML/HR: 900; 150 INJECTION, SOLUTION INTRAVENOUS at 08:31

## 2024-07-30 RX ADMIN — Medication 10 ML: at 08:30

## 2024-07-30 RX ADMIN — FOLIC ACID 1 MG: 1 TABLET ORAL at 08:31

## 2024-07-30 RX ADMIN — MORPHINE SULFATE 2 MG: 2 INJECTION, SOLUTION INTRAMUSCULAR; INTRAVENOUS at 05:38

## 2024-07-30 RX ADMIN — TOPIRAMATE 50 MG: 25 TABLET, FILM COATED ORAL at 22:13

## 2024-07-30 RX ADMIN — CARVEDILOL 25 MG: 25 TABLET, FILM COATED ORAL at 22:13

## 2024-07-30 RX ADMIN — ONDANSETRON 4 MG: 2 INJECTION INTRAMUSCULAR; INTRAVENOUS at 05:50

## 2024-07-30 RX ADMIN — LORAZEPAM 2 MG: 2 INJECTION INTRAMUSCULAR; INTRAVENOUS at 19:20

## 2024-07-30 RX ADMIN — CHLORDIAZEPOXIDE HYDROCHLORIDE 25 MG: 25 CAPSULE ORAL at 13:03

## 2024-07-30 RX ADMIN — FIDAXOMICIN 200 MG: 200 TABLET, FILM COATED ORAL at 08:34

## 2024-07-30 RX ADMIN — PANTOPRAZOLE SODIUM 40 MG: 40 TABLET, DELAYED RELEASE ORAL at 05:38

## 2024-07-30 RX ADMIN — THIAMINE HCL TAB 100 MG 100 MG: 100 TAB at 08:34

## 2024-07-30 RX ADMIN — METRONIDAZOLE 500 MG: 500 INJECTION, SOLUTION INTRAVENOUS at 15:50

## 2024-07-30 RX ADMIN — ATORVASTATIN CALCIUM 10 MG: 10 TABLET, FILM COATED ORAL at 08:34

## 2024-07-30 RX ADMIN — MORPHINE SULFATE 2 MG: 2 INJECTION, SOLUTION INTRAMUSCULAR; INTRAVENOUS at 16:54

## 2024-07-30 RX ADMIN — FLUOXETINE HYDROCHLORIDE 20 MG: 20 CAPSULE ORAL at 08:31

## 2024-07-30 RX ADMIN — HYDROCODONE BITARTRATE AND ACETAMINOPHEN 1 TABLET: 5; 325 TABLET ORAL at 18:42

## 2024-07-30 RX ADMIN — CHLORDIAZEPOXIDE HYDROCHLORIDE 25 MG: 25 CAPSULE ORAL at 05:38

## 2024-07-30 RX ADMIN — METRONIDAZOLE 500 MG: 500 INJECTION, SOLUTION INTRAVENOUS at 06:10

## 2024-07-30 RX ADMIN — POTASSIUM CHLORIDE AND SODIUM CHLORIDE 125 ML/HR: 900; 150 INJECTION, SOLUTION INTRAVENOUS at 06:13

## 2024-07-30 RX ADMIN — FIDAXOMICIN 200 MG: 200 TABLET, FILM COATED ORAL at 22:12

## 2024-07-30 RX ADMIN — HYDROCODONE BITARTRATE AND ACETAMINOPHEN 1 TABLET: 5; 325 TABLET ORAL at 03:26

## 2024-07-30 RX ADMIN — Medication 1 TABLET: at 08:31

## 2024-07-30 RX ADMIN — TOPIRAMATE 50 MG: 25 TABLET, FILM COATED ORAL at 08:33

## 2024-07-30 RX ADMIN — CHLORDIAZEPOXIDE HYDROCHLORIDE 25 MG: 25 CAPSULE ORAL at 22:13

## 2024-07-30 RX ADMIN — LISINOPRIL 20 MG: 20 TABLET ORAL at 16:54

## 2024-07-30 RX ADMIN — METRONIDAZOLE 500 MG: 500 INJECTION, SOLUTION INTRAVENOUS at 22:13

## 2024-07-30 NOTE — PROGRESS NOTES
GI follow-up.     KUB this afternoon looks fairly unremarkable.  There is some mild distention of some small bowel loops but I do not see any evidence of incipient or impending megacolon, thankfully.    Marty Browning MD

## 2024-07-30 NOTE — PROGRESS NOTES
[facility] Medicine Services  INPATIENT PROGRESS NOTE    Patient Name: Minnie Bang  Date of Admission: 7/29/2024  Today's Date: 07/30/24  Length of Stay: 1  Primary Care Physician: Provider, No Known    Subjective   Chief Complaint: Diarrhea abdominal pain  HPI   49-year-old female with history of hypertension, alcohol abuse disorder, coronary artery disease, chronic obstructive pulmonary disease, former smoker, comes to the hospital reporting diarrhea that has been present for approximately 2 months.  She describes diarrhea as liquid, several times per day, and occasional associated vomiting.  She states that approximately 2 weeks ago started developing abdominal pain, described as cramps, located mostly in the lower abdomen; she also reports stabbing quality pain over the right posterior rib cage that increases with inspiration and motion, and has been present since yesterday.  Patient states that she is trying to quit alcohol, but usually takes 1 or 2 drinks shots to avoid withdrawal tremors.      Evaluated with nurse present.  Today, feeling slightly better patient states that she had 1 episode of diarrhea overnight.  Vomiting still.  Abdominal pain persists.  No fevers reported.  Yesterday hypertensive, today medications on hold due to low blood pressure.    Review of Systems   All pertinent negatives and positives are as above. All other systems have been reviewed and are negative unless otherwise stated.     Objective    Temp:  [97.4 °F (36.3 °C)-98.5 °F (36.9 °C)] 97.4 °F (36.3 °C)  Heart Rate:  [] 76  Resp:  [18-24] 18  BP: ()/() 94/64  Physical Exam  Constitutional:       Appearance: Today, less anxious, no respiratory distress, alert, oriented x 3.  HENT:      Head: Normocephalic and atraumatic.      Nose: Nose normal.      Mouth/Throat:      Mouth: Mucous membranes are moist.      Pharynx: Oropharynx is clear.   Eyes:      Extraocular  Movements: Extraocular movements intact.      Conjunctiva/sclera: Conjunctivae normal.      Pupils: Pupils are equal, round, and reactive to light.   Cardiovascular:      Rate and Rhythm: Normal rate and regular rhythm.      Pulses: Normal pulses.   Pulmonary:      Effort: No respiratory distress.      Breath sounds: Normal breath sounds. No wheezing, rhonchi or rales.   Abdominal:      General: Abdomen is flat.  Tender to deep palpation in mesogastric area, with no peritoneal signs bowel sounds are normal.      Palpations: Abdomen is soft.      Tenderness: There is no guarding or rebound.   Musculoskeletal:         General: Normal range of motion.      Cervical back: Normal range of motion and neck supple.   Extremities:  No lower extremity edema.  Skin:     Capillary Refill: Capillary refill takes less than 2 seconds.      Coloration: Skin is not jaundiced.      Findings: No rash.   Neurological: No significant tremors.     General: No focal deficit present.      Mental Status: Patient is alert, oriented to place time and person.     Sensory: No sensory deficit.      Motor: No weakness.      Coordination: Coordination normal.   Psychiatric:         Mood and Affect: Slightly anxious     Behavior: Behavior normal.       Results Review:  I have reviewed the labs, radiology results, and diagnostic studies.    Laboratory Data:   Results from last 7 days   Lab Units 07/30/24  0420 07/29/24  1230   WBC 10*3/mm3 8.06 8.37   HEMOGLOBIN g/dL 9.7* 12.5   HEMATOCRIT % 29.7* 36.7   PLATELETS 10*3/mm3 217 285        Results from last 7 days   Lab Units 07/30/24  0420 07/29/24  1900 07/29/24  1334 07/29/24  1230   SODIUM mmol/L 140  --   --  137   POTASSIUM mmol/L 3.1* 2.6* 2.4* 2.3*   CHLORIDE mmol/L 101  --   --  92*   CO2 mmol/L 27.0  --   --  25.0   BUN mg/dL 4*  --   --  3*   CREATININE mg/dL 0.43*  --   --  0.45*   CALCIUM mg/dL 7.5*  --   --  8.5*   BILIRUBIN mg/dL 0.8  --   --  0.9   ALK PHOS U/L 473*  --   --  621*  "  ALT (SGPT) U/L 33  --   --  43*   AST (SGOT) U/L 120*  --   --  125*   GLUCOSE mg/dL 103*  --   --  102*       Culture Data:   No results found for: \"BLOODCX\", \"URINECX\", \"WOUNDCX\", \"MRSACX\", \"RESPCX\", \"STOOLCX\"    Radiology Data:   Imaging Results (Last 24 Hours)       Procedure Component Value Units Date/Time    US Liver [[id number]] Collected: 07/29/24 1917     Updated: 07/29/24 1923    Narrative:      EXAMINATION:  US LIVER-  7/29/2024 4:35 PM     HISTORY: Elevated liver function test.     COMPARISON: 6/26/2023.     TECHNIQUE: Grayscale and color flow ultrasound was performed.     PANCREAS: The pancreas is echogenic without a focal abnormality. No  pancreatic duct dilatation is seen.     LIVER: There is increased liver echogenicity. No focal liver lesion is  seen.     GALLBLADDER: There are no gallstones. There is no gallbladder wall  thickening.     COMMON BILE DUCT: 2 mm.     RIGHT KIDNEY: The renal size, cortical thickness and echogenicity are  normal. There is no hydronephrosis.     OTHER: The visualized abdominal aorta and inferior vena cava are normal  caliber.          Impression:      1. No gallstones or ductal dilatation.  2. Fatty infiltration of the liver.  3. Echogenic pancreas may be a normal variant. It may also be seen in  patients with diabetes and prediabetes.           The images and report are stored on PAC's per institutional and state  guidelines.     This report was signed and finalized on 7/29/2024 7:20 PM by Dr. Jesus Manuel Lomax MD.       XR Chest 1 View [[id number]] Collected: 07/29/24 1820     Updated: 07/29/24 1825    Narrative:      EXAMINATION:  XR CHEST 1 VW-  7/29/2024 5:18 PM     HISTORY: thoracic pain; K52.9-Noninfective gastroenteritis and colitis,  unspecified; R19.7-Diarrhea, unspecified; E87.6-Hypokalemia;  R16.0-Hepatomegaly, not elsewhere classified. Hyperlipidemia. Pulmonary  hypertension. Tobacco use disorder.     COMPARISON: 5/30/2024.     TECHNIQUE: Single view AP " image.     FINDINGS: There is mild hypoventilation. There is minimal vague density  in the right cardiophrenic angle and in the left lung base laterally,  stable compared to the prior study. The heart is normal in size. No  acute bony abnormality is seen.          Impression:      1. Mild hypoventilation.  2. Vague densities in the right cardiophrenic angle and left lung base  could be related to the poor inspiration and atelectasis. There is a  similar appearance on the prior study and therefore this may be related  to prominent fat pads.           This report was signed and finalized on 7/29/2024 6:22 PM by Dr. Jesus Manuel Lomax MD.       CT Angiogram Abdomen Pelvis [[id number]] Collected: 07/29/24 1410     Updated: 07/29/24 1434    Narrative:      EXAMINATION: CT ANGIOGRAM ABDOMEN PELVIS-      7/29/2024 12:24 PM     HISTORY: ab pain elevated lactic acid     In order to have a CT radiation dose as low as reasonably achievable  Automated Exposure Control was utilized for adjustment of the mA and/or  KV according to patient size.     Total DLP = 919.59 mGy.cm     The CT angiography of the abdominal pelvis is performed before and after  intravenous contrast enhancement.     The images are acquired in axial plane and subsequent 2D reconstruction  in coronal and sagittal planes and 3D maximum intensity projection image  reconstruction.     Comparison is made with the previous study dated 2/26/2024.     There are atheromatous changes of the abdominal aorta iliac and femoral  arteries.     There is no aneurysmal dilatation of the abdominal aorta or iliac  arteries.     A mixed plaque is seen at the origin of the celiac trunk with less than  50% diameter stenosis. There is subsequent normal branches.     There is normal origin course and caliber of the superior mesenteric  artery. Normal branches.     Small calcific plaques are seen in the proximal renal arteries  bilaterally. No flow-limiting stenosis. No aneurysmal  dilatation. There  is an accessory small left renal artery arising below the level of the  main renal artery.     There is high-grade stenosis at the origin of the inferior mesenteric  artery from the left anterolateral margin of the distal abdominal aorta.  A small and attenuated inferior mesenteric artery is opacified with a  subsequent small and diminutive left colonic and superior rectal  arteries.     There are calcific plaques throughout the course of both common,  internal and external iliac arteries. No flow-limiting stenosis.     Normal size common femoral artery is seen dividing into normal appearing  superficial and deep femoral arteries.     There is no evidence of contrast extravasation seen in the stomach,  small and large bowel.     The lung bases included in the study appear unremarkable.     Limited visualized cardiomediastinal structures show atheromatous change  in the coronary arteries. No significant cardiomegaly.     There is severe fatty infiltration of the liver. There is moderate  hepatomegaly. The liver measures 21.5 cm in craniocaudal dimension. No  focal intrinsic abnormality. The spleen is normal.     No radiopaque gallstones.     Pancreas is normal.     Moderate lobulation of the left adrenal gland. No discrete mass. Right  adrenal gland is normal.     Mild lobulation of the renal contour bilaterally. No mass. No calculi.  No hydronephrosis. Both ureters appear normal and nondilated. The  urinary bladder is poorly distended with moderate wall thickening. No  visible intrinsic abnormality.     There are small fat-containing femoral hernias bilaterally.     There is evidence of prior ventral hernia repair surgery.     The stomach is poorly distended. No focal abnormality. Duodenum is  normal. Fluid-filled nondilated nondistended small bowel is seen.  Moderate diffuse thickening of the wall and edema of the mucosa of the  entire colon most pronounced in the proximal colon including  the  ascending and transverse colon. Distal colon is significantly  decompressed with moderate wall thickening. There is no significant  surrounding peritoneal fat infiltration.     No evidence of abdominal or pelvic lymphadenopathy.     Images reviewed in bone window show chronic degenerative changes of the  lumbar and limited visualized thoracic spine. Severe degeneration of  disc L5-S1.       Impression:      1. Acute or subacute nonspecific colitis.  2. Severe hepatic steatosis and hepatomegaly.  3. The remaining abdominal pelvis is unremarkable similar to the  previous study.     This report was signed and finalized on 7/29/2024 2:31 PM by Dr. Karmen Teague MD.               I have reviewed the patient's current medications.     Assessment/Plan   Assessment  Active Hospital Problems    Diagnosis     **Pancolitis      Acute to subacute pancolitis  Rule out C. difficile colitis,.  Hypertension  Thoracic pain  Alcohol abuse disorder at risk for withdrawal  Anxiety disorder  COPD  Former smoker  Macrocytosis, likely from alcohol use       7/08/2024, Upper endoscopy that showed normal results.      She was having diarrhea and for this reason C. difficile testing was performed; toxin was positive but the antigen was negative.  She was treated with vancomycin oral for 10 days.      Lactate 7.4, down to 3.1.   Hemoglobin down from 12.5 to 9.7.  Normal white blood cell count  .7  Platelet count 217,000     Potassium proved to 3.1 (admission 2.3).    Creatinine 0.43, BUN 4.    Glucose 103.    Magnesium 2.0.       Alkaline phosphatase elevated 621 on admission, today 473.  Bilirubin normal.  Albumin 2.8.  , ALT 33.  Normal PT/INR     CT scan report  1. Acute or subacute nonspecific colitis.  2. Severe hepatic steatosis and hepatomegaly.  3. The remaining abdominal pelvis is unremarkable similar to the  previous study.     Liver ultrasound report  1. No gallstones or ductal dilatation.  2. Fatty  infiltration of the liver.  3. Echogenic pancreas may be a normal variant. It may also be seen in  patients with diabetes and prediabetes.       Treatment Plan  Replace potassium.  Normal saline with 20 meq  KCl at 100 mL/h.  Monitor electrolytes     Elevated alkaline phosphatase; AST/ALT consistent with alcohol abuse.     Regarding colitis, patient received 10 days of vancomycin p.o. for C. difficile toxin positive/antigen negative test.  She was started on Dificid 200 mg p.o. every 12 hours on 7/29/2024; infectious disease specialist following patient.  Repeat C. difficile in stool and GI panel ordered.  Pending collection today     Thiamine, folic acid, multivitamins, Librium taper, Ativan IV as needed for withdrawal signs or symptoms.  CIWA protocol.     For blood pressure management, yesterday was significantly elevated, likely component of pain and withdrawal associated.  Will hold antihypertensives today due to low blood pressure.    SCDs for DVT prophylaxis.    She still requires inpatient management.  Gastroenterology evaluation requested.    Medical Decision Making  Number and Complexity of problems: 9, moderate to high complexity  Differential Diagnosis: See above    Conditions and Status        Condition is improving.     MDM Data  External documents reviewed: No  Cardiac tracing (EKG, telemetry) interpretation: Sinus rhythm  Radiology interpretation: Radiology reports reviewed  Labs reviewed: Yes  Any tests that were considered but not ordered: No     Decision rules/scores evaluated (example [id number], Wells, etc): None     Discussed with: Patient and nurse     Care Planning  Shared decision making: With patient  Code status and discussions: Full code    Disposition  Social Determinants of Health that impact treatment or disposition: Alcohol abuse  I expect the patient to be discharged to home in 2 days.     Electronically signed by Jc Murillo MD, 07/30/24, 09:53 CDT.

## 2024-07-30 NOTE — PLAN OF CARE
Goal Outcome Evaluation:  Plan of Care Reviewed With: patient        Progress: improving  Outcome Evaluation: PT eval complete. Pt alert and oriented x4. Pt found in semi-reclined position in bed with NG tube just placed by nsg. Pt reports being ind prior to this but uses FWW or SP cane on days where she feels more fatigued. Pt reports still driving and doing own grocery shopping but does not usually walk longer distances. Pt SBA for sup<>sit and MMT found to be knee ext 4/5, knee flex and hip flex 4-/5, and ankle PF/DF 4/5. Pt able to gabby socks on BLE with spv assist. Pt CGA for sit<>stand and CGA-min A for ambulating to BR. Pt demonstrated decreased gait speed with a step-to gait pattern with reliance on HHA or IV pole for balance intermittently. Pt with BM and ind for toileting and self hygeine. Pt CGA-min A back to bed and left in semi-reclined position with all needs met. Pt would benefit from skilled PT to address deficits in functional strength and endurance, balance, and gait/stair training. Anticipate d/c home with recommendation for OP PT.

## 2024-07-30 NOTE — PLAN OF CARE
Goal Outcome Evaluation:  Plan of Care Reviewed With: patient        Progress: no change  Outcome Evaluation: NG tube placed, waiting on x ray for placement. IV pain medication given x3. IVF and IV abx. Safety maintained.

## 2024-07-30 NOTE — THERAPY EVALUATION
Patient Name: Minnie Bang  : 1975    MRN: [id number]                              Today's Date: 2024       Admit Date: 2024    Visit Dx:     ICD-10-CM ICD-9-CM   1. Colitis  K52.9 558.9   2. Diarrhea, unspecified type  R19.7 787.91   3. Hypokalemia  E87.6 276.8   4. Hepatomegaly  R16.0 789.1   5. Impaired mobility [Z74.09]  Z74.09 799.89     Patient Active Problem List   Diagnosis    Mixed hyperlipidemia    Raynaud's disease without gangrene    Ex-smoker    Chest pain in adult    SORTO (dyspnea on exertion)    Mixed simple and mucopurulent chronic bronchitis    Pulmonary emphysema    Non-occlusive coronary artery disease    Primary hypertension    Coronary-myocardial bridge    Bilateral lower extremity edema    Bicuspid aortic valve    Vocal cord polyps    Tobacco use disorder, continuous    Neoplasm of uncertain behavior    Hypokalemia    Dysphagia    Heartburn    Pain of upper abdomen    Pancolitis     Past Medical History:   Diagnosis Date    Abnormal ECG 2023    Anxiety     Arthritis     back    Asthma     Bicuspid aortic valve 2023    COPD (chronic obstructive pulmonary disease)     Coronary-myocardial bridge 2023    CTS (carpal tunnel syndrome) 2019    Dental disease     Depression     Diastolic dysfunction     Dizziness     Emphysema of lung     GERD (gastroesophageal reflux disease)     Headache     Hyperlipidemia     Hypertension     Hypothyroidism     Mixed simple and mucopurulent chronic bronchitis 2023    Non-occlusive coronary artery disease 2023    NSTEMI (non-ST elevated myocardial infarction) (HCC)     Pneumonia     Pulmonary emphysema 2023    Shingles 2018    Vocal cord polyps 2024     Past Surgical History:   Procedure Laterality Date    APPENDECTOMY      CARDIAC CATHETERIZATION N/A 2017    Procedure: Coronary angiography;  Surgeon: Luis Colvin MD;  Location: [facility] CATH INVASIVE LOCATION;  Service:     CARDIAC  CATHETERIZATION N/A 05/31/2023    Procedure: Left Heart Cath;  Surgeon: Steffen Rossi MD;  Location:  [facility] CATH INVASIVE LOCATION;  Service: Cardiology;  Laterality: N/A;    CARPAL TUNNEL RELEASE  2019    ENDOSCOPY N/A 7/8/2024    Procedure: ESOPHAGOGASTRODUODENOSCOPY WITH ANESTHESIA;  Surgeon: Gordy Wang MD;  Location: [facility] ENDOSCOPY;  Service: Gastroenterology;  Laterality: N/A;  pre: dysphagia.   post: esophagus dilated.   no PCP    HYSTERECTOMY      PANENDOSCOPY N/A 5/30/2024    Procedure: DIRECT LARYNGOSCOPY WITH BIOPSY OF VOCAL CORD POLYPS WITH POSSIBLE TRACHEOSTOMY;  Surgeon: Mendez Padilla MD;  Location:  [facility] OR;  Service: ENT;  Laterality: N/A;    SC RT/LT HEART CATHETERS N/A 11/04/2017    Procedure: Percutaneous Coronary Intervention;  Surgeon: Luis Colvin MD;  Location:  [facility] CATH INVASIVE LOCATION;  Service: Cardiovascular    THYROID SURGERY      TOTAL THYROIDECTOMY      TRACHEOSTOMY N/A 5/30/2024    Procedure: POSSIBLE TRACHEOSTOMY;  Surgeon: Mendez Padilla MD;  Location:  [facility] OR;  Service: ENT;  Laterality: N/A;      General Information       Row Name 07/30/24 1428          Physical Therapy Time and Intention    Document Type evaluation  CC: diarrhea for 2 months, n/v; Dx: pancolitis  -HAIDER (r) CN (t) HAIDER (c)     Mode of Treatment physical therapy  -HAIDER (r) CN (t) HAIDER (c)       Row Name 07/30/24 1425          General Information    Patient Profile Reviewed yes  -HAIDER (r) CN (t) HAIDER (c)     Prior Level of Function independent:;feeding;grooming;dressing;bathing;all household mobility;home management;community mobility;ADL's  FWW and SP cane if needed, has shower chair for showers  -HAIDER (r) CN (t) HAIDER (c)     Existing Precautions/Restrictions fall  -HAIDER (r) CN (t) HAIDER (c)     Barriers to Rehab medically complex  -HAIDER       Row Name 07/30/24 1427          Living Environment    People in Home spouse  Pt reports living with spouse part-time  -HAIDER (r) CN (t) HAIDER (c)       Row Name 07/30/24  1428          Home Main Entrance    Number of Stairs, Main Entrance five  -HAIDER (r) CN (t) HAIDER (c)     Stair Railings, Main Entrance railings on both sides of stairs  -HAIDER (r) CN (t) HAIDER (c)       Row Name 07/30/24 1428          Stairs Within Home, Primary    Number of Stairs, Within Home, Primary none  -HAIDER (r) CN (t) HAIDER (c)       Row Name 07/30/24 1428          Cognition    Orientation Status (Cognition) oriented x 4  -HAIDER (r) CN (t) HAIDER (c)       Row Name 07/30/24 1428          Safety Issues, Functional Mobility    Impairments Affecting Function (Mobility) balance;endurance/activity tolerance;pain;strength  -HAIDER (r) CN (t) HAIDER (c)               User Key  (r) = Recorded By, (t) = Taken By, (c) = Cosigned By      Initials Name Provider Type    Marvin Smyth, PT DPT Physical Therapist    Elise Hogan, PT Student PT Student                   Mobility       Row Name 07/30/24 1428          Bed Mobility    Bed Mobility supine-sit;sit-supine  -HAIDER (r) CN (t) HAIDER (c)     Supine-Sit [place] (Bed Mobility) standby assist  -HAIDER (r) CN (t) HAIDER (c)     Sit-Supine [place] (Bed Mobility) standby assist  -HAIDER (r) CN (t) HAIDER (c)     Assistive Device (Bed Mobility) bed rails;head of bed elevated  -HAIDER (r) CN (t) HAIDER (c)       Row Name 07/30/24 1428          Sit-Stand Transfer    Sit-Stand [place] (Transfers) supervision;contact guard  -HAIDER (r) CN (t) HAIDER (c)     Assistive Device (Sit-Stand Transfers) other (see comments)  HHA  -HAIDER (r) CN (t) HAIDER (c)       Row Name 07/30/24 1428          Gait/Stairs (Locomotion)    [place] Level (Gait) contact guard;minimum assist (75% patient effort)  -HAIDER (r) CN (t) HAIDER (c)     Assistive Device (Gait) other (see comments)  HHA  -HAIDER (r) CN (t) HAIDER (c)     Distance in Feet (Gait) 40  -HAIDER (r) CN (t) HAIDER (c)     Deviations/Abnormal Patterns (Gait) base of support, wide;gait speed decreased;stride length decreased;[place] decreased  -HAIDER (r) CN (t) HAIDER (c)               User Key  (r) =  Recorded By, (t) = Taken By, (c) = Cosigned By      Initials Name Provider Type    Marvin Smyth PT DPT Physical Therapist    Elise Hogan, PT Student PT Student                   Obj/Interventions       Row Name 07/30/24 1428          Range of Motion Comprehensive    General Range of Motion bilateral upper extremity ROM WFL;bilateral lower extremity ROM WFL  -HAIDER (r) CN (t) HAIDER (c)       Row Name 07/30/24 1428          Strength Comprehensive (MMT)    Comment, General Manual Muscle Testing (MMT) Assessment BLE MMT found to be as follows: knee ext 4/5, knee flex and hip flex 4-/5, and ankle PF/DF 4/5  -HAIDER (r) CN (t) HAIDER (c)       Row Name 07/30/24 1428          Balance    Balance Assessment sitting static balance;sitting dynamic balance;standing static balance;standing dynamic balance  -HAIDER (r) CN (t) HAIDER (c)     Static Sitting Balance supervision  -HAIDER (r) CN (t) HAIDER (c)     Dynamic Sitting Balance supervision  -HAIDER (r) CN (t) HAIDER (c)     Position, Sitting Balance unsupported;sitting edge of bed  -HAIDER (r) CN (t) HAIDER (c)     Static Standing Balance supervision  -HAIDER (r) CN (t) HAIDER (c)     Dynamic Standing Balance supervision;contact guard  -HAIDER (r) CN (t) HAIDER (c)     Position/Device Used, Standing Balance supported;other (see comments)  HHA  -HAIDER (r) CN (t) HAIDER (c)       Row Name 07/30/24 1428          Sensory Assessment (Somatosensory)    Sensory Assessment (Somatosensory) LE sensation intact  -HAIDER (r) CN (t) HAIDER (c)               User Key  (r) = Recorded By, (t) = Taken By, (c) = Cosigned By      Initials Name Provider Type    Marvin Smyth PT DPT Physical Therapist    Elise Hogan, PT Student PT Student                   Goals/Plan       Row Name 07/30/24 1428          Gait Training Goal 1 (PT)    Activity/Assistive Device (Gait Training Goal 1, PT) gait (walking locomotion);decrease fall risk;forward stepping;improve balance and speed;increase endurance/gait distance;increase energy conservation   -HAIDER (r) CN (t) HAIDER (c)     [place] Level (Gait Training Goal 1, PT) independent  -HAIDER (r) CN (t) HAIDER (c)     Distance (Gait Training Goal 1, PT) 300  -HAIDER (r) CN (t) HAIDER (c)     Time Frame (Gait Training Goal 1, PT) long term goal (LTG);10 days  -HAIDER (r) CN (t) HAIDER (c)     Progress/Outcome (Gait Training Goal 1, PT) new goal  -HAIDER (r) CN (t) HAIDER (c)       Row Name 07/30/24 1426          Stairs Goal 1 (PT)    Activity/Assistive Device (Stairs Goal 1, PT) ascending stairs;descending stairs;decrease fall risk;improve balance and speed  -HAIDER (r) CN (t) HAIDER (c)     [place] Level/Cues Needed (Stairs Goal 1, PT) independent  -HAIDER (r) CN (t) HAIDER (c)     Number of Stairs (Stairs Goal 1, PT) 5  -HAIDER (r) CN (t) HAIDER (c)     Time Frame (Stairs Goal 1, PT) long term goal (LTG);10 days  -HAIDER (r) CN (t) HAIDER (c)     Progress/Outcome (Stairs Goal 1, PT) new goal  -HAIDER (r) CN (t) HAIDER (c)       Row Name 07/30/24 1421          Therapy Assessment/Plan (PT)    Planned Therapy Interventions (PT) balance training;bed mobility training;gait training;home exercise program;strengthening;ROM (range of motion);stair training;postural re-education;patient/family education;neuromuscular re-education;transfer training  -HAIDER (r) CN (t) HAIDER (c)               User Key  (r) = Recorded By, (t) = Taken By, (c) = Cosigned By      Initials Name Provider Type    Marvin Smyth, PT DPT Physical Therapist    Elise Hogan, PT Student PT Student                   Clinical Impression       Row Name 07/30/24 1421          Pain    Pretreatment Pain Rating 8/10  -HAIDER (r) CN (t) HAIDER (c)     Pain Location - abdomen  -HAIDER (r) CN (t) HAIDER (c)     Pain Intervention(s) Repositioned;Ambulation/increased activity  -HIADER (r) CN (t) HAIDER (c)       Row Name 07/30/24 1422          Plan of Care Review    Plan of Care Reviewed With patient  -HAIDER (carlie) FLYNN (t) HAIDER (c)     Progress improving  -HAIDER (carlie) FLYNN (t) HAIDER (ann marie)     Outcome Evaluation PT eval complete. Pt alert and oriented x4. Pt found  in semi-reclined position in bed with NG tube just placed by nsg. Pt reports being ind prior to this but uses FWW or SP cane on days where she feels more fatigued. Pt reports still driving and doing own grocery shopping but does not usually walk longer distances. Pt SBA for sup<>sit and MMT found to be knee ext 4/5, knee flex and hip flex 4-/5, and ankle PF/DF 4/5. Pt able to gabby socks on BLE with spv assist. Pt CGA for sit<>stand and CGA-min A for ambulating to BR. Pt demonstrated decreased gait speed with a step-to gait pattern with reliance on HHA or IV pole for balance intermittently. Pt with BM and ind for toileting and self hygeine. Pt CGA-min A back to bed and left in semi-reclined position with all needs met. Pt would benefit from skilled PT to address deficits in functional strength and endurance, balance, and gait/stair training. Anticipate d/c home with recommendation for OP PT.  -HAIDER (r) FLYNN (t) HAIDER (c)       Row Name 07/30/24 1428          Therapy Assessment/Plan (PT)    Patient/Family Therapy Goals Statement (PT) not stated  -HAIDER (r) FLYNN (t) HAIDER (c)     Rehab Potential (PT) good, to achieve stated therapy goals  -HAIDER (r) FLYNN (t) HAIDER (c)     Criteria for Skilled Interventions Met (PT) yes;skilled treatment is necessary  -HAIDER (r) CN (t) HAIDER (c)     Therapy Frequency (PT) 2 times/day  -HAIDER (r) CN (t) HAIDER (c)     Predicted Duration of Therapy Intervention (PT) until d/c  -HAIDER (r) CN (t) HAIDER (c)       Row Name 07/30/24 1428          Positioning and Restraints    Pre-Treatment Position in bed  -HAIDER (r) CN (t) HAIDER (c)     Post Treatment Position bed  -HAIDER (r) CN (t) HAIDER (c)     In Bed call light within reach;encouraged to call for assist;side rails up x2;legs elevated;fowlers  -HAIDER               User Key  (r) = Recorded By, (t) = Taken By, (c) = Cosigned By      Initials Name Provider Type    Marvin Smyth, PT DPT Physical Therapist    CN Teresa, Elise, PT Student PT Student                   Outcome Measures        Row Name 07/30/24 1428          How much help from another person do you currently need...    Turning from your back to your side while in flat bed without using bedrails? 4  -HAIDER (r) CN (t) HAIDER (c)     Moving from lying on back to sitting on the side of a flat bed without bedrails? 4  -HAIDER (r) CN (t) HAIDER (c)     Moving to and from a bed to a chair (including a wheelchair)? 4  -HAIDER (r) CN (t) HAIDER (c)     Standing up from a chair using your arms (e.g., wheelchair, bedside chair)? 3  -HAIDER (r) CN (t) HAIDER (c)     Climbing 3-5 steps with a railing? 3  -HAIDER (r) CN (t) HAIDER (c)     To walk in hospital room? 3  -HAIDER (r) CN (t) HAIDER (c)     AM-PAC 6 Clicks Score (PT) 21  -HAIDER (r) CN (t)     Highest Level of Mobility Goal 6 --> Walk 10 steps or more  -HAIDER (r) CN (t)       Row Name 07/30/24 1428          Functional Assessment    Outcome Measure Options AM-PAC 6 Clicks Basic Mobility (PT)  -HAIDER (r) CN (t) HAIDER (c)               User Key  (r) = Recorded By, (t) = Taken By, (c) = Cosigned By      Initials Name Provider Type    Marvin Smyth, PT DPT Physical Therapist    Elise Hogan, PT Student PT Student                                 Physical Therapy Education       Title: PT OT SLP Therapies (In Progress)       Topic: Physical Therapy (In Progress)       Point: Mobility training (Done)       Learning Progress Summary             Patient Acceptance, E,TB, VU,DU by FLYNN at 7/30/2024 1428    Comment: Educated on role of PT in pt care                         Point: Home exercise program (Not Started)       Learner Progress:  Not documented in this visit.              Point: Body mechanics (Not Started)       Learner Progress:  Not documented in this visit.              Point: Precautions (Not Started)       Learner Progress:  Not documented in this visit.                              User Key       Initials Effective Dates Name Provider Type Discipline     06/24/24 -  Elise Moore, PT Student PT Student PT                   PT Recommendation and Plan  Planned Therapy Interventions (PT): balance training, bed mobility training, gait training, home exercise program, strengthening, ROM (range of motion), stair training, postural re-education, patient/family education, neuromuscular re-education, transfer training  Plan of Care Reviewed With: patient  Progress: improving  Outcome Evaluation: PT eval complete. Pt alert and oriented x4. Pt found in semi-reclined position in bed with NG tube just placed by nsg. Pt reports being ind prior to this but uses FWW or SP cane on days where she feels more fatigued. Pt reports still driving and doing own grocery shopping but does not usually walk longer distances. Pt SBA for sup<>sit and MMT found to be knee ext 4/5, knee flex and hip flex 4-/5, and ankle PF/DF 4/5. Pt able to gabby socks on BLE with spv assist. Pt CGA for sit<>stand and CGA-min A for ambulating to BR. Pt demonstrated decreased gait speed with a step-to gait pattern with reliance on HHA or IV pole for balance intermittently. Pt with BM and ind for toileting and self hygeine. Pt CGA-min A back to bed and left in semi-reclined position with all needs met. Pt would benefit from skilled PT to address deficits in functional strength and endurance, balance, and gait/stair training. Anticipate d/c home with recommendation for OP PT.     Time Calculation:         PT Charges       Row Name 07/30/24 1428             Time Calculation    Start Time 1428  -HAIDER (r) CN (t) HAIDER (c)      Stop Time 1512  -HAIDER (r) CN (t) HAIDER (c)      Time Calculation (min) 44 min  -HAIDER (r) CN (t)      PT Received On 07/30/24  -HAIDER (r) CN (t) HAIDER (c)      PT Goal Re-Cert Due Date 08/09/24  -HAIDER (r) CN (t) HAIDER (c)                User Key  (r) = Recorded By, (t) = Taken By, (c) = Cosigned By      Initials Name Provider Type    Marvin Smyth, PT DPT Physical Therapist    Elise Hogan, PT Student PT Student                      PT G-Codes  Outcome Measure Options:  -[facility] 6 Clicks Basic Mobility (PT)  -[facility] 6 Clicks Score (PT): 21       Elise Moore, PT Student  7/30/2024

## 2024-07-30 NOTE — PLAN OF CARE
Goal Outcome Evaluation:  Plan of Care Reviewed With: patient        Progress: no change  Outcome Evaluation: Pt c/o pain and nausea PRN meds given per orders, CIWA protocol in place, isolation precautions in place, cont. IVF and IV ABX

## 2024-07-30 NOTE — CONSULTS
"                                Inpatient Gastroenterology Consult  Referring Provider: Jc Murillo MD  Gastroenterologist of Record: None  Reason for Consultation: Colitis    Subjective     History of present illness: I have reviewed the infectious disease consultant's note.  For the sake of brevity and accuracy I will quoted verbatim:    \"Pleasant 49-year-old woman. She indicates she has been having problems with her bowels for 4 to 6 months. She indicates she had been seeing clinicians at [facility]. She had been referred to GI at [facility]. She indicates she saw them on 1 occasion. She indicates they were going to schedule colonoscopy but for some reason that did not occur. She was referred to gastroenterology at [facility]. She indicates they were also going to pursue EGD and colonoscopy, but testing revealed evidence of C. difficile. She indicates she received a course of vancomycin treatment. She did not experience significant response to her treatment course with vancomycin. She indicates that is the only antimicrobial treatment she has had to date for C. difficile. She indicates a prior to her treatment with vancomycin there had been some other medicines attempted including Imodium. She presented to the hospital with ongoing problems with diarrhea. She has also had some nausea and vomiting. She indicates her stools have been liquid in nature. She has had some intermittent generalized abdominal pain. She presented to the hospital today and was admitted for further management. She indicates she has had some antibiotic treatment in the last 6 months. She had received amoxicillin around the time she had had ENT surgery.\"    Patient states that she felt pretty well this morning and indeed her nurse and hospitalist noted that she appeared to be much more comfortable.  However, she says just in the past hours or so she is felt distended, generalized discomfort over in her entire " abdomen, nausea, and vomiting.      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    CTS (carpal tunnel syndrome) 2019    Dental disease     Depression     Diastolic dysfunction 2022    Dizziness     Emphysema of lung 2020    GERD (gastroesophageal reflux disease)     Headache     Hyperlipidemia     Hypertension     Hypothyroidism     Mixed simple and mucopurulent chronic bronchitis 04/18/2023    Non-occlusive coronary artery disease 05/04/2023    NSTEMI (non-ST elevated myocardial infarction) (HCC)     Pneumonia 2022    Pulmonary emphysema 04/21/2023    Shingles 2018    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       Past Surgical History:  Past Surgical History:   Procedure Laterality Date    APPENDECTOMY      CARDIAC CATHETERIZATION N/A 11/04/2017    Procedure: Coronary angiography;  Surgeon: Luis Colvin MD;  Location: [facility] CATH INVASIVE LOCATION;  Service:     CARDIAC CATHETERIZATION N/A 05/31/2023    Procedure: Left Heart Cath;  Surgeon: Steffen Rossi MD;  Location: [facility] CATH INVASIVE LOCATION;  Service: Cardiology;  Laterality: N/A;    CARPAL TUNNEL RELEASE  2019    ENDOSCOPY N/A 7/8/2024    Procedure: ESOPHAGOGASTRODUODENOSCOPY WITH ANESTHESIA;  Surgeon: Gordy Wang MD;  Location: [facility] ENDOSCOPY;  Service: Gastroenterology;  Laterality: N/A;  pre: dysphagia.   post: esophagus dilated.   no PCP    HYSTERECTOMY      PANENDOSCOPY N/A 5/30/2024    Procedure: DIRECT LARYNGOSCOPY WITH BIOPSY OF VOCAL CORD POLYPS WITH POSSIBLE TRACHEOSTOMY;  Surgeon: Mendez Padilla MD;  Location: [facility] OR;  Service: ENT;  Laterality: N/A;    ND RT/LT HEART CATHETERS N/A 11/04/2017    Procedure: Percutaneous Coronary Intervention;  Surgeon: Luis Colvin MD;  Location: [facility] CATH INVASIVE LOCATION;  Service:  Cardiovascular    THYROID SURGERY      TOTAL THYROIDECTOMY      TRACHEOSTOMY N/A 2024    Procedure: POSSIBLE TRACHEOSTOMY;  Surgeon: Mendez Padilla MD;  Location: [facility];  Service: ENT;  Laterality: N/A;        Social History:   Social History     Tobacco Use    Smoking status: Former     Current packs/day: 0.00     Average packs/day: 0.7 packs/day for 45.9 years (30.0 ttl pk-yrs)     Types: Cigarettes     Start date: 1991     Quit date: 3/1/2024     Years since quittin.4     Passive exposure: Past    Smokeless tobacco: Never    Tobacco comments:     Less than 1/2 pack a day    Substance Use Topics    Alcohol use: Yes     Comment: daily        Family History:  Family History   Problem Relation Age of Onset    Asthma Mother     Cancer Mother     Emphysema Mother     Cancer Father     Heart failure Father     Hypertension Father        Home Meds:  Medications Prior to Admission   Medication Sig Dispense Refill Last Dose    carvedilol (COREG) 25 MG tablet Take 1 tablet by mouth 2 (Two) Times a Day. 180 tablet 3     dilTIAZem CD (CARDIZEM CD) 120 MG 24 hr capsule Take 1 capsule by mouth Daily.       FLUoxetine (PROzac) 20 MG capsule Take 1 capsule by mouth Daily.       hydroCHLOROthiazide 25 MG tablet Take 1 tablet by mouth Daily.       lisinopril (PRINIVIL,ZESTRIL) 40 MG tablet Take 1 tablet by mouth Daily.       meclizine (ANTIVERT) 25 MG tablet Take 1 tablet by mouth 3 (Three) Times a Day As Needed for Dizziness. 42 tablet 2     pantoprazole (PROTONIX) 40 MG EC tablet Take 1 tablet by mouth 2 (Two) Times a Day. 60 tablet 2     potassium chloride (KLOR-CON M20) 20 MEQ CR tablet Take 1 tablet by mouth 2 (Two) Times a Day.       simvastatin (ZOCOR) 10 MG tablet Take 1 tablet by mouth Every Night.       Synthroid 125 MCG tablet Take 1 tablet by mouth Daily for 30 days. 30 tablet 1     topiramate (TOPAMAX) 50 MG tablet Take 1 tablet by mouth 2 (Two) Times a Day. 60 tablet 5     albuterol sulfate HFA  108 (90 Base) MCG/ACT inhaler Inhale 2 puffs Every 4 (Four) Hours As Needed for Wheezing. 6.7 g 0     cloNIDine (CATAPRES) 0.1 MG tablet Take 1 tablet by mouth As Needed for High Blood Pressure (for blood pressure >140/90).       ipratropium-albuterol (DUO-NEB) 0.5-2.5 mg/3 ml nebulizer Take 3 mL by nebulization Every 4 (Four) Hours As Needed for Wheezing. 360 mL 0     nitroglycerin (NITROSTAT) 0.4 MG SL tablet Place 1 tablet under the tongue Every 5 (Five) Minutes As Needed for Chest Pain. Take no more than 3 doses in 15 minutes.          Current Meds:   atorvastatin, 10 mg, Oral, Daily  [Held by provider] carvedilol, 25 mg, Oral, BID  chlordiazePOXIDE, 25 mg, Oral, Q8H  dilTIAZem CD, 120 mg, Oral, Daily  fidaxomicin, 200 mg, Oral, Q12H  FLUoxetine, 20 mg, Oral, Daily  folic acid, 1 mg, Oral, Daily  levothyroxine, 125 mcg, Oral, Q AM  [Held by provider] lisinopril, 40 mg, Oral, Daily  lisinopril, 40 mg, Oral, Once  metroNIDAZOLE, 500 mg, Intravenous, Q8H  multivitamin with minerals, 1 tablet, Oral, Daily  pantoprazole, 40 mg, Oral, BID AC  sodium chloride, 10 mL, Intravenous, Q12H  thiamine, 100 mg, Oral, Daily  topiramate, 50 mg, Oral, BID        Allergies:  No Known Allergies    Review of Systems  Pertinent items are noted in HPI, all other systems reviewed and negative    Objective     Vital Signs  Temp:  [97.4 °F (36.3 °C)-98.5 °F (36.9 °C)] 97.8 °F (36.6 °C)  Heart Rate:  [76-98] 86  Resp:  [18-23] 18  BP: ()/() 151/105    Physical Exam:     General Appearance:  Alert, cooperative, she appears to be in acute distress   Head:  Normocephalic, without obvious abnormality, atraumatic   Eyes:  Lids and lashes normal, conjunctivae and sclerae pale, no icterus, corneas clear, PERRLA   Ears:  Ears appear intact with no abnormalities noted   Throat:  No oral lesions, no thrush, oral mucosa moist   Neck:  No adenopathy, supple, trachea midline, no thyromegaly, no carotid bruit, no JVD   Back:  No kyphosis  "present, no scoliosis present, no skin lesions, erythema or scars, no tenderness to percussion, or palpation, range of motion normal   Lungs:  Clear to auscultation,respirations regular, even and unlabored    Heart:  Regular rhythm and normal rate, normal S1 and S2, no murmur, no gallop, no rub, no click   Breast Exam:  Deferred   Abdomen:  Her abdomen is grossly distended.  She is particularly tympanitic in the epigastrium but she is tympanitic throughout her entire abdomen.  The slightest touch causes much tenderness.  This is even after she has received some IV opioid recently   Genitalia:  Deferred   Extremities:  Moves all extremities well, no edema, no cyanosis, no redness   Pulses:  Pulses palpable and equal bilaterally   Skin:  No bleeding, bruising or rash   Lymph nodes:  No palpable adenopathy   Neurologic:  Cranial nerves 2 - 12 grossly intact, sensation intact, DTR present and equal bilaterally       WBC No results found for: \"WBCS\"   HGB Hemoglobin   Date Value Ref Range Status   07/30/2024 9.7 (L) 12.0 - 15.9 g/dL Final   07/29/2024 12.5 12.0 - 15.9 g/dL Final      HCT Hematocrit   Date Value Ref Range Status   07/30/2024 29.7 (L) 34.0 - 46.6 % Final   07/29/2024 36.7 34.0 - 46.6 % Final      Platelets No results found for: \"LABPLAT\"   MCV MCV   Date Value Ref Range Status   07/30/2024 106.8 (H) 79.0 - 97.0 fL Final   07/29/2024 103.7 (H) 79.0 - 97.0 fL Final          Sodium Sodium   Date Value Ref Range Status   07/30/2024 140 136 - 145 mmol/L Final   07/29/2024 137 136 - 145 mmol/L Final      Potassium Potassium   Date Value Ref Range Status   07/30/2024 3.1 (L) 3.5 - 5.2 mmol/L Final   07/29/2024 2.6 (C) 3.5 - 5.2 mmol/L Final   07/29/2024 2.4 (C) 3.5 - 5.2 mmol/L Final     Comment:     Slight hemolysis detected by analyzer. Result may be falsely elevated.   07/29/2024 2.3 (C) 3.5 - 5.2 mmol/L Final      Chloride Chloride   Date Value Ref Range Status   07/30/2024 101 98 - 107 mmol/L Final "   07/29/2024 92 (L) 98 - 107 mmol/L Final      CO2 CO2   Date Value Ref Range Status   07/30/2024 27.0 22.0 - 29.0 mmol/L Final   07/29/2024 25.0 22.0 - 29.0 mmol/L Final      BUN BUN   Date Value Ref Range Status   07/30/2024 4 (L) 6 - 20 mg/dL Final   07/29/2024 3 (L) 6 - 20 mg/dL Final      Creatinine Creatinine   Date Value Ref Range Status   07/30/2024 0.43 (L) 0.57 - 1.00 mg/dL Final   07/29/2024 0.45 (L) 0.57 - 1.00 mg/dL Final      Calcium Calcium   Date Value Ref Range Status   07/30/2024 7.5 (L) 8.6 - 10.5 mg/dL Final   07/29/2024 8.5 (L) 8.6 - 10.5 mg/dL Final      Albumin Albumin   Date Value Ref Range Status   07/30/2024 2.8 (L) 3.5 - 5.2 g/dL Final   07/29/2024 3.4 (L) 3.5 - 5.2 g/dL Final      AST  ALT  PT/INR:   AST (SGOT)   Date Value Ref Range Status   07/30/2024 120 (H) 1 - 32 U/L Final   07/29/2024 125 (H) 1 - 32 U/L Final     ALT (SGPT)   Date Value Ref Range Status   07/30/2024 33 1 - 33 U/L Final   07/29/2024 43 (H) 1 - 33 U/L Final     Protime   Date Value Ref Range Status   07/30/2024 13.8 11.8 - 14.8 Seconds Final   07/29/2024 12.7 11.8 - 14.8 Seconds Final   /  INR   Date Value Ref Range Status   07/30/2024 1.02 0.91 - 1.09 Final   07/29/2024 0.91 0.91 - 1.09 Final            Imaging Results (Last 72 Hours)       Procedure Component Value Units Date/Time    US Liver [[id number]] Collected: 07/29/24 1917     Updated: 07/29/24 1923    Narrative:      EXAMINATION:  US LIVER-  7/29/2024 4:35 PM     HISTORY: Elevated liver function test.     COMPARISON: 6/26/2023.     TECHNIQUE: Grayscale and color flow ultrasound was performed.     PANCREAS: The pancreas is echogenic without a focal abnormality. No  pancreatic duct dilatation is seen.     LIVER: There is increased liver echogenicity. No focal liver lesion is  seen.     GALLBLADDER: There are no gallstones. There is no gallbladder wall  thickening.     COMMON BILE DUCT: 2 mm.     RIGHT KIDNEY: The renal size, cortical thickness and echogenicity  are  normal. There is no hydronephrosis.     OTHER: The visualized abdominal aorta and inferior vena cava are normal  caliber.          Impression:      1. No gallstones or ductal dilatation.  2. Fatty infiltration of the liver.  3. Echogenic pancreas may be a normal variant. It may also be seen in  patients with diabetes and prediabetes.           The images and report are stored on PAC's per institutional and state  guidelines.     This report was signed and finalized on 7/29/2024 7:20 PM by Dr. Jesus Manuel Lomax MD.       XR Chest 1 View [[id number]] Collected: 07/29/24 1820     Updated: 07/29/24 1825    Narrative:      EXAMINATION:  XR CHEST 1 VW-  7/29/2024 5:18 PM     HISTORY: thoracic pain; K52.9-Noninfective gastroenteritis and colitis,  unspecified; R19.7-Diarrhea, unspecified; E87.6-Hypokalemia;  R16.0-Hepatomegaly, not elsewhere classified. Hyperlipidemia. Pulmonary  hypertension. Tobacco use disorder.     COMPARISON: 5/30/2024.     TECHNIQUE: Single view AP image.     FINDINGS: There is mild hypoventilation. There is minimal vague density  in the right cardiophrenic angle and in the left lung base laterally,  stable compared to the prior study. The heart is normal in size. No  acute bony abnormality is seen.          Impression:      1. Mild hypoventilation.  2. Vague densities in the right cardiophrenic angle and left lung base  could be related to the poor inspiration and atelectasis. There is a  similar appearance on the prior study and therefore this may be related  to prominent fat pads.           This report was signed and finalized on 7/29/2024 6:22 PM by Dr. Jesus Manuel Lomax MD.       CT Angiogram Abdomen Pelvis [[id number]] Collected: 07/29/24 1410     Updated: 07/29/24 1434    Narrative:      EXAMINATION: CT ANGIOGRAM ABDOMEN PELVIS-      7/29/2024 12:24 PM     HISTORY: ab pain elevated lactic acid     In order to have a CT radiation dose as low as reasonably achievable  Automated Exposure Control  was utilized for adjustment of the mA and/or  KV according to patient size.     Total DLP = 919.59 mGy.cm     The CT angiography of the abdominal pelvis is performed before and after  intravenous contrast enhancement.     The images are acquired in axial plane and subsequent 2D reconstruction  in coronal and sagittal planes and 3D maximum intensity projection image  reconstruction.     Comparison is made with the previous study dated 2/26/2024.     There are atheromatous changes of the abdominal aorta iliac and femoral  arteries.     There is no aneurysmal dilatation of the abdominal aorta or iliac  arteries.     A mixed plaque is seen at the origin of the celiac trunk with less than  50% diameter stenosis. There is subsequent normal branches.     There is normal origin course and caliber of the superior mesenteric  artery. Normal branches.     Small calcific plaques are seen in the proximal renal arteries  bilaterally. No flow-limiting stenosis. No aneurysmal dilatation. There  is an accessory small left renal artery arising below the level of the  main renal artery.     There is high-grade stenosis at the origin of the inferior mesenteric  artery from the left anterolateral margin of the distal abdominal aorta.  A small and attenuated inferior mesenteric artery is opacified with a  subsequent small and diminutive left colonic and superior rectal  arteries.     There are calcific plaques throughout the course of both common,  internal and external iliac arteries. No flow-limiting stenosis.     Normal size common femoral artery is seen dividing into normal appearing  superficial and deep femoral arteries.     There is no evidence of contrast extravasation seen in the stomach,  small and large bowel.     The lung bases included in the study appear unremarkable.     Limited visualized cardiomediastinal structures show atheromatous change  in the coronary arteries. No significant cardiomegaly.     There is severe fatty  infiltration of the liver. There is moderate  hepatomegaly. The liver measures 21.5 cm in craniocaudal dimension. No  focal intrinsic abnormality. The spleen is normal.     No radiopaque gallstones.     Pancreas is normal.     Moderate lobulation of the left adrenal gland. No discrete mass. Right  adrenal gland is normal.     Mild lobulation of the renal contour bilaterally. No mass. No calculi.  No hydronephrosis. Both ureters appear normal and nondilated. The  urinary bladder is poorly distended with moderate wall thickening. No  visible intrinsic abnormality.     There are small fat-containing femoral hernias bilaterally.     There is evidence of prior ventral hernia repair surgery.     The stomach is poorly distended. No focal abnormality. Duodenum is  normal. Fluid-filled nondilated nondistended small bowel is seen.  Moderate diffuse thickening of the wall and edema of the mucosa of the  entire colon most pronounced in the proximal colon including the  ascending and transverse colon. Distal colon is significantly  decompressed with moderate wall thickening. There is no significant  surrounding peritoneal fat infiltration.     No evidence of abdominal or pelvic lymphadenopathy.     Images reviewed in bone window show chronic degenerative changes of the  lumbar and limited visualized thoracic spine. Severe degeneration of  disc L5-S1.       Impression:      1. Acute or subacute nonspecific colitis.  2. Severe hepatic steatosis and hepatomegaly.  3. The remaining abdominal pelvis is unremarkable similar to the  previous study.     This report was signed and finalized on 7/29/2024 2:31 PM by Dr. Karmen Teague MD.               Result Review:  I have personally reviewed the results from the time of this admission to 7/30/2024 12:17 CDT and agree with these findings:  [x]  Laboratory list / accordion  [x]  Microbiology  [x]  Radiology  []  EKG/Telemetry   []  Cardiology/Vascular   []  Pathology  [x]  Old  records  []  Other:  Most notable findings include: No independent interpretation      Assessment & Plan       Pancolitis      Her presentation right this moment is very worrisome for the development of toxic megacolon, or at least generalized ileus.  If she has generalized ileus it will render her Dificid ineffective for the time being.  I will make her strict n.p.o., NG tube aspiration, and stat KUB.  Further recommendations are pending these maneuvers.    I discussed the patients findings and my recommendations with patient and nursing staff    Marty Browning MD  07/30/24  12:17 CDT    DISCLAIMER:    This physician works through a [employer] as an inpatient consultant gastroenterologist only and has no outpatient clinic for patient follow up.  Any results not available at time of inpatient discharge and/or GI clinic follow up should be managed by the hospitalist team, PCP, or outpatient gastroenterologist.

## 2024-07-30 NOTE — PAYOR COMM NOTE
"ADMIT INPT 7-29-24  UR  106 [id number]    Robles Singh (49 y.o. Female)       Date of Birth   1975    Social Security Number       Address   [address] [facility] [address]    Home Phone   [phone]    MRN   [id number]       [facility]    Marital Status                               Admission Date   7/29/24    Admission Type   Emergency    Admitting Provider   Jc Murillo MD    Attending Provider   Jc Murillo MD    Department, Room/Bed   [facility] 3C, 371/1       Discharge Date       Discharge Disposition       Discharge Destination                                 Attending Provider: Jc Murillo MD    Allergies: No Known Allergies    Isolation: Spore   Infection: C.difficile (07/02/24), C.difficile (rule out) (07/29/24)   Code Status: CPR    Ht: 172.7 cm (68\")   Wt: 78.9 kg (174 lb)    Admission Cmt: None   Principal Problem: Pancolitis [K51.00]                   Active Insurance as of 7/29/2024       Primary Coverage       Payor Plan Insurance Group Employer/Plan Group    [employer] [place] [employer] [place]ICA[place]        Payor Plan Address Payor Plan Phone Number Payor Plan Fax Number Effective Dates    [address] [address] [phone]  6/8/2020 - None Entered    [place] [address]         Subscriber Name Subscriber Birth Date Member ID       ROBLES SINGH 1975 [id number]                     Emergency Contacts        (Rel.) Home Phone Work Phone Mobile Phone    Kirill Montgomery (Spouse) [phone] -- [phone]                 History & Physical        Jc Murillo MD at 07/29/24 [facility]              [facility] Medicine Services  HISTORY AND PHYSICAL    Date of Admission: 7/29/2024  Primary Care Physician: Provider, No Known    Subjective   Primary Historian: Patient    Chief Complaint: Diarrhea and vomiting    History of Present Illness  49-year-old female with history of hypertension, " alcohol abuse disorder, coronary artery disease, chronic obstructive pulmonary disease, former smoker, comes to the hospital reporting diarrhea that has been present for approximately 2 months.  She describes diarrhea as liquid, several times per day, and occasional associated vomiting.  She states that approximately 2 weeks ago started developing abdominal pain, described as cramps, located mostly in the lower abdomen; she also reports stabbing quality pain over the right posterior rib cage that increases with inspiration and motion, and has been present since yesterday.  Patient states that she is trying to quit alcohol, but usually takes 1 or 2 drinks shots to avoid withdrawal tremors.      On 7/08/2024, patient had upper endoscopy that showed normal results.  She was having diarrhea and for this reason C. difficile testing was performed; toxin was positive but the antigen was negative.  He was treated with vancomycin oral for 10 days.  Diarrhea persisted.    Review of Systems   Otherwise complete ROS reviewed and negative except as mentioned in the HPI.    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    CTS (carpal tunnel syndrome) 2019    Dental disease     Depression     Diastolic dysfunction 2022    Dizziness     Emphysema of lung 2020    GERD (gastroesophageal reflux disease)     Headache     Hyperlipidemia     Hypertension     Hypothyroidism     Mixed simple and mucopurulent chronic bronchitis 04/18/2023    Non-occlusive coronary artery disease 05/04/2023    NSTEMI (non-ST elevated myocardial infarction) (HCC)     Pneumonia 2022    Pulmonary emphysema 04/21/2023    Shingles 2018    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     Past Surgical History:  Past Surgical History:   Procedure Laterality Date     APPENDECTOMY      CARDIAC CATHETERIZATION N/A 11/04/2017    Procedure: Coronary angiography;  Surgeon: Luis Colvin MD;  Location: [facility] CATH INVASIVE LOCATION;  Service:     CARDIAC CATHETERIZATION N/A 05/31/2023    Procedure: Left Heart Cath;  Surgeon: Steffen Rossi MD;  Location: [facility] CATH INVASIVE LOCATION;  Service: Cardiology;  Laterality: N/A;    CARPAL TUNNEL RELEASE  2019    ENDOSCOPY N/A 7/8/2024    Procedure: ESOPHAGOGASTRODUODENOSCOPY WITH ANESTHESIA;  Surgeon: Gordy Wang MD;  Location: [facility] ENDOSCOPY;  Service: Gastroenterology;  Laterality: N/A;  pre: dysphagia.   post: esophagus dilated.   no PCP    HYSTERECTOMY      PANENDOSCOPY N/A 5/30/2024    Procedure: DIRECT LARYNGOSCOPY WITH BIOPSY OF VOCAL CORD POLYPS WITH POSSIBLE TRACHEOSTOMY;  Surgeon: Mendez Padilla MD;  Location: [facility] OR;  Service: ENT;  Laterality: N/A;    WA RT/LT HEART CATHETERS N/A 11/04/2017    Procedure: Percutaneous Coronary Intervention;  Surgeon: Luis Colvin MD;  Location: [facility] CATH INVASIVE LOCATION;  Service: Cardiovascular    THYROID SURGERY      TOTAL THYROIDECTOMY      TRACHEOSTOMY N/A 5/30/2024    Procedure: POSSIBLE TRACHEOSTOMY;  Surgeon: Mendez Padilla MD;  Location: [facility] OR;  Service: ENT;  Laterality: N/A;     Social History:  reports that she quit smoking about 4 months ago. Her smoking use included cigarettes. She started smoking about 33 years ago. She has a 30 pack-year smoking history. She has been exposed to tobacco smoke. She has never used smokeless tobacco. She reports current alcohol use. She reports that she does not use drugs.    Family History: family history includes Asthma in her mother; Cancer in her father and mother; Emphysema in her mother; Heart failure in her father; Hypertension in her father.   Reviewed    Allergies:  No Known Allergies    Medications:  Prior to Admission medications    Medication Sig Start Date End Date Taking? Authorizing Provider    albuterol sulfate  (90 Base) MCG/ACT inhaler Inhale 2 puffs Every 4 (Four) Hours As Needed for Wheezing. 9/22/23   Sivan Wise APRN   carvedilol (COREG) 25 MG tablet Take 1 tablet by mouth 2 (Two) Times a Day. 5/11/23   Seema Hendricks APRN   cloNIDine (CATAPRES) 0.1 MG tablet Take 1 tablet by mouth As Needed for High Blood Pressure (for blood pressure >140/90).    Galdino Wei MD   dilTIAZem CD (CARDIZEM CD) 120 MG 24 hr capsule TAKE 1 CAPSULE BY MOUTH DAILY. 4/13/24   Steffen Rossi MD   FLUoxetine (PROzac) 20 MG capsule Take 1 capsule by mouth Daily. 11/6/23   Galdino Wei MD   ipratropium-albuterol (DUO-NEB) 0.5-2.5 mg/3 ml nebulizer Take 3 mL by nebulization Every 4 (Four) Hours As Needed for Wheezing. 4/3/23   Savage Calderon Jr., MD   lisinopril (PRINIVIL,ZESTRIL) 40 MG tablet Take 1 tablet by mouth Daily.    Galdino Wei MD   meclizine (ANTIVERT) 25 MG tablet Take 1 tablet by mouth 3 (Three) Times a Day As Needed for Dizziness. 3/10/23   Steffen Rossi MD   nitroglycerin (NITROSTAT) 0.4 MG SL tablet PLACE 1 TABLET UNDER THE TONGUE AS NEEDED FOR ANGINA, MAY REPEAT EVERY 5 MINUTES FOR UP THREE DOSES 11/6/23   Seema Hendricks APRN   pantoprazole (PROTONIX) 40 MG EC tablet Take 1 tablet by mouth 2 (Two) Times a Day. 7/8/24   Gordy Wang MD   POTASSIUM PO Take  by mouth Daily.    Galdino Wei MD   simvastatin (ZOCOR) 10 MG tablet Take 1 tablet by mouth Every Night. 11/6/23   Galdino Wei MD   Synthroid 125 MCG tablet Take 1 tablet by mouth Daily for 30 days. 5/28/24 7/8/24  Qing Ray APRN   topiramate (TOPAMAX) 50 MG tablet Take 1 tablet by mouth 2 (Two) Times a Day. 6/13/24   Domitila Duncan MD     I have utilized all available immediate resources to obtain, update, or review the patient's current medications (including all prescriptions, over-the-counter products, herbals, cannabis/cannabidiol products, and  "vitamin/mineral/dietary (nutritional) supplements).    Objective     Vital Signs: BP (!) 142/101   Pulse 96   Temp 98.1 °F (36.7 °C)   Resp 23   Ht 172.7 cm (68\")   Wt 78.9 kg (174 lb)   LMP  (LMP Unknown)   SpO2 93%   BMI 26.46 kg/m²   Physical Exam   Constitutional:       Appearance: Anxious, in distress due to pain, no respiratory distress, alert, oriented x 3.  HENT:      Head: Normocephalic and atraumatic.      Nose: Nose normal.      Mouth/Throat:      Mouth: Mucous membranes are moist.      Pharynx: Oropharynx is clear.   Eyes:      Extraocular Movements: Extraocular movements intact.      Conjunctiva/sclera: Conjunctivae normal.      Pupils: Pupils are equal, round, and reactive to light.   Cardiovascular:      Rate and Rhythm: Normal rate and regular rhythm.      Pulses: Normal pulses.   Pulmonary:      Effort: No respiratory distress.      Breath sounds: Normal breath sounds. No wheezing, rhonchi or rales.   Abdominal:      General: Abdomen is flat.  Tender to palpation in mesogastric area, with no peritoneal signs bowel sounds are normal.      Palpations: Abdomen is soft.      Tenderness: There is no guarding or rebound.   Musculoskeletal:         General: Normal range of motion.      Cervical back: Normal range of motion and neck supple.   Extremities:  No lower extremity edema.  Skin:     Capillary Refill: Capillary refill takes less than 2 seconds.      Coloration: Skin is not jaundiced.      Findings: No rash.   Neurological: No significant tremors.     General: No focal deficit present.      Mental Status: Patient is alert, oriented to place time and person.     Sensory: No sensory deficit.      Motor: No weakness.      Coordination: Coordination normal.   Psychiatric:         Mood and Affect: Anxious     Behavior: Behavior normal.           Results Reviewed:  Lab Results (last 24 hours)       Procedure Component Value Units Date/Time    Magnesium [[id number]]  (Normal) Collected: 07/29/24 " 1334    Specimen: Blood Updated: 07/29/24 1520     Magnesium 1.6 mg/dL     Potassium [[id number]]  (Abnormal) Collected: 07/29/24 1334    Specimen: Blood Updated: 07/29/24 1356     Potassium 2.4 mmol/L      Comment: Slight hemolysis detected by analyzer. Result may be falsely elevated.       Protime-INR [[id number]]  (Normal) Collected: 07/29/24 1230    Specimen: Blood Updated: 07/29/24 1330     Protime 12.7 Seconds      INR 0.91    Magnesium [[id number]]  (Normal) Collected: 07/29/24 1230    Specimen: Blood Updated: 07/29/24 1328     Magnesium 1.6 mg/dL     Comprehensive Metabolic Panel [[id number]]  (Abnormal) Collected: 07/29/24 1230    Specimen: Blood Updated: 07/29/24 1318     Glucose 102 mg/dL      BUN 3 mg/dL      Creatinine 0.45 mg/dL      Sodium 137 mmol/L      Potassium 2.3 mmol/L      Chloride 92 mmol/L      CO2 25.0 mmol/L      Calcium 8.5 mg/dL      Total Protein 6.8 g/dL      Albumin 3.4 g/dL      ALT (SGPT) 43 U/L      AST (SGOT) 125 U/L      Alkaline Phosphatase 621 U/L      Total Bilirubin 0.9 mg/dL      Globulin 3.4 gm/dL      A/G Ratio 1.0 g/dL      BUN/Creatinine Ratio 6.7     Anion Gap 20.0 mmol/L      eGFR 116.6 mL/min/1.73     Narrative:      GFR Normal >60  Chronic Kidney Disease <60  Kidney Failure <15      Procalcitonin [[id number]]  (Abnormal) Collected: 07/29/24 1230    Specimen: Blood Updated: 07/29/24 1315     Procalcitonin 36.34 ng/mL     Narrative:      As a Marker for Sepsis (Non-Neonates):    1. <0.5 ng/mL represents a low risk of severe sepsis and/or septic shock.  2. >2 ng/mL represents a high risk of severe sepsis and/or septic shock.    As a Marker for Lower Respiratory Tract Infections that require antibiotic therapy:    PCT on Admission    Antibiotic Therapy       6-12 Hrs later    >0.5                Strongly Recommended  >0.25 - <0.5        Recommended   0.1 - 0.25          Discouraged              Remeasure/reassess PCT  <0.1                Strongly Discouraged      "Remeasure/reassess PCT    As 28 day mortality risk marker: \"Change in Procalcitonin Result\" (>80% or <=80%) if Day 0 (or Day 1) and Day 4 values are available. Refer to http://www.[facility]-pct-calculator.com    Change in PCT <=80%  A decrease of PCT levels below or equal to 80% defines a positive change in PCT test result representing a higher risk for 28-day all-cause mortality of patients diagnosed with severe sepsis for septic shock.    Change in PCT >80%  A decrease of PCT levels of more than 80% defines a negative change in PCT result representing a lower risk for 28-day all-cause mortality of patients diagnosed with severe sepsis or septic shock.       Lactic Acid, Plasma [[id number]]  (Abnormal) Collected: 07/29/24 1230    Specimen: Blood Updated: 07/29/24 1310     Lactate 7.4 mmol/L     Lipase [[id number]]  (Normal) Collected: 07/29/24 1230    Specimen: Blood Updated: 07/29/24 1305     Lipase 37 U/L     Urinalysis With Culture If Indicated - Urine, Clean Catch [[id number]]  (Abnormal) Collected: 07/29/24 1246    Specimen: Urine, Clean Catch Updated: 07/29/24 1258     Color, UA Dark Yellow     Appearance, UA Clear     pH, UA 6.5     Specific Gravity, UA 1.010     Glucose, UA Negative     Ketones, UA Negative     Bilirubin, UA Negative     Blood, UA Negative     Protein, UA Negative     Leuk Esterase, UA Negative     Nitrite, UA Negative     Urobilinogen, UA 0.2 E.U./dL    Narrative:      In absence of clinical symptoms, the presence of pyuria, bacteria, and/or nitrites on the urinalysis result does not correlate with infection.  Urine microscopic not indicated.    Blood Culture - Blood, Arm, Right [[id number]] Collected: 07/29/24 1230    Specimen: Blood from Arm, Right Updated: 07/29/24 1248    Blood Culture - Blood, Arm, Left [[id number]] Collected: 07/29/24 1230    Specimen: Blood from Arm, Left Updated: 07/29/24 1247    CBC & Differential [[id number]]  (Abnormal) Collected: 07/29/24 1230    Specimen: Blood " Updated: 07/29/24 1246    Narrative:      The following orders were created for panel order CBC & Differential.  Procedure                               Abnormality         Status                     ---------                               -----------         ------                     CBC Auto Differential[[id number]]        Abnormal            Final result                 Please view results for these tests on the individual orders.    CBC Auto Differential [[id number]]  (Abnormal) Collected: 07/29/24 1230    Specimen: Blood Updated: 07/29/24 1246     WBC 8.37 10*3/mm3      RBC 3.54 10*6/mm3      Hemoglobin 12.5 g/dL      Hematocrit 36.7 %      .7 fL      MCH 35.3 pg      MCHC 34.1 g/dL      RDW 15.4 %      RDW-SD 59.6 fl      MPV 10.3 fL      Platelets 285 10*3/mm3      Neutrophil % 56.6 %      Lymphocyte % 28.1 %      Monocyte % 11.2 %      Eosinophil % 1.8 %      Basophil % 1.1 %      Immature Grans % 1.2 %      Neutrophils, Absolute 4.74 10*3/mm3      Lymphocytes, Absolute 2.35 10*3/mm3      Monocytes, Absolute 0.94 10*3/mm3      Eosinophils, Absolute 0.15 10*3/mm3      Basophils, Absolute 0.09 10*3/mm3      Immature Grans, Absolute 0.10 10*3/mm3      nRBC 0.0 /100 WBC           Imaging Results (Last 24 Hours)       Procedure Component Value Units Date/Time    CT Angiogram Abdomen Pelvis [[id number]] Collected: 07/29/24 1410     Updated: 07/29/24 1434    Narrative:      EXAMINATION: CT ANGIOGRAM ABDOMEN PELVIS-      7/29/2024 12:24 PM     HISTORY: ab pain elevated lactic acid     In order to have a CT radiation dose as low as reasonably achievable  Automated Exposure Control was utilized for adjustment of the mA and/or  KV according to patient size.     Total DLP = 919.59 mGy.cm     The CT angiography of the abdominal pelvis is performed before and after  intravenous contrast enhancement.     The images are acquired in axial plane and subsequent 2D reconstruction  in coronal and sagittal planes and 3D  maximum intensity projection image  reconstruction.     Comparison is made with the previous study dated 2/26/2024.     There are atheromatous changes of the abdominal aorta iliac and femoral  arteries.     There is no aneurysmal dilatation of the abdominal aorta or iliac  arteries.     A mixed plaque is seen at the origin of the celiac trunk with less than  50% diameter stenosis. There is subsequent normal branches.     There is normal origin course and caliber of the superior mesenteric  artery. Normal branches.     Small calcific plaques are seen in the proximal renal arteries  bilaterally. No flow-limiting stenosis. No aneurysmal dilatation. There  is an accessory small left renal artery arising below the level of the  main renal artery.     There is high-grade stenosis at the origin of the inferior mesenteric  artery from the left anterolateral margin of the distal abdominal aorta.  A small and attenuated inferior mesenteric artery is opacified with a  subsequent small and diminutive left colonic and superior rectal  arteries.     There are calcific plaques throughout the course of both common,  internal and external iliac arteries. No flow-limiting stenosis.     Normal size common femoral artery is seen dividing into normal appearing  superficial and deep femoral arteries.     There is no evidence of contrast extravasation seen in the stomach,  small and large bowel.     The lung bases included in the study appear unremarkable.     Limited visualized cardiomediastinal structures show atheromatous change  in the coronary arteries. No significant cardiomegaly.     There is severe fatty infiltration of the liver. There is moderate  hepatomegaly. The liver measures 21.5 cm in craniocaudal dimension. No  focal intrinsic abnormality. The spleen is normal.     No radiopaque gallstones.     Pancreas is normal.     Moderate lobulation of the left adrenal gland. No discrete mass. Right  adrenal gland is normal.     Mild  lobulation of the renal contour bilaterally. No mass. No calculi.  No hydronephrosis. Both ureters appear normal and nondilated. The  urinary bladder is poorly distended with moderate wall thickening. No  visible intrinsic abnormality.     There are small fat-containing femoral hernias bilaterally.     There is evidence of prior ventral hernia repair surgery.     The stomach is poorly distended. No focal abnormality. Duodenum is  normal. Fluid-filled nondilated nondistended small bowel is seen.  Moderate diffuse thickening of the wall and edema of the mucosa of the  entire colon most pronounced in the proximal colon including the  ascending and transverse colon. Distal colon is significantly  decompressed with moderate wall thickening. There is no significant  surrounding peritoneal fat infiltration.     No evidence of abdominal or pelvic lymphadenopathy.     Images reviewed in bone window show chronic degenerative changes of the  lumbar and limited visualized thoracic spine. Severe degeneration of  disc L5-S1.       Impression:      1. Acute or subacute nonspecific colitis.  2. Severe hepatic steatosis and hepatomegaly.  3. The remaining abdominal pelvis is unremarkable similar to the  previous study.     This report was signed and finalized on 7/29/2024 2:31 PM by Dr. Karmen Teague MD.             I have personally reviewed and interpreted the radiology studies and ECG obtained at time of admission.     Assessment / Plan   Assessment:   Active Hospital Problems    Diagnosis     **Pancolitis        Acute to subacute pancolitis  Questionable C. difficile colitis, possible recurrence?  Hypertensive urgency  Thoracic pain  Alcohol abuse disorder at risk for withdrawal  Anxiety disorder  COPD  Former smoker  Macrocytosis, likely from alcohol use        Lactate 7.4.  Lipase 37.  Hemoglobin 12.5, white blood cell count 8370, hematocrit 36.7.  .7  Platelet count 285,000    Potassium 2.3.  Repeat value 2.4.     Creatinine 0.45, BUN 20.    Glucose 102.    Magnesium 1.6.      Alkaline phosphatase elevated 621.  Bilirubin normal.  Albumin 3.4.  , ALT 43.      CT scan report  1. Acute or subacute nonspecific colitis.  2. Severe hepatic steatosis and hepatomegaly.  3. The remaining abdominal pelvis is unremarkable similar to the  previous study.    Treatment Plan  The patient will be admitted to my service here at [facility].      Ordered to replace potassium 40 mill equivalents p.o. every 2 hours x 3 doses.  IV fluids, normal saline with 20 meq  KCl at 100 mL/h.  Magnesium sulfate 2 g IV 1 dose.    Due to thoracic pain, will order chest x-ray now.    Elevated alkaline phosphatase above 600, AST/ALT consistent with alcohol abuse.  Will order liver ultrasound.  Ordered PT/INR.    Regarding colitis, Flagyl IV.  Will consult infectious disease specialist for input on management of pancolitis.  Patient received 10 days of vancomycin p.o. for C. difficile toxin positive/antigen negative test.  Continue IV vancomycin.    Thiamine, folic acid, multivitamins, Librium taper, Ativan IV as needed for withdrawal signs or symptoms.  CIWA protocol.    For blood pressure management, may be a component of withdrawal associated.  Gave Ativan per protocol, labetalol IV 10 mg.  Clonidine will be added to her regimen as needed.  Restart antihypertensive medications.  Patient stated she vomited and is unsure if medications were taken properly.    SCDs for DVT prophylaxis.      Medical Decision Making  Number and Complexity of problems: 7, moderate complexity  Differential Diagnosis: See above    Conditions and Status        Condition is unchanged.     MDM Data  External documents reviewed: No  Cardiac tracing (EKG, telemetry) interpretation: Sinus rhythm  Radiology interpretation: Radiology reports reviewed  Labs reviewed: Yes  Any tests that were considered but not ordered: No     Decision rules/scores evaluated (example  [id number], Wells, etc): None     Discussed with: Patient     Care Planning  Shared decision making: With patient  Code status and discussions: Full code    Disposition  Social Determinants of Health that impact treatment or disposition: Alcohol use  Estimated length of stay is 2 days.     I confirmed that the patient's advanced care plan is present, code status is documented, and a surrogate decision maker is listed in the patient's medical record.     The patient's surrogate decision maker is family member.     The patient was seen and examined by me on 7/29/2024 at 3:10 PM.    Electronically signed by Jc Murillo MD, 07/29/24, 15:38 CDT.               Electronically signed by Jc Murillo MD at 07/29/24 1612          Emergency Department Notes        Lilia Viramontes, RN at 07/29/24 1515          Nursing report ED to floor  Minnie Bang  49 y.o.  female    HPI:   Chief Complaint   Patient presents with    Abdominal Pain       Admitting doctor:   Jc Murillo MD    Consulting provider(s):  Consults       Date and Time Order Name Status Description    7/29/2024  3:12 PM Inpatient Gastroenterology Consult               Admitting diagnosis:   The primary encounter diagnosis was Colitis. Diagnoses of Diarrhea, unspecified type, Hypokalemia, and Hepatomegaly were also pertinent to this visit.    Code status:   Current Code Status       Date Active Code Status Order ID Comments User Context       7/29/2024 1514 CPR (Attempt to Resuscitate) [id number]  Jc Murillo MD ED        Question Answer    Code Status (Patient has no pulse and is not breathing) CPR (Attempt to Resuscitate)    Medical Interventions (Patient has pulse or is breathing) Full Support    Level Of Support Discussed With Patient                    Allergies:   Patient has no known allergies.    Intake and Output  No intake or output data in the 24 hours ending 07/29/24 1515    Weight:       07/29/24  1141   Weight: 78.9 kg  (174 lb)       Most recent vitals:   Vitals:    07/29/24 1137 07/29/24 1141 07/29/24 1247 07/29/24 1321   BP: (!) 183/119  (!) 163/117 (!) 142/101   BP Location:   Left arm    Patient Position:   Sitting    Pulse: 119  98 96   Resp:   23    Temp:       SpO2:   96% 93%   Weight:  78.9 kg (174 lb)     Height:         Oxygen Therapy: .    Active LDAs/IV Access:   Lines, Drains & Airways       Active LDAs       Name Placement date Placement time Site Days    Peripheral IV 07/29/24 1225 Anterior;Distal;Left Forearm 07/29/24  1225  Forearm  less than 1                    Labs (abnormal labs have a star):   Labs Reviewed   COMPREHENSIVE METABOLIC PANEL - Abnormal; Notable for the following components:       Result Value    Glucose 102 (*)     BUN 3 (*)     Creatinine 0.45 (*)     Potassium 2.3 (*)     Chloride 92 (*)     Calcium 8.5 (*)     Albumin 3.4 (*)     ALT (SGPT) 43 (*)     AST (SGOT) 125 (*)     Alkaline Phosphatase 621 (*)     BUN/Creatinine Ratio 6.7 (*)     Anion Gap 20.0 (*)     All other components within normal limits    Narrative:     GFR Normal >60  Chronic Kidney Disease <60  Kidney Failure <15     URINALYSIS W/ CULTURE IF INDICATED - Abnormal; Notable for the following components:    Color, UA Dark Yellow (*)     All other components within normal limits    Narrative:     In absence of clinical symptoms, the presence of pyuria, bacteria, and/or nitrites on the urinalysis result does not correlate with infection.  Urine microscopic not indicated.   PROCALCITONIN - Abnormal; Notable for the following components:    Procalcitonin 36.34 (*)     All other components within normal limits    Narrative:     As a Marker for Sepsis (Non-Neonates):    1. <0.5 ng/mL represents a low risk of severe sepsis and/or septic shock.  2. >2 ng/mL represents a high risk of severe sepsis and/or septic shock.    As a Marker for Lower Respiratory Tract Infections that require antibiotic therapy:    PCT on Admission    Antibiotic  "Therapy       6-12 Hrs later    >0.5                Strongly Recommended  >0.25 - <0.5        Recommended   0.1 - 0.25          Discouraged              Remeasure/reassess PCT  <0.1                Strongly Discouraged     Remeasure/reassess PCT    As 28 day mortality risk marker: \"Change in Procalcitonin Result\" (>80% or <=80%) if Day 0 (or Day 1) and Day 4 values are available. Refer to [email or web address] [email or web address]    Change in PCT <=80%  A decrease of PCT levels below or equal to 80% defines a positive change in PCT test result representing a higher risk for 28-day all-cause mortality of patients diagnosed with severe sepsis for septic shock.    Change in PCT >80%  A decrease of PCT levels of more than 80% defines a negative change in PCT result representing a lower risk for 28-day all-cause mortality of patients diagnosed with severe sepsis or septic shock.      LACTIC ACID, PLASMA - Abnormal; Notable for the following components:    Lactate 7.4 (*)     All other components within normal limits   CBC WITH AUTO DIFFERENTIAL - Abnormal; Notable for the following components:    RBC 3.54 (*)     .7 (*)     MCH 35.3 (*)     RDW-SD 59.6 (*)     Immature Grans % 1.2 (*)     Monocytes, Absolute 0.94 (*)     Immature Grans, Absolute 0.10 (*)     All other components within normal limits   POTASSIUM - Abnormal; Notable for the following components:    Potassium 2.4 (*)     All other components within normal limits   LIPASE - Normal   MAGNESIUM - Normal   PROTIME-INR - Normal   BLOOD CULTURE   BLOOD CULTURE   GASTROINTESTINAL PANEL, PCR (PREFERRED) DOES NOT INCLUDE CDIFF   LACTIC ACID, REFLEX   POTASSIUM   MAGNESIUM   CBC AND DIFFERENTIAL    Narrative:     The following orders were created for panel order CBC & Differential.  Procedure                               Abnormality         Status                     ---------                               -----------         ------                     CBC Auto " Differential[[id number]]        Abnormal            Final result                 Please view results for these tests on the individual orders.       Meds given in ED:   Medications   sodium chloride 0.9 % flush 10 mL (has no administration in time range)   Potassium Replacement - Follow Nurse / BPA Driven Protocol (has no administration in time range)   vancomycin IVPB 1500 mg in 0.9% NaCl (Premix) 500 mL (1,500 mg Intravenous New Bag 7/29/24 1456)   magnesium sulfate 2g/50 mL (PREMIX) infusion (has no administration in time range)   sodium chloride 0.9 % flush 10 mL (has no administration in time range)   sodium chloride 0.9 % flush 10 mL (has no administration in time range)   sodium chloride 0.9 % infusion 40 mL (has no administration in time range)   sodium chloride 0.9 % with KCl 20 mEq/L infusion (has no administration in time range)   acetaminophen (TYLENOL) tablet 650 mg (has no administration in time range)     Or   acetaminophen (TYLENOL) 160 MG/5ML oral solution 650 mg (has no administration in time range)     Or   acetaminophen (TYLENOL) suppository 650 mg (has no administration in time range)   HYDROcodone-acetaminophen (NORCO) 5-325 MG per tablet 1 tablet (has no administration in time range)   ondansetron ODT (ZOFRAN-ODT) disintegrating tablet 4 mg (has no administration in time range)     Or   ondansetron (ZOFRAN) injection 4 mg (has no administration in time range)   hydrALAZINE (APRESOLINE) injection 5 mg (has no administration in time range)   morphine injection 4 mg (4 mg Intravenous Given 7/29/24 1225)   ondansetron (ZOFRAN) injection 4 mg (4 mg Intravenous Given 7/29/24 1225)   labetalol (NORMODYNE,TRANDATE) injection 10 mg (10 mg Intravenous Given 7/29/24 1327)   piperacillin-tazobactam (ZOSYN) 3.375 g IVPB in 100 mL NS MBP (CD) (0 g Intravenous Stopped 7/29/24 1451)   sepsis fluid LR bolus 2,367 mL (2,367 mL Intravenous New Bag 7/29/24 1328)   iopamidol (ISOVUE-370) 76 % injection 100 mL (100  mL Intravenous Given 7/29/24 1349)   HYDROmorphone (DILAUDID) injection 1 mg (1 mg Intravenous Given 7/29/24 1451)     sodium chloride 0.9 % with KCl 20 mEq, 100 mL/hr         NIH Stroke Scale:       Isolation/Infection(s):  No active isolations   C.difficile     COVID Testing  Collected .  Resulted .    Nursing report ED to floor:  Mental status: .  Ambulatory status: .  Precautions: .    ED nurse phone extentsion- ..      Electronically signed by Lilia Viramontes RN at 07/29/24 2675       Humza Villanueva PA at 07/29/24 120       Attestation signed by Yaya Welch MD at 07/29/24 0764        SUPERVISE: For this patient encounter, I reviewed the APC's documentation, treatment plan, and medical decision making.  Yaya Welch MD 7/29/2024 19:04 CDT                         Subjective   History of Present Illness    Patient is a pleasant 49-year-old female chief complaint of worsening abdominal pain with nausea, vomiting, diarrhea and now fever.  The patient describes that she has been symptomatic for at least 1 year.  She has had chronic diarrhea, abdominal pain with nausea and vomiting.  She sought medical attention with a gastroenterologist as of late and had a stool study completed.  She sees Dr. Wang's group.  Upon her stool study results, her C. difficile toxin is positive but her C. difficile antigen is negative.  Ova and parasites are negative.  She did receive a round of vancomycin orally which she completed about 10 days ago.  Patient reports her symptoms have worsened with increased amounts of vomiting and diarrhea.  In the past 5 days, she has had fever measured at 102 in the wee hours of the morning.  She last taken her temperature this morning.  She had taken acetaminophen to help.  She reports abdominal pain is severe.  With her worsening symptoms, she came to the ER to be further evaluated.  Patient complains that her urine looks like tea and she is concerned she is dehydrated.  She has had  new onset bilateral flank pain as well.    Review of Systems   Constitutional:  Positive for activity change and fever.   HENT: Negative.     Respiratory: Negative.     Cardiovascular: Negative.    Gastrointestinal:  Positive for abdominal pain.   Genitourinary: Negative.    Neurological: Negative.    Psychiatric/Behavioral: Negative.     All other systems reviewed and are negative.      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    CTS (carpal tunnel syndrome) 2019    Dental disease     Depression     Diastolic dysfunction 2022    Dizziness     Emphysema of lung 2020    GERD (gastroesophageal reflux disease)     Headache     Hyperlipidemia     Hypertension     Hypothyroidism     Mixed simple and mucopurulent chronic bronchitis 04/18/2023    Non-occlusive coronary artery disease 05/04/2023    NSTEMI (non-ST elevated myocardial infarction) (HCC)     Pneumonia 2022    Pulmonary emphysema 04/21/2023    Shingles 2018    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       No Known Allergies    Past Surgical History:   Procedure Laterality Date    APPENDECTOMY      CARDIAC CATHETERIZATION N/A 11/04/2017    Procedure: Coronary angiography;  Surgeon: Luis Colvin MD;  Location: [facility] CATH INVASIVE LOCATION;  Service:     CARDIAC CATHETERIZATION N/A 05/31/2023    Procedure: Left Heart Cath;  Surgeon: Steffen Rossi MD;  Location: [facility] CATH INVASIVE LOCATION;  Service: Cardiology;  Laterality: N/A;    CARPAL TUNNEL RELEASE  2019    ENDOSCOPY N/A 7/8/2024    Procedure: ESOPHAGOGASTRODUODENOSCOPY WITH ANESTHESIA;  Surgeon: Gordy Wang MD;  Location: [facility] ENDOSCOPY;  Service: Gastroenterology;  Laterality: N/A;  pre: dysphagia.   post: esophagus dilated.   no PCP    HYSTERECTOMY      PANENDOSCOPY N/A 5/30/2024    Procedure: DIRECT LARYNGOSCOPY  WITH BIOPSY OF VOCAL CORD POLYPS WITH POSSIBLE TRACHEOSTOMY;  Surgeon: Mendez Padilla MD;  Location:  [facility] OR;  Service: ENT;  Laterality: N/A;    ND RT/LT HEART CATHETERS N/A 2017    Procedure: Percutaneous Coronary Intervention;  Surgeon: Luis Colvin MD;  Location:  [facility] CATH INVASIVE LOCATION;  Service: Cardiovascular    THYROID SURGERY      TOTAL THYROIDECTOMY      TRACHEOSTOMY N/A 2024    Procedure: POSSIBLE TRACHEOSTOMY;  Surgeon: Mendez Padilla MD;  Location:  [facility] OR;  Service: ENT;  Laterality: N/A;       Family History   Problem Relation Age of Onset    Asthma Mother     Cancer Mother     Emphysema Mother     Cancer Father     Heart failure Father     Hypertension Father        Social History     Socioeconomic History    Marital status:    Tobacco Use    Smoking status: Former     Current packs/day: 0.00     Average packs/day: 0.7 packs/day for 45.9 years (30.0 ttl pk-yrs)     Types: Cigarettes     Start date: 1991     Quit date: 3/1/2024     Years since quittin.4     Passive exposure: Past    Smokeless tobacco: Never    Tobacco comments:     Less than 1/2 pack a day    Vaping Use    Vaping status: Never Used   Substance and Sexual Activity    Alcohol use: Yes     Comment: daily    Drug use: No    Sexual activity: Not Currently     Partners: Male     Birth control/protection: Hysterectomy       Prior to Admission medications    Medication Sig Start Date End Date Taking? Authorizing Provider   albuterol sulfate  (90 Base) MCG/ACT inhaler Inhale 2 puffs Every 4 (Four) Hours As Needed for Wheezing. 23   Sivan Wise APRN   carvedilol (COREG) 25 MG tablet Take 1 tablet by mouth 2 (Two) Times a Day. 23   Seema Hendricks APRN   cloNIDine (CATAPRES) 0.1 MG tablet Take 1 tablet by mouth As Needed for High Blood Pressure (for blood pressure >140/90).    Provider, MD Galdino   dilTIAZem CD (CARDIZEM CD) 120 MG 24 hr capsule TAKE 1  CAPSULE BY MOUTH DAILY. 4/13/24   Steffen Rossi MD   FLUoxetine (PROzac) 20 MG capsule Take 1 capsule by mouth Daily. 11/6/23   Galdino Wei MD   ipratropium-albuterol (DUO-NEB) 0.5-2.5 mg/3 ml nebulizer Take 3 mL by nebulization Every 4 (Four) Hours As Needed for Wheezing. 4/3/23   Savage Calderon Jr., MD   lisinopril (PRINIVIL,ZESTRIL) 40 MG tablet Take 1 tablet by mouth Daily.    Galdino Wei MD   meclizine (ANTIVERT) 25 MG tablet Take 1 tablet by mouth 3 (Three) Times a Day As Needed for Dizziness. 3/10/23   Steffen Rossi MD   nitroglycerin (NITROSTAT) 0.4 MG SL tablet PLACE 1 TABLET UNDER THE TONGUE AS NEEDED FOR ANGINA, MAY REPEAT EVERY 5 MINUTES FOR UP THREE DOSES 11/6/23   Seema Hendricks APRN   pantoprazole (PROTONIX) 40 MG EC tablet Take 1 tablet by mouth 2 (Two) Times a Day. 7/8/24   Gordy Wang MD   POTASSIUM PO Take  by mouth Daily.    Galdino Wei MD   simvastatin (ZOCOR) 10 MG tablet Take 1 tablet by mouth Every Night. 11/6/23   Galdino Wei MD   Synthroid 125 MCG tablet Take 1 tablet by mouth Daily for 30 days. 5/28/24 7/8/24  Qing Ray APRN   topiramate (TOPAMAX) 50 MG tablet Take 1 tablet by mouth 2 (Two) Times a Day. 6/13/24   Domitila Duncan MD       Medications   sodium chloride 0.9 % flush 10 mL (has no administration in time range)   Potassium Replacement - Follow Nurse / BPA Driven Protocol (has no administration in time range)   vancomycin IVPB 1500 mg in 0.9% NaCl (Premix) 500 mL (has no administration in time range)   morphine injection 4 mg (4 mg Intravenous Given 7/29/24 1225)   ondansetron (ZOFRAN) injection 4 mg (4 mg Intravenous Given 7/29/24 1225)   labetalol (NORMODYNE,TRANDATE) injection 10 mg (10 mg Intravenous Given 7/29/24 1327)   piperacillin-tazobactam (ZOSYN) 3.375 g IVPB in 100 mL NS MBP (CD) (0 g Intravenous Stopped 7/29/24 1451)   sepsis fluid LR bolus 2,367 mL (2,367 mL Intravenous New Bag 7/29/24 1328)   iopamidol  "(ISOVUE-370) 76 % injection 100 mL (100 mL Intravenous Given 7/29/24 1349)   HYDROmorphone (DILAUDID) injection 1 mg (1 mg Intravenous Given 7/29/24 1451)       BP (!) 142/101   Pulse 96   Temp 98.1 °F (36.7 °C)   Resp 23   Ht 172.7 cm (68\")   Wt 78.9 kg (174 lb)   LMP  (LMP Unknown)   SpO2 93%   BMI 26.46 kg/m²       Objective   Physical Exam  Vitals and nursing note reviewed.   Constitutional:       General: She is not in acute distress.     Appearance: She is well-developed. She is not diaphoretic.   HENT:      Head: Normocephalic and atraumatic.      Mouth/Throat:      Mouth: Mucous membranes are moist.   Eyes:      Extraocular Movements: Extraocular movements intact.      Conjunctiva/sclera: Conjunctivae normal.      Pupils: Pupils are equal, round, and reactive to light.   Neck:      Trachea: No tracheal deviation.   Cardiovascular:      Rate and Rhythm: Regular rhythm. Tachycardia present.      Heart sounds: Normal heart sounds. No murmur heard.  Pulmonary:      Effort: Pulmonary effort is normal.      Breath sounds: Normal breath sounds.   Abdominal:      General: A surgical scar is present. Bowel sounds are normal. There is no distension.      Palpations: Abdomen is soft. There is no mass.      Tenderness: There is generalized no abdominal tenderness. There is no guarding or rebound.   Musculoskeletal:         General: Normal range of motion.      Cervical back: Normal range of motion and neck supple.   Skin:     General: Skin is warm and dry.   Neurological:      Mental Status: She is alert and oriented to person, place, and time.   Psychiatric:         Mood and Affect: Mood is anxious. Affect is tearful.         Behavior: Behavior normal.         Thought Content: Thought content normal.         Judgment: Judgment normal.         Procedures        Lab Results (last 24 hours)       Procedure Component Value Units Date/Time    CBC & Differential [[id number]]  (Abnormal) Collected: 07/29/24 1230    " Specimen: Blood Updated: 07/29/24 1246    Narrative:      The following orders were created for panel order CBC & Differential.  Procedure                               Abnormality         Status                     ---------                               -----------         ------                     CBC Auto Differential[[id number]]        Abnormal            Final result                 Please view results for these tests on the individual orders.    Comprehensive Metabolic Panel [[id number]]  (Abnormal) Collected: 07/29/24 1230    Specimen: Blood Updated: 07/29/24 1318     Glucose 102 mg/dL      BUN 3 mg/dL      Creatinine 0.45 mg/dL      Sodium 137 mmol/L      Potassium 2.3 mmol/L      Chloride 92 mmol/L      CO2 25.0 mmol/L      Calcium 8.5 mg/dL      Total Protein 6.8 g/dL      Albumin 3.4 g/dL      ALT (SGPT) 43 U/L      AST (SGOT) 125 U/L      Alkaline Phosphatase 621 U/L      Total Bilirubin 0.9 mg/dL      Globulin 3.4 gm/dL      A/G Ratio 1.0 g/dL      BUN/Creatinine Ratio 6.7     Anion Gap 20.0 mmol/L      eGFR 116.6 mL/min/1.73     Narrative:      GFR Normal >60  Chronic Kidney Disease <60  Kidney Failure <15      Lipase [[id number]]  (Normal) Collected: 07/29/24 1230    Specimen: Blood Updated: 07/29/24 1305     Lipase 37 U/L     Procalcitonin [[id number]]  (Abnormal) Collected: 07/29/24 1230    Specimen: Blood Updated: 07/29/24 1315     Procalcitonin 36.34 ng/mL     Narrative:      As a Marker for Sepsis (Non-Neonates):    1. <0.5 ng/mL represents a low risk of severe sepsis and/or septic shock.  2. >2 ng/mL represents a high risk of severe sepsis and/or septic shock.    As a Marker for Lower Respiratory Tract Infections that require antibiotic therapy:    PCT on Admission    Antibiotic Therapy       6-12 Hrs later    >0.5                Strongly Recommended  >0.25 - <0.5        Recommended   0.1 - 0.25          Discouraged              Remeasure/reassess PCT  <0.1                Strongly Discouraged      "Remeasure/reassess PCT    As 28 day mortality risk marker: \"Change in Procalcitonin Result\" (>80% or <=80%) if Day 0 (or Day 1) and Day 4 values are available. Refer to [email or web address]    Change in PCT <=80%  A decrease of PCT levels below or equal to 80% defines a positive change in PCT test result representing a higher risk for 28-day all-cause mortality of patients diagnosed with severe sepsis for septic shock.    Change in PCT >80%  A decrease of PCT levels of more than 80% defines a negative change in PCT result representing a lower risk for 28-day all-cause mortality of patients diagnosed with severe sepsis or septic shock.       Lactic Acid, Plasma [[id number]]  (Abnormal) Collected: 07/29/24 1230    Specimen: Blood Updated: 07/29/24 1310     Lactate 7.4 mmol/L     Blood Culture - Blood, Arm, Right [[id number]] Collected: 07/29/24 1230    Specimen: Blood from Arm, Right Updated: 07/29/24 1248    Blood Culture - Blood, Arm, Left [[id number]] Collected: 07/29/24 1230    Specimen: Blood from Arm, Left Updated: 07/29/24 1247    CBC Auto Differential [[id number]]  (Abnormal) Collected: 07/29/24 1230    Specimen: Blood Updated: 07/29/24 1246     WBC 8.37 10*3/mm3      RBC 3.54 10*6/mm3      Hemoglobin 12.5 g/dL      Hematocrit 36.7 %      .7 fL      MCH 35.3 pg      MCHC 34.1 g/dL      RDW 15.4 %      RDW-SD 59.6 fl      MPV 10.3 fL      Platelets 285 10*3/mm3      Neutrophil % 56.6 %      Lymphocyte % 28.1 %      Monocyte % 11.2 %      Eosinophil % 1.8 %      Basophil % 1.1 %      Immature Grans % 1.2 %      Neutrophils, Absolute 4.74 10*3/mm3      Lymphocytes, Absolute 2.35 10*3/mm3      Monocytes, Absolute 0.94 10*3/mm3      Eosinophils, Absolute 0.15 10*3/mm3      Basophils, Absolute 0.09 10*3/mm3      Immature Grans, Absolute 0.10 10*3/mm3      nRBC 0.0 /100 WBC     Magnesium [[id number]]  (Normal) Collected: 07/29/24 1230    Specimen: Blood Updated: 07/29/24 1328     Magnesium 1.6 mg/dL "     Protime-INR [[id number]]  (Normal) Collected: 07/29/24 1230    Specimen: Blood Updated: 07/29/24 1330     Protime 12.7 Seconds      INR 0.91    Urinalysis With Culture If Indicated - Urine, Clean Catch [[id number]]  (Abnormal) Collected: 07/29/24 1246    Specimen: Urine, Clean Catch Updated: 07/29/24 1258     Color, UA Dark Yellow     Appearance, UA Clear     pH, UA 6.5     Specific Gravity, UA 1.010     Glucose, UA Negative     Ketones, UA Negative     Bilirubin, UA Negative     Blood, UA Negative     Protein, UA Negative     Leuk Esterase, UA Negative     Nitrite, UA Negative     Urobilinogen, UA 0.2 E.U./dL    Narrative:      In absence of clinical symptoms, the presence of pyuria, bacteria, and/or nitrites on the urinalysis result does not correlate with infection.  Urine microscopic not indicated.    Potassium [[id number]]  (Abnormal) Collected: 07/29/24 1334    Specimen: Blood Updated: 07/29/24 1356     Potassium 2.4 mmol/L      Comment: Slight hemolysis detected by analyzer. Result may be falsely elevated.               CT Angiogram Abdomen Pelvis    Result Date: 7/29/2024  Narrative: EXAMINATION: CT ANGIOGRAM ABDOMEN PELVIS-   7/29/2024 12:24 PM  HISTORY: ab pain elevated lactic acid  In order to have a CT radiation dose as low as reasonably achievable Automated Exposure Control was utilized for adjustment of the mA and/or KV according to patient size.  Total DLP = 919.59 mGy.cm  The CT angiography of the abdominal pelvis is performed before and after intravenous contrast enhancement.  The images are acquired in axial plane and subsequent 2D reconstruction in coronal and sagittal planes and 3D maximum intensity projection image reconstruction.  Comparison is made with the previous study dated 2/26/2024.  There are atheromatous changes of the abdominal aorta iliac and femoral arteries.  There is no aneurysmal dilatation of the abdominal aorta or iliac arteries.  A mixed plaque is seen at the origin of the  celiac trunk with less than 50% diameter stenosis. There is subsequent normal branches.  There is normal origin course and caliber of the superior mesenteric artery. Normal branches.  Small calcific plaques are seen in the proximal renal arteries bilaterally. No flow-limiting stenosis. No aneurysmal dilatation. There is an accessory small left renal artery arising below the level of the main renal artery.  There is high-grade stenosis at the origin of the inferior mesenteric artery from the left anterolateral margin of the distal abdominal aorta. A small and attenuated inferior mesenteric artery is opacified with a subsequent small and diminutive left colonic and superior rectal arteries.  There are calcific plaques throughout the course of both common, internal and external iliac arteries. No flow-limiting stenosis.  Normal size common femoral artery is seen dividing into normal appearing superficial and deep femoral arteries.  There is no evidence of contrast extravasation seen in the stomach, small and large bowel.  The lung bases included in the study appear unremarkable.  Limited visualized cardiomediastinal structures show atheromatous change in the coronary arteries. No significant cardiomegaly.  There is severe fatty infiltration of the liver. There is moderate hepatomegaly. The liver measures 21.5 cm in craniocaudal dimension. No focal intrinsic abnormality. The spleen is normal.  No radiopaque gallstones.  Pancreas is normal.  Moderate lobulation of the left adrenal gland. No discrete mass. Right adrenal gland is normal.  Mild lobulation of the renal contour bilaterally. No mass. No calculi. No hydronephrosis. Both ureters appear normal and nondilated. The urinary bladder is poorly distended with moderate wall thickening. No visible intrinsic abnormality.  There are small fat-containing femoral hernias bilaterally.  There is evidence of prior ventral hernia repair surgery.  The stomach is poorly distended.  No focal abnormality. Duodenum is normal. Fluid-filled nondilated nondistended small bowel is seen. Moderate diffuse thickening of the wall and edema of the mucosa of the entire colon most pronounced in the proximal colon including the ascending and transverse colon. Distal colon is significantly decompressed with moderate wall thickening. There is no significant surrounding peritoneal fat infiltration.  No evidence of abdominal or pelvic lymphadenopathy.  Images reviewed in bone window show chronic degenerative changes of the lumbar and limited visualized thoracic spine. Severe degeneration of disc L5-S1.      Impression: 1. Acute or subacute nonspecific colitis. 2. Severe hepatic steatosis and hepatomegaly. 3. The remaining abdominal pelvis is unremarkable similar to the previous study.  This report was signed and finalized on 7/29/2024 2:31 PM by Dr. Karmen Teague MD.       ED Course  ED Course as of 07/29/24 1454   Mon Jul 29, 2024 1454 I did speak with Dr. White, hospitalist on-call, who is gracious toadmit the patient under their services. [TK]      ED Course User Index  [TK] Humza Villanueva PA          [facility]      Final diagnoses:   Colitis   Diarrhea, unspecified type   Hypokalemia   Hepatomegaly       Disposition: Patient will be admitted under hospitalists' services.     Humza Villanueva PA  07/29/24 1454      Electronically signed by Yaya Welch MD at 07/29/24 1904       Facility-Administered Medications as of 7/30/2024   Medication Dose Route Frequency Provider Last Rate Last Admin    acetaminophen (TYLENOL) tablet 650 mg  650 mg Oral Q4H PRN Jc Murillo MD        Or    acetaminophen (TYLENOL) 160 MG/5ML oral solution 650 mg  650 mg Oral Q4H PRN Jc Murillo MD        Or    acetaminophen (TYLENOL) suppository 650 mg  650 mg Rectal Q4H PRN Jc Murillo MD        atorvastatin (LIPITOR) tablet 10 mg  10 mg Oral Daily Jc Murillo MD        carvedilol  (COREG) tablet 25 mg  25 mg Oral BID Jc Murillo MD   25 mg at 07/29/24 2055    chlordiazePOXIDE (LIBRIUM) capsule 25 mg  25 mg Oral Q8H Jc Murillo MD   25 mg at 07/30/24 0538    cloNIDine (CATAPRES) tablet 0.1 mg  0.1 mg Oral Q6H PRN Jc Murillo MD        dilTIAZem CD (CARDIZEM CD) 24 hr capsule 120 mg  120 mg Oral Daily cJ Murillo MD        fidaxomicin (DIFICID) tablet 200 mg  200 mg Oral Q12H Alexis Yanez MD   200 mg at 07/29/24 2255    FLUoxetine (PROzac) capsule 20 mg  20 mg Oral Daily Jc Murillo MD        folic acid (FOLVITE) tablet 1 mg  1 mg Oral Daily Jc Murillo MD   1 mg at 07/29/24 2255    hydrALAZINE (APRESOLINE) injection 5 mg  5 mg Intravenous Q4H PRN Jc Murillo MD        HYDROcodone-acetaminophen (NORCO) 5-325 MG per tablet 1 tablet  1 tablet Oral Q6H PRN Jc Murillo MD   1 tablet at 07/30/24 0326    [COMPLETED] HYDROmorphone (DILAUDID) injection 1 mg  1 mg Intravenous Once Yaya Welch MD   1 mg at 07/29/24 1451    [COMPLETED] iopamidol (ISOVUE-370) 76 % injection 100 mL  100 mL Intravenous Once in imaging Yaya Welch MD   100 mL at 07/29/24 1349    ipratropium-albuterol (DUO-NEB) nebulizer solution 3 mL  3 mL Nebulization Q4H PRN Jc Murillo MD        [COMPLETED] labetalol (NORMODYNE,TRANDATE) injection 10 mg  10 mg Intravenous Once Humza Villanueva PA   10 mg at 07/29/24 1327    [COMPLETED] labetalol (NORMODYNE,TRANDATE) injection 10 mg  10 mg Intravenous Once Jc Murillo MD   10 mg at 07/29/24 1546    levothyroxine (SYNTHROID, LEVOTHROID) tablet 125 mcg  125 mcg Oral Q AM Sorzano, Jc F, MD   125 mcg at 07/30/24 0610    lisinopril (PRINIVIL,ZESTRIL) tablet 40 mg  40 mg Oral Daily Jc Murillo MD        lisinopril (PRINIVIL,ZESTRIL) tablet 40 mg  40 mg Oral Once Jc Murillo MD        LORazepam (ATIVAN) tablet 1 mg  1 mg Oral Q1H PRN Jc Murillo MD        Or    LORazepam (ATIVAN)  injection 1 mg  1 mg Intravenous Q1H PRN Jc Murillo MD   1 mg at 24 1546    Or    LORazepam (ATIVAN) tablet 2 mg  2 mg Oral Q1H PRN Jc Murillo MD        Or    LORazepam (ATIVAN) injection 2 mg  2 mg Intravenous Q1H PRN Jc Murillo MD        Or    LORazepam (ATIVAN) injection 2 mg  2 mg Intravenous Q15 Min PRN Jc Murillo MD        Or    LORazepam (ATIVAN) injection 2 mg  2 mg Intramuscular Q15 Min PRN Jc Murillo MD        [COMPLETED] magnesium sulfate 2g/50 mL (PREMIX) infusion  2 g Intravenous Once Jc Murillo MD 25 mL/hr at 24 1543 2 g at 24 1543    meclizine (ANTIVERT) tablet 25 mg  25 mg Oral TID PRN Jc Murillo MD        metroNIDAZOLE (FLAGYL) IVPB 500 mg  500 mg Intravenous Q8H Jc Murillo  mL/hr at 24 0610 500 mg at 24 0610    [COMPLETED] morphine injection 4 mg  4 mg Intravenous Once Yaya Welch MD   4 mg at 24 1225    Morphine sulfate (PF) injection 2 mg  2 mg Intravenous Q4H PRN Jc Murillo MD   2 mg at 24 0538    multivitamin with minerals 1 tablet  1 tablet Oral Daily Jc Murillo MD   1 tablet at 24 2055    nitroglycerin (NITROSTAT) SL tablet 0.4 mg  0.4 mg Sublingual Q5 Min PRN Jc Murillo MD        [COMPLETED] ondansetron (ZOFRAN) injection 4 mg  4 mg Intravenous Once Yaya Welch MD   4 mg at 24 1225    ondansetron ODT (ZOFRAN-ODT) disintegrating tablet 4 mg  4 mg Oral Q6H PRN Jc Murillo MD        Or    ondansetron (ZOFRAN) injection 4 mg  4 mg Intravenous Q6H PRN Jc Murillo MD   4 mg at 24 0550    pantoprazole (PROTONIX) EC tablet 40 mg  40 mg Oral BID AC Jc Murillo MD   40 mg at 24 0538    [COMPLETED] piperacillin-tazobactam (ZOSYN) 3.375 g IVPB in 100 mL NS MBP (CD)  3.375 g Intravenous Once Humza Villanueva PA   Stopped at 24 1451    [] potassium chloride (MICRO-K/KLOR-CON) CR capsule  40 mEq Oral  Q2H Jc Murillo MD   40 mEq at 07/29/24 2056    [COMPLETED] sepsis fluid LR bolus 2,367 mL  30 mL/kg Intravenous Once Humza Villanueva PA   2,367 mL at 07/29/24 1328    sodium chloride 0.9 % flush 10 mL  10 mL Intravenous PRN Yaya Welch MD        sodium chloride 0.9 % flush 10 mL  10 mL Intravenous Q12H Jc Murillo MD   10 mL at 07/29/24 2056    sodium chloride 0.9 % flush 10 mL  10 mL Intravenous PRN Jc Murillo MD        sodium chloride 0.9 % infusion 40 mL  40 mL Intravenous PRN Jc Murillo MD        sodium chloride 0.9 % with KCl 20 mEq/L infusion  125 mL/hr Intravenous Continuous Jc Murillo  mL/hr at 07/30/24 0613 125 mL/hr at 07/30/24 0613    thiamine (VITAMIN B-1) tablet 100 mg  100 mg Oral Daily Jc Murillo MD   100 mg at 07/29/24 2255    topiramate (TOPAMAX) tablet 50 mg  50 mg Oral BID Jc Murillo MD   50 mg at 07/29/24 2056     Orders (all)        Start     Ordered    07/31/24 2113  Auto Discontinue GI Panel in 48 Hours if not Collected  ONCE GI PANEL         07/29/24 2113 07/31/24 2113  Auto Discontinue in 48 Hours if not Collected  ONCE CDIFF         07/29/24 2113 07/31/24 1320  Auto Discontinue GI Panel in 48 Hours if not Collected  ONCE GI PANEL         07/29/24 1320    07/30/24 1020  STAT Lactic Acid, Reflex  PROCEDURE ONCE         07/30/24 0539    07/30/24 0900  dilTIAZem CD (CARDIZEM CD) 24 hr capsule 120 mg  Daily         07/29/24 1953 07/30/24 0900  FLUoxetine (PROzac) capsule 20 mg  Daily         07/29/24 1953 07/30/24 0900  lisinopril (PRINIVIL,ZESTRIL) tablet 40 mg  Daily         07/29/24 1953 07/30/24 0900  atorvastatin (LIPITOR) tablet 10 mg  Daily         07/29/24 1953 07/30/24 0600  Magnesium  Morning Draw         07/29/24 1514    07/30/24 0600  Comprehensive Metabolic Panel  Morning Draw         07/29/24 1514    07/30/24 0600  CBC Auto Differential  Morning Draw         07/29/24 1514 07/30/24 0600   levothyroxine (SYNTHROID, LEVOTHROID) tablet 125 mcg  Every Early Morning         07/29/24 1953    07/30/24 0600  Protime-INR  Morning Draw         07/29/24 1604    07/30/24 0518  Manual Differential  Once         07/30/24 0517    07/30/24 0349  STAT Lactic Acid, Reflex  PROCEDURE ONCE         07/29/24 2212    07/29/24 2325  Initiate & Follow Hypercapnic Monitoring Guideline for Opioid Administration via EtCO2 and / or SpO2  Continuous        Comments: Follow Hypercapnic Monitoring Guideline As Outlined in Process Instructions (Open Order Report to View Full Instructions)    07/29/24 2325 07/29/24 2325  Opioid Administration - Document EtCO2 and / or SpO2 With Each Set of Vitals & Any Change in Patient Status  Per Order Details        Comments: With Each Set of Vitals & Any Change in Patient Status    07/29/24 2325 07/29/24 2325  Opioid Administration - Notify Provider Hypercapnic Monitoring  Continuous        Comments: Open Order Report to View Parameters Requiring Provider Notification    07/29/24 2325 07/29/24 2325  Opioid Administration - Continuous Pulse Oximetry (SpO2)  Continuous         07/29/24 2325 07/29/24 2300  metroNIDAZOLE (FLAGYL) IVPB 500 mg  Every 8 Hours         07/29/24 1605    07/29/24 2200  STAT Lactic Acid, Reflex  PROCEDURE ONCE         07/29/24 1940    07/29/24 2200  fidaxomicin (DIFICID) tablet 200 mg  Every 12 Hours Scheduled         07/29/24 2113 07/29/24 2113  Gastrointestinal Panel, PCR - Stool, Per Rectum  Once         07/29/24 2113 07/29/24 2113  Clostridioides difficile Toxin - Stool, Per Rectum  Once         07/29/24 2113 07/29/24 2113  Patient Isolation Contact Spore  Continuous        Comments: Isolation Precautions Per Clostridium Difficile (CDI) Infection Control Policy / Process    07/29/24 2113 07/29/24 2113  Clostridioides difficile Toxin, PCR - Stool, Per Rectum  PROCEDURE ONCE         07/29/24 2113 07/29/24 2100  sodium chloride 0.9 % flush 10 mL   Every 12 Hours Scheduled         07/29/24 1514 07/29/24 2100  carvedilol (COREG) tablet 25 mg  2 Times Daily         07/29/24 1953 07/29/24 2100  topiramate (TOPAMAX) tablet 50 mg  2 Times Daily         07/29/24 1953 07/29/24 2000  Potassium  Once         07/29/24 1512    07/29/24 2000  Magnesium  Once         07/29/24 1512 07/29/24 2000  pantoprazole (PROTONIX) EC tablet 40 mg  2 Times Daily Before Meals         07/29/24 1953 07/29/24 1953  ipratropium-albuterol (DUO-NEB) nebulizer solution 3 mL  Every 4 Hours PRN         07/29/24 1953 07/29/24 1953  meclizine (ANTIVERT) tablet 25 mg  3 Times Daily PRN         07/29/24 1953 07/29/24 1953  nitroglycerin (NITROSTAT) SL tablet 0.4 mg  Every 5 Minutes PRN         07/29/24 1953 07/29/24 1953  cloNIDine (CATAPRES) tablet 0.1 mg  Every 6 Hours PRN         07/29/24 1953 07/29/24 1936  STAT Lactic Acid, Reflex  PROCEDURE ONCE         07/29/24 1707    07/29/24 1800  Oral Care  2 Times Daily       07/29/24 1514    07/29/24 1620  thiamine (VITAMIN B-1) tablet 100 mg  Daily         07/29/24 1604    07/29/24 1611  XR Chest 1 View  1 Time Imaging         07/29/24 1610    07/29/24 1601  US Liver  1 Time Imaging         07/29/24 1600    07/29/24 1600  Vital Signs  Every 4 Hours       07/29/24 1514    07/29/24 1600  potassium chloride (MICRO-K/KLOR-CON) CR capsule  Every 2 Hours         07/29/24 1532    07/29/24 1554  lisinopril (PRINIVIL,ZESTRIL) tablet 40 mg  Once         07/29/24 1538    07/29/24 1547  folic acid (FOLVITE) tablet 1 mg  Daily         07/29/24 1531    07/29/24 1547  multivitamin with minerals 1 tablet  Daily         07/29/24 1531    07/29/24 1547  chlordiazePOXIDE (LIBRIUM) capsule 25 mg  Every 8 Hours Scheduled         07/29/24 1531    07/29/24 1546  labetalol (NORMODYNE,TRANDATE) injection 10 mg  Once         07/29/24 1530    07/29/24 1540  Inpatient Infectious Diseases Consult  Once        Specialty:  Infectious Diseases  Provider:   "Alexis Yanez MD    07/29/24 1540    07/29/24 1531  LORazepam (ATIVAN) tablet 1 mg  Every 1 Hour PRN        Placed in \"Or\" Linked Group    07/29/24 1531    07/29/24 1531  LORazepam (ATIVAN) injection 1 mg  Every 1 Hour PRN        Placed in \"Or\" Linked Group    07/29/24 1531    07/29/24 1531  LORazepam (ATIVAN) tablet 2 mg  Every 1 Hour PRN        Placed in \"Or\" Linked Group    07/29/24 1531    07/29/24 1531  LORazepam (ATIVAN) injection 2 mg  Every 1 Hour PRN        Placed in \"Or\" Linked Group    07/29/24 1531    07/29/24 1531  LORazepam (ATIVAN) injection 2 mg  Every 15 Minutes PRN        Placed in \"Or\" Linked Group    07/29/24 1531    07/29/24 1531  LORazepam (ATIVAN) injection 2 mg  Every 15 Minutes PRN        Placed in \"Or\" Linked Group    07/29/24 1531    07/29/24 1531  Initiate Alcohol Withdrawal Protocol  Once         07/29/24 1531    07/29/24 1531  Obtain Baseline Clinical [place] Withdrawal Assessment - Ar (CIWA-Ar), Sedation Scale & Vital Signs  Once        Comments: Follow CIWA Scoring Reference Guide    07/29/24 1531    07/29/24 1531  Clinical [place] Withdrawal Assessment (CIWA-Ar)  Per Hospital Policy         07/29/24 1531    07/29/24 1531  If CIWA-Ar Score Less Than 8 For 3 Consecutive Assessments, Monitor Every 4 Hours & Discontinue Assessment When CIWA-Ar Less Than 8 for 24 Hours  Per Hospital Policy        Comments: Contact Provider to Restart Protocol if Any Symptoms Reappear    07/29/24 1531    07/29/24 1531  Seizure Precautions  Continuous         07/29/24 1531    07/29/24 1531  Notify Provider - Withdrawal  Continuous        Comments: Open Order Report to View Parameters Requiring Provider Notification    07/29/24 1531    07/29/24 1531  Notify Provider of Abnormal Lab Results  Continuous        Comments: Open Order Report to View Parameters Requiring Provider Notification    07/29/24 1531    07/29/24 1531  Notify Provider - Vitals  Continuous        Comments: Open Order Report to View " "Parameters Requiring Provider Notification    07/29/24 1531    07/29/24 1531  Safety Precautions  Continuous         07/29/24 1531    07/29/24 1530  Morphine sulfate (PF) injection 2 mg  Every 4 Hours PRN         07/29/24 1530    07/29/24 1530  STAT Lactic Acid, Reflex  PROCEDURE ONCE         07/29/24 1309    07/29/24 1530  sodium chloride 0.9 % with KCl 20 mEq/L infusion  Continuous         07/29/24 1514    07/29/24 1526  magnesium sulfate 2g/50 mL (PREMIX) infusion  Once         07/29/24 1510    07/29/24 1515  Bowel Regimen Not Indicated  Once         07/29/24 1514    07/29/24 1514  hydrALAZINE (APRESOLINE) injection 5 mg  Every 4 Hours PRN         07/29/24 1514    07/29/24 1514  ondansetron ODT (ZOFRAN-ODT) disintegrating tablet 4 mg  Every 6 Hours PRN        Placed in \"Or\" Linked Group    07/29/24 1514    07/29/24 1514  ondansetron (ZOFRAN) injection 4 mg  Every 6 Hours PRN        Placed in \"Or\" Linked Group    07/29/24 1514    07/29/24 1514  HYDROcodone-acetaminophen (NORCO) 5-325 MG per tablet 1 tablet  Every 6 Hours PRN         07/29/24 1514    07/29/24 1514  acetaminophen (TYLENOL) tablet 650 mg  Every 4 Hours PRN        Placed in \"Or\" Linked Group    07/29/24 1514    07/29/24 1514  acetaminophen (TYLENOL) 160 MG/5ML oral solution 650 mg  Every 4 Hours PRN        Placed in \"Or\" Linked Group    07/29/24 1514    07/29/24 1514  acetaminophen (TYLENOL) suppository 650 mg  Every 4 Hours PRN        Placed in \"Or\" Linked Group    07/29/24 1514    07/29/24 1514  Code Status and Medical Interventions: CPR (Attempt to Resuscitate); Full Support  Continuous         07/29/24 1514    07/29/24 1514  Place Sequential Compression Device  Once         07/29/24 1514    07/29/24 1514  Maintain Sequential Compression Device  Continuous         07/29/24 1514    07/29/24 1514  Diet: Gastrointestinal; Low Irritant, Lactose-Controlled; Texture: Regular (IDDSI 7); Fluid Consistency: Thin (IDDSI 0)  Diet Effective Now         " 07/29/24 1514    07/29/24 1513  Intake & Output  Every Shift       07/29/24 1514    07/29/24 1513  Weigh Patient  Once         07/29/24 1514    07/29/24 1513  Insert Peripheral IV  Once         07/29/24 1514    07/29/24 1513  Saline Lock & Maintain IV Access  Continuous         07/29/24 1514    07/29/24 1512  sodium chloride 0.9 % flush 10 mL  As Needed         07/29/24 1514    07/29/24 1512  sodium chloride 0.9 % infusion 40 mL  As Needed         07/29/24 1514    07/29/24 1512  Inpatient Gastroenterology Consult  Once        Specialty:  Gastroenterology  Provider:  Marty Browning MD    07/29/24 1512    07/29/24 1504  vancomycin IVPB 1500 mg in 0.9% NaCl (Premix) 500 mL  Once,   Status:  Discontinued         07/29/24 1448    07/29/24 1502  HYDROmorphone (DILAUDID) injection 1 mg  Once         07/29/24 1446    07/29/24 1454  Inpatient Admission  Once         07/29/24 1454    07/29/24 1444  I attest that I reassessed tissue perfusion after fluid resuscitation was completed  Once        Comments: I attest that I reassessed tissue perfusion after fluid resuscitation was completed    07/29/24 1443    07/29/24 1341  iopamidol (ISOVUE-370) 76 % injection 100 mL  Once in Imaging         07/29/24 1325    07/29/24 1336  piperacillin-tazobactam (ZOSYN) 3.375 g IVPB in 100 mL NS MBP (CD)  Once         07/29/24 1320    07/29/24 1336  sepsis fluid LR bolus 2,367 mL  Once         07/29/24 1320    07/29/24 1325  labetalol (NORMODYNE,TRANDATE) injection 10 mg  Once         07/29/24 1309    07/29/24 1321  Potassium  STAT         07/29/24 1320    07/29/24 1320  Gastrointestinal Panel, PCR - Stool, Per Rectum  Once,   Status:  Canceled         07/29/24 1320    07/29/24 1320  Protime-INR  STAT         07/29/24 1320    07/29/24 1320  ECG 12 Lead Electrolyte Imbalance  Once         07/29/24 1320    07/29/24 1319  Potassium Replacement - Follow Nurse / BPA Driven Protocol  As Needed,   Status:  Discontinued         07/29/24 1320     "07/29/24 1319  Magnesium  Once         07/29/24 1320    07/29/24 1313  CT Angiogram Abdomen Pelvis  1 Time Imaging         07/29/24 1313    07/29/24 1225  morphine injection 4 mg  Once         07/29/24 1209    07/29/24 1225  ondansetron (ZOFRAN) injection 4 mg  Once         07/29/24 1209    07/29/24 1210  Procalcitonin  Once         07/29/24 1209    07/29/24 1210  Lactic Acid, Plasma  Once         07/29/24 1209    07/29/24 1210  Blood Culture - Blood, Arm, Right  Once         07/29/24 1209    07/29/24 1210  Blood Culture - Blood, Arm, Left  Once         07/29/24 1209    07/29/24 1209  Vital Signs Recheck  Per Hospital Policy         07/29/24 1209    07/29/24 1209  Insert Peripheral IV  Once        Placed in \"And\" Linked Group    07/29/24 1209    07/29/24 1209  Cardiac Monitoring  Continuous        Comments: Follow Standing Orders As Outlined in Process Instructions (Open Order Report to View Full Instructions)    07/29/24 1209    07/29/24 1209  CBC & Differential  Once         07/29/24 1209    07/29/24 1209  Comprehensive Metabolic Panel  Once         07/29/24 1209    07/29/24 1209  Lipase  Once         07/29/24 1209    07/29/24 1209  Urinalysis With Culture If Indicated - Urine, Clean Catch  Once         07/29/24 1209    07/29/24 1209  CBC Auto Differential  PROCEDURE ONCE         07/29/24 1209    07/29/24 1208  sodium chloride 0.9 % flush 10 mL  As Needed        Placed in \"And\" Linked Group    07/29/24 1209    Unscheduled  Up With Assistance  As Needed       07/29/24 1514    Unscheduled  Obtain Pre & Post Sedation Scores With Every Sedative Dose - Hold For POSS Greater Than 2 or RASS of -3 or Less  As Needed       07/29/24 1531    --  dilTIAZem CD (CARDIZEM CD) 120 MG 24 hr capsule  Daily         07/29/24 1804    --  nitroglycerin (NITROSTAT) 0.4 MG SL tablet  Every 5 Minutes PRN         07/29/24 1806    --  potassium chloride (KLOR-CON M20) 20 MEQ CR tablet  2 Times Daily         07/29/24 1809    --  " "hydroCHLOROthiazide 25 MG tablet  Daily         07/29/24 1809                  Physician Progress Notes (all)    No notes of this type exist for this encounter.          Consult Notes (all)        Alexis Yanez MD at 07/29/24 2050        Consult Orders    1. Inpatient Infectious Diseases Consult [[id number]] ordered by Jc Murillo MD at 07/29/24 1540                 INFECTIOUS DISEASES CONSULT NOTE    Patient:  Minnie Bang 49 y.o. female  ROOM # 371/1  YOB: 1975  MRN: [id number]  CSN:  [id number]  Admit date: 7/29/2024   Admitting Physician: Jc Murillo MD  Primary Care Physician: Provider, No Known  REFERRING PROVIDER: No ref. provider found    Reason for Consultation: \"Pancolitis, recent C. difficile positive treated\"    History of Present Illness/Chief Complaint: Pleasant 49-year-old woman.  She indicates she has been having problems with her bowels for 4 to 6 months.  She indicates she had been seeing clinicians at [facility].  She had been referred to GI at [facility].  She indicates she saw them on 1 occasion.  She indicates they were going to schedule colonoscopy but for some reason that did not occur.  She was referred to gastroenterology at [facility].  She indicates they were also going to pursue EGD and colonoscopy, but testing revealed evidence of C. difficile.  She indicates she received a course of vancomycin treatment.  She did not experience significant response to her treatment course with vancomycin.  She indicates that is the only antimicrobial treatment she has had to date for C. difficile.  She indicates a prior to her treatment with vancomycin there had been some other medicines attempted including Imodium.  She presented to the hospital with ongoing problems with diarrhea.  She has also had some nausea and vomiting.  She indicates her stools have been liquid in nature.  She has had some intermittent generalized abdominal pain.  " She presented to the hospital today and was admitted for further management.  She indicates she has had some antibiotic treatment in the last 6 months.  She had received amoxicillin around the time she had had ENT surgery.    Current Scheduled Medications:   [START ON 7/30/2024] atorvastatin, 10 mg, Oral, Daily  carvedilol, 25 mg, Oral, BID  chlordiazePOXIDE, 25 mg, Oral, Q8H  [START ON 7/30/2024] dilTIAZem CD, 120 mg, Oral, Daily  [START ON 7/30/2024] FLUoxetine, 20 mg, Oral, Daily  folic acid, 1 mg, Oral, Daily  [START ON 7/30/2024] levothyroxine, 125 mcg, Oral, Q AM  [START ON 7/30/2024] lisinopril, 40 mg, Oral, Daily  lisinopril, 40 mg, Oral, Once  metroNIDAZOLE, 500 mg, Intravenous, Q8H  multivitamin with minerals, 1 tablet, Oral, Daily  pantoprazole, 40 mg, Oral, BID AC  potassium chloride, 40 mEq, Oral, Q2H  sodium chloride, 10 mL, Intravenous, Q12H  thiamine, 100 mg, Oral, Daily  topiramate, 50 mg, Oral, BID    Current PRN Medications:    acetaminophen **OR** acetaminophen **OR** acetaminophen    cloNIDine    hydrALAZINE    HYDROcodone-acetaminophen    ipratropium-albuterol    LORazepam **OR** LORazepam **OR** LORazepam **OR** LORazepam **OR** LORazepam **OR** LORazepam    meclizine    Morphine    nitroglycerin    ondansetron ODT **OR** ondansetron    [COMPLETED] Insert Peripheral IV **AND** sodium chloride    sodium chloride    sodium chloride    Allergies:  No Known Allergies    Past Medical History: Bicuspid aortic valve.  Chronic obstructive pulmonary disease.  Coronary myocardial bridge.  Carpal tunnel syndrome.  Depression.  Diastolic dysfunction.  Chronic obstructive pulmonary disease.  Gastroesophageal reflux disease.  Hypertension.  Thyroid dysfunction.  Vocal cord polyps.  Anxiety.    Past Surgical History: Carpal tunnel release.  Appendectomy.  Hysterectomy.  Thyroid surgery.  Total thyroidectomy.  Tracheostomy.  She reports prior history of hernia repair.    Social History: She indicates she is  ".  She indicates she lives in a camper type situation on her in-laws property.  She indicates her  is staying with his parents because he only has 1 leg and she is not able to care for him.  She indicates she has a past history of smoking tobacco.  She quit earlier this year so that she could have ENT surgery for vocal cord polyps.  She indicates she had the surgery but has continued to have some hoarseness.  She previously drank alcohol fairly heavily.  She would have 6-8 shots of alcohol per day.  In early July she cut back to 1 shot per day in the mornings.  She indicates she needed the alcohol in the morning to help relieve shakes.  She has no illicit drug use.    Family History: Asthma.  Cancer.  Emphysema.  Hypertension.    Exposure History: She did not report any close contacts that have been ill.    Review of Systems see HPI.  No headache or other neurological symptoms.  No anginal type chest pain or chest pressure.  No productive cough or sputum production.    Vital Signs:  /84 (BP Location: Right arm, Patient Position: Lying)   Pulse 80   Temp 97.9 °F (36.6 °C) (Oral)   Resp 18   Ht 172.7 cm (68\")   Wt 78.9 kg (174 lb)   LMP  (LMP Unknown)   SpO2 98%   BMI 26.46 kg/m²  Temp (24hrs), Av.9 °F (36.6 °C), Min:97.6 °F (36.4 °C), Max:98.1 °F (36.7 °C)    Physical Exam  Vital signs - reviewed.  Line/IV site - No erythema or tenderness.  Anxious appearing  No respiratory distress  Lungs with prolonged expiratory phase  No crackles or wheezes  Heart regular rhythm, distant heart sounds, no murmur  Abdomen with bowel sounds present  Mild abdominal distention  Moderate diffuse abdominal tenderness without rebound  There is a vertical scar between the umbilicus and the suprapubic area.  She indicates that was from previous appendectomy.  She indicates she had a lower abdominal incision for hysterectomy.  Extremities trace edema  Skin without rash    Lab Results:  CBC:   Results from " last 7 days   Lab Units 07/29/24  1230   WBC 10*3/mm3 8.37   HEMOGLOBIN g/dL 12.5   HEMATOCRIT % 36.7   PLATELETS 10*3/mm3 285     CMP:   Results from last 7 days   Lab Units 07/29/24  1900 07/29/24  1334 07/29/24  1230   SODIUM mmol/L  --   --  137   POTASSIUM mmol/L 2.6* 2.4* 2.3*   CHLORIDE mmol/L  --   --  92*   CO2 mmol/L  --   --  25.0   BUN mg/dL  --   --  3*   CREATININE mg/dL  --   --  0.45*   CALCIUM mg/dL  --   --  8.5*   BILIRUBIN mg/dL  --   --  0.9   ALK PHOS U/L  --   --  621*   ALT (SGPT) U/L  --   --  43*   AST (SGOT) U/L  --   --  125*   GLUCOSE mg/dL  --   --  102*     Review of previous laboratory work in epic:  Stool studies July 2, 2024:  C. difficile toxigenic PCR screening-positive  Testing for toxigenic C. difficile via PCR-positive  GI pathogen PCR panel-negative  C. difficile toxin antigen-negative    Culture results:  Blood cultures drawn today-pending  Blood cultures drawn today-pending    Radiology:   CT angiogram of the abdomen done today:  IMPRESSION:  1. Acute or subacute nonspecific colitis.  2. Severe hepatic steatosis and hepatomegaly.  3. The remaining abdominal pelvis is unremarkable similar to the  previous study.  HISTORY: ab pain elevated lactic acid     In order to have a CT radiation dose as low as reasonably achievable  Automated Exposure Control was utilized for adjustment of the mA and/or  KV according to patient size.     Total DLP = 919.59 mGy.cm     The CT angiography of the abdominal pelvis is performed before and after  intravenous contrast enhancement.     The images are acquired in axial plane and subsequent 2D reconstruction  in coronal and sagittal planes and 3D maximum intensity projection image  reconstruction.     Comparison is made with the previous study dated 2/26/2024.     There are atheromatous changes of the abdominal aorta iliac and femoral  arteries.     There is no aneurysmal dilatation of the abdominal aorta or iliac  arteries.     A mixed plaque is  seen at the origin of the celiac trunk with less than  50% diameter stenosis. There is subsequent normal branches.     There is normal origin course and caliber of the superior mesenteric  artery. Normal branches.     Small calcific plaques are seen in the proximal renal arteries  bilaterally. No flow-limiting stenosis. No aneurysmal dilatation. There  is an accessory small left renal artery arising below the level of the  main renal artery.     There is high-grade stenosis at the origin of the inferior mesenteric  artery from the left anterolateral margin of the distal abdominal aorta.  A small and attenuated inferior mesenteric artery is opacified with a  subsequent small and diminutive left colonic and superior rectal  arteries.     There are calcific plaques throughout the course of both common,  internal and external iliac arteries. No flow-limiting stenosis.     Normal size common femoral artery is seen dividing into normal appearing  superficial and deep femoral arteries.     There is no evidence of contrast extravasation seen in the stomach,  small and large bowel.     The lung bases included in the study appear unremarkable.     Limited visualized cardiomediastinal structures show atheromatous change  in the coronary arteries. No significant cardiomegaly.     There is severe fatty infiltration of the liver. There is moderate  hepatomegaly. The liver measures 21.5 cm in craniocaudal dimension. No  focal intrinsic abnormality. The spleen is normal.     No radiopaque gallstones.     Pancreas is normal.     Moderate lobulation of the left adrenal gland. No discrete mass. Right  adrenal gland is normal.     Mild lobulation of the renal contour bilaterally. No mass. No calculi.  No hydronephrosis. Both ureters appear normal and nondilated. The  urinary bladder is poorly distended with moderate wall thickening. No  visible intrinsic abnormality.     There are small fat-containing femoral hernias bilaterally.      There is evidence of prior ventral hernia repair surgery.     The stomach is poorly distended. No focal abnormality. Duodenum is  normal. Fluid-filled nondilated nondistended small bowel is seen.  Moderate diffuse thickening of the wall and edema of the mucosa of the  entire colon most pronounced in the proximal colon including the  ascending and transverse colon. Distal colon is significantly  decompressed with moderate wall thickening. There is no significant  surrounding peritoneal fat infiltration.    Ultrasound of the liver done today:  IMPRESSION:  1. No gallstones or ductal dilatation.  2. Fatty infiltration of the liver.  3. Echogenic pancreas may be a normal variant. It may also be seen in  patients with diabetes and prediabetes.    Chest x-ray done today:  IMPRESSION:  1. Mild hypoventilation.  2. Vague densities in the right cardiophrenic angle and left lung base  could be related to the poor inspiration and atelectasis. There is a  similar appearance on the prior study and therefore this may be related  to prominent fat pads.    Additional Studies Reviewed:     Impression:   1.  Diarrhea-differential diagnosis broad at this point.  C. difficile colitis a possibility.  Other inflammatory bowel disease is a possibility.  Feel we should retest for other potential GI pathogens as well.  2.  Intermittent nausea/vomiting-possible gastritis.  Large differential diagnosis.  3.  History heavy alcohol use  4.  Hypokalemia likely related to poor p.o. intake and diarrhea  5.  Elevated ALT, AST, and alkaline phosphatase-likely related to a combination of alcohol related liver disease and fatty infiltration of the liver  6.  Evidence of vascular disease on CT angiogram of abdomen pelvis    Recommendations:    Although she does not have significant leukocytosis, she has had prolonged diarrheal symptoms/abdominal pain, lack of prior response to vancomycin treatment, and evidence of colitis on CT scan.  At present feel  we should treat for the possibility of severe C. difficile colitis.  Feel Dificid is indicated.  Would like to repeat stool studies including GI pathogen PCR panel, C. difficile toxin antigen testing  IV fluids  Would not start any broad antimicrobial treatment at present  Potassium replacement  Monitor clinical course  Colonoscopy for further evaluation if no improvement  Continue to follow    Alexis Manzano MD  07/29/24  20:51 CDT            Electronically signed by Alexis Manzano MD at 07/29/24 4221

## 2024-07-30 NOTE — PROGRESS NOTES
Infectious Diseases Progress Note    Patient:  Minnie Bang  YOB: 1975  MRN: [id number]   Admit date: 7/29/2024   Admitting Physician: Jc Murillo MD  Primary Care Physician: Provider, No Known    Chief Complaint/Interval History: She had diarrhea throughout the night.  She has had some diarrhea today.  She indicates she had some nausea with vomiting overnight as well.  She she is without fever.  She was tachycardic when she initially presented to the hospital.  She was also hypertensive.  Her blood pressure has improved.  She is no longer tachycardic after some IV fluids.  She has remained afebrile throughout hospital stay.  Interval notes including GI consultation reviewed.  She had KUB today that showed nonobstructive nonspecific bowel gas pattern.  She has had NG tube placed.  Currently it appears to be clamped.    Intake/Output Summary (Last 24 hours) at 7/30/2024 1804  Last data filed at 7/30/2024 0900  Gross per 24 hour   Intake 1990 ml   Output 750 ml   Net 1240 ml     Allergies: No Known Allergies  Current Scheduled Medications:   atorvastatin, 10 mg, Oral, Daily  carvedilol, 25 mg, Oral, BID  chlordiazePOXIDE, 25 mg, Oral, Q8H  dilTIAZem CD, 120 mg, Oral, Daily  fidaxomicin, 200 mg, Oral, Q12H  FLUoxetine, 20 mg, Oral, Daily  folic acid, 1 mg, Oral, Daily  levothyroxine, 125 mcg, Oral, Q AM  lisinopril, 20 mg, Oral, Daily  metroNIDAZOLE, 500 mg, Intravenous, Q8H  multivitamin with minerals, 1 tablet, Oral, Daily  pantoprazole, 40 mg, Oral, BID AC  sodium chloride, 10 mL, Intravenous, Q12H  thiamine, 100 mg, Oral, Daily  topiramate, 50 mg, Oral, BID      sodium chloride 0.9 % with KCl 20 mEq, 100 mL/hr, Last Rate: 100 mL/hr (07/30/24 0831)       Current PRN Medications:    acetaminophen **OR** acetaminophen **OR** acetaminophen    cloNIDine    hydrALAZINE    HYDROcodone-acetaminophen    ipratropium-albuterol    LORazepam **OR** LORazepam **OR** LORazepam **OR** LORazepam **OR**  "LORazepam **OR** LORazepam    meclizine    Morphine    nitroglycerin    ondansetron ODT **OR** ondansetron    [COMPLETED] Insert Peripheral IV **AND** sodium chloride    sodium chloride    sodium chloride    Review of Systems see HPI.    Vital Signs:  Temp (24hrs), Av.9 °F (36.6 °C), Min:97.4 °F (36.3 °C), Max:98.5 °F (36.9 °C)    /95 (BP Location: Right arm, Patient Position: Lying)   Pulse 82   Temp 97.6 °F (36.4 °C) (Oral)   Resp 18   Ht 172.7 cm (68\")   Wt 78.9 kg (174 lb)   LMP  (LMP Unknown)   SpO2 94%   BMI 26.46 kg/m²     Physical Exam  Vital signs - reviewed.  Line/IV site - No erythema, warmth, induration, or tenderness.  Lungs clear without crackles  Decreased breath sounds both bases  Heart without murmur.  Distant heart sounds.  Abdomen mild to moderate diffuse tenderness.  Bowel sounds are present.  No rebound  She has NG tube in place    Lab Results:  CBC:   Results from last 7 days   Lab Units 24  0420 24  1230   WBC 10*3/mm3 8.06 8.37   HEMOGLOBIN g/dL 9.7* 12.5   HEMATOCRIT % 29.7* 36.7   PLATELETS 10*3/mm3 217 285     Manual white blood cell differential 40% neutrophils, 37% lymphocytes, 11% monocytes, 3% eosinophils, 1% bands    BMP:  Results from last 7 days   Lab Units 24  1527 24  0420 24  1900 24  1334 24  1230   SODIUM mmol/L  --  140  --   --  137   POTASSIUM mmol/L 3.6 3.1* 2.6* 2.4* 2.3*   CHLORIDE mmol/L  --  101  --   --  92*   CO2 mmol/L  --  27.0  --   --  25.0   BUN mg/dL  --  4*  --   --  3*   CREATININE mg/dL  --  0.43*  --   --  0.45*   GLUCOSE mg/dL  --  103*  --   --  102*   CALCIUM mg/dL  --  7.5*  --   --  8.5*   ALT (SGPT) U/L  --  33  --   --  43*   Alkaline phosphatase 473 (yesterday 621)  AST today 120 (yesterday 125)  ALT 33 (was yesterday 43  Total bilirubin 0.8    Albumin 2.8    Gastrointestinal PCR panel-enteropathogenic E. coli detected (otherwise negative)  C. difficile toxin antigen-negative  Toxigenic C. " difficile by PCR-positive    Review of previous laboratory work in epic:  Stool studies July 2, 2024:  C. difficile toxigenic PCR testing-positive  GI pathogen PCR panel-negative  C. difficile toxin antigen-negative    Culture Results:     Blood Culture   Date Value Ref Range Status   07/29/2024 No growth at 24 hours  Preliminary   07/29/2024 No growth at 24 hours  Preliminary     Radiology:   Abdominal KUB:  FINDINGS:    Bowel gas pattern is nonspecific but nonobstructive. There are  borderline air-filled loops of small bowel. There is bowel gas in the  colon. Patient is status post ventral hernia repair.  IMPRESSION:  1. Nonspecific but nonobstructive bowel gas pattern.  Personally reviewed KUB:      KUB post NG tube placement:      CT scan abdomen and pelvis:  HISTORY: ab pain elevated lactic acid     In order to have a CT radiation dose as low as reasonably achievable  Automated Exposure Control was utilized for adjustment of the mA and/or  KV according to patient size.     Total DLP = 919.59 mGy.cm     The CT angiography of the abdominal pelvis is performed before and after  intravenous contrast enhancement.     The images are acquired in axial plane and subsequent 2D reconstruction  in coronal and sagittal planes and 3D maximum intensity projection image  reconstruction.     Comparison is made with the previous study dated 2/26/2024.     There are atheromatous changes of the abdominal aorta iliac and femoral  arteries.     There is no aneurysmal dilatation of the abdominal aorta or iliac  arteries.     A mixed plaque is seen at the origin of the celiac trunk with less than  50% diameter stenosis. There is subsequent normal branches.     There is normal origin course and caliber of the superior mesenteric  artery. Normal branches.     Small calcific plaques are seen in the proximal renal arteries  bilaterally. No flow-limiting stenosis. No aneurysmal dilatation. There  is an accessory small left renal artery  arising below the level of the  main renal artery.     There is high-grade stenosis at the origin of the inferior mesenteric  artery from the left anterolateral margin of the distal abdominal aorta.  A small and attenuated inferior mesenteric artery is opacified with a  subsequent small and diminutive left colonic and superior rectal  arteries.     There are calcific plaques throughout the course of both common,  internal and external iliac arteries. No flow-limiting stenosis.     Normal size common femoral artery is seen dividing into normal appearing  superficial and deep femoral arteries.     There is no evidence of contrast extravasation seen in the stomach,  small and large bowel.     The lung bases included in the study appear unremarkable.     Limited visualized cardiomediastinal structures show atheromatous change  in the coronary arteries. No significant cardiomegaly.     There is severe fatty infiltration of the liver. There is moderate  hepatomegaly. The liver measures 21.5 cm in craniocaudal dimension. No  focal intrinsic abnormality. The spleen is normal.     No radiopaque gallstones.     Pancreas is normal.     Moderate lobulation of the left adrenal gland. No discrete mass. Right  adrenal gland is normal.     Mild lobulation of the renal contour bilaterally. No mass. No calculi.  No hydronephrosis. Both ureters appear normal and nondilated. The  urinary bladder is poorly distended with moderate wall thickening. No  visible intrinsic abnormality.     There are small fat-containing femoral hernias bilaterally.     There is evidence of prior ventral hernia repair surgery.     The stomach is poorly distended. No focal abnormality. Duodenum is  normal. Fluid-filled nondilated nondistended small bowel is seen.  Moderate diffuse thickening of the wall and edema of the mucosa of the  entire colon most pronounced in the proximal colon including the  ascending and transverse colon. Distal colon is  significantly  decompressed with moderate wall thickening. There is no significant  surrounding peritoneal fat infiltration.     No evidence of abdominal or pelvic lymphadenopathy.     Images reviewed in bone window show chronic degenerative changes of the  lumbar and limited visualized thoracic spine. Severe degeneration of  disc L5-S1.     IMPRESSION:  1. Acute or subacute nonspecific colitis.  2. Severe hepatic steatosis and hepatomegaly.  3. The remaining abdominal pelvis is unremarkable similar to the  previous study.    Additional Studies Reviewed: None    Impression:   1.  She has had problems with diarrhea per her history for a few months.  Her stool test positive for enteropathogenic E. coli.  I do not think that organism is the cause of her ongoing symptoms.  She has PCR evidence of colonization with toxigenic strain of Clostridium difficile, but she has had 2 C. difficile toxin antigen screens in the last month that have been negative.  Although the toxin antigen screen is not as sensitive as the PCR test, the negative antigen screen may suggest that C. difficile is not the cause of her ongoing problems with diarrhea.  C. difficile not being the cause would also be supported by her lack of any prior response to her previous course of vancomycin treatment.  I continue to favor maximizing treatment for C. difficile given her ongoing symptoms, but feel we need to maintain a broader differential diagnosis in terms of cause of her diarrhea.  At this point given her lack of fever, lack of tachycardia, normal white blood cell count, lack of bandemia, nonspecific bowel gas pattern on KUB, CT findings from yesterday, normal renal function, and normal serum bicarbonate-I lean against toxic megacolon.  2.  She has had some nausea/vomiting-may have gastritis or peptic ulcer disease related to prior heavy alcohol use.  3.  Elevated ALT/AST and alkaline phosphatase-showing some improvement.  May be alcohol related.  4.   Evidence of vascular disease on CT of the abdomen and pelvis    Recommendations:  Going to repeat C. difficile antigen testing  Will continue IV fluids  Continue IV metronidazole  Would recommend continuing treatment with Dificid  In my opinion consideration of limited colonoscopy or flexible sigmoidoscopy would be a consideration to help explore for other potential causes for diarrhea/colitis in addition to C. difficile  Will continue to follow    60 minutes spent on care today-interval history and exam.  Review of interval labs.  Review of imaging studies.  Review of interval notes.  Formulation of treatment plan recommendations and documentation.    Alexis Manzano MD

## 2024-07-30 NOTE — PLAN OF CARE
Goal Outcome Evaluation:  Plan of Care Reviewed With: patient, other (see comments)        Progress: no change  Outcome Evaluation: Initial nutrition assessment. Pt presented to the ED with reports of 2 months of diarrhea several times a day and occasional vomiting. She says 2 weeks ago she started having lower abd pain. She is admitted with pancolitis. She had a KUB today that did show mild distention of some small bowel loops but no evidence of megacolon. She is NPO at present. Will follow for nutrition needs and diet advancement/tolerance.

## 2024-07-30 NOTE — CONSULTS
"INFECTIOUS DISEASES CONSULT NOTE    Patient:  Minnie Bang 49 y.o. female  ROOM # 371/1  YOB: 1975  MRN: [id number]  CSN:  [id number]  Admit date: 7/29/2024   Admitting Physician: Jc Murillo MD  Primary Care Physician: Provider, No Known  REFERRING PROVIDER: No ref. provider found    Reason for Consultation: \"Pancolitis, recent C. difficile positive treated\"    History of Present Illness/Chief Complaint: Pleasant 49-year-old woman.  She indicates she has been having problems with her bowels for 4 to 6 months.  She indicates she had been seeing clinicians at [facility].  She had been referred to GI at [facility].  She indicates she saw them on 1 occasion.  She indicates they were going to schedule colonoscopy but for some reason that did not occur.  She was referred to gastroenterology at [facility].  She indicates they were also going to pursue EGD and colonoscopy, but testing revealed evidence of C. difficile.  She indicates she received a course of vancomycin treatment.  She did not experience significant response to her treatment course with vancomycin.  She indicates that is the only antimicrobial treatment she has had to date for C. difficile.  She indicates a prior to her treatment with vancomycin there had been some other medicines attempted including Imodium.  She presented to the hospital with ongoing problems with diarrhea.  She has also had some nausea and vomiting.  She indicates her stools have been liquid in nature.  She has had some intermittent generalized abdominal pain.  She presented to the hospital today and was admitted for further management.  She indicates she has had some antibiotic treatment in the last 6 months.  She had received amoxicillin around the time she had had ENT surgery.    Current Scheduled Medications:   [START ON 7/30/2024] atorvastatin, 10 mg, Oral, Daily  carvedilol, 25 mg, Oral, BID  chlordiazePOXIDE, 25 mg, Oral, " Q8H  [START ON 7/30/2024] dilTIAZem CD, 120 mg, Oral, Daily  [START ON 7/30/2024] FLUoxetine, 20 mg, Oral, Daily  folic acid, 1 mg, Oral, Daily  [START ON 7/30/2024] levothyroxine, 125 mcg, Oral, Q AM  [START ON 7/30/2024] lisinopril, 40 mg, Oral, Daily  lisinopril, 40 mg, Oral, Once  metroNIDAZOLE, 500 mg, Intravenous, Q8H  multivitamin with minerals, 1 tablet, Oral, Daily  pantoprazole, 40 mg, Oral, BID AC  potassium chloride, 40 mEq, Oral, Q2H  sodium chloride, 10 mL, Intravenous, Q12H  thiamine, 100 mg, Oral, Daily  topiramate, 50 mg, Oral, BID    Current PRN Medications:    acetaminophen **OR** acetaminophen **OR** acetaminophen    cloNIDine    hydrALAZINE    HYDROcodone-acetaminophen    ipratropium-albuterol    LORazepam **OR** LORazepam **OR** LORazepam **OR** LORazepam **OR** LORazepam **OR** LORazepam    meclizine    Morphine    nitroglycerin    ondansetron ODT **OR** ondansetron    [COMPLETED] Insert Peripheral IV **AND** sodium chloride    sodium chloride    sodium chloride    Allergies:  No Known Allergies    Past Medical History: Bicuspid aortic valve.  Chronic obstructive pulmonary disease.  Coronary myocardial bridge.  Carpal tunnel syndrome.  Depression.  Diastolic dysfunction.  Chronic obstructive pulmonary disease.  Gastroesophageal reflux disease.  Hypertension.  Thyroid dysfunction.  Vocal cord polyps.  Anxiety.    Past Surgical History: Carpal tunnel release.  Appendectomy.  Hysterectomy.  Thyroid surgery.  Total thyroidectomy.  Tracheostomy.  She reports prior history of hernia repair.    Social History: She indicates she is .  She indicates she lives in a camper type situation on her in-laws property.  She indicates her  is staying with his parents because he only has 1 leg and she is not able to care for him.  She indicates she has a past history of smoking tobacco.  She quit earlier this year so that she could have ENT surgery for vocal cord polyps.  She indicates she had the  "surgery but has continued to have some hoarseness.  She previously drank alcohol fairly heavily.  She would have 6-8 shots of alcohol per day.  In early July she cut back to 1 shot per day in the mornings.  She indicates she needed the alcohol in the morning to help relieve shakes.  She has no illicit drug use.    Family History: Asthma.  Cancer.  Emphysema.  Hypertension.    Exposure History: She did not report any close contacts that have been ill.    Review of Systems see HPI.  No headache or other neurological symptoms.  No anginal type chest pain or chest pressure.  No productive cough or sputum production.    Vital Signs:  /84 (BP Location: Right arm, Patient Position: Lying)   Pulse 80   Temp 97.9 °F (36.6 °C) (Oral)   Resp 18   Ht 172.7 cm (68\")   Wt 78.9 kg (174 lb)   LMP  (LMP Unknown)   SpO2 98%   BMI 26.46 kg/m²  Temp (24hrs), Av.9 °F (36.6 °C), Min:97.6 °F (36.4 °C), Max:98.1 °F (36.7 °C)    Physical Exam  Vital signs - reviewed.  Line/IV site - No erythema or tenderness.  Anxious appearing  No respiratory distress  Lungs with prolonged expiratory phase  No crackles or wheezes  Heart regular rhythm, distant heart sounds, no murmur  Abdomen with bowel sounds present  Mild abdominal distention  Moderate diffuse abdominal tenderness without rebound  There is a vertical scar between the umbilicus and the suprapubic area.  She indicates that was from previous appendectomy.  She indicates she had a lower abdominal incision for hysterectomy.  Extremities trace edema  Skin without rash    Lab Results:  CBC:   Results from last 7 days   Lab Units 24  1230   WBC 10*3/mm3 8.37   HEMOGLOBIN g/dL 12.5   HEMATOCRIT % 36.7   PLATELETS 10*3/mm3 285     CMP:   Results from last 7 days   Lab Units 24  1900 24  1334 24  1230   SODIUM mmol/L  --   --  137   POTASSIUM mmol/L 2.6* 2.4* 2.3*   CHLORIDE mmol/L  --   --  92*   CO2 mmol/L  --   --  25.0   BUN mg/dL  --   --  3* "   CREATININE mg/dL  --   --  0.45*   CALCIUM mg/dL  --   --  8.5*   BILIRUBIN mg/dL  --   --  0.9   ALK PHOS U/L  --   --  621*   ALT (SGPT) U/L  --   --  43*   AST (SGOT) U/L  --   --  125*   GLUCOSE mg/dL  --   --  102*     Review of previous laboratory work in epic:  Stool studies July 2, 2024:  C. difficile toxigenic PCR screening-positive  Testing for toxigenic C. difficile via PCR-positive  GI pathogen PCR panel-negative  C. difficile toxin antigen-negative    Culture results:  Blood cultures drawn today-pending  Blood cultures drawn today-pending    Radiology:   CT angiogram of the abdomen done today:  IMPRESSION:  1. Acute or subacute nonspecific colitis.  2. Severe hepatic steatosis and hepatomegaly.  3. The remaining abdominal pelvis is unremarkable similar to the  previous study.  HISTORY: ab pain elevated lactic acid     In order to have a CT radiation dose as low as reasonably achievable  Automated Exposure Control was utilized for adjustment of the mA and/or  KV according to patient size.     Total DLP = 919.59 mGy.cm     The CT angiography of the abdominal pelvis is performed before and after  intravenous contrast enhancement.     The images are acquired in axial plane and subsequent 2D reconstruction  in coronal and sagittal planes and 3D maximum intensity projection image  reconstruction.     Comparison is made with the previous study dated 2/26/2024.     There are atheromatous changes of the abdominal aorta iliac and femoral  arteries.     There is no aneurysmal dilatation of the abdominal aorta or iliac  arteries.     A mixed plaque is seen at the origin of the celiac trunk with less than  50% diameter stenosis. There is subsequent normal branches.     There is normal origin course and caliber of the superior mesenteric  artery. Normal branches.     Small calcific plaques are seen in the proximal renal arteries  bilaterally. No flow-limiting stenosis. No aneurysmal dilatation. There  is an  accessory small left renal artery arising below the level of the  main renal artery.     There is high-grade stenosis at the origin of the inferior mesenteric  artery from the left anterolateral margin of the distal abdominal aorta.  A small and attenuated inferior mesenteric artery is opacified with a  subsequent small and diminutive left colonic and superior rectal  arteries.     There are calcific plaques throughout the course of both common,  internal and external iliac arteries. No flow-limiting stenosis.     Normal size common femoral artery is seen dividing into normal appearing  superficial and deep femoral arteries.     There is no evidence of contrast extravasation seen in the stomach,  small and large bowel.     The lung bases included in the study appear unremarkable.     Limited visualized cardiomediastinal structures show atheromatous change  in the coronary arteries. No significant cardiomegaly.     There is severe fatty infiltration of the liver. There is moderate  hepatomegaly. The liver measures 21.5 cm in craniocaudal dimension. No  focal intrinsic abnormality. The spleen is normal.     No radiopaque gallstones.     Pancreas is normal.     Moderate lobulation of the left adrenal gland. No discrete mass. Right  adrenal gland is normal.     Mild lobulation of the renal contour bilaterally. No mass. No calculi.  No hydronephrosis. Both ureters appear normal and nondilated. The  urinary bladder is poorly distended with moderate wall thickening. No  visible intrinsic abnormality.     There are small fat-containing femoral hernias bilaterally.     There is evidence of prior ventral hernia repair surgery.     The stomach is poorly distended. No focal abnormality. Duodenum is  normal. Fluid-filled nondilated nondistended small bowel is seen.  Moderate diffuse thickening of the wall and edema of the mucosa of the  entire colon most pronounced in the proximal colon including the  ascending and transverse  colon. Distal colon is significantly  decompressed with moderate wall thickening. There is no significant  surrounding peritoneal fat infiltration.    Ultrasound of the liver done today:  IMPRESSION:  1. No gallstones or ductal dilatation.  2. Fatty infiltration of the liver.  3. Echogenic pancreas may be a normal variant. It may also be seen in  patients with diabetes and prediabetes.    Chest x-ray done today:  IMPRESSION:  1. Mild hypoventilation.  2. Vague densities in the right cardiophrenic angle and left lung base  could be related to the poor inspiration and atelectasis. There is a  similar appearance on the prior study and therefore this may be related  to prominent fat pads.    Additional Studies Reviewed:     Impression:   1.  Diarrhea-differential diagnosis broad at this point.  C. difficile colitis a possibility.  Other inflammatory bowel disease is a possibility.  Feel we should retest for other potential GI pathogens as well.  2.  Intermittent nausea/vomiting-possible gastritis.  Large differential diagnosis.  3.  History heavy alcohol use  4.  Hypokalemia likely related to poor p.o. intake and diarrhea  5.  Elevated ALT, AST, and alkaline phosphatase-likely related to a combination of alcohol related liver disease and fatty infiltration of the liver  6.  Evidence of vascular disease on CT angiogram of abdomen pelvis    Recommendations:    Although she does not have significant leukocytosis, she has had prolonged diarrheal symptoms/abdominal pain, lack of prior response to vancomycin treatment, and evidence of colitis on CT scan.  At present feel we should treat for the possibility of severe C. difficile colitis.  Feel Dificid is indicated.  Would like to repeat stool studies including GI pathogen PCR panel, C. difficile toxin antigen testing  IV fluids  Would not start any broad antimicrobial treatment at present  Potassium replacement  Monitor clinical course  Colonoscopy for further evaluation if  no improvement  Continue to follow    Alexis Manzano MD  07/29/24  20:51 CDT

## 2024-07-31 LAB
ALBUMIN SERPL-MCNC: 2.8 G/DL (ref 3.5–5.2)
ALBUMIN/GLOB SERPL: 1.1 G/DL
ALP SERPL-CCNC: 523 U/L (ref 39–117)
ALT SERPL W P-5'-P-CCNC: 37 U/L (ref 1–33)
ANION GAP SERPL CALCULATED.3IONS-SCNC: 6 MMOL/L (ref 5–15)
AST SERPL-CCNC: 111 U/L (ref 1–32)
BILIRUB SERPL-MCNC: 1 MG/DL (ref 0–1.2)
BUN SERPL-MCNC: 4 MG/DL (ref 6–20)
BUN/CREAT SERPL: 10.3 (ref 7–25)
CALCIUM SPEC-SCNC: 7.7 MG/DL (ref 8.6–10.5)
CHLORIDE SERPL-SCNC: 106 MMOL/L (ref 98–107)
CO2 SERPL-SCNC: 26 MMOL/L (ref 22–29)
CREAT SERPL-MCNC: 0.39 MG/DL (ref 0.57–1)
DEPRECATED RDW RBC AUTO: 65.1 FL (ref 37–54)
EGFRCR SERPLBLD CKD-EPI 2021: 120.7 ML/MIN/1.73
ERYTHROCYTE [DISTWIDTH] IN BLOOD BY AUTOMATED COUNT: 16.2 % (ref 12.3–15.4)
GLOBULIN UR ELPH-MCNC: 2.5 GM/DL
GLUCOSE SERPL-MCNC: 83 MG/DL (ref 65–99)
HCT VFR BLD AUTO: 31.1 % (ref 34–46.6)
HGB BLD-MCNC: 10 G/DL (ref 12–15.9)
MCH RBC QN AUTO: 34.7 PG (ref 26.6–33)
MCHC RBC AUTO-ENTMCNC: 32.2 G/DL (ref 31.5–35.7)
MCV RBC AUTO: 108 FL (ref 79–97)
PLATELET # BLD AUTO: 225 10*3/MM3 (ref 140–450)
PMV BLD AUTO: 10.1 FL (ref 6–12)
POTASSIUM SERPL-SCNC: 3.4 MMOL/L (ref 3.5–5.2)
PROT SERPL-MCNC: 5.3 G/DL (ref 6–8.5)
RBC # BLD AUTO: 2.88 10*6/MM3 (ref 3.77–5.28)
SODIUM SERPL-SCNC: 138 MMOL/L (ref 136–145)
WBC NRBC COR # BLD AUTO: 9.25 10*3/MM3 (ref 3.4–10.8)

## 2024-07-31 PROCEDURE — 99232 SBSQ HOSP IP/OBS MODERATE 35: CPT | Performed by: INTERNAL MEDICINE

## 2024-07-31 PROCEDURE — 80053 COMPREHEN METABOLIC PANEL: CPT | Performed by: FAMILY MEDICINE

## 2024-07-31 PROCEDURE — 85027 COMPLETE CBC AUTOMATED: CPT | Performed by: FAMILY MEDICINE

## 2024-07-31 PROCEDURE — 97116 GAIT TRAINING THERAPY: CPT

## 2024-07-31 PROCEDURE — 63710000001 ONDANSETRON ODT 4 MG TABLET DISPERSIBLE: Performed by: FAMILY MEDICINE

## 2024-07-31 PROCEDURE — 25010000002 MORPHINE PER 10 MG: Performed by: FAMILY MEDICINE

## 2024-07-31 PROCEDURE — 25010000002 LORAZEPAM PER 2 MG: Performed by: FAMILY MEDICINE

## 2024-07-31 PROCEDURE — 25010000002 SODIUM CHLORIDE 0.9 % WITH KCL 20 MEQ 20-0.9 MEQ/L-% SOLUTION: Performed by: FAMILY MEDICINE

## 2024-07-31 PROCEDURE — 25010000002 METRONIDAZOLE 500 MG/100ML SOLUTION: Performed by: FAMILY MEDICINE

## 2024-07-31 PROCEDURE — 97110 THERAPEUTIC EXERCISES: CPT

## 2024-07-31 PROCEDURE — 99231 SBSQ HOSP IP/OBS SF/LOW 25: CPT | Performed by: INTERNAL MEDICINE

## 2024-07-31 RX ORDER — POTASSIUM CHLORIDE 750 MG/1
40 CAPSULE, EXTENDED RELEASE ORAL ONCE
Status: COMPLETED | OUTPATIENT
Start: 2024-07-31 | End: 2024-07-31

## 2024-07-31 RX ORDER — CARVEDILOL 6.25 MG/1
12.5 TABLET ORAL 2 TIMES DAILY WITH MEALS
Status: DISCONTINUED | OUTPATIENT
Start: 2024-07-31 | End: 2024-08-06 | Stop reason: HOSPADM

## 2024-07-31 RX ADMIN — THIAMINE HCL TAB 100 MG 100 MG: 100 TAB at 09:05

## 2024-07-31 RX ADMIN — LEVOTHYROXINE SODIUM 125 MCG: 125 TABLET ORAL at 07:34

## 2024-07-31 RX ADMIN — METRONIDAZOLE 500 MG: 500 INJECTION, SOLUTION INTRAVENOUS at 07:33

## 2024-07-31 RX ADMIN — FIDAXOMICIN 200 MG: 200 TABLET, FILM COATED ORAL at 21:07

## 2024-07-31 RX ADMIN — POTASSIUM CHLORIDE AND SODIUM CHLORIDE 100 ML/HR: 900; 150 INJECTION, SOLUTION INTRAVENOUS at 06:58

## 2024-07-31 RX ADMIN — CHLORDIAZEPOXIDE HYDROCHLORIDE 25 MG: 25 CAPSULE ORAL at 07:32

## 2024-07-31 RX ADMIN — FIDAXOMICIN 200 MG: 200 TABLET, FILM COATED ORAL at 07:33

## 2024-07-31 RX ADMIN — ONDANSETRON 4 MG: 4 TABLET, ORALLY DISINTEGRATING ORAL at 11:08

## 2024-07-31 RX ADMIN — HYDROCODONE BITARTRATE AND ACETAMINOPHEN 1 TABLET: 5; 325 TABLET ORAL at 11:12

## 2024-07-31 RX ADMIN — FLUOXETINE HYDROCHLORIDE 20 MG: 20 CAPSULE ORAL at 09:06

## 2024-07-31 RX ADMIN — Medication 10 ML: at 09:06

## 2024-07-31 RX ADMIN — PHENOL 1 SPRAY: 1.5 LIQUID ORAL at 11:00

## 2024-07-31 RX ADMIN — CHLORDIAZEPOXIDE HYDROCHLORIDE 25 MG: 25 CAPSULE ORAL at 17:50

## 2024-07-31 RX ADMIN — TOPIRAMATE 50 MG: 25 TABLET, FILM COATED ORAL at 21:06

## 2024-07-31 RX ADMIN — Medication 1 TABLET: at 09:06

## 2024-07-31 RX ADMIN — TOPIRAMATE 50 MG: 25 TABLET, FILM COATED ORAL at 09:05

## 2024-07-31 RX ADMIN — LISINOPRIL 20 MG: 20 TABLET ORAL at 09:06

## 2024-07-31 RX ADMIN — MORPHINE SULFATE 2 MG: 2 INJECTION, SOLUTION INTRAMUSCULAR; INTRAVENOUS at 06:58

## 2024-07-31 RX ADMIN — LORAZEPAM 2 MG: 2 INJECTION INTRAMUSCULAR; INTRAVENOUS at 01:48

## 2024-07-31 RX ADMIN — PANTOPRAZOLE SODIUM 40 MG: 40 TABLET, DELAYED RELEASE ORAL at 17:50

## 2024-07-31 RX ADMIN — CARVEDILOL 25 MG: 25 TABLET, FILM COATED ORAL at 09:06

## 2024-07-31 RX ADMIN — CARVEDILOL 12.5 MG: 25 TABLET, FILM COATED ORAL at 21:08

## 2024-07-31 RX ADMIN — ATORVASTATIN CALCIUM 10 MG: 10 TABLET, FILM COATED ORAL at 09:06

## 2024-07-31 RX ADMIN — PANTOPRAZOLE SODIUM 40 MG: 40 TABLET, DELAYED RELEASE ORAL at 07:33

## 2024-07-31 RX ADMIN — HYDROCODONE BITARTRATE AND ACETAMINOPHEN 1 TABLET: 5; 325 TABLET ORAL at 21:07

## 2024-07-31 RX ADMIN — FOLIC ACID 1 MG: 1 TABLET ORAL at 09:06

## 2024-07-31 RX ADMIN — POTASSIUM CHLORIDE 40 MEQ: 750 CAPSULE, EXTENDED RELEASE ORAL at 09:05

## 2024-07-31 RX ADMIN — POTASSIUM CHLORIDE AND SODIUM CHLORIDE 100 ML/HR: 900; 150 INJECTION, SOLUTION INTRAVENOUS at 17:50

## 2024-07-31 RX ADMIN — DILTIAZEM HYDROCHLORIDE 120 MG: 120 CAPSULE, EXTENDED RELEASE ORAL at 09:06

## 2024-07-31 RX ADMIN — CHLORDIAZEPOXIDE HYDROCHLORIDE 25 MG: 25 CAPSULE ORAL at 21:08

## 2024-07-31 NOTE — PROGRESS NOTES
[facility] Medicine Services  INPATIENT PROGRESS NOTE    Patient Name: Minnie Bang  Date of Admission: 7/29/2024  Today's Date: 07/31/24  Length of Stay: 2  Primary Care Physician: Provider, No Known    Subjective   Chief Complaint: Diarrhea abdominal pain  Abdominal Pain       49-year-old female with history of hypertension, alcohol abuse disorder, coronary artery disease, chronic obstructive pulmonary disease, former smoker, comes to the hospital reporting diarrhea that has been present for approximately 2 months.  She describes diarrhea as liquid, several times per day, and occasional associated vomiting.  She states that approximately 2 weeks ago started developing abdominal pain, described as cramps, located mostly in the lower abdomen; she also reports stabbing quality pain over the right posterior rib cage that increases with inspiration and motion, and has been present since yesterday.  Patient states that she is trying to quit alcohol, but usually takes 1 or 2 drinks shots to avoid withdrawal tremors.      Evaluated with nurse present.  Patient had an NG tube placed on 7/30/2024.    Found anxious today, complaining of sore throat and pain due to NG tube insertion.  He had a large bowel movement this morning.  Abdominal pain persists, but is not the main concern for her renal.  No fevers reported.        Review of Systems   Gastrointestinal:  Positive for abdominal pain.      All pertinent negatives and positives are as above. All other systems have been reviewed and are negative unless otherwise stated.     Objective    Temp:  [97.6 °F (36.4 °C)-98.2 °F (36.8 °C)] 98 °F (36.7 °C)  Heart Rate:  [76-86] 77  Resp:  [18-20] 20  BP: ()/() 147/89  Physical Exam  Constitutional:       Appearance: Today, less anxious, no respiratory distress, alert, oriented x 3.  HENT:      Head: Normocephalic and atraumatic.      Nose: Nose normal.  NG-tube in place.      Mouth/Throat:      Mouth: Mucous membranes are moist.      Pharynx: Oropharynx is clear.   Eyes:      Extraocular Movements: Extraocular movements intact.      Conjunctiva/sclera: Conjunctivae normal.      Pupils: Pupils are equal, round, and reactive to light.   Cardiovascular:      Rate and Rhythm: Normal rate and regular rhythm.      Pulses: Normal pulses.   Pulmonary:      Effort: No respiratory distress.      Breath sounds: Normal breath sounds. No wheezing, rhonchi or rales.   Abdominal:      General: Abdomen is flat.  Tender to deep palpation in mesogastric area, with no peritoneal signs bowel sounds are normal.      Palpations: Abdomen is soft.      Tenderness: There is no guarding or rebound.   Musculoskeletal:         General: Normal range of motion.      Cervical back: Normal range of motion and neck supple.   Extremities:  No lower extremity edema.  Skin:     Capillary Refill: Capillary refill takes less than 2 seconds.      Coloration: Skin is not jaundiced.      Findings: No rash.   Neurological: No significant tremors.     General: No focal deficit present.      Mental Status: Patient is alert, oriented to place time and person.     Sensory: No sensory deficit.      Motor: No weakness.      Coordination: Coordination normal.   Psychiatric:         Mood and Affect: Slightly anxious     Behavior: Behavior normal.       Results Review:  I have reviewed the labs, radiology results, and diagnostic studies.    Laboratory Data:   Results from last 7 days   Lab Units 07/31/24  0627 07/30/24  0420 07/29/24  1230   WBC 10*3/mm3 9.25 8.06 8.37   HEMOGLOBIN g/dL 10.0* 9.7* 12.5   HEMATOCRIT % 31.1* 29.7* 36.7   PLATELETS 10*3/mm3 225 217 285        Results from last 7 days   Lab Units 07/31/24  0627 07/30/24  1527 07/30/24  0420 07/29/24  1334 07/29/24  1230   SODIUM mmol/L 138  --  140  --  137   POTASSIUM mmol/L 3.4* 3.6 3.1*   < > 2.3*   CHLORIDE mmol/L 106  --  101  --  92*   CO2 mmol/L 26.0  --  27.0  --   "25.0   BUN mg/dL 4*  --  4*  --  3*   CREATININE mg/dL 0.39*  --  0.43*  --  0.45*   CALCIUM mg/dL 7.7*  --  7.5*  --  8.5*   BILIRUBIN mg/dL 1.0  --  0.8  --  0.9   ALK PHOS U/L 523*  --  473*  --  621*   ALT (SGPT) U/L 37*  --  33  --  43*   AST (SGOT) U/L 111*  --  120*  --  125*   GLUCOSE mg/dL 83  --  103*  --  102*    < > = values in this interval not displayed.       Culture Data:   No results found for: \"BLOODCX\", \"URINECX\", \"WOUNDCX\", \"MRSACX\", \"RESPCX\", \"STOOLCX\"    Radiology Data:   Imaging Results (Last 24 Hours)       Procedure Component Value Units Date/Time    XR Abdomen KUB [[id number]] Collected: 07/30/24 1819     Updated: 07/30/24 1823    Narrative:      EXAMINATION:  XR ABDOMEN KUB-  7/30/2024 4:54 PM     HISTORY: NG tube advanced.     COMPARISON: 7/30/2024 at 4:45 p.m.     TECHNIQUE: Supine image of the lower chest and abdomen.     FINDINGS:   The NG tube has been advanced and the tip and sideport are  now well within the stomach in good position.          Impression:      NG tube advanced and now in good position.           This report was signed and finalized on 7/30/2024 6:20 PM by Dr. Jesus Manuel Lomax MD.       XR Abdomen 1 View [[id number]] Collected: 07/30/24 1711     Updated: 07/30/24 1715    Narrative:      EXAMINATION:  XR ABDOMEN 1 VW-  7/30/2024 4:09 PM     HISTORY: NG tube placement; K52.9-Noninfective gastroenteritis and  colitis, unspecified; R19.7-Diarrhea, unspecified; E87.6-Hypokalemia;  R16.0-Hepatomegaly, not elsewhere classified; Z74.09-Other reduced  mobility.     COMPARISON: 7/30/2024.     TECHNIQUE: Supine image of the lower chest and abdomen.     FINDINGS:   Visualized small bowel is mildly dilated measuring 2.4 cm.  There is air in the stomach and visualized colon. NG tube tip is in the  stomach. The sideport is in the distal esophagus. The tube needs to be  advanced about 9 cm distally.          Impression:      NG tube needs to be advanced about 9 cm distally, " as  described.           This report was signed and finalized on 7/30/2024 5:12 PM by Dr. Jesus Manuel Lomax MD.       XR Abdomen KUB [[id number]] Collected: 07/30/24 1439     Updated: 07/30/24 1442    Narrative:      EXAM: XR ABDOMEN KUB-      DATE: 7/30/2024 12:50 PM     HISTORY: Colitis, possible megacolon; K52.9-Noninfective gastroenteritis  and colitis, unspecified; R19.7-Diarrhea, unspecified;  E87.6-Hypokalemia; R16.0-Hepatomegaly, not elsewhere classified       COMPARISON: None available.     TECHNIQUE:   Frontal view of the abdomen. 1 image.     FINDINGS:    Bowel gas pattern is nonspecific but nonobstructive. There are  borderline air-filled loops of small bowel. There is bowel gas in the  colon. Patient is status post ventral hernia repair.          Impression:         1. Nonspecific but nonobstructive bowel gas pattern.     This report was signed and finalized on 7/30/2024 2:39 PM by Nicho Barreto.               I have reviewed the patient's current medications.     Assessment/Plan   Assessment  Active Hospital Problems    Diagnosis     **Pancolitis      Acute to subacute pancolitis  C. difficile colitis.  Enteropathogenic E. coli positive  Hypertension  Thoracic pain  Alcohol abuse disorder at risk for withdrawal  Anxiety disorder  COPD  Former smoker  Macrocytosis, likely from alcohol use       7/08/2024, Upper endoscopy that showed normal results.         Lactate 7.4, down to 3.1.   Hemoglobin down from 12.5 to 10., stable today  Normal white blood cell count  .  Platelet count 225,000       Sodium 138  Potassium proved to 3.4 (admission 2.3).    Creatinine 0.39, BUN 4.    Glucose 111    Magnesium 2.0.       Alkaline phosphatase elevated 621 on admission, today 523.  Bilirubin normal.  Albumin 2.8.  , ALT 33.  Normal PT/INR     CT scan report  1. Acute or subacute nonspecific colitis.  2. Severe hepatic steatosis and hepatomegaly.  3. The remaining abdominal pelvis is unremarkable  similar to the  previous study.     Liver ultrasound report  1. No gallstones or ductal dilatation.  2. Fatty infiltration of the liver.  3. Echogenic pancreas may be a normal variant. It may also be seen in  patients with diabetes and prediabetes.       Treatment Plan  Replace potassium.  Normal saline with 20 meq  KCl at 100 mL/h.  Monitor electrolytes.     Elevated alkaline phosphatase; AST/ALT consistent with alcohol abuse.     Regarding colitis, patient was treated with 10 days of vancomycin p.o. for C. difficile toxin positive/antigen negative test.  She was started on Dificid 200 mg p.o. every 12 hours on 7/29/2024; infectious disease specialist following patient.  Repeat C. difficile toxin positive, antigen negative.  GI panel showed enteropathogenic E. coli.  Discontinue IV Flagyl.  I will attempt to remove NG tube today and then gradually restart diet.  Frequent abdominal examinations.    Thiamine, folic acid, multivitamins, Librium taper, Ativan IV as needed for withdrawal signs or symptoms.  CIWA protocol.     For blood pressure management, blood pressure is better controlled.  I decreased lisinopril from 40 to 20 mg p.o. daily.  Currently on Coreg 25 p.o. twice daily and Cardizem  mg daily.  SCDs for DVT prophylaxis.    She still requires inpatient management.      Medical Decision Making  Number and Complexity of problems: 9, moderate to high complexity  Differential Diagnosis: See above    Conditions and Status        Condition is improving.     MDM Data  External documents reviewed: No  Cardiac tracing (EKG, telemetry) interpretation: Sinus rhythm  Radiology interpretation: Radiology reports reviewed  Labs reviewed: Yes  Any tests that were considered but not ordered: No     Decision rules/scores evaluated (example [id number], Wells, etc): None     Discussed with: Patient and nurse     Care Planning  Shared decision making: With patient  Code status and discussions: Full  code    Disposition  Social Determinants of Health that impact treatment or disposition: Alcohol abuse  I expect the patient to be discharged to home in 2 days.     Electronically signed by Jc Murillo MD, 07/31/24, 10:47 CDT.

## 2024-07-31 NOTE — THERAPY TREATMENT NOTE
Acute Care - Physical Therapy Treatment Note  [facility]     Patient Name: Minnie Bang  : 1975  MRN: [id number]  Today's Date: 2024      Visit Dx:     ICD-10-CM ICD-9-CM   1. Colitis  K52.9 558.9   2. Diarrhea, unspecified type  R19.7 787.91   3. Hypokalemia  E87.6 276.8   4. Hepatomegaly  R16.0 789.1   5. Impaired mobility [Z74.09]  Z74.09 799.89     Patient Active Problem List   Diagnosis    Mixed hyperlipidemia    Raynaud's disease without gangrene    Ex-smoker    Chest pain in adult    SORTO (dyspnea on exertion)    Mixed simple and mucopurulent chronic bronchitis    Pulmonary emphysema    Non-occlusive coronary artery disease    Primary hypertension    Coronary-myocardial bridge    Bilateral lower extremity edema    Bicuspid aortic valve    Vocal cord polyps    Tobacco use disorder, continuous    Neoplasm of uncertain behavior    Hypokalemia    Dysphagia    Heartburn    Pain of upper abdomen    Pancolitis     Past Medical History:   Diagnosis Date    Abnormal ECG 2023    Anxiety     Arthritis     back    Asthma     Bicuspid aortic valve 2023    COPD (chronic obstructive pulmonary disease)     Coronary-myocardial bridge 2023    CTS (carpal tunnel syndrome) 2019    Dental disease     Depression     Diastolic dysfunction     Dizziness     Emphysema of lung     GERD (gastroesophageal reflux disease)     Headache     Hyperlipidemia     Hypertension     Hypothyroidism     Mixed simple and mucopurulent chronic bronchitis 2023    Non-occlusive coronary artery disease 2023    NSTEMI (non-ST elevated myocardial infarction) (HCC)     Pneumonia     Pulmonary emphysema 2023    Shingles 2018    Vocal cord polyps 2024     Past Surgical History:   Procedure Laterality Date    APPENDECTOMY      CARDIAC CATHETERIZATION N/A 2017    Procedure: Coronary angiography;  Surgeon: Luis Colvin MD;  Location: [facility] INVASIVE LOCATION;  Service:      CARDIAC CATHETERIZATION N/A 05/31/2023    Procedure: Left Heart Cath;  Surgeon: Steffen Rossi MD;  Location:  [facility] CATH INVASIVE LOCATION;  Service: Cardiology;  Laterality: N/A;    CARPAL TUNNEL RELEASE  2019    ENDOSCOPY N/A 7/8/2024    Procedure: ESOPHAGOGASTRODUODENOSCOPY WITH ANESTHESIA;  Surgeon: Gordy Wang MD;  Location: [facility] ENDOSCOPY;  Service: Gastroenterology;  Laterality: N/A;  pre: dysphagia.   post: esophagus dilated.   no PCP    HYSTERECTOMY      PANENDOSCOPY N/A 5/30/2024    Procedure: DIRECT LARYNGOSCOPY WITH BIOPSY OF VOCAL CORD POLYPS WITH POSSIBLE TRACHEOSTOMY;  Surgeon: Mendez Padilla MD;  Location: [facility] OR;  Service: ENT;  Laterality: N/A;    FL RT/LT HEART CATHETERS N/A 11/04/2017    Procedure: Percutaneous Coronary Intervention;  Surgeon: Luis Colvin MD;  Location:  [facility] CATH INVASIVE LOCATION;  Service: Cardiovascular    THYROID SURGERY      TOTAL THYROIDECTOMY      TRACHEOSTOMY N/A 5/30/2024    Procedure: POSSIBLE TRACHEOSTOMY;  Surgeon: Mendez Padilla MD;  Location: [facility] OR;  Service: ENT;  Laterality: N/A;     PT Assessment (Last 12 Hours)       PT Evaluation and Treatment       Row Name 07/31/24 0857          Physical Therapy Time and Intention    Subjective Information complains of;weakness;fatigue;pain  -AURELIO     Document Type therapy note (daily note)  -AURELIO     Mode of Treatment physical therapy  -AURELIO       Row Name 07/31/24 0857          General Information    Existing Precautions/Restrictions fall  NG tube  -AURELIO       Row Name 07/31/24 0857          Pain    Pretreatment Pain Rating 3/10  -AURELIO     Posttreatment Pain Rating 3/10  -AURELIO     Pain Location - throat  -AURELIO     Pain Intervention(s) --  notified nsg  -AURELIO       Row Name 07/31/24 0857          Bed Mobility    Supine-Sit [place] (Bed Mobility) standby assist  -AURELIO     Sit-Supine [place] (Bed Mobility) standby assist  -AURELIO       Row Name 07/31/24 0857          Transfers    Transfers sit-stand  transfer;stand-sit transfer;toilet transfer  -AURELIO       Row Name 07/31/24 0857          Sit-Stand Transfer    Sit-Stand [place] (Transfers) verbal cues;contact guard  -AURELIO       Row Name 07/31/24 0857          Stand-Sit Transfer    Stand-Sit [place] (Transfers) verbal cues;contact guard  -AURELIO       Row Name 07/31/24 0857          Toilet Transfer    Type (Toilet Transfer) sit-stand;stand-sit  -AURELIO     [place] Level (Toilet Transfer) verbal cues;contact guard  -AURELIO     Assistive Device (Toilet Transfer) commode;grab bars/safety frame  -AURELIO       Row Name 07/31/24 0857          Gait/Stairs (Locomotion)    [place] Level (Gait) verbal cues;contact guard;minimum assist (75% patient effort)  -AURELIO     Assistive Device (Gait) --  pt held IV pole  -AURELIO     Distance in Feet (Gait) 40  -AURELIO     Comment, (Gait/Stairs) pt would benefit from RWX due to weakness and decrease balance with amb  -AURELIO       Row Name 07/31/24 0857          Motor Skills    Comments, Therapeutic Exercise sitting AROM BLE X 15  -AURELIO     Additional Documentation Comments, Therapeutic Exercise (Row)  -AURELIO       Row Name 07/31/24 0857          Positioning and Restraints    Pre-Treatment Position in bed  -AURELIO     Post Treatment Position bed  -AURELIO     In Bed fowlers;call light within reach;encouraged to call for assist;side rails up x2  -AURELIO               User Key  (r) = Recorded By, (t) = Taken By, (c) = Cosigned By      Initials Name Provider Type    AURELIO Bandar Villa, PTA Physical Therapist Assistant                    Physical Therapy Education       Title: PT OT SLP Therapies (In Progress)       Topic: Physical Therapy (In Progress)       Point: Mobility training (Done)       Learning Progress Summary             Patient Acceptance, E,TB, VU,DU by FLYNN at 7/30/2024 7680    Comment: Educated on role of PT in pt care                         Point: Home exercise program (Not Started)       Learner Progress:  Not documented in this visit.               Point: Body mechanics (Not Started)       Learner Progress:  Not documented in this visit.              Point: Precautions (Not Started)       Learner Progress:  Not documented in this visit.                              User Key       Initials Effective Dates Name Provider Type Discipline    CN 06/24/24 -  Elise Moore, PT Student PT Student PT                  PT Recommendation and Plan     Plan of Care Reviewed With: patient  Progress: improving  Outcome Evaluation: Pt was in bed, upset about being NPO and c/o her throat hurting.  Pt was able to perform bed mobilty with SBA.  Transfered sit to stand with CGA.  Amb 40' with CGA/min assist while holding IVpole.  Pt was unsteady during gait, 2 LOB that required min assist to correct.  May benefit from RWX.  Will continue to work with pt to increase strength and progress amb as pt is able.   Outcome Measures       Row Name 07/31/24 0857             How much help from another person do you currently need...    Turning from your back to your side while in flat bed without using bedrails? 4  -AURELIO      Moving from lying on back to sitting on the side of a flat bed without bedrails? 4  -AURELIO      Moving to and from a bed to a chair (including a wheelchair)? 4  -AURELIO      Standing up from a chair using your arms (e.g., wheelchair, bedside chair)? 3  -AURELIO      Climbing 3-5 steps with a railing? 3  -AURELIO      To walk in hospital room? 3  -AURELIO      AM-PAC 6 Clicks Score (PT) 21  -AURELIO      Highest Level of Mobility Goal 6 --> Walk 10 steps or more  -AURELIO         Functional Assessment    Outcome Measure Options AM-PAC 6 Clicks Basic Mobility (PT)  -AURELIO                User Key  (r) = Recorded By, (t) = Taken By, (c) = Cosigned By      Initials Name Provider Type    AURELIO Bandar Villa PTA Physical Therapist Assistant                     Time Calculation:    PT Charges       Row Name 07/31/24 0857             Time Calculation    Start Time 0857  -AURELIO      Stop Time 0920  -AURELIO      Time  Calculation (min) 23 min  -AURELIO      PT Received On 07/31/24  -AURELIO         Time Calculation- PT    Total Timed Code Minutes- PT 23 minute(s)  -AURELIO         Timed Charges    79075 - PT Therapeutic Exercise Minutes 10  -AURELIO      62772 - Gait Training Minutes  13  -AURELIO         Total Minutes    Timed Charges Total Minutes 23  -AURELIO       Total Minutes 23  -AURELIO                User Key  (r) = Recorded By, (t) = Taken By, (c) = Cosigned By      Initials Name Provider Type    Bandar Gonzalez, PTA Physical Therapist Assistant                  Therapy Charges for Today       Code Description Service Date Service Provider Modifiers Qty    45320918871 HC GAIT TRAINING EA 15 MIN 7/31/2024 Bandar Villa, PTA GP 1    37913350076 HC PT THER PROC EA 15 MIN 7/31/2024 Bandar Villa, PTA GP 1            PT G-Codes  Outcome Measure Options: AM-PAC 6 Clicks Basic Mobility (PT)  AM-PAC 6 Clicks Score (PT): 21    Bandar Villa PTA  7/31/2024

## 2024-07-31 NOTE — PROGRESS NOTES
Gastroenterology Hospitalist follow-up  Patient Identification:  Name: Minnie Bang  Age: 49 y.o.  Sex: female  :  1975  MRN: [id number]    Attending: Jc Murillo MD  Gastroenterologist of Record: None  Information from: Records     CC: Colitis    History:   Patient had her NG tube removed earlier today.  She is now resting comfortably and I did not awaken.    Review of Systems:  Not obtained    Problem List:  Patient Active Problem List    Diagnosis     *Pancolitis [K51.00]     Heartburn [R12]     Pain of upper abdomen [R10.10]     Dysphagia [R13.10]     Hypokalemia [E87.6]     Neoplasm of uncertain behavior [D48.9]     Vocal cord polyps [J38.1]     Tobacco use disorder, continuous [F17.209]     Bicuspid aortic valve [Q23.1]     Coronary-myocardial bridge [Q24.5]     Bilateral lower extremity edema [R60.0]     Primary hypertension [I10]     Non-occlusive coronary artery disease [I25.10]     Pulmonary emphysema [J43.9]     Mixed simple and mucopurulent chronic bronchitis [J41.8]     Ex-smoker [Z87.891]     Chest pain in adult [R07.9]     SORTO (dyspnea on exertion) [R06.09]     Mixed hyperlipidemia [E78.2]     Raynaud's disease without gangrene [I73.00]      Current Meds:  MAR Reviewed  Scheduled Meds:atorvastatin, 10 mg, Oral, Daily  carvedilol, 25 mg, Oral, BID  chlordiazePOXIDE, 25 mg, Oral, Q8H  dilTIAZem CD, 120 mg, Oral, Daily  fidaxomicin, 200 mg, Oral, Q12H  FLUoxetine, 20 mg, Oral, Daily  folic acid, 1 mg, Oral, Daily  levothyroxine, 125 mcg, Oral, Q AM  lisinopril, 20 mg, Oral, Daily  multivitamin with minerals, 1 tablet, Oral, Daily  pantoprazole, 40 mg, Oral, BID AC  sodium chloride, 10 mL, Intravenous, Q12H  thiamine, 100 mg, Oral, Daily  topiramate, 50 mg, Oral, BID      Continuous Infusions:sodium chloride 0.9 % with KCl 20 mEq, 100 mL/hr, Last Rate: 100 mL/hr (24 0658)      PRN Meds:.  acetaminophen **OR** acetaminophen **OR** acetaminophen    cloNIDine    hydrALAZINE     "HYDROcodone-acetaminophen    ipratropium-albuterol    LORazepam **OR** LORazepam **OR** LORazepam **OR** LORazepam **OR** LORazepam **OR** LORazepam    meclizine    Morphine    nitroglycerin    ondansetron ODT **OR** ondansetron    phenol    [COMPLETED] Insert Peripheral IV **AND** sodium chloride    sodium chloride    sodium chloride  Allergies:  No Known Allergies    Intake/Output:     Intake/Output Summary (Last 24 hours) at 2024 1540  Last data filed at 2024 0900  Gross per 24 hour   Intake 0 ml   Output 0 ml   Net 0 ml     New allergies/reactions:  None    Physical Exam:  Vitals:   Temp (24hrs), Av °F (36.7 °C), Min:97.6 °F (36.4 °C), Max:98.3 °F (36.8 °C)    Temp:  [97.6 °F (36.4 °C)-98.3 °F (36.8 °C)] 98.3 °F (36.8 °C)  Heart Rate:  [76-85] 82  Resp:  [18-20] 20  BP: ()/() 142/97  /97 (BP Location: Right arm, Patient Position: Lying)   Pulse 82   Temp 98.3 °F (36.8 °C) (Oral)   Resp 20   Ht 172.7 cm (68\")   Wt 78.9 kg (174 lb)   LMP  (LMP Unknown)   SpO2 97%   BMI 26.46 kg/m²     Exam:  NAD  No respiratory distress.     DATA:  Radiology and Labs:   Recent Results (from the past 24 hour(s))   CBC (No Diff)    Collection Time: 24  6:27 AM    Specimen: Blood   Result Value Ref Range    WBC 9.25 3.40 - 10.80 10*3/mm3    RBC 2.88 (L) 3.77 - 5.28 10*6/mm3    Hemoglobin 10.0 (L) 12.0 - 15.9 g/dL    Hematocrit 31.1 (L) 34.0 - 46.6 %    .0 (H) 79.0 - 97.0 fL    MCH 34.7 (H) 26.6 - 33.0 pg    MCHC 32.2 31.5 - 35.7 g/dL    RDW 16.2 (H) 12.3 - 15.4 %    RDW-SD 65.1 (H) 37.0 - 54.0 fl    MPV 10.1 6.0 - 12.0 fL    Platelets 225 140 - 450 10*3/mm3   Comprehensive Metabolic Panel    Collection Time: 24  6:27 AM    Specimen: Blood   Result Value Ref Range    Glucose 83 65 - 99 mg/dL    BUN 4 (L) 6 - 20 mg/dL    Creatinine 0.39 (L) 0.57 - 1.00 mg/dL    Sodium 138 136 - 145 mmol/L    Potassium 3.4 (L) 3.5 - 5.2 mmol/L    Chloride 106 98 - 107 mmol/L    CO2 26.0 22.0 - " 29.0 mmol/L    Calcium 7.7 (L) 8.6 - 10.5 mg/dL    Total Protein 5.3 (L) 6.0 - 8.5 g/dL    Albumin 2.8 (L) 3.5 - 5.2 g/dL    ALT (SGPT) 37 (H) 1 - 33 U/L    AST (SGOT) 111 (H) 1 - 32 U/L    Alkaline Phosphatase 523 (H) 39 - 117 U/L    Total Bilirubin 1.0 0.0 - 1.2 mg/dL    Globulin 2.5 gm/dL    A/G Ratio 1.1 g/dL    BUN/Creatinine Ratio 10.3 7.0 - 25.0    Anion Gap 6.0 5.0 - 15.0 mmol/L    eGFR 120.7 >60.0 mL/min/1.73       Result Review:  I have personally reviewed the results from the time of this admission to 7/31/2024 15:40 CDT and agree with these findings:  [x]  Laboratory list / accordion  [x]  Microbiology  []  Radiology  []  EKG/Telemetry   []  Cardiology/Vascular   []  Pathology  [x]  Old records  []  Other:  Most notable findings include: I did notice that her alkaline phosphatase has been slowly and steadily going up over the past 4 to 5 months.  I do not know if this is an identified problem or if it has been evaluated.      Assessment/recommendations/plan:  Problem List:     Pancolitis    I agree with current assessments.  She may well have an infectious colitis in which cultures have been false negative.  We also have to consider the possibility that this is idiopathic inflammatory bowel disease.  Will continue to follow along.  I agree that she may require unprepped colonoscopy or similar procedure in the next couple of days.    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  Any results not available at time of inpatient discharge and/or GI clinic follow up should be managed by the hospitalist team, PCP, or outpatient gastroenterologist.

## 2024-07-31 NOTE — PROGRESS NOTES
Infectious Diseases Progress Note    Patient:  Minnie Bang  YOB: 1975  MRN: [id number]   Admit date: 7/29/2024   Admitting Physician: Jc Murillo MD  Primary Care Physician: Provider, No Known    Chief Complaint/Interval History: She has an NG tube in place.  Per nursing it was on suction overnight.  She indicates it was clamped this morning to administer medication.  She had large liquid bowel movement overnight.  Per discussion with nursing it was greenish-brown.  No blood or melena.  Patient indicates she awoke having passed a large liquid stool.  She would like some ice chips.    Intake/Output Summary (Last 24 hours) at 7/31/2024 0827  Last data filed at 7/30/2024 2210  Gross per 24 hour   Intake 240 ml   Output 0 ml   Net 240 ml     Allergies: No Known Allergies  Current Scheduled Medications:   atorvastatin, 10 mg, Oral, Daily  carvedilol, 25 mg, Oral, BID  chlordiazePOXIDE, 25 mg, Oral, Q8H  dilTIAZem CD, 120 mg, Oral, Daily  fidaxomicin, 200 mg, Oral, Q12H  FLUoxetine, 20 mg, Oral, Daily  folic acid, 1 mg, Oral, Daily  levothyroxine, 125 mcg, Oral, Q AM  lisinopril, 20 mg, Oral, Daily  metroNIDAZOLE, 500 mg, Intravenous, Q8H  multivitamin with minerals, 1 tablet, Oral, Daily  pantoprazole, 40 mg, Oral, BID AC  potassium chloride, 40 mEq, Oral, Once  sodium chloride, 10 mL, Intravenous, Q12H  thiamine, 100 mg, Oral, Daily  topiramate, 50 mg, Oral, BID      sodium chloride 0.9 % with KCl 20 mEq, 100 mL/hr, Last Rate: 100 mL/hr (07/31/24 0658)       Current PRN Medications:    acetaminophen **OR** acetaminophen **OR** acetaminophen    cloNIDine    hydrALAZINE    HYDROcodone-acetaminophen    ipratropium-albuterol    LORazepam **OR** LORazepam **OR** LORazepam **OR** LORazepam **OR** LORazepam **OR** LORazepam    meclizine    Morphine    nitroglycerin    ondansetron ODT **OR** ondansetron    [COMPLETED] Insert Peripheral IV **AND** sodium chloride    sodium chloride    sodium  "chloride    Review of Systems see HPI    Vital Signs:  Temp (24hrs), Av.9 °F (36.6 °C), Min:97.6 °F (36.4 °C), Max:98.2 °F (36.8 °C)    /89 (BP Location: Right arm, Patient Position: Lying)   Pulse 77   Temp 98 °F (36.7 °C) (Oral)   Resp 20   Ht 172.7 cm (68\")   Wt 78.9 kg (174 lb)   LMP  (LMP Unknown)   SpO2 97%   BMI 26.46 kg/m²     Physical Exam  Vital signs - reviewed.  Line/IV site - No erythema, warmth, induration, or tenderness.  Abdomen without increasing distention  She has bowel sounds present  No rebound  Mild to moderate diffuse tenderness  NG tube remains in place    Lab Results:  CBC:   Results from last 7 days   Lab Units 24  0627 24  0420 24  1230   WBC 10*3/mm3 9.25 8.06 8.37   HEMOGLOBIN g/dL 10.0* 9.7* 12.5   HEMATOCRIT % 31.1* 29.7* 36.7   PLATELETS 10*3/mm3 225 217 285     BMP:  Results from last 7 days   Lab Units 24  0627 24  1527 24  0420 24  1900 24  1334 24  1230   SODIUM mmol/L 138  --  140  --   --  137   POTASSIUM mmol/L 3.4* 3.6 3.1* 2.6* 2.4* 2.3*   CHLORIDE mmol/L 106  --  101  --   --  92*   CO2 mmol/L 26.0  --  27.0  --   --  25.0   BUN mg/dL 4*  --  4*  --   --  3*   CREATININE mg/dL 0.39*  --  0.43*  --   --  0.45*   GLUCOSE mg/dL 83  --  103*  --   --  102*   CALCIUM mg/dL 7.7*  --  7.5*  --   --  8.5*   ALT (SGPT) U/L 37*  --  33  --   --  43*     Culture Results:   Blood Culture   Date Value Ref Range Status   2024 No growth at 24 hours  Preliminary   2024 No growth at 24 hours  Preliminary     Radiology:     Abdominal KUB done on 2024:  FINDINGS:    Bowel gas pattern is nonspecific but nonobstructive. There are  borderline air-filled loops of small bowel. There is bowel gas in the  colon. Patient is status post ventral hernia repair.  IMPRESSION:  1. Nonspecific but nonobstructive bowel gas pattern.  Personally reviewed KUB:       KUB post NG tube placement:       CT scan abdomen and " pelvis done on July 29, 2024:  HISTORY: ab pain elevated lactic acid     In order to have a CT radiation dose as low as reasonably achievable  Automated Exposure Control was utilized for adjustment of the mA and/or  KV according to patient size.     Total DLP = 919.59 mGy.cm     The CT angiography of the abdominal pelvis is performed before and after  intravenous contrast enhancement.     The images are acquired in axial plane and subsequent 2D reconstruction  in coronal and sagittal planes and 3D maximum intensity projection image  reconstruction.     Comparison is made with the previous study dated 2/26/2024.     There are atheromatous changes of the abdominal aorta iliac and femoral  arteries.     There is no aneurysmal dilatation of the abdominal aorta or iliac  arteries.     A mixed plaque is seen at the origin of the celiac trunk with less than  50% diameter stenosis. There is subsequent normal branches.     There is normal origin course and caliber of the superior mesenteric  artery. Normal branches.     Small calcific plaques are seen in the proximal renal arteries  bilaterally. No flow-limiting stenosis. No aneurysmal dilatation. There  is an accessory small left renal artery arising below the level of the  main renal artery.     There is high-grade stenosis at the origin of the inferior mesenteric  artery from the left anterolateral margin of the distal abdominal aorta.  A small and attenuated inferior mesenteric artery is opacified with a  subsequent small and diminutive left colonic and superior rectal  arteries.     There are calcific plaques throughout the course of both common,  internal and external iliac arteries. No flow-limiting stenosis.     Normal size common femoral artery is seen dividing into normal appearing  superficial and deep femoral arteries.     There is no evidence of contrast extravasation seen in the stomach,  small and large bowel.     The lung bases included in the study appear  unremarkable.     Limited visualized cardiomediastinal structures show atheromatous change  in the coronary arteries. No significant cardiomegaly.     There is severe fatty infiltration of the liver. There is moderate  hepatomegaly. The liver measures 21.5 cm in craniocaudal dimension. No  focal intrinsic abnormality. The spleen is normal.     No radiopaque gallstones.     Pancreas is normal.     Moderate lobulation of the left adrenal gland. No discrete mass. Right  adrenal gland is normal.     Mild lobulation of the renal contour bilaterally. No mass. No calculi.  No hydronephrosis. Both ureters appear normal and nondilated. The  urinary bladder is poorly distended with moderate wall thickening. No  visible intrinsic abnormality.     There are small fat-containing femoral hernias bilaterally.     There is evidence of prior ventral hernia repair surgery.     The stomach is poorly distended. No focal abnormality. Duodenum is  normal. Fluid-filled nondilated nondistended small bowel is seen.  Moderate diffuse thickening of the wall and edema of the mucosa of the  entire colon most pronounced in the proximal colon including the  ascending and transverse colon. Distal colon is significantly  decompressed with moderate wall thickening. There is no significant  surrounding peritoneal fat infiltration.     No evidence of abdominal or pelvic lymphadenopathy.     Images reviewed in bone window show chronic degenerative changes of the  lumbar and limited visualized thoracic spine. Severe degeneration of  disc L5-S1.     IMPRESSION:  1. Acute or subacute nonspecific colitis.  2. Severe hepatic steatosis and hepatomegaly.  3. The remaining abdominal pelvis is unremarkable similar to the  previous study.      Additional Studies Reviewed: None    Impression:   1.  Diarrhea-although her stool PCR studies identified toxigenic strain of C. difficile, she has had 2 C. difficile toxin antigen studies that have been negative.  The C.  difficile toxin antigen test is not 100% sensitive and she could still have C. difficile with negative toxin antigen studies, however, feel we need to maintain differential diagnosis for other causes of colitis outside of C. difficile.  2.  She had had nausea vomiting-feel gastritis/peptic ulcer disease remains a possibility  3.  Elevated ALT/AST-suspect alcohol related  4.  Evidence of vascular disease on CT scan of the abdomen and pelvis    Recommendations:   If okay with others since KUB did not demonstrate ileus or toxic megacolon, feel NG tube can be removed.  She is continuing to pass liquid stool and flatus.  Repeat C. difficile toxin antigen screen.  Feel it be reasonable to try liquid diet.  If no improvement with IV metronidazole and Dificid and additional C. difficile toxin antigen study negative, would consider limited colonoscopy or flexible sigmoidoscopy to look for evidence of other causes of colitis other than C. difficile  Continue to follow    Alexis Manzano MD

## 2024-07-31 NOTE — PLAN OF CARE
Goal Outcome Evaluation:  Plan of Care Reviewed With: patient        Progress: no change  Outcome Evaluation: ivf/ o2 3l nc cont. npo with ice chips- tolerating. up with minimal assist. mood/ behavior ranging from calm and quiet to yelling & cursing to crying. medicating per order. cont to monitor.

## 2024-07-31 NOTE — PLAN OF CARE
Goal Outcome Evaluation:  Plan of Care Reviewed With: patient        Progress: improving  Outcome Evaluation: Pt was in bed, upset about being NPO and c/o her throat hurting.  Pt was able to perform bed mobilty with SBA.  Transfered sit to stand with CGA.  Amb 40' with CGA/min assist while holding IVpole.  Pt was unsteady during gait, 2 LOB that required min assist to correct.  May benefit from RWX.  Will continue to work with pt to increase strength and progress amb as pt is able.

## 2024-08-01 ENCOUNTER — APPOINTMENT (OUTPATIENT)
Dept: CT IMAGING | Facility: HOSPITAL | Age: 49
DRG: 392 | End: 2024-08-01
Payer: COMMERCIAL

## 2024-08-01 LAB
ALBUMIN SERPL-MCNC: 2.7 G/DL (ref 3.5–5.2)
ALBUMIN/GLOB SERPL: 1 G/DL
ALP SERPL-CCNC: 546 U/L (ref 39–117)
ALT SERPL W P-5'-P-CCNC: 36 U/L (ref 1–33)
ANION GAP SERPL CALCULATED.3IONS-SCNC: 9 MMOL/L (ref 5–15)
ANISOCYTOSIS BLD QL: ABNORMAL
AST SERPL-CCNC: 105 U/L (ref 1–32)
BASOPHILS # BLD MANUAL: 0.23 10*3/MM3 (ref 0–0.2)
BASOPHILS NFR BLD MANUAL: 2 % (ref 0–1.5)
BILIRUB SERPL-MCNC: 0.7 MG/DL (ref 0–1.2)
BUN SERPL-MCNC: 4 MG/DL (ref 6–20)
BUN/CREAT SERPL: 9.8 (ref 7–25)
CALCIUM SPEC-SCNC: 7.7 MG/DL (ref 8.6–10.5)
CHLORIDE SERPL-SCNC: 108 MMOL/L (ref 98–107)
CO2 SERPL-SCNC: 23 MMOL/L (ref 22–29)
CREAT SERPL-MCNC: 0.41 MG/DL (ref 0.57–1)
D-LACTATE SERPL-SCNC: 0.8 MMOL/L (ref 0.5–2)
DEPRECATED RDW RBC AUTO: 67 FL (ref 37–54)
EGFRCR SERPLBLD CKD-EPI 2021: 119.3 ML/MIN/1.73
EOSINOPHIL # BLD MANUAL: 0.35 10*3/MM3 (ref 0–0.4)
EOSINOPHIL NFR BLD MANUAL: 3 % (ref 0.3–6.2)
ERYTHROCYTE [DISTWIDTH] IN BLOOD BY AUTOMATED COUNT: 16.4 % (ref 12.3–15.4)
FOLATE SERPL-MCNC: 5.72 NG/ML (ref 4.78–24.2)
GLOBULIN UR ELPH-MCNC: 2.8 GM/DL
GLUCOSE SERPL-MCNC: 68 MG/DL (ref 65–99)
HCT VFR BLD AUTO: 31.6 % (ref 34–46.6)
HGB BLD-MCNC: 10 G/DL (ref 12–15.9)
LYMPHOCYTES # BLD MANUAL: 2.07 10*3/MM3 (ref 0.7–3.1)
LYMPHOCYTES NFR BLD MANUAL: 10 % (ref 5–12)
MACROCYTES BLD QL SMEAR: ABNORMAL
MCH RBC QN AUTO: 35.3 PG (ref 26.6–33)
MCHC RBC AUTO-ENTMCNC: 31.6 G/DL (ref 31.5–35.7)
MCV RBC AUTO: 111.7 FL (ref 79–97)
METAMYELOCYTES NFR BLD MANUAL: 2 % (ref 0–0)
MONOCYTES # BLD: 1.15 10*3/MM3 (ref 0.1–0.9)
NEUTROPHILS # BLD AUTO: 7.49 10*3/MM3 (ref 1.7–7)
NEUTROPHILS NFR BLD MANUAL: 57 % (ref 42.7–76)
NEUTS BAND NFR BLD MANUAL: 8 % (ref 0–5)
PLAT MORPH BLD: NORMAL
PLATELET # BLD AUTO: 250 10*3/MM3 (ref 140–450)
PMV BLD AUTO: 10.5 FL (ref 6–12)
POIKILOCYTOSIS BLD QL SMEAR: ABNORMAL
POTASSIUM SERPL-SCNC: 3.8 MMOL/L (ref 3.5–5.2)
PROT SERPL-MCNC: 5.5 G/DL (ref 6–8.5)
RBC # BLD AUTO: 2.83 10*6/MM3 (ref 3.77–5.28)
SODIUM SERPL-SCNC: 140 MMOL/L (ref 136–145)
STOMATOCYTES BLD QL SMEAR: ABNORMAL
TARGETS BLD QL SMEAR: ABNORMAL
VARIANT LYMPHS NFR BLD MANUAL: 15 % (ref 19.6–45.3)
VARIANT LYMPHS NFR BLD MANUAL: 3 % (ref 0–5)
VIT B12 BLD-MCNC: 1225 PG/ML (ref 211–946)
WBC MORPH BLD: NORMAL
WBC NRBC COR # BLD AUTO: 11.52 10*3/MM3 (ref 3.4–10.8)

## 2024-08-01 PROCEDURE — 25010000002 SODIUM CHLORIDE 0.9 % WITH KCL 20 MEQ 20-0.9 MEQ/L-% SOLUTION: Performed by: FAMILY MEDICINE

## 2024-08-01 PROCEDURE — 83605 ASSAY OF LACTIC ACID: CPT | Performed by: FAMILY MEDICINE

## 2024-08-01 PROCEDURE — 80053 COMPREHEN METABOLIC PANEL: CPT | Performed by: INTERNAL MEDICINE

## 2024-08-01 PROCEDURE — 85025 COMPLETE CBC W/AUTO DIFF WBC: CPT | Performed by: INTERNAL MEDICINE

## 2024-08-01 PROCEDURE — 82746 ASSAY OF FOLIC ACID SERUM: CPT | Performed by: INTERNAL MEDICINE

## 2024-08-01 PROCEDURE — 99232 SBSQ HOSP IP/OBS MODERATE 35: CPT | Performed by: INTERNAL MEDICINE

## 2024-08-01 PROCEDURE — 82607 VITAMIN B-12: CPT | Performed by: INTERNAL MEDICINE

## 2024-08-01 PROCEDURE — 25010000002 MORPHINE PER 10 MG: Performed by: FAMILY MEDICINE

## 2024-08-01 PROCEDURE — 86381 MITOCHONDRIAL ANTIBODY EACH: CPT | Performed by: INTERNAL MEDICINE

## 2024-08-01 PROCEDURE — 74176 CT ABD & PELVIS W/O CONTRAST: CPT

## 2024-08-01 PROCEDURE — 25010000002 MORPHINE SULFATE (PF) 2 MG/ML SOLUTION: Performed by: FAMILY MEDICINE

## 2024-08-01 PROCEDURE — 85007 BL SMEAR W/DIFF WBC COUNT: CPT | Performed by: INTERNAL MEDICINE

## 2024-08-01 PROCEDURE — 97116 GAIT TRAINING THERAPY: CPT

## 2024-08-01 RX ADMIN — MORPHINE SULFATE 2 MG: 2 INJECTION, SOLUTION INTRAMUSCULAR; INTRAVENOUS at 23:00

## 2024-08-01 RX ADMIN — FIDAXOMICIN 200 MG: 200 TABLET, FILM COATED ORAL at 10:46

## 2024-08-01 RX ADMIN — ATORVASTATIN CALCIUM 10 MG: 10 TABLET, FILM COATED ORAL at 09:30

## 2024-08-01 RX ADMIN — FIDAXOMICIN 200 MG: 200 TABLET, FILM COATED ORAL at 20:28

## 2024-08-01 RX ADMIN — LISINOPRIL 20 MG: 20 TABLET ORAL at 09:30

## 2024-08-01 RX ADMIN — LEVOTHYROXINE SODIUM 125 MCG: 125 TABLET ORAL at 06:41

## 2024-08-01 RX ADMIN — POTASSIUM CHLORIDE AND SODIUM CHLORIDE 50 ML/HR: 900; 150 INJECTION, SOLUTION INTRAVENOUS at 18:14

## 2024-08-01 RX ADMIN — TOPIRAMATE 50 MG: 25 TABLET, FILM COATED ORAL at 20:28

## 2024-08-01 RX ADMIN — Medication 1 TABLET: at 09:30

## 2024-08-01 RX ADMIN — PANTOPRAZOLE SODIUM 40 MG: 40 TABLET, DELAYED RELEASE ORAL at 05:34

## 2024-08-01 RX ADMIN — FLUOXETINE HYDROCHLORIDE 20 MG: 20 CAPSULE ORAL at 09:30

## 2024-08-01 RX ADMIN — HYDROCODONE BITARTRATE AND ACETAMINOPHEN 1 TABLET: 5; 325 TABLET ORAL at 04:06

## 2024-08-01 RX ADMIN — HYDROCODONE BITARTRATE AND ACETAMINOPHEN 1 TABLET: 5; 325 TABLET ORAL at 20:33

## 2024-08-01 RX ADMIN — MORPHINE SULFATE 2 MG: 2 INJECTION, SOLUTION INTRAMUSCULAR; INTRAVENOUS at 06:39

## 2024-08-01 RX ADMIN — CARVEDILOL 12.5 MG: 25 TABLET, FILM COATED ORAL at 18:09

## 2024-08-01 RX ADMIN — CARVEDILOL 12.5 MG: 25 TABLET, FILM COATED ORAL at 09:30

## 2024-08-01 RX ADMIN — MORPHINE SULFATE 2 MG: 2 INJECTION, SOLUTION INTRAMUSCULAR; INTRAVENOUS at 18:19

## 2024-08-01 RX ADMIN — HYDROCODONE BITARTRATE AND ACETAMINOPHEN 1 TABLET: 5; 325 TABLET ORAL at 12:43

## 2024-08-01 RX ADMIN — FOLIC ACID 1 MG: 1 TABLET ORAL at 09:30

## 2024-08-01 RX ADMIN — THIAMINE HCL TAB 100 MG 100 MG: 100 TAB at 09:30

## 2024-08-01 RX ADMIN — DILTIAZEM HYDROCHLORIDE 120 MG: 120 CAPSULE, EXTENDED RELEASE ORAL at 09:30

## 2024-08-01 RX ADMIN — TOPIRAMATE 50 MG: 25 TABLET, FILM COATED ORAL at 09:30

## 2024-08-01 RX ADMIN — MORPHINE SULFATE 2 MG: 2 INJECTION, SOLUTION INTRAMUSCULAR; INTRAVENOUS at 01:58

## 2024-08-01 RX ADMIN — CHLORDIAZEPOXIDE HYDROCHLORIDE 25 MG: 25 CAPSULE ORAL at 05:34

## 2024-08-01 RX ADMIN — PANTOPRAZOLE SODIUM 40 MG: 40 TABLET, DELAYED RELEASE ORAL at 18:09

## 2024-08-01 RX ADMIN — POTASSIUM CHLORIDE AND SODIUM CHLORIDE 100 ML/HR: 900; 150 INJECTION, SOLUTION INTRAVENOUS at 05:33

## 2024-08-01 RX ADMIN — Medication 10 ML: at 09:31

## 2024-08-01 NOTE — PLAN OF CARE
Goal Outcome Evaluation:  Plan of Care Reviewed With: patient        Progress: improving  Outcome Evaluation: Pt was in bed, stated to be feeling better today.  Able to transfer supine to sitting with min assist.  Transfered sit to stand with CGA.  Amb 200' with one standing rest with CGA.

## 2024-08-01 NOTE — PROGRESS NOTES
Infectious Diseases Progress Note    Patient:  Minnie Bang  YOB: 1975  MRN: [id number]   Admit date: 7/29/2024   Admitting Physician: Jc Murillo MD  Primary Care Physician: Provider, No Known    Chief Complaint/Interval History: Her NG tube has been removed.  She is continuing to pass stool and flatus.  She has continued to have a number of liquid bowel movements.  She has trouble controlling her diarrhea at times.  She continues to have some nausea with vomiting.  She is not experiencing temperature elevation.  Her heart rate and blood pressure are remaining stable.  She remains on room air.  She is receiving oral Dificid.    Intake/Output Summary (Last 24 hours) at 8/1/2024 0612  Last data filed at 8/1/2024 0533  Gross per 24 hour   Intake 3913 ml   Output --   Net 3913 ml     Allergies: No Known Allergies  Current Scheduled Medications:   atorvastatin, 10 mg, Oral, Daily  carvedilol, 12.5 mg, Oral, BID With Meals  chlordiazePOXIDE, 25 mg, Oral, Q8H  dilTIAZem CD, 120 mg, Oral, Daily  fidaxomicin, 200 mg, Oral, Q12H  FLUoxetine, 20 mg, Oral, Daily  folic acid, 1 mg, Oral, Daily  levothyroxine, 125 mcg, Oral, Q AM  lisinopril, 20 mg, Oral, Daily  multivitamin with minerals, 1 tablet, Oral, Daily  pantoprazole, 40 mg, Oral, BID AC  sodium chloride, 10 mL, Intravenous, Q12H  thiamine, 100 mg, Oral, Daily  topiramate, 50 mg, Oral, BID      sodium chloride 0.9 % with KCl 20 mEq, 100 mL/hr, Last Rate: 100 mL/hr (08/01/24 0533)       Current PRN Medications:    acetaminophen **OR** acetaminophen **OR** acetaminophen    cloNIDine    hydrALAZINE    HYDROcodone-acetaminophen    ipratropium-albuterol    LORazepam **OR** LORazepam **OR** LORazepam **OR** LORazepam **OR** LORazepam **OR** LORazepam    meclizine    Morphine    nitroglycerin    ondansetron ODT **OR** ondansetron    phenol    [COMPLETED] Insert Peripheral IV **AND** sodium chloride    sodium chloride    sodium chloride    Review of  "Systems see HPI    Vital Signs:  Temp (24hrs), Av.3 °F (36.8 °C), Min:97.8 °F (36.6 °C), Max:98.9 °F (37.2 °C)    /89 (BP Location: Right arm, Patient Position: Lying)   Pulse 85   Temp 97.8 °F (36.6 °C) (Oral)   Resp 18   Ht 172.7 cm (68\")   Wt 78.9 kg (174 lb)   LMP  (LMP Unknown)   SpO2 95%   BMI 26.46 kg/m²     Physical Exam  Vital signs - reviewed.  Line/IV site - No erythema, warmth, induration, or tenderness.  Tired appearing.  No respiratory distress.  Bowel sounds present  Abdomen without significant distention  Moderate diffuse abdominal tenderness  No rebound  Extremities without significant edema    Lab Results:  CBC:   Results from last 7 days   Lab Units 24  0627 24  0420 24  1230   WBC 10*3/mm3 9.25 8.06 8.37   HEMOGLOBIN g/dL 10.0* 9.7* 12.5   HEMATOCRIT % 31.1* 29.7* 36.7   PLATELETS 10*3/mm3 225 217 285     BMP:  Results from last 7 days   Lab Units 24  0627 24  1527 24  0420 24  1900 24  1334 24  1230   SODIUM mmol/L 138  --  140  --   --  137   POTASSIUM mmol/L 3.4* 3.6 3.1* 2.6* 2.4* 2.3*   CHLORIDE mmol/L 106  --  101  --   --  92*   CO2 mmol/L 26.0  --  27.0  --   --  25.0   BUN mg/dL 4*  --  4*  --   --  3*   CREATININE mg/dL 0.39*  --  0.43*  --   --  0.45*   GLUCOSE mg/dL 83  --  103*  --   --  102*   CALCIUM mg/dL 7.7*  --  7.5*  --   --  8.5*   ALT (SGPT) U/L 37*  --  33  --   --  43*     ALT 36  Alkaline phosphatase 546  Albumin 2.7  Protein 5.5    Culture Results:   Blood Culture   Date Value Ref Range Status   2024 No growth at 2 days  Preliminary   2024 No growth at 2 days  Preliminary     Radiology:   CT scan of the abdomen and pelvis done today personally reviewed:  IMPRESSION:  1. There is evidence of bibasilar pleural effusion right more than the  left which was not seen in the previous study. There are finding to  suggest pulmonary venous congestion/edema which was not seen in the  previous " study.  2. Edema of the abdominal wall and which was not noted in the previous  study may represent fluid overload.  3. Persistent moderate thickening of the wall of the large bowel more  pronounced in the proximal colon including the ascending and transverse  colon. This may partly be due to incomplete distention. There is a  probability of acute nonspecific colitis. There is surrounding  peritoneal infiltration and a small fluid in the right paracolic gutter.  There is a small free fluid in the pelvis.  4. Hepatic steatosis.      Additional Studies Reviewed: None    Impression:   1.  Diarrhea  2.  Nausea/vomiting  3.  Elevated ALT/AST  4.  Evidence of vascular disease on CT scan of the abdomen and pelvis    Recommendations:   She remains ill with frequent watery diarrhea.  She also has nausea vomiting.  Her diarrheal symptoms have been present for weeks to a few months.  Feel there is some evidence against C. difficile colitis as the cause-white blood cell count is normal, lack of response to prior treatment with vancomycin and Dificid, and 2 negative C. difficile toxin antigen screens.  Favor continuing C. difficile treatment with Dificid.  Continue to monitor clinical course.  If GI in agreement would consider flexible sigmoidoscopy or limited colonoscopy to try to further evaluate for other causes of diarrhea/colitis-inflammatory bowel disease, ischemic bowel disease, nonspecific colitis etc.    Alexis Manzano MD

## 2024-08-01 NOTE — PLAN OF CARE
Goal Outcome Evaluation:  Plan of Care Reviewed With: patient        Progress: improving  Outcome Evaluation: Nutrition follow up. Diet advanced to clear liquids today and NPO after midnight. Pt cont to have numerous liquid stools. NG tube has been removed. She is receiving oral Dificid. Plans in place for colonoscopy tomorrow. Will follow for POC.

## 2024-08-01 NOTE — PROGRESS NOTES
Gastroenterology Hospitalist follow-up  Patient Identification:  Name: Minnie Bang  Age: 49 y.o.  Sex: female  :  1975  MRN: [id number]    Attending: Jc Murillo MD  Gastroenterologist of Record: None  Information from:patient and family     CC: Profuse watery diarrhea.    History:   Diarrhea continues unabated.  She is now passing bilious fluid.  There is no blood.  He has fecal incontinence.  He is going multiple times throughout the day.    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]     Bilateral lower extremity edema [R60.0]     Primary hypertension [I10]     Non-occlusive coronary artery disease [I25.10]     Pulmonary emphysema [J43.9]     Mixed simple and mucopurulent chronic bronchitis [J41.8]     Ex-smoker [Z87.891]     Chest pain in adult [R07.9]     SORTO (dyspnea on exertion) [R06.09]     Mixed hyperlipidemia [E78.2]     Raynaud's disease without gangrene [I73.00]      Current Meds:  MAR Reviewed  Scheduled Meds:atorvastatin, 10 mg, Oral, Daily  carvedilol, 12.5 mg, Oral, BID With Meals  dilTIAZem CD, 120 mg, Oral, Daily  fidaxomicin, 200 mg, Oral, Q12H  FLUoxetine, 20 mg, Oral, Daily  folic acid, 1 mg, Oral, Daily  levothyroxine, 125 mcg, Oral, Q AM  lisinopril, 20 mg, Oral, Daily  multivitamin with minerals, 1 tablet, Oral, Daily  pantoprazole, 40 mg, Oral, BID AC  sodium chloride, 10 mL, Intravenous, Q12H  thiamine, 100 mg, Oral, Daily  topiramate, 50 mg, Oral, BID      Continuous Infusions:sodium chloride 0.9 % with KCl 20 mEq, 100 mL/hr, Last Rate: 100  "mL/hr (24 0533)      PRN Meds:.  acetaminophen **OR** acetaminophen **OR** acetaminophen    cloNIDine    hydrALAZINE    HYDROcodone-acetaminophen    ipratropium-albuterol    LORazepam **OR** LORazepam **OR** LORazepam **OR** LORazepam **OR** LORazepam **OR** LORazepam    meclizine    Morphine    nitroglycerin    ondansetron ODT **OR** ondansetron    phenol    [COMPLETED] Insert Peripheral IV **AND** sodium chloride    sodium chloride    sodium chloride  Allergies:  No Known Allergies    Intake/Output:     Intake/Output Summary (Last 24 hours) at 2024 1620  Last data filed at 2024 1300  Gross per 24 hour   Intake 4393 ml   Output --   Net 4393 ml     New allergies/reactions:  None    Physical Exam:  Vitals:   Temp (24hrs), Av.1 °F (36.7 °C), Min:97.7 °F (36.5 °C), Max:98.9 °F (37.2 °C)    Temp:  [97.7 °F (36.5 °C)-98.9 °F (37.2 °C)] 98 °F (36.7 °C)  Heart Rate:  [79-85] 79  Resp:  [18] 18  BP: (114-156)/(70-94) 114/70  /70 (BP Location: Right arm, Patient Position: Lying)   Pulse 79   Temp 98 °F (36.7 °C) (Oral)   Resp 18   Ht 172.7 cm (68\")   Wt 78.9 kg (174 lb)   LMP  (LMP Unknown)   SpO2 98%   BMI 26.46 kg/m²     Exam:  NAD  PERRLA. Sclerae and conjunctivae normal  HENT: external inspection normal. Hearing intact.  No respiratory distress. Lungs clear.  Cardiac: no MRG.  Abdomen: Her abdomen remains distended and somewhat tender, not as severe as it was with my previous examination.  Alert, oriented, normal affect.     DATA:  Radiology and Labs:   Recent Results (from the past 24 hour(s))   Vitamin B12    Collection Time: 24  6:25 AM    Specimen: Blood   Result Value Ref Range    Vitamin B-12 1,225 (H) 211 - 946 pg/mL   Folate    Collection Time: 24  6:25 AM    Specimen: Blood   Result Value Ref Range    Folate 5.72 4.78 - 24.20 ng/mL   Comprehensive Metabolic Panel    Collection Time: 24  6:25 AM    Specimen: Blood   Result Value Ref Range    Glucose 68 65 - 99 " mg/dL    BUN 4 (L) 6 - 20 mg/dL    Creatinine 0.41 (L) 0.57 - 1.00 mg/dL    Sodium 140 136 - 145 mmol/L    Potassium 3.8 3.5 - 5.2 mmol/L    Chloride 108 (H) 98 - 107 mmol/L    CO2 23.0 22.0 - 29.0 mmol/L    Calcium 7.7 (L) 8.6 - 10.5 mg/dL    Total Protein 5.5 (L) 6.0 - 8.5 g/dL    Albumin 2.7 (L) 3.5 - 5.2 g/dL    ALT (SGPT) 36 (H) 1 - 33 U/L    AST (SGOT) 105 (H) 1 - 32 U/L    Alkaline Phosphatase 546 (H) 39 - 117 U/L    Total Bilirubin 0.7 0.0 - 1.2 mg/dL    Globulin 2.8 gm/dL    A/G Ratio 1.0 g/dL    BUN/Creatinine Ratio 9.8 7.0 - 25.0    Anion Gap 9.0 5.0 - 15.0 mmol/L    eGFR 119.3 >60.0 mL/min/1.73   CBC Auto Differential    Collection Time: 08/01/24  6:25 AM    Specimen: Blood   Result Value Ref Range    WBC 11.52 (H) 3.40 - 10.80 10*3/mm3    RBC 2.83 (L) 3.77 - 5.28 10*6/mm3    Hemoglobin 10.0 (L) 12.0 - 15.9 g/dL    Hematocrit 31.6 (L) 34.0 - 46.6 %    .7 (H) 79.0 - 97.0 fL    MCH 35.3 (H) 26.6 - 33.0 pg    MCHC 31.6 31.5 - 35.7 g/dL    RDW 16.4 (H) 12.3 - 15.4 %    RDW-SD 67.0 (H) 37.0 - 54.0 fl    MPV 10.5 6.0 - 12.0 fL    Platelets 250 140 - 450 10*3/mm3   Manual Differential    Collection Time: 08/01/24  6:25 AM    Specimen: Blood   Result Value Ref Range    Neutrophil % 57.0 42.7 - 76.0 %    Lymphocyte % 15.0 (L) 19.6 - 45.3 %    Monocyte % 10.0 5.0 - 12.0 %    Eosinophil % 3.0 0.3 - 6.2 %    Basophil % 2.0 (H) 0.0 - 1.5 %    Bands %  8.0 (H) 0.0 - 5.0 %    Metamyelocyte % 2.0 (H) 0.0 - 0.0 %    Atypical Lymphocyte % 3.0 0.0 - 5.0 %    Neutrophils Absolute 7.49 (H) 1.70 - 7.00 10*3/mm3    Lymphocytes Absolute 2.07 0.70 - 3.10 10*3/mm3    Monocytes Absolute 1.15 (H) 0.10 - 0.90 10*3/mm3    Eosinophils Absolute 0.35 0.00 - 0.40 10*3/mm3    Basophils Absolute 0.23 (H) 0.00 - 0.20 10*3/mm3    Anisocytosis Mod/2+ None Seen    Macrocytes Large/3+ None Seen    Poikilocytes Slight/1+ None Seen    Target Cells Slight/1+ None Seen    Stomatocytes Slight/1+ None Seen    WBC Morphology Normal Normal     "Platelet Morphology Normal Normal   Lactic Acid, Plasma    Collection Time: 08/01/24 12:34 PM    Specimen: Blood   Result Value Ref Range    Lactate 0.8 0.5 - 2.0 mmol/L       Result Review:  I have personally reviewed the results from the time of this admission to 8/1/2024 16:20 CDT and agree with these findings:  [x]  Laboratory list / accordion  [x]  Microbiology  [x]  Radiology  []  EKG/Telemetry   []  Cardiology/Vascular   []  Pathology  [x]  Old records  []  Other:  Most notable findings include: Her GI PCR was positive for enteropathogenic E. coli.  It was also positive for toxigenic C. difficile.      Assessment/recommendations/plan:  Problem List:     Pancolitis    Patient's symptoms have been dragging on inspite of antibiotic therapy and other supportive measures.  This raises the possibility whether she could have idiopathic inflammatory bowel disease as a backdrop for these other infections.  If all are in agreement, I will proceed with \"limited colonoscopy\" tomorrow.  This examination will be limited in the sense that she will receive no oral preparation at all, she will only have 1 tapwater enema before the procedure.  I discussed this with patient and family in attendance who were amenable to this.    Marty Browning MD  8/1/2024    DISCLAIMER:    This physician works through a [employer] as an inpatient consultant gastroenterologist only and has no outpatient clinic for patient follow up.  Any results not available at time of inpatient discharge and/or GI clinic follow up should be managed by the hospitalist team, PCP, or outpatient gastroenterologist.    "

## 2024-08-01 NOTE — THERAPY TREATMENT NOTE
Acute Care - Physical Therapy Treatment Note  [facility]     Patient Name: Minnie Bang  : 1975  MRN: [id number]  Today's Date: 2024      Visit Dx:     ICD-10-CM ICD-9-CM   1. Colitis  K52.9 558.9   2. Diarrhea, unspecified type  R19.7 787.91   3. Hypokalemia  E87.6 276.8   4. Hepatomegaly  R16.0 789.1   5. Impaired mobility [Z74.09]  Z74.09 799.89     Patient Active Problem List   Diagnosis    Mixed hyperlipidemia    Raynaud's disease without gangrene    Ex-smoker    Chest pain in adult    SORTO (dyspnea on exertion)    Mixed simple and mucopurulent chronic bronchitis    Pulmonary emphysema    Non-occlusive coronary artery disease    Primary hypertension    Coronary-myocardial bridge    Bilateral lower extremity edema    Bicuspid aortic valve    Vocal cord polyps    Tobacco use disorder, continuous    Neoplasm of uncertain behavior    Hypokalemia    Dysphagia    Heartburn    Pain of upper abdomen    Pancolitis     Past Medical History:   Diagnosis Date    Abnormal ECG 2023    Anxiety     Arthritis     back    Asthma     Bicuspid aortic valve 2023    COPD (chronic obstructive pulmonary disease)     Coronary-myocardial bridge 2023    CTS (carpal tunnel syndrome) 2019    Dental disease     Depression     Diastolic dysfunction     Dizziness     Emphysema of lung     GERD (gastroesophageal reflux disease)     Headache     Hyperlipidemia     Hypertension     Hypothyroidism     Mixed simple and mucopurulent chronic bronchitis 2023    Non-occlusive coronary artery disease 2023    NSTEMI (non-ST elevated myocardial infarction) (HCC)     Pneumonia     Pulmonary emphysema 2023    Shingles 2018    Vocal cord polyps 2024     Past Surgical History:   Procedure Laterality Date    APPENDECTOMY      CARDIAC CATHETERIZATION N/A 2017    Procedure: Coronary angiography;  Surgeon: Luis Colvin MD;  Location: [facility] INVASIVE LOC[facility];  Service:      CARDIAC CATHETERIZATION N/A 05/31/2023    Procedure: Left Heart Cath;  Surgeon: Steffen Rossi MD;  Location:  [facility] CATH INVASIVE LOCATION;  Service: Cardiology;  Laterality: N/A;    CARPAL TUNNEL RELEASE  2019    ENDOSCOPY N/A 7/8/2024    Procedure: ESOPHAGOGASTRODUODENOSCOPY WITH ANESTHESIA;  Surgeon: Gordy Wang MD;  Location: [facility] ENDOSCOPY;  Service: Gastroenterology;  Laterality: N/A;  pre: dysphagia.   post: esophagus dilated.   no PCP    HYSTERECTOMY      PANENDOSCOPY N/A 5/30/2024    Procedure: DIRECT LARYNGOSCOPY WITH BIOPSY OF VOCAL CORD POLYPS WITH POSSIBLE TRACHEOSTOMY;  Surgeon: Mendez Padilla MD;  Location: [facility] OR;  Service: ENT;  Laterality: N/A;    OK RT/LT HEART CATHETERS N/A 11/04/2017    Procedure: Percutaneous Coronary Intervention;  Surgeon: Luis Colvin MD;  Location:  [facility] CATH INVASIVE LOCATION;  Service: Cardiovascular    THYROID SURGERY      TOTAL THYROIDECTOMY      TRACHEOSTOMY N/A 5/30/2024    Procedure: POSSIBLE TRACHEOSTOMY;  Surgeon: Mendez Padilla MD;  Location: [facility] OR;  Service: ENT;  Laterality: N/A;     PT Assessment (Last 12 Hours)       PT Evaluation and Treatment       Row Name 08/01/24 1358 08/01/24 0800       Physical Therapy Time and Intention    Subjective Information complains of;weakness;fatigue;pain  -AURELIO --    Document Type therapy note (daily note)  -AURELIO therapy note (daily note)  -AURELIO    Mode of Treatment physical therapy  -AURELIO physical therapy  -AURELIO    Comment, Session Not Performed -- in shower with nsg  -AURELIO      Row Name 08/01/24 1358          General Information    Existing Precautions/Restrictions fall  -AURELIO       Row Name 08/01/24 1358          Pain    Pretreatment Pain Rating 4/10  -AURELIO     Posttreatment Pain Rating 4/10  -AURELIO     Pain Location - abdomen  -AURELIO     Pain Intervention(s) Repositioned  -AURELIO       Row Name 08/01/24 1358          Bed Mobility    Supine-Sit [place] (Bed Mobility) standby assist  -AURELIO     Sit-Supine  [place] (Bed Mobility) standby assist  -AURELIO       Row Name 08/01/24 1358          Sit-Stand Transfer    Sit-Stand [place] (Transfers) verbal cues;contact guard  -AURELIO       Row Name 08/01/24 1358          Stand-Sit Transfer    Stand-Sit [place] (Transfers) verbal cues;contact guard  -AURELIO       Row Name 08/01/24 1358          Gait/Stairs (Locomotion)    [place] Level (Gait) verbal cues;contact guard  -AURELIO     Distance in Feet (Gait) 200  -AURELIO     Deviations/Abnormal Patterns (Gait) gait speed decreased;stride length decreased  -AURELIO     Bilateral Gait Deviations forward flexed posture  -AURELIO       Row Name 08/01/24 1358          Positioning and Restraints    Pre-Treatment Position in bed  -AURELIO     Post Treatment Position bed  -AURELIO     In Bed fowlers;call light within reach;encouraged to call for assist;side rails up x2  -AURELIO               User Key  (r) = Recorded By, (t) = Taken By, (c) = Cosigned By      Initials Name Provider Type    AURELIO Bandar Villa, PTA Physical Therapist Assistant                    Physical Therapy Education       Title: PT OT SLP Therapies (In Progress)       Topic: Physical Therapy (In Progress)       Point: Mobility training (Done)       Learning Progress Summary             Patient Acceptance, E,TB, VU,DU by FLYNN at 7/30/2024 9749    Comment: Educated on role of PT in pt care                         Point: Home exercise program (Not Started)       Learner Progress:  Not documented in this visit.              Point: Body mechanics (Not Started)       Learner Progress:  Not documented in this visit.              Point: Precautions (Not Started)       Learner Progress:  Not documented in this visit.                              User Key       Initials Effective Dates Name Provider Type Discipline    FLYNN 06/24/24 -  Elise Moore, PT Student PT Student PT                  PT Recommendation and Plan     Plan of Care Reviewed With: patient  Progress: improving  Outcome Evaluation:  Pt was in bed, stated to be feeling better today.  Able to transfer supine to sitting with min assist.  Transfered sit to stand with CGA.  Amb 200' with one standing rest with CGA.   Outcome Measures       Row Name 08/01/24 1358 07/31/24 0857          How much help from another person do you currently need...    Turning from your back to your side while in flat bed without using bedrails? 4  -AURELIO 4  -AURELIO     Moving from lying on back to sitting on the side of a flat bed without bedrails? 4  -AURELIO 4  -AURELIO     Moving to and from a bed to a chair (including a wheelchair)? 4  -AURELIO 4  -AURELIO     Standing up from a chair using your arms (e.g., wheelchair, bedside chair)? 4  -AURELIO 3  -AURELIO     Climbing 3-5 steps with a railing? 3  -AURELIO 3  -AURELIO     To walk in hospital room? 3  -AURELIO 3  -AURELIO     AM-PAC 6 Clicks Score (PT) 22  -AURELIO 21  -AURELIO     Highest Level of Mobility Goal 7 --> Walk 25 feet or more  -AURELIO 6 --> Walk 10 steps or more  -AUREILO        Functional Assessment    Outcome Measure Options AM-PAC 6 Clicks Basic Mobility (PT)  -AURELIO AM-PAC 6 Clicks Basic Mobility (PT)  -AURELIO               User Key  (r) = Recorded By, (t) = Taken By, (c) = Cosigned By      Initials Name Provider Type    Bandar Gonzalez PTA Physical Therapist Assistant                     Time Calculation:    PT Charges       Row Name 08/01/24 1358             Time Calculation    Start Time 1358  -AURELIO      Stop Time 1410  -AURELIO      Time Calculation (min) 12 min  -AURELIO      PT Received On 08/01/24  -AURELIO         Time Calculation- PT    Total Timed Code Minutes- PT 12 minute(s)  -AURELIO         Timed Charges    57686 - Gait Training Minutes  12  -AURELIO         Total Minutes    Timed Charges Total Minutes 12  -AURELIO       Total Minutes 12  -AURELIO                User Key  (r) = Recorded By, (t) = Taken By, (c) = Cosigned By      Initials Name Provider Type    Bandar Gonzalez PTA Physical Therapist Assistant                  Therapy Charges for Today       Code Description Service Date Service  Provider Modifiers Qty    38045983108 HC GAIT TRAINING EA 15 MIN 7/31/2024 Bandar Villa, PTA GP 1    01201465792 HC PT THER PROC EA 15 MIN 7/31/2024 Bandar Villa, PTA GP 1    95867806639 HC GAIT TRAINING EA 15 MIN 8/1/2024 Bandar Villa, PTA GP 1            PT G-Codes  Outcome Measure Options: AM-PAC 6 Clicks Basic Mobility (PT)  AM-PAC 6 Clicks Score (PT): 22    Bandar Villa PTA  8/1/2024

## 2024-08-01 NOTE — PLAN OF CARE
Goal Outcome Evaluation:  Plan of Care Reviewed With: patient        Progress: no change  Outcome Evaluation: Pt resting intermittently. Up ad buck in the room, liquid BMs this shift. Voiding. NPO with ice chips. Room air. C/o pain often, po and IV pain prn medications given when available. Safety maintained.

## 2024-08-01 NOTE — PLAN OF CARE
Goal Outcome Evaluation:           Progress: improving     VSS.  Tolerating clear liquid diet.  IVF as ordered.  NSR per telemetry.  Medicated for pain X1.  Plans for colonoscopy in AM - no PO prep, but TWE at 0800 tomorrow for prep.  Tearful this am, but much calmer as day progressed.  Safety maintained.

## 2024-08-01 NOTE — PROGRESS NOTES
[facility]  INPATIENT PROGRESS NOTE    Patient Name: Minnie Bang  Date of Admission: 7/29/2024  Today's Date: 08/01/24  Length of Stay: 3  Primary Care Physician: Provider, No Known    Subjective   Chief Complaint: Diarrhea abdominal pain  Abdominal Pain       49-year-old female with history of hypertension, alcohol abuse disorder, coronary artery disease, chronic obstructive pulmonary disease, former smoker, comes to the hospital reporting diarrhea that has been present for approximately 2 months.  She describes diarrhea as liquid, several times per day, and occasional associated vomiting.  She states that approximately 2 weeks ago started developing abdominal pain, described as cramps, located mostly in the lower abdomen; she also reports stabbing quality pain over the right posterior rib cage that increases with inspiration and motion, and has been present since yesterday.  Patient states that she is trying to quit alcohol, but usually takes 1 or 2 drinks shots to avoid withdrawal tremors.      Evaluated with nurse present.  Patient had an NG tube placed on 7/30/2024 removed 7/31 24.    Today she had a shower.  Still complains of some abdominal pain, intensity 7/10.  No fevers.  Diarrhea at least 5-6 opportunities.  Some vomiting.  No rectal bleeding or melena.      Review of Systems   Gastrointestinal:  Positive for abdominal pain.      All pertinent negatives and positives are as above. All other systems have been reviewed and are negative unless otherwise stated.     Objective    Temp:  [97.7 °F (36.5 °C)-98.9 °F (37.2 °C)] 97.7 °F (36.5 °C)  Heart Rate:  [80-85] 81  Resp:  [18-20] 18  BP: (122-156)/(70-97) 127/93  Physical Exam  Constitutional:       Appearance: Today, less anxious, no respiratory distress, alert, oriented x 3.  HENT:      Head: Normocephalic and atraumatic.      Nose: Nose normal.     Mouth/Throat:      Mouth: Mucous membranes  This visit was performed via live interactive two-way video.    Clinician Location: [facility] [address]   Patient Location: Home     Due to the current COVID-19 (SARS-Cov 2) global pandemic, in an effort to minimize our patients' and the community's exposure, the standard in-office visit was converted to a formal virtual video visit.    I verified that I was speaking with Jayne Acuna's mother from home and consent was obtained to conduct a formal virtual video visit.    I notified Jayne Acuna's mother that a medical scribe was joining us on this visit for the purposes of electronic medical record chart documentation. Formal consent was obtained to proceed.    Written/documented by Pepe Covarrubias, acting as a scribe in Dr. José's presence.    INITIAL ALLERGY/IMMUNOLOGY CONSULTATION    Provider requesting consultation:  Self referred    Reason for consultation/Chief complaint:  evaluation of food allergy (dairy)    Accompanied by: mother    A/I notes from 3/2014 reviewed pertaining to eczema and nonallergic rhinitis.    MJ, NP (dermatology) notes reviewed pertaining to atopic dermatitis.  HISTORY OF PRESENT ILLNESS     Jayne is a 11 year old female who presents for an initial visit predominantly to evaluate a possible food allergy. Jayne's mother reports that Jayne gets GI issues (stomach ache- diarrhea) after drinking milk, eating ice cream, cheese. Jayne notes she has consumed crackers with baked/ processed dairy without issues. Jayne notes she occasionally consumes cow milk in her cereal, and still consumes ice cream. Jayne went to [place] recently and had an episode of vomiting after coming home from [place]. Jayne notes she also had an episode of vomiting after eating pizza recently, but usually does not have issues with pizza. Jayne consumes wheat without issues.     Jayne notes her eczema has been mild. Jayne reports she itches her elbows frequently, and still has some eczema there. She notes she usually takes  are moist.      Pharynx: Oropharynx is clear.   Eyes:      Extraocular Movements: Extraocular movements intact.      Conjunctiva/sclera: Conjunctivae normal.      Pupils: Pupils are equal, round, and reactive to light.   Cardiovascular:      Rate and Rhythm: Normal rate and regular rhythm.      Pulses: Normal pulses.   Pulmonary:      Effort: No respiratory distress.      Breath sounds: Normal breath sounds. No wheezing, rhonchi or rales.   Abdominal:      General: Abdomen is obese.  Slightly distended.  Tender to deep palpation diffusely, with no peritoneal signs.      Palpations: Abdomen is soft.      Tenderness: There is no guarding or rebound.   Musculoskeletal:         General: Normal range of motion.      Cervical back: Normal range of motion and neck supple.   Extremities:  No lower extremity edema.  Skin:     Capillary Refill: Capillary refill takes less than 2 seconds.      Coloration: Skin is not jaundiced.      Findings: No rash.   Neurological: No significant tremors.     General: No focal deficit present.      Mental Status: Patient is alert, oriented to place time and person.     Sensory: No sensory deficit.      Motor: No weakness.      Coordination: Coordination normal.   Psychiatric:         Mood and Affect: Slightly anxious     Behavior: Behavior normal.       Results Review:  I have reviewed the labs, radiology results, and diagnostic studies.    Laboratory Data:   Results from last 7 days   Lab Units 08/01/24  0625 07/31/24  0627 07/30/24  0420   WBC 10*3/mm3 11.52* 9.25 8.06   HEMOGLOBIN g/dL 10.0* 10.0* 9.7*   HEMATOCRIT % 31.6* 31.1* 29.7*   PLATELETS 10*3/mm3 250 225 217        Results from last 7 days   Lab Units 08/01/24  0625 07/31/24  0627 07/30/24  1527 07/30/24  0420   SODIUM mmol/L 140 138  --  140   POTASSIUM mmol/L 3.8 3.4* 3.6 3.1*   CHLORIDE mmol/L 108* 106  --  101   CO2 mmol/L 23.0 26.0  --  27.0   BUN mg/dL 4* 4*  --  4*   CREATININE mg/dL 0.41* 0.39*  --  0.43*   CALCIUM mg/dL  showers everyday, and sometimes uses Eucrisa for her skin. She does not moisturize frequently. She uses bath and body works products and uses free and clear laundry detergent. Jayne has an upcoming appointment with dermatology for warts.     Jayne notes she gets nasal symptoms around dust. She reports her nasal symptoms are worse when the weather changes.     Jayne reports she has chronic headaches and it is followed by neurology.     Recurrent Infections - No    Skin  Dermatitis - No  Eczema - Yes  Hives - No  Angioedema - No    PAST MEDICAL HISTORY   No past medical history on file.  No past surgical history on file.  Allergies:   ALLERGIES:  No Known Allergies  Current Outpatient Medications   Medication Sig Dispense Refill   • cyproheptadine (PERIACTIN) 4 MG tablet Take 4 mg by mouth.     • rizatriptan (MAXALT-MLT) 5 MG disintegrating tablet dissolve 1 tab in mouth at onset of headache. may repeat once in 2 hours if needed. max of 2 per day       No current facility-administered medications for this visit.      FAMILY HISTORY   No family history on file.  --Asthma: No  --Allergic Rhinitis: Yes       --Atopic Dermatitis: Yes maternal cousin (eczema)  --Immunodeficiency: No  --Angioedema: No  --Urticaria: No  --Food allergy: Yes father (eggs- GI issues)    SOCIAL / ENVIRONMENTAL HISTORY   --Lives in a home for 10 years.  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   HENT: Positive for congestion and rhinorrhea. Negative for ear pain, postnasal drip and sneezing.    Respiratory: Negative for cough, chest tightness, shortness of breath and wheezing.    Cardiovascular: Negative for chest pain.   Gastrointestinal: Positive for  "7.7* 7.7*  --  7.5*   BILIRUBIN mg/dL 0.7 1.0  --  0.8   ALK PHOS U/L 546* 523*  --  473*   ALT (SGPT) U/L 36* 37*  --  33   AST (SGOT) U/L 105* 111*  --  120*   GLUCOSE mg/dL 68 83  --  103*       Culture Data:   No results found for: \"BLOODCX\", \"URINECX\", \"WOUNDCX\", \"MRSACX\", \"RESPCX\", \"STOOLCX\"    Radiology Data:   Imaging Results (Last 24 Hours)       ** No results found for the last 24 hours. **            I have reviewed the patient's current medications.     Assessment/Plan   Assessment  Active Hospital Problems    Diagnosis     **Pancolitis      Acute to subacute pancolitis  C. difficile colitis.  Enteropathogenic E. coli positive  Hypertension  Thoracic pain  Alcohol abuse disorder at risk for withdrawal  Anxiety disorder  COPD  Former smoker  Macrocytosis, likely from alcohol use       7/08/2024, Upper endoscopy that showed normal results.         Lactate 7.4, down to 3.1.  Repeat level today.  Hemoglobin down from 12.5 to 10., stable today  Blood cell count slightly increased to 11,520  .  Platelet count 250,000       Sodium 140  Potassium proved to 3.8 (admission 2.3).    Creatinine 0.41, BUN 4.    Glucose 111    Magnesium 2.0.    Calcium Correction for Hypoalbuminemia - MDCalc  Calculated on Aug 01 2024 11:07 AM  8.7 mg/dL -> Corrected Calcium Equivalent to 2.2 mmol/L     Alkaline phosphatase elevated 621 on admission, today 546.  Bilirubin normal.  Albumin 2.7.  , ALT 36.  Normal PT/INR     CT scan report from 7/29/2024 on admission  1. Acute or subacute nonspecific colitis.  2. Severe hepatic steatosis and hepatomegaly.  3. The remaining abdominal pelvis is unremarkable similar to the  previous study.     Liver ultrasound report  1. No gallstones or ductal dilatation.  2. Fatty infiltration of the liver.  3. Echogenic pancreas may be a normal variant. It may also be seen in  patients with diabetes and prediabetes.       Treatment Plan  Continue normal saline with 20 meq  KCl at 100 " abdominal pain, diarrhea and vomiting. Negative for nausea.   Skin: Negative for rash.     PHYSICAL EXAM  Visit Vitals  Ht 5' 2.5\" (1.588 m)   Wt 40.8 kg (90 lb)   BMI 16.20 kg/m²     Physical Exam   Constitutional: She is well-developed, well-nourished, and in no distress. No distress.   HENT:   Right Ear: External ear normal.   Left Ear: External ear normal.   Nose: Nose normal.   Eyes: Conjunctivae and EOM are normal. Right eye exhibits no discharge. Left eye exhibits no discharge.   Pulmonary/Chest: No stridor. No respiratory distress.   Neurological: She is alert.   Skin: No rash noted.   Psychiatric: Mood and affect normal.      TESTING RESULTS  Spirometry:   NA    ASSESSMENT and PLAN    Food allergy vs. Lactose intolerance  Comment: GI issues (stomach ache, gassiness, diarrhea, vomiting) after eating pure cow milk, cheese, and ice cream. Tolerates baked/ processed crackers and wheat. Episode of vomiting recently after eating pizza, normally tolerates pizza without issue.     Lactose intolerance is much more likely based on history, lack of systemic symptoms.    Plan:    · Check RAST to cow milk and total IgE 07/2020- .  · Avoid/limit lactose-rich forms of dairy.  · May try lactaid pills (1-2) when consuming dairy, or lactose-free forms of dairy (eg cow milk).    Chronic rhinitis (nasal inflammation possibly due to allergies)  Comment: Jayne has a history of seasonal nasal congestion/pressure. Prior testing was negative (~2014). 1 dog at home.     Plan:    · Start Flonase (fluticasone) each nostril 1 spray each nostril once daily as needed.  · Check RAST to the full adult aeroallergen panel 07/2020- 10/2020.    Atopic dermatitis (eczema); Warts  Comment: History of eczema as a young child. Still has mild eczema on her elbows and knees. Frequently uses bath and body works products. Takes frequent showers, and uses Eucrisa as needed. Reports warts recently for which she will follow up with dermatology.      Atopic dermatitis (eczema), its origin, possible allergen association, and management/treatment were discussed in detail. This included bathing regularly, moisturization, nature and course of disease, itch-scratch cycle, use of topical treatments, and minimizing common triggers such as scented detergents and soaps.     Plan:    · Bathe (quickly with warm, not hot water and fragrance/dye-free gentle soap eg- dove) and moisturize with Cetaphil immediately afterwards on a daily basis. Avoid using Bath and Body works due to dye/fragrance.  · Use hydrocortisone 2.5% cream for active skin inflammation twice daily as needed up to 2 weeks consecutively in any affected skin areas. Prescription sent today.   · Follow up with dermatology for warts, Dr. LASHAY Herring 7/21/2020.    Counseled patient and/or family on:  Atopic dermatitis and Food allergy .     Follow up 08/2020    Thank you for allowing me to share in the care of Jayne. Please feel free to contact me at the [facility] office: [phone] if you have any questions or concerns.    Scribe: Electronically signed: Pepe Covarrubias has scribed for Jonatan José MD  7/13/2020 4:48 PM    I have reviewed and edited the progress note and agree with what has been scribed: Jonatan José MD             mL/h.  Monitor electrolytes.  Replace as needed     Elevated alkaline phosphatase; AST/ALT consistent with alcohol abuse.     Regarding colitis, patient was treated with 10 days of vancomycin p.o. for C. difficile toxin positive/antigen negative test.  She was started on Dificid 200 mg p.o. every 12 hours on 7/29/2024; infectious disease specialist following patient.  Repeat C. difficile toxin positive, antigen negative.  GI panel showed enteropathogenic E. coli.    NG tube removed 7/31 24.  Will repeat CT scan of the abdomen and pelvis without contrast today.  Frequent abdominal examinations.    Thiamine, folic acid, multivitamins, Librium taper, Ativan IV as needed for withdrawal signs or symptoms.  [facility] protocol.    Follow-up vitamin B12 level.  Macrocytosis likely from alcohol use.    For blood pressure management, blood pressure is better controlled.  I decreased lisinopril from 40 to 20 mg p.o. daily.  Currently on Coreg decreased to 12.5 p.o. twice daily and Cardizem  mg daily.  SCDs for DVT prophylaxis.    She still requires inpatient management.      Medical Decision Making  Number and Complexity of problems: 9, moderate to high complexity  Differential Diagnosis: See above    Conditions and Status        Condition is improving.     MDM Data  External documents reviewed: No  Cardiac tracing (EKG, telemetry) interpretation: Sinus rhythm  Radiology interpretation: Radiology reports reviewed  Labs reviewed: Yes  Any tests that were considered but not ordered: No     Decision rules/scores evaluated (example [id number], [place], etc): None     Discussed with: Patient and nurse     Care Planning  Shared decision making: With patient  Code status and discussions: Full code    Disposition  Social Determinants of Health that impact treatment or disposition: Alcohol abuse  I expect the patient to be discharged to home in 2 days.     Electronically signed by Jc Murillo MD, 08/01/24, 10:05 CDT.

## 2024-08-02 ENCOUNTER — ANESTHESIA EVENT (OUTPATIENT)
Dept: GASTROENTEROLOGY | Facility: HOSPITAL | Age: 49
End: 2024-08-02
Payer: COMMERCIAL

## 2024-08-02 ENCOUNTER — ANESTHESIA (OUTPATIENT)
Dept: GASTROENTEROLOGY | Facility: HOSPITAL | Age: 49
End: 2024-08-02
Payer: COMMERCIAL

## 2024-08-02 LAB — MITOCHONDRIA M2 IGG SER-ACNC: <20 UNITS (ref 0–20)

## 2024-08-02 PROCEDURE — 45380 COLONOSCOPY AND BIOPSY: CPT | Performed by: INTERNAL MEDICINE

## 2024-08-02 PROCEDURE — 25810000003 SODIUM CHLORIDE 0.9 % SOLUTION: Performed by: ANESTHESIOLOGY

## 2024-08-02 PROCEDURE — 25010000002 MORPHINE SULFATE (PF) 2 MG/ML SOLUTION: Performed by: FAMILY MEDICINE

## 2024-08-02 PROCEDURE — 0DBF8ZX EXCISION OF RIGHT LARGE INTESTINE, VIA NATURAL OR ARTIFICIAL OPENING ENDOSCOPIC, DIAGNOSTIC: ICD-10-PCS | Performed by: INTERNAL MEDICINE

## 2024-08-02 PROCEDURE — 88305 TISSUE EXAM BY PATHOLOGIST: CPT | Performed by: INTERNAL MEDICINE

## 2024-08-02 PROCEDURE — 25010000002 PROPOFOL 10 MG/ML EMULSION

## 2024-08-02 PROCEDURE — 97116 GAIT TRAINING THERAPY: CPT

## 2024-08-02 PROCEDURE — 25010000002 SODIUM CHLORIDE 0.9 % WITH KCL 20 MEQ 20-0.9 MEQ/L-% SOLUTION: Performed by: INTERNAL MEDICINE

## 2024-08-02 PROCEDURE — 0DBG8ZX EXCISION OF LEFT LARGE INTESTINE, VIA NATURAL OR ARTIFICIAL OPENING ENDOSCOPIC, DIAGNOSTIC: ICD-10-PCS | Performed by: INTERNAL MEDICINE

## 2024-08-02 RX ORDER — LORAZEPAM 2 MG/ML
2 INJECTION INTRAMUSCULAR
Status: DISCONTINUED | OUTPATIENT
Start: 2024-08-02 | End: 2024-08-02

## 2024-08-02 RX ORDER — LORAZEPAM 1 MG/1
2 TABLET ORAL
Status: ACTIVE | OUTPATIENT
Start: 2024-08-02 | End: 2024-08-03

## 2024-08-02 RX ORDER — CHLORDIAZEPOXIDE HYDROCHLORIDE 25 MG/1
25 CAPSULE, GELATIN COATED ORAL EVERY 12 HOURS
Status: COMPLETED | OUTPATIENT
Start: 2024-08-02 | End: 2024-08-04

## 2024-08-02 RX ORDER — SODIUM CHLORIDE 9 MG/ML
100 INJECTION, SOLUTION INTRAVENOUS CONTINUOUS
Status: DISCONTINUED | OUTPATIENT
Start: 2024-08-02 | End: 2024-08-03

## 2024-08-02 RX ORDER — CHLORDIAZEPOXIDE HYDROCHLORIDE 25 MG/1
25 CAPSULE, GELATIN COATED ORAL EVERY 12 HOURS
Status: CANCELLED | OUTPATIENT
Start: 2024-08-02 | End: 2024-08-04

## 2024-08-02 RX ORDER — PROPOFOL 10 MG/ML
VIAL (ML) INTRAVENOUS AS NEEDED
Status: DISCONTINUED | OUTPATIENT
Start: 2024-08-02 | End: 2024-08-02 | Stop reason: SURG

## 2024-08-02 RX ORDER — LORAZEPAM 2 MG/ML
1 INJECTION INTRAMUSCULAR
Status: DISCONTINUED | OUTPATIENT
Start: 2024-08-02 | End: 2024-08-02

## 2024-08-02 RX ORDER — LORAZEPAM 1 MG/1
2 TABLET ORAL
Status: DISCONTINUED | OUTPATIENT
Start: 2024-08-02 | End: 2024-08-02

## 2024-08-02 RX ORDER — LORAZEPAM 2 MG/ML
2 INJECTION INTRAMUSCULAR
Status: ACTIVE | OUTPATIENT
Start: 2024-08-02 | End: 2024-08-03

## 2024-08-02 RX ORDER — SODIUM CHLORIDE 0.9 % (FLUSH) 0.9 %
10 SYRINGE (ML) INJECTION AS NEEDED
Status: DISCONTINUED | OUTPATIENT
Start: 2024-08-02 | End: 2024-08-02 | Stop reason: HOSPADM

## 2024-08-02 RX ORDER — LORAZEPAM 2 MG/ML
1 INJECTION INTRAMUSCULAR
Status: ACTIVE | OUTPATIENT
Start: 2024-08-02 | End: 2024-08-03

## 2024-08-02 RX ORDER — LIDOCAINE HYDROCHLORIDE 20 MG/ML
INJECTION, SOLUTION EPIDURAL; INFILTRATION; INTRACAUDAL; PERINEURAL AS NEEDED
Status: DISCONTINUED | OUTPATIENT
Start: 2024-08-02 | End: 2024-08-02 | Stop reason: SURG

## 2024-08-02 RX ORDER — LORAZEPAM 1 MG/1
1 TABLET ORAL
Status: DISCONTINUED | OUTPATIENT
Start: 2024-08-02 | End: 2024-08-02

## 2024-08-02 RX ORDER — LOPERAMIDE HYDROCHLORIDE 2 MG/1
2 CAPSULE ORAL 3 TIMES DAILY PRN
Status: DISCONTINUED | OUTPATIENT
Start: 2024-08-02 | End: 2024-08-03

## 2024-08-02 RX ORDER — LORAZEPAM 1 MG/1
1 TABLET ORAL
Status: ACTIVE | OUTPATIENT
Start: 2024-08-02 | End: 2024-08-03

## 2024-08-02 RX ORDER — SODIUM CHLORIDE 0.9 % (FLUSH) 0.9 %
10 SYRINGE (ML) INJECTION EVERY 12 HOURS SCHEDULED
Status: DISCONTINUED | OUTPATIENT
Start: 2024-08-02 | End: 2024-08-02 | Stop reason: HOSPADM

## 2024-08-02 RX ORDER — SODIUM CHLORIDE 9 MG/ML
40 INJECTION, SOLUTION INTRAVENOUS AS NEEDED
Status: DISCONTINUED | OUTPATIENT
Start: 2024-08-02 | End: 2024-08-02 | Stop reason: HOSPADM

## 2024-08-02 RX ADMIN — Medication 10 ML: at 08:48

## 2024-08-02 RX ADMIN — TOPIRAMATE 50 MG: 25 TABLET, FILM COATED ORAL at 20:30

## 2024-08-02 RX ADMIN — SODIUM CHLORIDE 100 ML/HR: 9 INJECTION, SOLUTION INTRAVENOUS at 11:04

## 2024-08-02 RX ADMIN — LIDOCAINE HYDROCHLORIDE 100 MG: 20 INJECTION, SOLUTION EPIDURAL; INFILTRATION; INTRACAUDAL; PERINEURAL at 11:30

## 2024-08-02 RX ADMIN — PROPOFOL 240 MG: 10 INJECTION, EMULSION INTRAVENOUS at 11:30

## 2024-08-02 RX ADMIN — CHLORDIAZEPOXIDE HYDROCHLORIDE 25 MG: 25 CAPSULE ORAL at 17:37

## 2024-08-02 RX ADMIN — MORPHINE SULFATE 2 MG: 2 INJECTION, SOLUTION INTRAMUSCULAR; INTRAVENOUS at 08:08

## 2024-08-02 RX ADMIN — PANTOPRAZOLE SODIUM 40 MG: 40 TABLET, DELAYED RELEASE ORAL at 17:37

## 2024-08-02 RX ADMIN — FIDAXOMICIN 200 MG: 200 TABLET, FILM COATED ORAL at 20:30

## 2024-08-02 RX ADMIN — HYDROCODONE BITARTRATE AND ACETAMINOPHEN 1 TABLET: 5; 325 TABLET ORAL at 02:31

## 2024-08-02 RX ADMIN — CARVEDILOL 12.5 MG: 25 TABLET, FILM COATED ORAL at 17:36

## 2024-08-02 RX ADMIN — MORPHINE SULFATE 2 MG: 2 INJECTION, SOLUTION INTRAMUSCULAR; INTRAVENOUS at 20:36

## 2024-08-02 RX ADMIN — Medication 10 ML: at 14:00

## 2024-08-02 RX ADMIN — HYDROCODONE BITARTRATE AND ACETAMINOPHEN 1 TABLET: 5; 325 TABLET ORAL at 17:36

## 2024-08-02 RX ADMIN — POTASSIUM CHLORIDE AND SODIUM CHLORIDE 50 ML/HR: 900; 150 INJECTION, SOLUTION INTRAVENOUS at 20:36

## 2024-08-02 NOTE — PLAN OF CARE
Goal Outcome Evaluation:  Plan of Care Reviewed With: patient        Progress: improving  Outcome Evaluation: Pt was in bed.  C/o weakness.  Able to transfer supine to sitting with CGA.  Amb 200' with CGA

## 2024-08-02 NOTE — PROGRESS NOTES
I dropped by to review colonoscopic findings with the patient.  Understandably she is quite depressed about the lack of definite answers.  Reviewed that multiple biopsies were obtained and we will await those.  Patient encouraged to consume as normal diet as possible and see how she does.    Marty Browning MD

## 2024-08-02 NOTE — PLAN OF CARE
Goal Outcome Evaluation:  Plan of Care Reviewed With: patient        Progress: no change  Outcome Evaluation: ivf in progress. tolerates diet. medicated x1 for pain.  up ad buck. cont to monitor.

## 2024-08-02 NOTE — PROGRESS NOTES
[facility] Medicine Services  INPATIENT PROGRESS NOTE    Patient Name: Minnie Bang  Date of Admission: 7/29/2024  Today's Date: 08/02/24  Length of Stay: 4  Primary Care Physician: Provider, No Known    Subjective   Chief Complaint: Diarrhea abdominal pain  Abdominal Pain       49-year-old female with history of hypertension, alcohol abuse disorder, coronary artery disease, chronic obstructive pulmonary disease, former smoker, comes to the hospital reporting diarrhea that has been present for approximately 2 months.  She describes diarrhea as liquid, several times per day, and occasional associated vomiting.  She states that approximately 2 weeks ago started developing abdominal pain, described as cramps, located mostly in the lower abdomen; she also reports stabbing quality pain over the right posterior rib cage that increases with inspiration and motion, and has been present since yesterday.  Patient states that she is trying to quit alcohol, but usually takes 1 or 2 drinks shots to avoid withdrawal tremors.    Patient had an NG tube placed on 7/30/2024 removed 7/31 24.    Still complains of some abdominal pain, intensity 6/10.    No fevers.    Diarrhea at least 5-6 opportunities overnight.    Today, no report of vomiting.  No rectal bleeding or melena.      Will have colonoscopy today.    Review of Systems   Gastrointestinal:  Positive for abdominal pain.      All pertinent negatives and positives are as above. All other systems have been reviewed and are negative unless otherwise stated.     Objective    Temp:  [98 °F (36.7 °C)-98.9 °F (37.2 °C)] 98.8 °F (37.1 °C)  Heart Rate:  [79-95] 80  Resp:  [18-24] 19  BP: (102-147)/(62-96) 147/96  Physical Exam  Constitutional:       Appearance: Today, less anxious, no respiratory distress, alert, oriented x 3.  HENT:      Head: Normocephalic and atraumatic.      Nose: Nose normal.     Mouth/Throat:      Mouth: Mucous  membranes are moist.      Pharynx: Oropharynx is clear.   Eyes:      Extraocular Movements: Extraocular movements intact.      Conjunctiva/sclera: Conjunctivae normal.      Pupils: Pupils are equal, round, and reactive to light.   Cardiovascular:      Rate and Rhythm: Normal rate and regular rhythm.      Pulses: Normal pulses.   Pulmonary:      Effort: No respiratory distress.      Breath sounds: Normal breath sounds. No wheezing, rhonchi or rales.   Abdominal:      General: Abdomen is obese.  Slightly distended.  Tender to deep palpation diffusely, with no peritoneal signs.      Palpations: Abdomen is soft.      Tenderness: There is no guarding or rebound.   Musculoskeletal:         General: Normal range of motion.      Cervical back: Normal range of motion and neck supple.   Extremities:  No lower extremity edema.  Skin:     Capillary Refill: Capillary refill takes less than 2 seconds.      Coloration: Skin is not jaundiced.      Findings: No rash.   Neurological: No significant tremors.     General: No focal deficit present.      Mental Status: Patient is alert, oriented to place time and person.     Sensory: No sensory deficit.      Motor: No weakness.      Coordination: Coordination normal.   Psychiatric:         Mood and Affect: Slightly anxious     Behavior: Behavior normal.       Results Review:  I have reviewed the labs, radiology results, and diagnostic studies.    Laboratory Data:   Results from last 7 days   Lab Units 08/01/24  0625 07/31/24  0627 07/30/24  0420   WBC 10*3/mm3 11.52* 9.25 8.06   HEMOGLOBIN g/dL 10.0* 10.0* 9.7*   HEMATOCRIT % 31.6* 31.1* 29.7*   PLATELETS 10*3/mm3 250 225 217        Results from last 7 days   Lab Units 08/01/24  0625 07/31/24  0627 07/30/24  1527 07/30/24  0420   SODIUM mmol/L 140 138  --  140   POTASSIUM mmol/L 3.8 3.4* 3.6 3.1*   CHLORIDE mmol/L 108* 106  --  101   CO2 mmol/L 23.0 26.0  --  27.0   BUN mg/dL 4* 4*  --  4*   CREATININE mg/dL 0.41* 0.39*  --  0.43*  "  CALCIUM mg/dL 7.7* 7.7*  --  7.5*   BILIRUBIN mg/dL 0.7 1.0  --  0.8   ALK PHOS U/L 546* 523*  --  473*   ALT (SGPT) U/L 36* 37*  --  33   AST (SGOT) U/L 105* 111*  --  120*   GLUCOSE mg/dL 68 83  --  103*       Culture Data:   No results found for: \"BLOODCX\", \"URINECX\", \"WOUNDCX\", \"MRSACX\", \"RESPCX\", \"STOOLCX\"    Radiology Data:   Imaging Results (Last 24 Hours)       ** No results found for the last 24 hours. **            I have reviewed the patient's current medications.     Assessment/Plan   Assessment  Active Hospital Problems    Diagnosis     **Pancolitis      Acute to subacute pancolitis  Possible idiopathic inflammatory bowel disease  Possible C. difficile colitis.  Enteropathogenic E. coli positive  Hypertension  Thoracic pain  Alcohol abuse disorder at risk for withdrawal  Anxiety disorder  COPD  Former smoker  Macrocytosis, likely from alcohol use       7/08/2024, Upper endoscopy that showed normal results.         Last laboratory tests from August 1, 2024:     Lactate 7.4, down to 0.8     Hemoglobin down from 12.5 to 10., stable,     Blood cell count slightly increased to 11,520     .     Platelet count 250,000  Vitamin B 12 level above 1200.  Folate 5.72       Sodium 140  Potassium proved to 3.8 (admission 2.3).    Creatinine 0.41, BUN 4.    Glucose 111    Last magnesium 2.0 on 7/30/2024.    Calcium Correction for Hypoalbuminemia - MDCalc  Calculated on Aug 01 2024 11:07 AM  8.7 mg/dL -> Corrected Calcium Equivalent to 2.2 mmol/L     Alkaline phosphatase elevated 621 on admission, today 546.  Bilirubin normal.  Albumin 2.7.  , ALT 36.  Normal PT/INR     CT scan report from 7/29/2024 on admission  1. Acute or subacute nonspecific colitis.  2. Severe hepatic steatosis and hepatomegaly.  3. The remaining abdominal pelvis is unremarkable similar to the  previous study.     Liver ultrasound report  1. No gallstones or ductal dilatation.  2. Fatty infiltration of the liver.  3. Echogenic " pancreas may be a normal variant. It may also be seen in  patients with diabetes and prediabetes.      Repeat CT scan of the abdomen and pelvis on August 1, 2024 showed bibasilar pleural effusion right more than the left.  Edema of the abdominal wall.  Persistent moderate thickening of the wall of the large bowel, more pronounced in the proximal colon ascending and transverse.  May be partly due to incomplete distention.  Probability of acute nonspecific colitis.  Surrounding peritoneal infiltration.  Hepatic steatosis.       Treatment Plan  Monitor for volume overload given findings on CT scan of pleural effusions and abdominal wall edema.  Ruben labs in the morning.  Monitor electrolytes.  Replace as needed     Elevated alkaline phosphatase; AST/ALT consistent with alcohol abuse.     Regarding colitis, patient was treated with 10 days of vancomycin p.o. for C. difficile toxin positive/antigen negative test.  She was started on Dificid 200 mg p.o. every 12 hours on 7/29/2024; infectious disease specialist following patient.  Repeat C. difficile toxin positive, antigen negative.  GI panel showed enteropathogenic E. coli.    NG tube removed 7/31 24.    Patient is having a colonoscopy today; will follow results and recommendations by gastroenterology.    Thiamine, folic acid, multivitamins, Librium taper, now at 25 mg p.o. every 12 hours., Ativan as needed for withdrawal signs or symptoms.    CIWA protocol.    Macrocytosis likely from alcohol use.    For blood pressure management, blood pressure has been variable throughout the stay.  I decreased lisinopril from 40 to 20 mg p.o. daily.  Coreg was decreased to 12.5 p.o. twice daily and she is on her prior dose of Cardizem  mg daily.  She may require adjustment if blood pressure again elevated, but I believe is also driven by anxiety and possible withdrawal.    SCDs for DVT prophylaxis.      Medical Decision Making  Number and Complexity of problems: 9, moderate to  high complexity  Differential Diagnosis: See above    Conditions and Status        Condition is improving.     MDM Data  External documents reviewed: No  Cardiac tracing (EKG, telemetry) interpretation: Sinus rhythm  Radiology interpretation: Radiology reports reviewed  Labs reviewed: Yes  Any tests that were considered but not ordered: No     Decision rules/scores evaluated (example [id number], Wells, etc): None     Discussed with: Patient and nurse     Care Planning  Shared decision making: With patient  Code status and discussions: Full code    Disposition  Social Determinants of Health that impact treatment or disposition: Alcohol abuse  I expect the patient to be discharged to home in 2 days.     Electronically signed by Jc Murillo MD, 08/02/24, 12:26 CDT.

## 2024-08-02 NOTE — PLAN OF CARE
Goal Outcome Evaluation:  Plan of Care Reviewed With: patient        Progress: no change  Outcome Evaluation: Pt continuing to have diarrhea through the night. Voiding. C/o pain often, prn meds given when available. NPO since 0000 for colonoscopy today. TWE to be given at 0800 for prep. Up ad buck. Safety maintained.

## 2024-08-02 NOTE — THERAPY TREATMENT NOTE
Acute Care - Physical Therapy Treatment Note  [facility]     Patient Name: Minnie Bang  : 1975  MRN: [id number]  Today's Date: 2024      Visit Dx:     ICD-10-CM ICD-9-CM   1. Colitis  K52.9 558.9   2. Diarrhea, unspecified type  R19.7 787.91   3. Hypokalemia  E87.6 276.8   4. Hepatomegaly  R16.0 789.1   5. Impaired mobility [Z74.09]  Z74.09 799.89   6. Pancolitis  K51.00 556.6     Patient Active Problem List   Diagnosis    Mixed hyperlipidemia    Raynaud's disease without gangrene    Ex-smoker    Chest pain in adult    SORTO (dyspnea on exertion)    Mixed simple and mucopurulent chronic bronchitis    Pulmonary emphysema    Non-occlusive coronary artery disease    Primary hypertension    Coronary-myocardial bridge    Bilateral lower extremity edema    Bicuspid aortic valve    Vocal cord polyps    Tobacco use disorder, continuous    Neoplasm of uncertain behavior    Hypokalemia    Dysphagia    Heartburn    Pain of upper abdomen    Pancolitis     Past Medical History:   Diagnosis Date    Abnormal ECG 2023    Anxiety     Arthritis     back    Asthma     Bicuspid aortic valve 2023    COPD (chronic obstructive pulmonary disease)     Coronary-myocardial bridge 2023    CTS (carpal tunnel syndrome) 2019    Dental disease     Depression     Diastolic dysfunction     Dizziness     Emphysema of lung     GERD (gastroesophageal reflux disease)     Headache     Hyperlipidemia     Hypertension     Hypothyroidism     Mixed simple and mucopurulent chronic bronchitis 2023    Non-occlusive coronary artery disease 2023    NSTEMI (non-ST elevated myocardial infarction) (HCC)     Pneumonia     Pulmonary emphysema 2023    Shingles 2018    Vocal cord polyps 2024     Past Surgical History:   Procedure Laterality Date    APPENDECTOMY      CARDIAC CATHETERIZATION N/A 2017    Procedure: Coronary angiography;  Surgeon: Luis Colvin MD;  Location: [facility] INVASIVE  LOCATION;  Service:     CARDIAC CATHETERIZATION N/A 05/31/2023    Procedure: Left Heart Cath;  Surgeon: Steffen Rossi MD;  Location:  [facility] CATH INVASIVE LOCATION;  Service: Cardiology;  Laterality: N/A;    CARPAL TUNNEL RELEASE  2019    ENDOSCOPY N/A 7/8/2024    Procedure: ESOPHAGOGASTRODUODENOSCOPY WITH ANESTHESIA;  Surgeon: Gordy Wang MD;  Location: [facility] ENDOSCOPY;  Service: Gastroenterology;  Laterality: N/A;  pre: dysphagia.   post: esophagus dilated.   no PCP    HYSTERECTOMY      PANENDOSCOPY N/A 5/30/2024    Procedure: DIRECT LARYNGOSCOPY WITH BIOPSY OF VOCAL CORD POLYPS WITH POSSIBLE TRACHEOSTOMY;  Surgeon: Mendez Padilla MD;  Location: [facility] OR;  Service: ENT;  Laterality: N/A;    DE RT/LT HEART CATHETERS N/A 11/04/2017    Procedure: Percutaneous Coronary Intervention;  Surgeon: Luis Colvin MD;  Location:  [facility] CATH INVASIVE LOCATION;  Service: Cardiovascular    THYROID SURGERY      TOTAL THYROIDECTOMY      TRACHEOSTOMY N/A 5/30/2024    Procedure: POSSIBLE TRACHEOSTOMY;  Surgeon: Mendez Padilla MD;  Location: [facility] OR;  Service: ENT;  Laterality: N/A;     PT Assessment (Last 12 Hours)       PT Evaluation and Treatment       Row Name 08/02/24 1350          Physical Therapy Time and Intention    Subjective Information complains of;weakness  -AURELIO     Document Type therapy note (daily note)  -AURELIO     Mode of Treatment physical therapy  -AURELIO       Row Name 08/02/24 1350          General Information    Existing Precautions/Restrictions fall  -AURELIO       Row Name 08/02/24 1350          Pain    Pretreatment Pain Rating 4/10  -AURELIO     Posttreatment Pain Rating 4/10  -AURELIO     Pain Location - abdomen  -AURELIO     Pain Intervention(s) Repositioned  -AURELIO       Row Name 08/02/24 1350          Bed Mobility    Supine-Sit [place] (Bed Mobility) verbal cues;standby assist  -AURELIO     Sit-Supine [place] (Bed Mobility) standby assist  -AURELIO       Row Name 08/02/24 1350          Sit-Stand Transfer     Sit-Stand [place] (Transfers) verbal cues;contact guard  -AURELIO       Row Name 08/02/24 1350          Stand-Sit Transfer    Stand-Sit [place] (Transfers) verbal cues;contact guard  -AURELIO       Row Name 08/02/24 1350          Gait/Stairs (Locomotion)    [place] Level (Gait) verbal cues;contact guard  -AURELIO     Assistive Device (Gait) --  HHA  -AURELIO     Distance in Feet (Gait) 200  -AURELIO     Deviations/Abnormal Patterns (Gait) gait speed decreased  -AURELIO     Bilateral Gait Deviations forward flexed posture  -AURELIO       Row Name 08/02/24 1350          Positioning and Restraints    Pre-Treatment Position in bed  -AURELIO     Post Treatment Position bed  -AURELIO     In Bed fowlers;call light within reach;encouraged to call for assist;side rails up x2  -AURELIO               User Key  (r) = Recorded By, (t) = Taken By, (c) = Cosigned By      Initials Name Provider Type    AURELIO Bandar Villa, PTA Physical Therapist Assistant                    Physical Therapy Education       Title: PT OT SLP Therapies (In Progress)       Topic: Physical Therapy (In Progress)       Point: Mobility training (Done)       Learning Progress Summary             Patient Acceptance, E,TB, VU,DU by CN at 7/30/2024 2712    Comment: Educated on role of PT in pt care                         Point: Home exercise program (Not Started)       Learner Progress:  Not documented in this visit.              Point: Body mechanics (Not Started)       Learner Progress:  Not documented in this visit.              Point: Precautions (Not Started)       Learner Progress:  Not documented in this visit.                              User Key       Initials Effective Dates Name Provider Type Discipline    FLYNN 06/24/24 -  Elise Moore, PT Student PT Student PT                  PT Recommendation and Plan     Plan of Care Reviewed With: patient  Progress: improving  Outcome Evaluation: Pt was in bed.  C/o weakness.  Able to transfer supine to sitting with CGA.  Amb 200' with  CGA   Outcome Measures       Row Name 08/02/24 1350 08/01/24 1358 07/31/24 0857       How much help from another person do you currently need...    Turning from your back to your side while in flat bed without using bedrails? 4  -AURELIO 4  -AURELIO 4  -AURELIO    Moving from lying on back to sitting on the side of a flat bed without bedrails? 4  -AURELIO 4  -AURELIO 4  -AURELIO    Moving to and from a bed to a chair (including a wheelchair)? 4  -AURELIO 4  -AURELIO 4  -AURELIO    Standing up from a chair using your arms (e.g., wheelchair, bedside chair)? 4  -AURELIO 4  -AURELIO 3  -AURELIO    Climbing 3-5 steps with a railing? 3  -AURELIO 3  -AURELIO 3  -AURELIO    To walk in hospital room? 3  -AURELIO 3  -AURELIO 3  -AURELIO    AM-PAC 6 Clicks Score (PT) 22  -AURELIO 22  -AURELIO 21  -AURELIO    Highest Level of Mobility Goal 7 --> Walk 25 feet or more  -AURELIO 7 --> Walk 25 feet or more  -AURELIO 6 --> Walk 10 steps or more  -AURELIO       Functional Assessment    Outcome Measure Options AM-PAC 6 Clicks Basic Mobility (PT)  -AURELIO AM-PAC 6 Clicks Basic Mobility (PT)  -AURELIO AM-PAC 6 Clicks Basic Mobility (PT)  -AURELIO              User Key  (r) = Recorded By, (t) = Taken By, (c) = Cosigned By      Initials Name Provider Type    Bandar Gonzalez PTA Physical Therapist Assistant                     Time Calculation:    PT Charges       Row Name 08/02/24 1350             Time Calculation    Start Time 1350  -AURELIO      Stop Time 1400  -AURELIO      Time Calculation (min) 10 min  -AURELIO      PT Received On 08/02/24  -AURELIO         Time Calculation- PT    Total Timed Code Minutes- PT 10 minute(s)  -AURELIO         Timed Charges    91129 - Gait Training Minutes  10  -AURELIO         Total Minutes    Timed Charges Total Minutes 10  -AURELIO       Total Minutes 10  -AURELIO                User Key  (r) = Recorded By, (t) = Taken By, (c) = Cosigned By      Initials Name Provider Type    Bandar Gonzalez PTA Physical Therapist Assistant                  Therapy Charges for Today       Code Description Service Date Service Provider Modifiers Qty    50238021847 HC GAIT  TRAINING EA 15 MIN 8/1/2024 Bandar Villa, PTA GP 1    66467518526 HC GAIT TRAINING EA 15 MIN 8/2/2024 Bandar Villa, PTA GP 1            PT G-Codes  Outcome Measure Options: AM-PAC 6 Clicks Basic Mobility (PT)  AM-PAC 6 Clicks Score (PT): 22    Bandar Villa PTA  8/2/2024

## 2024-08-02 NOTE — ANESTHESIA POSTPROCEDURE EVALUATION
"Patient: Minnie Bang    Procedure Summary       Date: 08/02/24 Room / Location:  [facility] 2 /  [facility]    Anesthesia Start: 1122 Anesthesia Stop: 1151    Procedure: COLONOSCOPY WITH ANESTHESIA Diagnosis:       Pancolitis      (Pancolitis [K51.00])    Surgeons: Marty Browning MD Provider: Edgar Donis CRNA    Anesthesia Type: MAC ASA Status: 3            Anesthesia Type: MAC    Vitals  Vitals Value Taken Time   /86 08/02/24 1151   Temp     Pulse 90 08/02/24 1153   Resp 23 08/02/24 1150   SpO2 95 % 08/02/24 1153   Vitals shown include unfiled device data.        Post Anesthesia Care and Evaluation    Patient location during evaluation: PHASE II  Patient participation: complete - patient participated  Level of consciousness: awake  Pain management: adequate    Airway patency: patent  Anesthetic complications: No anesthetic complications    Cardiovascular status: acceptable  Respiratory status: acceptable  Hydration status: acceptable    Comments: /89 (BP Location: Right arm, Patient Position: Lying)   Pulse 85   Temp 98.8 °F (37.1 °C) (Oral)   Resp 23   Ht 172.7 cm (68\")   Wt 78.9 kg (174 lb)   LMP  (LMP Unknown)   SpO2 95%   BMI 26.46 kg/m²         "

## 2024-08-02 NOTE — PROGRESS NOTES
Brief (courtesy) procedure note:    Procedure: Colonoscopy    Pre-op diagnosis: Colitis    Post-op diagnosis: No obvious mucosal disease! Normal TI. Random biopsies obtained.     Recommendations: Path. Can be more aggressive with antidiarrheal meds if need be.     Please refer to the Provation-generated note for photos and further details.     Marty Browning MD    DISCLAIMER:    This physician works through a [employer] as an inpatient consultant gastroenterologist only and has no outpatient clinic for patient follow up.  Any results not available at time of inpatient discharge and/or GI clinic follow up should be managed by the hospitalist team, PCP, or outpatient gastroenterologist.

## 2024-08-02 NOTE — ANESTHESIA PREPROCEDURE EVALUATION
Anesthesia Evaluation     Patient summary reviewed   no history of anesthetic complications:   NPO Solid Status: > 8 hours             Airway   Mallampati: II  Dental    (+) poor dentition        Pulmonary    (+) a smoker Former, COPD, asthma,shortness of breath, sleep apnea  Cardiovascular   Exercise tolerance: good (4-7 METS)    (+) hypertension, valvular problems/murmurs, past MI , CAD (non obstructive), SORTO    ROS comment: Cath with nonobstructive disease 2023: Mid left anterior descending coronary artery has moderate myocardial bridging  Mild atherosclerotic changes      Neuro/Psych  (-) seizures, CVA  GI/Hepatic/Renal/Endo    (+) GERD, thyroid problem     Musculoskeletal     Abdominal    Substance History   (+) alcohol use     OB/GYN          Other   arthritis,                   Anesthesia Plan    ASA 3     MAC     intravenous induction     Anesthetic plan, risks, benefits, and alternatives have been provided, discussed and informed consent has been obtained with: patient.      CODE STATUS:    Level Of Support Discussed With: Patient  Code Status (Patient has no pulse and is not breathing): CPR (Attempt to Resuscitate)  Medical Interventions (Patient has pulse or is breathing): Full Support

## 2024-08-03 PROBLEM — F10.10 ETOH ABUSE: Status: ACTIVE | Noted: 2024-08-03

## 2024-08-03 PROBLEM — E83.42 HYPOMAGNESEMIA: Status: ACTIVE | Noted: 2024-08-03

## 2024-08-03 PROBLEM — R19.7 DIARRHEA: Status: ACTIVE | Noted: 2024-08-03

## 2024-08-03 PROBLEM — K52.9 NONSPECIFIC COLITIS: Status: ACTIVE | Noted: 2024-07-29

## 2024-08-03 LAB
ANION GAP SERPL CALCULATED.3IONS-SCNC: 8 MMOL/L (ref 5–15)
BACTERIA SPEC AEROBE CULT: NORMAL
BACTERIA SPEC AEROBE CULT: NORMAL
BUN SERPL-MCNC: 2 MG/DL (ref 6–20)
BUN/CREAT SERPL: 3.9 (ref 7–25)
CALCIUM SPEC-SCNC: 7.9 MG/DL (ref 8.6–10.5)
CHLORIDE SERPL-SCNC: 111 MMOL/L (ref 98–107)
CO2 SERPL-SCNC: 23 MMOL/L (ref 22–29)
CREAT SERPL-MCNC: 0.51 MG/DL (ref 0.57–1)
EGFRCR SERPLBLD CKD-EPI 2021: 113.2 ML/MIN/1.73
GLUCOSE SERPL-MCNC: 103 MG/DL (ref 65–99)
HCT VFR BLD AUTO: 29.4 % (ref 34–46.6)
HGB BLD-MCNC: 9.2 G/DL (ref 12–15.9)
MAGNESIUM SERPL-MCNC: 1.5 MG/DL (ref 1.6–2.6)
POTASSIUM SERPL-SCNC: 3.3 MMOL/L (ref 3.5–5.2)
SODIUM SERPL-SCNC: 142 MMOL/L (ref 136–145)

## 2024-08-03 PROCEDURE — 97116 GAIT TRAINING THERAPY: CPT

## 2024-08-03 PROCEDURE — 63710000001 ONDANSETRON ODT 4 MG TABLET DISPERSIBLE: Performed by: INTERNAL MEDICINE

## 2024-08-03 PROCEDURE — 25010000002 MAGNESIUM SULFATE 2 GM/50ML SOLUTION: Performed by: INTERNAL MEDICINE

## 2024-08-03 PROCEDURE — 99232 SBSQ HOSP IP/OBS MODERATE 35: CPT | Performed by: INTERNAL MEDICINE

## 2024-08-03 PROCEDURE — 83735 ASSAY OF MAGNESIUM: CPT | Performed by: INTERNAL MEDICINE

## 2024-08-03 PROCEDURE — 94799 UNLISTED PULMONARY SVC/PX: CPT

## 2024-08-03 PROCEDURE — 80048 BASIC METABOLIC PNL TOTAL CA: CPT | Performed by: INTERNAL MEDICINE

## 2024-08-03 PROCEDURE — 85014 HEMATOCRIT: CPT | Performed by: FAMILY MEDICINE

## 2024-08-03 PROCEDURE — 85018 HEMOGLOBIN: CPT | Performed by: FAMILY MEDICINE

## 2024-08-03 PROCEDURE — 94760 N-INVAS EAR/PLS OXIMETRY 1: CPT

## 2024-08-03 PROCEDURE — 25010000002 MORPHINE SULFATE (PF) 2 MG/ML SOLUTION: Performed by: FAMILY MEDICINE

## 2024-08-03 RX ORDER — LOPERAMIDE HYDROCHLORIDE 2 MG/1
4 CAPSULE ORAL 4 TIMES DAILY PRN
Status: DISCONTINUED | OUTPATIENT
Start: 2024-08-03 | End: 2024-08-06 | Stop reason: HOSPADM

## 2024-08-03 RX ORDER — POTASSIUM CHLORIDE 750 MG/1
40 CAPSULE, EXTENDED RELEASE ORAL
Status: COMPLETED | OUTPATIENT
Start: 2024-08-03 | End: 2024-08-03

## 2024-08-03 RX ORDER — MAGNESIUM SULFATE HEPTAHYDRATE 40 MG/ML
2 INJECTION, SOLUTION INTRAVENOUS ONCE
Status: COMPLETED | OUTPATIENT
Start: 2024-08-03 | End: 2024-08-03

## 2024-08-03 RX ADMIN — MAGNESIUM SULFATE HEPTAHYDRATE 2 G: 2 INJECTION, SOLUTION INTRAVENOUS at 13:00

## 2024-08-03 RX ADMIN — Medication 10 ML: at 20:04

## 2024-08-03 RX ADMIN — LISINOPRIL 20 MG: 20 TABLET ORAL at 08:44

## 2024-08-03 RX ADMIN — CHLORDIAZEPOXIDE HYDROCHLORIDE 25 MG: 25 CAPSULE ORAL at 17:54

## 2024-08-03 RX ADMIN — MORPHINE SULFATE 2 MG: 2 INJECTION, SOLUTION INTRAMUSCULAR; INTRAVENOUS at 08:58

## 2024-08-03 RX ADMIN — Medication 1 TABLET: at 08:44

## 2024-08-03 RX ADMIN — PANTOPRAZOLE SODIUM 40 MG: 40 TABLET, DELAYED RELEASE ORAL at 17:54

## 2024-08-03 RX ADMIN — FIDAXOMICIN 200 MG: 200 TABLET, FILM COATED ORAL at 08:44

## 2024-08-03 RX ADMIN — CARVEDILOL 12.5 MG: 25 TABLET, FILM COATED ORAL at 08:44

## 2024-08-03 RX ADMIN — PANTOPRAZOLE SODIUM 40 MG: 40 TABLET, DELAYED RELEASE ORAL at 06:32

## 2024-08-03 RX ADMIN — MORPHINE SULFATE 2 MG: 2 INJECTION, SOLUTION INTRAMUSCULAR; INTRAVENOUS at 13:10

## 2024-08-03 RX ADMIN — LEVOTHYROXINE SODIUM 125 MCG: 125 TABLET ORAL at 06:32

## 2024-08-03 RX ADMIN — POTASSIUM CHLORIDE 40 MEQ: 750 CAPSULE, EXTENDED RELEASE ORAL at 17:53

## 2024-08-03 RX ADMIN — Medication 10 ML: at 08:44

## 2024-08-03 RX ADMIN — CARVEDILOL 12.5 MG: 25 TABLET, FILM COATED ORAL at 17:53

## 2024-08-03 RX ADMIN — ONDANSETRON 4 MG: 4 TABLET, ORALLY DISINTEGRATING ORAL at 22:37

## 2024-08-03 RX ADMIN — FOLIC ACID 1 MG: 1 TABLET ORAL at 08:44

## 2024-08-03 RX ADMIN — HYDROCODONE BITARTRATE AND ACETAMINOPHEN 1 TABLET: 5; 325 TABLET ORAL at 06:32

## 2024-08-03 RX ADMIN — TOPIRAMATE 50 MG: 25 TABLET, FILM COATED ORAL at 20:03

## 2024-08-03 RX ADMIN — POTASSIUM CHLORIDE 40 MEQ: 750 CAPSULE, EXTENDED RELEASE ORAL at 13:00

## 2024-08-03 RX ADMIN — HYDROCODONE BITARTRATE AND ACETAMINOPHEN 1 TABLET: 5; 325 TABLET ORAL at 12:33

## 2024-08-03 RX ADMIN — DILTIAZEM HYDROCHLORIDE 120 MG: 120 CAPSULE, EXTENDED RELEASE ORAL at 08:44

## 2024-08-03 RX ADMIN — HYDROCODONE BITARTRATE AND ACETAMINOPHEN 1 TABLET: 5; 325 TABLET ORAL at 00:08

## 2024-08-03 RX ADMIN — TOPIRAMATE 50 MG: 25 TABLET, FILM COATED ORAL at 08:44

## 2024-08-03 RX ADMIN — CHLORDIAZEPOXIDE HYDROCHLORIDE 25 MG: 25 CAPSULE ORAL at 06:32

## 2024-08-03 RX ADMIN — HYDROCODONE BITARTRATE AND ACETAMINOPHEN 1 TABLET: 5; 325 TABLET ORAL at 17:57

## 2024-08-03 RX ADMIN — LOPERAMIDE HYDROCHLORIDE 2 MG: 2 CAPSULE ORAL at 13:00

## 2024-08-03 RX ADMIN — ATORVASTATIN CALCIUM 10 MG: 10 TABLET, FILM COATED ORAL at 08:44

## 2024-08-03 RX ADMIN — THIAMINE HCL TAB 100 MG 100 MG: 100 TAB at 08:44

## 2024-08-03 RX ADMIN — MORPHINE SULFATE 2 MG: 2 INJECTION, SOLUTION INTRAMUSCULAR; INTRAVENOUS at 02:27

## 2024-08-03 RX ADMIN — FLUOXETINE HYDROCHLORIDE 20 MG: 20 CAPSULE ORAL at 08:44

## 2024-08-03 NOTE — PROGRESS NOTES
Infectious Diseases Progress Note    Patient:  Minnie Bang  YOB: 1975  MRN: [id number]   Admit date: 7/29/2024   Admitting Physician: Kirill Chakraborty DO  Primary Care Physician: Provider, No Known    Chief Complaint/Interval History: She still has diarrhea.  She indicates when she bends over or strains a little bit in her abdomen she has some problems with incontinence.  She is not vomiting.  She does have some nausea.  I again reviewed with her the duration of her diarrheal symptoms-she indicates symptoms have been present 4 to 6 months.  She had colonoscopy.  Results and images reviewed.  She does not appear to have visual evidence of colitis.  She did not have pseudomembranes.  Interval notes reviewed.    Intake/Output Summary (Last 24 hours) at 8/3/2024 1020  Last data filed at 8/2/2024 2102  Gross per 24 hour   Intake 480 ml   Output --   Net 480 ml     Allergies: No Known Allergies  Current Scheduled Medications:   atorvastatin, 10 mg, Oral, Daily  carvedilol, 12.5 mg, Oral, BID With Meals  chlordiazePOXIDE, 25 mg, Oral, Q12H  dilTIAZem CD, 120 mg, Oral, Daily  fidaxomicin, 200 mg, Oral, Q12H  FLUoxetine, 20 mg, Oral, Daily  folic acid, 1 mg, Oral, Daily  levothyroxine, 125 mcg, Oral, Q AM  lisinopril, 20 mg, Oral, Daily  multivitamin with minerals, 1 tablet, Oral, Daily  pantoprazole, 40 mg, Oral, BID AC  sodium chloride, 10 mL, Intravenous, Q12H  thiamine, 100 mg, Oral, Daily  topiramate, 50 mg, Oral, BID      sodium chloride, 100 mL/hr, Last Rate: Stopped (08/02/24 1158)  sodium chloride 0.9 % with KCl 20 mEq, 50 mL/hr, Last Rate: 50 mL/hr (08/02/24 2036)       Current PRN Medications:    acetaminophen **OR** acetaminophen **OR** acetaminophen    cloNIDine    hydrALAZINE    HYDROcodone-acetaminophen    ipratropium-albuterol    loperamide    LORazepam **OR** LORazepam **OR** LORazepam **OR** LORazepam **OR** LORazepam **OR** LORazepam    meclizine    Morphine    nitroglycerin     "ondansetron ODT **OR** ondansetron    phenol    [COMPLETED] Insert Peripheral IV **AND** sodium chloride    sodium chloride    sodium chloride    Review of Systems see HPI    Vital Signs:  Temp (24hrs), Av.2 °F (36.8 °C), Min:97.6 °F (36.4 °C), Max:98.6 °F (37 °C)    /81 (BP Location: Right arm, Patient Position: Lying)   Pulse 83   Temp 97.9 °F (36.6 °C) (Oral)   Resp 16   Ht 172.7 cm (68\")   Wt 78.9 kg (174 lb)   LMP  (LMP Unknown)   SpO2 90%   BMI 26.46 kg/m²     Physical Exam  Vital signs - reviewed.  Line/IV site - No erythema, warmth, induration, or tenderness.  Abdomen mild tenderness  No guarding or rebound  Bowel sounds present    Lab Results:  CBC:   Results from last 7 days   Lab Units 24  0548 24  0625 24  0627 24  0420 24  1230   WBC 10*3/mm3  --  11.52* 9.25 8.06 8.37   HEMOGLOBIN g/dL 9.2* 10.0* 10.0* 9.7* 12.5   HEMATOCRIT % 29.4* 31.6* 31.1* 29.7* 36.7   PLATELETS 10*3/mm3  --  250 225 217 285     BMP:  Results from last 7 days   Lab Units 24  0548 24  0625 24  0627 24  1527 24  0420 24  1900 24  1334 24  1230   SODIUM mmol/L 142 140 138  --  140  --   --  137   POTASSIUM mmol/L 3.3* 3.8 3.4* 3.6 3.1* 2.6* 2.4* 2.3*   CHLORIDE mmol/L 111* 108* 106  --  101  --   --  92*   CO2 mmol/L 23.0 23.0 26.0  --  27.0  --   --  25.0   BUN mg/dL 2* 4* 4*  --  4*  --   --  3*   CREATININE mg/dL 0.51* 0.41* 0.39*  --  0.43*  --   --  0.45*   GLUCOSE mg/dL 103* 68 83  --  103*  --   --  102*   CALCIUM mg/dL 7.9* 7.7* 7.7*  --  7.5*  --   --  8.5*   ALT (SGPT) U/L  --  36* 37*  --  33  --   --  43*     Culture Results:   Blood Culture   Date Value Ref Range Status   2024 No growth at 4 days  Preliminary   2024 No growth at 4 days  Preliminary     Radiology: None  Additional Studies Reviewed: None    Impression:   1.  Diarrhea-definite etiology uncertain.  Although she tests positive for toxigenic strain of C. " difficile via PCR testing, she has had 2 negative toxin antigen assays and her colonoscopy does not demonstrate any visual evidence of pseudomembranous colitis.  In addition she has not had previous therapeutic response to oral vancomycin or to Dificid.  At this point I do not think C. difficile colitis is the cause of her problems with diarrhea.  She does not appear to have ulcerative colitis or Crohn's disease visually based on her colonoscopy results.  Biopsies to assess for microscopic or eosinophilic colitis pending.  Based on the above information feel we can discontinue Dificid treatment.  Per review of GI notes, antidiarrheal agents if necessary an option at this point given colonoscopy findings indicating no C. difficile or inflammatory bowel disease.  2.  Nausea/vomiting-seems to have shown some improvement  3.  Elevated AST/ALT-suspect alcohol related  4.  Evidence of vascular disease on CT of the abdomen and pelvis    Recommendations:   Discontinue Dificid  Encourage oral intake of general diet as tolerated  Encouraging her to try to be up to chair and moving more  Await biopsy results  If continued diarrhea, okay to try some antidiarrheal treatments from ID standpoint  If medicines can be adjusted to help control diarrhea and help her maintain adequate oral intake over the next few days, she could potentially be discharged with additional outpatient follow-up  I will be out next week.  Dr. Browning will be covering.    Alexis Manzano MD

## 2024-08-03 NOTE — PLAN OF CARE
Goal Outcome Evaluation:  Plan of Care Reviewed With: patient        Progress: no change  Outcome Evaluation: IVF; void; no nausea; medicated for pain prn with po and IV pain medications; up ad buck; resting between care; safety maintained

## 2024-08-03 NOTE — PROGRESS NOTES
[facility] Medicine Services  INPATIENT PROGRESS NOTE    Patient Name: Minnie Bang  Date of Admission: 7/29/2024  Today's Date: 08/03/24  Length of Stay: 5  Primary Care Physician: Provider, No Known    Subjective   Chief Complaint: f/u n/v/d    HPI   When I entered the room patient was eating a cup of Jell-O.  She says she still having abdominal pain.  She still nauseous.  Says she still having loose stool and is unable to do anything physically without causing her to defecate and her depends.  She is status post colonoscopy yesterday without any significant findings.  No noted fevers overnight.  Vital stable.  She otherwise denies chest pain or shortness of breath.  No other adverse issues.        Review of Systems   All pertinent negatives and positives are as above. All other systems have been reviewed and are negative unless otherwise stated.     Objective    Temp:  [97.6 °F (36.4 °C)-98.6 °F (37 °C)] 97.9 °F (36.6 °C)  Heart Rate:  [79-90] 83  Resp:  [16] 16  BP: (125-155)/(59-92) 138/81  Physical Exam  GEN: Awake, alert, interactive, in NAD  HEENT: PERRLA, EOMI, Anicteric, Trachea midline  Lungs: no wheezing/rales/rhonchi  Heart: RRR, +S1/s2, no rub  ABD: soft, +BS, no guarding/rebound  Extremities: atraumatic, no cyanosis, trace b/l LE edema  Skin: no rashes or petechiae  Neuro: AAOx3, no focal deficits  Psych: normal mood & affect        Results Review:  I have reviewed the labs, radiology results, and diagnostic studies.    Laboratory Data:   Results from last 7 days   Lab Units 08/03/24  0548 08/01/24 0625 07/31/24 0627 07/30/24  0420   WBC 10*3/mm3  --  11.52* 9.25 8.06   HEMOGLOBIN g/dL 9.2* 10.0* 10.0* 9.7*   HEMATOCRIT % 29.4* 31.6* 31.1* 29.7*   PLATELETS 10*3/mm3  --  250 225 217        Results from last 7 days   Lab Units 08/03/24  0548 08/01/24 0625 07/31/24  0627 07/30/24  1527 07/30/24  0420   SODIUM mmol/L 142 140 138  --  140   POTASSIUM mmol/L  3.3* 3.8 3.4*   < > 3.1*   CHLORIDE mmol/L 111* 108* 106  --  101   CO2 mmol/L 23.0 23.0 26.0  --  27.0   BUN mg/dL 2* 4* 4*  --  4*   CREATININE mg/dL 0.51* 0.41* 0.39*  --  0.43*   CALCIUM mg/dL 7.9* 7.7* 7.7*  --  7.5*   BILIRUBIN mg/dL  --  0.7 1.0  --  0.8   ALK PHOS U/L  --  546* 523*  --  473*   ALT (SGPT) U/L  --  36* 37*  --  33   AST (SGOT) U/L  --  105* 111*  --  120*   GLUCOSE mg/dL 103* 68 83  --  103*    < > = values in this interval not displayed.       Culture Data:   Blood Culture   Date Value Ref Range Status   07/29/2024 No growth at 4 days  Preliminary   07/29/2024 No growth at 4 days  Preliminary       Radiology Data:   Imaging Results (Last 24 Hours)       ** No results found for the last 24 hours. **            I have reviewed the patient's current medications.     Assessment/Plan   Assessment  Active Hospital Problems    Diagnosis     **Nonspecific colitis     Hypomagnesemia     Diarrhea     ETOH abuse     Hypokalemia     Primary hypertension     Mixed hyperlipidemia        Treatment Plan  #1 nonspecific colitis -patient had presented with abdominal pain, nausea, vomiting, diarrhea.  CT showed nonspecific colitis across multiple segments.  Patient recently treated with 10 days of vancomycin for C. difficile.  She has now been on Dificid twice daily but per note today okay to discontinue.  Appreciate ID input.  Monitor stool output.  Tolerating p.o.  Status post colonoscopy yesterday without any obvious significant issues and awaiting biopsies.    #2 hypokalemia -3.3, continue to replace    #3 hypomagnesemia -1.5, replace    #4 chronic anemia -H&H fairly stable without overt signs of bleeding    #5 hypertension -BP stable on current meds.  Monitor.    #6 hyperlipidemia -on statin    #7 history of EtOH abuse -no current signs of withdrawal    Medical Decision Making  Number and Complexity of problems: 2-3 acute, 1-2 acute on chronic, multiple chronic  Differential Diagnosis: as  above    Conditions and Status        New to me, does not appear toxic     MDM Data  External documents reviewed: none  Cardiac tracing (EKG, telemetry) interpretation: none  Radiology interpretation: reports reviewed   Labs reviewed: as above  Any tests that were considered but not ordered: none     Decision rules/scores evaluated (example [id number], Wells, etc): none     Discussed with: patient, nursing     Care Planning  Shared decision making: Patient apprised of current labs, vitals, imaging and treatment plan.  They are agreeable with proceeding with plans as discussed.    Code status and discussions: full code    Disposition  Social Determinants of Health that impact treatment or disposition: none  I expect the patient to be discharged to home when appropriate     Electronically signed by Kirill Chakraborty DO, 08/03/24, 12:37 CDT.

## 2024-08-03 NOTE — PROGRESS NOTES
Gastroenterology Hospitalist follow-up  Patient Identification:  Name: Minnie Bang  Age: 49 y.o.  Sex: female  :  1975  MRN: [id number]    Attending: Kirill Chakraborty DO  Gastroenterologist of Record: None  Information from:patient and family     CC: Severe diarrhea    History:   Colonoscopy yesterday did not demonstrate macroscopic colitis.  Her diarrhea continues for the most part unchecked.  She now says that she has had this problem upwards of 6 months.    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]     Bilateral lower extremity edema [R60.0]     Primary hypertension [I10]     Non-occlusive coronary artery disease [I25.10]     Pulmonary emphysema [J43.9]     Mixed simple and mucopurulent chronic bronchitis [J41.8]     Ex-smoker [Z87.891]     Chest pain in adult [R07.9]     SORTO (dyspnea on exertion) [R06.09]     Mixed hyperlipidemia [E78.2]     Raynaud's disease without gangrene [I73.00]      Current Meds:  MAR Reviewed  Scheduled Meds:atorvastatin, 10 mg, Oral, Daily  carvedilol, 12.5 mg, Oral, BID With Meals  chlordiazePOXIDE, 25 mg, Oral, Q12H  dilTIAZem CD, 120 mg, Oral, Daily  FLUoxetine, 20 mg, Oral, Daily  folic acid, 1 mg, Oral, Daily  levothyroxine, 125 mcg, Oral, Q AM  lisinopril, 20 mg, Oral, Daily  multivitamin with minerals, 1 tablet, Oral, Daily  pantoprazole, 40 mg, Oral, BID AC  potassium chloride, 40 mEq, Oral, Q3H  sodium chloride, 10 mL, Intravenous, Q12H  thiamine, 100 mg,  "Oral, Daily  topiramate, 50 mg, Oral, BID      Continuous Infusions:   PRN Meds:.  acetaminophen **OR** acetaminophen **OR** acetaminophen    cloNIDine    hydrALAZINE    HYDROcodone-acetaminophen    ipratropium-albuterol    loperamide    meclizine    nitroglycerin    ondansetron ODT **OR** ondansetron    phenol    [COMPLETED] Insert Peripheral IV **AND** sodium chloride    sodium chloride    sodium chloride  Allergies:  No Known Allergies    Intake/Output:     Intake/Output Summary (Last 24 hours) at 8/3/2024 1545  Last data filed at 2024 210  Gross per 24 hour   Intake 240 ml   Output --   Net 240 ml     New allergies/reactions:  None    Physical Exam:  Vitals:   Temp (24hrs), Av.9 °F (36.6 °C), Min:97.6 °F (36.4 °C), Max:98.3 °F (36.8 °C)    Temp:  [97.6 °F (36.4 °C)-98.3 °F (36.8 °C)] 97.8 °F (36.6 °C)  Heart Rate:  [79-90] 79  Resp:  [16] 16  BP: (125-155)/(59-92) 134/82  /82 (BP Location: Right arm, Patient Position: Lying)   Pulse 79   Temp 97.8 °F (36.6 °C) (Oral)   Resp 16   Ht 172.7 cm (68\")   Wt 78.9 kg (174 lb)   LMP  (LMP Unknown)   SpO2 95%   BMI 26.46 kg/m²     Exam:  NAD  PERRLA. Sclerae and conjunctivae normal  HENT: external inspection normal. Hearing intact.  No respiratory distress. Lungs clear.  Cardiac: no MRG.  Abdomen: bowel sounds active. Soft, non-tender, no HSM.  Alert, oriented, normal affect.     DATA:  Radiology and Labs:   Recent Results (from the past 24 hour(s))   Basic Metabolic Panel    Collection Time: 24  5:48 AM    Specimen: Blood   Result Value Ref Range    Glucose 103 (H) 65 - 99 mg/dL    BUN 2 (L) 6 - 20 mg/dL    Creatinine 0.51 (L) 0.57 - 1.00 mg/dL    Sodium 142 136 - 145 mmol/L    Potassium 3.3 (L) 3.5 - 5.2 mmol/L    Chloride 111 (H) 98 - 107 mmol/L    CO2 23.0 22.0 - 29.0 mmol/L    Calcium 7.9 (L) 8.6 - 10.5 mg/dL    BUN/Creatinine Ratio 3.9 (L) 7.0 - 25.0    Anion Gap 8.0 5.0 - 15.0 mmol/L    eGFR 113.2 >60.0 mL/min/1.73   Magnesium    " Collection Time: 08/03/24  5:48 AM    Specimen: Blood   Result Value Ref Range    Magnesium 1.5 (L) 1.6 - 2.6 mg/dL   Hemoglobin & Hematocrit, Blood    Collection Time: 08/03/24  5:48 AM    Specimen: Blood   Result Value Ref Range    Hemoglobin 9.2 (L) 12.0 - 15.9 g/dL    Hematocrit 29.4 (L) 34.0 - 46.6 %       Result Review:  I have personally reviewed the results from the time of this admission to 8/3/2024 15:45 CDT and agree with these findings:  [x]  Laboratory list / accordion  []  Microbiology  []  Radiology  []  EKG/Telemetry   []  Cardiology/Vascular   []  Pathology  []  Old records  []  Other:  Most notable findings include: Nothing new      Assessment/recommendations/plan:  Problem List:     Nonspecific colitis    Mixed hyperlipidemia    Primary hypertension    Hypokalemia    Hypomagnesemia    Diarrhea    ETOH abuse    Biopsies are pending to determine if that she may have microscopic colitis.  I will go ahead and check a celiac panel.  I have increased the dose of loperamide to 4 mg 4 times a day as needed.  Hopefully that will give her some diarrhea relief.    Marty Browning MD  8/3/2024    DISCLAIMER:    This physician works through a [employer] as an inpatient consultant gastroenterologist only and has no outpatient clinic for patient follow up.  Any results not available at time of inpatient discharge and/or GI clinic follow up should be managed by the hospitalist team, PCP, or outpatient gastroenterologist.

## 2024-08-03 NOTE — THERAPY TREATMENT NOTE
Acute Care - Physical Therapy Treatment Note  [facility]     Patient Name: Minnie Bang  : 1975  MRN: [id number]  Today's Date: 8/3/2024      Visit Dx:     ICD-10-CM ICD-9-CM   1. Colitis  K52.9 558.9   2. Diarrhea, unspecified type  R19.7 787.91   3. Hypokalemia  E87.6 276.8   4. Hepatomegaly  R16.0 789.1   5. Impaired mobility [Z74.09]  Z74.09 799.89   6. Pancolitis  K51.00 556.6     Patient Active Problem List   Diagnosis    Mixed hyperlipidemia    Raynaud's disease without gangrene    Ex-smoker    Chest pain in adult    SORTO (dyspnea on exertion)    Mixed simple and mucopurulent chronic bronchitis    Pulmonary emphysema    Non-occlusive coronary artery disease    Primary hypertension    Coronary-myocardial bridge    Bilateral lower extremity edema    Bicuspid aortic valve    Vocal cord polyps    Tobacco use disorder, continuous    Neoplasm of uncertain behavior    Hypokalemia    Dysphagia    Heartburn    Pain of upper abdomen    Pancolitis    Hypomagnesemia    Diarrhea    ETOH abuse     Past Medical History:   Diagnosis Date    Abnormal ECG 2023    Anxiety     Arthritis     back    Asthma     Bicuspid aortic valve 2023    COPD (chronic obstructive pulmonary disease)     Coronary-myocardial bridge 2023    CTS (carpal tunnel syndrome) 2019    Dental disease     Depression     Diastolic dysfunction     Dizziness     Emphysema of lung     GERD (gastroesophageal reflux disease)     Headache     Hyperlipidemia     Hypertension     Hypothyroidism     Mixed simple and mucopurulent chronic bronchitis 2023    Non-occlusive coronary artery disease 2023    NSTEMI (non-ST elevated myocardial infarction) (HCC)     Pneumonia     Pulmonary emphysema 2023    Shingles 2018    Vocal cord polyps 2024     Past Surgical History:   Procedure Laterality Date    APPENDECTOMY      CARDIAC CATHETERIZATION N/A 2017    Procedure: Coronary angiography;  Surgeon: Luis SALDIVAR  MD Vinicius;  Location:  [facility] CATH INVASIVE LOCATION;  Service:     CARDIAC CATHETERIZATION N/A 05/31/2023    Procedure: Left Heart Cath;  Surgeon: Steffen Rossi MD;  Location:  [facility] CATH INVASIVE LOCATION;  Service: Cardiology;  Laterality: N/A;    CARPAL TUNNEL RELEASE  2019    ENDOSCOPY N/A 7/8/2024    Procedure: ESOPHAGOGASTRODUODENOSCOPY WITH ANESTHESIA;  Surgeon: Gordy Wang MD;  Location: [facility] ENDOSCOPY;  Service: Gastroenterology;  Laterality: N/A;  pre: dysphagia.   post: esophagus dilated.   no PCP    HYSTERECTOMY      PANENDOSCOPY N/A 5/30/2024    Procedure: DIRECT LARYNGOSCOPY WITH BIOPSY OF VOCAL CORD POLYPS WITH POSSIBLE TRACHEOSTOMY;  Surgeon: Mendez Padilla MD;  Location: [facility] OR;  Service: ENT;  Laterality: N/A;    NH RT/LT HEART CATHETERS N/A 11/04/2017    Procedure: Percutaneous Coronary Intervention;  Surgeon: Luis Colvin MD;  Location: [facility] CATH INVASIVE LOCATION;  Service: Cardiovascular    THYROID SURGERY      TOTAL THYROIDECTOMY      TRACHEOSTOMY N/A 5/30/2024    Procedure: POSSIBLE TRACHEOSTOMY;  Surgeon: Mendez Padilla MD;  Location: [facility] OR;  Service: ENT;  Laterality: N/A;     PT Assessment (Last 12 Hours)       PT Evaluation and Treatment       Row Name 08/03/24 1311          Physical Therapy Time and Intention    Subjective Information complains of;pain  -KJ     Document Type therapy note (daily note)  -KJ     Mode of Treatment physical therapy  -KJ     Patient Effort good  -KJ       Row Name 08/03/24 1311          Pain    Pretreatment Pain Rating 4/10  -KJ     Posttreatment Pain Rating 4/10  -KJ     Pain Location - abdomen  -KJ       Row Name 08/03/24 1311          Bed Mobility    Supine-Sit [place] (Bed Mobility) independent  -KJ     Sit-Supine [place] (Bed Mobility) independent  -KJ       Row Name 08/03/24 1311          Sit-Stand Transfer    Sit-Stand [place] (Transfers) standby assist;modified independence  -KJ       Row Name 08/03/24  1311          Stand-Sit Transfer    Stand-Sit [place] (Transfers) independent  -KJ       Row Name 08/03/24 1311          Gait/Stairs (Locomotion)    [place] Level (Gait) standby assist  -KJ     Distance in Feet (Gait) 300  -KJ       Row Name 08/03/24 1311          Positioning and Restraints    Pre-Treatment Position in bed  -KJ     Post Treatment Position bed  -KJ     In Bed call light within reach  -KJ               User Key  (r) = Recorded By, (t) = Taken By, (c) = Cosigned By      Initials Name Provider Type    Ana Rader, PTA Physical Therapist Assistant                    Physical Therapy Education       Title: PT OT SLP Therapies (In Progress)       Topic: Physical Therapy (In Progress)       Point: Mobility training (Done)       Learning Progress Summary             Patient Acceptance, E,TB, VU,DU by FLYNN at 7/30/2024 3383    Comment: Educated on role of PT in pt care                         Point: Home exercise program (Not Started)       Learner Progress:  Not documented in this visit.              Point: Body mechanics (Not Started)       Learner Progress:  Not documented in this visit.              Point: Precautions (Not Started)       Learner Progress:  Not documented in this visit.                              User Key       Initials Effective Dates Name Provider Type Discipline    FLYNN 06/24/24 -  Elise Moore, PT Student PT Student PT                  PT Recommendation and Plan         Outcome Measures       Row Name 08/02/24 1350 08/01/24 1358          How much help from another person do you currently need...    Turning from your back to your side while in flat bed without using bedrails? 4  -AURELIO 4  -AURELIO     Moving from lying on back to sitting on the side of a flat bed without bedrails? 4  -AURELIO 4  -AURELIO     Moving to and from a bed to a chair (including a wheelchair)? 4  -AURELIO 4  -AURELIO     Standing up from a chair using your arms (e.g., wheelchair, bedside chair)? 4  -AURELIO 4  -AURELIO      Climbing 3-5 steps with a railing? 3  -AURELIO 3  -AURELIO     To walk in hospital room? 3  -AURELIO 3  -AURELIO     AM-PAC 6 Clicks Score (PT) 22  -AURELIO 22  -AURELIO     Highest Level of Mobility Goal 7 --> Walk 25 feet or more  -AURELIO 7 --> Walk 25 feet or more  -AURELIO        Functional Assessment    Outcome Measure Options AM-PAC 6 Clicks Basic Mobility (PT)  -AURELIO AM-PAC 6 Clicks Basic Mobility (PT)  -AURELIO               User Key  (r) = Recorded By, (t) = Taken By, (c) = Cosigned By      Initials Name Provider Type    Bandar Gonzalez PTA Physical Therapist Assistant                     Time Calculation:    PT Charges       Row Name 08/03/24 1334             Time Calculation    Start Time 1311  -KJ      Stop Time 1324  -KJ      Time Calculation (min) 13 min  -KJ      PT Received On 08/03/24  -KJ      PT Goal Re-Cert Due Date 08/09/24  -KJ         Time Calculation- PT    Total Timed Code Minutes- PT 13 minute(s)  -KJ                User Key  (r) = Recorded By, (t) = Taken By, (c) = Cosigned By      Initials Name Provider Type    Ana Rader PTA Physical Therapist Assistant                  Therapy Charges for Today       Code Description Service Date Service Provider Modifiers Qty    [id number] HC GAIT TRAINING EA 15 MIN 8/3/2024 Ana Lucio PTA GP 1            PT G-Codes  Outcome Measure Options: AM-PAC 6 Clicks Basic Mobility (PT)  AM-PAC 6 Clicks Score (PT): 22    Ana Lucio PTA  8/3/2024

## 2024-08-03 NOTE — PLAN OF CARE
Goal Outcome Evaluation:      PT is A&Ox4 and afebrile. She is on room air and fluids have been D/C'd. She got up and walked the halls earlier. She is on CIWA. Pain controlled with PRN pain meds. VSS.

## 2024-08-04 LAB
ALBUMIN SERPL-MCNC: 3 G/DL (ref 3.5–5.2)
ALBUMIN/GLOB SERPL: 1.1 G/DL
ALP SERPL-CCNC: 653 U/L (ref 39–117)
ALT SERPL W P-5'-P-CCNC: 39 U/L (ref 1–33)
ANION GAP SERPL CALCULATED.3IONS-SCNC: 10 MMOL/L (ref 5–15)
ANISOCYTOSIS BLD QL: ABNORMAL
AST SERPL-CCNC: 99 U/L (ref 1–32)
BASOPHILS # BLD MANUAL: 0 10*3/MM3 (ref 0–0.2)
BASOPHILS NFR BLD MANUAL: 0 % (ref 0–1.5)
BILIRUB SERPL-MCNC: 0.5 MG/DL (ref 0–1.2)
BUN SERPL-MCNC: 2 MG/DL (ref 6–20)
BUN/CREAT SERPL: 4.1 (ref 7–25)
CALCIUM SPEC-SCNC: 8.6 MG/DL (ref 8.6–10.5)
CHLORIDE SERPL-SCNC: 108 MMOL/L (ref 98–107)
CO2 SERPL-SCNC: 24 MMOL/L (ref 22–29)
CREAT SERPL-MCNC: 0.49 MG/DL (ref 0.57–1)
DEPRECATED RDW RBC AUTO: 71.8 FL (ref 37–54)
EGFRCR SERPLBLD CKD-EPI 2021: 114.3 ML/MIN/1.73
EOSINOPHIL # BLD MANUAL: 0.48 10*3/MM3 (ref 0–0.4)
EOSINOPHIL NFR BLD MANUAL: 4 % (ref 0.3–6.2)
ERYTHROCYTE [DISTWIDTH] IN BLOOD BY AUTOMATED COUNT: 17.3 % (ref 12.3–15.4)
GLOBULIN UR ELPH-MCNC: 2.8 GM/DL
GLUCOSE SERPL-MCNC: 100 MG/DL (ref 65–99)
HCT VFR BLD AUTO: 30.5 % (ref 34–46.6)
HGB BLD-MCNC: 9.4 G/DL (ref 12–15.9)
LYMPHOCYTES # BLD MANUAL: 2.53 10*3/MM3 (ref 0.7–3.1)
LYMPHOCYTES NFR BLD MANUAL: 8 % (ref 5–12)
MACROCYTES BLD QL SMEAR: ABNORMAL
MAGNESIUM SERPL-MCNC: 1.8 MG/DL (ref 1.6–2.6)
MCH RBC QN AUTO: 34.7 PG (ref 26.6–33)
MCHC RBC AUTO-ENTMCNC: 30.8 G/DL (ref 31.5–35.7)
MCV RBC AUTO: 112.5 FL (ref 79–97)
METAMYELOCYTES NFR BLD MANUAL: 1 % (ref 0–0)
MONOCYTES # BLD: 0.96 10*3/MM3 (ref 0.1–0.9)
MYELOCYTES NFR BLD MANUAL: 2 % (ref 0–0)
NEUTROPHILS # BLD AUTO: 7.58 10*3/MM3 (ref 1.7–7)
NEUTROPHILS NFR BLD MANUAL: 61 % (ref 42.7–76)
NEUTS BAND NFR BLD MANUAL: 2 % (ref 0–5)
NRBC SPEC MANUAL: 1 /100 WBC (ref 0–0.2)
PHOSPHATE SERPL-MCNC: 3.8 MG/DL (ref 2.5–4.5)
PLAT MORPH BLD: NORMAL
PLATELET # BLD AUTO: 372 10*3/MM3 (ref 140–450)
PMV BLD AUTO: 10.6 FL (ref 6–12)
POLYCHROMASIA BLD QL SMEAR: ABNORMAL
POTASSIUM SERPL-SCNC: 4.1 MMOL/L (ref 3.5–5.2)
PROMYELOCYTES NFR BLD MANUAL: 1 % (ref 0–0)
PROT SERPL-MCNC: 5.8 G/DL (ref 6–8.5)
RBC # BLD AUTO: 2.71 10*6/MM3 (ref 3.77–5.28)
SODIUM SERPL-SCNC: 142 MMOL/L (ref 136–145)
STOMATOCYTES BLD QL SMEAR: ABNORMAL
VARIANT LYMPHS NFR BLD MANUAL: 1 % (ref 0–5)
VARIANT LYMPHS NFR BLD MANUAL: 20 % (ref 19.6–45.3)
WBC MORPH BLD: NORMAL
WBC NRBC COR # BLD AUTO: 12.03 10*3/MM3 (ref 3.4–10.8)

## 2024-08-04 PROCEDURE — 83735 ASSAY OF MAGNESIUM: CPT | Performed by: INTERNAL MEDICINE

## 2024-08-04 PROCEDURE — 86364 TISS TRNSGLTMNASE EA IG CLAS: CPT | Performed by: INTERNAL MEDICINE

## 2024-08-04 PROCEDURE — 82784 ASSAY IGA/IGD/IGG/IGM EACH: CPT | Performed by: INTERNAL MEDICINE

## 2024-08-04 PROCEDURE — 99232 SBSQ HOSP IP/OBS MODERATE 35: CPT | Performed by: INTERNAL MEDICINE

## 2024-08-04 PROCEDURE — 84100 ASSAY OF PHOSPHORUS: CPT | Performed by: INTERNAL MEDICINE

## 2024-08-04 PROCEDURE — 85025 COMPLETE CBC W/AUTO DIFF WBC: CPT | Performed by: INTERNAL MEDICINE

## 2024-08-04 PROCEDURE — 86258 DGP ANTIBODY EACH IG CLASS: CPT | Performed by: INTERNAL MEDICINE

## 2024-08-04 PROCEDURE — 85007 BL SMEAR W/DIFF WBC COUNT: CPT | Performed by: INTERNAL MEDICINE

## 2024-08-04 PROCEDURE — 80053 COMPREHEN METABOLIC PANEL: CPT | Performed by: INTERNAL MEDICINE

## 2024-08-04 RX ADMIN — LEVOTHYROXINE SODIUM 125 MCG: 125 TABLET ORAL at 06:27

## 2024-08-04 RX ADMIN — CARVEDILOL 12.5 MG: 25 TABLET, FILM COATED ORAL at 08:14

## 2024-08-04 RX ADMIN — PANTOPRAZOLE SODIUM 40 MG: 40 TABLET, DELAYED RELEASE ORAL at 06:28

## 2024-08-04 RX ADMIN — HYDROCODONE BITARTRATE AND ACETAMINOPHEN 1 TABLET: 5; 325 TABLET ORAL at 00:25

## 2024-08-04 RX ADMIN — CHLORDIAZEPOXIDE HYDROCHLORIDE 25 MG: 25 CAPSULE ORAL at 06:27

## 2024-08-04 RX ADMIN — TOPIRAMATE 50 MG: 25 TABLET, FILM COATED ORAL at 08:15

## 2024-08-04 RX ADMIN — HYDROCODONE BITARTRATE AND ACETAMINOPHEN 1 TABLET: 5; 325 TABLET ORAL at 17:56

## 2024-08-04 RX ADMIN — FLUOXETINE HYDROCHLORIDE 20 MG: 20 CAPSULE ORAL at 08:14

## 2024-08-04 RX ADMIN — Medication 10 ML: at 21:05

## 2024-08-04 RX ADMIN — THIAMINE HCL TAB 100 MG 100 MG: 100 TAB at 08:14

## 2024-08-04 RX ADMIN — TOPIRAMATE 50 MG: 25 TABLET, FILM COATED ORAL at 21:05

## 2024-08-04 RX ADMIN — Medication 10 ML: at 08:15

## 2024-08-04 RX ADMIN — FOLIC ACID 1 MG: 1 TABLET ORAL at 08:14

## 2024-08-04 RX ADMIN — DILTIAZEM HYDROCHLORIDE 120 MG: 120 CAPSULE, EXTENDED RELEASE ORAL at 08:14

## 2024-08-04 RX ADMIN — CARVEDILOL 12.5 MG: 25 TABLET, FILM COATED ORAL at 17:56

## 2024-08-04 RX ADMIN — PANTOPRAZOLE SODIUM 40 MG: 40 TABLET, DELAYED RELEASE ORAL at 17:56

## 2024-08-04 RX ADMIN — HYDROCODONE BITARTRATE AND ACETAMINOPHEN 1 TABLET: 5; 325 TABLET ORAL at 06:27

## 2024-08-04 RX ADMIN — LISINOPRIL 20 MG: 20 TABLET ORAL at 08:14

## 2024-08-04 RX ADMIN — Medication 1 TABLET: at 08:14

## 2024-08-04 RX ADMIN — ATORVASTATIN CALCIUM 10 MG: 10 TABLET, FILM COATED ORAL at 08:14

## 2024-08-04 RX ADMIN — LOPERAMIDE HYDROCHLORIDE 4 MG: 2 CAPSULE ORAL at 06:30

## 2024-08-04 NOTE — PLAN OF CARE
Goal Outcome Evaluation:  Plan of Care Reviewed With: patient        Progress: no change  Outcome Evaluation: No request for pain medication, rates pain a 2. IID. Ambulating in the hallway. Safety maintained.

## 2024-08-04 NOTE — PROGRESS NOTES
Gastroenterology Hospitalist follow-up  Patient Identification:  Name: Minnie Bang  Age: 49 y.o.  Sex: female  :  1975  MRN: [id number]    Attending: Kirill Chakraborty DO  Gastroenterologist of Record: Kathleen  Information from:patient     CC: Severe diarrhea    History:   Patient continues to have severe diarrhea.  Increased her antidiarrheals to 4 mg every 4 hours as needed and even at that dose she is having bouts of incontinence.  Colon biopsies are pending.    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]     Bilateral lower extremity edema [R60.0]     Primary hypertension [I10]     Non-occlusive coronary artery disease [I25.10]     Pulmonary emphysema [J43.9]     Mixed simple and mucopurulent chronic bronchitis [J41.8]     Ex-smoker [Z87.891]     Chest pain in adult [R07.9]     SORTO (dyspnea on exertion) [R06.09]     Mixed hyperlipidemia [E78.2]     Raynaud's disease without gangrene [I73.00]      Current Meds:  MAR Reviewed  Scheduled Meds:atorvastatin, 10 mg, Oral, Daily  carvedilol, 12.5 mg, Oral, BID With Meals  dilTIAZem CD, 120 mg, Oral, Daily  FLUoxetine, 20 mg, Oral, Daily  folic acid, 1 mg, Oral, Daily  levothyroxine, 125 mcg, Oral, Q AM  lisinopril, 20 mg, Oral, Daily  multivitamin with minerals, 1 tablet, Oral, Daily  pantoprazole, 40 mg, Oral, BID AC  sodium chloride, 10 mL, Intravenous, Q12H  thiamine, 100 mg, Oral, Daily  topiramate, 50 mg, Oral, BID      Continuous Infusions:  "  PRN Meds:.  acetaminophen **OR** acetaminophen **OR** acetaminophen    cloNIDine    hydrALAZINE    HYDROcodone-acetaminophen    ipratropium-albuterol    loperamide    meclizine    nitroglycerin    ondansetron ODT **OR** ondansetron    phenol    [COMPLETED] Insert Peripheral IV **AND** sodium chloride    sodium chloride    sodium chloride  Allergies:  No Known Allergies    Intake/Output:     Intake/Output Summary (Last 24 hours) at 2024 1318  Last data filed at 2024 1235  Gross per 24 hour   Intake 240 ml   Output --   Net 240 ml     New allergies/reactions:  None    Physical Exam:  Vitals:   Temp (24hrs), Av.2 °F (36.8 °C), Min:97.4 °F (36.3 °C), Max:98.6 °F (37 °C)    Temp:  [97.4 °F (36.3 °C)-98.6 °F (37 °C)] 98.4 °F (36.9 °C)  Heart Rate:  [64-84] 74  Resp:  [16-18] 18  BP: (129-159)/(80-95) 143/80  /80 (BP Location: Right arm, Patient Position: Lying)   Pulse 74   Temp 98.4 °F (36.9 °C) (Oral)   Resp 18   Ht 172.7 cm (68\")   Wt 78.9 kg (174 lb)   LMP  (LMP Unknown)   SpO2 100%   BMI 26.46 kg/m²     Exam:  NAD  PERRLA. Sclerae and conjunctivae normal  HENT: external inspection normal. Hearing intact.  No respiratory distress. Lungs clear.  Cardiac: no MRG.  Abdomen: bowel sounds active. Soft, non-tender, no HSM.  Alert, oriented, normal affect.     DATA:  Radiology and Labs:   Recent Results (from the past 24 hour(s))   Comprehensive Metabolic Panel    Collection Time: 24  6:48 AM    Specimen: Blood   Result Value Ref Range    Glucose 100 (H) 65 - 99 mg/dL    BUN 2 (L) 6 - 20 mg/dL    Creatinine 0.49 (L) 0.57 - 1.00 mg/dL    Sodium 142 136 - 145 mmol/L    Potassium 4.1 3.5 - 5.2 mmol/L    Chloride 108 (H) 98 - 107 mmol/L    CO2 24.0 22.0 - 29.0 mmol/L    Calcium 8.6 8.6 - 10.5 mg/dL    Total Protein 5.8 (L) 6.0 - 8.5 g/dL    Albumin 3.0 (L) 3.5 - 5.2 g/dL    ALT (SGPT) 39 (H) 1 - 33 U/L    AST (SGOT) 99 (H) 1 - 32 U/L    Alkaline Phosphatase 653 (H) 39 - 117 U/L    Total " Bilirubin 0.5 0.0 - 1.2 mg/dL    Globulin 2.8 gm/dL    A/G Ratio 1.1 g/dL    BUN/Creatinine Ratio 4.1 (L) 7.0 - 25.0    Anion Gap 10.0 5.0 - 15.0 mmol/L    eGFR 114.3 >60.0 mL/min/1.73   Magnesium    Collection Time: 08/04/24  6:48 AM    Specimen: Blood   Result Value Ref Range    Magnesium 1.8 1.6 - 2.6 mg/dL   Phosphorus    Collection Time: 08/04/24  6:48 AM    Specimen: Blood   Result Value Ref Range    Phosphorus 3.8 2.5 - 4.5 mg/dL   CBC Auto Differential    Collection Time: 08/04/24  6:48 AM    Specimen: Blood   Result Value Ref Range    WBC 12.03 (H) 3.40 - 10.80 10*3/mm3    RBC 2.71 (L) 3.77 - 5.28 10*6/mm3    Hemoglobin 9.4 (L) 12.0 - 15.9 g/dL    Hematocrit 30.5 (L) 34.0 - 46.6 %    .5 (H) 79.0 - 97.0 fL    MCH 34.7 (H) 26.6 - 33.0 pg    MCHC 30.8 (L) 31.5 - 35.7 g/dL    RDW 17.3 (H) 12.3 - 15.4 %    RDW-SD 71.8 (H) 37.0 - 54.0 fl    MPV 10.6 6.0 - 12.0 fL    Platelets 372 140 - 450 10*3/mm3   Manual Differential    Collection Time: 08/04/24  6:48 AM    Specimen: Blood   Result Value Ref Range    Neutrophil % 61.0 42.7 - 76.0 %    Lymphocyte % 20.0 19.6 - 45.3 %    Monocyte % 8.0 5.0 - 12.0 %    Eosinophil % 4.0 0.3 - 6.2 %    Basophil % 0.0 0.0 - 1.5 %    Bands %  2.0 0.0 - 5.0 %    Metamyelocyte % 1.0 (H) 0.0 - 0.0 %    Myelocyte % 2.0 (H) 0.0 - 0.0 %    Promyelocyte % 1.0 (H) 0.0 - 0.0 %    Atypical Lymphocyte % 1.0 0.0 - 5.0 %    Neutrophils Absolute 7.58 (H) 1.70 - 7.00 10*3/mm3    Lymphocytes Absolute 2.53 0.70 - 3.10 10*3/mm3    Monocytes Absolute 0.96 (H) 0.10 - 0.90 10*3/mm3    Eosinophils Absolute 0.48 (H) 0.00 - 0.40 10*3/mm3    Basophils Absolute 0.00 0.00 - 0.20 10*3/mm3    nRBC 1.0 (H) 0.0 - 0.2 /100 WBC    Anisocytosis Mod/2+ None Seen    Macrocytes Mod/2+ None Seen    Polychromasia Slight/1+ None Seen    Stomatocytes Slight/1+ None Seen    WBC Morphology Normal Normal    Platelet Morphology Normal Normal       Result Review:  I have personally reviewed the results from the time of  this admission to 8/4/2024 13:18 CDT and agree with these findings:  [x]  Laboratory list / accordion  []  Microbiology  []  Radiology  []  EKG/Telemetry   []  Cardiology/Vascular   []  Pathology  []  Old records  []  Other:  Most notable findings include: No independent interpretation      Assessment/recommendations/plan:  Problem List:     Nonspecific colitis    Mixed hyperlipidemia    Primary hypertension    Hypokalemia    Hypomagnesemia    Diarrhea    ETOH abuse    Patient has severe diarrhea.  It would not surprise me at all if she has lymphocytic/collagenous colitis.  If so, treatment of choice is enteric-coated budesonide 9 mg daily with subsequent taper.  If her biopsies are negative, I would consider putting her on a bile binder.  I would also consider use of pancreatic enzymes.  She also has a celiac panel that is pending.  It would not shock me if that were positive, either.  Dr. Grier to  tomorrow.    Marty Browning MD  8/4/2024    DISCLAIMER:    This physician works through a [employer] as an inpatient consultant gastroenterologist only and has no outpatient clinic for patient follow up.  Any results not available at time of inpatient discharge and/or GI clinic follow up should be managed by the hospitalist team, PCP, or outpatient gastroenterologist.

## 2024-08-04 NOTE — PROGRESS NOTES
[facility] Medicine Services  INPATIENT PROGRESS NOTE    Patient Name: Minnie Bang  Date of Admission: 7/29/2024  Today's Date: 08/04/24  Length of Stay: 6  Primary Care Physician: Provider, No Known    Subjective   Chief Complaint: f/u n/v/d    HPI   Patient seen and examined.  She actually feels a little better today.  She feels like her bowel movements are starting to slow down some.  Pain is better.  Tolerating more p.o.  No vomiting.  No fevers noted.        Review of Systems   All pertinent negatives and positives are as above. All other systems have been reviewed and are negative unless otherwise stated.     Objective    Temp:  [97.4 °F (36.3 °C)-98.6 °F (37 °C)] 98.4 °F (36.9 °C)  Heart Rate:  [64-84] 74  Resp:  [16-18] 18  BP: (129-159)/(80-95) 143/80  Physical Exam  GEN: Awake, alert, interactive, in NAD  HEENT: PERRLA, EOMI, Anicteric, Trachea midline  Lungs: no wheezing/rales/rhonchi  Heart: RRR, +S1/s2, no rub  ABD: soft, +BS, no guarding/rebound  Extremities: atraumatic, no cyanosis, trace b/l LE edema  Skin: no rashes or petechiae  Neuro: AAOx3, no focal deficits  Psych: normal mood & affect        Results Review:  I have reviewed the labs, radiology results, and diagnostic studies.    Laboratory Data:   Results from last 7 days   Lab Units 08/04/24  0648 08/03/24  0548 08/01/24  0625 07/31/24  0627   WBC 10*3/mm3 12.03*  --  11.52* 9.25   HEMOGLOBIN g/dL 9.4* 9.2* 10.0* 10.0*   HEMATOCRIT % 30.5* 29.4* 31.6* 31.1*   PLATELETS 10*3/mm3 372  --  250 225        Results from last 7 days   Lab Units 08/04/24  0648 08/03/24  0548 08/01/24  0625 07/31/24  0627   SODIUM mmol/L 142 142 140 138   POTASSIUM mmol/L 4.1 3.3* 3.8 3.4*   CHLORIDE mmol/L 108* 111* 108* 106   CO2 mmol/L 24.0 23.0 23.0 26.0   BUN mg/dL 2* 2* 4* 4*   CREATININE mg/dL 0.49* 0.51* 0.41* 0.39*   CALCIUM mg/dL 8.6 7.9* 7.7* 7.7*   BILIRUBIN mg/dL 0.5  --  0.7 1.0   ALK PHOS U/L 653*  --  546*  523*   ALT (SGPT) U/L 39*  --  36* 37*   AST (SGOT) U/L 99*  --  105* 111*   GLUCOSE mg/dL 100* 103* 68 83       Culture Data:   Blood Culture   Date Value Ref Range Status   07/29/2024 No growth at 4 days  Preliminary   07/29/2024 No growth at 4 days  Preliminary       Radiology Data:   Imaging Results (Last 24 Hours)       ** No results found for the last 24 hours. **            I have reviewed the patient's current medications.     Assessment/Plan   Assessment  Active Hospital Problems    Diagnosis     **Nonspecific colitis     Hypomagnesemia     Diarrhea     ETOH abuse     Hypokalemia     Primary hypertension     Mixed hyperlipidemia        Treatment Plan  #1 nonspecific colitis -patient had presented with abdominal pain, nausea, vomiting, diarrhea.  CT showed nonspecific colitis across multiple segments.  Patient recently treated with 10 days of vancomycin for C. difficile.  She has now been on Dificid twice daily but per note today okay to discontinue.  Appreciate ID input.  Monitor stool output.  Tolerating p.o.  Status post colonoscopy 8/2 without any obvious significant issues and awaiting biopsies.    #2 hypokalemia - improved with replacement    #3 hypomagnesemia - improved with replacement    #4 chronic anemia -H&H fairly stable without overt signs of bleeding    #5 hypertension -BP stable on current meds.  Monitor.    #6 hyperlipidemia -on statin    #7 history of EtOH abuse -no current signs of withdrawal    Medical Decision Making  Number and Complexity of problems: 2-3 acute, 1-2 acute on chronic, multiple chronic  Differential Diagnosis: as above    Conditions and Status        Appears about the same to slightly better.  Does not appear toxic     MDM Data  External documents reviewed: none  Cardiac tracing (EKG, telemetry) interpretation: none  Radiology interpretation: reports reviewed   Labs reviewed: as above  Any tests that were considered but not ordered: none     Decision rules/scores evaluated  (example [id number], Wells, etc): none     Discussed with: patient, nursing     Care Planning  Shared decision making: Patient apprised of current labs, vitals, imaging and treatment plan.  They are agreeable with proceeding with plans as discussed.    Code status and discussions: full code    Disposition  Social Determinants of Health that impact treatment or disposition: none  I expect the patient to be discharged to home when appropriate, plan for home at this time    Electronically signed by Kirill Chakraborty DO, 08/04/24, 12:53 CDT.

## 2024-08-04 NOTE — PROGRESS NOTES
"INFECTIOUS DISEASES PROGRESS NOTE    Patient:  Minnie Bang  YOB: 1975  MRN: [id number]   Admit date: 2024   Admitting Physician: Kirill Chakraborty DO  Primary Care Physician: Provider, No Known    Chief Complaint: Stool incontinence        Interval History: Patient reports she is still having stool incontinence.  He said she tried to walk in the halls but was unable to control her bowels.    On further questioning she recalls that she has had issues with this dating back to Valentine of last year.  She notes she has had some nausea and vomiting as well as some generalized weakness.    Allergies: No Known Allergies    Current Scheduled Medications:   atorvastatin, 10 mg, Oral, Daily  carvedilol, 12.5 mg, Oral, BID With Meals  dilTIAZem CD, 120 mg, Oral, Daily  FLUoxetine, 20 mg, Oral, Daily  folic acid, 1 mg, Oral, Daily  levothyroxine, 125 mcg, Oral, Q AM  lisinopril, 20 mg, Oral, Daily  multivitamin with minerals, 1 tablet, Oral, Daily  pantoprazole, 40 mg, Oral, BID AC  sodium chloride, 10 mL, Intravenous, Q12H  thiamine, 100 mg, Oral, Daily  topiramate, 50 mg, Oral, BID      Current PRN Medications:    acetaminophen **OR** acetaminophen **OR** acetaminophen    cloNIDine    hydrALAZINE    HYDROcodone-acetaminophen    ipratropium-albuterol    loperamide    meclizine    nitroglycerin    ondansetron ODT **OR** ondansetron    phenol    [COMPLETED] Insert Peripheral IV **AND** sodium chloride    sodium chloride    sodium chloride            Objective     Vital Signs:  Temp (24hrs), Av.1 °F (36.7 °C), Min:97.4 °F (36.3 °C), Max:98.6 °F (37 °C)      /83 (BP Location: Right arm, Patient Position: Lying)   Pulse 64   Temp 98.6 °F (37 °C) (Oral)   Resp 18   Ht 172.7 cm (68\")   Wt 78.9 kg (174 lb)   LMP  (LMP Unknown)   SpO2 92%   BMI 26.46 kg/m²         Physical Exam:  General: Patient sitting up in bed in no acute distress  Respiratory: Effort even and unlabored  Abdomen: " Soft, nontender, no rebound or guarding  Psych: She is pleasant, cooperative, tearful at times.        Results Review:    I reviewed the patient's new clinical results.    Lab Results:    CBC:   Lab Results   Lab 07/29/24  1230 07/30/24  0420 07/31/24 0627 08/01/24 0625 08/03/24  0548 08/04/24  0648   WBC 8.37 8.06 9.25 11.52*  --  12.03*   HEMOGLOBIN 12.5 9.7* 10.0* 10.0* 9.2* 9.4*   HEMATOCRIT 36.7 29.7* 31.1* 31.6* 29.4* 30.5*   PLATELETS 285 217 225 250  --  372        AutoDiff:   Lab Results   Lab 07/29/24  1230 07/30/24  0420 08/01/24 0625 08/04/24  0648   NEUTROPHIL % 56.6  --   --   --    LYMPHOCYTE % 28.1  --   --   --    MONOCYTES % 11.2  --   --   --    EOSINOPHIL % 1.8  --   --   --    BASOPHIL % 1.1  --   --   --    NEUTROS ABS 4.74 3.95 7.49* 7.58*   LYMPHS ABS 2.35  --   --   --    MONOS ABS 0.94*  --   --   --    EOS ABS 0.15 0.24 0.35 0.48*   BASOS ABS 0.09 0.00 0.23* 0.00        Manual Diff:    Lab Results   Lab 07/30/24  0420 08/01/24 0625 08/04/24  0648   NEUTROPHIL % 48.0 57.0 61.0   LYMPHOCYTE % 37.0 15.0* 20.0   MONOCYTES % 11.0 10.0 8.0   BANDS % 1.0 8.0* 2.0   METAMYELOCYTE %  --  2.0* 1.0*   NEUTROS ABS 3.95 7.49* 7.58*   LYMPHS ABS 2.98 2.07 2.53   ANISOCYTOSIS Slight/1+ Mod/2+ Mod/2+   POIKILOCYTES Slight/1+ Slight/1+  --    WBC MORPHOLOGY Normal Normal Normal   PLT MORPH Normal Normal Normal           CMP:   Lab Results   Lab 07/31/24 0627 08/01/24 0625 08/03/24  0548 08/04/24  0648   SODIUM 138 140 142 142   POTASSIUM 3.4* 3.8 3.3* 4.1   CHLORIDE 106 108* 111* 108*   CO2 26.0 23.0 23.0 24.0   BUN 4* 4* 2* 2*   CREATININE 0.39* 0.41* 0.51* 0.49*   CALCIUM 7.7* 7.7* 7.9* 8.6   BILIRUBIN 1.0 0.7  --  0.5   ALK PHOS 523* 546*  --  653*   ALT (SGPT) 37* 36*  --  39*   AST (SGOT) 111* 105*  --  99*   GLUCOSE 83 68 103* 100*       Estimated Creatinine Clearance: 149.9 mL/min (A) (by C-G formula based on SCr of 0.49 mg/dL (L)).    Culture Results:    Microbiology Results (last 10 days)        Procedure Component Value - Date/Time    Gastrointestinal Panel, PCR - Stool, Per Rectum [[id number]]  (Abnormal) Collected: 07/30/24 1024    Lab Status: Final result Specimen: Stool from Per Rectum Updated: 07/30/24 1143     Campylobacter Not Detected     Plesiomonas shigelloides Not Detected     Salmonella Not Detected     Vibrio Not Detected     Vibrio cholerae Not Detected     Yersinia enterocolitica Not Detected     Enteroaggregative E. coli (EAEC) Not Detected     Enteropathogenic E. coli (EPEC) Detected     Enterotoxigenic E. coli (ETEC) lt/st Not Detected     Shiga-like toxin-producing E. coli (STEC) stx1/stx2 Not Detected     Shigella/Enteroinvasive E. coli (EIEC) Not Detected     Cryptosporidium Not Detected     Cyclospora cayetanensis Not Detected     Entamoeba histolytica Not Detected     Giardia lamblia Not Detected     Adenovirus F40/41 Not Detected     Astrovirus Not Detected     Norovirus GI/GII Not Detected     Rotavirus A Not Detected     Sapovirus (I, II, IV or V) Not Detected    Clostridioides difficile Toxin - Stool, Per Rectum [[id number]]  (Abnormal) Collected: 07/30/24 1020    Lab Status: Final result Specimen: Stool from Per Rectum Updated: 07/30/24 1150    Narrative:      The following orders were created for panel order Clostridioides difficile Toxin - Stool, Per Rectum.  Procedure                               Abnormality         Status                     ---------                               -----------         ------                     Clostridioides difficile...[[id number]]  Abnormal            Final result                 Please view results for these tests on the individual orders.    Clostridioides difficile Toxin, PCR - Stool, Per Rectum [[id number]]  (Abnormal) Collected: 07/30/24 1020    Lab Status: Final result Specimen: Stool from Per Rectum Updated: 07/30/24 1150     Toxigenic C. difficile by PCR Positive    Narrative:      DNA from a toxigenic strain of C.difficile has  been detected. Antigen testing for the presence of free C.difficile toxin is currently in progress, to help determine the clinical significance of this PCR result.     Clostridioides difficile toxin Ag, Reflex - Stool, Per Rectum [[id number]]  (Normal) Collected: 07/30/24 1020    Lab Status: Final result Specimen: Stool from Per Rectum Updated: 07/30/24 1148     C.diff Toxin Ag Negative    Narrative:      DNA from a toxigenic strain of C.difficile was detected, although the free toxin itself was not detected. These findings are consistent with C.difficile colonization and may not reflect actual C.difficile infection. Clinical correlation needed.    Blood Culture - Blood, Arm, Right [[id number]]  (Normal) Collected: 07/29/24 1230    Lab Status: Final result Specimen: Blood from Arm, Right Updated: 08/03/24 1301     Blood Culture No growth at 5 days    Blood Culture - Blood, Arm, Left [[id number]]  (Normal) Collected: 07/29/24 1230    Lab Status: Final result Specimen: Blood from Arm, Left Updated: 08/03/24 1301     Blood Culture No growth at 5 days                 Radiology:   Imaging Results (Last 72 Hours)       Procedure Component Value Units Date/Time    CT Abdomen Pelvis Without Contrast [[id number]] Collected: 08/01/24 1200     Updated: 08/01/24 1216    Narrative:      EXAMINATION: CT ABDOMEN PELVIS WO CONTRAST-      8/1/2024 10:40 AM     HISTORY: abdominal pain; K52.9-Noninfective gastroenteritis and colitis,  unspecified; R19.7-Diarrhea, unspecified; E87.6-Hypokalemia;  R16.0-Hepatomegaly, not elsewhere classified; Z74.09-Other reduced  mobility     In order to have a CT radiation dose as low as reasonably achievable  Automated Exposure Control was utilized for adjustment of the mA and/or  KV according to patient size.     Total DLP = 407.5 mGy.cm     CT scan of the abdomen and pelvis CT performed without intravenous  contrast enhancement.     Images were acquired in axial plane and subsequent reconstruction  with  coronal and sagittal planes.     Comparison is made with the previous study dated 7/29/2024.     Lung bases included in the study show evidence of interlobular septal  thickening suggesting pulmonary venous congestion/fluid overload. There  are atelectatic changes in the lower lungs right more than the left.  There is a small bibasilar pleural effusion right more than the left.     There is persistent severe hepatic steatosis. The spleen is normal.     No radiopaque gallstones.     Unenhanced pancreas is unremarkable.     The adrenal glands bilaterally are normal.     Moderate lobulation of the renal contour bilaterally is seen. No  radiopaque calculi. No hydronephrosis. The limited visualized ureters  are nondilated. Urinary bladder is poorly distended. No intrinsic  abnormality.     Uterus is absent. No adnexal masses. There is small amount of free fluid  in the pelvis.     There are small fat-containing inguinal hernias, right larger than the  left.     There is evidence of a previous mid to lower abdominal ventral hernia  repair.     The stomach is decompressed with moderate wall thickening. Small bowel  is nondistended and nondilated. Appendix is surgically absent. There is  persistent moderate thickening and edema of the wall of the entire colon  and there is a surrounding peritoneal infiltration. This may partly be  due to incomplete distention. There is small amount of fluid in the  right paracolic gutter.     There is diffuse subcutaneous fat infiltration of the abdominal wall  which may suggest edema due to fluid overload. This was not noted in the  previous study.     Atheromatous changes of the abdominal aorta and iliac arteries. No  aneurysmal dilatation.     There is no evidence of abdominal or pelvic lymphadenopathy.     Images reviewed in bone window show chronic degenerative changes of the  lumbar spine with severe degeneration of disc L5-S1. No acute bony  abnormality.       Impression:       1. There is evidence of bibasilar pleural effusion right more than the  left which was not seen in the previous study. There are finding to  suggest pulmonary venous congestion/edema which was not seen in the  previous study.  2. Edema of the abdominal wall and which was not noted in the previous  study may represent fluid overload.  3. Persistent moderate thickening of the wall of the large bowel more  pronounced in the proximal colon including the ascending and transverse  colon. This may partly be due to incomplete distention. There is a  probability of acute nonspecific colitis. There is surrounding  peritoneal infiltration and a small fluid in the right paracolic gutter.  There is a small free fluid in the pelvis.  4. Hepatic steatosis.                                      This report was signed and finalized on 8/1/2024 12:12 PM by Dr. Karmen Teague MD.                   Active Hospital Problems    Diagnosis     **Nonspecific colitis     Hypomagnesemia     Diarrhea     ETOH abuse     Hypokalemia     Primary hypertension     Mixed hyperlipidemia        IMPRESSION:  Diarrhea-etiology unclear.  Patient has able to estimate she had issues with this as far back as December, 2023.  Patient has had C. difficile PCR testing is positive however antigen to the toxin has been negative.  Additionally, it appears she has been treated empirically with oral vancomycin and fidaxomicin without any improvement in symptoms.  Patient is also had colonoscopy without visual report of pseudomembranes to support C. difficile colitis.  Biopsies pending to evaluate for microscopic colitis  Nausea and vomiting  Evaded ALT and AST-essentially stable.  Severe hepatic steatosis  Elevated alkaline phosphatase-noted gradual increase since February, 2024  GI panel noted to be positive for enteropathogenic E. coli-usually pathogenic and resource limited settings, classically with children.  Treatment is supportive      RECOMMENDATION:    Follow-up on pathology from colon biopsies  Follow-up on celiac panel  Will monitor with you.      Johanna Rowley MD  08/04/24  11:00 CDT

## 2024-08-04 NOTE — PLAN OF CARE
Goal Outcome Evaluation:  Plan of Care Reviewed With: patient        Progress: no change  Outcome Evaluation: IID; still complains of abd pain; medicated x1; resting between care; safety maintained

## 2024-08-05 LAB
ALBUMIN SERPL-MCNC: 2.9 G/DL (ref 3.5–5.2)
ALBUMIN/GLOB SERPL: 1.1 G/DL
ALP SERPL-CCNC: 581 U/L (ref 39–117)
ALT SERPL W P-5'-P-CCNC: 29 U/L (ref 1–33)
ANION GAP SERPL CALCULATED.3IONS-SCNC: 9 MMOL/L (ref 5–15)
ANISOCYTOSIS BLD QL: ABNORMAL
AST SERPL-CCNC: 59 U/L (ref 1–32)
BASOPHILS # BLD MANUAL: 0.11 10*3/MM3 (ref 0–0.2)
BASOPHILS NFR BLD MANUAL: 1 % (ref 0–1.5)
BILIRUB SERPL-MCNC: 0.3 MG/DL (ref 0–1.2)
BUN SERPL-MCNC: 2 MG/DL (ref 6–20)
BUN/CREAT SERPL: 3.7 (ref 7–25)
CALCIUM SPEC-SCNC: 8.8 MG/DL (ref 8.6–10.5)
CHLORIDE SERPL-SCNC: 110 MMOL/L (ref 98–107)
CO2 SERPL-SCNC: 25 MMOL/L (ref 22–29)
CREAT SERPL-MCNC: 0.54 MG/DL (ref 0.57–1)
DEPRECATED RDW RBC AUTO: 70.2 FL (ref 37–54)
EGFRCR SERPLBLD CKD-EPI 2021: 111.6 ML/MIN/1.73
EOSINOPHIL # BLD MANUAL: 0.11 10*3/MM3 (ref 0–0.4)
EOSINOPHIL NFR BLD MANUAL: 1 % (ref 0.3–6.2)
ERYTHROCYTE [DISTWIDTH] IN BLOOD BY AUTOMATED COUNT: 17.2 % (ref 12.3–15.4)
GIANT PLATELETS: ABNORMAL
GLOBULIN UR ELPH-MCNC: 2.7 GM/DL
GLUCOSE SERPL-MCNC: 95 MG/DL (ref 65–99)
HCT VFR BLD AUTO: 29.9 % (ref 34–46.6)
HGB BLD-MCNC: 9.4 G/DL (ref 12–15.9)
LYMPHOCYTES # BLD MANUAL: 1.94 10*3/MM3 (ref 0.7–3.1)
LYMPHOCYTES NFR BLD MANUAL: 9.3 % (ref 5–12)
MACROCYTES BLD QL SMEAR: ABNORMAL
MCH RBC QN AUTO: 34.8 PG (ref 26.6–33)
MCHC RBC AUTO-ENTMCNC: 31.4 G/DL (ref 31.5–35.7)
MCV RBC AUTO: 110.7 FL (ref 79–97)
MONOCYTES # BLD: 0.98 10*3/MM3 (ref 0.1–0.9)
MYELOCYTES NFR BLD MANUAL: 2.1 % (ref 0–0)
NEUTROPHILS # BLD AUTO: 7.15 10*3/MM3 (ref 1.7–7)
NEUTROPHILS NFR BLD MANUAL: 68 % (ref 42.7–76)
PLATELET # BLD AUTO: 419 10*3/MM3 (ref 140–450)
PMV BLD AUTO: 10.5 FL (ref 6–12)
POIKILOCYTOSIS BLD QL SMEAR: ABNORMAL
POLYCHROMASIA BLD QL SMEAR: ABNORMAL
POTASSIUM SERPL-SCNC: 4 MMOL/L (ref 3.5–5.2)
PROT SERPL-MCNC: 5.6 G/DL (ref 6–8.5)
RBC # BLD AUTO: 2.7 10*6/MM3 (ref 3.77–5.28)
SODIUM SERPL-SCNC: 144 MMOL/L (ref 136–145)
STOMATOCYTES BLD QL SMEAR: ABNORMAL
VARIANT LYMPHS NFR BLD MANUAL: 1 % (ref 0–5)
VARIANT LYMPHS NFR BLD MANUAL: 17.5 % (ref 19.6–45.3)
WBC MORPH BLD: NORMAL
WBC NRBC COR # BLD AUTO: 10.51 10*3/MM3 (ref 3.4–10.8)

## 2024-08-05 PROCEDURE — 85025 COMPLETE CBC W/AUTO DIFF WBC: CPT | Performed by: INTERNAL MEDICINE

## 2024-08-05 PROCEDURE — 85007 BL SMEAR W/DIFF WBC COUNT: CPT | Performed by: INTERNAL MEDICINE

## 2024-08-05 PROCEDURE — 25010000002 HYDRALAZINE PER 20 MG: Performed by: INTERNAL MEDICINE

## 2024-08-05 PROCEDURE — 97116 GAIT TRAINING THERAPY: CPT

## 2024-08-05 PROCEDURE — 94799 UNLISTED PULMONARY SVC/PX: CPT

## 2024-08-05 PROCEDURE — 80053 COMPREHEN METABOLIC PANEL: CPT | Performed by: INTERNAL MEDICINE

## 2024-08-05 PROCEDURE — 94761 N-INVAS EAR/PLS OXIMETRY MLT: CPT

## 2024-08-05 RX ADMIN — HYDRALAZINE HYDROCHLORIDE 5 MG: 20 INJECTION INTRAMUSCULAR; INTRAVENOUS at 11:32

## 2024-08-05 RX ADMIN — LISINOPRIL 20 MG: 20 TABLET ORAL at 10:07

## 2024-08-05 RX ADMIN — CARVEDILOL 12.5 MG: 25 TABLET, FILM COATED ORAL at 10:07

## 2024-08-05 RX ADMIN — DILTIAZEM HYDROCHLORIDE 120 MG: 120 CAPSULE, EXTENDED RELEASE ORAL at 10:07

## 2024-08-05 RX ADMIN — THIAMINE HCL TAB 100 MG 100 MG: 100 TAB at 10:07

## 2024-08-05 RX ADMIN — PANTOPRAZOLE SODIUM 40 MG: 40 TABLET, DELAYED RELEASE ORAL at 06:43

## 2024-08-05 RX ADMIN — Medication 10 ML: at 10:08

## 2024-08-05 RX ADMIN — TOPIRAMATE 50 MG: 25 TABLET, FILM COATED ORAL at 20:35

## 2024-08-05 RX ADMIN — HYDROCODONE BITARTRATE AND ACETAMINOPHEN 1 TABLET: 5; 325 TABLET ORAL at 03:27

## 2024-08-05 RX ADMIN — FOLIC ACID 1 MG: 1 TABLET ORAL at 10:07

## 2024-08-05 RX ADMIN — TOPIRAMATE 50 MG: 25 TABLET, FILM COATED ORAL at 10:07

## 2024-08-05 RX ADMIN — LEVOTHYROXINE SODIUM 125 MCG: 125 TABLET ORAL at 06:43

## 2024-08-05 RX ADMIN — HYDROCODONE BITARTRATE AND ACETAMINOPHEN 1 TABLET: 5; 325 TABLET ORAL at 20:41

## 2024-08-05 RX ADMIN — FLUOXETINE HYDROCHLORIDE 20 MG: 20 CAPSULE ORAL at 10:07

## 2024-08-05 RX ADMIN — ATORVASTATIN CALCIUM 10 MG: 10 TABLET, FILM COATED ORAL at 10:07

## 2024-08-05 RX ADMIN — Medication 1 TABLET: at 10:07

## 2024-08-05 RX ADMIN — PANTOPRAZOLE SODIUM 40 MG: 40 TABLET, DELAYED RELEASE ORAL at 17:24

## 2024-08-05 RX ADMIN — HYDROCODONE BITARTRATE AND ACETAMINOPHEN 1 TABLET: 5; 325 TABLET ORAL at 10:12

## 2024-08-05 RX ADMIN — CARVEDILOL 12.5 MG: 25 TABLET, FILM COATED ORAL at 17:24

## 2024-08-05 RX ADMIN — LOPERAMIDE HYDROCHLORIDE 4 MG: 2 CAPSULE ORAL at 10:11

## 2024-08-05 NOTE — THERAPY TREATMENT NOTE
Acute Care - Physical Therapy Treatment Note  [facility]     Patient Name: Minnie Bang  : 1975  MRN: [id number]  Today's Date: 2024      Visit Dx:     ICD-10-CM ICD-9-CM   1. Colitis  K52.9 558.9   2. Diarrhea, unspecified type  R19.7 787.91   3. Hypokalemia  E87.6 276.8   4. Hepatomegaly  R16.0 789.1   5. Impaired mobility [Z74.09]  Z74.09 799.89   6. Pancolitis  K51.00 556.6     Patient Active Problem List   Diagnosis    Mixed hyperlipidemia    Raynaud's disease without gangrene    Ex-smoker    Chest pain in adult    SORTO (dyspnea on exertion)    Mixed simple and mucopurulent chronic bronchitis    Pulmonary emphysema    Non-occlusive coronary artery disease    Primary hypertension    Coronary-myocardial bridge    Bilateral lower extremity edema    Bicuspid aortic valve    Vocal cord polyps    Tobacco use disorder, continuous    Neoplasm of uncertain behavior    Hypokalemia    Dysphagia    Heartburn    Pain of upper abdomen    Nonspecific colitis    Hypomagnesemia    Diarrhea    ETOH abuse     Past Medical History:   Diagnosis Date    Abnormal ECG 2023    Anxiety     Arthritis     back    Asthma     Bicuspid aortic valve 2023    COPD (chronic obstructive pulmonary disease)     Coronary-myocardial bridge 2023    CTS (carpal tunnel syndrome) 2019    Dental disease     Depression     Diastolic dysfunction     Dizziness     Emphysema of lung     GERD (gastroesophageal reflux disease)     Headache     Hyperlipidemia     Hypertension     Hypothyroidism     Mixed simple and mucopurulent chronic bronchitis 2023    Non-occlusive coronary artery disease 2023    NSTEMI (non-ST elevated myocardial infarction) (HCC)     Pneumonia     Pulmonary emphysema 2023    Shingles 2018    Vocal cord polyps 2024     Past Surgical History:   Procedure Laterality Date    APPENDECTOMY      CARDIAC CATHETERIZATION N/A 2017    Procedure: Coronary angiography;  Surgeon:  Luis Colvin MD;  Location:  [facility]ATH [facility];  Service:     CARDIAC CATHETERIZATION N/A 05/31/2023    Procedure: Left Heart Cath;  Surgeon: Steffen Rossi MD;  Location:  [facility] CATH [facility];  Service: Cardiology;  Laterality: N/A;    CARPAL TUNNEL RELEASE  2019    COLONOSCOPY N/A 8/2/2024    Procedure: COLONOSCOPY WITH ANESTHESIA;  Surgeon: Marty Browning MD;  Location: [facility] ENDOSCOPY;  Service: Gastroenterology;  Laterality: N/A;  pre op pancolitis    ENDOSCOPY N/A 7/8/2024    Procedure: ESOPHAGOGASTRODUODENOSCOPY WITH ANESTHESIA;  Surgeon: Gordy Wang MD;  Location: [facility] ENDOSCOPY;  Service: Gastroenterology;  Laterality: N/A;  pre: dysphagia.   post: esophagus dilated.   no PCP    HYSTERECTOMY      PANENDOSCOPY N/A 5/30/2024    Procedure: DIRECT LARYNGOSCOPY WITH BIOPSY OF VOCAL CORD POLYPS WITH POSSIBLE TRACHEOSTOMY;  Surgeon: Mendez Padilla MD;  Location: [facility] OR;  Service: ENT;  Laterality: N/A;    NM RT/LT HEART CATHETERS N/A 11/04/2017    Procedure: Percutaneous Coronary Intervention;  Surgeon: Luis Colvin MD;  Location:  [facility] CATH INVAS[facility];  Service: Cardiovascular    THYROID SURGERY      TOTAL THYROIDECTOMY      TRACHEOSTOMY N/A 5/30/2024    Procedure: POSSIBLE TRACHEOSTOMY;  Surgeon: Mendez Padilla MD;  Location: [facility] OR;  Service: ENT;  Laterality: N/A;     PT Assessment (Last 12 Hours)       PT Evaluation and Treatment       Row Name 08/05/24 1146          Physical Therapy Time and Intention    Subjective Information complains of;dyspnea  -AB     Document Type therapy note (daily note)  -AB     Mode of Treatment physical therapy  -AB       Row Name 08/05/24 1146          General Information    Existing Precautions/Restrictions fall  -AB       Row Name 08/05/24 1146          Bed Mobility    Supine-Sit [place] (Bed Mobility) independent  -AB     Sit-Supine [place] (Bed Mobility) independent  -AB       Row Name 08/05/24 1146           Sit-Stand Transfer    Sit-Stand [place] (Transfers) contact guard  -AB       Row Name 08/05/24 1146          Stand-Sit Transfer    Stand-Sit [place] (Transfers) standby assist  -AB       Row Name 08/05/24 1146          Gait/Stairs (Locomotion)    [place] Level (Gait) contact guard  -AB     Distance in Feet (Gait) 200  -AB     Deviations/Abnormal Patterns (Gait) gait speed decreased  -AB     Bilateral Gait Deviations forward flexed posture  -AB       Row Name 08/05/24 1146          Positioning and Restraints    Pre-Treatment Position in bed  -AB     Post Treatment Position bed  -AB     In Bed sitting EOB;call light within reach  -AB               User Key  (r) = Recorded By, (t) = Taken By, (c) = Cosigned By      Initials Name Provider Type    AB Mary Hector, PTA Physical Therapist Assistant                    Physical Therapy Education       Title: PT OT SLP Therapies (In Progress)       Topic: Physical Therapy (In Progress)       Point: Mobility training (Done)       Learning Progress Summary             Patient Acceptance, E,TB, VU,DU by CN at 7/30/2024 0816    Comment: Educated on role of PT in pt care                         Point: Home exercise program (Not Started)       Learner Progress:  Not documented in this visit.              Point: Body mechanics (Not Started)       Learner Progress:  Not documented in this visit.              Point: Precautions (Not Started)       Learner Progress:  Not documented in this visit.                              User Key       Initials Effective Dates Name Provider Type Discipline    FLYNN 06/24/24 -  Elise Moroe, PT Student PT Student PT                  PT Recommendation and Plan         Outcome Measures       Row Name 08/02/24 1350             How much help from another person do you currently need...    Turning from your back to your side while in flat bed without using bedrails? 4  -AURELIO      Moving from lying on back to sitting on the side  of a flat bed without bedrails? 4  -AURELIO      Moving to and from a bed to a chair (including a wheelchair)? 4  -AURELIO      Standing up from a chair using your arms (e.g., wheelchair, bedside chair)? 4  -AURELIO      Climbing 3-5 steps with a railing? 3  -AURELIO      To walk in hospital room? 3  -AURELIO      AM-PAC 6 Clicks Score (PT) 22  -AUREILO      Highest Level of Mobility Goal 7 --> Walk 25 feet or more  -AURELIO         Functional Assessment    Outcome Measure Options AM-PAC 6 Clicks Basic Mobility (PT)  -AURELIO                User Key  (r) = Recorded By, (t) = Taken By, (c) = Cosigned By      Initials Name Provider Type    AURELIO Bandar [place], MILAGROS Physical Therapist Assistant                     Time Calculation:    PT Charges       Row Name 08/05/24 1146             Time Calculation    Start Time 1146  -AB      Stop Time 1201  -AB      Time Calculation (min) 15 min  -AB      PT Received On 08/05/24  -AB         Time Calculation- PT    Total Timed Code Minutes- PT 15 minute(s)  -AB                User Key  (r) = Recorded By, (t) = Taken By, (c) = Cosigned By      Initials Name Provider Type    AB Mary Hector PTA Physical Therapist Assistant                      PT G-Codes  Outcome Measure Options: AM-PAC 6 Clicks Basic Mobility (PT)  AM-PAC 6 Clicks Score (PT): 22    Mary Hector PTA  8/5/2024

## 2024-08-05 NOTE — PLAN OF CARE
Goal Outcome Evaluation:  Plan of Care Reviewed With: patient        Progress: no change  Outcome Evaluation: Medicated x1 for c/o pain, voiding, up ad buck, IID, AOx4.

## 2024-08-05 NOTE — CASE MANAGEMENT/SOCIAL WORK
Discharge Planning Assessment  [facility]     Patient Name: Minnie Bang  MRN: [id number]  Today's Date: 8/5/2024    Admit Date: 7/29/2024    Plan: Home   Discharge Needs Assessment       Row Name 08/05/24 1202       Living Environment    People in Home spouse    Name(s) of People in Home Norman Roy    Current Living Arrangements home    Potentially Unsafe Housing Conditions none    In the past 12 months has the electric, gas, oil, or water company threatened to shut off services in your home? No    Primary Care Provided by self    Provides Primary Care For no one    Family Caregiver if Needed spouse    Quality of Family Relationships unable to assess    Able to Return to Prior Arrangements yes       Resource/Environmental Concerns    Resource/Environmental Concerns none       Transportation Needs    In the past 12 months, has lack of transportation kept you from medical appointments or from getting medications? no    In the past 12 months, has lack of transportation kept you from meetings, work, or from getting things needed for daily living? No       Food Insecurity    Within the past 12 months, you worried that your food would run out before you got the money to buy more. Never true    Within the past 12 months, the food you bought just didn't last and you didn't have money to get more. Never true       Transition Planning    Patient/Family Anticipates Transition to home with family    Patient/Family Anticipated Services at Transition none       Discharge Needs Assessment    Readmission Within the Last 30 Days no previous admission in last 30 days    Equipment Currently Used at Home none    Concerns to be Addressed denies needs/concerns at this time    Equipment Needed After Discharge none    Current Discharge Risk substance use/abuse                   Discharge Plan       Row Name 08/05/24 1205       Plan    Plan Home    Patient/Family in Agreement with Plan yes    Plan Comments Spoke with pt to  assess for d/c planning. Pt lives at home with spouse and plans same. Did provide pt chemical dependency packet due to dx of alcohol abuse.  Pt says she goes to [facility] when doctor needed, has RX coverage. She denies home needs.                  Continued Care and Services - Admitted Since 7/29/2024    No active coordination exists for this encounter.          Demographic Summary    No documentation.                  Functional Status    No documentation.                  Psychosocial    No documentation.                  Abuse/Neglect    No documentation.                  Legal    No documentation.                  Substance Abuse    No documentation.                  Patient Forms    No documentation.                     DEYANIRA SmileyW

## 2024-08-05 NOTE — PROGRESS NOTES
"INFECTIOUS DISEASES PROGRESS NOTE    Patient:  Minnie Bang  YOB: 1975  MRN: [id number]   Admit date: 2024   Admitting Physician: Kirill Chakraborty DO  Primary Care Physician: Provider, No Known    Chief Complaint: Diarrhea      Interval History: Patient indicated to me that she did notice a difference in stool but she is getting increased dose loperamide per GI    Pathology still pending    She also asked about her peripheral edema.  She also said she was short of breath by time she got to the end of the hallway.  I talked to LENORE Sexton.  She did the patient walked herself to the end of the hallway and back and she checked her pulse ox and it was 97% on room air      Allergies: No Known Allergies    Current Scheduled Medications:   atorvastatin, 10 mg, Oral, Daily  carvedilol, 12.5 mg, Oral, BID With Meals  dilTIAZem CD, 120 mg, Oral, Daily  FLUoxetine, 20 mg, Oral, Daily  folic acid, 1 mg, Oral, Daily  levothyroxine, 125 mcg, Oral, Q AM  lisinopril, 20 mg, Oral, Daily  multivitamin with minerals, 1 tablet, Oral, Daily  pantoprazole, 40 mg, Oral, BID AC  sodium chloride, 10 mL, Intravenous, Q12H  thiamine, 100 mg, Oral, Daily  topiramate, 50 mg, Oral, BID      Current PRN Medications:    acetaminophen **OR** acetaminophen **OR** acetaminophen    cloNIDine    hydrALAZINE    HYDROcodone-acetaminophen    ipratropium-albuterol    loperamide    meclizine    nitroglycerin    ondansetron ODT **OR** ondansetron    phenol    [COMPLETED] Insert Peripheral IV **AND** sodium chloride    sodium chloride    sodium chloride            Objective     Vital Signs:  Temp (24hrs), Av °F (36.7 °C), Min:97.6 °F (36.4 °C), Max:98.4 °F (36.9 °C)      /89 (BP Location: Right arm, Patient Position: Lying)   Pulse 78   Temp 97.9 °F (36.6 °C) (Oral)   Resp 18   Ht 172.7 cm (68\")   Wt 78.9 kg (174 lb)   LMP  (LMP Unknown)   SpO2 92%   BMI 26.46 kg/m²         Physical Exam:  General: Patient " sitting up in bed drawing.  She is in no acute distress  Respiratory: Effort even and unlabored.  She did have diminished breath sounds at the bilateral bases.  O2 sats were 97% on room air while I was in the room with her  Abdomen: Soft, no rebound or guarding      Results Review:    I reviewed the patient's new clinical results.    Lab Results:    CBC:   Lab Results   Lab 07/29/24  1230 07/30/24 0420 07/31/24 0627 08/01/24 0625 08/03/24 0548 08/04/24 0648 08/05/24  0459   WBC 8.37 8.06 9.25 11.52*  --  12.03* 10.51   HEMOGLOBIN 12.5 9.7* 10.0* 10.0* 9.2* 9.4* 9.4*   HEMATOCRIT 36.7 29.7* 31.1* 31.6* 29.4* 30.5* 29.9*   PLATELETS 285 217 225 250  --  372 419        AutoDiff:   Lab Results   Lab 07/29/24 1230 07/30/24 0420 08/01/24 0625 08/04/24 0648 08/05/24  0459   NEUTROPHIL % 56.6  --   --   --   --    LYMPHOCYTE % 28.1  --   --   --   --    MONOCYTES % 11.2  --   --   --   --    EOSINOPHIL % 1.8  --   --   --   --    BASOPHIL % 1.1  --   --   --   --    NEUTROS ABS 4.74   < > 7.49* 7.58* 7.15*   LYMPHS ABS 2.35  --   --   --   --    MONOS ABS 0.94*  --   --   --   --    EOS ABS 0.15   < > 0.35 0.48* 0.11   BASOS ABS 0.09   < > 0.23* 0.00 0.11    < > = values in this interval not displayed.        Manual Diff:    Lab Results   Lab 07/30/24 0420 08/01/24 0625 08/04/24 0648 08/05/24  0459   NEUTROPHIL % 48.0 57.0 61.0 68.0   LYMPHOCYTE % 37.0 15.0* 20.0 17.5*   MONOCYTES % 11.0 10.0 8.0 9.3   BANDS % 1.0 8.0* 2.0  --    METAMYELOCYTE %  --  2.0* 1.0*  --    NEUTROS ABS 3.95 7.49* 7.58* 7.15*   LYMPHS ABS 2.98 2.07 2.53 1.94   ANISOCYTOSIS Slight/1+ Mod/2+ Mod/2+ Slight/1+   POIKILOCYTES Slight/1+ Slight/1+  --  Slight/1+   WBC MORPHOLOGY Normal Normal Normal Normal   PLT MORPH Normal Normal Normal  --            CMP:   Lab Results   Lab 08/01/24  0625 08/03/24  0548 08/04/24  0648 08/05/24  0459   SODIUM 140 142 142 144   POTASSIUM 3.8 3.3* 4.1 4.0   CHLORIDE 108* 111* 108* 110*   CO2 23.0 23.0 24.0  25.0   BUN 4* 2* 2* 2*   CREATININE 0.41* 0.51* 0.49* 0.54*   CALCIUM 7.7* 7.9* 8.6 8.8   BILIRUBIN 0.7  --  0.5 0.3   ALK PHOS 546*  --  653* 581*   ALT (SGPT) 36*  --  39* 29   AST (SGOT) 105*  --  99* 59*   GLUCOSE 68 103* 100* 95       Estimated Creatinine Clearance: 136 mL/min (A) (by C-G formula based on SCr of 0.54 mg/dL (L)).    Culture Results:    Microbiology Results (last 10 days)       Procedure Component Value - Date/Time    Gastrointestinal Panel, PCR - Stool, Per Rectum [[id number]]  (Abnormal) Collected: 07/30/24 1024    Lab Status: Final result Specimen: Stool from Per Rectum Updated: 07/30/24 1143     Campylobacter Not Detected     Plesiomonas shigelloides Not Detected     Salmonella Not Detected     Vibrio Not Detected     Vibrio cholerae Not Detected     Yersinia enterocolitica Not Detected     Enteroaggregative E. coli (EAEC) Not Detected     Enteropathogenic E. coli (EPEC) Detected     Enterotoxigenic E. coli (ETEC) lt/st Not Detected     Shiga-like toxin-producing E. coli (STEC) stx1/stx2 Not Detected     Shigella/Enteroinvasive E. coli (EIEC) Not Detected     Cryptosporidium Not Detected     Cyclospora cayetanensis Not Detected     Entamoeba histolytica Not Detected     Giardia lamblia Not Detected     Adenovirus F40/41 Not Detected     Astrovirus Not Detected     Norovirus GI/GII Not Detected     Rotavirus A Not Detected     Sapovirus (I, II, IV or V) Not Detected    Clostridioides difficile Toxin - Stool, Per Rectum [[id number]]  (Abnormal) Collected: 07/30/24 1020    Lab Status: Final result Specimen: Stool from Per Rectum Updated: 07/30/24 1150    Narrative:      The following orders were created for panel order Clostridioides difficile Toxin - Stool, Per Rectum.  Procedure                               Abnormality         Status                     ---------                               -----------         ------                     Clostridioides difficile...[[id number]]  Abnormal             Final result                 Please view results for these tests on the individual orders.    Clostridioides difficile Toxin, PCR - Stool, Per Rectum [[id number]]  (Abnormal) Collected: 07/30/24 1020    Lab Status: Final result Specimen: Stool from Per Rectum Updated: 07/30/24 1150     Toxigenic C. difficile by PCR Positive    Narrative:      DNA from a toxigenic strain of C.difficile has been detected. Antigen testing for the presence of free C.difficile toxin is currently in progress, to help determine the clinical significance of this PCR result.     Clostridioides difficile toxin Ag, Reflex - Stool, Per Rectum [[id number]]  (Normal) Collected: 07/30/24 1020    Lab Status: Final result Specimen: Stool from Per Rectum Updated: 07/30/24 1148     C.diff Toxin Ag Negative    Narrative:      DNA from a toxigenic strain of C.difficile was detected, although the free toxin itself was not detected. These findings are consistent with C.difficile colonization and may not reflect actual C.difficile infection. Clinical correlation needed.    Blood Culture - Blood, Arm, Right [[id number]]  (Normal) Collected: 07/29/24 1230    Lab Status: Final result Specimen: Blood from Arm, Right Updated: 08/03/24 1301     Blood Culture No growth at 5 days    Blood Culture - Blood, Arm, Left [[id number]]  (Normal) Collected: 07/29/24 1230    Lab Status: Final result Specimen: Blood from Arm, Left Updated: 08/03/24 1301     Blood Culture No growth at 5 days                 Radiology:   Imaging Results (Last 72 Hours)       ** No results found for the last 72 hours. **                Active Hospital Problems    Diagnosis     **Nonspecific colitis     Hypomagnesemia     Diarrhea     ETOH abuse     Hypokalemia     Primary hypertension     Mixed hyperlipidemia        IMPRESSION:  Diarrhea-etiology unclear.  On further questioning, patient is able to recall symptoms back to December, 2023.  Patient has had C. difficile PCR testing that is  positive, however antigen to the toxin has been negative.  Additionally, it appears she has been treated empirically with oral vancomycin and fidaxomicin without any improvement in symptoms.  Patient has also had colonoscopy without visual report of pseudomembranes to support C. difficile colitis.  Biopsies pending to evaluate for microscopic colitis-noted Dr. Browning's consideration for lymphocytic/collagenous colitis  Evaded ALT and AST-ALT normalized, AST improved  Severe hepatic steatosis  Elevated alkaline phosphatase-noted gradual increase since February, 2024  GI panel noted to be positive for enteropathogenic E. coli-usually pathogenic and resource limited settings, classically with children.  Treatment is supportive      RECOMMENDATION:   Follow-up on pathology from colon biopsies-still pending  Follow-up on celiac panel-still pending  Patient continues to be on contacts for isolation given the carrier status of C. difficile.  Based on testing she is not producing  toxin at this time, however could start producing toxin at any time.    Reviewed Dr. Browning's note yesterday.  Infectious diseases will sign off.  Please reconsult if needed    Cussed with LENORE Sexton MD  08/05/24  10:38 CDT

## 2024-08-05 NOTE — PROGRESS NOTES
[facility] Medicine Services  INPATIENT PROGRESS NOTE    Patient Name: Minnie Bang  Date of Admission: 7/29/2024  Today's Date: 08/05/24  Length of Stay: 7  Primary Care Physician: Provider, No Known    Subjective   Chief Complaint: f/u n/v/d    HPI   Patient seen and evaluated.  Clinically seems to be continuing to improve.  She is eating better.  Not vomiting.  Still states she is having some diarrhea but it is not clear how often she is having it.  She tells me is better than arrival.  Patient seems to report to staff more events then are being documented i.e. patient is reporting diarrhea not being seen/confirmed at times by staff.        Review of Systems   All pertinent negatives and positives are as above. All other systems have been reviewed and are negative unless otherwise stated.     Objective    Temp:  [97.6 °F (36.4 °C)-98.4 °F (36.9 °C)] 97.7 °F (36.5 °C)  Heart Rate:  [73-82] 82  Resp:  [18] 18  BP: (111-170)/() 170/102  Physical Exam  GEN: Awake, alert, interactive, in NAD  HEENT: PERRLA, EOMI, Anicteric, Trachea midline  Lungs: no wheezing/rales/rhonchi  Heart: RRR, +S1/s2, no rub  ABD: soft, +BS, no guarding/rebound  Extremities: atraumatic, no cyanosis, trace b/l LE edema  Skin: no rashes or petechiae  Neuro: AAOx3, no focal deficits  Psych: normal mood & affect        Results Review:  I have reviewed the labs, radiology results, and diagnostic studies.    Laboratory Data:   Results from last 7 days   Lab Units 08/05/24  0459 08/04/24  0648 08/03/24  0548 08/01/24  0625   WBC 10*3/mm3 10.51 12.03*  --  11.52*   HEMOGLOBIN g/dL 9.4* 9.4* 9.2* 10.0*   HEMATOCRIT % 29.9* 30.5* 29.4* 31.6*   PLATELETS 10*3/mm3 419 372  --  250        Results from last 7 days   Lab Units 08/05/24  0459 08/04/24  0648 08/03/24  0548 08/01/24  0625   SODIUM mmol/L 144 142 142 140   POTASSIUM mmol/L 4.0 4.1 3.3* 3.8   CHLORIDE mmol/L 110* 108* 111* 108*   CO2 mmol/L 25.0  24.0 23.0 23.0   BUN mg/dL 2* 2* 2* 4*   CREATININE mg/dL 0.54* 0.49* 0.51* 0.41*   CALCIUM mg/dL 8.8 8.6 7.9* 7.7*   BILIRUBIN mg/dL 0.3 0.5  --  0.7   ALK PHOS U/L 581* 653*  --  546*   ALT (SGPT) U/L 29 39*  --  36*   AST (SGOT) U/L 59* 99*  --  105*   GLUCOSE mg/dL 95 100* 103* 68       Culture Data:   Blood Culture   Date Value Ref Range Status   07/29/2024 No growth at 4 days  Preliminary   07/29/2024 No growth at 4 days  Preliminary       Radiology Data:   Imaging Results (Last 24 Hours)       ** No results found for the last 24 hours. **            I have reviewed the patient's current medications.     Assessment/Plan   Assessment  Active Hospital Problems    Diagnosis     **Nonspecific colitis     Hypomagnesemia     Diarrhea     ETOH abuse     Hypokalemia     Primary hypertension     Mixed hyperlipidemia        Treatment Plan  #1 nonspecific colitis -patient had presented with abdominal pain, nausea, vomiting, diarrhea.  CT showed nonspecific colitis across multiple segments.  Patient recently treated with 10 days of vancomycin for C. difficile.  She had been on Dificid twice daily but now discontinued.  Appreciate ID input.  Monitor stool output.  Tolerating p.o.  Status post colonoscopy 8/2 without any obvious significant issues and awaiting biopsies.    #2 hypokalemia - improved with replacement    #3 hypomagnesemia - improved with replacement    #4 chronic anemia -H&H fairly stable without overt signs of bleeding    #5 hypertension -BP stable on current meds.  Monitor.    #6 hyperlipidemia -on statin    #7 history of EtOH abuse -no current signs of withdrawal    Medical Decision Making  Number and Complexity of problems: 2-3 acute, 1-2 acute on chronic, multiple chronic  Differential Diagnosis: as above    Conditions and Status        Appears better, non-toxic, nearing goal     MDM Data  External documents reviewed: none  Cardiac tracing (EKG, telemetry) interpretation: none  Radiology interpretation:  reports reviewed   Labs reviewed: as above  Any tests that were considered but not ordered: none     Decision rules/scores evaluated (example [id number], [place], etc): none     Discussed with: patient, nursing     Care Planning  Shared decision making: Patient apprised of current labs, vitals, imaging and treatment plan.  They are agreeable with proceeding with plans as discussed.    Code status and discussions: full code    Disposition  Social Determinants of Health that impact treatment or disposition: none  Suspect patient can go home tomorrow if clinically continues to be improved    Electronically signed by Kirill Chakraborty DO, 08/05/24, 11:47 CDT.

## 2024-08-06 ENCOUNTER — READMISSION MANAGEMENT (OUTPATIENT)
Dept: CALL CENTER | Facility: HOSPITAL | Age: 49
End: 2024-08-06
Payer: COMMERCIAL

## 2024-08-06 VITALS
BODY MASS INDEX: 26.37 KG/M2 | WEIGHT: 174 LBS | TEMPERATURE: 96.3 F | OXYGEN SATURATION: 94 % | HEIGHT: 68 IN | RESPIRATION RATE: 18 BRPM | HEART RATE: 76 BPM | DIASTOLIC BLOOD PRESSURE: 90 MMHG | SYSTOLIC BLOOD PRESSURE: 140 MMHG

## 2024-08-06 LAB
ANION GAP SERPL CALCULATED.3IONS-SCNC: 7 MMOL/L (ref 5–15)
BUN SERPL-MCNC: 3 MG/DL (ref 6–20)
BUN/CREAT SERPL: 5.1 (ref 7–25)
CALCIUM SPEC-SCNC: 8.6 MG/DL (ref 8.6–10.5)
CHLORIDE SERPL-SCNC: 108 MMOL/L (ref 98–107)
CO2 SERPL-SCNC: 26 MMOL/L (ref 22–29)
CREAT SERPL-MCNC: 0.59 MG/DL (ref 0.57–1)
CYTO UR: NORMAL
EGFRCR SERPLBLD CKD-EPI 2021: 109.3 ML/MIN/1.73
GLIADIN PEPTIDE IGA SER-ACNC: 79 UNITS (ref 0–19)
GLUCOSE SERPL-MCNC: 90 MG/DL (ref 65–99)
IGA SERPL-MCNC: 352 MG/DL (ref 87–352)
LAB AP CASE REPORT: NORMAL
Lab: NORMAL
PATH REPORT.FINAL DX SPEC: NORMAL
PATH REPORT.GROSS SPEC: NORMAL
POTASSIUM SERPL-SCNC: 3.7 MMOL/L (ref 3.5–5.2)
SODIUM SERPL-SCNC: 141 MMOL/L (ref 136–145)
TTG IGA SER-ACNC: <2 U/ML (ref 0–3)

## 2024-08-06 PROCEDURE — 80048 BASIC METABOLIC PNL TOTAL CA: CPT | Performed by: INTERNAL MEDICINE

## 2024-08-06 RX ORDER — CHOLESTYRAMINE 4 G/9G
1 POWDER, FOR SUSPENSION ORAL DAILY
Qty: 14 PACKET | Refills: 0 | Status: SHIPPED | OUTPATIENT
Start: 2024-08-06 | End: 2024-08-19

## 2024-08-06 RX ORDER — LISINOPRIL 40 MG/1
20 TABLET ORAL DAILY
Start: 2024-08-06

## 2024-08-06 RX ORDER — CHOLESTYRAMINE 4 G/9G
1 POWDER, FOR SUSPENSION ORAL DAILY
Status: DISCONTINUED | OUTPATIENT
Start: 2024-08-06 | End: 2024-08-06 | Stop reason: HOSPADM

## 2024-08-06 RX ORDER — LOPERAMIDE HYDROCHLORIDE 2 MG/1
2 TABLET ORAL 4 TIMES DAILY PRN
Start: 2024-08-06

## 2024-08-06 RX ORDER — ATORVASTATIN CALCIUM 10 MG/1
10 TABLET, FILM COATED ORAL DAILY
Qty: 30 TABLET | Refills: 0 | Status: SHIPPED | OUTPATIENT
Start: 2024-08-07

## 2024-08-06 RX ADMIN — TOPIRAMATE 50 MG: 25 TABLET, FILM COATED ORAL at 09:18

## 2024-08-06 RX ADMIN — FOLIC ACID 1 MG: 1 TABLET ORAL at 09:18

## 2024-08-06 RX ADMIN — Medication 1 TABLET: at 09:18

## 2024-08-06 RX ADMIN — LEVOTHYROXINE SODIUM 125 MCG: 125 TABLET ORAL at 05:18

## 2024-08-06 RX ADMIN — PANTOPRAZOLE SODIUM 40 MG: 40 TABLET, DELAYED RELEASE ORAL at 06:02

## 2024-08-06 RX ADMIN — HYDROCODONE BITARTRATE AND ACETAMINOPHEN 1 TABLET: 5; 325 TABLET ORAL at 10:47

## 2024-08-06 RX ADMIN — CARVEDILOL 12.5 MG: 25 TABLET, FILM COATED ORAL at 09:18

## 2024-08-06 RX ADMIN — Medication 10 ML: at 09:18

## 2024-08-06 RX ADMIN — DILTIAZEM HYDROCHLORIDE 120 MG: 120 CAPSULE, EXTENDED RELEASE ORAL at 09:18

## 2024-08-06 RX ADMIN — ATORVASTATIN CALCIUM 10 MG: 10 TABLET, FILM COATED ORAL at 09:18

## 2024-08-06 RX ADMIN — FLUOXETINE HYDROCHLORIDE 20 MG: 20 CAPSULE ORAL at 09:18

## 2024-08-06 RX ADMIN — LISINOPRIL 20 MG: 20 TABLET ORAL at 09:18

## 2024-08-06 RX ADMIN — HYDROCODONE BITARTRATE AND ACETAMINOPHEN 1 TABLET: 5; 325 TABLET ORAL at 03:24

## 2024-08-06 RX ADMIN — THIAMINE HCL TAB 100 MG 100 MG: 100 TAB at 09:18

## 2024-08-06 NOTE — PLAN OF CARE
Goal Outcome Evaluation:  Plan of Care Reviewed With: patient           Outcome Evaluation: Pt aox4. Pain medication prn with relief noted. Voiding. Up ad bcuk. Immodium PRN. No needs expressed at this time. Safety maintained.

## 2024-08-06 NOTE — THERAPY DISCHARGE NOTE
Acute Care - Physical Therapy Discharge Summary  [facility]       Patient Name: Minnie Bang  : 1975  MRN: [id number]    Today's Date: 2024                 Admit Date: 2024      PT Recommendation and Plan    Visit Dx:    ICD-10-CM ICD-9-CM   1. Colitis  K52.9 558.9   2. Diarrhea, unspecified type  R19.7 787.91   3. Hypokalemia  E87.6 276.8   4. Hepatomegaly  R16.0 789.1   5. Impaired mobility [Z74.09]  Z74.09 799.89   6. Pancolitis  K51.00 556.6                PT Rehab Goals       Row Name 24 1625             Gait Training Goal 1 (PT)    Activity/Assistive Device (Gait Training Goal 1, PT) gait (walking locomotion);decrease fall risk;forward stepping;improve balance and speed;increase endurance/gait distance;increase energy conservation  -LY      Ivor Level (Gait Training Goal 1, PT) independent  -LY      Distance (Gait Training Goal 1, PT) 300  -LY      Time Frame (Gait Training Goal 1, PT) long term goal (LTG);10 days  -LY      Progress/Outcome (Gait Training Goal 1, PT) goal not met  -LY         Stairs Goal 1 (PT)    Activity/Assistive Device (Stairs Goal 1, PT) ascending stairs;descending stairs;decrease fall risk;improve balance and speed  -LY      Ivor Level/Cues Needed (Stairs Goal 1, PT) independent  -LY      Number of Stairs (Stairs Goal 1, PT) 5  -LY      Time Frame (Stairs Goal 1, PT) long term goal (LTG);10 days  -LY      Progress/Outcome (Stairs Goal 1, PT) goal not met  -LY                User Key  (r) = Recorded By, (t) = Taken By, (c) = Cosigned By      Initials Name Provider Type Discipline    Cecille Watson, PTA Physical Therapist Assistant PT                        PT Discharge Summary  Anticipated Discharge Disposition (PT): home with outpatient therapy services, home with assist  Reason for Discharge: Discharge from facility  Outcomes Achieved: Refer to plan of care for updates on goals achieved  Discharge Destination: Home with assist      Cecille  CHINTAN Buck, PTA   8/6/2024

## 2024-08-06 NOTE — PAYOR COMM NOTE
"Ref:  [id number]     [facility]  FAX  [phone]      Robles Singh (49 y.o. Female)       Date of Birth   1975    Social Security Number       Address   [address] [facility] [address]    Home Phone   [phone]    MRN   [id number]       Baptism   [facility]    Marital Status                               Admission Date   7/29/24    Admission Type   Emergency    Admitting Provider   Kirill Chakraborty,     Attending Provider       Department, Room/Bed   [facility] 3C, 371/1       Discharge Date   8/6/2024    Discharge Disposition   Home or Self Care    Discharge Destination                                 Attending Provider: (none)   Allergies: No Known Allergies    Isolation: Spore   Infection: C.difficile (07/02/24)   Code Status: Prior    Ht: 172.7 cm (68\")   Wt: 78.9 kg (174 lb)    Admission Cmt: None   Principal Problem: Nonspecific colitis [K52.9]                   Active Insurance as of 7/29/2024       Primary Coverage       Payor Plan Insurance Group Employer/Plan Group    [employer] [place] [employer]        Payor Plan Address Payor Plan Phone Number Payor Plan Fax Number Effective Dates    [address] [address] [phone]  6/8/2020 - None Entered    [facility] [id number]         Subscriber Name Subscriber Birth Date Member ID       ROBLES SINGH 1975 [id number]                     Emergency Contacts        (Rel.) Home Phone Work Phone Mobile Phone    Kirill Montgomery (Spouse) [phone] -- [phone]              Discharge Summary    No notes of this type exist for this encounter.       Discharge Order (From admission, onward)       Start     Ordered    08/06/24 0956  Discharge patient  Once        Expected Discharge Date: 08/06/24   Discharge Disposition: Home or Self Care   Physician of Record for Attribution - Please select from Treatment Team: HAYDER KENNEDY [[id number]]   Review needed by CMO to determine Physician of Record: " No      Question Answer Comment   Physician of Record for Attribution - Please select from Treatment Team HAYDER KENNEDY    Review needed by CMO to determine Physician of Record No        08/06/24 1002

## 2024-08-06 NOTE — PLAN OF CARE
Problem: Adult Inpatient Plan of Care  Goal: Plan of Care Review  Outcome: Ongoing, Progressing  Flowsheets (Taken 8/6/2024 0422)  Progress: no change  Plan of Care Reviewed With: patient  Outcome Evaluation: Pt complains of abdominal pain, see MAR. No complaints of nausea. O2 @ 2L while asleep. Cont pulse ox. Voiding. Safety maintained.

## 2024-08-06 NOTE — PROGRESS NOTES
Brief clinical note  Patient had nonspecific colitis and has been treated for presumed C. difficile Dificid has been discontinued now.  Colonoscopy with biopsy performed by Dr. Marty Browning biopsies are still pending.  Recommendations  If the diarrhea continues may consider 2 weeks course of Questran 4 g daily to reduce the frequency of the diarrhea  In the meantime await the biopsy results from recent colonoscopy  Further recommendations and plans based on the biopsy results  Dr. Ena Grier  Consultant gastroenterologist

## 2024-08-06 NOTE — DISCHARGE SUMMARY
[facility]  DISCHARGE SUMMARY       Date of Admission: 7/29/2024  Date of Discharge:  8/6/2024  Primary Care Physician: Provider, No Known    Presenting Problem/History of Present Illness:  Diarrhea and vomiting    Final Discharge Diagnoses:  Active Hospital Problems    Diagnosis     **Nonspecific colitis     Hypomagnesemia     Diarrhea     ETOH abuse     Hypokalemia     Primary hypertension     Mixed hyperlipidemia        Consults:   Dr. Yanez, ID  Dr. Browning, GI    Procedures Performed:   Colonoscopy on 8/2 by Dr. Brwoning    Pertinent Test Results:     Imaging Results (All)       Procedure Component Value Units Date/Time    CT Abdomen Pelvis Without Contrast [[id number]] Collected: 08/01/24 1200     Updated: 08/01/24 1216    Narrative:      EXAMINATION: CT ABDOMEN PELVIS WO CONTRAST-      8/1/2024 10:40 AM     HISTORY: abdominal pain; K52.9-Noninfective gastroenteritis and colitis,  unspecified; R19.7-Diarrhea, unspecified; E87.6-Hypokalemia;  R16.0-Hepatomegaly, not elsewhere classified; Z74.09-Other reduced  mobility     In order to have a CT radiation dose as low as reasonably achievable  Automated Exposure Control was utilized for adjustment of the mA and/or  KV according to patient size.     Total DLP = 407.5 mGy.cm     CT scan of the abdomen and pelvis CT performed without intravenous  contrast enhancement.     Images were acquired in axial plane and subsequent reconstruction with  coronal and sagittal planes.     Comparison is made with the previous study dated 7/29/2024.     Lung bases included in the study show evidence of interlobular septal  thickening suggesting pulmonary venous congestion/fluid overload. There  are atelectatic changes in the lower lungs right more than the left.  There is a small bibasilar pleural effusion right more than the left.     There is persistent severe hepatic steatosis. The spleen is normal.     No radiopaque  gallstones.     Unenhanced pancreas is unremarkable.     The adrenal glands bilaterally are normal.     Moderate lobulation of the renal contour bilaterally is seen. No  radiopaque calculi. No hydronephrosis. The limited visualized ureters  are nondilated. Urinary bladder is poorly distended. No intrinsic  abnormality.     Uterus is absent. No adnexal masses. There is small amount of free fluid  in the pelvis.     There are small fat-containing inguinal hernias, right larger than the  left.     There is evidence of a previous mid to lower abdominal ventral hernia  repair.     The stomach is decompressed with moderate wall thickening. Small bowel  is nondistended and nondilated. Appendix is surgically absent. There is  persistent moderate thickening and edema of the wall of the entire colon  and there is a surrounding peritoneal infiltration. This may partly be  due to incomplete distention. There is small amount of fluid in the  right paracolic gutter.     There is diffuse subcutaneous fat infiltration of the abdominal wall  which may suggest edema due to fluid overload. This was not noted in the  previous study.     Atheromatous changes of the abdominal aorta and iliac arteries. No  aneurysmal dilatation.     There is no evidence of abdominal or pelvic lymphadenopathy.     Images reviewed in bone window show chronic degenerative changes of the  lumbar spine with severe degeneration of disc L5-S1. No acute bony  abnormality.       Impression:      1. There is evidence of bibasilar pleural effusion right more than the  left which was not seen in the previous study. There are finding to  suggest pulmonary venous congestion/edema which was not seen in the  previous study.  2. Edema of the abdominal wall and which was not noted in the previous  study may represent fluid overload.  3. Persistent moderate thickening of the wall of the large bowel more  pronounced in the proximal colon including the ascending and  transverse  colon. This may partly be due to incomplete distention. There is a  probability of acute nonspecific colitis. There is surrounding  peritoneal infiltration and a small fluid in the right paracolic gutter.  There is a small free fluid in the pelvis.  4. Hepatic steatosis.                                      This report was signed and finalized on 8/1/2024 12:12 PM by Dr. Karmen Teague MD.       XR Abdomen KUB [[id number]] Collected: 07/30/24 1819     Updated: 07/30/24 1823    Narrative:      EXAMINATION:  XR ABDOMEN KUB-  7/30/2024 4:54 PM     HISTORY: NG tube advanced.     COMPARISON: 7/30/2024 at 4:45 p.m.     TECHNIQUE: Supine image of the lower chest and abdomen.     FINDINGS:   The NG tube has been advanced and the tip and sideport are  now well within the stomach in good position.          Impression:      NG tube advanced and now in good position.           This report was signed and finalized on 7/30/2024 6:20 PM by Dr. Jesus Manuel Lomax MD.       XR Abdomen 1 View [[id number]] Collected: 07/30/24 1711     Updated: 07/30/24 1715    Narrative:      EXAMINATION:  XR ABDOMEN 1 VW-  7/30/2024 4:09 PM     HISTORY: NG tube placement; K52.9-Noninfective gastroenteritis and  colitis, unspecified; R19.7-Diarrhea, unspecified; E87.6-Hypokalemia;  R16.0-Hepatomegaly, not elsewhere classified; Z74.09-Other reduced  mobility.     COMPARISON: 7/30/2024.     TECHNIQUE: Supine image of the lower chest and abdomen.     FINDINGS:   Visualized small bowel is mildly dilated measuring 2.4 cm.  There is air in the stomach and visualized colon. NG tube tip is in the  stomach. The sideport is in the distal esophagus. The tube needs to be  advanced about 9 cm distally.          Impression:      NG tube needs to be advanced about 9 cm distally, as  described.           This report was signed and finalized on 7/30/2024 5:12 PM by Dr. Jesus Manuel Lomax MD.       XR Abdomen KUB [[id number]] Collected: 07/30/24 6970      Updated: 07/30/24 1442    Narrative:      EXAM: XR ABDOMEN KUB-      DATE: 7/30/2024 12:50 PM     HISTORY: Colitis, possible megacolon; K52.9-Noninfective gastroenteritis  and colitis, unspecified; R19.7-Diarrhea, unspecified;  E87.6-Hypokalemia; R16.0-Hepatomegaly, not elsewhere classified       COMPARISON: None available.     TECHNIQUE:   Frontal view of the abdomen. 1 image.     FINDINGS:    Bowel gas pattern is nonspecific but nonobstructive. There are  borderline air-filled loops of small bowel. There is bowel gas in the  colon. Patient is status post ventral hernia repair.          Impression:         1. Nonspecific but nonobstructive bowel gas pattern.     This report was signed and finalized on 7/30/2024 2:39 PM by Nicho Barreto.       US Liver [[id number]] Collected: 07/29/24 1917     Updated: 07/29/24 1923    Narrative:      EXAMINATION:  US LIVER-  7/29/2024 4:35 PM     HISTORY: Elevated liver function test.     COMPARISON: 6/26/2023.     TECHNIQUE: Grayscale and color flow ultrasound was performed.     PANCREAS: The pancreas is echogenic without a focal abnormality. No  pancreatic duct dilatation is seen.     LIVER: There is increased liver echogenicity. No focal liver lesion is  seen.     GALLBLADDER: There are no gallstones. There is no gallbladder wall  thickening.     COMMON BILE DUCT: 2 mm.     RIGHT KIDNEY: The renal size, cortical thickness and echogenicity are  normal. There is no hydronephrosis.     OTHER: The visualized abdominal aorta and inferior vena cava are normal  caliber.          Impression:      1. No gallstones or ductal dilatation.  2. Fatty infiltration of the liver.  3. Echogenic pancreas may be a normal variant. It may also be seen in  patients with diabetes and prediabetes.           The images and report are stored on PAC's per institutional and state  guidelines.     This report was signed and finalized on 7/29/2024 7:20 PM by Dr. Jesus Manuel Lomax MD.       XR Chest 1 View  [[id number]] Collected: 07/29/24 1820     Updated: 07/29/24 1825    Narrative:      EXAMINATION:  XR CHEST 1 VW-  7/29/2024 5:18 PM     HISTORY: thoracic pain; K52.9-Noninfective gastroenteritis and colitis,  unspecified; R19.7-Diarrhea, unspecified; E87.6-Hypokalemia;  R16.0-Hepatomegaly, not elsewhere classified. Hyperlipidemia. Pulmonary  hypertension. Tobacco use disorder.     COMPARISON: 5/30/2024.     TECHNIQUE: Single view AP image.     FINDINGS: There is mild hypoventilation. There is minimal vague density  in the right cardiophrenic angle and in the left lung base laterally,  stable compared to the prior study. The heart is normal in size. No  acute bony abnormality is seen.          Impression:      1. Mild hypoventilation.  2. Vague densities in the right cardiophrenic angle and left lung base  could be related to the poor inspiration and atelectasis. There is a  similar appearance on the prior study and therefore this may be related  to prominent fat pads.           This report was signed and finalized on 7/29/2024 6:22 PM by Dr. Jesus Manuel Lomax MD.       CT Angiogram Abdomen Pelvis [[id number]] Collected: 07/29/24 1410     Updated: 07/29/24 1434    Narrative:      EXAMINATION: CT ANGIOGRAM ABDOMEN PELVIS-      7/29/2024 12:24 PM     HISTORY: ab pain elevated lactic acid     In order to have a CT radiation dose as low as reasonably achievable  Automated Exposure Control was utilized for adjustment of the mA and/or  KV according to patient size.     Total DLP = 919.59 mGy.cm     The CT angiography of the abdominal pelvis is performed before and after  intravenous contrast enhancement.     The images are acquired in axial plane and subsequent 2D reconstruction  in coronal and sagittal planes and 3D maximum intensity projection image  reconstruction.     Comparison is made with the previous study dated 2/26/2024.     There are atheromatous changes of the abdominal aorta iliac and femoral  arteries.     There  is no aneurysmal dilatation of the abdominal aorta or iliac  arteries.     A mixed plaque is seen at the origin of the celiac trunk with less than  50% diameter stenosis. There is subsequent normal branches.     There is normal origin course and caliber of the superior mesenteric  artery. Normal branches.     Small calcific plaques are seen in the proximal renal arteries  bilaterally. No flow-limiting stenosis. No aneurysmal dilatation. There  is an accessory small left renal artery arising below the level of the  main renal artery.     There is high-grade stenosis at the origin of the inferior mesenteric  artery from the left anterolateral margin of the distal abdominal aorta.  A small and attenuated inferior mesenteric artery is opacified with a  subsequent small and diminutive left colonic and superior rectal  arteries.     There are calcific plaques throughout the course of both common,  internal and external iliac arteries. No flow-limiting stenosis.     Normal size common femoral artery is seen dividing into normal appearing  superficial and deep femoral arteries.     There is no evidence of contrast extravasation seen in the stomach,  small and large bowel.     The lung bases included in the study appear unremarkable.     Limited visualized cardiomediastinal structures show atheromatous change  in the coronary arteries. No significant cardiomegaly.     There is severe fatty infiltration of the liver. There is moderate  hepatomegaly. The liver measures 21.5 cm in craniocaudal dimension. No  focal intrinsic abnormality. The spleen is normal.     No radiopaque gallstones.     Pancreas is normal.     Moderate lobulation of the left adrenal gland. No discrete mass. Right  adrenal gland is normal.     Mild lobulation of the renal contour bilaterally. No mass. No calculi.  No hydronephrosis. Both ureters appear normal and nondilated. The  urinary bladder is poorly distended with moderate wall thickening. No  visible  intrinsic abnormality.     There are small fat-containing femoral hernias bilaterally.     There is evidence of prior ventral hernia repair surgery.     The stomach is poorly distended. No focal abnormality. Duodenum is  normal. Fluid-filled nondilated nondistended small bowel is seen.  Moderate diffuse thickening of the wall and edema of the mucosa of the  entire colon most pronounced in the proximal colon including the  ascending and transverse colon. Distal colon is significantly  decompressed with moderate wall thickening. There is no significant  surrounding peritoneal fat infiltration.     No evidence of abdominal or pelvic lymphadenopathy.     Images reviewed in bone window show chronic degenerative changes of the  lumbar and limited visualized thoracic spine. Severe degeneration of  disc L5-S1.       Impression:      1. Acute or subacute nonspecific colitis.  2. Severe hepatic steatosis and hepatomegaly.  3. The remaining abdominal pelvis is unremarkable similar to the  previous study.     This report was signed and finalized on 7/29/2024 2:31 PM by Dr. Karmen Teague MD.             LAB RESULTS:      Lab 08/05/24  0459 08/04/24  0648 08/03/24  0548 08/01/24  1234 08/01/24  0625 07/31/24  0627   WBC 10.51 12.03*  --   --  11.52* 9.25   HEMOGLOBIN 9.4* 9.4* 9.2*  --  10.0* 10.0*   HEMATOCRIT 29.9* 30.5* 29.4*  --  31.6* 31.1*   PLATELETS 419 372  --   --  250 225   NEUTROS ABS 7.15* 7.58*  --   --  7.49*  --    EOS ABS 0.11 0.48*  --   --  0.35  --    .7* 112.5*  --   --  111.7* 108.0*   LACTATE  --   --   --  0.8  --   --          Lab 08/06/24  0505 08/05/24  0459 08/04/24  0648 08/03/24  0548 08/01/24  0625   SODIUM 141 144 142 142 140   POTASSIUM 3.7 4.0 4.1 3.3* 3.8   CHLORIDE 108* 110* 108* 111* 108*   CO2 26.0 25.0 24.0 23.0 23.0   ANION GAP 7.0 9.0 10.0 8.0 9.0   BUN 3* 2* 2* 2* 4*   CREATININE 0.59 0.54* 0.49* 0.51* 0.41*   EGFR 109.3 111.6 114.3 113.2 119.3   GLUCOSE 90 95 100* 103* 68    CALCIUM 8.6 8.8 8.6 7.9* 7.7*   MAGNESIUM  --   --  1.8 1.5*  --    PHOSPHORUS  --   --  3.8  --   --          Lab 08/05/24  0459 08/04/24  0648 08/01/24  0625 07/31/24  0627   TOTAL PROTEIN 5.6* 5.8* 5.5* 5.3*   ALBUMIN 2.9* 3.0* 2.7* 2.8*   GLOBULIN 2.7 2.8 2.8 2.5   ALT (SGPT) 29 39* 36* 37*   AST (SGOT) 59* 99* 105* 111*   BILIRUBIN 0.3 0.5 0.7 1.0   ALK PHOS 581* 653* 546* 523*                 Lab 08/01/24  0625   FOLATE 5.72   VITAMIN B 12 1,225*         Brief Urine Lab Results  (Last result in the past 365 days)        Color   Clarity   Blood   Leuk Est   Nitrite   Protein   CREAT   Urine HCG        07/29/24 1246 Dark Yellow   Clear   Negative   Negative   Negative   Negative                 Microbiology Results (last 10 days)       Procedure Component Value - Date/Time    Gastrointestinal Panel, PCR - Stool, Per Rectum [[id number]]  (Abnormal) Collected: 07/30/24 1024    Lab Status: Final result Specimen: Stool from Per Rectum Updated: 07/30/24 1143     Campylobacter Not Detected     Plesiomonas shigelloides Not Detected     Salmonella Not Detected     Vibrio Not Detected     Vibrio cholerae Not Detected     Yersinia enterocolitica Not Detected     Enteroaggregative E. coli (EAEC) Not Detected     Enteropathogenic E. coli (EPEC) Detected     Enterotoxigenic E. coli (ETEC) lt/st Not Detected     Shiga-like toxin-producing E. coli (STEC) stx1/stx2 Not Detected     Shigella/Enteroinvasive E. coli (EIEC) Not Detected     Cryptosporidium Not Detected     Cyclospora cayetanensis Not Detected     Entamoeba histolytica Not Detected     Giardia lamblia Not Detected     Adenovirus F40/41 Not Detected     Astrovirus Not Detected     Norovirus GI/GII Not Detected     Rotavirus A Not Detected     Sapovirus (I, II, IV or V) Not Detected    Clostridioides difficile Toxin - Stool, Per Rectum [[id number]]  (Abnormal) Collected: 07/30/24 1020    Lab Status: Final result Specimen: Stool from Per Rectum Updated: 07/30/24 1150     Narrative:      The following orders were created for panel order Clostridioides difficile Toxin - Stool, Per Rectum.  Procedure                               Abnormality         Status                     ---------                               -----------         ------                     Clostridioides difficile...[[id number]]  Abnormal            Final result                 Please view results for these tests on the individual orders.    Clostridioides difficile Toxin, PCR - Stool, Per Rectum [[id number]]  (Abnormal) Collected: 07/30/24 1020    Lab Status: Final result Specimen: Stool from Per Rectum Updated: 07/30/24 1150     Toxigenic C. difficile by PCR Positive    Narrative:      DNA from a toxigenic strain of C.difficile has been detected. Antigen testing for the presence of free C.difficile toxin is currently in progress, to help determine the clinical significance of this PCR result.     Clostridioides difficile toxin Ag, Reflex - Stool, Per Rectum [[id number]]  (Normal) Collected: 07/30/24 1020    Lab Status: Final result Specimen: Stool from Per Rectum Updated: 07/30/24 1148     C.diff Toxin Ag Negative    Narrative:      DNA from a toxigenic strain of C.difficile was detected, although the free toxin itself was not detected. These findings are consistent with C.difficile colonization and may not reflect actual C.difficile infection. Clinical correlation needed.    Blood Culture - Blood, Arm, Right [[id number]]  (Normal) Collected: 07/29/24 1230    Lab Status: Final result Specimen: Blood from Arm, Right Updated: 08/03/24 1301     Blood Culture No growth at 5 days    Blood Culture - Blood, Arm, Left [[id number]]  (Normal) Collected: 07/29/24 1230    Lab Status: Final result Specimen: Blood from Arm, Left Updated: 08/03/24 1301     Blood Culture No growth at 5 days            Hospital Course:   Patient is a 49-year-old female with history of hypertension, alcohol abuse, CAD, COPD who presented to the  hospital on 7/29 for reported diarrhea that has been going on for almost 2 months.  Occasional vomiting.  She been having intermittent abdominal pain and cramps.  Mostly lower abdomen.  Patient is also currently trying to stop alcohol use and is down to 1-2 drinks/shots per day per reports to avoid tremors.  Of note patient had an endoscopy on 7/8 earlier on the month and had normal results.  She did have C. difficile testing at that time and she had a positive toxin but negative antigen.  She was treated with 10 days of oral vancomycin but diarrhea persisted anyway.  On arrival here CAT scan showed potential nonspecific colitis.  She was admitted to the hospital for supportive care with fluids on alcohol withdrawal protocol.  No significant withdrawal seen during stay.  ID and GI were consulted.  Patient initially was put on Dificid but this eventually was discontinued.  Again her C. difficile toxin was positive but antigen negative.  Possible carrier.  Her GI panel was positive for EPEC however.  Patient slowly clinically improved with IV fluids.  Supportive care and electrolyte replacement.  Eventually was able to eat and tolerate p.o.  Diarrhea slowed down with Imodium.  Colonoscopy was done which was fairly benign.  Biopsies were taken and pending for possible microscopic colitis.  Celiac panel was also sent and pending.  Clinically the patient has stabilized.  She has not required fluids or electrolytes for the last 3 days.  Tolerating p.o.  Suspect that heat pack was likely the major part of her current issue.  Discussed with GI today.  They would like to start Questran 4 mg daily for 2 weeks.  Otherwise okay for discharge and follow-up at GI office for further evaluation.  Patient to follow-up with PCP after discharge well.  Seek medical evaluation for any return or worsening of symptoms.      Physical Exam on Discharge:  /90 (BP Location: Right arm, Patient Position: Lying)   Pulse 76   Temp 96.3 °F  "(35.7 °C) (Axillary)   Resp 18   Ht 172.7 cm (68\")   Wt 78.9 kg (174 lb)   LMP  (LMP Unknown)   SpO2 94%   BMI 26.46 kg/m²   Physical Exam  GEN: Awake, alert, interactive, in NAD  HEENT: PERRLA, EOMI, Anicteric, Trachea midline  Lungs: no wheezing/rales/rhonchi  Heart: RRR, +S1/s2, no rub  ABD: soft, +BS, no guarding/rebound  Extremities: atraumatic, no cyanosis, trace b/l LE edema  Skin: no rashes or petechiae  Neuro: AAOx3, no focal deficits  Psych: normal mood & affect    Condition on Discharge: stable/improved    Discharge Disposition:  Home or Self Care    Discharge Medications:     Discharge Medications        New Medications        Instructions Start Date   atorvastatin 10 MG tablet  Commonly known as: LIPITOR  Replaces: simvastatin 10 MG tablet   10 mg, Oral, Daily   Start Date: August 7, 2024     cholestyramine 4 g packet  Commonly known as: QUESTRAN   1 packet, Oral, Daily      loperamide 2 MG tablet  Commonly known as: Imodium A-D   2 mg, Oral, 4 Times Daily PRN             Changes to Medications        Instructions Start Date   lisinopril 40 MG tablet  Commonly known as: PRINIVIL,ZESTRIL  What changed: how much to take   20 mg, Oral, Daily             Continue These Medications        Instructions Start Date   albuterol sulfate  (90 Base) MCG/ACT inhaler  Commonly known as: PROVENTIL HFA;VENTOLIN HFA;PROAIR HFA   2 puffs, Inhalation, Every 4 Hours PRN      carvedilol 25 MG tablet  Commonly known as: COREG   25 mg, Oral, 2 Times Daily      cloNIDine 0.1 MG tablet  Commonly known as: CATAPRES   0.1 mg, Oral, As Needed      dilTIAZem  MG 24 hr capsule  Commonly known as: CARDIZEM CD   120 mg, Oral, Daily      FLUoxetine 20 MG capsule  Commonly known as: PROzac   Take 1 capsule by mouth Daily.      ipratropium-albuterol 0.5-2.5 mg/3 ml nebulizer  Commonly known as: DUO-NEB   3 mL, Nebulization, Every 4 Hours PRN      meclizine 25 MG tablet  Commonly known as: ANTIVERT   25 mg, Oral, 3 " Times Daily PRN      nitroglycerin 0.4 MG SL tablet  Commonly known as: NITROSTAT   0.4 mg, Sublingual, Every 5 Minutes PRN, Take no more than 3 doses in 15 minutes.      pantoprazole 40 MG EC tablet  Commonly known as: PROTONIX   40 mg, Oral, 2 Times Daily      potassium chloride 20 MEQ CR tablet  Commonly known as: KLOR-CON M20   20 mEq, Oral, 2 Times Daily      Synthroid 125 MCG tablet  Generic drug: levothyroxine   125 mcg, Oral, Daily      topiramate 50 MG tablet  Commonly known as: TOPAMAX   50 mg, Oral, 2 Times Daily             Stop These Medications      hydroCHLOROthiazide 25 MG tablet     simvastatin 10 MG tablet  Commonly known as: ZOCOR  Replaced by: atorvastatin 10 MG tablet              Discharge Diet:   Dietary Orders (From admission, onward)       Start     Ordered    08/02/24 1308  Diet: Gastrointestinal; Fiber-Restricted, Low Irritant; Texture: Soft to Chew (NDD 3); Soft to Chew: Whole Meat; Fluid Consistency: Thin (IDDSI 0)  Diet Effective Now        References:    Diet Order [place]   Question Answer Comment   Diets: Gastrointestinal    Gastrointestinal Diet: Fiber-Restricted    Gastrointestinal Diet: Low Irritant    Texture: Soft to Chew (NDD 3)    Soft to Chew: Whole Meat    Fluid Consistency: Thin (IDDSI 0)        08/02/24 1307                      Activity at Discharge:   As tolerated    Follow-up Appointments:   Future Appointments   Date Time Provider Department Center   8/19/2024  9:30 AM Rakel Padilla APRN [facility]   9/13/2024 11:30 AM Domitila Duncan MD [facility]       Test Results Pending at Discharge:   Colonoscopy biopsies  Celiac panel    Electronically signed by Kirill Chakraborty DO, 08/06/24, 10:02 CDT.    Time: 36 minutes.

## 2024-08-07 NOTE — OUTREACH NOTE
Prep Survey      Flowsheet Row Responses   [facility] facility patient discharged from? [place]   Is LACE score < 7 ? No   Eligibility Readm Mgmt   Discharge diagnosis Nonspecific colitis-Colonoscopy this visit   Does the patient have one of the following disease processes/diagnoses(primary or secondary)? Other   Does the patient have Home health ordered? No   Is there a DME ordered? No   Prep survey completed? Yes            FARZANA SUTTON - Registered Nurse

## 2024-08-19 ENCOUNTER — OFFICE VISIT (OUTPATIENT)
Dept: GASTROENTEROLOGY | Facility: CLINIC | Age: 49
End: 2024-08-19
Payer: COMMERCIAL

## 2024-08-19 VITALS
BODY MASS INDEX: 27.11 KG/M2 | HEART RATE: 88 BPM | TEMPERATURE: 98 F | SYSTOLIC BLOOD PRESSURE: 162 MMHG | OXYGEN SATURATION: 99 % | WEIGHT: 183 LBS | DIASTOLIC BLOOD PRESSURE: 98 MMHG | HEIGHT: 69 IN

## 2024-08-19 DIAGNOSIS — R11.2 NAUSEA AND VOMITING, UNSPECIFIED VOMITING TYPE: ICD-10-CM

## 2024-08-19 DIAGNOSIS — R19.7 DIARRHEA, UNSPECIFIED TYPE: Primary | ICD-10-CM

## 2024-08-19 DIAGNOSIS — K76.0 FATTY LIVER: ICD-10-CM

## 2024-08-19 DIAGNOSIS — R79.89 ABNORMAL LFTS: ICD-10-CM

## 2024-08-19 PROCEDURE — 3077F SYST BP >= 140 MM HG: CPT | Performed by: NURSE PRACTITIONER

## 2024-08-19 PROCEDURE — 99213 OFFICE O/P EST LOW 20 MIN: CPT | Performed by: NURSE PRACTITIONER

## 2024-08-19 PROCEDURE — 3080F DIAST BP >= 90 MM HG: CPT | Performed by: NURSE PRACTITIONER

## 2024-08-19 RX ORDER — MONTELUKAST SODIUM 4 MG/1
1 TABLET, CHEWABLE ORAL DAILY
Qty: 30 TABLET | Refills: 2 | Status: SHIPPED | OUTPATIENT
Start: 2024-08-19

## 2024-08-19 NOTE — PROGRESS NOTES
[facility] GASTROENTEROLOGY - OFFICE NOTE    8/19/2024    Minnie Bang   1975    Primary Physician: Aiede Padilla APRN Glasses you know your affect thank you    Chief Complaint   Patient presents with    Vomiting     Recent hospital stay    Diarrhea         HISTORY OF PRESENT ILLNESS:     Minnie Bang is a 49 y.o. female presents for f/u diarrhea.  I saw her in office 6-11-24. Since then she has had egd, colonoscopy, and was admitted here at [facility] 8/2024.  Dr. Browning with GI did see her and complete colonoscopy. Biopsies negative for microscopic colitis. Cdiff toxin was positive but antigen was negative. Stool for EPEC was positive as well. She had been treated with vancomycin outpt for cdiff toxin pos ( antigen was negative at that time as well). She was treated with dificd inpatient. She was seen by ID as well. She reports that her symptoms are not improving.        She continues to have diarrhea daily with intermittent nausea/vomiting. Having 10 + stools per day, watery.  She is not eating much. Has diarrhea even when does not eat. Having nocturnal diarrhea as well.  Has generalized abdomen pain as well. She reports that her entire body hurts. Questran did not help.  Imodium does not help. Her weight is actually up some. Has had lower extremity swelling. No fever. No sign of gi bleeding.         She has decreased etoh intake. Now 1-2 shots of liquor per day.   She also tried to decrease glutein unsure if has helped.       She does have abnormal lft's. This is chronic. Ultrasound showed fatty liver. Recent ama negative. She does drink etoh.         8-4-24 gliadin deamidated peptide Ab, IgA was strongly positive.  AMA negative 7/2024.   CT Abdomen Pelvis Without Contrast (08/01/2024 11:50)   US Liver (07/29/2024 18:04)   CT Angiogram Abdomen Pelvis (07/29/2024 13:48)           Colonoscopy (08/02/2024 11:11) by Dr. Browning.   Tissue Pathology Exam (08/02/2024 11:41)     Upper GI  "Endoscopy (07/08/2024 11:20)       ==========================================================  Office visit  6-11-24 HPI Minnie Bang is a 49 y.o. female presents  with reflux and dysphagia.            GERD  Heartburn occurs daily. This has been for at least 1 year. Occurs at night and when bending over.  Has been on protonix daily for at least 1 year.  Has also used tums that helps for a short period. Tried otc zantac that did not help.  Drinking milk helps. Has noted upper abdominal pain x 4 -5 days, pressure.   No tobacco, quit 3/2024. Caffeine: \" drink mountain dew all day\".  No certain food triggers. No weight loss.            Dysphagia   This started 1 year. This was with pills and solid foods that has resolved.          Diarrhea  This started 6 mo ago. Having 6 stools per day. Can't remember last solid stool. Tried imodium that does not help. Fiber supplement did not help.  No new meds.  Recent antibiotic. No rectal bleeding.            She was seen by [facility] 2023. EGD and colonoscopy was recommended but not done. Has history of chronic gerd and colon polyp.         No history of egd or colonoscopy.   Father had colon cancer in his 60's.     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    CTS (carpal tunnel syndrome) 2019    Dental disease     Depression     Diastolic dysfunction 2022    Dizziness     Emphysema of lung 2020    GERD (gastroesophageal reflux disease)     Headache     Hyperlipidemia     Hypertension     Hypothyroidism     Mixed simple and mucopurulent chronic bronchitis 04/18/2023    Non-occlusive coronary artery disease 05/04/2023    NSTEMI (non-ST elevated myocardial infarction) (HCC)     Pneumonia 2022    Pulmonary emphysema 04/21/2023    Shingles 2018    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       Past " Surgical History:   Procedure Laterality Date    APPENDECTOMY      CARDIAC CATHETERIZATION N/A 11/04/2017    Procedure: Coronary angiography;  Surgeon: Luis Colvin MD;  Location: [facility] CATH INVASIVE LOCATION;  Service:     CARDIAC CATHETERIZATION N/A 05/31/2023    Procedure: Left Heart Cath;  Surgeon: Steffen Rossi MD;  Location: [facility] CATH INVASIVE LOCATION;  Service: Cardiology;  Laterality: N/A;    CARPAL TUNNEL RELEASE  2019    COLONOSCOPY N/A 8/2/2024    Procedure: COLONOSCOPY WITH ANESTHESIA;  Surgeon: Marty Browning MD;  Location: [facility] ENDOSCOPY;  Service: Gastroenterology;  Laterality: N/A;  pre op pancolitis    ENDOSCOPY N/A 7/8/2024    Procedure: ESOPHAGOGASTRODUODENOSCOPY WITH ANESTHESIA;  Surgeon: Gordy Wang MD;  Location: [facility] ENDOSCOPY;  Service: Gastroenterology;  Laterality: N/A;  pre: dysphagia.   post: esophagus dilated.   no PCP    HYSTERECTOMY      PANENDOSCOPY N/A 5/30/2024    Procedure: DIRECT LARYNGOSCOPY WITH BIOPSY OF VOCAL CORD POLYPS WITH POSSIBLE TRACHEOSTOMY;  Surgeon: Mendez Padilla MD;  Location: [facility] OR;  Service: ENT;  Laterality: N/A;    MA RT/LT HEART CATHETERS N/A 11/04/2017    Procedure: Percutaneous Coronary Intervention;  Surgeon: Luis Colvin MD;  Location: [facility] CATH INVASIVE LOCATION;  Service: Cardiovascular    THYROID SURGERY      TOTAL THYROIDECTOMY      TRACHEOSTOMY N/A 5/30/2024    Procedure: POSSIBLE TRACHEOSTOMY;  Surgeon: Mendez Padilla MD;  Location: [facility] OR;  Service: ENT;  Laterality: N/A;       Outpatient Medications Marked as Taking for the 8/19/24 encounter (Office Visit) with Rakel Padilla APRN   Medication Sig Dispense Refill    albuterol sulfate  (90 Base) MCG/ACT inhaler Inhale 2 puffs Every 4 (Four) Hours As Needed for Wheezing. 6.7 g 0    atorvastatin (LIPITOR) 10 MG tablet Take 1 tablet by mouth Daily. 30 tablet 0    carvedilol (COREG) 25 MG tablet Take 1 tablet by mouth 2 (Two) Times a Day. 180 tablet 3     cloNIDine (CATAPRES) 0.1 MG tablet Take 1 tablet by mouth As Needed for High Blood Pressure (for blood pressure >140/90).      dilTIAZem CD (CARDIZEM CD) 120 MG 24 hr capsule Take 1 capsule by mouth Daily.      FLUoxetine (PROzac) 20 MG capsule Take 1 capsule by mouth Daily.      ipratropium-albuterol (DUO-NEB) 0.5-2.5 mg/3 ml nebulizer Take 3 mL by nebulization Every 4 (Four) Hours As Needed for Wheezing. 360 mL 0    lisinopril (PRINIVIL,ZESTRIL) 40 MG tablet Take 0.5 tablets by mouth Daily.      loperamide (Imodium A-D) 2 MG tablet Take 1 tablet by mouth 4 (Four) Times a Day As Needed for Diarrhea.      meclizine (ANTIVERT) 25 MG tablet Take 1 tablet by mouth 3 (Three) Times a Day As Needed for Dizziness. 42 tablet 2    nitroglycerin (NITROSTAT) 0.4 MG SL tablet Place 1 tablet under the tongue Every 5 (Five) Minutes As Needed for Chest Pain. Take no more than 3 doses in 15 minutes.      pantoprazole (PROTONIX) 40 MG EC tablet Take 1 tablet by mouth 2 (Two) Times a Day. 60 tablet 2    potassium chloride (KLOR-CON M20) 20 MEQ CR tablet Take 1 tablet by mouth 2 (Two) Times a Day.      Synthroid 125 MCG tablet Take 1 tablet by mouth Daily for 30 days. 30 tablet 1    topiramate (TOPAMAX) 50 MG tablet Take 1 tablet by mouth 2 (Two) Times a Day. 60 tablet 5    [DISCONTINUED] cholestyramine (QUESTRAN) 4 g packet Take 1 packet by mouth Daily for 14 doses. 14 packet 0       No Known Allergies    Social History     Socioeconomic History    Marital status:    Tobacco Use    Smoking status: Former     Current packs/day: 0.00     Average packs/day: 0.7 packs/day for 45.9 years (30.0 ttl pk-yrs)     Types: Cigarettes     Start date: 1991     Quit date: 3/1/2024     Years since quittin.4     Passive exposure: Past    Smokeless tobacco: Never    Tobacco comments:     Less than 1/2 pack a day    Vaping Use    Vaping status: Never Used   Substance and Sexual Activity    Alcohol use: Yes     Comment: daily    Drug  "use: No    Sexual activity: Not Currently     Partners: Male     Birth control/protection: Hysterectomy       Family History   Problem Relation Age of Onset    Asthma Mother     Cancer Mother     Emphysema Mother     Colon cancer Father     Cancer Father     Heart failure Father     Hypertension Father        Review of Systems   Constitutional:  Positive for fatigue. Negative for chills, fever and unexpected weight change.   Respiratory:  Negative for shortness of breath.    Cardiovascular:  Positive for leg swelling. Negative for chest pain.   Gastrointestinal:  Negative for abdominal distention, anal bleeding, blood in stool and constipation.   Neurological:  Positive for weakness.        Vitals:    08/19/24 0944   BP: 162/98   Pulse: 88   Temp: 98 °F (36.7 °C)   SpO2: 99%   Weight: 83 kg (183 lb)   Height: 174 cm (68.5\")      Body mass index is 27.42 kg/m².    Physical Exam  Vitals reviewed.   Constitutional:       General: She is not in acute distress.  Cardiovascular:      Rate and Rhythm: Normal rate and regular rhythm.      Heart sounds: Normal heart sounds.   Pulmonary:      Effort: Pulmonary effort is normal.      Breath sounds: Normal breath sounds.   Abdominal:      General: Bowel sounds are normal. There is no distension.      Palpations: Abdomen is soft.      Tenderness: There is abdominal tenderness (mild generalized tenderness .).   Skin:     General: Skin is warm and dry.   Neurological:      Mental Status: She is alert.         Results for orders placed or performed during the hospital encounter of 07/29/24   Blood Culture - Blood, Arm, Right    Specimen: Arm, Right; Blood   Result Value Ref Range    Blood Culture No growth at 5 days    Blood Culture - Blood, Arm, Left    Specimen: Arm, Left; Blood   Result Value Ref Range    Blood Culture No growth at 5 days    Gastrointestinal Panel, PCR - Stool, Per Rectum    Specimen: Per Rectum; Stool   Result Value Ref Range    Campylobacter Not Detected Not " Detected    Plesiomonas shigelloides Not Detected Not Detected    Salmonella Not Detected Not Detected    Vibrio Not Detected Not Detected    Vibrio cholerae Not Detected Not Detected    Yersinia enterocolitica Not Detected Not Detected    Enteroaggregative E. coli (EAEC) Not Detected Not Detected    Enteropathogenic E. coli (EPEC) Detected (A) Not Detected    Enterotoxigenic E. coli (ETEC) lt/st Not Detected Not Detected    Shiga-like toxin-producing E. coli (STEC) stx1/stx2 Not Detected Not Detected    Shigella/Enteroinvasive E. coli (EIEC) Not Detected Not Detected    Cryptosporidium Not Detected Not Detected    Cyclospora cayetanensis Not Detected Not Detected    Entamoeba histolytica Not Detected Not Detected    Giardia lamblia Not Detected Not Detected    Adenovirus F40/41 Not Detected Not Detected    Astrovirus Not Detected Not Detected    Norovirus GI/GII Not Detected Not Detected    Rotavirus A Not Detected Not Detected    Sapovirus (I, II, IV or V) Not Detected Not Detected   Clostridioides difficile Toxin, PCR - Stool, Per Rectum    Specimen: Per Rectum; Stool   Result Value Ref Range    Toxigenic C. difficile by PCR Positive (A) Negative   Clostridioides difficile toxin Ag, Reflex - Stool, Per Rectum    Specimen: Per Rectum; Stool   Result Value Ref Range    C.diff Toxin Ag Negative Negative   Comprehensive Metabolic Panel    Specimen: Blood   Result Value Ref Range    Glucose 102 (H) 65 - 99 mg/dL    BUN 3 (L) 6 - 20 mg/dL    Creatinine 0.45 (L) 0.57 - 1.00 mg/dL    Sodium 137 136 - 145 mmol/L    Potassium 2.3 (C) 3.5 - 5.2 mmol/L    Chloride 92 (L) 98 - 107 mmol/L    CO2 25.0 22.0 - 29.0 mmol/L    Calcium 8.5 (L) 8.6 - 10.5 mg/dL    Total Protein 6.8 6.0 - 8.5 g/dL    Albumin 3.4 (L) 3.5 - 5.2 g/dL    ALT (SGPT) 43 (H) 1 - 33 U/L    AST (SGOT) 125 (H) 1 - 32 U/L    Alkaline Phosphatase 621 (H) 39 - 117 U/L    Total Bilirubin 0.9 0.0 - 1.2 mg/dL    Globulin 3.4 gm/dL    A/G Ratio 1.0 g/dL     BUN/Creatinine Ratio 6.7 (L) 7.0 - 25.0    Anion Gap 20.0 (H) 5.0 - 15.0 mmol/L    eGFR 116.6 >60.0 mL/min/1.73   Lipase    Specimen: Blood   Result Value Ref Range    Lipase 37 13 - 60 U/L   Urinalysis With Culture If Indicated - Urine, Clean Catch    Specimen: Urine, Clean Catch   Result Value Ref Range    Color, UA Dark Yellow (A) Yellow, Straw    Appearance, UA Clear Clear    pH, UA 6.5 5.0 - 8.0    Specific Gravity, UA 1.010 1.005 - 1.030    Glucose, UA Negative Negative    Ketones, UA Negative Negative    Bilirubin, UA Negative Negative    Blood, UA Negative Negative    Protein, UA Negative Negative    Leuk Esterase, UA Negative Negative    Nitrite, UA Negative Negative    Urobilinogen, UA 0.2 E.U./dL 0.2 - 1.0 E.U./dL   Procalcitonin    Specimen: Blood   Result Value Ref Range    Procalcitonin 36.34 (H) 0.00 - 0.25 ng/mL   Lactic Acid, Plasma    Specimen: Blood   Result Value Ref Range    Lactate 7.4 (C) 0.5 - 2.0 mmol/L   CBC Auto Differential    Specimen: Blood   Result Value Ref Range    WBC 8.37 3.40 - 10.80 10*3/mm3    RBC 3.54 (L) 3.77 - 5.28 10*6/mm3    Hemoglobin 12.5 12.0 - 15.9 g/dL    Hematocrit 36.7 34.0 - 46.6 %    .7 (H) 79.0 - 97.0 fL    MCH 35.3 (H) 26.6 - 33.0 pg    MCHC 34.1 31.5 - 35.7 g/dL    RDW 15.4 12.3 - 15.4 %    RDW-SD 59.6 (H) 37.0 - 54.0 fl    MPV 10.3 6.0 - 12.0 fL    Platelets 285 140 - 450 10*3/mm3    Neutrophil % 56.6 42.7 - 76.0 %    Lymphocyte % 28.1 19.6 - 45.3 %    Monocyte % 11.2 5.0 - 12.0 %    Eosinophil % 1.8 0.3 - 6.2 %    Basophil % 1.1 0.0 - 1.5 %    Immature Grans % 1.2 (H) 0.0 - 0.5 %    Neutrophils, Absolute 4.74 1.70 - 7.00 10*3/mm3    Lymphocytes, Absolute 2.35 0.70 - 3.10 10*3/mm3    Monocytes, Absolute 0.94 (H) 0.10 - 0.90 10*3/mm3    Eosinophils, Absolute 0.15 0.00 - 0.40 10*3/mm3    Basophils, Absolute 0.09 0.00 - 0.20 10*3/mm3    Immature Grans, Absolute 0.10 (H) 0.00 - 0.05 10*3/mm3    nRBC 0.0 0.0 - 0.2 /100 WBC   STAT Lactic Acid, Reflex     Specimen: Blood   Result Value Ref Range    Lactate 4.2 (C) 0.5 - 2.0 mmol/L   Magnesium    Specimen: Blood   Result Value Ref Range    Magnesium 1.6 1.6 - 2.6 mg/dL   Protime-INR    Specimen: Blood   Result Value Ref Range    Protime 12.7 11.8 - 14.8 Seconds    INR 0.91 0.91 - 1.09   Potassium    Specimen: Blood   Result Value Ref Range    Potassium 2.4 (C) 3.5 - 5.2 mmol/L   Potassium    Specimen: Blood   Result Value Ref Range    Potassium 2.6 (C) 3.5 - 5.2 mmol/L   Magnesium    Specimen: Blood   Result Value Ref Range    Magnesium 1.6 1.6 - 2.6 mg/dL   STAT Lactic Acid, Reflex    Specimen: Blood   Result Value Ref Range    Lactate 4.3 (C) 0.5 - 2.0 mmol/L   STAT Lactic Acid, Reflex    Specimen: Blood   Result Value Ref Range    Lactate 5.3 (C) 0.5 - 2.0 mmol/L   Magnesium    Specimen: Blood   Result Value Ref Range    Magnesium 2.0 1.6 - 2.6 mg/dL   Comprehensive Metabolic Panel    Specimen: Blood   Result Value Ref Range    Glucose 103 (H) 65 - 99 mg/dL    BUN 4 (L) 6 - 20 mg/dL    Creatinine 0.43 (L) 0.57 - 1.00 mg/dL    Sodium 140 136 - 145 mmol/L    Potassium 3.1 (L) 3.5 - 5.2 mmol/L    Chloride 101 98 - 107 mmol/L    CO2 27.0 22.0 - 29.0 mmol/L    Calcium 7.5 (L) 8.6 - 10.5 mg/dL    Total Protein 5.2 (L) 6.0 - 8.5 g/dL    Albumin 2.8 (L) 3.5 - 5.2 g/dL    ALT (SGPT) 33 1 - 33 U/L    AST (SGOT) 120 (H) 1 - 32 U/L    Alkaline Phosphatase 473 (H) 39 - 117 U/L    Total Bilirubin 0.8 0.0 - 1.2 mg/dL    Globulin 2.4 gm/dL    A/G Ratio 1.2 g/dL    BUN/Creatinine Ratio 9.3 7.0 - 25.0    Anion Gap 12.0 5.0 - 15.0 mmol/L    eGFR 117.9 >60.0 mL/min/1.73   CBC Auto Differential    Specimen: Blood   Result Value Ref Range    WBC 8.06 3.40 - 10.80 10*3/mm3    RBC 2.78 (L) 3.77 - 5.28 10*6/mm3    Hemoglobin 9.7 (L) 12.0 - 15.9 g/dL    Hematocrit 29.7 (L) 34.0 - 46.6 %    .8 (H) 79.0 - 97.0 fL    MCH 34.9 (H) 26.6 - 33.0 pg    MCHC 32.7 31.5 - 35.7 g/dL    RDW 16.0 (H) 12.3 - 15.4 %    RDW-SD 63.1 (H) 37.0 - 54.0 fl     MPV 10.4 6.0 - 12.0 fL    Platelets 217 140 - 450 10*3/mm3   Protime-INR    Specimen: Blood   Result Value Ref Range    Protime 13.8 11.8 - 14.8 Seconds    INR 1.02 0.91 - 1.09   STAT Lactic Acid, Reflex    Specimen: Blood   Result Value Ref Range    Lactate 3.5 (C) 0.5 - 2.0 mmol/L   Manual Differential    Specimen: Blood   Result Value Ref Range    Neutrophil % 48.0 42.7 - 76.0 %    Lymphocyte % 37.0 19.6 - 45.3 %    Monocyte % 11.0 5.0 - 12.0 %    Eosinophil % 3.0 0.3 - 6.2 %    Basophil % 0.0 0.0 - 1.5 %    Bands %  1.0 0.0 - 5.0 %    Neutrophils Absolute 3.95 1.70 - 7.00 10*3/mm3    Lymphocytes Absolute 2.98 0.70 - 3.10 10*3/mm3    Monocytes Absolute 0.89 0.10 - 0.90 10*3/mm3    Eosinophils Absolute 0.24 0.00 - 0.40 10*3/mm3    Basophils Absolute 0.00 0.00 - 0.20 10*3/mm3    Anisocytosis Slight/1+ None Seen    Macrocytes Slight/1+ None Seen    Poikilocytes Slight/1+ None Seen    Stomatocytes Slight/1+ None Seen    WBC Morphology Normal Normal    Platelet Morphology Normal Normal   STAT Lactic Acid, Reflex    Specimen: Blood   Result Value Ref Range    Lactate 3.1 (C) 0.5 - 2.0 mmol/L   Potassium    Specimen: Blood   Result Value Ref Range    Potassium 3.6 3.5 - 5.2 mmol/L   CBC (No Diff)    Specimen: Blood   Result Value Ref Range    WBC 9.25 3.40 - 10.80 10*3/mm3    RBC 2.88 (L) 3.77 - 5.28 10*6/mm3    Hemoglobin 10.0 (L) 12.0 - 15.9 g/dL    Hematocrit 31.1 (L) 34.0 - 46.6 %    .0 (H) 79.0 - 97.0 fL    MCH 34.7 (H) 26.6 - 33.0 pg    MCHC 32.2 31.5 - 35.7 g/dL    RDW 16.2 (H) 12.3 - 15.4 %    RDW-SD 65.1 (H) 37.0 - 54.0 fl    MPV 10.1 6.0 - 12.0 fL    Platelets 225 140 - 450 10*3/mm3   Comprehensive Metabolic Panel    Specimen: Blood   Result Value Ref Range    Glucose 83 65 - 99 mg/dL    BUN 4 (L) 6 - 20 mg/dL    Creatinine 0.39 (L) 0.57 - 1.00 mg/dL    Sodium 138 136 - 145 mmol/L    Potassium 3.4 (L) 3.5 - 5.2 mmol/L    Chloride 106 98 - 107 mmol/L    CO2 26.0 22.0 - 29.0 mmol/L    Calcium 7.7 (L)  8.6 - 10.5 mg/dL    Total Protein 5.3 (L) 6.0 - 8.5 g/dL    Albumin 2.8 (L) 3.5 - 5.2 g/dL    ALT (SGPT) 37 (H) 1 - 33 U/L    AST (SGOT) 111 (H) 1 - 32 U/L    Alkaline Phosphatase 523 (H) 39 - 117 U/L    Total Bilirubin 1.0 0.0 - 1.2 mg/dL    Globulin 2.5 gm/dL    A/G Ratio 1.1 g/dL    BUN/Creatinine Ratio 10.3 7.0 - 25.0    Anion Gap 6.0 5.0 - 15.0 mmol/L    eGFR 120.7 >60.0 mL/min/1.73   Mitochondrial Antibodies, M2    Specimen: Blood   Result Value Ref Range    Mitochondrial Ab <20.0 0.0 - 20.0 Units   Vitamin B12    Specimen: Blood   Result Value Ref Range    Vitamin B-12 1,225 (H) 211 - 946 pg/mL   Folate    Specimen: Blood   Result Value Ref Range    Folate 5.72 4.78 - 24.20 ng/mL   Comprehensive Metabolic Panel    Specimen: Blood   Result Value Ref Range    Glucose 68 65 - 99 mg/dL    BUN 4 (L) 6 - 20 mg/dL    Creatinine 0.41 (L) 0.57 - 1.00 mg/dL    Sodium 140 136 - 145 mmol/L    Potassium 3.8 3.5 - 5.2 mmol/L    Chloride 108 (H) 98 - 107 mmol/L    CO2 23.0 22.0 - 29.0 mmol/L    Calcium 7.7 (L) 8.6 - 10.5 mg/dL    Total Protein 5.5 (L) 6.0 - 8.5 g/dL    Albumin 2.7 (L) 3.5 - 5.2 g/dL    ALT (SGPT) 36 (H) 1 - 33 U/L    AST (SGOT) 105 (H) 1 - 32 U/L    Alkaline Phosphatase 546 (H) 39 - 117 U/L    Total Bilirubin 0.7 0.0 - 1.2 mg/dL    Globulin 2.8 gm/dL    A/G Ratio 1.0 g/dL    BUN/Creatinine Ratio 9.8 7.0 - 25.0    Anion Gap 9.0 5.0 - 15.0 mmol/L    eGFR 119.3 >60.0 mL/min/1.73   CBC Auto Differential    Specimen: Blood   Result Value Ref Range    WBC 11.52 (H) 3.40 - 10.80 10*3/mm3    RBC 2.83 (L) 3.77 - 5.28 10*6/mm3    Hemoglobin 10.0 (L) 12.0 - 15.9 g/dL    Hematocrit 31.6 (L) 34.0 - 46.6 %    .7 (H) 79.0 - 97.0 fL    MCH 35.3 (H) 26.6 - 33.0 pg    MCHC 31.6 31.5 - 35.7 g/dL    RDW 16.4 (H) 12.3 - 15.4 %    RDW-SD 67.0 (H) 37.0 - 54.0 fl    MPV 10.5 6.0 - 12.0 fL    Platelets 250 140 - 450 10*3/mm3   Manual Differential    Specimen: Blood   Result Value Ref Range    Neutrophil % 57.0 42.7 - 76.0 %     Lymphocyte % 15.0 (L) 19.6 - 45.3 %    Monocyte % 10.0 5.0 - 12.0 %    Eosinophil % 3.0 0.3 - 6.2 %    Basophil % 2.0 (H) 0.0 - 1.5 %    Bands %  8.0 (H) 0.0 - 5.0 %    Metamyelocyte % 2.0 (H) 0.0 - 0.0 %    Atypical Lymphocyte % 3.0 0.0 - 5.0 %    Neutrophils Absolute 7.49 (H) 1.70 - 7.00 10*3/mm3    Lymphocytes Absolute 2.07 0.70 - 3.10 10*3/mm3    Monocytes Absolute 1.15 (H) 0.10 - 0.90 10*3/mm3    Eosinophils Absolute 0.35 0.00 - 0.40 10*3/mm3    Basophils Absolute 0.23 (H) 0.00 - 0.20 10*3/mm3    Anisocytosis Mod/2+ None Seen    Macrocytes Large/3+ None Seen    Poikilocytes Slight/1+ None Seen    Target Cells Slight/1+ None Seen    Stomatocytes Slight/1+ None Seen    WBC Morphology Normal Normal    Platelet Morphology Normal Normal   Lactic Acid, Plasma    Specimen: Blood   Result Value Ref Range    Lactate 0.8 0.5 - 2.0 mmol/L   Basic Metabolic Panel    Specimen: Blood   Result Value Ref Range    Glucose 103 (H) 65 - 99 mg/dL    BUN 2 (L) 6 - 20 mg/dL    Creatinine 0.51 (L) 0.57 - 1.00 mg/dL    Sodium 142 136 - 145 mmol/L    Potassium 3.3 (L) 3.5 - 5.2 mmol/L    Chloride 111 (H) 98 - 107 mmol/L    CO2 23.0 22.0 - 29.0 mmol/L    Calcium 7.9 (L) 8.6 - 10.5 mg/dL    BUN/Creatinine Ratio 3.9 (L) 7.0 - 25.0    Anion Gap 8.0 5.0 - 15.0 mmol/L    eGFR 113.2 >60.0 mL/min/1.73   Magnesium    Specimen: Blood   Result Value Ref Range    Magnesium 1.5 (L) 1.6 - 2.6 mg/dL   Hemoglobin & Hematocrit, Blood    Specimen: Blood   Result Value Ref Range    Hemoglobin 9.2 (L) 12.0 - 15.9 g/dL    Hematocrit 29.4 (L) 34.0 - 46.6 %   Comprehensive Metabolic Panel    Specimen: Blood   Result Value Ref Range    Glucose 100 (H) 65 - 99 mg/dL    BUN 2 (L) 6 - 20 mg/dL    Creatinine 0.49 (L) 0.57 - 1.00 mg/dL    Sodium 142 136 - 145 mmol/L    Potassium 4.1 3.5 - 5.2 mmol/L    Chloride 108 (H) 98 - 107 mmol/L    CO2 24.0 22.0 - 29.0 mmol/L    Calcium 8.6 8.6 - 10.5 mg/dL    Total Protein 5.8 (L) 6.0 - 8.5 g/dL    Albumin 3.0 (L) 3.5 -  5.2 g/dL    ALT (SGPT) 39 (H) 1 - 33 U/L    AST (SGOT) 99 (H) 1 - 32 U/L    Alkaline Phosphatase 653 (H) 39 - 117 U/L    Total Bilirubin 0.5 0.0 - 1.2 mg/dL    Globulin 2.8 gm/dL    A/G Ratio 1.1 g/dL    BUN/Creatinine Ratio 4.1 (L) 7.0 - 25.0    Anion Gap 10.0 5.0 - 15.0 mmol/L    eGFR 114.3 >60.0 mL/min/1.73   Magnesium    Specimen: Blood   Result Value Ref Range    Magnesium 1.8 1.6 - 2.6 mg/dL   Phosphorus    Specimen: Blood   Result Value Ref Range    Phosphorus 3.8 2.5 - 4.5 mg/dL   Celiac Panel Reflex To Titer    Specimen: Blood   Result Value Ref Range    Gliadin Deamidated Peptide Ab, IgA 79 (H) 0 - 19 units    Tissue Transglutaminase IgA <2 0 - 3 U/mL    IgA 352 87 - 352 mg/dL   CBC Auto Differential    Specimen: Blood   Result Value Ref Range    WBC 12.03 (H) 3.40 - 10.80 10*3/mm3    RBC 2.71 (L) 3.77 - 5.28 10*6/mm3    Hemoglobin 9.4 (L) 12.0 - 15.9 g/dL    Hematocrit 30.5 (L) 34.0 - 46.6 %    .5 (H) 79.0 - 97.0 fL    MCH 34.7 (H) 26.6 - 33.0 pg    MCHC 30.8 (L) 31.5 - 35.7 g/dL    RDW 17.3 (H) 12.3 - 15.4 %    RDW-SD 71.8 (H) 37.0 - 54.0 fl    MPV 10.6 6.0 - 12.0 fL    Platelets 372 140 - 450 10*3/mm3   Manual Differential    Specimen: Blood   Result Value Ref Range    Neutrophil % 61.0 42.7 - 76.0 %    Lymphocyte % 20.0 19.6 - 45.3 %    Monocyte % 8.0 5.0 - 12.0 %    Eosinophil % 4.0 0.3 - 6.2 %    Basophil % 0.0 0.0 - 1.5 %    Bands %  2.0 0.0 - 5.0 %    Metamyelocyte % 1.0 (H) 0.0 - 0.0 %    Myelocyte % 2.0 (H) 0.0 - 0.0 %    Promyelocyte % 1.0 (H) 0.0 - 0.0 %    Atypical Lymphocyte % 1.0 0.0 - 5.0 %    Neutrophils Absolute 7.58 (H) 1.70 - 7.00 10*3/mm3    Lymphocytes Absolute 2.53 0.70 - 3.10 10*3/mm3    Monocytes Absolute 0.96 (H) 0.10 - 0.90 10*3/mm3    Eosinophils Absolute 0.48 (H) 0.00 - 0.40 10*3/mm3    Basophils Absolute 0.00 0.00 - 0.20 10*3/mm3    nRBC 1.0 (H) 0.0 - 0.2 /100 WBC    Anisocytosis Mod/2+ None Seen    Macrocytes Mod/2+ None Seen    Polychromasia Slight/1+ None Seen     Stomatocytes Slight/1+ None Seen    WBC Morphology Normal Normal    Platelet Morphology Normal Normal   Comprehensive Metabolic Panel    Specimen: Blood   Result Value Ref Range    Glucose 95 65 - 99 mg/dL    BUN 2 (L) 6 - 20 mg/dL    Creatinine 0.54 (L) 0.57 - 1.00 mg/dL    Sodium 144 136 - 145 mmol/L    Potassium 4.0 3.5 - 5.2 mmol/L    Chloride 110 (H) 98 - 107 mmol/L    CO2 25.0 22.0 - 29.0 mmol/L    Calcium 8.8 8.6 - 10.5 mg/dL    Total Protein 5.6 (L) 6.0 - 8.5 g/dL    Albumin 2.9 (L) 3.5 - 5.2 g/dL    ALT (SGPT) 29 1 - 33 U/L    AST (SGOT) 59 (H) 1 - 32 U/L    Alkaline Phosphatase 581 (H) 39 - 117 U/L    Total Bilirubin 0.3 0.0 - 1.2 mg/dL    Globulin 2.7 gm/dL    A/G Ratio 1.1 g/dL    BUN/Creatinine Ratio 3.7 (L) 7.0 - 25.0    Anion Gap 9.0 5.0 - 15.0 mmol/L    eGFR 111.6 >60.0 mL/min/1.73   CBC Auto Differential    Specimen: Blood   Result Value Ref Range    WBC 10.51 3.40 - 10.80 10*3/mm3    RBC 2.70 (L) 3.77 - 5.28 10*6/mm3    Hemoglobin 9.4 (L) 12.0 - 15.9 g/dL    Hematocrit 29.9 (L) 34.0 - 46.6 %    .7 (H) 79.0 - 97.0 fL    MCH 34.8 (H) 26.6 - 33.0 pg    MCHC 31.4 (L) 31.5 - 35.7 g/dL    RDW 17.2 (H) 12.3 - 15.4 %    RDW-SD 70.2 (H) 37.0 - 54.0 fl    MPV 10.5 6.0 - 12.0 fL    Platelets 419 140 - 450 10*3/mm3   Manual Differential    Specimen: Blood   Result Value Ref Range    Neutrophil % 68.0 42.7 - 76.0 %    Lymphocyte % 17.5 (L) 19.6 - 45.3 %    Monocyte % 9.3 5.0 - 12.0 %    Eosinophil % 1.0 0.3 - 6.2 %    Basophil % 1.0 0.0 - 1.5 %    Myelocyte % 2.1 (H) 0.0 - 0.0 %    Atypical Lymphocyte % 1.0 0.0 - 5.0 %    Neutrophils Absolute 7.15 (H) 1.70 - 7.00 10*3/mm3    Lymphocytes Absolute 1.94 0.70 - 3.10 10*3/mm3    Monocytes Absolute 0.98 (H) 0.10 - 0.90 10*3/mm3    Eosinophils Absolute 0.11 0.00 - 0.40 10*3/mm3    Basophils Absolute 0.11 0.00 - 0.20 10*3/mm3    Anisocytosis Slight/1+ None Seen    Macrocytes Mod/2+ None Seen    Poikilocytes Slight/1+ None Seen    Polychromasia Slight/1+ None  Seen    Stomatocytes Slight/1+ None Seen    WBC Morphology Normal Normal    Giant Platelets Slight/1+ None Seen   Basic Metabolic Panel    Specimen: Blood   Result Value Ref Range    Glucose 90 65 - 99 mg/dL    BUN 3 (L) 6 - 20 mg/dL    Creatinine 0.59 0.57 - 1.00 mg/dL    Sodium 141 136 - 145 mmol/L    Potassium 3.7 3.5 - 5.2 mmol/L    Chloride 108 (H) 98 - 107 mmol/L    CO2 26.0 22.0 - 29.0 mmol/L    Calcium 8.6 8.6 - 10.5 mg/dL    BUN/Creatinine Ratio 5.1 (L) 7.0 - 25.0    Anion Gap 7.0 5.0 - 15.0 mmol/L    eGFR 109.3 >60.0 mL/min/1.73   ECG 12 Lead Electrolyte Imbalance   Result Value Ref Range    QT Interval 406 ms    QTC Interval 512 ms   Tissue Pathology Exam    Specimen: A: Large Intestine; Tissue    B: Large Intestine; Tissue   Result Value Ref Range    Note to Patients       This report may contain a detailed description of human tissue sent by a health care provider to the laboratory for pathologic evaluation. The content of this report is essential for diagnosis and may provide important critical findings. This information may be unfamiliar to patients to review without a medical professional present. It is advised that the patient review this report in the presence of a health care provider who can answer questions and explain the results.      Case Report       Surgical Pathology Report                         Case: [id number]                                  Authorizing Provider:  Marty Browning MD       Collected:           08/02/2024 11:41 AM          Ordering Location:     [facility]     Received:            08/02/2024 12:55 PM                                 ENDOSCOPY                                                                    Pathologist:           Raiza Wheeler MD                                                        Specimens:   1) - Large Intestine, bx @ right colon                                                              2) - Large Intestine, bx @ left colon     "                                                  Final Diagnosis       1.  Large intestine, right colon, biopsy: Fragments of benign colonic mucosa.    Comment: Histologic changes of collagenous or lymphocytic (microscopic) colitis are not identified.    2.  Large intestine, left colon, biopsy: Fragments of benign colonic mucosa.    Comment: Histologic changes of collagenous or lymphocytic (microscopic) colitis are not identified.      Gross Description       1. Large Intestine.  Specimen is received in a formalin filled container, labeled with the patient's name, date of birth, and \"biopsy of right colon\".  The specimen consists of 2 tan-pink tissue fragments measuring 0.2 x 0.2 x 0.1 cm.  The specimen is totally submitted in block 1A.    2. Large Intestine.  Specimen is received in a formalin filled container, labeled with the patient's name, date of birth, and \"biopsy at left colon\".  The specimen consists of 4 tan-pink tissue fragments measuring up to 0.4 x 0.2 x 0.1 cm.  The specimen is totally submitted in block 2A.        Microscopic Description       1.  Sections of the right colon biopsy reveal fragments of colonic mucosa.  The collagen table is not thickened.  Crypt abscesses or granulomata are not identified and intraepithelial lymphocytes are not increased.  There is no glandular dysplasia.  There is no evidence of malignancy.    2.  Sections of the left colon biopsy reveal fragments of colonic mucosa.  The collagen table is not thickened.  Crypt abscesses or granulomata are not identified and intraepithelial lymphocytes are not increased.  There is no glandular dysplasia.  There is no evidence of malignancy.             ASSESSMENT AND PLAN    Assessment & Plan     Diagnoses and all orders for this visit:    1. Diarrhea, unspecified type (Primary)  -     Case Request; Standing  -     Case Request  -     5 HIAA, Urine, Quantitative, 24 Hour - Urine, Clean Catch; Future  -     Gastrin; Future  -     " Vasoactive Intestinal Peptide (VIP); Future  -     Fecal Fat, Qualitative - Stool, Per Rectum; Future  -     Comprehensive Metabolic Panel; Future  -     CBC & Differential; Future  -     CT Abdomen Pelvis With & Without Contrast Enterography; Future    2. Nausea and vomiting, unspecified vomiting type  -     CT Abdomen Pelvis With & Without Contrast Enterography; Future    3. Abnormal LFTs  -     Alpha - 1 - Antitrypsin; Future  -     Anti-Smooth Muscle Antibody Titer; Future  -     Ferritin; Future  -     Ceruloplasmin; Future  -     Hepatic Function Panel; Future  -     Hepatitis Panel, Acute; Future  -     Iron Profile; Future  -     Mitochondrial Antibodies, M2; Future  -     BARRERA by IFA, Reflex to Titer and Pattern; Future    4. Fatty liver    Other orders  -     colestipol (COLESTID) 1 g tablet; Take 1 tablet by mouth Daily.  Dispense: 30 tablet; Refill: 2  -     Implement Anesthesia Orders Day of Procedure; Standing  -     Obtain Informed Consent; Standing     Diarrhea is the main complaint.  I recommend stop Questran.  Trial of Colestid.  I recommend check CT enterography.  I also recommend check urine for 5 HIAA, gastrin level, and serum VIP.  Check stool for fecal fat to rule out pancreatic insufficiency.         In regards to abnormal LFTs, I recommend check liver serologies. Office visit 2 weeks.   Discussed fatty liver. I also recommended no alcohol. Discussed ill effects of alcohol.        EGD canceled. Discussed with Dr. Wang. She had recent egd and he did not visually see evidence of celiac sprue.  Also her TTG was negative.    No follow-ups on file.          There are no Patient Instructions on file for this visit.      BREANA Mendoza

## 2024-08-21 ENCOUNTER — LAB (OUTPATIENT)
Dept: LAB | Facility: HOSPITAL | Age: 49
End: 2024-08-21
Payer: COMMERCIAL

## 2024-08-21 DIAGNOSIS — R79.89 ABNORMAL LFTS: ICD-10-CM

## 2024-08-21 DIAGNOSIS — R19.7 DIARRHEA, UNSPECIFIED TYPE: ICD-10-CM

## 2024-08-21 LAB
ALBUMIN SERPL-MCNC: 3.4 G/DL (ref 3.5–5.2)
ALBUMIN/GLOB SERPL: 1 G/DL
ALP SERPL-CCNC: 673 U/L (ref 39–117)
ALT SERPL W P-5'-P-CCNC: 30 U/L (ref 1–33)
ANION GAP SERPL CALCULATED.3IONS-SCNC: 14 MMOL/L (ref 5–15)
AST SERPL-CCNC: 86 U/L (ref 1–32)
BASOPHILS # BLD AUTO: 0.14 10*3/MM3 (ref 0–0.2)
BASOPHILS NFR BLD AUTO: 1.4 % (ref 0–1.5)
BILIRUB CONJ SERPL-MCNC: 0.2 MG/DL (ref 0–0.3)
BILIRUB SERPL-MCNC: 0.3 MG/DL (ref 0–1.2)
BUN SERPL-MCNC: 2 MG/DL (ref 6–20)
BUN/CREAT SERPL: 3.6 (ref 7–25)
CALCIUM SPEC-SCNC: 8.7 MG/DL (ref 8.6–10.5)
CHLORIDE SERPL-SCNC: 99 MMOL/L (ref 98–107)
CO2 SERPL-SCNC: 28 MMOL/L (ref 22–29)
CREAT SERPL-MCNC: 0.55 MG/DL (ref 0.57–1)
DEPRECATED RDW RBC AUTO: 63.7 FL (ref 37–54)
EGFRCR SERPLBLD CKD-EPI 2021: 111.1 ML/MIN/1.73
EOSINOPHIL # BLD AUTO: 0.39 10*3/MM3 (ref 0–0.4)
EOSINOPHIL NFR BLD AUTO: 3.9 % (ref 0.3–6.2)
ERYTHROCYTE [DISTWIDTH] IN BLOOD BY AUTOMATED COUNT: 15.8 % (ref 12.3–15.4)
FERRITIN SERPL-MCNC: 222.1 NG/ML (ref 13–150)
GLOBULIN UR ELPH-MCNC: 3.5 GM/DL
GLUCOSE SERPL-MCNC: 122 MG/DL (ref 65–99)
HAV IGM SERPL QL IA: NORMAL
HBV CORE IGM SERPL QL IA: NORMAL
HBV SURFACE AG SERPL QL IA: NORMAL
HCT VFR BLD AUTO: 38.5 % (ref 34–46.6)
HCV AB SER QL: NORMAL
HGB BLD-MCNC: 12.1 G/DL (ref 12–15.9)
IMM GRANULOCYTES # BLD AUTO: 0.15 10*3/MM3 (ref 0–0.05)
IMM GRANULOCYTES NFR BLD AUTO: 1.5 % (ref 0–0.5)
IRON 24H UR-MRATE: 114 MCG/DL (ref 37–145)
IRON SATN MFR SERPL: 35 % (ref 20–50)
LYMPHOCYTES # BLD AUTO: 3.95 10*3/MM3 (ref 0.7–3.1)
LYMPHOCYTES NFR BLD AUTO: 39.6 % (ref 19.6–45.3)
MCH RBC QN AUTO: 34.1 PG (ref 26.6–33)
MCHC RBC AUTO-ENTMCNC: 31.4 G/DL (ref 31.5–35.7)
MCV RBC AUTO: 108.5 FL (ref 79–97)
MONOCYTES # BLD AUTO: 0.89 10*3/MM3 (ref 0.1–0.9)
MONOCYTES NFR BLD AUTO: 8.9 % (ref 5–12)
NEUTROPHILS NFR BLD AUTO: 4.45 10*3/MM3 (ref 1.7–7)
NEUTROPHILS NFR BLD AUTO: 44.7 % (ref 42.7–76)
PLATELET # BLD AUTO: 327 10*3/MM3 (ref 140–450)
PMV BLD AUTO: 10.1 FL (ref 6–12)
POTASSIUM SERPL-SCNC: 3 MMOL/L (ref 3.5–5.2)
PROT SERPL-MCNC: 6.9 G/DL (ref 6–8.5)
RBC # BLD AUTO: 3.55 10*6/MM3 (ref 3.77–5.28)
SODIUM SERPL-SCNC: 141 MMOL/L (ref 136–145)
TIBC SERPL-MCNC: 325 MCG/DL (ref 298–536)
TRANSFERRIN SERPL-MCNC: 218 MG/DL (ref 200–360)
WBC NRBC COR # BLD AUTO: 9.97 10*3/MM3 (ref 3.4–10.8)

## 2024-08-21 PROCEDURE — 86381 MITOCHONDRIAL ANTIBODY EACH: CPT

## 2024-08-21 PROCEDURE — 82248 BILIRUBIN DIRECT: CPT

## 2024-08-21 PROCEDURE — 84586 ASSAY OF VIP: CPT

## 2024-08-21 PROCEDURE — 36415 COLL VENOUS BLD VENIPUNCTURE: CPT

## 2024-08-21 PROCEDURE — 85025 COMPLETE CBC W/AUTO DIFF WBC: CPT

## 2024-08-21 PROCEDURE — 86038 ANTINUCLEAR ANTIBODIES: CPT

## 2024-08-21 PROCEDURE — 82941 ASSAY OF GASTRIN: CPT

## 2024-08-21 PROCEDURE — 83540 ASSAY OF IRON: CPT

## 2024-08-21 PROCEDURE — 82728 ASSAY OF FERRITIN: CPT

## 2024-08-21 PROCEDURE — 82103 ALPHA-1-ANTITRYPSIN TOTAL: CPT

## 2024-08-21 PROCEDURE — 80053 COMPREHEN METABOLIC PANEL: CPT

## 2024-08-21 PROCEDURE — 80074 ACUTE HEPATITIS PANEL: CPT

## 2024-08-21 PROCEDURE — 86015 ACTIN ANTIBODY EACH: CPT

## 2024-08-21 PROCEDURE — 82390 ASSAY OF CERULOPLASMIN: CPT

## 2024-08-21 PROCEDURE — 84466 ASSAY OF TRANSFERRIN: CPT

## 2024-08-22 ENCOUNTER — TELEPHONE (OUTPATIENT)
Dept: GASTROENTEROLOGY | Facility: CLINIC | Age: 49
End: 2024-08-22
Payer: COMMERCIAL

## 2024-08-22 LAB
ALPHA1 GLOB MFR UR ELPH: 129 MG/DL (ref 90–200)
CERULOPLASMIN SERPL-MCNC: 21 MG/DL (ref 19–39)
MITOCHONDRIA M2 IGG SER-ACNC: <20 UNITS (ref 0–20)
SMA IGG SER-ACNC: 4 UNITS (ref 0–19)

## 2024-08-22 NOTE — TELEPHONE ENCOUNTER
Let patient know so far labs show her potassium is low. I will forward labs to pcp. Tell her that she needs to make sure that she eats some bananas. Also drink gator gayatri.  We can repeat labs in near future. Ask if the colestid is helping the diarrhea.   Thank you

## 2024-08-22 NOTE — TELEPHONE ENCOUNTER
Rosalva, she needs ct enterography, I put in order , please call and help get scheduled. Thank you

## 2024-08-23 ENCOUNTER — READMISSION MANAGEMENT (OUTPATIENT)
Dept: CALL CENTER | Facility: HOSPITAL | Age: 49
End: 2024-08-23
Payer: COMMERCIAL

## 2024-08-23 LAB
ANA SER QL IF: NEGATIVE
GASTRIN SERPL-MCNC: 189 PG/ML (ref 0–115)

## 2024-08-23 NOTE — OUTREACH NOTE
Medical Week 3 Survey      Flowsheet Row Responses   [facility] facility patient discharged from? [facility]   Does the patient have one of the following disease processes/diagnoses(primary or secondary)? Other   Week 3 attempt successful? No   Unsuccessful attempts Attempt 1            Apoorva RADFORD - Registered Nurse

## 2024-08-23 NOTE — TELEPHONE ENCOUNTER
Pt verbalized understanding and states the Colestid is not helping. She is on the toilet as we speak.

## 2024-08-26 ENCOUNTER — READMISSION MANAGEMENT (OUTPATIENT)
Dept: CALL CENTER | Facility: HOSPITAL | Age: 49
End: 2024-08-26
Payer: COMMERCIAL

## 2024-08-26 NOTE — OUTREACH NOTE
Medical Week 3 Survey      Flowsheet Row Responses   [facility] patient discharged from? [facility]   Does the patient have one of the following disease processes/diagnoses(primary or secondary)? Other   Week 3 attempt successful? Yes   Call start time 1848   Call end time 1851   Discharge diagnosis Nonspecific colitis-Colonoscopy this visit   Meds reviewed with patient/caregiver? Yes   Is the patient having any side effects they believe may be caused by any medication additions or changes? No   Does the patient have all medications ordered at discharge? Yes   Is the patient taking all medications as directed (includes completed medication regime)? Yes   Does the patient have a primary care provider?  Yes   Does the patient have an appointment with their PCP within 7 days of discharge? No   What is preventing the patient from scheduling follow up appointments within 7 days of discharge? Waiting on return call   Nursing Interventions Advised patient to make appointment   Has the patient kept scheduled appointments due by today? N/A   Has home health visited the patient within 72 hours of discharge? N/A   Psychosocial issues? No   Did the patient receive a copy of their discharge instructions? Yes   Nursing interventions Reviewed instructions with patient   What is the patient's perception of their health status since discharge? Same   Is the patient/caregiver able to teach back signs and symptoms related to disease process for when to call PCP? Yes   Is the patient/caregiver able to teach back signs and symptoms related to disease process for when to call 911? Yes   Is the patient/caregiver able to teach back the hierarchy of who to call/visit for symptoms/problems? PCP, Specialist, Home health nurse, Urgent Care, ED, 911 Yes   If the patient is a current smoker, are they able to teach back resources for cessation? Not a smoker   Additional teach back comments had some vomiting in the night   Week 3 Call  Completed? Yes   Call end time 1851            Mari LEONARD - Registered Nurse

## 2024-08-27 ENCOUNTER — TELEPHONE (OUTPATIENT)
Dept: GASTROENTEROLOGY | Facility: CLINIC | Age: 49
End: 2024-08-27

## 2024-08-30 ENCOUNTER — LAB (OUTPATIENT)
Dept: LAB | Facility: HOSPITAL | Age: 49
End: 2024-08-30
Payer: COMMERCIAL

## 2024-08-30 ENCOUNTER — TELEPHONE (OUTPATIENT)
Dept: GASTROENTEROLOGY | Facility: CLINIC | Age: 49
End: 2024-08-30

## 2024-08-30 ENCOUNTER — OFFICE VISIT (OUTPATIENT)
Dept: GASTROENTEROLOGY | Facility: CLINIC | Age: 49
End: 2024-08-30
Payer: COMMERCIAL

## 2024-08-30 VITALS
SYSTOLIC BLOOD PRESSURE: 182 MMHG | WEIGHT: 174 LBS | OXYGEN SATURATION: 100 % | BODY MASS INDEX: 25.77 KG/M2 | DIASTOLIC BLOOD PRESSURE: 120 MMHG | HEART RATE: 99 BPM | HEIGHT: 69 IN | TEMPERATURE: 97.7 F

## 2024-08-30 DIAGNOSIS — R79.89 ABNORMAL LFTS: ICD-10-CM

## 2024-08-30 DIAGNOSIS — R10.30 LOWER ABDOMINAL PAIN: ICD-10-CM

## 2024-08-30 DIAGNOSIS — R79.89 ABNORMAL LFTS: Primary | ICD-10-CM

## 2024-08-30 DIAGNOSIS — R19.7 DIARRHEA, UNSPECIFIED TYPE: ICD-10-CM

## 2024-08-30 DIAGNOSIS — R19.7 DIARRHEA, UNSPECIFIED TYPE: Primary | ICD-10-CM

## 2024-08-30 DIAGNOSIS — R11.2 NAUSEA AND VOMITING, UNSPECIFIED VOMITING TYPE: ICD-10-CM

## 2024-08-30 DIAGNOSIS — K76.0 FATTY LIVER: ICD-10-CM

## 2024-08-30 LAB
ALBUMIN SERPL-MCNC: 3.7 G/DL (ref 3.5–5.2)
ALBUMIN/GLOB SERPL: 1 G/DL
ALP SERPL-CCNC: 1310 U/L (ref 39–117)
ALT SERPL W P-5'-P-CCNC: 76 U/L (ref 1–33)
ANION GAP SERPL CALCULATED.3IONS-SCNC: 15 MMOL/L (ref 5–15)
AST SERPL-CCNC: 228 U/L (ref 1–32)
BILIRUB SERPL-MCNC: 0.8 MG/DL (ref 0–1.2)
BUN SERPL-MCNC: 3 MG/DL (ref 6–20)
BUN/CREAT SERPL: 4.5 (ref 7–25)
CALCIUM SPEC-SCNC: 8.7 MG/DL (ref 8.6–10.5)
CHLORIDE SERPL-SCNC: 96 MMOL/L (ref 98–107)
CO2 SERPL-SCNC: 26 MMOL/L (ref 22–29)
CREAT SERPL-MCNC: 0.67 MG/DL (ref 0.57–1)
CRP SERPL-MCNC: 0.39 MG/DL (ref 0–0.5)
DEPRECATED RDW RBC AUTO: 64.9 FL (ref 37–54)
EGFRCR SERPLBLD CKD-EPI 2021: 106 ML/MIN/1.73
ERYTHROCYTE [DISTWIDTH] IN BLOOD BY AUTOMATED COUNT: 16.3 % (ref 12.3–15.4)
ERYTHROCYTE [SEDIMENTATION RATE] IN BLOOD: 33 MM/HR (ref 0–30)
GLOBULIN UR ELPH-MCNC: 3.7 GM/DL
GLUCOSE SERPL-MCNC: 102 MG/DL (ref 65–99)
HCT VFR BLD AUTO: 41.9 % (ref 34–46.6)
HGB BLD-MCNC: 13.3 G/DL (ref 12–15.9)
MCH RBC QN AUTO: 34 PG (ref 26.6–33)
MCHC RBC AUTO-ENTMCNC: 31.7 G/DL (ref 31.5–35.7)
MCV RBC AUTO: 107.2 FL (ref 79–97)
PLATELET # BLD AUTO: 267 10*3/MM3 (ref 140–450)
PMV BLD AUTO: 9.9 FL (ref 6–12)
POTASSIUM SERPL-SCNC: 3.8 MMOL/L (ref 3.5–5.2)
PROT SERPL-MCNC: 7.4 G/DL (ref 6–8.5)
RBC # BLD AUTO: 3.91 10*6/MM3 (ref 3.77–5.28)
SODIUM SERPL-SCNC: 137 MMOL/L (ref 136–145)
VIP SERPL-MCNC: 19.4 PG/ML (ref 0–58.8)
WBC NRBC COR # BLD AUTO: 9.76 10*3/MM3 (ref 3.4–10.8)

## 2024-08-30 PROCEDURE — 3080F DIAST BP >= 90 MM HG: CPT | Performed by: NURSE PRACTITIONER

## 2024-08-30 PROCEDURE — 36415 COLL VENOUS BLD VENIPUNCTURE: CPT

## 2024-08-30 PROCEDURE — 80053 COMPREHEN METABOLIC PANEL: CPT

## 2024-08-30 PROCEDURE — 99213 OFFICE O/P EST LOW 20 MIN: CPT | Performed by: NURSE PRACTITIONER

## 2024-08-30 PROCEDURE — 85027 COMPLETE CBC AUTOMATED: CPT

## 2024-08-30 PROCEDURE — 1159F MED LIST DOCD IN RCRD: CPT | Performed by: NURSE PRACTITIONER

## 2024-08-30 PROCEDURE — 86140 C-REACTIVE PROTEIN: CPT

## 2024-08-30 PROCEDURE — 1160F RVW MEDS BY RX/DR IN RCRD: CPT | Performed by: NURSE PRACTITIONER

## 2024-08-30 PROCEDURE — 3077F SYST BP >= 140 MM HG: CPT | Performed by: NURSE PRACTITIONER

## 2024-08-30 PROCEDURE — 85652 RBC SED RATE AUTOMATED: CPT

## 2024-08-30 RX ORDER — MONTELUKAST SODIUM 4 MG/1
1 TABLET, CHEWABLE ORAL 2 TIMES DAILY
Qty: 60 TABLET | Refills: 1 | Status: SHIPPED | OUTPATIENT
Start: 2024-08-30

## 2024-08-30 NOTE — PROGRESS NOTES
[facility] GASTROENTEROLOGY - OFFICE NOTE    8/30/2024    Minnie Bang   1975    Primary Physician: Aidee Padilla APRN    Chief Complaint   Patient presents with    Follow-up   Diarrhea       HISTORY OF PRESENT ILLNESS:     Minnie Bang is a 49 y.o. female presents for f/u diarrhea, n/v, abn lft's, and fatty liver.         Diarrhea   This started the first of the year.  Awaiting stool for fecal fat and urine 5 HIAA ( has not been turned in to the lab). VIP is in process. Gastrin level mildly elevated but she is on a ppi.  She is scheduled to have a CT abdomen and pelvis with enterography next week.  Having stools 10 - 20 per day. Having nocturnal diarrhea. Has associated lower abdominal cramping and intermittent n/v.  Weight is fluctuating.  No recent antibiotics. Last drink of alcohol was 10 days ago.  No rectal bleeding.  No fever.                    Abn lft's   Liver serologies unremarkable.             Colonoscopy (08/02/2024 11:11) path negative for colitis.   No family history of IBD.        ==========================================================  Office visit 8-19-24 HPI  Minnie Bang is a 49 y.o. female presents for f/u diarrhea.  I saw her in office 6-11-24. Since then she has had egd, colonoscopy, and was admitted here at [facility] 8/2024.  Dr. Browning with GI did see her and complete colonoscopy. Biopsies negative for microscopic colitis. Cdiff toxin was positive but antigen was negative. Stool for EPEC was positive as well. She had been treated with vancomycin outpt for cdiff toxin pos ( antigen was negative at that time as well). She was treated with dificd inpatient. She was seen by ID as well. She reports that her symptoms are not improving.           She continues to have diarrhea daily with intermittent nausea/vomiting. Having 10 + stools per day, watery.  She is not eating much. Has diarrhea even when does not eat. Having nocturnal diarrhea as well.  Has generalized  "abdomen pain as well. She reports that her entire body hurts. Questran did not help.  Imodium does not help. Her weight is actually up some. Has had lower extremity swelling. No fever. No sign of gi bleeding.            She has decreased etoh intake. Now 1-2 shots of liquor per day.   She also tried to decrease glutein unsure if has helped.         She does have abnormal lft's. This is chronic. Ultrasound showed fatty liver. Recent ama negative. She does drink etoh.              8-4-24 gliadin deamidated peptide Ab, IgA was strongly positive.  AMA negative 7/2024.   CT Abdomen Pelvis Without Contrast (08/01/2024 11:50)   US Liver (07/29/2024 18:04)   CT Angiogram Abdomen Pelvis (07/29/2024 13:48)               Colonoscopy (08/02/2024 11:11) by Dr. Browning.   Tissue Pathology Exam (08/02/2024 11:41)      Upper GI Endoscopy (07/08/2024 11:20)      [facility].   ==========================================================  Office visit  6-11-24 HPI Minnie aBng is a 49 y.o. female presents  with reflux and dysphagia.            GERD  Heartburn occurs daily. This has been for at least 1 year. Occurs at night and when bending over.  Has been on protonix daily for at least 1 year.  Has also used tums that helps for a short period. Tried otc zantac that did not help.  Drinking milk helps. Has noted upper abdominal pain x 4 -5 days, pressure.   No tobacco, quit 3/2024. Caffeine: \" drink mountain dew all day\".  No certain food triggers. No weight loss.            Dysphagia   This started 1 year. This was with pills and solid foods that has resolved.          Diarrhea  This started 6 mo ago. Having 6 stools per day. Can't remember last solid stool. Tried imodium that does not help. Fiber supplement did not help.  No new meds.  Recent antibiotic. No rectal bleeding.            She was seen by [facility] 2023. EGD and colonoscopy was recommended but not done. Has history of chronic gerd and colon polyp.         No " history of egd or colonoscopy.   Father had colon cancer in his 60's.       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    CTS (carpal tunnel syndrome) 2019    Dental disease     Depression     Diastolic dysfunction 2022    Dizziness     Emphysema of lung 2020    GERD (gastroesophageal reflux disease)     Headache     Hyperlipidemia     Hypertension     Hypothyroidism     Mixed simple and mucopurulent chronic bronchitis 04/18/2023    Non-occlusive coronary artery disease 05/04/2023    NSTEMI (non-ST elevated myocardial infarction) (HCC)     Pneumonia 2022    Pulmonary emphysema 04/21/2023    Shingles 2018    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       Past Surgical History:   Procedure Laterality Date    APPENDECTOMY      CARDIAC CATHETERIZATION N/A 11/04/2017    Procedure: Coronary angiography;  Surgeon: Luis Colvin MD;  Location: [facility] CATH INVASIVE LOCATION;  Service:     CARDIAC CATHETERIZATION N/A 05/31/2023    Procedure: Left Heart Cath;  Surgeon: Steffen Rossi MD;  Location: [facility] CATH INVASIVE LOCATION;  Service: Cardiology;  Laterality: N/A;    CARPAL TUNNEL RELEASE  2019    COLONOSCOPY N/A 8/2/2024    Procedure: COLONOSCOPY WITH ANESTHESIA;  Surgeon: Marty Browning MD;  Location: [facility] ENDOSCOPY;  Service: Gastroenterology;  Laterality: N/A;  pre op pancolitis    ENDOSCOPY N/A 7/8/2024    Procedure: ESOPHAGOGASTRODUODENOSCOPY WITH ANESTHESIA;  Surgeon: Gordy Wang MD;  Location: [facility] ENDOSCOPY;  Service: Gastroenterology;  Laterality: N/A;  pre: dysphagia.   post: esophagus dilated.   no PCP    HYSTERECTOMY      PANENDOSCOPY N/A 5/30/2024    Procedure: DIRECT LARYNGOSCOPY WITH BIOPSY OF VOCAL CORD POLYPS WITH POSSIBLE TRACHEOSTOMY;  Surgeon: Mendez Padilla MD;  Location: [facility] OR;  Service: ENT;   Laterality: N/A;    AZ RT/LT HEART CATHETERS N/A 11/04/2017    Procedure: Percutaneous Coronary Intervention;  Surgeon: Luis Colvin MD;  Location:  [facility] CATH INVASIVE LOCATION;  Service: Cardiovascular    THYROID SURGERY      TOTAL THYROIDECTOMY      TRACHEOSTOMY N/A 5/30/2024    Procedure: POSSIBLE TRACHEOSTOMY;  Surgeon: Mendez Padilla MD;  Location:  [facility] OR;  Service: ENT;  Laterality: N/A;       Outpatient Medications Marked as Taking for the 8/30/24 encounter (Office Visit) with Rakel Padilla APRN   Medication Sig Dispense Refill    albuterol sulfate  (90 Base) MCG/ACT inhaler Inhale 2 puffs Every 4 (Four) Hours As Needed for Wheezing. 6.7 g 0    atorvastatin (LIPITOR) 10 MG tablet Take 1 tablet by mouth Daily. 30 tablet 0    carvedilol (COREG) 25 MG tablet Take 1 tablet by mouth 2 (Two) Times a Day. 180 tablet 3    cloNIDine (CATAPRES) 0.1 MG tablet Take 1 tablet by mouth As Needed for High Blood Pressure (for blood pressure >140/90).      dilTIAZem CD (CARDIZEM CD) 120 MG 24 hr capsule Take 1 capsule by mouth Daily.      FLUoxetine (PROzac) 20 MG capsule Take 1 capsule by mouth Daily.      ipratropium-albuterol (DUO-NEB) 0.5-2.5 mg/3 ml nebulizer Take 3 mL by nebulization Every 4 (Four) Hours As Needed for Wheezing. 360 mL 0    lisinopril (PRINIVIL,ZESTRIL) 40 MG tablet Take 0.5 tablets by mouth Daily.      loperamide (Imodium A-D) 2 MG tablet Take 1 tablet by mouth 4 (Four) Times a Day As Needed for Diarrhea.      meclizine (ANTIVERT) 25 MG tablet Take 1 tablet by mouth 3 (Three) Times a Day As Needed for Dizziness. 42 tablet 2    nitroglycerin (NITROSTAT) 0.4 MG SL tablet Place 1 tablet under the tongue Every 5 (Five) Minutes As Needed for Chest Pain. Take no more than 3 doses in 15 minutes.      pantoprazole (PROTONIX) 40 MG EC tablet Take 1 tablet by mouth 2 (Two) Times a Day. 60 tablet 2    potassium chloride (KLOR-CON M20) 20 MEQ CR tablet Take 1 tablet by mouth 2 (Two) Times a  "Day.      topiramate (TOPAMAX) 50 MG tablet Take 1 tablet by mouth 2 (Two) Times a Day. 60 tablet 5    [DISCONTINUED] colestipol (COLESTID) 1 g tablet Take 1 tablet by mouth Daily. 30 tablet 2    [DISCONTINUED] Synthroid 125 MCG tablet Take 1 tablet by mouth Daily for 30 days. 30 tablet 1       No Known Allergies    Social History     Socioeconomic History    Marital status:    Tobacco Use    Smoking status: Former     Current packs/day: 0.00     Average packs/day: 0.7 packs/day for 45.9 years (30.0 ttl pk-yrs)     Types: Cigarettes     Start date: 1991     Quit date: 3/1/2024     Years since quittin.4     Passive exposure: Past    Smokeless tobacco: Never    Tobacco comments:     Less than 1/2 pack a day    Vaping Use    Vaping status: Never Used   Substance and Sexual Activity    Alcohol use: Yes     Comment: daily    Drug use: No    Sexual activity: Not Currently     Partners: Male     Birth control/protection: Hysterectomy       Family History   Problem Relation Age of Onset    Asthma Mother     Cancer Mother     Emphysema Mother     Colon cancer Father     Cancer Father     Heart failure Father     Hypertension Father        Review of Systems   Constitutional:  Negative for chills and fever.   Respiratory:  Negative for shortness of breath.    Cardiovascular:  Negative for chest pain.   Gastrointestinal:  Negative for abdominal distention, anal bleeding, blood in stool and constipation.        Vitals:    24 0933   BP: (!) 182/120   Pulse: 99   Temp: 97.7 °F (36.5 °C)   SpO2: 100%   Weight: 78.9 kg (174 lb)   Height: 175.3 cm (69\")      Body mass index is 25.7 kg/m².    Repeat blood pressure prior to leaving the office was 170/106 over her blood pressure was decreasing.  She does have clonidine at home to take as needed for hypertension.        Physical Exam  Vitals reviewed.   Constitutional:       General: She is not in acute distress.  Cardiovascular:      Rate and Rhythm: Normal rate " and regular rhythm.      Heart sounds: Normal heart sounds.   Pulmonary:      Effort: Pulmonary effort is normal.      Breath sounds: Normal breath sounds.   Abdominal:      General: Bowel sounds are normal. There is no distension.      Palpations: Abdomen is soft.      Tenderness: There is no abdominal tenderness.   Skin:     General: Skin is warm and dry.   Neurological:      Mental Status: She is alert.         Results for orders placed or performed in visit on 08/21/24   Gastrin    Specimen: Arm, Right; Blood   Result Value Ref Range    Gastrin 189 (H) 0 - 115 pg/mL   Alpha - 1 - Antitrypsin    Specimen: Arm, Right; Blood   Result Value Ref Range    ALPHA -1 ANTITRYPSIN 129 90 - 200 mg/dL   Anti-Smooth Muscle Antibody Titer    Specimen: Arm, Right; Blood   Result Value Ref Range    Smooth Muscle Ab 4 0 - 19 Units   Ferritin    Specimen: Arm, Right; Blood   Result Value Ref Range    Ferritin 222.10 (H) 13.00 - 150.00 ng/mL   Ceruloplasmin    Specimen: Arm, Right; Blood   Result Value Ref Range    Ceruloplasmin 21 19 - 39 mg/dL   Hepatitis Panel, Acute    Specimen: Arm, Right; Blood   Result Value Ref Range    Hepatitis B Surface Ag Non-Reactive Non-Reactive    Hep A IgM Non-Reactive Non-Reactive    Hep B C IgM Non-Reactive Non-Reactive    Hepatitis C Ab Non-Reactive Non-Reactive   Iron Profile    Specimen: Arm, Right; Blood   Result Value Ref Range    Iron 114 37 - 145 mcg/dL    Iron Saturation (TSAT) 35 20 - 50 %    Transferrin 218 200 - 360 mg/dL    TIBC 325 298 - 536 mcg/dL   Mitochondrial Antibodies, M2    Specimen: Arm, Right; Blood   Result Value Ref Range    Mitochondrial Ab <20.0 0.0 - 20.0 Units   BARRERA by IFA, Reflex to Titer and Pattern    Specimen: Arm, Right; Blood   Result Value Ref Range    BARRERA Negative    Comprehensive Metabolic Panel    Specimen: Arm, Right; Blood   Result Value Ref Range    Glucose 122 (H) 65 - 99 mg/dL    BUN 2 (L) 6 - 20 mg/dL    Creatinine 0.55 (L) 0.57 - 1.00 mg/dL    Sodium  141 136 - 145 mmol/L    Potassium 3.0 (L) 3.5 - 5.2 mmol/L    Chloride 99 98 - 107 mmol/L    CO2 28.0 22.0 - 29.0 mmol/L    Calcium 8.7 8.6 - 10.5 mg/dL    Total Protein 6.9 6.0 - 8.5 g/dL    Albumin 3.4 (L) 3.5 - 5.2 g/dL    ALT (SGPT) 30 1 - 33 U/L    AST (SGOT) 86 (H) 1 - 32 U/L    Alkaline Phosphatase 673 (H) 39 - 117 U/L    Total Bilirubin 0.3 0.0 - 1.2 mg/dL    Globulin 3.5 gm/dL    A/G Ratio 1.0 g/dL    BUN/Creatinine Ratio 3.6 (L) 7.0 - 25.0    Anion Gap 14.0 5.0 - 15.0 mmol/L    eGFR 111.1 >60.0 mL/min/1.73   CBC Auto Differential    Specimen: Arm, Right; Blood   Result Value Ref Range    WBC 9.97 3.40 - 10.80 10*3/mm3    RBC 3.55 (L) 3.77 - 5.28 10*6/mm3    Hemoglobin 12.1 12.0 - 15.9 g/dL    Hematocrit 38.5 34.0 - 46.6 %    .5 (H) 79.0 - 97.0 fL    MCH 34.1 (H) 26.6 - 33.0 pg    MCHC 31.4 (L) 31.5 - 35.7 g/dL    RDW 15.8 (H) 12.3 - 15.4 %    RDW-SD 63.7 (H) 37.0 - 54.0 fl    MPV 10.1 6.0 - 12.0 fL    Platelets 327 140 - 450 10*3/mm3    Neutrophil % 44.7 42.7 - 76.0 %    Lymphocyte % 39.6 19.6 - 45.3 %    Monocyte % 8.9 5.0 - 12.0 %    Eosinophil % 3.9 0.3 - 6.2 %    Basophil % 1.4 0.0 - 1.5 %    Immature Grans % 1.5 (H) 0.0 - 0.5 %    Neutrophils, Absolute 4.45 1.70 - 7.00 10*3/mm3    Lymphocytes, Absolute 3.95 (H) 0.70 - 3.10 10*3/mm3    Monocytes, Absolute 0.89 0.10 - 0.90 10*3/mm3    Eosinophils, Absolute 0.39 0.00 - 0.40 10*3/mm3    Basophils, Absolute 0.14 0.00 - 0.20 10*3/mm3    Immature Grans, Absolute 0.15 (H) 0.00 - 0.05 10*3/mm3   Bilirubin, Direct    Specimen: Arm, Right; Blood   Result Value Ref Range    Bilirubin, Direct 0.2 0.0 - 0.3 mg/dL           ASSESSMENT AND PLAN    Assessment & Plan     Diagnoses and all orders for this visit:    1. Diarrhea, unspecified type (Primary)  -     5 HIAA, Urine, Quantitative, 24 Hour - Urine, Clean Catch; Future  -     Fecal Fat, Qualitative - Stool, Per Rectum; Future  -     CBC (No Diff); Future  -     Comprehensive Metabolic Panel; Future  -      Sedimentation Rate; Future  -     C-reactive Protein; Future    2. Nausea and vomiting, unspecified vomiting type    3. Lower abdominal pain    4. Abnormal LFTs    5. Fatty liver    Other orders  -     riFAXIMin (Xifaxan) 550 MG tablet; Take 1 tablet by mouth Every 8 (Eight) Hours for 14 days.  Dispense: 42 tablet; Refill: 0  -     colestipol (COLESTID) 1 g tablet; Take 1 tablet by mouth 2 (Two) Times a Day.  Dispense: 60 tablet; Refill: 1      In regards to diarrhea, she has had an extensive workup thus far.  She has had upper endoscopy and colonoscopy.  She has had imaging studies of the abdomen including CT scan of the abdomen and pelvis and CT angiogram of the abdomen and pelvis. I have ordered stool for fecal fat as well as urine for 5 HIAA.  VIP level is in process.  Will also recheck labs today as her last labs show that her potassium was low.  I have instructed her to start drinking some Gatorade and I will contact her with results of labs.  I have recommended that she drink plenty of fluids.  I recommend that she go to the emergency room if symptoms are severe or worsening symptoms.  Await labs as well as CT scan of the abdomen pelvis with enterography.  I will have her back in 2 weeks for reevaluation.  I will increase Colestid to 2 pills a day to see if this helps.  I also discussed trial of Xifaxan and she is agreeable.  If these 2 medications do not help then consider trial of pancreatic enzymes.      She has had intermittent nausea vomiting as well however her main complaint is diarrhea.  Await CT scan abdomen pelvis enterography  results.  If symptoms severe then go to the emergency room.  Recommend drinking plenty of fluids.  Strongly recommend that she abstain from alcohol.    In regards to abnormal LFTs, liver serologies were unremarkable.  Ultrasound noted fatty liver. I recommend that she abstain from alcohol. Will recheck labs today.     * Surgery not found *           Return in about 2 weeks  (around 9/13/2024).          There are no Patient Instructions on file for this visit.      Rakel Padilla, APRN

## 2024-08-30 NOTE — TELEPHONE ENCOUNTER
Didier, let her know this message below. Thank you        Signed         Please let her know that I did get the rest of her labs back.  Liver function tests are  elevated.  I would recommend abstaining from alcohol.  One of her liver function tests which is the alkaline phosphatase keeps increasing as well.  I would like for her to come to the lab to get an additional lab test next week when she is here for the CT enterography.  I will put in the order for the alkaline phosphatase isoenzymes.

## 2024-08-30 NOTE — TELEPHONE ENCOUNTER
Please let her know that I did get the rest of her labs back.  Liver function tests are  elevated.  I would recommend abstaining from alcohol.  One of her liver function tests which is the alkaline phosphatase keeps increasing as well.  I would like for her to come to the lab to get an additional lab test next week when she is here for the CT enterography.  I will put in the order for the alkaline phosphatase isoenzymes.

## 2024-09-03 ENCOUNTER — LAB (OUTPATIENT)
Dept: LAB | Facility: HOSPITAL | Age: 49
End: 2024-09-03
Payer: COMMERCIAL

## 2024-09-03 DIAGNOSIS — R19.7 DIARRHEA, UNSPECIFIED TYPE: ICD-10-CM

## 2024-09-03 DIAGNOSIS — R79.89 ABNORMAL LFTS: ICD-10-CM

## 2024-09-03 PROCEDURE — 89125 SPECIMEN FAT STAIN: CPT

## 2024-09-03 PROCEDURE — 83497 ASSAY OF 5-HIAA: CPT

## 2024-09-03 PROCEDURE — 36415 COLL VENOUS BLD VENIPUNCTURE: CPT

## 2024-09-03 PROCEDURE — 84075 ASSAY ALKALINE PHOSPHATASE: CPT

## 2024-09-03 PROCEDURE — 84080 ASSAY ALKALINE PHOSPHATASES: CPT

## 2024-09-03 PROCEDURE — 81050 URINALYSIS VOLUME MEASURE: CPT

## 2024-09-04 LAB
FATTY ACIDS: NORMAL
NEUTRAL FATS: NORMAL

## 2024-09-05 ENCOUNTER — TELEPHONE (OUTPATIENT)
Dept: GASTROENTEROLOGY | Facility: CLINIC | Age: 49
End: 2024-09-05
Payer: COMMERCIAL

## 2024-09-05 ENCOUNTER — HOSPITAL ENCOUNTER (OUTPATIENT)
Dept: CT IMAGING | Facility: HOSPITAL | Age: 49
Discharge: HOME | End: 2024-09-05
Admitting: NURSE PRACTITIONER
Payer: COMMERCIAL

## 2024-09-05 DIAGNOSIS — R19.7 DIARRHEA, UNSPECIFIED TYPE: ICD-10-CM

## 2024-09-05 DIAGNOSIS — R11.2 NAUSEA AND VOMITING, UNSPECIFIED VOMITING TYPE: ICD-10-CM

## 2024-09-05 LAB
ALP BONE CFR SERPL: 39 % (ref 14–68)
ALP INTEST CFR SERPL: 1 % (ref 0–18)
ALP LIVER CFR SERPL: 60 % (ref 18–85)
ALP SERPL-CCNC: 1029 IU/L (ref 44–121)

## 2024-09-05 PROCEDURE — 74177 CT ABD & PELVIS W/CONTRAST: CPT

## 2024-09-05 PROCEDURE — 25510000001 IOPAMIDOL PER 1 ML: Performed by: NURSE PRACTITIONER

## 2024-09-05 RX ORDER — IOPAMIDOL 755 MG/ML
100 INJECTION, SOLUTION INTRAVASCULAR
Status: COMPLETED | OUTPATIENT
Start: 2024-09-05 | End: 2024-09-05

## 2024-09-05 RX ADMIN — IOPAMIDOL 100 ML: 755 INJECTION, SOLUTION INTRAVENOUS at 15:54

## 2024-09-06 ENCOUNTER — TELEPHONE (OUTPATIENT)
Dept: GASTROENTEROLOGY | Facility: CLINIC | Age: 49
End: 2024-09-06
Payer: COMMERCIAL

## 2024-09-06 DIAGNOSIS — R79.89 ABNORMAL LFTS: Primary | ICD-10-CM

## 2024-09-06 LAB
5OH-INDOLEACETATE 24H UR-MCNC: 1.8 MG/L
5OH-INDOLEACETATE 24H UR-MRATE: 0.7 MG/24 HR (ref 0–14.9)

## 2024-09-06 RX ORDER — MONTELUKAST SODIUM 4 MG/1
1 TABLET, CHEWABLE ORAL 2 TIMES DAILY
Qty: 60 TABLET | Refills: 2 | Status: SHIPPED | OUTPATIENT
Start: 2024-09-06

## 2024-09-06 NOTE — TELEPHONE ENCOUNTER
Kalee, I ordered an MRI/MRCP stat on this patient.  Can you facilitate getting this scheduled for Monday or Tuesday of next week.        I spoke with her today.  She continues to have diarrhea.  She was not able to get the Colestid twice daily filled.  She would like for me to send to a different pharmacy.  I recommend taking twice a day.  If this does not help of the neck several days then we discussed trial of pancreatic enzymes.  She also had some vomiting today as well.  I did let her know CT abdomen and pelvis with enterography was negative.  Alkaline phosphatase is markedly elevated.  I did discuss her case again with Dr. Wang.  Recommend MRI/MRCP due to markedly elevated alkaline phosphatase.  There are still some other lab test that are pending.  I will contact her with results.  She has an office visit next Friday.  In the meantime emergency room if feels really bad or worsening symptoms.

## 2024-09-10 ENCOUNTER — HOSPITAL ENCOUNTER (OUTPATIENT)
Dept: MRI IMAGING | Facility: HOSPITAL | Age: 49
Discharge: HOME | End: 2024-09-10
Admitting: NURSE PRACTITIONER
Payer: COMMERCIAL

## 2024-09-10 DIAGNOSIS — R79.89 ABNORMAL LFTS: ICD-10-CM

## 2024-09-10 LAB — CREAT BLDA-MCNC: 0.8 MG/DL (ref 0.6–1.3)

## 2024-09-10 PROCEDURE — 74183 MRI ABD W/O CNTR FLWD CNTR: CPT

## 2024-09-10 PROCEDURE — 82565 ASSAY OF CREATININE: CPT

## 2024-09-10 PROCEDURE — 25510000001 GADOPICLENOL 0.5 MMOL/ML SOLUTION: Performed by: NURSE PRACTITIONER

## 2024-09-10 PROCEDURE — A9573 GADOPICLENOL 0.5 MMOL/ML SOLUTION: HCPCS | Performed by: NURSE PRACTITIONER

## 2024-09-10 RX ADMIN — GADOPICLENOL 8 ML: 485.1 INJECTION INTRAVENOUS at 15:33

## 2024-09-13 ENCOUNTER — TELEPHONE (OUTPATIENT)
Dept: GASTROENTEROLOGY | Facility: CLINIC | Age: 49
End: 2024-09-13

## 2024-09-13 ENCOUNTER — OFFICE VISIT (OUTPATIENT)
Dept: GASTROENTEROLOGY | Facility: CLINIC | Age: 49
End: 2024-09-13
Payer: COMMERCIAL

## 2024-09-13 VITALS
SYSTOLIC BLOOD PRESSURE: 160 MMHG | OXYGEN SATURATION: 99 % | HEART RATE: 83 BPM | WEIGHT: 179 LBS | HEIGHT: 69 IN | DIASTOLIC BLOOD PRESSURE: 100 MMHG | BODY MASS INDEX: 26.51 KG/M2 | TEMPERATURE: 96.9 F

## 2024-09-13 DIAGNOSIS — R79.89 ABNORMAL LFTS: Primary | ICD-10-CM

## 2024-09-13 DIAGNOSIS — R11.2 NAUSEA AND VOMITING, UNSPECIFIED VOMITING TYPE: ICD-10-CM

## 2024-09-13 DIAGNOSIS — R10.30 LOWER ABDOMINAL PAIN: ICD-10-CM

## 2024-09-13 DIAGNOSIS — R19.7 DIARRHEA, UNSPECIFIED TYPE: ICD-10-CM

## 2024-09-13 PROCEDURE — 3077F SYST BP >= 140 MM HG: CPT | Performed by: NURSE PRACTITIONER

## 2024-09-13 PROCEDURE — 3080F DIAST BP >= 90 MM HG: CPT | Performed by: NURSE PRACTITIONER

## 2024-09-13 PROCEDURE — 99214 OFFICE O/P EST MOD 30 MIN: CPT | Performed by: NURSE PRACTITIONER

## 2024-09-13 NOTE — TELEPHONE ENCOUNTER
Kalee, I put in referral to [facility] gastroenterology.  Please let me know the status of that referral next week.

## 2024-09-13 NOTE — PROGRESS NOTES
"[facility] GASTROENTEROLOGY - OFFICE NOTE    9/13/2024    Minnie Bang   1975    Primary Physician: Aidee Padilla APRN    Chief Complaint   Patient presents with    Follow-up   Diarrhea   Abdominal pain   Abnormal lft's,   N/V.       HISTORY OF PRESENT ILLNESS:     Minnie Bang is a 49 y.o. female presents for follow up diarrhea, n/v, abn lft's, and fatty liver.         She still continues with diarrhea.  Several stools per day. The amount can be only \" squirts\" at times. Has felt feverish at times.  Taking colestid twice daily. Taking imodium prn. Neither of these help. Trying to drink plenty of liquids. Also with nausea and dry heaves. Has gained 5 # since last office visit but reports \" not eating'.   She had been having lower abdominal cramping but now having ruq cramping. No fever. No rectal bleeding.         In regards to abnormal LFTs, she has had extensive workup.  Transaminases and alkaline phosphatase are elevated.  Alkaline phosphatase is markedly elevated.  AMA negative.  MRCP unremarkable.      Discussed case with Dr. Wang.   Last drink of alcohol was 1 mo ago.             MRI abdomen w wo contrast mrcp (09/10/2024 15:33)   CT Enterography Abdomen Pelvis w Contrast (09/05/2024 15:55)         ===========================================================  Office visit   8-30-24  HPI  Minnie Bang is a 49 y.o. female presents for f/u diarrhea, n/v, abn lft's, and fatty liver.            Diarrhea   This started the first of the year.  Awaiting stool for fecal fat and urine 5 HIAA ( has not been turned in to the lab). VIP is in process. Gastrin level mildly elevated but she is on a ppi.  She is scheduled to have a CT abdomen and pelvis with enterography next week.  Having stools 10 - 20 per day. Having nocturnal diarrhea. Has associated lower abdominal cramping and intermittent n/v.  Weight is fluctuating.  No recent antibiotics. Last drink of alcohol was 10 days ago.  No " rectal bleeding.  No fever.                             Abn lft's   Liver serologies unremarkable.                  Colonoscopy (08/02/2024 11:11) path negative for colitis.   No family history of IBD.          ==========================================================  Office visit 8-19-24 HPI  Minnie Bang is a 49 y.o. female presents for f/u diarrhea.  I saw her in office 6-11-24. Since then she has had egd, colonoscopy, and was admitted here at [facility] 8/2024.  Dr. Browning with GI did see her and complete colonoscopy. Biopsies negative for microscopic colitis. Cdiff toxin was positive but antigen was negative. Stool for EPEC was positive as well. She had been treated with vancomycin outpt for cdiff toxin pos ( antigen was negative at that time as well). She was treated with dificd inpatient. She was seen by ID as well. She reports that her symptoms are not improving.           She continues to have diarrhea daily with intermittent nausea/vomiting. Having 10 + stools per day, watery.  She is not eating much. Has diarrhea even when does not eat. Having nocturnal diarrhea as well.  Has generalized abdomen pain as well. She reports that her entire body hurts. Questran did not help.  Imodium does not help. Her weight is actually up some. Has had lower extremity swelling. No fever. No sign of gi bleeding.            She has decreased etoh intake. Now 1-2 shots of liquor per day.   She also tried to decrease glutein unsure if has helped.         She does have abnormal lft's. This is chronic. Ultrasound showed fatty liver. Recent ama negative. She does drink etoh.               8-4-24 gliadin deamidated peptide Ab, IgA was strongly positive.  AMA negative 7/2024.   CT Abdomen Pelvis Without Contrast (08/01/2024 11:50)   US Liver (07/29/2024 18:04)   CT Angiogram Abdomen Pelvis (07/29/2024 13:48)               Colonoscopy (08/02/2024 11:11) by Dr. Browning.   Tissue Pathology Exam (08/02/2024 11:41)      Upper GI  "Endoscopy (07/08/2024 11:20)      [facility].   ==========================================================  Office visit  6-11-24 HPI Minnie Bang is a 49 y.o. female presents  with reflux and dysphagia.            GERD  Heartburn occurs daily. This has been for at least 1 year. Occurs at night and when bending over.  Has been on protonix daily for at least 1 year.  Has also used tums that helps for a short period. Tried otc zantac that did not help.  Drinking milk helps. Has noted upper abdominal pain x 4 -5 days, pressure.   No tobacco, quit 3/2024. Caffeine: \" drink mountain dew all day\".  No certain food triggers. No weight loss.            Dysphagia   This started 1 year. This was with pills and solid foods that has resolved.          Diarrhea  This started 6 mo ago. Having 6 stools per day. Can't remember last solid stool. Tried imodium that does not help. Fiber supplement did not help.  No new meds.  Recent antibiotic. No rectal bleeding.            She was seen by [facility] 2023. EGD and colonoscopy was recommended but not done. Has history of chronic gerd and colon polyp.         No history of egd or colonoscopy.   Father had colon cancer in his 60's.        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    CTS (carpal tunnel syndrome) 2019    Dental disease     Depression     Diastolic dysfunction 2022    Dizziness     Emphysema of lung 2020    GERD (gastroesophageal reflux disease)     Headache     Hyperlipidemia     Hypertension     Hypothyroidism     Mixed simple and mucopurulent chronic bronchitis 04/18/2023    Non-occlusive coronary artery disease 05/04/2023    NSTEMI (non-ST elevated myocardial infarction) (HCC)     Pneumonia 2022    Pulmonary emphysema 04/21/2023    Shingles 2018    Vocal cord polyps " 05/2024       Past Surgical History:   Procedure Laterality Date    APPENDECTOMY      CARDIAC CATHETERIZATION N/A 11/04/2017    Procedure: Coronary angiography;  Surgeon: Luis Colvin MD;  Location: [facility] CATH INVASIVE LOCATION;  Service:     CARDIAC CATHETERIZATION N/A 05/31/2023    Procedure: Left Heart Cath;  Surgeon: Steffen Rossi MD;  Location: [facility] CATH INVASIVE LOCATION;  Service: Cardiology;  Laterality: N/A;    CARPAL TUNNEL RELEASE  2019    COLONOSCOPY N/A 8/2/2024    Procedure: COLONOSCOPY WITH ANESTHESIA;  Surgeon: Marty Browning MD;  Location: [facility] ENDOSCOPY;  Service: Gastroenterology;  Laterality: N/A;  pre op pancolitis    ENDOSCOPY N/A 7/8/2024    Procedure: ESOPHAGOGASTRODUODENOSCOPY WITH ANESTHESIA;  Surgeon: Gordy Wang MD;  Location: [facility] ENDOSCOPY;  Service: Gastroenterology;  Laterality: N/A;  pre: dysphagia.   post: esophagus dilated.   no PCP    HYSTERECTOMY      PANENDOSCOPY N/A 5/30/2024    Procedure: DIRECT LARYNGOSCOPY WITH BIOPSY OF VOCAL CORD POLYPS WITH POSSIBLE TRACHEOSTOMY;  Surgeon: Mnedez Padilla MD;  Location: [facility] OR;  Service: ENT;  Laterality: N/A;    WA RT/LT HEART CATHETERS N/A 11/04/2017    Procedure: Percutaneous Coronary Intervention;  Surgeon: Luis Colvin MD;  Location: [facility] CATH INVASIVE LOCATION;  Service: Cardiovascular    THYROID SURGERY      TOTAL THYROIDECTOMY      TRACHEOSTOMY N/A 5/30/2024    Procedure: POSSIBLE TRACHEOSTOMY;  Surgeon: Mendez Padilla MD;  Location: [facility] OR;  Service: ENT;  Laterality: N/A;       Outpatient Medications Marked as Taking for the 9/13/24 encounter (Office Visit) with Rakel Padilla APRN   Medication Sig Dispense Refill    albuterol sulfate  (90 Base) MCG/ACT inhaler Inhale 2 puffs Every 4 (Four) Hours As Needed for Wheezing. 6.7 g 0    atorvastatin (LIPITOR) 10 MG tablet Take 1 tablet by mouth Daily. 30 tablet 0    carvedilol (COREG) 25 MG tablet Take 1 tablet by mouth 2 (Two) Times a  Day. 180 tablet 3    cloNIDine (CATAPRES) 0.1 MG tablet Take 1 tablet by mouth As Needed for High Blood Pressure (for blood pressure >140/90).      colestipol (COLESTID) 1 g tablet Take 1 tablet by mouth 2 (Two) Times a Day. 60 tablet 2    dilTIAZem CD (CARDIZEM CD) 120 MG 24 hr capsule Take 1 capsule by mouth Daily.      FLUoxetine (PROzac) 20 MG capsule Take 1 capsule by mouth Daily.      ipratropium-albuterol (DUO-NEB) 0.5-2.5 mg/3 ml nebulizer Take 3 mL by nebulization Every 4 (Four) Hours As Needed for Wheezing. 360 mL 0    lisinopril (PRINIVIL,ZESTRIL) 40 MG tablet Take 0.5 tablets by mouth Daily.      loperamide (Imodium A-D) 2 MG tablet Take 1 tablet by mouth 4 (Four) Times a Day As Needed for Diarrhea.      meclizine (ANTIVERT) 25 MG tablet Take 1 tablet by mouth 3 (Three) Times a Day As Needed for Dizziness. 42 tablet 2    nitroglycerin (NITROSTAT) 0.4 MG SL tablet Place 1 tablet under the tongue Every 5 (Five) Minutes As Needed for Chest Pain. Take no more than 3 doses in 15 minutes.      pantoprazole (PROTONIX) 40 MG EC tablet Take 1 tablet by mouth 2 (Two) Times a Day. 60 tablet 2    potassium chloride (KLOR-CON M20) 20 MEQ CR tablet Take 1 tablet by mouth 2 (Two) Times a Day.      riFAXIMin (Xifaxan) 550 MG tablet Take 1 tablet by mouth Every 8 (Eight) Hours for 14 days. 42 tablet 0    topiramate (TOPAMAX) 50 MG tablet Take 1 tablet by mouth 2 (Two) Times a Day. 60 tablet 5       No Known Allergies    Social History     Socioeconomic History    Marital status:    Tobacco Use    Smoking status: Former     Current packs/day: 0.00     Average packs/day: 0.7 packs/day for 45.9 years (30.0 ttl pk-yrs)     Types: Cigarettes     Start date: 1991     Quit date: 3/1/2024     Years since quittin.5     Passive exposure: Past    Smokeless tobacco: Never    Tobacco comments:     Less than 1/2 pack a day    Vaping Use    Vaping status: Never Used   Substance and Sexual Activity    Alcohol use: Not  "Currently     Comment: nothing to drink in 3 1/2 weeks    Drug use: No    Sexual activity: Not Currently     Partners: Male     Birth control/protection: Hysterectomy       Family History   Problem Relation Age of Onset    Asthma Mother     Cancer Mother     Emphysema Mother     Colon cancer Father     Cancer Father     Heart failure Father     Hypertension Father        Review of Systems   Constitutional:  Positive for fatigue.   Respiratory:  Negative for shortness of breath.    Cardiovascular:  Negative for chest pain.   Gastrointestinal:  Negative for abdominal distention, anal bleeding, blood in stool and constipation.   Neurological:  Positive for weakness and headaches (this is chronic, gets botox injections, schedule today.).        Vitals:    09/13/24 0912   BP: 160/100   Pulse: 83   Temp: 96.9 °F (36.1 °C)   SpO2: 99%   Weight: 81.2 kg (179 lb)   Height: 175.3 cm (69\")      Body mass index is 26.43 kg/m².    Physical Exam  Vitals reviewed.   Constitutional:       General: She is not in acute distress.  Cardiovascular:      Rate and Rhythm: Normal rate and regular rhythm.      Heart sounds: Normal heart sounds.   Pulmonary:      Effort: Pulmonary effort is normal.      Breath sounds: Normal breath sounds.   Abdominal:      General: Bowel sounds are normal. There is no distension.      Palpations: Abdomen is soft.      Tenderness: There is no abdominal tenderness.   Skin:     General: Skin is warm and dry.   Neurological:      Mental Status: She is alert.                 ASSESSMENT AND PLAN    Assessment & Plan     Diagnoses and all orders for this visit:    1. Abnormal LFTs (Primary)  -     CBC & Differential; Future  -     Comprehensive Metabolic Panel; Future  -     Ambulatory Referral to Gastroenterology    2. Diarrhea, unspecified type  -     Clostridioides difficile Toxin - Stool, Per Rectum; Future  -     Ambulatory Referral to Gastroenterology    3. Nausea and vomiting, unspecified vomiting type  -   "   Ambulatory Referral to Gastroenterology    4. Lower abdominal pain  -     Ambulatory Referral to Gastroenterology    In regards to abnormal LFTs, she has had extensive workup.  At this point I recommend referral to a university such as [facility].  She is agreeable.  I will make a referral to the [facility].  Recheck labs today.     In regards to diarrhea, lower abdominal pain with nausea, has also had an extensive workup.  I will recheck stool for C. difficile.  Referral to [facility].    I recommend emergency room with severe symptoms or worsening symptoms.  I recommend that she continue to drink plenty of liquids.  I recommend nutritional supplements.  Abstain from alcohol.  * Surgery not found *       Addendum: I did contact [facility] and spoke with Lala.  Called to inform them of our plan to refer her to [facility] gastroenterology for second opinion.  I will forward records to [facility] as well.    No follow-ups on file.          There are no Patient Instructions on file for this visit.      Rakel Padilla APRN

## 2024-09-16 ENCOUNTER — TELEPHONE (OUTPATIENT)
Dept: GASTROENTEROLOGY | Facility: CLINIC | Age: 49
End: 2024-09-16
Payer: COMMERCIAL

## 2024-09-16 ENCOUNTER — HOSPITAL ENCOUNTER (INPATIENT)
Facility: HOSPITAL | Age: 49
LOS: 4 days | Discharge: HOME | DRG: 641 | End: 2024-09-20
Attending: GENERAL PRACTICE | Admitting: STUDENT IN AN ORGANIZED HEALTH CARE EDUCATION/TRAINING PROGRAM
Payer: COMMERCIAL

## 2024-09-16 ENCOUNTER — LAB (OUTPATIENT)
Dept: LAB | Facility: HOSPITAL | Age: 49
End: 2024-09-16
Payer: COMMERCIAL

## 2024-09-16 ENCOUNTER — APPOINTMENT (OUTPATIENT)
Dept: CT IMAGING | Facility: HOSPITAL | Age: 49
DRG: 641 | End: 2024-09-16
Payer: COMMERCIAL

## 2024-09-16 DIAGNOSIS — R79.89 ABNORMAL LFTS: ICD-10-CM

## 2024-09-16 DIAGNOSIS — E87.6 HYPOKALEMIA: Primary | ICD-10-CM

## 2024-09-16 DIAGNOSIS — E83.42 HYPOMAGNESEMIA: ICD-10-CM

## 2024-09-16 DIAGNOSIS — E87.20 LACTIC ACID ACIDOSIS: ICD-10-CM

## 2024-09-16 LAB
ALBUMIN SERPL-MCNC: 3.3 G/DL (ref 3.5–5.2)
ALBUMIN SERPL-MCNC: 3.5 G/DL (ref 3.5–5.2)
ALBUMIN/GLOB SERPL: 1.1 G/DL
ALBUMIN/GLOB SERPL: 1.1 G/DL
ALP SERPL-CCNC: 799 U/L (ref 39–117)
ALP SERPL-CCNC: 855 U/L (ref 39–117)
ALT SERPL W P-5'-P-CCNC: 109 U/L (ref 1–33)
ALT SERPL W P-5'-P-CCNC: 116 U/L (ref 1–33)
AMYLASE SERPL-CCNC: 13 U/L (ref 28–100)
ANION GAP SERPL CALCULATED.3IONS-SCNC: 19 MMOL/L (ref 5–15)
ANION GAP SERPL CALCULATED.3IONS-SCNC: 22 MMOL/L (ref 5–15)
AST SERPL-CCNC: 295 U/L (ref 1–32)
AST SERPL-CCNC: 309 U/L (ref 1–32)
BASOPHILS # BLD AUTO: 0.11 10*3/MM3 (ref 0–0.2)
BASOPHILS # BLD AUTO: 0.15 10*3/MM3 (ref 0–0.2)
BASOPHILS NFR BLD AUTO: 1.2 % (ref 0–1.5)
BASOPHILS NFR BLD AUTO: 1.7 % (ref 0–1.5)
BILIRUB SERPL-MCNC: 0.7 MG/DL (ref 0–1.2)
BILIRUB SERPL-MCNC: 1.1 MG/DL (ref 0–1.2)
BILIRUB UR QL STRIP: NEGATIVE
BUN SERPL-MCNC: 3 MG/DL (ref 6–20)
BUN SERPL-MCNC: 3 MG/DL (ref 6–20)
BUN/CREAT SERPL: 5.9 (ref 7–25)
BUN/CREAT SERPL: 5.9 (ref 7–25)
CALCIUM SPEC-SCNC: 8 MG/DL (ref 8.6–10.5)
CALCIUM SPEC-SCNC: 8.3 MG/DL (ref 8.6–10.5)
CHLORIDE SERPL-SCNC: 91 MMOL/L (ref 98–107)
CHLORIDE SERPL-SCNC: 95 MMOL/L (ref 98–107)
CLARITY UR: CLEAR
CO2 SERPL-SCNC: 24 MMOL/L (ref 22–29)
CO2 SERPL-SCNC: 26 MMOL/L (ref 22–29)
COLOR UR: YELLOW
CREAT SERPL-MCNC: 0.51 MG/DL (ref 0.57–1)
CREAT SERPL-MCNC: 0.51 MG/DL (ref 0.57–1)
CRP SERPL-MCNC: 0.59 MG/DL (ref 0–0.5)
D-LACTATE SERPL-SCNC: 8.7 MMOL/L (ref 0.5–2)
DEPRECATED RDW RBC AUTO: 60.3 FL (ref 37–54)
DEPRECATED RDW RBC AUTO: 61.2 FL (ref 37–54)
EGFRCR SERPLBLD CKD-EPI 2021: 113.2 ML/MIN/1.73
EGFRCR SERPLBLD CKD-EPI 2021: 113.2 ML/MIN/1.73
EOSINOPHIL # BLD AUTO: 0.13 10*3/MM3 (ref 0–0.4)
EOSINOPHIL # BLD AUTO: 0.18 10*3/MM3 (ref 0–0.4)
EOSINOPHIL NFR BLD AUTO: 1.5 % (ref 0.3–6.2)
EOSINOPHIL NFR BLD AUTO: 2 % (ref 0.3–6.2)
ERYTHROCYTE [DISTWIDTH] IN BLOOD BY AUTOMATED COUNT: 15.5 % (ref 12.3–15.4)
ERYTHROCYTE [DISTWIDTH] IN BLOOD BY AUTOMATED COUNT: 15.9 % (ref 12.3–15.4)
ERYTHROCYTE [SEDIMENTATION RATE] IN BLOOD: 19 MM/HR (ref 0–30)
GLOBULIN UR ELPH-MCNC: 3.1 GM/DL
GLOBULIN UR ELPH-MCNC: 3.1 GM/DL
GLUCOSE SERPL-MCNC: 112 MG/DL (ref 65–99)
GLUCOSE SERPL-MCNC: 151 MG/DL (ref 65–99)
GLUCOSE UR STRIP-MCNC: NEGATIVE MG/DL
HCT VFR BLD AUTO: 32.9 % (ref 34–46.6)
HCT VFR BLD AUTO: 37.2 % (ref 34–46.6)
HGB BLD-MCNC: 11.2 G/DL (ref 12–15.9)
HGB BLD-MCNC: 12.4 G/DL (ref 12–15.9)
HGB UR QL STRIP.AUTO: NEGATIVE
IMM GRANULOCYTES # BLD AUTO: 0.07 10*3/MM3 (ref 0–0.05)
IMM GRANULOCYTES # BLD AUTO: 0.1 10*3/MM3 (ref 0–0.05)
IMM GRANULOCYTES NFR BLD AUTO: 0.8 % (ref 0–0.5)
IMM GRANULOCYTES NFR BLD AUTO: 1.1 % (ref 0–0.5)
INR PPP: 0.98 (ref 0.91–1.09)
KETONES UR QL STRIP: NEGATIVE
LEUKOCYTE ESTERASE UR QL STRIP.AUTO: NEGATIVE
LIPASE SERPL-CCNC: 12 U/L (ref 13–60)
LYMPHOCYTES # BLD AUTO: 2.5 10*3/MM3 (ref 0.7–3.1)
LYMPHOCYTES # BLD AUTO: 3.41 10*3/MM3 (ref 0.7–3.1)
LYMPHOCYTES NFR BLD AUTO: 28 % (ref 19.6–45.3)
LYMPHOCYTES NFR BLD AUTO: 38.7 % (ref 19.6–45.3)
MAGNESIUM SERPL-MCNC: 1.4 MG/DL (ref 1.6–2.6)
MCH RBC QN AUTO: 34.8 PG (ref 26.6–33)
MCH RBC QN AUTO: 35.6 PG (ref 26.6–33)
MCHC RBC AUTO-ENTMCNC: 33.3 G/DL (ref 31.5–35.7)
MCHC RBC AUTO-ENTMCNC: 34 G/DL (ref 31.5–35.7)
MCV RBC AUTO: 104.4 FL (ref 79–97)
MCV RBC AUTO: 104.5 FL (ref 79–97)
MONOCYTES # BLD AUTO: 0.84 10*3/MM3 (ref 0.1–0.9)
MONOCYTES # BLD AUTO: 0.87 10*3/MM3 (ref 0.1–0.9)
MONOCYTES NFR BLD AUTO: 9.5 % (ref 5–12)
MONOCYTES NFR BLD AUTO: 9.8 % (ref 5–12)
NEUTROPHILS NFR BLD AUTO: 4.18 10*3/MM3 (ref 1.7–7)
NEUTROPHILS NFR BLD AUTO: 47.5 % (ref 42.7–76)
NEUTROPHILS NFR BLD AUTO: 5.2 10*3/MM3 (ref 1.7–7)
NEUTROPHILS NFR BLD AUTO: 58.2 % (ref 42.7–76)
NITRITE UR QL STRIP: NEGATIVE
NRBC BLD AUTO-RTO: 0 /100 WBC (ref 0–0.2)
NRBC BLD AUTO-RTO: 0 /100 WBC (ref 0–0.2)
PH UR STRIP.AUTO: 7 [PH] (ref 5–8)
PLATELET # BLD AUTO: 227 10*3/MM3 (ref 140–450)
PLATELET # BLD AUTO: 234 10*3/MM3 (ref 140–450)
PMV BLD AUTO: 10.6 FL (ref 6–12)
PMV BLD AUTO: 11.1 FL (ref 6–12)
POTASSIUM SERPL-SCNC: 2.2 MMOL/L (ref 3.5–5.2)
POTASSIUM SERPL-SCNC: 2.5 MMOL/L (ref 3.5–5.2)
PROT SERPL-MCNC: 6.4 G/DL (ref 6–8.5)
PROT SERPL-MCNC: 6.6 G/DL (ref 6–8.5)
PROT UR QL STRIP: NEGATIVE
PROTHROMBIN TIME: 13.4 SECONDS (ref 11.8–14.8)
RBC # BLD AUTO: 3.15 10*6/MM3 (ref 3.77–5.28)
RBC # BLD AUTO: 3.56 10*6/MM3 (ref 3.77–5.28)
SODIUM SERPL-SCNC: 137 MMOL/L (ref 136–145)
SODIUM SERPL-SCNC: 140 MMOL/L (ref 136–145)
SP GR UR STRIP: 1.02 (ref 1–1.03)
UROBILINOGEN UR QL STRIP: NORMAL
WBC NRBC COR # BLD AUTO: 8.82 10*3/MM3 (ref 3.4–10.8)
WBC NRBC COR # BLD AUTO: 8.92 10*3/MM3 (ref 3.4–10.8)

## 2024-09-16 PROCEDURE — 81003 URINALYSIS AUTO W/O SCOPE: CPT | Performed by: NURSE PRACTITIONER

## 2024-09-16 PROCEDURE — 80053 COMPREHEN METABOLIC PANEL: CPT

## 2024-09-16 PROCEDURE — 36415 COLL VENOUS BLD VENIPUNCTURE: CPT

## 2024-09-16 PROCEDURE — 25010000002 POTASSIUM CHLORIDE 10 MEQ/100ML SOLUTION: Performed by: GENERAL PRACTICE

## 2024-09-16 PROCEDURE — 93005 ELECTROCARDIOGRAM TRACING: CPT | Performed by: GENERAL PRACTICE

## 2024-09-16 PROCEDURE — 25010000002 MAGNESIUM SULFATE 2 GM/50ML SOLUTION: Performed by: GENERAL PRACTICE

## 2024-09-16 PROCEDURE — 85025 COMPLETE CBC W/AUTO DIFF WBC: CPT

## 2024-09-16 PROCEDURE — 93010 ELECTROCARDIOGRAM REPORT: CPT | Performed by: INTERNAL MEDICINE

## 2024-09-16 PROCEDURE — 83605 ASSAY OF LACTIC ACID: CPT | Performed by: NURSE PRACTITIONER

## 2024-09-16 PROCEDURE — 74177 CT ABD & PELVIS W/CONTRAST: CPT

## 2024-09-16 PROCEDURE — 82150 ASSAY OF AMYLASE: CPT | Performed by: NURSE PRACTITIONER

## 2024-09-16 PROCEDURE — 85025 COMPLETE CBC W/AUTO DIFF WBC: CPT | Performed by: NURSE PRACTITIONER

## 2024-09-16 PROCEDURE — 25810000003 LACTATED RINGERS SOLUTION: Performed by: NURSE PRACTITIONER

## 2024-09-16 PROCEDURE — 83690 ASSAY OF LIPASE: CPT | Performed by: NURSE PRACTITIONER

## 2024-09-16 PROCEDURE — 25010000002 PROCHLORPERAZINE 10 MG/2ML SOLUTION: Performed by: GENERAL PRACTICE

## 2024-09-16 PROCEDURE — 25510000001 IOPAMIDOL 61 % SOLUTION: Performed by: GENERAL PRACTICE

## 2024-09-16 PROCEDURE — 99285 EMERGENCY DEPT VISIT HI MDM: CPT

## 2024-09-16 PROCEDURE — 80053 COMPREHEN METABOLIC PANEL: CPT | Performed by: NURSE PRACTITIONER

## 2024-09-16 PROCEDURE — 25010000002 DIPHENHYDRAMINE PER 50 MG: Performed by: GENERAL PRACTICE

## 2024-09-16 PROCEDURE — 85652 RBC SED RATE AUTOMATED: CPT | Performed by: NURSE PRACTITIONER

## 2024-09-16 PROCEDURE — 93005 ELECTROCARDIOGRAM TRACING: CPT

## 2024-09-16 PROCEDURE — 85610 PROTHROMBIN TIME: CPT | Performed by: NURSE PRACTITIONER

## 2024-09-16 PROCEDURE — 25810000003 LACTATED RINGERS SOLUTION: Performed by: GENERAL PRACTICE

## 2024-09-16 PROCEDURE — 83735 ASSAY OF MAGNESIUM: CPT | Performed by: NURSE PRACTITIONER

## 2024-09-16 PROCEDURE — 86140 C-REACTIVE PROTEIN: CPT | Performed by: NURSE PRACTITIONER

## 2024-09-16 RX ORDER — SODIUM CHLORIDE 9 MG/ML
40 INJECTION, SOLUTION INTRAVENOUS AS NEEDED
Status: DISCONTINUED | OUTPATIENT
Start: 2024-09-16 | End: 2024-09-20 | Stop reason: HOSPADM

## 2024-09-16 RX ORDER — POTASSIUM CHLORIDE 29.8 MG/ML
20 INJECTION INTRAVENOUS ONCE
Status: DISCONTINUED | OUTPATIENT
Start: 2024-09-16 | End: 2024-09-16

## 2024-09-16 RX ORDER — ACETAMINOPHEN 650 MG/1
650 SUPPOSITORY RECTAL EVERY 4 HOURS PRN
Status: DISCONTINUED | OUTPATIENT
Start: 2024-09-16 | End: 2024-09-20 | Stop reason: HOSPADM

## 2024-09-16 RX ORDER — MAGNESIUM SULFATE HEPTAHYDRATE 40 MG/ML
2 INJECTION, SOLUTION INTRAVENOUS ONCE
Status: COMPLETED | OUTPATIENT
Start: 2024-09-16 | End: 2024-09-16

## 2024-09-16 RX ORDER — IOPAMIDOL 612 MG/ML
100 INJECTION, SOLUTION INTRAVASCULAR
Status: COMPLETED | OUTPATIENT
Start: 2024-09-16 | End: 2024-09-16

## 2024-09-16 RX ORDER — POTASSIUM CHLORIDE 7.45 MG/ML
10 INJECTION INTRAVENOUS
Status: COMPLETED | OUTPATIENT
Start: 2024-09-16 | End: 2024-09-17

## 2024-09-16 RX ORDER — SODIUM CHLORIDE 0.9 % (FLUSH) 0.9 %
10 SYRINGE (ML) INJECTION EVERY 12 HOURS SCHEDULED
Status: DISCONTINUED | OUTPATIENT
Start: 2024-09-16 | End: 2024-09-20 | Stop reason: HOSPADM

## 2024-09-16 RX ORDER — DEXTROSE MONOHYDRATE, SODIUM CHLORIDE, AND POTASSIUM CHLORIDE 50; 1.49; 4.5 G/1000ML; G/1000ML; G/1000ML
100 INJECTION, SOLUTION INTRAVENOUS CONTINUOUS
Status: DISCONTINUED | OUTPATIENT
Start: 2024-09-16 | End: 2024-09-17

## 2024-09-16 RX ORDER — ACETAMINOPHEN 325 MG/1
650 TABLET ORAL EVERY 4 HOURS PRN
Status: DISCONTINUED | OUTPATIENT
Start: 2024-09-16 | End: 2024-09-20 | Stop reason: HOSPADM

## 2024-09-16 RX ORDER — MECLIZINE HYDROCHLORIDE 25 MG/1
25 TABLET ORAL 3 TIMES DAILY PRN
Status: DISCONTINUED | OUTPATIENT
Start: 2024-09-16 | End: 2024-09-20 | Stop reason: HOSPADM

## 2024-09-16 RX ORDER — TOPIRAMATE 25 MG/1
50 TABLET, FILM COATED ORAL 2 TIMES DAILY
Status: DISCONTINUED | OUTPATIENT
Start: 2024-09-16 | End: 2024-09-20 | Stop reason: HOSPADM

## 2024-09-16 RX ORDER — CHOLESTYRAMINE LIGHT 4 G/5.7G
1 POWDER, FOR SUSPENSION ORAL DAILY
Status: DISCONTINUED | OUTPATIENT
Start: 2024-09-17 | End: 2024-09-19

## 2024-09-16 RX ORDER — ONDANSETRON 2 MG/ML
4 INJECTION INTRAMUSCULAR; INTRAVENOUS EVERY 6 HOURS PRN
Status: DISCONTINUED | OUTPATIENT
Start: 2024-09-16 | End: 2024-09-20 | Stop reason: HOSPADM

## 2024-09-16 RX ORDER — LOPERAMIDE HCL 2 MG
4 CAPSULE ORAL 4 TIMES DAILY PRN
Status: DISCONTINUED | OUTPATIENT
Start: 2024-09-16 | End: 2024-09-20 | Stop reason: HOSPADM

## 2024-09-16 RX ORDER — CARVEDILOL 25 MG/1
25 TABLET ORAL 2 TIMES DAILY
Status: DISCONTINUED | OUTPATIENT
Start: 2024-09-16 | End: 2024-09-20 | Stop reason: HOSPADM

## 2024-09-16 RX ORDER — DIPHENHYDRAMINE HYDROCHLORIDE 50 MG/ML
25 INJECTION INTRAMUSCULAR; INTRAVENOUS ONCE
Status: COMPLETED | OUTPATIENT
Start: 2024-09-16 | End: 2024-09-16

## 2024-09-16 RX ORDER — PROCHLORPERAZINE EDISYLATE 5 MG/ML
10 INJECTION INTRAMUSCULAR; INTRAVENOUS ONCE
Status: COMPLETED | OUTPATIENT
Start: 2024-09-16 | End: 2024-09-16

## 2024-09-16 RX ORDER — ACETAMINOPHEN 500 MG
1000 TABLET ORAL ONCE
Status: DISCONTINUED | OUTPATIENT
Start: 2024-09-16 | End: 2024-09-16

## 2024-09-16 RX ORDER — HYDROCODONE BITARTRATE AND ACETAMINOPHEN 5; 325 MG/1; MG/1
1 TABLET ORAL EVERY 6 HOURS PRN
Status: DISCONTINUED | OUTPATIENT
Start: 2024-09-16 | End: 2024-09-20 | Stop reason: HOSPADM

## 2024-09-16 RX ORDER — ACETAMINOPHEN 160 MG/5ML
650 SOLUTION ORAL EVERY 4 HOURS PRN
Status: DISCONTINUED | OUTPATIENT
Start: 2024-09-16 | End: 2024-09-20 | Stop reason: HOSPADM

## 2024-09-16 RX ORDER — SODIUM CHLORIDE 0.9 % (FLUSH) 0.9 %
10 SYRINGE (ML) INJECTION AS NEEDED
Status: DISCONTINUED | OUTPATIENT
Start: 2024-09-16 | End: 2024-09-20 | Stop reason: HOSPADM

## 2024-09-16 RX ORDER — IPRATROPIUM BROMIDE AND ALBUTEROL SULFATE 2.5; .5 MG/3ML; MG/3ML
3 SOLUTION RESPIRATORY (INHALATION) EVERY 4 HOURS PRN
Status: DISCONTINUED | OUTPATIENT
Start: 2024-09-16 | End: 2024-09-20 | Stop reason: HOSPADM

## 2024-09-16 RX ADMIN — MAGNESIUM SULFATE HEPTAHYDRATE 2 G: 2 INJECTION, SOLUTION INTRAVENOUS at 21:39

## 2024-09-16 RX ADMIN — POTASSIUM CHLORIDE 10 MEQ: 7.46 INJECTION, SOLUTION INTRAVENOUS at 21:39

## 2024-09-16 RX ADMIN — DIPHENHYDRAMINE HYDROCHLORIDE 25 MG: 50 INJECTION, SOLUTION INTRAMUSCULAR; INTRAVENOUS at 20:52

## 2024-09-16 RX ADMIN — SODIUM CHLORIDE, POTASSIUM CHLORIDE, SODIUM LACTATE AND CALCIUM CHLORIDE 1000 ML: 600; 310; 30; 20 INJECTION, SOLUTION INTRAVENOUS at 20:42

## 2024-09-16 RX ADMIN — IOPAMIDOL 100 ML: 612 INJECTION, SOLUTION INTRAVENOUS at 19:37

## 2024-09-16 RX ADMIN — POTASSIUM CHLORIDE 10 MEQ: 7.46 INJECTION, SOLUTION INTRAVENOUS at 23:42

## 2024-09-16 RX ADMIN — SODIUM CHLORIDE, POTASSIUM CHLORIDE, SODIUM LACTATE AND CALCIUM CHLORIDE 1000 ML: 600; 310; 30; 20 INJECTION, SOLUTION INTRAVENOUS at 21:39

## 2024-09-16 RX ADMIN — PROCHLORPERAZINE EDISYLATE 10 MG: 5 INJECTION INTRAMUSCULAR; INTRAVENOUS at 20:47

## 2024-09-16 NOTE — Clinical Note
Level of Care: Telemetry [5]   Diagnosis: Hypokalemia [422421]   Admitting Physician: SYLVIA LEWIS [[id number]]   Attending Physician: SYLVIA LEWIS [[id number]]   Certification: I Certify That Inpatient Hospital Services Are Medically Necessary For Greater Than 2 Midnights

## 2024-09-16 NOTE — ED NOTES
MEDICAL SCREENING:    Reason for Visit: Patient is a 49-year-old female presents emergency department with vomiting and diarrhea and abdominal pain has been going on for the past 8 months.  She states she is having diarrhea about 25 times a day.  The last time she vomited was this morning.  She went to see her GI this morning and they had labs drawn and they advised her to come to the emergency department because her potassium was so low.  She states her pain is worse.  She states that her liver enzymes are elevated as well.  She denies any fever.  She complains of generalized weakness.  She states she feels so ill that she can hardly walk.  She has had several GI workups with an MRI done on September 6 which showed hepatic steatosis.  She states she came in today because she felt so much worse continued to have vomiting and diarrhea and that her labs were abnormal.  She denies any black or tarry stools.  No hematemesis.    Patient initially seen in triage.  The patient was advised further evaluation and diagnostic testing will be needed, some of the treatment and testing will be initiated in the lobby in order to begin the process.  The patient will be returned to the waiting area for the time being and possibly be re-assessed by a subsequent ED provider.  The patient will be brought back to the treatment area in as timely manner as possible.       Leah Neal, BREANA  09/16/24 [id number]

## 2024-09-17 PROBLEM — K76.0 STEATOSIS OF LIVER: Status: ACTIVE | Noted: 2024-09-17

## 2024-09-17 LAB
ANION GAP SERPL CALCULATED.3IONS-SCNC: 13 MMOL/L (ref 5–15)
BASOPHILS # BLD AUTO: 0.09 10*3/MM3 (ref 0–0.2)
BASOPHILS NFR BLD AUTO: 1 % (ref 0–1.5)
BUN SERPL-MCNC: 3 MG/DL (ref 6–20)
BUN/CREAT SERPL: 6.5 (ref 7–25)
C DIFF TOX GENS STL QL NAA+PROBE: NEGATIVE
CALCIUM SPEC-SCNC: 8.3 MG/DL (ref 8.6–10.5)
CHLORIDE SERPL-SCNC: 99 MMOL/L (ref 98–107)
CO2 SERPL-SCNC: 29 MMOL/L (ref 22–29)
CREAT SERPL-MCNC: 0.46 MG/DL (ref 0.57–1)
D-LACTATE SERPL-SCNC: 3.7 MMOL/L (ref 0.5–2)
D-LACTATE SERPL-SCNC: 5.3 MMOL/L (ref 0.5–2)
D-LACTATE SERPL-SCNC: 5.7 MMOL/L (ref 0.5–2)
D-LACTATE SERPL-SCNC: 5.9 MMOL/L (ref 0.5–2)
DEPRECATED RDW RBC AUTO: 60.4 FL (ref 37–54)
EGFRCR SERPLBLD CKD-EPI 2021: 116 ML/MIN/1.73
EOSINOPHIL # BLD AUTO: 0.19 10*3/MM3 (ref 0–0.4)
EOSINOPHIL NFR BLD AUTO: 2.2 % (ref 0.3–6.2)
ERYTHROCYTE [DISTWIDTH] IN BLOOD BY AUTOMATED COUNT: 15.7 % (ref 12.3–15.4)
GLUCOSE SERPL-MCNC: 91 MG/DL (ref 65–99)
HCT VFR BLD AUTO: 33.5 % (ref 34–46.6)
HGB BLD-MCNC: 11.1 G/DL (ref 12–15.9)
IMM GRANULOCYTES # BLD AUTO: 0.07 10*3/MM3 (ref 0–0.05)
IMM GRANULOCYTES NFR BLD AUTO: 0.8 % (ref 0–0.5)
LYMPHOCYTES # BLD AUTO: 2.71 10*3/MM3 (ref 0.7–3.1)
LYMPHOCYTES NFR BLD AUTO: 31.1 % (ref 19.6–45.3)
MAGNESIUM SERPL-MCNC: 1.7 MG/DL (ref 1.6–2.6)
MCH RBC QN AUTO: 34.8 PG (ref 26.6–33)
MCHC RBC AUTO-ENTMCNC: 33.1 G/DL (ref 31.5–35.7)
MCV RBC AUTO: 105 FL (ref 79–97)
MONOCYTES # BLD AUTO: 0.91 10*3/MM3 (ref 0.1–0.9)
MONOCYTES NFR BLD AUTO: 10.4 % (ref 5–12)
NEUTROPHILS NFR BLD AUTO: 4.75 10*3/MM3 (ref 1.7–7)
NEUTROPHILS NFR BLD AUTO: 54.5 % (ref 42.7–76)
NRBC BLD AUTO-RTO: 0 /100 WBC (ref 0–0.2)
PHOSPHATE SERPL-MCNC: 3.2 MG/DL (ref 2.5–4.5)
PHOSPHATE SERPL-MCNC: 3.3 MG/DL (ref 2.5–4.5)
PLATELET # BLD AUTO: 209 10*3/MM3 (ref 140–450)
PMV BLD AUTO: 11.1 FL (ref 6–12)
POTASSIUM SERPL-SCNC: 3 MMOL/L (ref 3.5–5.2)
POTASSIUM SERPL-SCNC: 3.7 MMOL/L (ref 3.5–5.2)
RBC # BLD AUTO: 3.19 10*6/MM3 (ref 3.77–5.28)
SODIUM SERPL-SCNC: 141 MMOL/L (ref 136–145)
WBC NRBC COR # BLD AUTO: 8.72 10*3/MM3 (ref 3.4–10.8)

## 2024-09-17 PROCEDURE — 25810000003 SODIUM CHLORIDE 0.9 % SOLUTION: Performed by: FAMILY MEDICINE

## 2024-09-17 PROCEDURE — 87493 C DIFF AMPLIFIED PROBE: CPT | Performed by: FAMILY MEDICINE

## 2024-09-17 PROCEDURE — 36415 COLL VENOUS BLD VENIPUNCTURE: CPT | Performed by: NURSE PRACTITIONER

## 2024-09-17 PROCEDURE — 25010000002 ONDANSETRON PER 1 MG: Performed by: FAMILY MEDICINE

## 2024-09-17 PROCEDURE — 80048 BASIC METABOLIC PNL TOTAL CA: CPT | Performed by: FAMILY MEDICINE

## 2024-09-17 PROCEDURE — 83605 ASSAY OF LACTIC ACID: CPT | Performed by: NURSE PRACTITIONER

## 2024-09-17 PROCEDURE — 84100 ASSAY OF PHOSPHORUS: CPT | Performed by: FAMILY MEDICINE

## 2024-09-17 PROCEDURE — 85025 COMPLETE CBC W/AUTO DIFF WBC: CPT | Performed by: FAMILY MEDICINE

## 2024-09-17 PROCEDURE — 84132 ASSAY OF SERUM POTASSIUM: CPT | Performed by: FAMILY MEDICINE

## 2024-09-17 PROCEDURE — 83735 ASSAY OF MAGNESIUM: CPT | Performed by: FAMILY MEDICINE

## 2024-09-17 RX ORDER — SODIUM CHLORIDE 9 MG/ML
75 INJECTION, SOLUTION INTRAVENOUS CONTINUOUS
Status: DISCONTINUED | OUTPATIENT
Start: 2024-09-17 | End: 2024-09-20 | Stop reason: HOSPADM

## 2024-09-17 RX ORDER — LISINOPRIL 40 MG/1
40 TABLET ORAL DAILY
COMMUNITY
End: 2024-09-17

## 2024-09-17 RX ORDER — POTASSIUM CHLORIDE 750 MG/1
40 CAPSULE, EXTENDED RELEASE ORAL EVERY 4 HOURS
Status: COMPLETED | OUTPATIENT
Start: 2024-09-17 | End: 2024-09-17

## 2024-09-17 RX ORDER — LEVOTHYROXINE SODIUM 175 UG/1
175 TABLET ORAL DAILY
Status: ON HOLD | COMMUNITY
End: 2024-10-04

## 2024-09-17 RX ADMIN — POTASSIUM CHLORIDE, DEXTROSE MONOHYDRATE AND SODIUM CHLORIDE 100 ML/HR: 150; 5; 450 INJECTION, SOLUTION INTRAVENOUS at 09:40

## 2024-09-17 RX ADMIN — POTASSIUM CHLORIDE 40 MEQ: 750 CAPSULE, EXTENDED RELEASE ORAL at 09:34

## 2024-09-17 RX ADMIN — POTASSIUM CHLORIDE 40 MEQ: 750 CAPSULE, EXTENDED RELEASE ORAL at 12:02

## 2024-09-17 RX ADMIN — TOPIRAMATE 50 MG: 25 TABLET, FILM COATED ORAL at 21:15

## 2024-09-17 RX ADMIN — CARVEDILOL 25 MG: 25 TABLET, FILM COATED ORAL at 20:32

## 2024-09-17 RX ADMIN — HYDROCODONE BITARTRATE AND ACETAMINOPHEN 1 TABLET: 5; 325 TABLET ORAL at 17:12

## 2024-09-17 RX ADMIN — TOPIRAMATE 50 MG: 25 TABLET, FILM COATED ORAL at 09:35

## 2024-09-17 RX ADMIN — ONDANSETRON 4 MG: 2 INJECTION INTRAMUSCULAR; INTRAVENOUS at 04:07

## 2024-09-17 RX ADMIN — Medication 10 ML: at 09:34

## 2024-09-17 RX ADMIN — Medication 10 ML: at 00:16

## 2024-09-17 RX ADMIN — POTASSIUM CHLORIDE, DEXTROSE MONOHYDRATE AND SODIUM CHLORIDE 100 ML/HR: 150; 5; 450 INJECTION, SOLUTION INTRAVENOUS at 00:05

## 2024-09-17 RX ADMIN — ONDANSETRON 4 MG: 2 INJECTION INTRAMUSCULAR; INTRAVENOUS at 09:43

## 2024-09-17 RX ADMIN — CHOLESTYRAMINE 4 G: 4 POWDER, FOR SUSPENSION ORAL at 09:35

## 2024-09-17 RX ADMIN — Medication 10 ML: at 20:33

## 2024-09-17 RX ADMIN — POTASSIUM CHLORIDE 40 MEQ: 750 CAPSULE, EXTENDED RELEASE ORAL at 17:12

## 2024-09-17 RX ADMIN — CARVEDILOL 25 MG: 25 TABLET, FILM COATED ORAL at 00:04

## 2024-09-17 RX ADMIN — CARVEDILOL 25 MG: 25 TABLET, FILM COATED ORAL at 09:34

## 2024-09-17 RX ADMIN — SODIUM CHLORIDE 75 ML/HR: 9 INJECTION, SOLUTION INTRAVENOUS at 12:02

## 2024-09-17 RX ADMIN — TOPIRAMATE 50 MG: 25 TABLET, FILM COATED ORAL at 00:04

## 2024-09-17 NOTE — PAYOR COMM NOTE
"Admit 9/16/2024    [facility]  JAY JAY,   [phone]  OR   FAX   [phone]  Robles Singh (49 y.o. Female)       Date of Birth   1975    Social Security Number       Address   [address] [place] [address]    Home Phone   [phone]    MRN   [id number]       Oriental orthodox   [facility]    Marital Status                               Admission Date   9/16/24    Admission Type   Emergency    Admitting Provider   Domingo White DO    Attending Provider   Domingo White DO    Department, Room/Bed   [facility] EMERGENCY DEPARTMENT, 24/24       Discharge Date       Discharge Disposition       Discharge Destination                                 Attending Provider: Domingo White DO    Allergies: No Known Allergies    Isolation: None   Infection: C.difficile (07/02/24), C.difficile (rule out) (09/13/24)   Code Status: CPR    Ht: 172.7 cm (68\")   Wt: 80.7 kg (178 lb)    Admission Cmt: None   Principal Problem: Hypokalemia [E87.6]                   Active Insurance as of 9/16/2024       Primary Coverage       Payor Plan Insurance Group Employer/Plan Group    [employer] [place] [employer] [place]ICA[place]        Payor Plan Address Payor Plan Phone Number Payor Plan Fax Number Effective Dates    [address] [address] [phone]  6/8/2020 - None Entered    [place] [address]         Subscriber Name Subscriber Birth Date Member ID       ROBLES SINGH 1975 [id number]                     Emergency Contacts        (Rel.) Home Phone Work Phone Mobile Phone    Kirill Montgomery (Spouse) [phone] -- [phone]          9/16/2024 9/16/2024 Event Details User   16:49 Patient arrival  Samaria Figueroa RN   16:49:40 Arrival Complaint abnormal lab    17:15 Vitals Assessment  Domitila Reyes PCT   17:15 Vital Signs  Vital Signs  Temp: 98.9 °F (37.2 °C)  Temp src: Oral  Heart Rate: 92  Heart Rate Source: Monitor  Resp: 18  BP: 141/92  BP Location: Right " "arm  BP Method: Automatic  Patient Position: Sitting  BMI (Calculated): 27.1  Oxygen Therapy  SpO2: 96 %  Pulse Oximetry Type: Intermittent  Device (Oxygen Therapy): room air  Vitals Timer  Restart Vitals Timer: Yes  Height and Weight  Height: 172.7 cm (68\")  Weight: 80.7 kg (178 lb)  Other flowsheet entries  Ideal Body Weight k.9 Domitila Reyes, PCT   17:16 HPI HPI (Adult)  Stated Reason for Visit: pt sent by  for low potassium. (2.5) pt reports heart feels racing at times, denies cp, reports pain to upper back. + leg pain. has been taking potassium RX. Samaria Figueroa RN     17:19 Pain Pain (Adult)  (0-10) Pain Rating: Rest: 8  Pain Location: back Samaria Figueroa RN     17:29:03 ED Notes -------------------------------------------------------------------------------  Attestation signed by Savage Calderon Jr., MD at 24  Critical patient triage  -------------------------------------------------------------------------------     MEDICAL SCREENING:     Reason for Visit: Patient is a 49-year-old female presents emergency department with vomiting and diarrhea and abdominal pain has been going on for the past 8 months.  She states she is having diarrhea about 25 times a day.  The last time she vomited was this morning.  She went to see her GI this morning and they had labs drawn and they advised her to come to the emergency department because her potassium was so low.  She states her pain is worse.  She states that her liver enzymes are elevated as well.  She denies any fever.  She complains of generalized weakness.  She states she feels so ill that she can hardly walk.  She has had several GI workups with an MRI done on  which showed hepatic steatosis.  She states she came in today because she felt so much worse continued to have vomiting and diarrhea and that her labs were abnormal.  She denies any black or tarry stools.  No hematemesis.     Patient initially seen in " triage.  The patient was advised further evaluation and diagnostic testing will be needed, some of the treatment and testing will be initiated in the lobby in order to begin the process.  The patient will be returned to the waiting area for the time being and possibly be re-assessed by a subsequent ED provider.  The patient will be brought back to the treatment area in as timely manner as possible.        Leah Neal, APRHERMANN  09/16/24 1815        20:35 Free Text Labs reviewed, notable for lactate of 8.7, potassium 2.2, mag 1.4, CRP mildly elevated.  Second liter of IV fluids ordered.  Unclear source of elevated lactate, CT abdomen pelvis is currently pending. Cole Hopper MD     20:42 Medication New Bag lactated ringers bolus 1,000 mL - Dose: 1,000 mL ; Rate: 2,000 mL/hr ; Route: Intravenous ; Line: Peripheral IV 09/16/24 1935 Right Antecubital ; Scheduled Time: 1753 Eloisa Castillo RN     20:47 Medication Given prochlorperazine (COMPAZINE) injection 10 mg - Dose: 10 mg ; Route: Intravenous ; Line: Peripheral IV 09/16/24 1935 Right Antecubital ; Scheduled Time: 2050 ; Comment: pulled second vial from [employer], dropped 1st syringe after pulling the original Eloisa Castillo RN   20:52 Medication Given diphenhydrAMINE (BENADRYL) injection 25 mg - Dose: 25 mg ; Route: Intravenous ; Line: Peripheral IV 09/16/24 1935 Right Antecubital ; Scheduled Time: 2050 Eloisa Castillo RN     21:18 Free Text D/w Dr Ware, hospitalist, who will admit for further management of chronic diarrhea, multiple electrolyte abnormalities Cole Hopper MD     21:39 Medication New Bag lactated ringers bolus 1,000 mL - Dose: 1,000 mL ; Rate: 2,000 mL/hr ; Route: Intravenous ; Line: Peripheral IV 09/16/24 1935 Right Antecubital ; Scheduled Time: 2051 Carlos Reeves RN   21:39 Medication New Bag magnesium sulfate 2g/50 mL (PREMIX) infusion - Dose: 2 g ; Route: Intravenous ; Line: Peripheral IV 09/16/24 2134 Anterior;Left Forearm ;  Scheduled Time: 2051 Carlos Reeves RN   21:39 Medication New Bag potassium chloride 10 mEq in 100 mL IVPB - Dose: 10 mEq ; Rate: 100 mL/hr ; Route: Intravenous ; Line: Peripheral IV 09/16/24 1935 Right Antecubital ; Scheduled Time: 2057 Carlos Reeves RN     23:42 Medication New Bag potassium chloride 10 mEq in 100 mL IVPB - Dose: 10 mEq ; Rate: 100 mL/hr ; Route: Intravenous ; Line: Peripheral IV 09/16/24 1935 Right Antecubital ; Scheduled Time: 2157 ; Comment: previous run of potassium was still infusing Carlos Reeves RN     00:04 Medication Given carvedilol (COREG) tablet 25 mg - Dose: 25 mg ; Route: Oral ; Scheduled Date: 9/16/24; ; Scheduled Time: 2347 Carlos Reeves RN   00:04 Medication Given topiramate (TOPAMAX) tablet 50 mg - Dose: 50 mg ; Route: Oral ; Scheduled Date: 9/16/24; ; Scheduled Time: 2347 Carlos Reeves RN   00:05 Medication New Bag dextrose 5 % and sodium chloride 0.45 % with KCl 20 mEq/L infusion - Dose: 100 mL/hr ; Rate: 100 mL/hr ; Route: Intravenous ; Line: Peripheral IV 09/16/24 2134 Anterior;Left Forearm ; Scheduled Date: 9/16/24; ; Scheduled Time: 2347 Carlos Reeves RN     01:29 Medication Currently Infusing dextrose 5 % and sodium chloride 0.45 % with KCl 20 mEq/L infusion - Dose: 100 mL/hr ; Rate: 100 mL/hr ; Route: Intravenous ; Line: Peripheral IV 09/16/24 2134 Anterior;Left Forearm ; Scheduled Date: 9/16/24; ; Scheduled Time: 2355 ; Comment: see scaned med and time Ivy Aldana RN     04:07 Medication Given ondansetron (ZOFRAN) injection 4 mg - Dose: 4 mg ; Route: Intravenous ; Line: Peripheral IV 09/16/24 2134 Anterior;Left Forearm Carlos Reeves RN     05:02 Medication Currently Infusing dextrose 5 % and sodium chloride 0.45 % with KCl 20 mEq/L infusion - Dose: 100 mL/hr ; Rate: 100 mL/hr ; Route: Intravenous ; Line: Peripheral IV 09/16/24 2134 Anterior;Left Forearm ; Scheduled Time: 0502 Ivy Aldana RN          History & Physical        Paul Fulton,  MD at 09/16/24 2313              [facility] Medicine Services  HISTORY AND PHYSICAL    Date of Admission: 9/16/2024  Primary Care Physician: Aidee Padilla APRN Subjective   Primary Historian: Patient    Chief Complaint: Headache, diarrhea, vomiting    History of Present Illness  49-year-old female with past medical history of chronic diarrhea, transaminitis, fatty liver, COPD that presents to the emergency room with complaints of splitting headache diarrhea and vomiting.  She has had diarrhea for several months has been evaluated by GI outpatient and inpatient referred to [facility] she has not visited there yet.  Extensive workup has been negative and she was placed on colestipol and Imodium but has persistent diarrhea.  She also complains of poor tolerance due to vomiting and abdominal pain.  Lab work shows transaminitis, bilirubin normal, CT abdomen and pelvis with no acute pathology.  Potassium and magnesium are very low.    Review of Systems   Otherwise complete ROS reviewed and negative except as mentioned in the HPI.    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    CTS (carpal tunnel syndrome) 2019    Dental disease     Depression     Diastolic dysfunction 2022    Dizziness     Emphysema of lung 2020    GERD (gastroesophageal reflux disease)     Headache     Hyperlipidemia     Hypertension     Hypothyroidism     Mixed simple and mucopurulent chronic bronchitis 04/18/2023    Non-occlusive coronary artery disease 05/04/2023    NSTEMI (non-ST elevated myocardial infarction) (HCC)     Pneumonia 2022    Pulmonary emphysema 04/21/2023    Shingles 2018    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     Past Surgical History:  Past Surgical History:   Procedure Laterality Date     APPENDECTOMY      CARDIAC CATHETERIZATION N/A 11/04/2017    Procedure: Coronary angiography;  Surgeon: Luis Colvin MD;  Location: [facility] CATH INVASIVE LOCATION;  Service:     CARDIAC CATHETERIZATION N/A 05/31/2023    Procedure: Left Heart Cath;  Surgeon: Steffen Rossi MD;  Location: [facility] CATH INVASIVE LOCATION;  Service: Cardiology;  Laterality: N/A;    CARPAL TUNNEL RELEASE  2019    COLONOSCOPY N/A 8/2/2024    Procedure: COLONOSCOPY WITH ANESTHESIA;  Surgeon: Marty Browning MD;  Location: [facility] ENDOSCOPY;  Service: Gastroenterology;  Laterality: N/A;  pre op pancolitis    ENDOSCOPY N/A 7/8/2024    Procedure: ESOPHAGOGASTRODUODENOSCOPY WITH ANESTHESIA;  Surgeon: Gordy Wang MD;  Location: [facility] ENDOSCOPY;  Service: Gastroenterology;  Laterality: N/A;  pre: dysphagia.   post: esophagus dilated.   no PCP    HYSTERECTOMY      PANENDOSCOPY N/A 5/30/2024    Procedure: DIRECT LARYNGOSCOPY WITH BIOPSY OF VOCAL CORD POLYPS WITH POSSIBLE TRACHEOSTOMY;  Surgeon: Mendez Padilla MD;  Location: [facility] OR;  Service: ENT;  Laterality: N/A;    NM RT/LT HEART CATHETERS N/A 11/04/2017    Procedure: Percutaneous Coronary Intervention;  Surgeon: Luis Colvin MD;  Location: [facility] CATH INVASIVE LOCATION;  Service: Cardiovascular    THYROID SURGERY      TOTAL THYROIDECTOMY      TRACHEOSTOMY N/A 5/30/2024    Procedure: POSSIBLE TRACHEOSTOMY;  Surgeon: Mendez Padilla MD;  Location: [facility] OR;  Service: ENT;  Laterality: N/A;     Social History:  reports that she quit smoking about 6 months ago. Her smoking use included cigarettes. She started smoking about 33 years ago. She has a 30 pack-year smoking history. She has been exposed to tobacco smoke. She has never used smokeless tobacco. She reports that she does not currently use alcohol. She reports that she does not use drugs.    Family History: family history includes Asthma in her mother; Cancer in her father and mother; Colon cancer in her father;  Emphysema in her mother; Heart failure in her father; Hypertension in her father.       Allergies:  No Known Allergies    Medications:  Prior to Admission medications    Medication Sig Start Date End Date Taking? Authorizing Provider   albuterol sulfate  (90 Base) MCG/ACT inhaler Inhale 2 puffs Every 4 (Four) Hours As Needed for Wheezing. 9/22/23   Sivan Wise APRN   atorvastatin (LIPITOR) 10 MG tablet Take 1 tablet by mouth Daily. 8/7/24   Kirill Chakraborty DO   carvedilol (COREG) 25 MG tablet Take 1 tablet by mouth 2 (Two) Times a Day. 5/11/23   Seema Hendricks APRN   cloNIDine (CATAPRES) 0.1 MG tablet Take 1 tablet by mouth As Needed for High Blood Pressure (for blood pressure >140/90).    ProviderGaldino MD   colestipol (COLESTID) 1 g tablet Take 1 tablet by mouth 2 (Two) Times a Day. 9/6/24   Rakel Padilla APRN   dilTIAZem CD (CARDIZEM CD) 120 MG 24 hr capsule Take 1 capsule by mouth Daily.    Galdino Wei MD   FLUoxetine (PROzac) 20 MG capsule Take 1 capsule by mouth Daily. 11/6/23   Galdino Wei MD   ipratropium-albuterol (DUO-NEB) 0.5-2.5 mg/3 ml nebulizer Take 3 mL by nebulization Every 4 (Four) Hours As Needed for Wheezing. 4/3/23   Savage Calderon Jr., MD   lisinopril (PRINIVIL,ZESTRIL) 40 MG tablet Take 0.5 tablets by mouth Daily. 8/6/24   Kirill Chakraborty DO   loperamide (Imodium A-D) 2 MG tablet Take 1 tablet by mouth 4 (Four) Times a Day As Needed for Diarrhea. 8/6/24   Kirill Chakraborty DO   meclizine (ANTIVERT) 25 MG tablet Take 1 tablet by mouth 3 (Three) Times a Day As Needed for Dizziness. 3/10/23   Steffen Rossi MD   nitroglycerin (NITROSTAT) 0.4 MG SL tablet Place 1 tablet under the tongue Every 5 (Five) Minutes As Needed for Chest Pain. Take no more than 3 doses in 15 minutes.    Galdino Wei MD   pantoprazole (PROTONIX) 40 MG EC tablet Take 1 tablet by mouth 2 (Two) Times a Day. 7/8/24   Gordy Wang MD   potassium chloride  "(KLOR-CON M20) 20 MEQ CR tablet Take 1 tablet by mouth 2 (Two) Times a Day.    Provider, MD Galdino   topiramate (TOPAMAX) 50 MG tablet Take 1 tablet by mouth 2 (Two) Times a Day. 6/13/24   Domitila Duncan MD     I have utilized all available immediate resources to obtain, update, or review the patient's current medications (including all prescriptions, over-the-counter products, herbals, cannabis/cannabidiol products, and vitamin/mineral/dietary (nutritional) supplements).    Objective     Vital Signs: /100 (BP Location: Right arm, Patient Position: Sitting)   Pulse 98   Temp 98.9 °F (37.2 °C) (Oral)   Resp 18   Ht 172.7 cm (68\")   Wt 80.7 kg (178 lb)   LMP  (LMP Unknown)   SpO2 96%   BMI 27.06 kg/m²   Physical Exam  Vitals reviewed.   Constitutional:       General: She is not in acute distress.     Appearance: She is well-developed. She is not toxic-appearing.   HENT:      Head: Normocephalic and atraumatic.      Right Ear: External ear normal.      Left Ear: External ear normal.      Mouth/Throat:      Mouth: Mucous membranes are dry.      Pharynx: Oropharynx is clear.   Eyes:      General:         Right eye: No discharge.         Left eye: No discharge.      Extraocular Movements: Extraocular movements intact.      Conjunctiva/sclera: Conjunctivae normal.      Pupils: Pupils are equal, round, and reactive to light.   Neck:      Vascular: No JVD.   Cardiovascular:      Rate and Rhythm: Normal rate and regular rhythm.      Pulses: Normal pulses.      Heart sounds: Normal heart sounds. No murmur heard.     No friction rub. No gallop.   Pulmonary:      Effort: Pulmonary effort is normal. No respiratory distress.      Breath sounds: No stridor. No wheezing, rhonchi or rales.   Chest:      Chest wall: No tenderness.   Abdominal:      General: Bowel sounds are normal. There is distension.      Palpations: Abdomen is soft.      Tenderness: There is no abdominal tenderness. There is no guarding or " rebound.      Hernia: No hernia is present.   Musculoskeletal:         General: No swelling, tenderness or deformity. Normal range of motion.      Cervical back: Normal range of motion and neck supple. No rigidity or tenderness. No muscular tenderness.      Right lower leg: No edema.      Left lower leg: No edema.   Skin:     General: Skin is warm and dry.      Findings: No erythema or rash.   Neurological:      General: No focal deficit present.      Mental Status: She is alert and oriented to person, place, and time.      Cranial Nerves: No cranial nerve deficit.      Sensory: No sensory deficit.      Motor: No weakness or abnormal muscle tone.      Deep Tendon Reflexes: Reflexes normal.   Psychiatric:         Mood and Affect: Mood normal.         Behavior: Behavior normal.     Results Reviewed:  Lab Results (last 24 hours)       Procedure Component Value Units Date/Time    Lactic Acid, Plasma [[id number]]  (Abnormal) Collected: 09/16/24 1956    Specimen: Blood from Arm, Left Updated: 09/16/24 2035     Lactate 8.7 mmol/L     Comprehensive Metabolic Panel [[id number]]  (Abnormal) Collected: 09/16/24 1956    Specimen: Blood from Arm, Left Updated: 09/16/24 2034     Glucose 112 mg/dL      BUN 3 mg/dL      Creatinine 0.51 mg/dL      Sodium 137 mmol/L      Potassium 2.2 mmol/L      Chloride 91 mmol/L      CO2 24.0 mmol/L      Calcium 8.0 mg/dL      Total Protein 6.4 g/dL      Albumin 3.3 g/dL      ALT (SGPT) 109 U/L      AST (SGOT) 295 U/L      Alkaline Phosphatase 799 U/L      Total Bilirubin 0.7 mg/dL      Globulin 3.1 gm/dL      A/G Ratio 1.1 g/dL      BUN/Creatinine Ratio 5.9     Anion Gap 22.0 mmol/L      eGFR 113.2 mL/min/1.73     Narrative:      GFR Normal >60  Chronic Kidney Disease <60  Kidney Failure <15      Magnesium [[id number]]  (Abnormal) Collected: 09/16/24 1956    Specimen: Blood from Arm, Left Updated: 09/16/24 2034     Magnesium 1.4 mg/dL     C-reactive Protein [[id number]]  (Abnormal) Collected:  09/16/24 1956    Specimen: Blood from Arm, Left Updated: 09/16/24 2033     C-Reactive Protein 0.59 mg/dL     Urinalysis With Culture If Indicated - Urine, Clean Catch [[id number]]  (Normal) Collected: 09/16/24 2020    Specimen: Urine, Clean Catch Updated: 09/16/24 2029     Color, UA Yellow     Appearance, UA Clear     pH, UA 7.0     Specific Gravity, UA 1.019     Glucose, UA Negative     Ketones, UA Negative     Bilirubin, UA Negative     Blood, UA Negative     Protein, UA Negative     Leuk Esterase, UA Negative     Nitrite, UA Negative     Urobilinogen, UA 0.2 E.U./dL    Narrative:      In absence of clinical symptoms, the presence of pyuria, bacteria, and/or nitrites on the urinalysis result does not correlate with infection.  Urine microscopic not indicated.    Amylase [[id number]]  (Abnormal) Collected: 09/16/24 1956    Specimen: Blood from Arm, Left Updated: 09/16/24 2028     Amylase 13 U/L     Lipase [[id number]]  (Abnormal) Collected: 09/16/24 1956    Specimen: Blood from Arm, Left Updated: 09/16/24 2027     Lipase 12 U/L     Protime-INR [[id number]]  (Normal) Collected: 09/16/24 1956    Specimen: Blood from Arm, Left Updated: 09/16/24 2017     Protime 13.4 Seconds      INR 0.98    Sedimentation Rate [[id number]]  (Normal) Collected: 09/16/24 1956    Specimen: Blood from Arm, Left Updated: 09/16/24 2015     Sed Rate 19 mm/hr     CBC & Differential [[id number]]  (Abnormal) Collected: 09/16/24 1956    Specimen: Blood from Arm, Left Updated: 09/16/24 2009    Narrative:      The following orders were created for panel order CBC & Differential.  Procedure                               Abnormality         Status                     ---------                               -----------         ------                     CBC Auto Differential[[id number]]        Abnormal            Final result                 Please view results for these tests on the individual orders.    CBC Auto Differential [[id number]]  (Abnormal)  Collected: 09/16/24 1956    Specimen: Blood from Arm, Left Updated: 09/16/24 2009     WBC 8.82 10*3/mm3      RBC 3.15 10*6/mm3      Hemoglobin 11.2 g/dL      Hematocrit 32.9 %      .4 fL      MCH 35.6 pg      MCHC 34.0 g/dL      RDW 15.9 %      RDW-SD 61.2 fl      MPV 11.1 fL      Platelets 227 10*3/mm3      Neutrophil % 47.5 %      Lymphocyte % 38.7 %      Monocyte % 9.5 %      Eosinophil % 2.0 %      Basophil % 1.2 %      Immature Grans % 1.1 %      Neutrophils, Absolute 4.18 10*3/mm3      Lymphocytes, Absolute 3.41 10*3/mm3      Monocytes, Absolute 0.84 10*3/mm3      Eosinophils, Absolute 0.18 10*3/mm3      Basophils, Absolute 0.11 10*3/mm3      Immature Grans, Absolute 0.10 10*3/mm3      nRBC 0.0 /100 WBC           Imaging Results (Last 24 Hours)       Procedure Component Value Units Date/Time    CT Abdomen Pelvis With Contrast [[id number]] Collected: 09/16/24 1944     Updated: 09/16/24 1951    Narrative:      EXAMINATION:  CT ABDOMEN PELVIS W CONTRAST-  9/16/2024 6:22 PM     HISTORY: Abdominal pain with vomiting and diarrhea.     TECHNIQUE: Spiral CT was performed of the abdomen and pelvis with IV  contrast. Multiplanar images were reconstructed.     DLP: 378.39 mGy.cm Automated dosage reduction technique was utilized to  reduce patient dose.     COMPARISON: 9/5/2024.     LUNG BASES: There is minimal dependent atelectasis.     LIVER AND SPLEEN: There is very severe fatty infiltration of the liver.  There is hepatomegaly with liver measuring 22.6 cm in length. The spleen  is normal in size. There are no dense gallstones. No ductal dilatation  is appreciated.     PANCREAS: No pancreatic mass or inflammatory change.     KIDNEYS AND ADRENALS: The kidneys and adrenal glands are unremarkable.  No bladder abnormality is detected.     BOWEL: No oral contrast was given. There is food material in the  stomach. There is mild under distention of the stomach. Small bowel  loops are nondilated. There is submucosal fat  deposition in the colon.  There is under distention of most of the colon. There is some  questionable wall thickening of the sigmoid colon extending to the  rectum versus artifact of under distention.     OTHER: There is atheromatous disease of the aortoiliac vessels. There is  a retroaortic left renal vein. There has been prior hysterectomy. There  are no enlarged abdominal or pelvic lymph nodes. There are degenerative  changes of the spine. There is avascular necrosis of both femoral heads  with no articular collapse.          Impression:      1. Severe fatty infiltration of the liver with hepatomegaly.  2. Under distention of stomach and colon. No small bowel distention is  seen. Submucosal fat deposition in the colon can be seen as a normal  variant. It can also be seen in patients with chronic inflammatory bowel  disease. There may be some wall thickening of the sigmoid colon and  rectum versus artifact of under distention. Colitis is included in the  differential.  3. Atheromatous disease of the aortoiliac vessels. Retroaortic left  renal vein.  4. 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 Degenerative  changes of the visualized spine.  5. Prior hysterectomy.        The full report of this exam was immediately signed and available to the  emergency room. The patient is currently in the emergency room.     This report was signed and finalized on 9/16/2024 7:48 PM by Dr. Jesus Manuel Lomax MD.             I have personally reviewed and interpreted the radiology studies and ECG obtained at time of admission.     Assessment / Plan   Assessment:   Active Hospital Problems    Diagnosis     **Hypokalemia     Hypomagnesemia     Nonspecific colitis     Pain of upper abdomen     Primary hypertension     Pulmonary emphysema      Treatment Plan  The patient will be admitted to my service here at [facility].   Admit to [facility] floor  Vitals every 4 hours  Up ad buck.  Clear liquid diet  IV fluids D5  half-normal saline with KCl 20 mill equivalents at 100 cc an hour  Electrolyte replacement protocol for magnesium and potassium.  Will check phosphorus and replace as needed  Treat symptomatically with antiemetics and antidiarrheals    Headache  Correct fluid status and electrolytes  Treat symptomatically    Home medications reviewed    DVT prophylaxis SCDs    Medical Decision Making  Number and Complexity of problems: 4 complex medical problems  Differential Diagnosis: See above    Conditions and Status        Condition is unchanged.     [facility] Data  External documents reviewed: [employer] EHR and [employer] EHR  Cardiac tracing (EKG, telemetry) interpretation: NSR  Radiology interpretation: see above  Labs reviewed: see above  Any tests that were considered but not ordered: none     Decision rules/scores evaluated (example [id number], Wells, etc): N/A     Discussed with: Patient     Care Planning  Shared decision making: Patient  Code status and discussions: Full code    Disposition  Social Determinants of Health that impact treatment or disposition: none  Estimated length of stay is over 2 midnights.     I confirmed that the patient's advanced care plan is present, code status is documented, and a surrogate decision maker is listed in the patient's medical record.     The patient's surrogate decision maker is family, see records.     The patient was seen and examined by me on 9/16/2024 at 2313.    Electronically signed by Paul Fulton MD, 09/16/24, 23:13 CDT.              Electronically signed by Paul Fulton MD at 09/16/24 2333          Emergency Department Notes        Cole Hopper MD at 09/16/24 2033        Procedure Orders    1. ECG 12 Lead Electrolyte Imbalance [[id number]] ordered by Leah Neal APRN                 Subjective   History of Present Illness  49-year-old female, past medical history including COPD, liver disease, presents due to persistent nausea and vomiting.  Patient  states she has had regular abdominal pain, nausea, vomiting for weeks.  Over the last several days she says symptoms have gotten worse with associated headache.  She is unsure of what improves symptoms.  Denies fever, chills, chest pain, shortness of breath.        Review of Systems   Constitutional:  Positive for fatigue. Negative for chills and diaphoresis.   HENT: Negative.     Respiratory:  Negative for cough and shortness of breath.    Cardiovascular:  Negative for chest pain and leg swelling.   Gastrointestinal:  Positive for abdominal pain, diarrhea, nausea and vomiting.   Endocrine: Negative for polyphagia and polyuria.   Genitourinary:  Negative for difficulty urinating and dysuria.   Musculoskeletal: Negative.    Skin: Negative.    Neurological: Negative.    Hematological: Negative.    Psychiatric/Behavioral:  Negative for agitation and behavioral problems.        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    CTS (carpal tunnel syndrome) 2019    Dental disease     Depression     Diastolic dysfunction 2022    Dizziness     Emphysema of lung 2020    GERD (gastroesophageal reflux disease)     Headache     Hyperlipidemia     Hypertension     Hypothyroidism     Mixed simple and mucopurulent chronic bronchitis 04/18/2023    Non-occlusive coronary artery disease 05/04/2023    NSTEMI (non-ST elevated myocardial infarction) (HCC)     Pneumonia 2022    Pulmonary emphysema 04/21/2023    Shingles 2018    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       No Known Allergies    Past Surgical History:   Procedure Laterality Date    APPENDECTOMY      CARDIAC CATHETERIZATION N/A 11/04/2017    Procedure: Coronary angiography;  Surgeon: Luis Colvin MD;  Location: [facility] CATH INVASIVE LOCATION;  Service:     CARDIAC CATHETERIZATION N/A 05/31/2023     Procedure: Left Heart Cath;  Surgeon: Steffen Rossi MD;  Location: [facility] CATH INVASIVE LOCATION;  Service: Cardiology;  Laterality: N/A;    CARPAL TUNNEL RELEASE  2019    COLONOSCOPY N/A 2024    Procedure: COLONOSCOPY WITH ANESTHESIA;  Surgeon: Marty Browning MD;  Location: [facility] ENDOSCOPY;  Service: Gastroenterology;  Laterality: N/A;  pre op pancolitis    ENDOSCOPY N/A 2024    Procedure: ESOPHAGOGASTRODUODENOSCOPY WITH ANESTHESIA;  Surgeon: Gordy Wang MD;  Location: [facility] ENDOSCOPY;  Service: Gastroenterology;  Laterality: N/A;  pre: dysphagia.   post: esophagus dilated.   no PCP    HYSTERECTOMY      PANENDOSCOPY N/A 2024    Procedure: DIRECT LARYNGOSCOPY WITH BIOPSY OF VOCAL CORD POLYPS WITH POSSIBLE TRACHEOSTOMY;  Surgeon: Mendez Padilla MD;  Location: [facility] OR;  Service: ENT;  Laterality: N/A;    ND RT/LT HEART CATHETERS N/A 2017    Procedure: Percutaneous Coronary Intervention;  Surgeon: Luis Colvin MD;  Location: [facility] CATH INVASIVE LOCATION;  Service: Cardiovascular    THYROID SURGERY      TOTAL THYROIDECTOMY      TRACHEOSTOMY N/A 2024    Procedure: POSSIBLE TRACHEOSTOMY;  Surgeon: Mendez Padilla MD;  Location: [facility] OR;  Service: ENT;  Laterality: N/A;       Family History   Problem Relation Age of Onset    Asthma Mother     Cancer Mother     Emphysema Mother     Colon cancer Father     Cancer Father     Heart failure Father     Hypertension Father        Social History     Socioeconomic History    Marital status:    Tobacco Use    Smoking status: Former     Current packs/day: 0.00     Average packs/day: 0.7 packs/day for 45.9 years (30.0 ttl pk-yrs)     Types: Cigarettes     Start date: 1991     Quit date: 3/1/2024     Years since quittin.5     Passive exposure: Past    Smokeless tobacco: Never    Tobacco comments:     Less than 1/2 pack a day    Vaping Use    Vaping status: Never Used   Substance and Sexual Activity    Alcohol use:  Not Currently     Comment: nothing to drink in 3 1/2 weeks    Drug use: No    Sexual activity: Not Currently     Partners: Male     Birth control/protection: Hysterectomy           Objective   Physical Exam  Constitutional:       General: She is not in acute distress.     Appearance: She is ill-appearing.   HENT:      Head: Normocephalic and atraumatic.      Right Ear: External ear normal.      Left Ear: External ear normal.      Nose: Nose normal.      Mouth/Throat:      Mouth: Mucous membranes are dry.   Eyes:      Extraocular Movements: Extraocular movements intact.      Conjunctiva/sclera: Conjunctivae normal.   Cardiovascular:      Rate and Rhythm: Normal rate and regular rhythm.   Pulmonary:      Effort: Pulmonary effort is normal.      Breath sounds: Normal breath sounds.   Abdominal:      General: Abdomen is flat.      Palpations: Abdomen is soft.      Tenderness: There is abdominal tenderness.   Musculoskeletal:         General: Normal range of motion.      Cervical back: Normal range of motion and neck supple.   Skin:     General: Skin is warm and dry.      Capillary Refill: Capillary refill takes less than 2 seconds.   Neurological:      General: No focal deficit present.      Mental Status: She is alert and oriented to person, place, and time.   Psychiatric:         Mood and Affect: Mood normal.         Behavior: Behavior normal.         ECG 12 Lead Electrolyte Imbalance      Date/Time: 9/16/2024 8:46 PM    Performed by: Cole Hopper MD  Authorized by: Leah Neal APRN  Rhythm: sinus rhythm  Ectopy: PVCs  Rate: normal  QRS axis: normal  Conduction: non-specific intraventricular conduction delay  ST Segments: ST segments normal  T Waves: T waves normal  Other: no other findings  Other findings: prolonged QTc interval  Clinical impression: abnormal ECG and electrolyte abnormality              ED Course  ED Course as of 09/17/24 0142   Mon Sep 16, 2024   2035 Labs reviewed, notable for lactate  of 8.7, potassium 2.2, mag 1.4, CRP mildly elevated.  Second liter of IV fluids ordered.  Unclear source of elevated lactate, CT abdomen pelvis is currently pending. [PC]   2118 D/w Dr Ware, hospitalist, who will admit for further management of chronic diarrhea, multiple electrolyte abnormalities [PC]      ED Course User Index  [PC] Cole Hopper MD                                      MEDICAL DECISION MAKING    Number and Complexity of Problems  Differential Diagnosis: Diverticulitis, pancreatitis, urinary tract infection, electrolyte abnormality, dehydration, appendicitis, cholecystitis    MDM Data  Historians other than the patient none  External records reviewed: none  My EKG interpretation: Prolonged QT, electrolyte abnormality  My CT interpretation: Diffuse colitis noted  My X-ray interpretation: None  My Ultrasound interpretation: None  Tests considered but not ordered: None  Decision rules/scores evaluated: None  Management/test interpretation discussed with: Hospitalist    Treatment and Disposition  ED Course: Patient noted to have persistent nausea, vomiting, now with headache likely associated with her symptoms.  Patient noted to have significant metabolic abnormalities included markedly elevated lactate, hypomagnesia Lainey, hypokalemia, with EKG changes.  Patient started on IV potassium, magnesium, fluid boluses.  CT scan notable for colitis.  Will admit for further management.  Shared decision making: Patient amenable to admission  Social Determinants of Health that significantly limit diagnosis or treatment decisions: Multiple chronic comorbid diseases  Admit or transfer decisions: Will admit patient to hospitalist service for further management of multiple electrolyte abnormalities, dehydration, chronic diarrhea  Drug management includes: None  Treatment goal(s):   Code status and discussions: Full code           Medical Decision Making  Problems Addressed:  Hypokalemia: complicated acute illness or  injury  Hypomagnesemia: complicated acute illness or injury  Lactic acid acidosis: complicated acute illness or injury    Amount and/or Complexity of Data Reviewed  Labs: ordered.  Radiology: ordered.  ECG/medicine tests: ordered and independent interpretation performed.    Risk  Prescription drug management.  Decision regarding hospitalization.        Final diagnoses:   Hypokalemia   Lactic acid acidosis   Hypomagnesemia       ED Disposition  ED Disposition       ED Disposition   Decision to Admit    Condition   --    Comment   Level of Care: Telemetry [5]   Diagnosis: Hypokalemia [749501]   Admitting Physician: SYLVIA LEWIS [[id number]]   Attending Physician: SYLVIA LEWIS [[id number]]   Certification: I Certify That Inpatient Hospital Services Are Medically Necessary For Greater Than 2 Midnights                 No follow-up provider specified.       Medication List      No changes were made to your prescriptions during this visit.            Cole Hopper MD  09/17/24 0142      Electronically signed by Cole Hopper MD at 09/17/24 0142       Leah Neal APRN at 09/16/24 1729       Attestation signed by Savage Calderon Jr., MD at 09/16/24 2005    Critical patient triage                MEDICAL SCREENING:    Reason for Visit: Patient is a 49-year-old female presents emergency department with vomiting and diarrhea and abdominal pain has been going on for the past 8 months.  She states she is having diarrhea about 25 times a day.  The last time she vomited was this morning.  She went to see her GI this morning and they had labs drawn and they advised her to come to the emergency department because her potassium was so low.  She states her pain is worse.  She states that her liver enzymes are elevated as well.  She denies any fever.  She complains of generalized weakness.  She states she feels so ill that she can hardly walk.  She has had several GI workups with an MRI done on September 6  which showed hepatic steatosis.  She states she came in today because she felt so much worse continued to have vomiting and diarrhea and that her labs were abnormal.  She denies any black or tarry stools.  No hematemesis.    Patient initially seen in triage.  The patient was advised further evaluation and diagnostic testing will be needed, some of the treatment and testing will be initiated in the lobby in order to begin the process.  The patient will be returned to the waiting area for the time being and possibly be re-assessed by a subsequent ED provider.  The patient will be brought back to the treatment area in as timely manner as possible.       Ángel Leah Salinasn, APRN  09/16/24 1815      Electronically signed by Savage Calderon Jr., MD at 09/16/24 2005       Vital Signs (last 2 days)       Date/Time Temp Temp src Pulse Resp BP Patient Position SpO2    09/17/24 04:16:31 98.7 (37.1) Oral -- -- -- -- --    09/17/24 0405 -- -- 68 -- 159/96 -- 95    09/16/24 20:23:23 98.9 (37.2) Oral 98 18 160/100 Sitting 96    09/16/24 1715 98.9 (37.2) Oral 92 18 141/92 Sitting 96          Oxygen Therapy (last 2 days)       Date/Time SpO2 Device (Oxygen Therapy) Flow (L/min) Oxygen Concentration (%) ETCO2 (mmHg)    09/17/24 0405 95 -- -- -- --    09/16/24 20:23:23 96 room air -- -- --    09/16/24 1715 96 room air -- -- --          Intake & Output (last 2 days)       None          Facility-Administered Medications as of 9/16/2024   Medication Dose Route Frequency Provider Last Rate Last Admin    acetaminophen (TYLENOL) tablet 650 mg  650 mg Oral Q4H PRN Paul Fulton MD        Or    acetaminophen (TYLENOL) 160 MG/5ML oral solution 650 mg  650 mg Oral Q4H PRN Paul Fulton MD        Or    acetaminophen (TYLENOL) suppository 650 mg  650 mg Rectal Q4H PRPaul Haro MD        Calcium Replacement - Follow Nurse / BPA Driven Protocol   Does not apply Paul Quijano MD         carvedilol (COREG) tablet 25 mg  25 mg Oral BID Paul Fulton MD   25 mg at 09/17/24 0004    cholestyramine light packet 4 g  1 packet Oral Daily Paul Fulton MD        dextrose 5 % and sodium chloride 0.45 % with KCl 20 mEq/L infusion  100 mL/hr Intravenous Continuous Paul Fulton  mL/hr at 09/17/24 0502 100 mL/hr at 09/17/24 0502    [COMPLETED] diphenhydrAMINE (BENADRYL) injection 25 mg  25 mg Intravenous Once Cole Hopper MD   25 mg at 09/16/24 2052    HYDROcodone-acetaminophen (NORCO) 5-325 MG per tablet 1 tablet  1 tablet Oral Q6H PRN Paul Fulton MD        [COMPLETED] iopamidol (ISOVUE-300) 61 % injection 100 mL  100 mL Intravenous Once in imaging Cole Hopper MD   100 mL at 09/16/24 1937    ipratropium-albuterol (DUO-NEB) nebulizer solution 3 mL  3 mL Nebulization Q4H PRN Paul Fulton MD        [COMPLETED] lactated ringers bolus 1,000 mL  1,000 mL Intravenous Once Leah Neal APRN   Stopped at 09/16/24 2135    [COMPLETED] lactated ringers bolus 1,000 mL  1,000 mL Intravenous Once Cole Hopper MD   Stopped at 09/17/24 0146    loperamide (IMODIUM) capsule 4 mg  4 mg Oral 4x Daily PRN Paul Fulton MD        Magnesium Standard Dose Replacement - Follow Nurse / BPA Driven Protocol   Does not apply PRN Paul Fulton MD        [COMPLETED] magnesium sulfate 2g/50 mL (PREMIX) infusion  2 g Intravenous Once Cole Hopper MD   Stopped at 09/16/24 2341    meclizine (ANTIVERT) tablet 25 mg  25 mg Oral TID PRN Paul Fulton MD        ondansetron (ZOFRAN) injection 4 mg  4 mg Intravenous Q6H PRN Paul Fulton MD   4 mg at 09/17/24 0407    Phosphorus Replacement - Follow Nurse / BPA Driven Protocol   Does not apply PRN Paul Fulton MD        [COMPLETED] potassium chloride 10 mEq in 100 mL IVPB  10 mEq Intravenous Q1H Cole Hopper MD   Stopped at 09/17/24 0146    Potassium Replacement -  Follow Nurse / BPA Driven Protocol   Does not apply PRN Paul Fulton MD        [COMPLETED] prochlorperazine (COMPAZINE) injection 10 mg  10 mg Intravenous Once Cole Hopper MD   10 mg at 09/16/24 2047    sodium chloride 0.9 % flush 10 mL  10 mL Intravenous Q12H Paul Fulton MD   10 mL at 09/17/24 0016    sodium chloride 0.9 % flush 10 mL  10 mL Intravenous PRN Paul Fulton MD        sodium chloride 0.9 % infusion 40 mL  40 mL Intravenous PRN Paul Fulton MD        topiramate (TOPAMAX) tablet 50 mg  50 mg Oral BID Paul Fulton MD   50 mg at 09/17/24 0004     Orders (last 48 hrs)        Start     Ordered    09/18/24 1736  Auto Discontinue GI Panel in 48 Hours if not Collected  ONCE GI PANEL         09/16/24 1737    09/17/24 0900  cholestyramine light packet 4 g  Daily         09/16/24 2331 09/17/24 0800  Oral Care  2 Times Daily       09/16/24 2331 09/17/24 0600  Basic Metabolic Panel  Daily       09/16/24 2331 09/17/24 0600  CBC & Differential  Daily       09/16/24 2331 09/17/24 0600  Magnesium  Daily       09/16/24 2331 09/17/24 0600  CBC Auto Differential  PROCEDURE ONCE         09/16/24 2333    09/17/24 0302  STAT Lactic Acid, Reflex  PROCEDURE ONCE         09/17/24 0035    09/17/24 0000  Vital Signs  Every 4 Hours       09/16/24 2331 09/16/24 2347  carvedilol (COREG) tablet 25 mg  2 Times Daily         09/16/24 2331 09/16/24 2347  topiramate (TOPAMAX) tablet 50 mg  2 Times Daily         09/16/24 2331 09/16/24 2347  sodium chloride 0.9 % flush 10 mL  Every 12 Hours Scheduled         09/16/24 2331    09/16/24 2347  dextrose 5 % and sodium chloride 0.45 % with KCl 20 mEq/L infusion  Continuous         09/16/24 2331    09/16/24 2332  Intake & Output  Every Shift       09/16/24 2331 09/16/24 2332  Weigh Patient  Once         09/16/24 2331    09/16/24 2332  Insert Peripheral IV  Once         09/16/24 2331    09/16/24 2332  Saline  "Lock & Maintain IV Access  Continuous         09/16/24 2331    09/16/24 2332  Place Sequential Compression Device  Once         09/16/24 2331    09/16/24 2332  Maintain Sequential Compression Device  Continuous         09/16/24 2331 09/16/24 2332  Activity - Ad Yarely  Until Discontinued         09/16/24 2331 09/16/24 2332  Diet: Liquid; Clear Liquid; Fluid Consistency: Thin (IDDSI 0)  Diet Effective Now         09/16/24 2331    09/16/24 2332  Phosphorus  Daily       09/16/24 2331 09/16/24 2332  Bowel Regimen Not Indicated  Once         09/16/24 2331 09/16/24 2331  ipratropium-albuterol (DUO-NEB) nebulizer solution 3 mL  Every 4 Hours PRN         09/16/24 2331    09/16/24 2331  loperamide (IMODIUM) capsule 4 mg  4 Times Daily PRN         09/16/24 2331    09/16/24 2331  meclizine (ANTIVERT) tablet 25 mg  3 Times Daily PRN         09/16/24 2331    09/16/24 2331  sodium chloride 0.9 % flush 10 mL  As Needed         09/16/24 2331    09/16/24 2331  sodium chloride 0.9 % infusion 40 mL  As Needed         09/16/24 2331 09/16/24 2331  Potassium Replacement - Follow Nurse / BPA Driven Protocol  As Needed         09/16/24 2331 09/16/24 2331  Magnesium Standard Dose Replacement - Follow Nurse / BPA Driven Protocol  As Needed         09/16/24 2331 09/16/24 2331  Phosphorus Replacement - Follow Nurse / BPA Driven Protocol  As Needed         09/16/24 2331 09/16/24 2331  Calcium Replacement - Follow Nurse / BPA Driven Protocol  As Needed         09/16/24 2331 09/16/24 2331  ondansetron (ZOFRAN) injection 4 mg  Every 6 Hours PRN         09/16/24 2331 09/16/24 2331  HYDROcodone-acetaminophen (NORCO) 5-325 MG per tablet 1 tablet  Every 6 Hours PRN         09/16/24 2331    09/16/24 2330  acetaminophen (TYLENOL) tablet 650 mg  Every 4 Hours PRN        Placed in \"Or\" Linked Group    09/16/24 2331 09/16/24 2330  acetaminophen (TYLENOL) 160 MG/5ML oral solution 650 mg  Every 4 Hours PRN        Placed in \"Or\" " "Linked Group    09/16/24 2331    09/16/24 2330  acetaminophen (TYLENOL) suppository 650 mg  Every 4 Hours PRN        Placed in \"Or\" Linked Group    09/16/24 2331    09/16/24 2330  Code Status and Medical Interventions: CPR (Attempt to Resuscitate); Full Support  Continuous         09/16/24 2331    09/16/24 2256  STAT Lactic Acid, Reflex  PROCEDURE ONCE         09/16/24 2035 09/16/24 2118  Inpatient Admission  Once         09/16/24 2117 09/16/24 2056  potassium chloride 10 mEq in 100 mL IVPB  Every 1 Hour         09/16/24 2041 09/16/24 2051  lactated ringers bolus 1,000 mL  Once         09/16/24 2035 09/16/24 2051  potassium chloride 20 mEq in 50 mL IVPB  Once,   Status:  Discontinued         09/16/24 2035 09/16/24 2051  magnesium sulfate 2g/50 mL (PREMIX) infusion  Once         09/16/24 2035 09/16/24 2050  acetaminophen (TYLENOL) tablet 1,000 mg  Once,   Status:  Discontinued         09/16/24 2034 09/16/24 2050  prochlorperazine (COMPAZINE) injection 10 mg  Once         09/16/24 2034 09/16/24 2050  diphenhydrAMINE (BENADRYL) injection 25 mg  Once         09/16/24 2034 09/16/24 1939  iopamidol (ISOVUE-300) 61 % injection 100 mL  Once in Imaging         09/16/24 1923 09/16/24 1753  lactated ringers bolus 1,000 mL  Once         09/16/24 1737    09/16/24 1737  Sedimentation Rate  STAT         09/16/24 1737    09/16/24 1737  C-reactive Protein  STAT         09/16/24 1737    09/16/24 1737  Lactic Acid, Plasma  STAT         09/16/24 1737    09/16/24 1737  Amylase  STAT         09/16/24 1737    09/16/24 1737  CT Abdomen Pelvis With Contrast  1 Time Imaging         09/16/24 1737    09/16/24 1736  CBC & Differential  Once         09/16/24 1737    09/16/24 1736  Comprehensive Metabolic Panel  Once         09/16/24 1737    09/16/24 1736  Protime-INR  Once         09/16/24 1737    09/16/24 1736  Lipase  Once         09/16/24 1737    09/16/24 1736  Urinalysis With Culture If Indicated - Urine, Clean " Catch  Once         09/16/24 1737    09/16/24 1736  Gastrointestinal Panel, PCR - Stool, Per Rectum  Once,   Status:  Canceled         09/16/24 1737    09/16/24 1736  Magnesium  STAT         09/16/24 1737    09/16/24 1736  Cardiac Monitoring  Continuous        Comments: Follow Standing Orders As Outlined in Process Instructions (Open Order Report to View Full Instructions)    09/16/24 1737    09/16/24 1736  Monitor Blood Pressure  Per Hospital Policy         09/16/24 1737    09/16/24 1736  ECG 12 Lead Electrolyte Imbalance  Once         09/16/24 1737    09/16/24 1736  CBC Auto Differential  PROCEDURE ONCE         09/16/24 1737    09/16/24 1721  ECG 12 Lead Electrolyte Imbalance  Once         09/16/24 1720

## 2024-09-17 NOTE — PLAN OF CARE
Goal Outcome Evaluation:      Received pt from ED to room 360 w/ hypokalemia, N/V/D. A/Ox4, c/o pain see MAR. K+ replaced w. PO. IVF per order. Up ad buck to BR. Stool sent down for cdiff r/o. Safety maintained.

## 2024-09-17 NOTE — H&P
[facility]  HISTORY AND PHYSICAL    Date of Admission: 9/16/2024  Primary Care Physician: Aidee Padilla APRN Subjective   Primary Historian: Patient    Chief Complaint: Headache, diarrhea, vomiting    History of Present Illness  49-year-old female with past medical history of chronic diarrhea, transaminitis, fatty liver, COPD that presents to the emergency room with complaints of splitting headache diarrhea and vomiting.  She has had diarrhea for several months has been evaluated by GI outpatient and inpatient referred to [place] she has not visited there yet.  Extensive workup has been negative and she was placed on colestipol and Imodium but has persistent diarrhea.  She also complains of poor tolerance due to vomiting and abdominal pain.  Lab work shows transaminitis, bilirubin normal, CT abdomen and pelvis with no acute pathology.  Potassium and magnesium are very low.    Review of Systems   Otherwise complete ROS reviewed and negative except as mentioned in the HPI.    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    CTS (carpal tunnel syndrome) 2019    Dental disease     Depression     Diastolic dysfunction 2022    Dizziness     Emphysema of lung 2020    GERD (gastroesophageal reflux disease)     Headache     Hyperlipidemia     Hypertension     Hypothyroidism     Mixed simple and mucopurulent chronic bronchitis 04/18/2023    Non-occlusive coronary artery disease 05/04/2023    NSTEMI (non-ST elevated myocardial infarction) (HCC)     Pneumonia 2022    Pulmonary emphysema 04/21/2023    Shingles 2018    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     Past Surgical History:  Past Surgical History:   Procedure Laterality Date    APPENDECTOMY      CARDIAC  CATHETERIZATION N/A 11/04/2017    Procedure: Coronary angiography;  Surgeon: Luis Colvin MD;  Location: [facility] CATH INVASIVE LOCATION;  Service:     CARDIAC CATHETERIZATION N/A 05/31/2023    Procedure: Left Heart Cath;  Surgeon: Steffen Rossi MD;  Location: [facility] CATH INVASIVE LOCATION;  Service: Cardiology;  Laterality: N/A;    CARPAL TUNNEL RELEASE  2019    COLONOSCOPY N/A 8/2/2024    Procedure: COLONOSCOPY WITH ANESTHESIA;  Surgeon: Marty Browning MD;  Location: [facility] ENDOSCOPY;  Service: Gastroenterology;  Laterality: N/A;  pre op pancolitis    ENDOSCOPY N/A 7/8/2024    Procedure: ESOPHAGOGASTRODUODENOSCOPY WITH ANESTHESIA;  Surgeon: Gordy Wang MD;  Location: [facility] ENDOSCOPY;  Service: Gastroenterology;  Laterality: N/A;  pre: dysphagia.   post: esophagus dilated.   no PCP    HYSTERECTOMY      PANENDOSCOPY N/A 5/30/2024    Procedure: DIRECT LARYNGOSCOPY WITH BIOPSY OF VOCAL CORD POLYPS WITH POSSIBLE TRACHEOSTOMY;  Surgeon: Mendez Padilla MD;  Location: [facility] OR;  Service: ENT;  Laterality: N/A;    MS RT/LT HEART CATHETERS N/A 11/04/2017    Procedure: Percutaneous Coronary Intervention;  Surgeon: Luis Colvin MD;  Location: [facility] CATH INVASIVE LOCATION;  Service: Cardiovascular    THYROID SURGERY      TOTAL THYROIDECTOMY      TRACHEOSTOMY N/A 5/30/2024    Procedure: POSSIBLE TRACHEOSTOMY;  Surgeon: Mendez Padilla MD;  Location: [facility] OR;  Service: ENT;  Laterality: N/A;     Social History:  reports that she quit smoking about 6 months ago. Her smoking use included cigarettes. She started smoking about 33 years ago. She has a 30 pack-year smoking history. She has been exposed to tobacco smoke. She has never used smokeless tobacco. She reports that she does not currently use alcohol. She reports that she does not use drugs.    Family History: family history includes Asthma in her mother; Cancer in her father and mother; Colon cancer in her father; Emphysema in her mother; Heart  failure in her father; Hypertension in her father.       Allergies:  No Known Allergies    Medications:  Prior to Admission medications    Medication Sig Start Date End Date Taking? Authorizing Provider   albuterol sulfate  (90 Base) MCG/ACT inhaler Inhale 2 puffs Every 4 (Four) Hours As Needed for Wheezing. 9/22/23   Sivan Wise APRN   atorvastatin (LIPITOR) 10 MG tablet Take 1 tablet by mouth Daily. 8/7/24   Kirill Chakraborty DO   carvedilol (COREG) 25 MG tablet Take 1 tablet by mouth 2 (Two) Times a Day. 5/11/23   Seema Hendricks APRN   cloNIDine (CATAPRES) 0.1 MG tablet Take 1 tablet by mouth As Needed for High Blood Pressure (for blood pressure >140/90).    ProviderGaldino MD   colestipol (COLESTID) 1 g tablet Take 1 tablet by mouth 2 (Two) Times a Day. 9/6/24   Rakel Padilla APRN   dilTIAZem CD (CARDIZEM CD) 120 MG 24 hr capsule Take 1 capsule by mouth Daily.    ProviderGaldino MD   FLUoxetine (PROzac) 20 MG capsule Take 1 capsule by mouth Daily. 11/6/23   Galdino Wei MD   ipratropium-albuterol (DUO-NEB) 0.5-2.5 mg/3 ml nebulizer Take 3 mL by nebulization Every 4 (Four) Hours As Needed for Wheezing. 4/3/23   Savage Calderon Jr., MD   lisinopril (PRINIVIL,ZESTRIL) 40 MG tablet Take 0.5 tablets by mouth Daily. 8/6/24   Kirill Chakraborty DO   loperamide (Imodium A-D) 2 MG tablet Take 1 tablet by mouth 4 (Four) Times a Day As Needed for Diarrhea. 8/6/24   Kirill Chakraborty DO   meclizine (ANTIVERT) 25 MG tablet Take 1 tablet by mouth 3 (Three) Times a Day As Needed for Dizziness. 3/10/23   Steffen Rossi MD   nitroglycerin (NITROSTAT) 0.4 MG SL tablet Place 1 tablet under the tongue Every 5 (Five) Minutes As Needed for Chest Pain. Take no more than 3 doses in 15 minutes.    Galdino Wei MD   pantoprazole (PROTONIX) 40 MG EC tablet Take 1 tablet by mouth 2 (Two) Times a Day. 7/8/24   Gordy Wang MD   potassium chloride (KLOR-CON M20) 20 MEQ CR tablet  "Take 1 tablet by mouth 2 (Two) Times a Day.    Provider, MD Galdino   topiramate (TOPAMAX) 50 MG tablet Take 1 tablet by mouth 2 (Two) Times a Day. 6/13/24   Domitila Duncan MD     I have utilized all available immediate resources to obtain, update, or review the patient's current medications (including all prescriptions, over-the-counter products, herbals, cannabis/cannabidiol products, and vitamin/mineral/dietary (nutritional) supplements).    Objective     Vital Signs: /100 (BP Location: Right arm, Patient Position: Sitting)   Pulse 98   Temp 98.9 °F (37.2 °C) (Oral)   Resp 18   Ht 172.7 cm (68\")   Wt 80.7 kg (178 lb)   LMP  (LMP Unknown)   SpO2 96%   BMI 27.06 kg/m²   Physical Exam  Vitals reviewed.   Constitutional:       General: She is not in acute distress.     Appearance: She is well-developed. She is not toxic-appearing.   HENT:      Head: Normocephalic and atraumatic.      Right Ear: External ear normal.      Left Ear: External ear normal.      Mouth/Throat:      Mouth: Mucous membranes are dry.      Pharynx: Oropharynx is clear.   Eyes:      General:         Right eye: No discharge.         Left eye: No discharge.      Extraocular Movements: Extraocular movements intact.      Conjunctiva/sclera: Conjunctivae normal.      Pupils: Pupils are equal, round, and reactive to light.   Neck:      Vascular: No JVD.   Cardiovascular:      Rate and Rhythm: Normal rate and regular rhythm.      Pulses: Normal pulses.      Heart sounds: Normal heart sounds. No murmur heard.     No friction rub. No gallop.   Pulmonary:      Effort: Pulmonary effort is normal. No respiratory distress.      Breath sounds: No stridor. No wheezing, rhonchi or rales.   Chest:      Chest wall: No tenderness.   Abdominal:      General: Bowel sounds are normal. There is distension.      Palpations: Abdomen is soft.      Tenderness: There is no abdominal tenderness. There is no guarding or rebound.      Hernia: No hernia is " present.   Musculoskeletal:         General: No swelling, tenderness or deformity. Normal range of motion.      Cervical back: Normal range of motion and neck supple. No rigidity or tenderness. No muscular tenderness.      Right lower leg: No edema.      Left lower leg: No edema.   Skin:     General: Skin is warm and dry.      Findings: No erythema or rash.   Neurological:      General: No focal deficit present.      Mental Status: She is alert and oriented to person, place, and time.      Cranial Nerves: No cranial nerve deficit.      Sensory: No sensory deficit.      Motor: No weakness or abnormal muscle tone.      Deep Tendon Reflexes: Reflexes normal.   Psychiatric:         Mood and Affect: Mood normal.         Behavior: Behavior normal.     Results Reviewed:  Lab Results (last 24 hours)       Procedure Component Value Units Date/Time    Lactic Acid, Plasma [[id number]]  (Abnormal) Collected: 09/16/24 1956    Specimen: Blood from Arm, Left Updated: 09/16/24 2035     Lactate 8.7 mmol/L     Comprehensive Metabolic Panel [[id number]]  (Abnormal) Collected: 09/16/24 1956    Specimen: Blood from Arm, Left Updated: 09/16/24 2034     Glucose 112 mg/dL      BUN 3 mg/dL      Creatinine 0.51 mg/dL      Sodium 137 mmol/L      Potassium 2.2 mmol/L      Chloride 91 mmol/L      CO2 24.0 mmol/L      Calcium 8.0 mg/dL      Total Protein 6.4 g/dL      Albumin 3.3 g/dL      ALT (SGPT) 109 U/L      AST (SGOT) 295 U/L      Alkaline Phosphatase 799 U/L      Total Bilirubin 0.7 mg/dL      Globulin 3.1 gm/dL      A/G Ratio 1.1 g/dL      BUN/Creatinine Ratio 5.9     Anion Gap 22.0 mmol/L      eGFR 113.2 mL/min/1.73     Narrative:      GFR Normal >60  Chronic Kidney Disease <60  Kidney Failure <15      Magnesium [[id number]]  (Abnormal) Collected: 09/16/24 1956    Specimen: Blood from Arm, Left Updated: 09/16/24 2034     Magnesium 1.4 mg/dL     C-reactive Protein [[id number]]  (Abnormal) Collected: 09/16/24 1956    Specimen: Blood from Arm,  Left Updated: 09/16/24 2033     C-Reactive Protein 0.59 mg/dL     Urinalysis With Culture If Indicated - Urine, Clean Catch [[id number]]  (Normal) Collected: 09/16/24 2020    Specimen: Urine, Clean Catch Updated: 09/16/24 2029     Color, UA Yellow     Appearance, UA Clear     pH, UA 7.0     Specific Gravity, UA 1.019     Glucose, UA Negative     Ketones, UA Negative     Bilirubin, UA Negative     Blood, UA Negative     Protein, UA Negative     Leuk Esterase, UA Negative     Nitrite, UA Negative     Urobilinogen, UA 0.2 E.U./dL    Narrative:      In absence of clinical symptoms, the presence of pyuria, bacteria, and/or nitrites on the urinalysis result does not correlate with infection.  Urine microscopic not indicated.    Amylase [[id number]]  (Abnormal) Collected: 09/16/24 1956    Specimen: Blood from Arm, Left Updated: 09/16/24 2028     Amylase 13 U/L     Lipase [[id number]]  (Abnormal) Collected: 09/16/24 1956    Specimen: Blood from Arm, Left Updated: 09/16/24 2027     Lipase 12 U/L     Protime-INR [[id number]]  (Normal) Collected: 09/16/24 1956    Specimen: Blood from Arm, Left Updated: 09/16/24 2017     Protime 13.4 Seconds      INR 0.98    Sedimentation Rate [[id number]]  (Normal) Collected: 09/16/24 1956    Specimen: Blood from Arm, Left Updated: 09/16/24 2015     Sed Rate 19 mm/hr     CBC & Differential [[id number]]  (Abnormal) Collected: 09/16/24 1956    Specimen: Blood from Arm, Left Updated: 09/16/24 2009    Narrative:      The following orders were created for panel order CBC & Differential.  Procedure                               Abnormality         Status                     ---------                               -----------         ------                     CBC Auto Differential[[id number]]        Abnormal            Final result                 Please view results for these tests on the individual orders.    CBC Auto Differential [[id number]]  (Abnormal) Collected: 09/16/24 1956    Specimen: Blood from  Arm, Left Updated: 09/16/24 2009     WBC 8.82 10*3/mm3      RBC 3.15 10*6/mm3      Hemoglobin 11.2 g/dL      Hematocrit 32.9 %      .4 fL      MCH 35.6 pg      MCHC 34.0 g/dL      RDW 15.9 %      RDW-SD 61.2 fl      MPV 11.1 fL      Platelets 227 10*3/mm3      Neutrophil % 47.5 %      Lymphocyte % 38.7 %      Monocyte % 9.5 %      Eosinophil % 2.0 %      Basophil % 1.2 %      Immature Grans % 1.1 %      Neutrophils, Absolute 4.18 10*3/mm3      Lymphocytes, Absolute 3.41 10*3/mm3      Monocytes, Absolute 0.84 10*3/mm3      Eosinophils, Absolute 0.18 10*3/mm3      Basophils, Absolute 0.11 10*3/mm3      Immature Grans, Absolute 0.10 10*3/mm3      nRBC 0.0 /100 WBC           Imaging Results (Last 24 Hours)       Procedure Component Value Units Date/Time    CT Abdomen Pelvis With Contrast [[id number]] Collected: 09/16/24 1944     Updated: 09/16/24 1951    Narrative:      EXAMINATION:  CT ABDOMEN PELVIS W CONTRAST-  9/16/2024 6:22 PM     HISTORY: Abdominal pain with vomiting and diarrhea.     TECHNIQUE: Spiral CT was performed of the abdomen and pelvis with IV  contrast. Multiplanar images were reconstructed.     DLP: 378.39 mGy.cm Automated dosage reduction technique was utilized to  reduce patient dose.     COMPARISON: 9/5/2024.     LUNG BASES: There is minimal dependent atelectasis.     LIVER AND SPLEEN: There is very severe fatty infiltration of the liver.  There is hepatomegaly with liver measuring 22.6 cm in length. The spleen  is normal in size. There are no dense gallstones. No ductal dilatation  is appreciated.     PANCREAS: No pancreatic mass or inflammatory change.     KIDNEYS AND ADRENALS: The kidneys and adrenal glands are unremarkable.  No bladder abnormality is detected.     BOWEL: No oral contrast was given. There is food material in the  stomach. There is mild under distention of the stomach. Small bowel  loops are nondilated. There is submucosal fat deposition in the colon.  There is under  distention of most of the colon. There is some  questionable wall thickening of the sigmoid colon extending to the  rectum versus artifact of under distention.     OTHER: There is atheromatous disease of the aortoiliac vessels. There is  a retroaortic left renal vein. There has been prior hysterectomy. There  are no enlarged abdominal or pelvic lymph nodes. There are degenerative  changes of the spine. There is avascular necrosis of both femoral heads  with no articular collapse.          Impression:      1. Severe fatty infiltration of the liver with hepatomegaly.  2. Under distention of stomach and colon. No small bowel distention is  seen. Submucosal fat deposition in the colon can be seen as a normal  variant. It can also be seen in patients with chronic inflammatory bowel  disease. There may be some wall thickening of the sigmoid colon and  rectum versus artifact of under distention. Colitis is included in the  differential.  3. Atheromatous disease of the aortoiliac vessels. Retroaortic left  renal vein.  4. 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 Degenerative  changes of the visualized spine.  5. Prior hysterectomy.        The full report of this exam was immediately signed and available to the  emergency room. The patient is currently in the emergency room.     This report was signed and finalized on 9/16/2024 7:48 PM by Dr. Jesus Manuel Lomax MD.             I have personally reviewed and interpreted the radiology studies and ECG obtained at time of admission.     Assessment / Plan   Assessment:   Active Hospital Problems    Diagnosis     **Hypokalemia     Hypomagnesemia     Nonspecific colitis     Pain of upper abdomen     Primary hypertension     Pulmonary emphysema      Treatment Plan  The patient will be admitted to my service here at [facility].   Admit to med floor  Vitals every 4 hours  Up ad buck.  Clear liquid diet  IV fluids D5 half-normal saline with KCl 20 mill equivalents  at 100 cc an hour  Electrolyte replacement protocol for magnesium and potassium.  Will check phosphorus and replace as needed  Treat symptomatically with antiemetics and antidiarrheals    Headache  Correct fluid status and electrolytes  Treat symptomatically    Home medications reviewed    DVT prophylaxis SCDs    Medical Decision Making  Number and Complexity of problems: 4 complex medical problems  Differential Diagnosis: See above    Conditions and Status        Condition is unchanged.     [facility] Data  External documents reviewed: [employer] EHR and [employer] EHR  Cardiac tracing (EKG, telemetry) interpretation: NSR  Radiology interpretation: see above  Labs reviewed: see above  Any tests that were considered but not ordered: none     Decision rules/scores evaluated (example [id number], [place], etc): N/A     Discussed with: Patient     Care Planning  Shared decision making: Patient  Code status and discussions: Full code    Disposition  Social Determinants of Health that impact treatment or disposition: none  Estimated length of stay is over 2 midnights.     I confirmed that the patient's advanced care plan is present, code status is documented, and a surrogate decision maker is listed in the patient's medical record.     The patient's surrogate decision maker is family, see records.     The patient was seen and examined by me on 9/16/2024 at 2313.    Electronically signed by Paul Fulotn MD, 09/16/24, 23:13 CDT.

## 2024-09-17 NOTE — ED PROVIDER NOTES
Subjective   History of Present Illness  49-year-old female, past medical history including COPD, liver disease, presents due to persistent nausea and vomiting.  Patient states she has had regular abdominal pain, nausea, vomiting for weeks.  Over the last several days she says symptoms have gotten worse with associated headache.  She is unsure of what improves symptoms.  Denies fever, chills, chest pain, shortness of breath.        Review of Systems   Constitutional:  Positive for fatigue. Negative for chills and diaphoresis.   HENT: Negative.     Respiratory:  Negative for cough and shortness of breath.    Cardiovascular:  Negative for chest pain and leg swelling.   Gastrointestinal:  Positive for abdominal pain, diarrhea, nausea and vomiting.   Endocrine: Negative for polyphagia and polyuria.   Genitourinary:  Negative for difficulty urinating and dysuria.   Musculoskeletal: Negative.    Skin: Negative.    Neurological: Negative.    Hematological: Negative.    Psychiatric/Behavioral:  Negative for agitation and behavioral problems.        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    CTS (carpal tunnel syndrome) 2019    Dental disease     Depression     Diastolic dysfunction 2022    Dizziness     Emphysema of lung 2020    GERD (gastroesophageal reflux disease)     Headache     Hyperlipidemia     Hypertension     Hypothyroidism     Mixed simple and mucopurulent chronic bronchitis 04/18/2023    Non-occlusive coronary artery disease 05/04/2023    NSTEMI (non-ST elevated myocardial infarction) (HCC)     Pneumonia 2022    Pulmonary emphysema 04/21/2023    Shingles 2018    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       No Known Allergies    Past Surgical History:   Procedure Laterality Date    APPENDECTOMY      CARDIAC CATHETERIZATION N/A  2017    Procedure: Coronary angiography;  Surgeon: Luis Colvin MD;  Location:  [facility];  Service:     CARDIAC CATHETERIZATION N/A 2023    Procedure: Left Heart Cath;  Surgeon: Steffen Rossi MD;  Location:  [facility];  Service: Cardiology;  Laterality: N/A;    CARPAL TUNNEL RELEASE  2019    COLONOSCOPY N/A 2024    Procedure: COLONOSCOPY WITH ANESTHESIA;  Surgeon: Marty Browning MD;  Location: [facility] ENDOSCOPY;  Service: Gastroenterology;  Laterality: N/A;  pre op pancolitis    ENDOSCOPY N/A 2024    Procedure: ESOPHAGOGASTRODUODENOSCOPY WITH ANESTHESIA;  Surgeon: Gordy Wang MD;  Location: [facility] ENDOSCOPY;  Service: Gastroenterology;  Laterality: N/A;  pre: dysphagia.   post: esophagus dilated.   no PCP    HYSTERECTOMY      PANENDOSCOPY N/A 2024    Procedure: DIRECT LARYNGOSCOPY WITH BIOPSY OF VOCAL CORD POLYPS WITH POSSIBLE TRACHEOSTOMY;  Surgeon: Mendez Padilla MD;  Location: [facility] OR;  Service: ENT;  Laterality: N/A;    SC RT/LT HEART CATHETERS N/A 2017    Procedure: Percutaneous Coronary Intervention;  Surgeon: Luis Colvin MD;  Location:  [facility]ATH INV[facility];  Service: Cardiovascular    THYROID SURGERY      TOTAL THYROIDECTOMY      TRACHEOSTOMY N/A 2024    Procedure: POSSIBLE TRACHEOSTOMY;  Surgeon: Mendez Padilla MD;  Location: [facility] OR;  Service: ENT;  Laterality: N/A;       Family History   Problem Relation Age of Onset    Asthma Mother     Cancer Mother     Emphysema Mother     Colon cancer Father     Cancer Father     Heart failure Father     Hypertension Father        Social History     Socioeconomic History    Marital status:    Tobacco Use    Smoking status: Former     Current packs/day: 0.00     Average packs/day: 0.7 packs/day for 45.9 years (30.0 ttl pk-yrs)     Types: Cigarettes     Start date: 1991     Quit date: 3/1/2024     Years since quittin.5     Passive exposure:  Past    Smokeless tobacco: Never    Tobacco comments:     Less than 1/2 pack a day    Vaping Use    Vaping status: Never Used   Substance and Sexual Activity    Alcohol use: Not Currently     Comment: nothing to drink in 3 1/2 weeks    Drug use: No    Sexual activity: Not Currently     Partners: Male     Birth control/protection: Hysterectomy           Objective   Physical Exam  Constitutional:       General: She is not in acute distress.     Appearance: She is ill-appearing.   HENT:      Head: Normocephalic and atraumatic.      Right Ear: External ear normal.      Left Ear: External ear normal.      Nose: Nose normal.      Mouth/Throat:      Mouth: Mucous membranes are dry.   Eyes:      Extraocular Movements: Extraocular movements intact.      Conjunctiva/sclera: Conjunctivae normal.   Cardiovascular:      Rate and Rhythm: Normal rate and regular rhythm.   Pulmonary:      Effort: Pulmonary effort is normal.      Breath sounds: Normal breath sounds.   Abdominal:      General: Abdomen is flat.      Palpations: Abdomen is soft.      Tenderness: There is abdominal tenderness.   Musculoskeletal:         General: Normal range of motion.      Cervical back: Normal range of motion and neck supple.   Skin:     General: Skin is warm and dry.      Capillary Refill: Capillary refill takes less than 2 seconds.   Neurological:      General: No focal deficit present.      Mental Status: She is alert and oriented to person, place, and time.   Psychiatric:         Mood and Affect: Mood normal.         Behavior: Behavior normal.         ECG 12 Lead Electrolyte Imbalance      Date/Time: 9/16/2024 8:46 PM    Performed by: Cole Hopper MD  Authorized by: Leah Neal APRN  Rhythm: sinus rhythm  Ectopy: PVCs  Rate: normal  QRS axis: normal  Conduction: non-specific intraventricular conduction delay  ST Segments: ST segments normal  T Waves: T waves normal  Other: no other findings  Other findings: prolonged QTc  interval  Clinical impression: abnormal ECG and electrolyte abnormality               ED Course  ED Course as of 09/17/24 0142   Mon Sep 16, 2024 2035 Labs reviewed, notable for lactate of 8.7, potassium 2.2, mag 1.4, CRP mildly elevated.  Second liter of IV fluids ordered.  Unclear source of elevated lactate, CT abdomen pelvis is currently pending. [PC]   2118 D/w Dr Ware, hospitalist, who will admit for further management of chronic diarrhea, multiple electrolyte abnormalities [PC]      ED Course User Index  [PC] Cole Hopper MD                                      MEDICAL DECISION MAKING    Number and Complexity of Problems  Differential Diagnosis: Diverticulitis, pancreatitis, urinary tract infection, electrolyte abnormality, dehydration, appendicitis, cholecystitis    MDM Data  Historians other than the patient none  External records reviewed: none  My EKG interpretation: Prolonged QT, electrolyte abnormality  My CT interpretation: Diffuse colitis noted  My X-ray interpretation: None  My Ultrasound interpretation: None  Tests considered but not ordered: None  Decision rules/scores evaluated: None  Management/test interpretation discussed with: Hospitalist    Treatment and Disposition  ED Course: Patient noted to have persistent nausea, vomiting, now with headache likely associated with her symptoms.  Patient noted to have significant metabolic abnormalities included markedly elevated lactate, hypomagnesia Lainey, hypokalemia, with EKG changes.  Patient started on IV potassium, magnesium, fluid boluses.  CT scan notable for colitis.  Will admit for further management.  Shared decision making: Patient amenable to admission  Social Determinants of Health that significantly limit diagnosis or treatment decisions: Multiple chronic comorbid diseases  Admit or transfer decisions: Will admit patient to hospitalist service for further management of multiple electrolyte abnormalities, dehydration, chronic  diarrhea  Drug management includes: None  Treatment goal(s):   Code status and discussions: Full code           Medical Decision Making  Problems Addressed:  Hypokalemia: complicated acute illness or injury  Hypomagnesemia: complicated acute illness or injury  Lactic acid acidosis: complicated acute illness or injury    Amount and/or Complexity of Data Reviewed  Labs: ordered.  Radiology: ordered.  ECG/medicine tests: ordered and independent interpretation performed.    Risk  Prescription drug management.  Decision regarding hospitalization.        Final diagnoses:   Hypokalemia   Lactic acid acidosis   Hypomagnesemia       ED Disposition  ED Disposition       ED Disposition   Decision to Admit    Condition   --    Comment   Level of Care: Telemetry [5]   Diagnosis: Hypokalemia [950272]   Admitting Physician: SYLVIA LEWIS [[id number]]   Attending Physician: SYLVIA LEWIS [[id number]]   Certification: I Certify That Inpatient Hospital Services Are Medically Necessary For Greater Than 2 Midnights                 No follow-up provider specified.       Medication List      No changes were made to your prescriptions during this visit.            Cole Hopper MD  09/17/24 0142

## 2024-09-17 NOTE — PROGRESS NOTES
[facility] Medicine Services  INPATIENT PROGRESS NOTE    Patient Name: Minnie Bang  Date of Admission: 9/16/2024  Today's Date: 09/17/24  Length of Stay: 1  Primary Care Physician: Aidee Padilla APRN    Subjective   Chief Complaint: Recurrent diarrhea  HPI     The patient has had improvement in her headache.  Diarrhea continues unabated.  She has had only 1 dose of cholestyramine so far.  Magnesium and phosphorus have normalized with replacement.  Potassium remains low at 3.0 and replacement continues.  She will remain on the electrolyte replacement protocol.  She has a pending appointment with a GI program in [place] for further workup.    Review of Systems   All pertinent negatives and positives are as above. All other systems have been reviewed and are negative unless otherwise stated.     Objective    Temp:  [97.9 °F (36.6 °C)-98.9 °F (37.2 °C)] 97.9 °F (36.6 °C)  Heart Rate:  [68-98] 68  Resp:  [18] 18  BP: (141-160)/() 144/111  Physical Exam    Constitutional:       General: She is not in acute distress.     Appearance: She is well-developed. She is not toxic-appearing.   HENT:      Head: Normocephalic and atraumatic.      Right Ear: External ear normal.      Left Ear: External ear normal.      Mouth: Mucous membranes are moist.      Pharynx: Oropharynx is clear.   Eyes:      General:         Right eye: No discharge.         Left eye: No discharge.      Conjunctiva/sclera: Conjunctivae normal.   Cardiovascular:      Rate and Rhythm: Normal rate and regular rhythm.      Pulses: Normal pulses.      Heart sounds: Normal heart sounds. No murmur heard.  Pulmonary:      Effort: Pulmonary effort is normal. No respiratory distress.      Breath sounds: No stridor. No wheezing, rhonchi or rales.   Chest:      Chest wall: No tenderness.   Abdominal:      General: Bowel sounds are hyperactive.     Palpations: Abdomen is soft.      Tenderness: There is no abdominal  "tenderness. There is no guarding or rebound.   Musculoskeletal:         General: No swelling, tenderness or deformity. Normal range of motion.      Right lower leg: No edema.      Left lower leg: No edema.   Skin:     General: Skin is warm and dry.      Findings: No erythema or rash.   Neurological:      General: No focal deficit present.      Mental Status: She is alert and oriented to person, place, and time.   Psychiatric:         Mood and Affect: Mood normal.         Behavior: Behavior normal.     Results Review:  I have reviewed the labs, radiology results, and diagnostic studies.    Laboratory Data:   Results from last 7 days   Lab Units 09/17/24 0715 09/16/24 1956 09/16/24  1118   WBC 10*3/mm3 8.72 8.82 8.92   HEMOGLOBIN g/dL 11.1* 11.2* 12.4   HEMATOCRIT % 33.5* 32.9* 37.2   PLATELETS 10*3/mm3 209 227 234        Results from last 7 days   Lab Units 09/17/24 0715 09/16/24 1956 09/16/24  1118   SODIUM mmol/L 141 137 140   POTASSIUM mmol/L 3.0* 2.2* 2.5*   CHLORIDE mmol/L 99 91* 95*   CO2 mmol/L 29.0 24.0 26.0   BUN mg/dL 3* 3* 3*   CREATININE mg/dL 0.46* 0.51* 0.51*   CALCIUM mg/dL 8.3* 8.0* 8.3*   BILIRUBIN mg/dL  --  0.7 1.1   ALK PHOS U/L  --  799* 855*   ALT (SGPT) U/L  --  109* 116*   AST (SGOT) U/L  --  295* 309*   GLUCOSE mg/dL 91 112* 151*       Culture Data:   No results found for: \"BLOODCX\", \"URINECX\", \"WOUNDCX\", \"MRSACX\", \"RESPCX\", \"STOOLCX\"    Radiology Data:   Imaging Results (Last 24 Hours)       Procedure Component Value Units Date/Time    CT Abdomen Pelvis With Contrast [[id number]] Collected: 09/16/24 1944     Updated: 09/16/24 1951    Narrative:      EXAMINATION:  CT ABDOMEN PELVIS W CONTRAST-  9/16/2024 6:22 PM     HISTORY: Abdominal pain with vomiting and diarrhea.     TECHNIQUE: Spiral CT was performed of the abdomen and pelvis with IV  contrast. Multiplanar images were reconstructed.     DLP: 378.39 mGy.cm Automated dosage reduction technique was utilized to  reduce patient dose.   "   COMPARISON: 9/5/2024.     LUNG BASES: There is minimal dependent atelectasis.     LIVER AND SPLEEN: There is very severe fatty infiltration of the liver.  There is hepatomegaly with liver measuring 22.6 cm in length. The spleen  is normal in size. There are no dense gallstones. No ductal dilatation  is appreciated.     PANCREAS: No pancreatic mass or inflammatory change.     KIDNEYS AND ADRENALS: The kidneys and adrenal glands are unremarkable.  No bladder abnormality is detected.     BOWEL: No oral contrast was given. There is food material in the  stomach. There is mild under distention of the stomach. Small bowel  loops are nondilated. There is submucosal fat deposition in the colon.  There is under distention of most of the colon. There is some  questionable wall thickening of the sigmoid colon extending to the  rectum versus artifact of under distention.     OTHER: There is atheromatous disease of the aortoiliac vessels. There is  a retroaortic left renal vein. There has been prior hysterectomy. There  are no enlarged abdominal or pelvic lymph nodes. There are degenerative  changes of the spine. There is avascular necrosis of both femoral heads  with no articular collapse.          Impression:      1. Severe fatty infiltration of the liver with hepatomegaly.  2. Under distention of stomach and colon. No small bowel distention is  seen. Submucosal fat deposition in the colon can be seen as a normal  variant. It can also be seen in patients with chronic inflammatory bowel  disease. There may be some wall thickening of the sigmoid colon and  rectum versus artifact of under distention. Colitis is included in the  differential.  3. Atheromatous disease of the aortoiliac vessels. Retroaortic left  renal vein.  4. 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 Degenerative  changes of the visualized spine.  5. Prior hysterectomy.        The full report of this exam was immediately signed and available to  the  emergency room. The patient is currently in the emergency room.     This report was signed and finalized on 9/16/2024 7:48 PM by Dr. Jesus Manuel Lomax MD.               I have reviewed the patient's current medications.     Assessment/Plan   Assessment  Active Hospital Problems    Diagnosis     **Hypokalemia     Hypomagnesemia     Nonspecific colitis     Pain of upper abdomen     Primary hypertension     Pulmonary emphysema        Treatment Plan  Continue electrolyte replacement  Change IV fluids to normal saline at 75 cc/h  Advance to regular diet    Medical Decision Making  Number and Complexity of problems: 4 complex medical problems  Differential Diagnosis: See above     Conditions and Status        Condition is unchanged.     [facility] Data  External documents reviewed: [employer] EHR and [employer] EHR  Cardiac tracing (EKG, telemetry) interpretation: NSR  Radiology interpretation: see above  Labs reviewed: see above  Any tests that were considered but not ordered: none     Decision rules/scores evaluated (example [id number], Wells, etc): N/A     Discussed with: Patient     Care Planning  Shared decision making: Patient  Code status and discussions: Full code     Disposition  Social Determinants of Health that impact treatment or disposition: none  I expect the patient to be discharged to home in 1 days.     Electronically signed by Domingo White DO, 09/17/24, 11:31 CDT.

## 2024-09-18 LAB
ALBUMIN SERPL-MCNC: 2.9 G/DL (ref 3.5–5.2)
ALBUMIN/GLOB SERPL: 1.1 G/DL
ALP SERPL-CCNC: 714 U/L (ref 39–117)
ALT SERPL W P-5'-P-CCNC: 89 U/L (ref 1–33)
ANION GAP SERPL CALCULATED.3IONS-SCNC: 11 MMOL/L (ref 5–15)
ANION GAP SERPL CALCULATED.3IONS-SCNC: 16 MMOL/L (ref 5–15)
AST SERPL-CCNC: 240 U/L (ref 1–32)
BASOPHILS # BLD AUTO: 0.1 10*3/MM3 (ref 0–0.2)
BASOPHILS NFR BLD AUTO: 1.4 % (ref 0–1.5)
BILIRUB SERPL-MCNC: 1 MG/DL (ref 0–1.2)
BUN SERPL-MCNC: 3 MG/DL (ref 6–20)
BUN SERPL-MCNC: 4 MG/DL (ref 6–20)
BUN/CREAT SERPL: 6.3 (ref 7–25)
BUN/CREAT SERPL: 9.5 (ref 7–25)
CALCIUM SPEC-SCNC: 7.8 MG/DL (ref 8.6–10.5)
CALCIUM SPEC-SCNC: 8 MG/DL (ref 8.6–10.5)
CHLORIDE SERPL-SCNC: 102 MMOL/L (ref 98–107)
CHLORIDE SERPL-SCNC: 98 MMOL/L (ref 98–107)
CO2 SERPL-SCNC: 24 MMOL/L (ref 22–29)
CO2 SERPL-SCNC: 26 MMOL/L (ref 22–29)
CREAT SERPL-MCNC: 0.42 MG/DL (ref 0.57–1)
CREAT SERPL-MCNC: 0.48 MG/DL (ref 0.57–1)
D-LACTATE SERPL-SCNC: 2.5 MMOL/L (ref 0.5–2)
DEPRECATED RDW RBC AUTO: 61.6 FL (ref 37–54)
EGFRCR SERPLBLD CKD-EPI 2021: 114.8 ML/MIN/1.73
EGFRCR SERPLBLD CKD-EPI 2021: 118.6 ML/MIN/1.73
EOSINOPHIL # BLD AUTO: 0.25 10*3/MM3 (ref 0–0.4)
EOSINOPHIL NFR BLD AUTO: 3.4 % (ref 0.3–6.2)
ERYTHROCYTE [DISTWIDTH] IN BLOOD BY AUTOMATED COUNT: 15.7 % (ref 12.3–15.4)
GLOBULIN UR ELPH-MCNC: 2.6 GM/DL
GLUCOSE SERPL-MCNC: 133 MG/DL (ref 65–99)
GLUCOSE SERPL-MCNC: 85 MG/DL (ref 65–99)
HCT VFR BLD AUTO: 31.2 % (ref 34–46.6)
HGB BLD-MCNC: 10.4 G/DL (ref 12–15.9)
IMM GRANULOCYTES # BLD AUTO: 0.06 10*3/MM3 (ref 0–0.05)
IMM GRANULOCYTES NFR BLD AUTO: 0.8 % (ref 0–0.5)
LYMPHOCYTES # BLD AUTO: 2.45 10*3/MM3 (ref 0.7–3.1)
LYMPHOCYTES NFR BLD AUTO: 33.3 % (ref 19.6–45.3)
MAGNESIUM SERPL-MCNC: 1.5 MG/DL (ref 1.6–2.6)
MAGNESIUM SERPL-MCNC: 3.6 MG/DL (ref 1.6–2.6)
MCH RBC QN AUTO: 35.4 PG (ref 26.6–33)
MCHC RBC AUTO-ENTMCNC: 33.3 G/DL (ref 31.5–35.7)
MCV RBC AUTO: 106.1 FL (ref 79–97)
MONOCYTES # BLD AUTO: 0.74 10*3/MM3 (ref 0.1–0.9)
MONOCYTES NFR BLD AUTO: 10.1 % (ref 5–12)
NEUTROPHILS NFR BLD AUTO: 3.75 10*3/MM3 (ref 1.7–7)
NEUTROPHILS NFR BLD AUTO: 51 % (ref 42.7–76)
PHOSPHATE SERPL-MCNC: 2.9 MG/DL (ref 2.5–4.5)
PLATELET # BLD AUTO: 202 10*3/MM3 (ref 140–450)
PMV BLD AUTO: 11.1 FL (ref 6–12)
POTASSIUM SERPL-SCNC: 3.3 MMOL/L (ref 3.5–5.2)
POTASSIUM SERPL-SCNC: 3.3 MMOL/L (ref 3.5–5.2)
POTASSIUM SERPL-SCNC: 3.4 MMOL/L (ref 3.5–5.2)
PROT SERPL-MCNC: 5.5 G/DL (ref 6–8.5)
RBC # BLD AUTO: 2.94 10*6/MM3 (ref 3.77–5.28)
SODIUM SERPL-SCNC: 138 MMOL/L (ref 136–145)
SODIUM SERPL-SCNC: 139 MMOL/L (ref 136–145)
WBC NRBC COR # BLD AUTO: 7.35 10*3/MM3 (ref 3.4–10.8)

## 2024-09-18 PROCEDURE — 25010000002 ONDANSETRON PER 1 MG: Performed by: FAMILY MEDICINE

## 2024-09-18 PROCEDURE — 84100 ASSAY OF PHOSPHORUS: CPT | Performed by: FAMILY MEDICINE

## 2024-09-18 PROCEDURE — 83735 ASSAY OF MAGNESIUM: CPT | Performed by: FAMILY MEDICINE

## 2024-09-18 PROCEDURE — 25010000002 MAGNESIUM SULFATE 2 GM/50ML SOLUTION: Performed by: FAMILY MEDICINE

## 2024-09-18 PROCEDURE — 84132 ASSAY OF SERUM POTASSIUM: CPT | Performed by: FAMILY MEDICINE

## 2024-09-18 PROCEDURE — 85025 COMPLETE CBC W/AUTO DIFF WBC: CPT | Performed by: FAMILY MEDICINE

## 2024-09-18 PROCEDURE — 83605 ASSAY OF LACTIC ACID: CPT | Performed by: NURSE PRACTITIONER

## 2024-09-18 PROCEDURE — 80053 COMPREHEN METABOLIC PANEL: CPT | Performed by: FAMILY MEDICINE

## 2024-09-18 PROCEDURE — 25810000003 SODIUM CHLORIDE 0.9 % SOLUTION: Performed by: FAMILY MEDICINE

## 2024-09-18 RX ORDER — POTASSIUM CHLORIDE 750 MG/1
40 CAPSULE, EXTENDED RELEASE ORAL EVERY 4 HOURS
Status: COMPLETED | OUTPATIENT
Start: 2024-09-18 | End: 2024-09-18

## 2024-09-18 RX ORDER — POTASSIUM CHLORIDE 750 MG/1
40 CAPSULE, EXTENDED RELEASE ORAL EVERY 4 HOURS
Status: DISCONTINUED | OUTPATIENT
Start: 2024-09-18 | End: 2024-09-19

## 2024-09-18 RX ORDER — MAGNESIUM SULFATE HEPTAHYDRATE 40 MG/ML
2 INJECTION, SOLUTION INTRAVENOUS
Status: COMPLETED | OUTPATIENT
Start: 2024-09-18 | End: 2024-09-18

## 2024-09-18 RX ADMIN — POTASSIUM CHLORIDE 40 MEQ: 750 CAPSULE, EXTENDED RELEASE ORAL at 11:53

## 2024-09-18 RX ADMIN — CHOLESTYRAMINE 4 G: 4 POWDER, FOR SUSPENSION ORAL at 08:12

## 2024-09-18 RX ADMIN — ONDANSETRON 4 MG: 2 INJECTION INTRAMUSCULAR; INTRAVENOUS at 22:50

## 2024-09-18 RX ADMIN — CARVEDILOL 25 MG: 25 TABLET, FILM COATED ORAL at 19:43

## 2024-09-18 RX ADMIN — POTASSIUM CHLORIDE 40 MEQ: 750 CAPSULE, EXTENDED RELEASE ORAL at 08:12

## 2024-09-18 RX ADMIN — HYDROCODONE BITARTRATE AND ACETAMINOPHEN 1 TABLET: 5; 325 TABLET ORAL at 05:28

## 2024-09-18 RX ADMIN — CARVEDILOL 25 MG: 25 TABLET, FILM COATED ORAL at 08:12

## 2024-09-18 RX ADMIN — ACETAMINOPHEN 650 MG: 325 TABLET ORAL at 22:50

## 2024-09-18 RX ADMIN — MAGNESIUM SULFATE HEPTAHYDRATE 2 G: 2 INJECTION, SOLUTION INTRAVENOUS at 11:54

## 2024-09-18 RX ADMIN — Medication 10 ML: at 19:43

## 2024-09-18 RX ADMIN — MAGNESIUM SULFATE HEPTAHYDRATE 2 G: 2 INJECTION, SOLUTION INTRAVENOUS at 07:57

## 2024-09-18 RX ADMIN — HYDROCODONE BITARTRATE AND ACETAMINOPHEN 1 TABLET: 5; 325 TABLET ORAL at 11:53

## 2024-09-18 RX ADMIN — SODIUM CHLORIDE 75 ML/HR: 9 INJECTION, SOLUTION INTRAVENOUS at 14:38

## 2024-09-18 RX ADMIN — LOPERAMIDE HYDROCHLORIDE 4 MG: 2 CAPSULE ORAL at 00:13

## 2024-09-18 RX ADMIN — MAGNESIUM SULFATE HEPTAHYDRATE 2 G: 2 INJECTION, SOLUTION INTRAVENOUS at 09:44

## 2024-09-18 RX ADMIN — TOPIRAMATE 50 MG: 25 TABLET, FILM COATED ORAL at 08:12

## 2024-09-18 RX ADMIN — TOPIRAMATE 50 MG: 25 TABLET, FILM COATED ORAL at 19:43

## 2024-09-18 RX ADMIN — HYDROCODONE BITARTRATE AND ACETAMINOPHEN 1 TABLET: 5; 325 TABLET ORAL at 18:53

## 2024-09-18 RX ADMIN — POTASSIUM CHLORIDE 40 MEQ: 750 CAPSULE, EXTENDED RELEASE ORAL at 19:42

## 2024-09-18 RX ADMIN — LOPERAMIDE HYDROCHLORIDE 4 MG: 2 CAPSULE ORAL at 19:46

## 2024-09-18 NOTE — PLAN OF CARE
Goal Outcome Evaluation:  Plan of Care Reviewed With: patient        Progress: no change  Outcome Evaluation: A/Ox4, c/o pain see MAR. K+ and Mg2+ replaced this shift. K+ still low, reordered replacement. IVF per order. Up ad buck to BR. cdiff negative. Safety maintained.

## 2024-09-18 NOTE — PROGRESS NOTES
[facility] Medicine Services  INPATIENT PROGRESS NOTE    Patient Name: Minnie Bang  Date of Admission: 9/16/2024  Today's Date: 09/18/24  Length of Stay: 2  Primary Care Physician: Aidee Padilla APRN    Subjective   Chief Complaint: Recurrent diarrhea  HPI     Diarrhea has slowed down somewhat with cholestyramine.  Magnesium has normalized.  Phosphorus is normalized.  Potassium remains low at 3.3 and she continues on replacement protocol.  She is noted to have a pending appointment with a GI program in [place] for further workup.  She continues on electrolyte replacement protocol.  I would anticipate likely discharge tomorrow.    Review of Systems   All pertinent negatives and positives are as above. All other systems have been reviewed and are negative unless otherwise stated.     Objective    Temp:  [97.9 °F (36.6 °C)-98.5 °F (36.9 °C)] 98.1 °F (36.7 °C)  Heart Rate:  [63-70] 66  Resp:  [16] 16  BP: (135-166)/() 164/99  Physical Exam    Constitutional:       General: She is not in acute distress.     Appearance: She is well-developed. She is not toxic-appearing.   HENT:      Head: Normocephalic and atraumatic.      Right Ear: External ear normal.      Left Ear: External ear normal.      Mouth: Mucous membranes are moist.      Pharynx: Oropharynx is clear.   Eyes:      General:         Right eye: No discharge.         Left eye: No discharge.      Conjunctiva/sclera: Conjunctivae normal.   Cardiovascular:      Rate and Rhythm: Normal rate and regular rhythm.      Pulses: Normal pulses.      Heart sounds: Normal heart sounds. No murmur heard.  Pulmonary:      Effort: Pulmonary effort is normal. No respiratory distress.      Breath sounds: No stridor. No wheezing, rhonchi or rales.   Chest:      Chest wall: No tenderness.   Abdominal:      General: Bowel sounds are hyperactive.     Palpations: Abdomen is soft.      Tenderness: There is no abdominal tenderness.  "There is no guarding or rebound.   Musculoskeletal:         General: No swelling, tenderness or deformity. Normal range of motion.      Right lower leg: No edema.      Left lower leg: No edema.   Skin:     General: Skin is warm and dry.      Findings: No erythema or rash.   Neurological:      General: No focal deficit present.      Mental Status: She is alert and oriented to person, place, and time.   Psychiatric:         Mood and Affect: Mood normal.         Behavior: Behavior normal.     Results Review:  I have reviewed the labs, radiology results, and diagnostic studies.    Laboratory Data:   Results from last 7 days   Lab Units 09/18/24  0501 09/17/24 0715 09/16/24 1956   WBC 10*3/mm3 7.35 8.72 8.82   HEMOGLOBIN g/dL 10.4* 11.1* 11.2*   HEMATOCRIT % 31.2* 33.5* 32.9*   PLATELETS 10*3/mm3 202 209 227        Results from last 7 days   Lab Units 09/18/24  1443 09/18/24  0501 09/17/24 2120 09/17/24 0715 09/16/24 1956 09/16/24  1118   SODIUM mmol/L 138 139  --  141 137 140   POTASSIUM mmol/L 3.3*  3.3* 3.4* 3.7 3.0* 2.2* 2.5*   CHLORIDE mmol/L 98 102  --  99 91* 95*   CO2 mmol/L 24.0 26.0  --  29.0 24.0 26.0   BUN mg/dL 3* 4*  --  3* 3* 3*   CREATININE mg/dL 0.48* 0.42*  --  0.46* 0.51* 0.51*   CALCIUM mg/dL 8.0* 7.8*  --  8.3* 8.0* 8.3*   BILIRUBIN mg/dL  --  1.0  --   --  0.7 1.1   ALK PHOS U/L  --  714*  --   --  799* 855*   ALT (SGPT) U/L  --  89*  --   --  109* 116*   AST (SGOT) U/L  --  240*  --   --  295* 309*   GLUCOSE mg/dL 133* 85  --  91 112* 151*       Culture Data:   No results found for: \"BLOODCX\", \"URINECX\", \"WOUNDCX\", \"MRSACX\", \"RESPCX\", \"STOOLCX\"    Radiology Data:   Imaging Results (Last 24 Hours)       ** No results found for the last 24 hours. **            I have reviewed the patient's current medications.     Assessment/Plan   Assessment  Active Hospital Problems    Diagnosis     **Hypokalemia     Steatosis of liver, advanced     Hypomagnesemia     ETOH abuse     Nonspecific colitis     " Pain of upper abdomen     Primary hypertension     Pulmonary emphysema        Treatment Plan  Continue regular diet  Continue IV fluids  Continue electrolyte replacement    Medical Decision Making  Number and Complexity of problems: 4 complex medical problems  Differential Diagnosis: See above     Conditions and Status        Condition is unchanged.     [facility] Data  External documents reviewed: [employer] EHR and [employer] EHR  Cardiac tracing (EKG, telemetry) interpretation: NSR  Radiology interpretation: see above  Labs reviewed: see above  Any tests that were considered but not ordered: none     Decision rules/scores evaluated (example [id number], Wells, etc): N/A     Discussed with: Patient     Care Planning  Shared decision making: Patient  Code status and discussions: Full code     Disposition  Social Determinants of Health that impact treatment or disposition: none  I expect the patient to be discharged to home in 1 days.     Electronically signed by Domingo White DO, 09/18/24, 17:14 CDT.

## 2024-09-18 NOTE — PLAN OF CARE
Problem: Hypertension Comorbidity  Goal: Blood Pressure in Desired Range  Outcome: Ongoing, Progressing     Problem: Electrolyte Imbalance  Goal: Electrolyte Balance  Outcome: Ongoing, Progressing     Problem: Fall Injury Risk  Goal: Absence of Fall and Fall-Related Injury  Outcome: Ongoing, Progressing  Intervention: Promote Injury-Free Environment  Recent Flowsheet Documentation  Taken 9/18/2024 0400 by Stephanie Zuniga RN  Safety Promotion/Fall Prevention: safety round/check completed  Taken 9/18/2024 0200 by Stephanie Zuniga RN  Safety Promotion/Fall Prevention: safety round/check completed  Taken 9/18/2024 0000 by Stephanie Zuniga RN  Safety Promotion/Fall Prevention: safety round/check completed  Taken 9/17/2024 2300 by Stephanie Zuniga RN  Safety Promotion/Fall Prevention: safety round/check completed  Taken 9/17/2024 2200 by Stephanie Zuniga RN  Safety Promotion/Fall Prevention: safety round/check completed  Taken 9/17/2024 2100 by Stephanie Zuniga RN  Safety Promotion/Fall Prevention: safety round/check completed  Taken 9/17/2024 1955 by Stephanie Zuniga RN  Safety Promotion/Fall Prevention: safety round/check completed  Taken 9/17/2024 1900 by Stephanie Zuniga RN  Safety Promotion/Fall Prevention: safety round/check completed   Goal Outcome Evaluation:

## 2024-09-19 LAB
ALBUMIN SERPL-MCNC: 3 G/DL (ref 3.5–5.2)
ALBUMIN/GLOB SERPL: 1.1 G/DL
ALP SERPL-CCNC: 699 U/L (ref 39–117)
ALT SERPL W P-5'-P-CCNC: 78 U/L (ref 1–33)
ANION GAP SERPL CALCULATED.3IONS-SCNC: 11 MMOL/L (ref 5–15)
AST SERPL-CCNC: 213 U/L (ref 1–32)
BASOPHILS # BLD AUTO: 0.12 10*3/MM3 (ref 0–0.2)
BASOPHILS NFR BLD AUTO: 1.4 % (ref 0–1.5)
BILIRUB SERPL-MCNC: 0.8 MG/DL (ref 0–1.2)
BUN SERPL-MCNC: 3 MG/DL (ref 6–20)
BUN/CREAT SERPL: 5.2 (ref 7–25)
CALCIUM SPEC-SCNC: 7.7 MG/DL (ref 8.6–10.5)
CHLORIDE SERPL-SCNC: 101 MMOL/L (ref 98–107)
CO2 SERPL-SCNC: 27 MMOL/L (ref 22–29)
CREAT SERPL-MCNC: 0.58 MG/DL (ref 0.57–1)
DEPRECATED RDW RBC AUTO: 64.3 FL (ref 37–54)
EGFRCR SERPLBLD CKD-EPI 2021: 109.7 ML/MIN/1.73
EOSINOPHIL # BLD AUTO: 0.25 10*3/MM3 (ref 0–0.4)
EOSINOPHIL NFR BLD AUTO: 3 % (ref 0.3–6.2)
ERYTHROCYTE [DISTWIDTH] IN BLOOD BY AUTOMATED COUNT: 16.1 % (ref 12.3–15.4)
GLOBULIN UR ELPH-MCNC: 2.7 GM/DL
GLUCOSE SERPL-MCNC: 82 MG/DL (ref 65–99)
HCT VFR BLD AUTO: 33.1 % (ref 34–46.6)
HGB BLD-MCNC: 10.6 G/DL (ref 12–15.9)
IMM GRANULOCYTES # BLD AUTO: 0.12 10*3/MM3 (ref 0–0.05)
IMM GRANULOCYTES NFR BLD AUTO: 1.4 % (ref 0–0.5)
LYMPHOCYTES # BLD AUTO: 2.83 10*3/MM3 (ref 0.7–3.1)
LYMPHOCYTES NFR BLD AUTO: 33.5 % (ref 19.6–45.3)
MAGNESIUM SERPL-MCNC: 2.3 MG/DL (ref 1.6–2.6)
MCH RBC QN AUTO: 34.8 PG (ref 26.6–33)
MCHC RBC AUTO-ENTMCNC: 32 G/DL (ref 31.5–35.7)
MCV RBC AUTO: 108.5 FL (ref 79–97)
MONOCYTES # BLD AUTO: 0.9 10*3/MM3 (ref 0.1–0.9)
MONOCYTES NFR BLD AUTO: 10.7 % (ref 5–12)
NEUTROPHILS NFR BLD AUTO: 4.23 10*3/MM3 (ref 1.7–7)
NEUTROPHILS NFR BLD AUTO: 50 % (ref 42.7–76)
PLATELET # BLD AUTO: 210 10*3/MM3 (ref 140–450)
PMV BLD AUTO: 11.6 FL (ref 6–12)
POTASSIUM SERPL-SCNC: 3.6 MMOL/L (ref 3.5–5.2)
PROT SERPL-MCNC: 5.7 G/DL (ref 6–8.5)
RBC # BLD AUTO: 3.05 10*6/MM3 (ref 3.77–5.28)
SODIUM SERPL-SCNC: 139 MMOL/L (ref 136–145)
WBC NRBC COR # BLD AUTO: 8.45 10*3/MM3 (ref 3.4–10.8)

## 2024-09-19 PROCEDURE — 25810000003 SODIUM CHLORIDE 0.9 % SOLUTION: Performed by: FAMILY MEDICINE

## 2024-09-19 PROCEDURE — 80053 COMPREHEN METABOLIC PANEL: CPT | Performed by: FAMILY MEDICINE

## 2024-09-19 PROCEDURE — 25010000002 POTASSIUM CHLORIDE 10 MEQ/100ML SOLUTION: Performed by: STUDENT IN AN ORGANIZED HEALTH CARE EDUCATION/TRAINING PROGRAM

## 2024-09-19 PROCEDURE — 85025 COMPLETE CBC W/AUTO DIFF WBC: CPT | Performed by: FAMILY MEDICINE

## 2024-09-19 PROCEDURE — 83735 ASSAY OF MAGNESIUM: CPT | Performed by: FAMILY MEDICINE

## 2024-09-19 PROCEDURE — 25010000002 ONDANSETRON PER 1 MG: Performed by: FAMILY MEDICINE

## 2024-09-19 PROCEDURE — 36415 COLL VENOUS BLD VENIPUNCTURE: CPT | Performed by: FAMILY MEDICINE

## 2024-09-19 RX ORDER — POTASSIUM CHLORIDE 7.45 MG/ML
10 INJECTION INTRAVENOUS
Status: COMPLETED | OUTPATIENT
Start: 2024-09-19 | End: 2024-09-19

## 2024-09-19 RX ORDER — CHOLESTYRAMINE LIGHT 4 G/5.7G
2 POWDER, FOR SUSPENSION ORAL DAILY
Status: DISCONTINUED | OUTPATIENT
Start: 2024-09-20 | End: 2024-09-20 | Stop reason: HOSPADM

## 2024-09-19 RX ADMIN — ACETAMINOPHEN 650 MG: 325 TABLET ORAL at 08:23

## 2024-09-19 RX ADMIN — Medication 10 ML: at 08:23

## 2024-09-19 RX ADMIN — CARVEDILOL 25 MG: 25 TABLET, FILM COATED ORAL at 08:23

## 2024-09-19 RX ADMIN — HYDROCODONE BITARTRATE AND ACETAMINOPHEN 1 TABLET: 5; 325 TABLET ORAL at 17:13

## 2024-09-19 RX ADMIN — ONDANSETRON 4 MG: 2 INJECTION INTRAMUSCULAR; INTRAVENOUS at 08:23

## 2024-09-19 RX ADMIN — TOPIRAMATE 50 MG: 25 TABLET, FILM COATED ORAL at 08:23

## 2024-09-19 RX ADMIN — POTASSIUM CHLORIDE 10 MEQ: 10 INJECTION, SOLUTION INTRAVENOUS at 09:04

## 2024-09-19 RX ADMIN — LOPERAMIDE HYDROCHLORIDE 4 MG: 2 CAPSULE ORAL at 03:25

## 2024-09-19 RX ADMIN — HYDROCODONE BITARTRATE AND ACETAMINOPHEN 1 TABLET: 5; 325 TABLET ORAL at 03:25

## 2024-09-19 RX ADMIN — Medication 10 ML: at 20:39

## 2024-09-19 RX ADMIN — TOPIRAMATE 50 MG: 25 TABLET, FILM COATED ORAL at 21:31

## 2024-09-19 RX ADMIN — SODIUM CHLORIDE 75 ML/HR: 9 INJECTION, SOLUTION INTRAVENOUS at 15:21

## 2024-09-19 RX ADMIN — CHOLESTYRAMINE 4 G: 4 POWDER, FOR SUSPENSION ORAL at 08:23

## 2024-09-19 RX ADMIN — ONDANSETRON 4 MG: 2 INJECTION INTRAMUSCULAR; INTRAVENOUS at 17:13

## 2024-09-19 RX ADMIN — POTASSIUM CHLORIDE 10 MEQ: 10 INJECTION, SOLUTION INTRAVENOUS at 10:33

## 2024-09-19 RX ADMIN — POTASSIUM CHLORIDE 10 MEQ: 10 INJECTION, SOLUTION INTRAVENOUS at 11:55

## 2024-09-19 RX ADMIN — LOPERAMIDE HYDROCHLORIDE 4 MG: 2 CAPSULE ORAL at 21:31

## 2024-09-19 RX ADMIN — POTASSIUM CHLORIDE 10 MEQ: 10 INJECTION, SOLUTION INTRAVENOUS at 08:23

## 2024-09-19 RX ADMIN — LOPERAMIDE HYDROCHLORIDE 4 MG: 2 CAPSULE ORAL at 15:21

## 2024-09-19 RX ADMIN — CARVEDILOL 25 MG: 25 TABLET, FILM COATED ORAL at 20:39

## 2024-09-19 RX ADMIN — SODIUM CHLORIDE 75 ML/HR: 9 INJECTION, SOLUTION INTRAVENOUS at 03:22

## 2024-09-19 NOTE — PLAN OF CARE
Goal Outcome Evaluation:  Plan of Care Reviewed With: patient        Progress: no change  Outcome Evaluation: Pt continues to have diarrhea, states she had about 10 on day shift, immodium given this shift-last dose of this was previous night shift at 0030 am as it is prn and pt has not thought to ask for it. Nausea and stomach upset zofran given along with tylenol, last 4 K+ PO pills held for GI upset will await am labs and if neccessary will replete with IV K+ which should be more effective and tolerable. CTM

## 2024-09-19 NOTE — PROGRESS NOTES
[facility] Medicine Services  INPATIENT PROGRESS NOTE    Patient Name: Minnie Bang  Date of Admission: 9/16/2024  Today's Date: 09/19/24  Length of Stay: 3  Primary Care Physician: Aidee Padilla APRN    Subjective   Chief Complaint: abnormal labs     Minnie Bang is a 49 y.o. female with pmh of lipidemia, hypothyroidism and chronic diarrhea for the past 8 months.  She was sent here by her GI doctor when she was found to have low potassium levels on BMP.  She reports that her diarrhea started progressively almost a year ago but has gotten very bad over the past 8 months.  She states that she has up to 20 bowel movements per day including throughout the night.  She had a colonoscopy a few weeks ago which was normal per the patient.  She has also had an unremarkable MRCP which was done for elevated aminotransferases.  Seen in the gastroenterology office on 9/13, at that time fecal fat was negative and urine 5-HIAA were ordered but not yet turned into lab.  VIP levels were in process.  She told me she has a history of heavy drinking (6 beers daily for several years) but has cut down several years ago and started consuming 6 whiskey shots 3 times a week on the weekends.  However she has completely stopped consuming alcohol for the past 6 months due to the diarrhea.  She denies having had history of pancreatitis in the past.  However on the office visit with GI in August she reported drinking 2 shots of liquor daily.    Today she reports she is tired she has had multiple bowel movements.  She is on Imodium and colestipol but she does not feel like it helps.  She has received multiple doses of KCl.  No other complaints at this time.      Review of Systems   All pertinent negatives and positives are as above. All other systems have been reviewed and are negative unless otherwise stated.     Objective    Temp:  [97.9 °F (36.6 °C)-98.6 °F (37 °C)] 98.6 °F (37 °C)  Heart  Rate:  [62-69] 62  Resp:  [16] 16  BP: (140-173)/() 147/84    Physical Exam  Constitutional:       General: She is not in acute distress.     Appearance: She is obese.   HENT:      Head: Normocephalic.      Mouth/Throat:      Mouth: Mucous membranes are moist.   Eyes:      Extraocular Movements: Extraocular movements intact.      Conjunctiva/sclera: Conjunctivae normal.   Cardiovascular:      Rate and Rhythm: Normal rate and regular rhythm.      Pulses: Normal pulses.      Heart sounds: Normal heart sounds.   Pulmonary:      Effort: Pulmonary effort is normal.      Breath sounds: Normal breath sounds.   Abdominal:      General: Bowel sounds are increased.      Palpations: Abdomen is soft. There is no fluid wave.      Tenderness: There is no abdominal tenderness.   Musculoskeletal:      Right lower leg: No edema.      Left lower leg: No edema.   Skin:     General: Skin is warm and dry.      Capillary Refill: Capillary refill takes less than 2 seconds.   Neurological:      General: No focal deficit present.      Mental Status: She is alert and oriented to person, place, and time.   Psychiatric:      Comments: Tearful         Results Review:  I have reviewed the labs, radiology results, and diagnostic studies.    Laboratory Data:   Results from last 7 days   Lab Units 09/19/24  0501 09/18/24  0501 09/17/24  0715   WBC 10*3/mm3 8.45 7.35 8.72   HEMOGLOBIN g/dL 10.6* 10.4* 11.1*   HEMATOCRIT % 33.1* 31.2* 33.5*   PLATELETS 10*3/mm3 210 202 209        Results from last 7 days   Lab Units 09/19/24  0501 09/18/24  1443 09/18/24  0501 09/17/24  0715 09/16/24  1956   SODIUM mmol/L 139 138 139   < > 137   POTASSIUM mmol/L 3.6 3.3*  3.3* 3.4*   < > 2.2*   CHLORIDE mmol/L 101 98 102   < > 91*   CO2 mmol/L 27.0 24.0 26.0   < > 24.0   BUN mg/dL 3* 3* 4*   < > 3*   CREATININE mg/dL 0.58 0.48* 0.42*   < > 0.51*   CALCIUM mg/dL 7.7* 8.0* 7.8*   < > 8.0*   BILIRUBIN mg/dL 0.8  --  1.0  --  0.7   ALK PHOS U/L 699*  --  714*  --   799*   ALT (SGPT) U/L 78*  --  89*  --  109*   AST (SGOT) U/L 213*  --  240*  --  295*   GLUCOSE mg/dL 82 133* 85   < > 112*    < > = values in this interval not displayed.       Culture Data:   [unfilled]    Radiology Data:   Imaging Results (Last 24 Hours)       ** No results found for the last 24 hours. **            I have reviewed the patient's current medications.     Assessment/Plan   Assessment  Active Hospital Problems    Diagnosis     **Hypokalemia     Steatosis of liver, advanced     Hypomagnesemia     ETOH abuse     Nonspecific colitis     Pain of upper abdomen     Primary hypertension     Pulmonary emphysema        Treatment Plan:    # Hypokalemia 2/2 chronic diarrhea  # Chronic diarrhea - follows up with Rakel Lucas APRN   Outpatient GI workup: MRCP unremarkable.Normal colonoscopy.  Fecal fat negative.  VIP levels and urine 5-HIAA are in process.  - Continue replacing potassium, today 3.6  -Continue Imodium as needed and colestipol PPI  - Will need to continue her outpatient workup for chronic diarrhea  - Will order stool elastase as I have not seen it done on the chart  - Continue IV fluids  - has outpatient referral to specialist in [place], [place]      # Alcohol use disorder - counseling given  # Hypothyroidism - continue home dose Synthroid  # Hypertension - continue home Coreg      Medical Decision Making  Number and Complexity of problems: 1 acute, 3 chronic  Differential Diagnosis: as above     Conditions and Status        Condition is unchanged.     [facility] Data  External documents reviewed:  gastroenterology office note 9/13  Cardiac tracing (EKG, telemetry) interpretation: none   Radiology interpretation: none  Labs reviewed: as above  Any tests that were considered but not ordered: none      Decision rules/scores evaluated (example [id number], [place], etc): none      Discussed with: patient     Care Planning  Shared decision making: Patient apprised of current labs, vitals, imaging and  treatment plan.  They are agreeable with proceeding with plans as discussed.    Code status and discussions: FULL CODE    Disposition  Social Determinants of Health that impact treatment or disposition: alcohol use  I expect the patient to be discharged to home (RV) in 1 days.     Electronically signed by Mery Varner MD, 09/19/24, 08:04 CDT.

## 2024-09-20 ENCOUNTER — READMISSION MANAGEMENT (OUTPATIENT)
Dept: CALL CENTER | Facility: HOSPITAL | Age: 49
End: 2024-09-20
Payer: COMMERCIAL

## 2024-09-20 VITALS
RESPIRATION RATE: 18 BRPM | BODY MASS INDEX: 26.98 KG/M2 | TEMPERATURE: 97.7 F | WEIGHT: 178 LBS | HEIGHT: 68 IN | OXYGEN SATURATION: 100 % | SYSTOLIC BLOOD PRESSURE: 168 MMHG | HEART RATE: 70 BPM | DIASTOLIC BLOOD PRESSURE: 98 MMHG

## 2024-09-20 PROBLEM — E83.42 HYPOMAGNESEMIA: Status: RESOLVED | Noted: 2024-08-03 | Resolved: 2024-09-20

## 2024-09-20 PROBLEM — R10.10 PAIN OF UPPER ABDOMEN: Status: RESOLVED | Noted: 2024-06-11 | Resolved: 2024-09-20

## 2024-09-20 PROBLEM — E87.6 HYPOKALEMIA: Status: RESOLVED | Noted: 2024-05-23 | Resolved: 2024-09-20

## 2024-09-20 LAB
ANION GAP SERPL CALCULATED.3IONS-SCNC: 10 MMOL/L (ref 5–15)
BUN SERPL-MCNC: 2 MG/DL (ref 6–20)
BUN/CREAT SERPL: 4.2 (ref 7–25)
CALCIUM SPEC-SCNC: 7.8 MG/DL (ref 8.6–10.5)
CHLORIDE SERPL-SCNC: 102 MMOL/L (ref 98–107)
CO2 SERPL-SCNC: 25 MMOL/L (ref 22–29)
CREAT SERPL-MCNC: 0.48 MG/DL (ref 0.57–1)
DEPRECATED RDW RBC AUTO: 64.3 FL (ref 37–54)
EGFRCR SERPLBLD CKD-EPI 2021: 114.8 ML/MIN/1.73
ERYTHROCYTE [DISTWIDTH] IN BLOOD BY AUTOMATED COUNT: 16 % (ref 12.3–15.4)
GLUCOSE SERPL-MCNC: 88 MG/DL (ref 65–99)
HCT VFR BLD AUTO: 34.3 % (ref 34–46.6)
HGB BLD-MCNC: 10.9 G/DL (ref 12–15.9)
MAGNESIUM SERPL-MCNC: 2 MG/DL (ref 1.6–2.6)
MCH RBC QN AUTO: 34.5 PG (ref 26.6–33)
MCHC RBC AUTO-ENTMCNC: 31.8 G/DL (ref 31.5–35.7)
MCV RBC AUTO: 108.5 FL (ref 79–97)
PHOSPHATE SERPL-MCNC: 2.6 MG/DL (ref 2.5–4.5)
PLATELET # BLD AUTO: 217 10*3/MM3 (ref 140–450)
PMV BLD AUTO: 11.1 FL (ref 6–12)
POTASSIUM SERPL-SCNC: 4 MMOL/L (ref 3.5–5.2)
RBC # BLD AUTO: 3.16 10*6/MM3 (ref 3.77–5.28)
SODIUM SERPL-SCNC: 137 MMOL/L (ref 136–145)
WBC NRBC COR # BLD AUTO: 7.59 10*3/MM3 (ref 3.4–10.8)

## 2024-09-20 PROCEDURE — 80048 BASIC METABOLIC PNL TOTAL CA: CPT | Performed by: STUDENT IN AN ORGANIZED HEALTH CARE EDUCATION/TRAINING PROGRAM

## 2024-09-20 PROCEDURE — 83735 ASSAY OF MAGNESIUM: CPT | Performed by: STUDENT IN AN ORGANIZED HEALTH CARE EDUCATION/TRAINING PROGRAM

## 2024-09-20 PROCEDURE — 25010000002 ONDANSETRON PER 1 MG: Performed by: FAMILY MEDICINE

## 2024-09-20 PROCEDURE — 85027 COMPLETE CBC AUTOMATED: CPT | Performed by: STUDENT IN AN ORGANIZED HEALTH CARE EDUCATION/TRAINING PROGRAM

## 2024-09-20 PROCEDURE — 84100 ASSAY OF PHOSPHORUS: CPT | Performed by: STUDENT IN AN ORGANIZED HEALTH CARE EDUCATION/TRAINING PROGRAM

## 2024-09-20 PROCEDURE — 25810000003 SODIUM CHLORIDE 0.9 % SOLUTION: Performed by: FAMILY MEDICINE

## 2024-09-20 RX ORDER — HYDRALAZINE HYDROCHLORIDE 20 MG/ML
10 INJECTION INTRAMUSCULAR; INTRAVENOUS EVERY 6 HOURS PRN
Status: DISCONTINUED | OUTPATIENT
Start: 2024-09-20 | End: 2024-09-20 | Stop reason: HOSPADM

## 2024-09-20 RX ORDER — PANTOPRAZOLE SODIUM 40 MG/1
40 TABLET, DELAYED RELEASE ORAL 2 TIMES DAILY
Qty: 60 TABLET | Refills: 0 | Status: SHIPPED | OUTPATIENT
Start: 2024-09-20

## 2024-09-20 RX ORDER — CARVEDILOL 25 MG/1
25 TABLET ORAL 2 TIMES DAILY
Qty: 60 TABLET | Refills: 0 | Status: SHIPPED | OUTPATIENT
Start: 2024-09-20

## 2024-09-20 RX ORDER — BISMUTH SUBSALICYLATE 262 MG/1
524 TABLET, CHEWABLE ORAL
Qty: 120 TABLET | Refills: 0 | Status: SHIPPED | OUTPATIENT
Start: 2024-09-20

## 2024-09-20 RX ORDER — LOPERAMIDE HCL 2 MG
4 CAPSULE ORAL 4 TIMES DAILY PRN
Qty: 120 CAPSULE | Refills: 0 | Status: SHIPPED | OUTPATIENT
Start: 2024-09-20

## 2024-09-20 RX ADMIN — SODIUM CHLORIDE 75 ML/HR: 9 INJECTION, SOLUTION INTRAVENOUS at 04:40

## 2024-09-20 RX ADMIN — TOPIRAMATE 50 MG: 25 TABLET, FILM COATED ORAL at 09:47

## 2024-09-20 RX ADMIN — CHOLESTYRAMINE 8 G: 4 POWDER, FOR SUSPENSION ORAL at 09:46

## 2024-09-20 RX ADMIN — HYDROCODONE BITARTRATE AND ACETAMINOPHEN 1 TABLET: 5; 325 TABLET ORAL at 01:39

## 2024-09-20 RX ADMIN — CARVEDILOL 25 MG: 25 TABLET, FILM COATED ORAL at 09:46

## 2024-09-20 RX ADMIN — LOPERAMIDE HYDROCHLORIDE 4 MG: 2 CAPSULE ORAL at 04:38

## 2024-09-20 RX ADMIN — Medication 10 ML: at 09:47

## 2024-09-20 RX ADMIN — ONDANSETRON 4 MG: 2 INJECTION INTRAMUSCULAR; INTRAVENOUS at 08:57

## 2024-09-20 NOTE — PAYOR COMM NOTE
"REF:  [id number]     [facility]  FAX  [phone]     Robles Singh (49 y.o. Female)       Date of Birth   1975    Social Security Number       Address   [address] [facility] [address]    Home Phone   [phone]    MRN   [id number]       [facility]    Marital Status                               Admission Date   9/16/24    Admission Type   Emergency    Admitting Provider   Mery Varner MD    Attending Provider       Department, Room/Bed   [facility] 3C, 360/1       Discharge Date   9/20/2024    Discharge Disposition   Home or Self Care    Discharge Destination                                 Attending Provider: (none)   Allergies: No Known Allergies    Isolation: None   Infection: None   Code Status: CPR    Ht: 172.7 cm (68\")   Wt: 80.7 kg (178 lb)    Admission Cmt: None   Principal Problem: Hypokalemia [E87.6]                   Active Insurance as of 9/16/2024       Primary Coverage       Payor Plan Insurance Group Employer/Plan Group    [employer] [place] [employer]ICA[place]        Payor Plan Address Payor Plan Phone Number Payor Plan Fax Number Effective Dates    [address] [address] [phone]  6/8/2020 - None Entered    [facility] [address]         Subscriber Name Subscriber Birth Date Member ID       ROBLES SINGH 1975 [id number]                     Emergency Contacts        (Rel.) Home Phone Work Phone Mobile Phone    Kirill Montgomery (Spouse) [phone] -- [phone]                 Discharge Summary        Mery Varner MD at 09/20/24 [address] [place] [address] Medicine Services  DISCHARGE SUMMARY       Date of Admission: 9/16/2024  Date of Discharge:  9/20/2024  Primary Care Physician: Aidee Padilla APRN    Presenting Problem/History of Present Illness:  Robles Singh is a 49 y.o. female with pmh of lipidemia, hypothyroidism and chronic diarrhea for the past 8 months.  She " was sent here by her GI doctor when she was found to have low potassium levels on BMP.  She reports that her diarrhea started progressively almost a year ago but has gotten very bad over the past 8 months.  She states that she has up to 20 bowel movements per day including throughout the night.  She had a colonoscopy a few weeks ago which was normal per the patient.  She has also had an unremarkable MRCP which was done for elevated aminotransferases.  Seen in the gastroenterology office on 9/13, at that time fecal fat was negative and urine 5-HIAA were ordered but not yet turned into lab.  VIP levels were in process.  She told me she has a history of heavy drinking (6 beers daily for several years) but has cut down several years ago and started consuming 6 whiskey shots 3 times a week on the weekends.  However she has completely stopped consuming alcohol for the past 6 months due to the diarrhea.  She denies having had history of pancreatitis in the past.  However on the office visit with GI in August she reported drinking 2 shots of liquor daily.     Final Discharge Diagnoses:  Active Hospital Problems    Diagnosis     Steatosis of liver, advanced     ETOH abuse     Nonspecific colitis     Primary hypertension     Pulmonary emphysema        Consults: none     Procedures Performed: none     Pertinent Test Results:   Results for orders placed during the hospital encounter of 04/05/23    Adult Transthoracic Echo Complete w/ Color, Spectral and Contrast if necessary per protocol    Interpretation Summary    Left ventricular systolic function is normal. Left ventricular ejection fraction appears to be 61 - 65%.    Left ventricular diastolic function was normal.    Normal global longitudinal LV strain (GLS) = -18.8%    The aortic valve is abnormal in structure. The aortic valve exhibits sclerosis. A bicuspid aortic valve is suggested. There is mild calcification of the aortic valve. There is thickening of the aortic  valve    No aortic valve regurgitation or stenosis is present.    Estimated right ventricular systolic pressure from tricuspid regurgitation is normal (<35 mmHg).      Imaging Results (All)       Procedure Component Value Units Date/Time    CT Abdomen Pelvis With Contrast [[id number]] Collected: 09/16/24 1944     Updated: 09/16/24 1951    Narrative:      EXAMINATION:  CT ABDOMEN PELVIS W CONTRAST-  9/16/2024 6:22 PM     HISTORY: Abdominal pain with vomiting and diarrhea.     TECHNIQUE: Spiral CT was performed of the abdomen and pelvis with IV  contrast. Multiplanar images were reconstructed.     DLP: 378.39 mGy.cm Automated dosage reduction technique was utilized to  reduce patient dose.     COMPARISON: 9/5/2024.     LUNG BASES: There is minimal dependent atelectasis.     LIVER AND SPLEEN: There is very severe fatty infiltration of the liver.  There is hepatomegaly with liver measuring 22.6 cm in length. The spleen  is normal in size. There are no dense gallstones. No ductal dilatation  is appreciated.     PANCREAS: No pancreatic mass or inflammatory change.     KIDNEYS AND ADRENALS: The kidneys and adrenal glands are unremarkable.  No bladder abnormality is detected.     BOWEL: No oral contrast was given. There is food material in the  stomach. There is mild under distention of the stomach. Small bowel  loops are nondilated. There is submucosal fat deposition in the colon.  There is under distention of most of the colon. There is some  questionable wall thickening of the sigmoid colon extending to the  rectum versus artifact of under distention.     OTHER: There is atheromatous disease of the aortoiliac vessels. There is  a retroaortic left renal vein. There has been prior hysterectomy. There  are no enlarged abdominal or pelvic lymph nodes. There are degenerative  changes of the spine. There is avascular necrosis of both femoral heads  with no articular collapse.          Impression:      1. Severe fatty  infiltration of the liver with hepatomegaly.  2. Under distention of stomach and colon. No small bowel distention is  seen. Submucosal fat deposition in the colon can be seen as a normal  variant. It can also be seen in patients with chronic inflammatory bowel  disease. There may be some wall thickening of the sigmoid colon and  rectum versus artifact of under distention. Colitis is included in the  differential.  3. Atheromatous disease of the aortoiliac vessels. Retroaortic left  renal vein.  4. 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 Degenerative  changes of the visualized spine.  5. Prior hysterectomy.        The full report of this exam was immediately signed and available to the  emergency room. The patient is currently in the emergency room.     This report was signed and finalized on 9/16/2024 7:48 PM by Dr. Jesus Manuel Lomax MD.             LAB RESULTS:      Lab 09/20/24  0529 09/19/24  0501 09/18/24  0501 09/17/24  2120 09/17/24  1130 09/17/24  0715 09/17/24  0002 09/16/24  1956 09/16/24  1118 09/16/24  1118   WBC 7.59 8.45 7.35  --   --  8.72  --  8.82  --  8.92   HEMOGLOBIN 10.9* 10.6* 10.4*  --   --  11.1*  --  11.2*  --  12.4   HEMATOCRIT 34.3 33.1* 31.2*  --   --  33.5*  --  32.9*  --  37.2   PLATELETS 217 210 202  --   --  209  --  227  --  234   NEUTROS ABS  --  4.23 3.75  --   --  4.75  --  4.18  --  5.20   IMMATURE GRANS (ABS)  --  0.12* 0.06*  --   --  0.07*  --  0.10*  --  0.07*   LYMPHS ABS  --  2.83 2.45  --   --  2.71  --  3.41*  --  2.50   MONOS ABS  --  0.90 0.74  --   --  0.91*  --  0.84  --  0.87   EOS ABS  --  0.25 0.25  --   --  0.19  --  0.18  --  0.13   .5* 108.5* 106.1*  --   --  105.0*  --  104.4*  --  104.5*   SED RATE  --   --   --   --   --   --   --  19  --   --    CRP  --   --   --   --   --   --   --  0.59*  --   --    LACTATE  --   --  2.5* 3.7* 5.7* 5.3* 5.9* 8.7*   < >  --    PROTIME  --   --   --   --   --   --   --  13.4  --   --     < > = values in this  interval not displayed.         Lab 09/20/24  0529 09/19/24  0501 09/18/24  1443 09/18/24  0501 09/17/24  2120 09/17/24  0715 09/17/24  0002   SODIUM 137 139 138 139  --  141  --    POTASSIUM 4.0 3.6 3.3*  3.3* 3.4* 3.7 3.0*  --    CHLORIDE 102 101 98 102  --  99  --    CO2 25.0 27.0 24.0 26.0  --  29.0  --    ANION GAP 10.0 11.0 16.0* 11.0  --  13.0  --    BUN 2* 3* 3* 4*  --  3*  --    CREATININE 0.48* 0.58 0.48* 0.42*  --  0.46*  --    EGFR 114.8 109.7 114.8 118.6  --  116.0  --    GLUCOSE 88 82 133* 85  --  91  --    CALCIUM 7.8* 7.7* 8.0* 7.8*  --  8.3*  --    MAGNESIUM 2.0 2.3 3.6* 1.5*  --  1.7  --    PHOSPHORUS 2.6  --   --  2.9  --  3.3 3.2         Lab 09/19/24  0501 09/18/24  0501 09/16/24 1956 09/16/24  1118   TOTAL PROTEIN 5.7* 5.5* 6.4 6.6   ALBUMIN 3.0* 2.9* 3.3* 3.5   GLOBULIN 2.7 2.6 3.1 3.1   ALT (SGPT) 78* 89* 109* 116*   AST (SGOT) 213* 240* 295* 309*   BILIRUBIN 0.8 1.0 0.7 1.1   ALK PHOS 699* 714* 799* 855*   AMYLASE  --   --  13*  --    LIPASE  --   --  12*  --          Lab 09/16/24 1956   PROTIME 13.4   INR 0.98                 Brief Urine Lab Results  (Last result in the past 365 days)        Color   Clarity   Blood   Leuk Est   Nitrite   Protein   CREAT   Urine HCG        09/16/24 2020 Yellow   Clear   Negative   Negative   Negative   Negative                 Microbiology Results (last 10 days)       Procedure Component Value - Date/Time    Clostridioides difficile Toxin - Stool, Per Rectum [[id number]]  (Normal) Collected: 09/17/24 1714    Lab Status: Final result Specimen: Stool from Per Rectum Updated: 09/17/24 1804    Narrative:      The following orders were created for panel order Clostridioides difficile Toxin - Stool, Per Rectum.  Procedure                               Abnormality         Status                     ---------                               -----------         ------                     Clostridioides difficile...[[id number]]  Normal              Final result                  Please view results for these tests on the individual orders.    Clostridioides difficile Toxin, PCR - Stool, Per Rectum [[id number]]  (Normal) Collected: 09/17/24 1714    Lab Status: Final result Specimen: Stool from Per Rectum Updated: 09/17/24 1804     Toxigenic C. difficile by PCR Negative    Narrative:      The result indicates the absence of toxigenic C. difficile from stool specimen.             Hospital Course:     Minnie Bang is a 49 y.o. female with pmh of lipidemia, hypothyroidism and chronic diarrhea for the past 8 months.  She was sent here by her GI doctor when she was found to have low potassium levels on BMP.  She reports that her diarrhea started progressively almost a year ago but has gotten very bad over the past 8 months.  She states that she has up to 20 bowel movements per day including throughout the night.  She had a colonoscopy a few weeks ago which was normal per the patient.  She has also had an unremarkable MRCP which was done for elevated aminotransferases.  Seen in the gastroenterology office on 9/13, at that time fecal fat was negative and urine 5-HIAA were ordered but not yet turned into lab.  VIP levels were in process.  She told me she has a history of heavy drinking (6 beers daily for several years) but has cut down several years ago and started consuming 6 whiskey shots 3 times a week on the weekends.  However she has completely stopped consuming alcohol for the past 6 months due to the diarrhea.  She denies having had history of pancreatitis in the past.  However on the office visit with GI in August she reported drinking 2 shots of liquor daily.     She came in and was found to have a potassium was low was 2.2 secondary to the diarrhea.  Potassium was adequately replaced and went up to 4.0 on the day of discharge.  She received as needed Imodium, colestipol and PPI.  She also received IV fluids.  She will follow-up with her gastroenterologist who had already  "started workup.  She also has a pending referral to a specialist in [facility].  She was advised to quit alcohol as it may be one of the culprits of her diarrhea.    Other chronic problems included hypertension and hypothyroidism which were managed with her home meds.      Physical Exam on Discharge:  /82 (BP Location: Left arm, Patient Position: Lying)   Pulse 60   Temp 97.6 °F (36.4 °C) (Oral)   Resp 18   Ht 172.7 cm (68\")   Wt 80.7 kg (178 lb)   LMP  (LMP Unknown)   SpO2 97%   BMI 27.06 kg/m²     Physical Exam    GEN: Awake, alert, interactive, in NAD, obese  HEENT: Atraumatic, EOMI  Lungs: CTAB, no wheezing/rales/rhonchi  Heart: RRR, +S1/s2, no rub  ABD: soft, mildly tender to palpation, BS increased  Extremities: atraumatic, no cyanosis, no edema  Skin: no rashes or lesions  Neuro: AAOx3, no focal deficits  Psych: tearful      Condition on Discharge: Stable    Discharge Disposition:  Home or Self Care    Discharge Medications:     Discharge Medications        New Medications        Instructions Start Date   bismuth subsalicylate 262 MG chewable tablet  Commonly known as: Pepto-Bismol   524 mg, Oral, 4 Times Daily Before Meals & Nightly      loperamide 2 MG capsule  Commonly known as: IMODIUM  Replaces: loperamide 2 MG tablet   4 mg, Oral, 4 Times Daily PRN             Continue These Medications        Instructions Start Date   atorvastatin 10 MG tablet  Commonly known as: LIPITOR   10 mg, Oral, Daily      carvedilol 25 MG tablet  Commonly known as: COREG   25 mg, Oral, 2 Times Daily      colestipol 1 g tablet  Commonly known as: COLESTID   1 g, Oral, 2 Times Daily      levothyroxine 175 MCG tablet  Commonly known as: SYNTHROID, LEVOTHROID   175 mcg, Oral, Daily      nitroglycerin 0.4 MG SL tablet  Commonly known as: NITROSTAT   0.4 mg, Sublingual, Every 5 Minutes PRN, Take no more than 3 doses in 15 minutes.      pantoprazole 40 MG EC tablet  Commonly known as: PROTONIX   40 mg, Oral, 2 " Times Daily      topiramate 50 MG tablet  Commonly known as: TOPAMAX   50 mg, Oral, 2 Times Daily             Stop These Medications      loperamide 2 MG tablet  Commonly known as: Imodium A-D  Replaced by: loperamide 2 MG capsule              Discharge Diet:   Dietary Orders (From admission, onward)       Start     Ordered    09/17/24 1137  Diet: Regular/House; Fluid Consistency: Thin (IDDSI 0)  Diet Effective Now        References:    Diet Order [facility]   Question Answer Comment   Diets: Regular/House    Fluid Consistency: Thin (IDDSI 0)        09/17/24 1136                      Activity at Discharge: ad buck    Follow-up Appointments:   - following with PCP and gastroenterologist    Test Results Pending at Discharge: none     Electronically signed by Larry Varner MD, 09/20/24, 10:18 CDT.    Time: 34 minutes.           Electronically signed by Larry Varner MD at 09/20/24 1023       Discharge Order (From admission, onward)       Start     Ordered    09/20/24 1013  Discharge patient  Once        Expected Discharge Date: 09/20/24   Expected Discharge Time: Morning   Discharge Disposition: Home or Self Care   Physician of Record for Attribution - Please select from Treatment Team: LARRY VARNER [[id number]]   Review needed by CMO to determine Physician of Record: No      Question Answer Comment   Physician of Record for Attribution - Please select from Treatment Team LARRY VARNER    Review needed by CMO to determine Physician of Record No        09/20/24 1017

## 2024-09-20 NOTE — DISCHARGE SUMMARY
[facility] Medicine Services  DISCHARGE SUMMARY       Date of Admission: 9/16/2024  Date of Discharge:  9/20/2024  Primary Care Physician: Aidee Padilla APRN    Presenting Problem/History of Present Illness:  Minnie Bang is a 49 y.o. female with pmh of lipidemia, hypothyroidism and chronic diarrhea for the past 8 months.  She was sent here by her GI doctor when she was found to have low potassium levels on BMP.  She reports that her diarrhea started progressively almost a year ago but has gotten very bad over the past 8 months.  She states that she has up to 20 bowel movements per day including throughout the night.  She had a colonoscopy a few weeks ago which was normal per the patient.  She has also had an unremarkable MRCP which was done for elevated aminotransferases.  Seen in the gastroenterology office on 9/13, at that time fecal fat was negative and urine 5-HIAA were ordered but not yet turned into lab.  VIP levels were in process.  She told me she has a history of heavy drinking (6 beers daily for several years) but has cut down several years ago and started consuming 6 whiskey shots 3 times a week on the weekends.  However she has completely stopped consuming alcohol for the past 6 months due to the diarrhea.  She denies having had history of pancreatitis in the past.  However on the office visit with GI in August she reported drinking 2 shots of liquor daily.     Final Discharge Diagnoses:  Active Hospital Problems    Diagnosis     Steatosis of liver, advanced     ETOH abuse     Nonspecific colitis     Primary hypertension     Pulmonary emphysema        Consults: none     Procedures Performed: none     Pertinent Test Results:   Results for orders placed during the hospital encounter of 04/05/23    Adult Transthoracic Echo Complete w/ Color, Spectral and Contrast if necessary per protocol    Interpretation Summary    Left ventricular systolic function is  normal. Left ventricular ejection fraction appears to be 61 - 65%.    Left ventricular diastolic function was normal.    Normal global longitudinal LV strain (GLS) = -18.8%    The aortic valve is abnormal in structure. The aortic valve exhibits sclerosis. A bicuspid aortic valve is suggested. There is mild calcification of the aortic valve. There is thickening of the aortic valve    No aortic valve regurgitation or stenosis is present.    Estimated right ventricular systolic pressure from tricuspid regurgitation is normal (<35 mmHg).      Imaging Results (All)       Procedure Component Value Units Date/Time    CT Abdomen Pelvis With Contrast [[id number]] Collected: 09/16/24 1944     Updated: 09/16/24 1951    Narrative:      EXAMINATION:  CT ABDOMEN PELVIS W CONTRAST-  9/16/2024 6:22 PM     HISTORY: Abdominal pain with vomiting and diarrhea.     TECHNIQUE: Spiral CT was performed of the abdomen and pelvis with IV  contrast. Multiplanar images were reconstructed.     DLP: 378.39 mGy.cm Automated dosage reduction technique was utilized to  reduce patient dose.     COMPARISON: 9/5/2024.     LUNG BASES: There is minimal dependent atelectasis.     LIVER AND SPLEEN: There is very severe fatty infiltration of the liver.  There is hepatomegaly with liver measuring 22.6 cm in length. The spleen  is normal in size. There are no dense gallstones. No ductal dilatation  is appreciated.     PANCREAS: No pancreatic mass or inflammatory change.     KIDNEYS AND ADRENALS: The kidneys and adrenal glands are unremarkable.  No bladder abnormality is detected.     BOWEL: No oral contrast was given. There is food material in the  stomach. There is mild under distention of the stomach. Small bowel  loops are nondilated. There is submucosal fat deposition in the colon.  There is under distention of most of the colon. There is some  questionable wall thickening of the sigmoid colon extending to the  rectum versus artifact of under  distention.     OTHER: There is atheromatous disease of the aortoiliac vessels. There is  a retroaortic left renal vein. There has been prior hysterectomy. There  are no enlarged abdominal or pelvic lymph nodes. There are degenerative  changes of the spine. There is avascular necrosis of both femoral heads  with no articular collapse.          Impression:      1. Severe fatty infiltration of the liver with hepatomegaly.  2. Under distention of stomach and colon. No small bowel distention is  seen. Submucosal fat deposition in the colon can be seen as a normal  variant. It can also be seen in patients with chronic inflammatory bowel  disease. There may be some wall thickening of the sigmoid colon and  rectum versus artifact of under distention. Colitis is included in the  differential.  3. Atheromatous disease of the aortoiliac vessels. Retroaortic left  renal vein.  4. 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 Degenerative  changes of the visualized spine.  5. Prior hysterectomy.        The full report of this exam was immediately signed and available to the  emergency room. The patient is currently in the emergency room.     This report was signed and finalized on 9/16/2024 7:48 PM by Dr. Jesus Manuel Lomax MD.             LAB RESULTS:      Lab 09/20/24  0529 09/19/24  0501 09/18/24  0501 09/17/24  2120 09/17/24  1130 09/17/24  0715 09/17/24  0002 09/16/24  1956 09/16/24  1118 09/16/24  1118   WBC 7.59 8.45 7.35  --   --  8.72  --  8.82  --  8.92   HEMOGLOBIN 10.9* 10.6* 10.4*  --   --  11.1*  --  11.2*  --  12.4   HEMATOCRIT 34.3 33.1* 31.2*  --   --  33.5*  --  32.9*  --  37.2   PLATELETS 217 210 202  --   --  209  --  227  --  234   NEUTROS ABS  --  4.23 3.75  --   --  4.75  --  4.18  --  5.20   IMMATURE GRANS (ABS)  --  0.12* 0.06*  --   --  0.07*  --  0.10*  --  0.07*   LYMPHS ABS  --  2.83 2.45  --   --  2.71  --  3.41*  --  2.50   MONOS ABS  --  0.90 0.74  --   --  0.91*  --  0.84  --  0.87   EOS ABS   --  0.25 0.25  --   --  0.19  --  0.18  --  0.13   .5* 108.5* 106.1*  --   --  105.0*  --  104.4*  --  104.5*   SED RATE  --   --   --   --   --   --   --  19  --   --    CRP  --   --   --   --   --   --   --  0.59*  --   --    LACTATE  --   --  2.5* 3.7* 5.7* 5.3* 5.9* 8.7*   < >  --    PROTIME  --   --   --   --   --   --   --  13.4  --   --     < > = values in this interval not displayed.         Lab 09/20/24  0529 09/19/24 0501 09/18/24  1443 09/18/24  0501 09/17/24  2120 09/17/24  0715 09/17/24  0002   SODIUM 137 139 138 139  --  141  --    POTASSIUM 4.0 3.6 3.3*  3.3* 3.4* 3.7 3.0*  --    CHLORIDE 102 101 98 102  --  99  --    CO2 25.0 27.0 24.0 26.0  --  29.0  --    ANION GAP 10.0 11.0 16.0* 11.0  --  13.0  --    BUN 2* 3* 3* 4*  --  3*  --    CREATININE 0.48* 0.58 0.48* 0.42*  --  0.46*  --    EGFR 114.8 109.7 114.8 118.6  --  116.0  --    GLUCOSE 88 82 133* 85  --  91  --    CALCIUM 7.8* 7.7* 8.0* 7.8*  --  8.3*  --    MAGNESIUM 2.0 2.3 3.6* 1.5*  --  1.7  --    PHOSPHORUS 2.6  --   --  2.9  --  3.3 3.2         Lab 09/19/24 0501 09/18/24 0501 09/16/24 1956 09/16/24  1118   TOTAL PROTEIN 5.7* 5.5* 6.4 6.6   ALBUMIN 3.0* 2.9* 3.3* 3.5   GLOBULIN 2.7 2.6 3.1 3.1   ALT (SGPT) 78* 89* 109* 116*   AST (SGOT) 213* 240* 295* 309*   BILIRUBIN 0.8 1.0 0.7 1.1   ALK PHOS 699* 714* 799* 855*   AMYLASE  --   --  13*  --    LIPASE  --   --  12*  --          Lab 09/16/24 1956   PROTIME 13.4   INR 0.98                 Brief Urine Lab Results  (Last result in the past 365 days)        Color   Clarity   Blood   Leuk Est   Nitrite   Protein   CREAT   Urine HCG        09/16/24 2020 Yellow   Clear   Negative   Negative   Negative   Negative                 Microbiology Results (last 10 days)       Procedure Component Value - Date/Time    Clostridioides difficile Toxin - Stool, Per Rectum [[id number]]  (Normal) Collected: 09/17/24 1714    Lab Status: Final result Specimen: Stool from Per Rectum Updated: 09/17/24  1804    Narrative:      The following orders were created for panel order Clostridioides difficile Toxin - Stool, Per Rectum.  Procedure                               Abnormality         Status                     ---------                               -----------         ------                     Clostridioides difficile...[[id number]]  Normal              Final result                 Please view results for these tests on the individual orders.    Clostridioides difficile Toxin, PCR - Stool, Per Rectum [[id number]]  (Normal) Collected: 09/17/24 1714    Lab Status: Final result Specimen: Stool from Per Rectum Updated: 09/17/24 1804     Toxigenic C. difficile by PCR Negative    Narrative:      The result indicates the absence of toxigenic C. difficile from stool specimen.             Hospital Course:     Minnie Bang is a 49 y.o. female with pmh of lipidemia, hypothyroidism and chronic diarrhea for the past 8 months.  She was sent here by her GI doctor when she was found to have low potassium levels on BMP.  She reports that her diarrhea started progressively almost a year ago but has gotten very bad over the past 8 months.  She states that she has up to 20 bowel movements per day including throughout the night.  She had a colonoscopy a few weeks ago which was normal per the patient.  She has also had an unremarkable MRCP which was done for elevated aminotransferases.  Seen in the gastroenterology office on 9/13, at that time fecal fat was negative and urine 5-HIAA were ordered but not yet turned into lab.  VIP levels were in process.  She told me she has a history of heavy drinking (6 beers daily for several years) but has cut down several years ago and started consuming 6 whiskey shots 3 times a week on the weekends.  However she has completely stopped consuming alcohol for the past 6 months due to the diarrhea.  She denies having had history of pancreatitis in the past.  However on the office visit with  "GI in August she reported drinking 2 shots of liquor daily.     She came in and was found to have a potassium was low was 2.2 secondary to the diarrhea.  Potassium was adequately replaced and went up to 4.0 on the day of discharge.  She received as needed Imodium, colestipol and PPI.  She also received IV fluids.  She will follow-up with her gastroenterologist who had already started workup.  She also has a pending referral to a specialist in [facility].  She was advised to quit alcohol as it may be one of the culprits of her diarrhea.    Other chronic problems included hypertension and hypothyroidism which were managed with her home meds.      Physical Exam on Discharge:  /82 (BP Location: Left arm, Patient Position: Lying)   Pulse 60   Temp 97.6 °F (36.4 °C) (Oral)   Resp 18   Ht 172.7 cm (68\")   Wt 80.7 kg (178 lb)   LMP  (LMP Unknown)   SpO2 97%   BMI 27.06 kg/m²     Physical Exam    GEN: Awake, alert, interactive, in NAD, obese  HEENT: Atraumatic, EOMI  Lungs: CTAB, no wheezing/rales/rhonchi  Heart: RRR, +S1/s2, no rub  ABD: soft, mildly tender to palpation, BS increased  Extremities: atraumatic, no cyanosis, no edema  Skin: no rashes or lesions  Neuro: AAOx3, no focal deficits  Psych: tearful      Condition on Discharge: Stable    Discharge Disposition:  Home or Self Care    Discharge Medications:     Discharge Medications        New Medications        Instructions Start Date   bismuth subsalicylate 262 MG chewable tablet  Commonly known as: Pepto-Bismol   524 mg, Oral, 4 Times Daily Before Meals & Nightly      loperamide 2 MG capsule  Commonly known as: IMODIUM  Replaces: loperamide 2 MG tablet   4 mg, Oral, 4 Times Daily PRN             Continue These Medications        Instructions Start Date   atorvastatin 10 MG tablet  Commonly known as: LIPITOR   10 mg, Oral, Daily      carvedilol 25 MG tablet  Commonly known as: COREG   25 mg, Oral, 2 Times Daily      colestipol 1 g " tablet  Commonly known as: COLESTID   1 g, Oral, 2 Times Daily      levothyroxine 175 MCG tablet  Commonly known as: SYNTHROID, LEVOTHROID   175 mcg, Oral, Daily      nitroglycerin 0.4 MG SL tablet  Commonly known as: NITROSTAT   0.4 mg, Sublingual, Every 5 Minutes PRN, Take no more than 3 doses in 15 minutes.      pantoprazole 40 MG EC tablet  Commonly known as: PROTONIX   40 mg, Oral, 2 Times Daily      topiramate 50 MG tablet  Commonly known as: TOPAMAX   50 mg, Oral, 2 Times Daily             Stop These Medications      loperamide 2 MG tablet  Commonly known as: Imodium A-D  Replaced by: loperamide 2 MG capsule              Discharge Diet:   Dietary Orders (From admission, onward)       Start     Ordered    09/17/24 1137  Diet: Regular/House; Fluid Consistency: Thin (IDDSI 0)  Diet Effective Now        References:    Diet Order [facility]   Question Answer Comment   Diets: Regular/House    Fluid Consistency: Thin (IDDSI 0)        09/17/24 1136                      Activity at Discharge: ad buck    Follow-up Appointments:   - following with PCP and gastroenterologist    Test Results Pending at Discharge: none     Electronically signed by Mery Varner MD, 09/20/24, 10:18 CDT.    Time: 34 minutes.

## 2024-09-20 NOTE — PLAN OF CARE
Goal Outcome Evaluation:  Plan of Care Reviewed With: patient      Progress: no change     Pt A&O x4. C/o pain this shift; see MAR. IV fluids infusing per order. Voiding per BRP; up ad buck. Episodes of diarrhea continue; prn imodium given. VSS. Safety maintained.

## 2024-09-22 LAB
QT INTERVAL: 418 MS
QTC INTERVAL: 497 MS

## 2024-10-01 ENCOUNTER — TELEPHONE (OUTPATIENT)
Dept: GASTROENTEROLOGY | Facility: CLINIC | Age: 49
End: 2024-10-01

## 2024-10-01 ENCOUNTER — HOSPITAL ENCOUNTER (INPATIENT)
Facility: HOSPITAL | Age: 49
LOS: 1 days | Discharge: HOME | DRG: 645 | End: 2024-10-04
Attending: STUDENT IN AN ORGANIZED HEALTH CARE EDUCATION/TRAINING PROGRAM | Admitting: STUDENT IN AN ORGANIZED HEALTH CARE EDUCATION/TRAINING PROGRAM
Payer: COMMERCIAL

## 2024-10-01 ENCOUNTER — OFFICE VISIT (OUTPATIENT)
Dept: GASTROENTEROLOGY | Facility: CLINIC | Age: 49
End: 2024-10-01
Payer: COMMERCIAL

## 2024-10-01 ENCOUNTER — APPOINTMENT (OUTPATIENT)
Dept: CT IMAGING | Facility: HOSPITAL | Age: 49
DRG: 645 | End: 2024-10-01
Payer: COMMERCIAL

## 2024-10-01 VITALS
TEMPERATURE: 97.8 F | HEART RATE: 101 BPM | WEIGHT: 170 LBS | OXYGEN SATURATION: 99 % | SYSTOLIC BLOOD PRESSURE: 150 MMHG | DIASTOLIC BLOOD PRESSURE: 100 MMHG | HEIGHT: 69 IN | BODY MASS INDEX: 25.18 KG/M2

## 2024-10-01 DIAGNOSIS — R19.7 DIARRHEA, UNSPECIFIED TYPE: ICD-10-CM

## 2024-10-01 DIAGNOSIS — E03.9 ACQUIRED HYPOTHYROIDISM: ICD-10-CM

## 2024-10-01 DIAGNOSIS — R79.89 ELEVATED TSH: ICD-10-CM

## 2024-10-01 DIAGNOSIS — F10.10 ALCOHOL ABUSE, DAILY USE: ICD-10-CM

## 2024-10-01 DIAGNOSIS — E87.6 HYPOKALEMIA: ICD-10-CM

## 2024-10-01 DIAGNOSIS — F10.10 ETOH ABUSE: ICD-10-CM

## 2024-10-01 DIAGNOSIS — E03.9 HYPOTHYROIDISM (ACQUIRED): Primary | ICD-10-CM

## 2024-10-01 DIAGNOSIS — R10.11 RIGHT UPPER QUADRANT ABDOMINAL PAIN: ICD-10-CM

## 2024-10-01 DIAGNOSIS — R79.89 ABNORMAL LFTS: ICD-10-CM

## 2024-10-01 DIAGNOSIS — R11.2 INTRACTABLE VOMITING WITH NAUSEA: ICD-10-CM

## 2024-10-01 DIAGNOSIS — R11.2 NAUSEA AND VOMITING, UNSPECIFIED VOMITING TYPE: Primary | ICD-10-CM

## 2024-10-01 DIAGNOSIS — R11.2 NAUSEA AND VOMITING, UNSPECIFIED VOMITING TYPE: ICD-10-CM

## 2024-10-01 DIAGNOSIS — K52.9 CHRONIC DIARRHEA: ICD-10-CM

## 2024-10-01 DIAGNOSIS — R11.14 BILIOUS VOMITING WITH NAUSEA: ICD-10-CM

## 2024-10-01 DIAGNOSIS — K76.0 HEPATIC STEATOSIS: ICD-10-CM

## 2024-10-01 LAB
ALBUMIN SERPL-MCNC: 3.6 G/DL (ref 3.5–5.2)
ALBUMIN/GLOB SERPL: 1.1 G/DL
ALP SERPL-CCNC: 740 U/L (ref 39–117)
ALT SERPL W P-5'-P-CCNC: 58 U/L (ref 1–33)
AMMONIA BLD-SCNC: 40 UMOL/L (ref 11–51)
ANION GAP SERPL CALCULATED.3IONS-SCNC: 22 MMOL/L (ref 5–15)
AST SERPL-CCNC: 150 U/L (ref 1–32)
BASOPHILS # BLD AUTO: 0.12 10*3/MM3 (ref 0–0.2)
BASOPHILS NFR BLD AUTO: 1.3 % (ref 0–1.5)
BILIRUB SERPL-MCNC: 0.8 MG/DL (ref 0–1.2)
BILIRUB UR QL STRIP: NEGATIVE
BUN SERPL-MCNC: 5 MG/DL (ref 6–20)
BUN/CREAT SERPL: 7.8 (ref 7–25)
CALCIUM SPEC-SCNC: 9.2 MG/DL (ref 8.6–10.5)
CHLORIDE SERPL-SCNC: 92 MMOL/L (ref 98–107)
CLARITY UR: CLEAR
CO2 SERPL-SCNC: 25 MMOL/L (ref 22–29)
COLOR UR: YELLOW
CREAT SERPL-MCNC: 0.64 MG/DL (ref 0.57–1)
DEPRECATED RDW RBC AUTO: 57.7 FL (ref 37–54)
EGFRCR SERPLBLD CKD-EPI 2021: 107.1 ML/MIN/1.73
EOSINOPHIL # BLD AUTO: 0.19 10*3/MM3 (ref 0–0.4)
EOSINOPHIL NFR BLD AUTO: 2.1 % (ref 0.3–6.2)
ERYTHROCYTE [DISTWIDTH] IN BLOOD BY AUTOMATED COUNT: 15.5 % (ref 12.3–15.4)
ETHANOL UR QL: 0.04 %
GLOBULIN UR ELPH-MCNC: 3.3 GM/DL
GLUCOSE SERPL-MCNC: 90 MG/DL (ref 65–99)
GLUCOSE UR STRIP-MCNC: NEGATIVE MG/DL
HCT VFR BLD AUTO: 36.2 % (ref 34–46.6)
HGB BLD-MCNC: 12.2 G/DL (ref 12–15.9)
HGB UR QL STRIP.AUTO: NEGATIVE
IMM GRANULOCYTES # BLD AUTO: 0.09 10*3/MM3 (ref 0–0.05)
IMM GRANULOCYTES NFR BLD AUTO: 1 % (ref 0–0.5)
KETONES UR QL STRIP: NEGATIVE
LEUKOCYTE ESTERASE UR QL STRIP.AUTO: NEGATIVE
LIPASE SERPL-CCNC: 12 U/L (ref 13–60)
LYMPHOCYTES # BLD AUTO: 2.92 10*3/MM3 (ref 0.7–3.1)
LYMPHOCYTES NFR BLD AUTO: 31.6 % (ref 19.6–45.3)
MAGNESIUM SERPL-MCNC: 1.8 MG/DL (ref 1.6–2.6)
MCH RBC QN AUTO: 34.8 PG (ref 26.6–33)
MCHC RBC AUTO-ENTMCNC: 33.7 G/DL (ref 31.5–35.7)
MCV RBC AUTO: 103.1 FL (ref 79–97)
MONOCYTES # BLD AUTO: 0.91 10*3/MM3 (ref 0.1–0.9)
MONOCYTES NFR BLD AUTO: 9.8 % (ref 5–12)
NEUTROPHILS NFR BLD AUTO: 5.02 10*3/MM3 (ref 1.7–7)
NEUTROPHILS NFR BLD AUTO: 54.2 % (ref 42.7–76)
NITRITE UR QL STRIP: NEGATIVE
NRBC BLD AUTO-RTO: 0 /100 WBC (ref 0–0.2)
PH UR STRIP.AUTO: 6 [PH] (ref 5–8)
PLATELET # BLD AUTO: 325 10*3/MM3 (ref 140–450)
PMV BLD AUTO: 10.8 FL (ref 6–12)
POTASSIUM SERPL-SCNC: 2.9 MMOL/L (ref 3.5–5.2)
PROT SERPL-MCNC: 6.9 G/DL (ref 6–8.5)
PROT UR QL STRIP: NEGATIVE
RBC # BLD AUTO: 3.51 10*6/MM3 (ref 3.77–5.28)
SODIUM SERPL-SCNC: 139 MMOL/L (ref 136–145)
SP GR UR STRIP: >1.03 (ref 1–1.03)
T4 FREE SERPL-MCNC: 0.21 NG/DL (ref 0.93–1.7)
TROPONIN T SERPL HS-MCNC: 11 NG/L
TSH SERPL DL<=0.05 MIU/L-ACNC: 142.8 UIU/ML (ref 0.27–4.2)
UROBILINOGEN UR QL STRIP: ABNORMAL
WBC NRBC COR # BLD AUTO: 9.25 10*3/MM3 (ref 3.4–10.8)

## 2024-10-01 PROCEDURE — 25510000001 IOPAMIDOL 61 % SOLUTION: Performed by: NURSE PRACTITIONER

## 2024-10-01 PROCEDURE — 25810000003 SODIUM CHLORIDE 0.9 % SOLUTION: Performed by: NURSE PRACTITIONER

## 2024-10-01 PROCEDURE — 84481 FREE ASSAY (FT-3): CPT | Performed by: NURSE PRACTITIONER

## 2024-10-01 PROCEDURE — 74177 CT ABD & PELVIS W/CONTRAST: CPT

## 2024-10-01 PROCEDURE — 3077F SYST BP >= 140 MM HG: CPT | Performed by: NURSE PRACTITIONER

## 2024-10-01 PROCEDURE — 1159F MED LIST DOCD IN RCRD: CPT | Performed by: NURSE PRACTITIONER

## 2024-10-01 PROCEDURE — 25010000002 THIAMINE HCL 200 MG/2ML SOLUTION: Performed by: STUDENT IN AN ORGANIZED HEALTH CARE EDUCATION/TRAINING PROGRAM

## 2024-10-01 PROCEDURE — 93010 ELECTROCARDIOGRAM REPORT: CPT | Performed by: INTERNAL MEDICINE

## 2024-10-01 PROCEDURE — G0378 HOSPITAL OBSERVATION PER HR: HCPCS

## 2024-10-01 PROCEDURE — 82077 ASSAY SPEC XCP UR&BREATH IA: CPT | Performed by: NURSE PRACTITIONER

## 2024-10-01 PROCEDURE — 84439 ASSAY OF FREE THYROXINE: CPT | Performed by: NURSE PRACTITIONER

## 2024-10-01 PROCEDURE — 99213 OFFICE O/P EST LOW 20 MIN: CPT | Performed by: NURSE PRACTITIONER

## 2024-10-01 PROCEDURE — 80050 GENERAL HEALTH PANEL: CPT | Performed by: NURSE PRACTITIONER

## 2024-10-01 PROCEDURE — 25810000003 SODIUM CHLORIDE 0.9 % SOLUTION

## 2024-10-01 PROCEDURE — 93005 ELECTROCARDIOGRAM TRACING: CPT | Performed by: NURSE PRACTITIONER

## 2024-10-01 PROCEDURE — 25010000002 METOCLOPRAMIDE PER 10 MG: Performed by: NURSE PRACTITIONER

## 2024-10-01 PROCEDURE — 83735 ASSAY OF MAGNESIUM: CPT | Performed by: NURSE PRACTITIONER

## 2024-10-01 PROCEDURE — 25010000002 ONDANSETRON PER 1 MG: Performed by: NURSE PRACTITIONER

## 2024-10-01 PROCEDURE — 82140 ASSAY OF AMMONIA: CPT | Performed by: NURSE PRACTITIONER

## 2024-10-01 PROCEDURE — 25010000002 HYDROMORPHONE PER 4 MG: Performed by: FAMILY MEDICINE

## 2024-10-01 PROCEDURE — 83690 ASSAY OF LIPASE: CPT | Performed by: NURSE PRACTITIONER

## 2024-10-01 PROCEDURE — 1160F RVW MEDS BY RX/DR IN RCRD: CPT | Performed by: NURSE PRACTITIONER

## 2024-10-01 PROCEDURE — 3080F DIAST BP >= 90 MM HG: CPT | Performed by: NURSE PRACTITIONER

## 2024-10-01 PROCEDURE — 99285 EMERGENCY DEPT VISIT HI MDM: CPT

## 2024-10-01 PROCEDURE — 84484 ASSAY OF TROPONIN QUANT: CPT | Performed by: NURSE PRACTITIONER

## 2024-10-01 PROCEDURE — 25010000002 HYDROMORPHONE PER 4 MG: Performed by: NURSE PRACTITIONER

## 2024-10-01 PROCEDURE — 25010000002 POTASSIUM CHLORIDE 10 MEQ/100ML SOLUTION: Performed by: NURSE PRACTITIONER

## 2024-10-01 PROCEDURE — 81003 URINALYSIS AUTO W/O SCOPE: CPT | Performed by: NURSE PRACTITIONER

## 2024-10-01 RX ORDER — ACETAMINOPHEN 650 MG/1
650 SUPPOSITORY RECTAL EVERY 4 HOURS PRN
Status: DISCONTINUED | OUTPATIENT
Start: 2024-10-01 | End: 2024-10-04 | Stop reason: HOSPADM

## 2024-10-01 RX ORDER — HYDROMORPHONE HYDROCHLORIDE 1 MG/ML
0.5 INJECTION, SOLUTION INTRAMUSCULAR; INTRAVENOUS; SUBCUTANEOUS ONCE
Status: DISCONTINUED | OUTPATIENT
Start: 2024-10-01 | End: 2024-10-01

## 2024-10-01 RX ORDER — SODIUM CHLORIDE 9 MG/ML
75 INJECTION, SOLUTION INTRAVENOUS CONTINUOUS
Status: DISCONTINUED | OUTPATIENT
Start: 2024-10-01 | End: 2024-10-04 | Stop reason: HOSPADM

## 2024-10-01 RX ORDER — BISACODYL 5 MG/1
5 TABLET, DELAYED RELEASE ORAL DAILY PRN
Status: DISCONTINUED | OUTPATIENT
Start: 2024-10-01 | End: 2024-10-04 | Stop reason: HOSPADM

## 2024-10-01 RX ORDER — DICYCLOMINE HYDROCHLORIDE 10 MG/ML
10 INJECTION INTRAMUSCULAR EVERY 6 HOURS PRN
Status: DISCONTINUED | OUTPATIENT
Start: 2024-10-01 | End: 2024-10-02

## 2024-10-01 RX ORDER — METOCLOPRAMIDE HYDROCHLORIDE 5 MG/ML
10 INJECTION INTRAMUSCULAR; INTRAVENOUS ONCE
Status: COMPLETED | OUTPATIENT
Start: 2024-10-01 | End: 2024-10-01

## 2024-10-01 RX ORDER — LABETALOL HYDROCHLORIDE 5 MG/ML
10 INJECTION, SOLUTION INTRAVENOUS EVERY 6 HOURS PRN
Status: DISCONTINUED | OUTPATIENT
Start: 2024-10-01 | End: 2024-10-04 | Stop reason: HOSPADM

## 2024-10-01 RX ORDER — POTASSIUM CHLORIDE 1500 MG/1
40 TABLET, EXTENDED RELEASE ORAL ONCE
Status: COMPLETED | OUTPATIENT
Start: 2024-10-01 | End: 2024-10-01

## 2024-10-01 RX ORDER — TOPIRAMATE 25 MG/1
50 TABLET, FILM COATED ORAL 2 TIMES DAILY
Status: DISCONTINUED | OUTPATIENT
Start: 2024-10-01 | End: 2024-10-04 | Stop reason: HOSPADM

## 2024-10-01 RX ORDER — LEVOTHYROXINE SODIUM ANHYDROUS 100 UG/5ML
25 INJECTION, POWDER, LYOPHILIZED, FOR SOLUTION INTRAVENOUS
Status: DISCONTINUED | OUTPATIENT
Start: 2024-10-01 | End: 2024-10-01

## 2024-10-01 RX ORDER — SODIUM CHLORIDE 0.9 % (FLUSH) 0.9 %
10 SYRINGE (ML) INJECTION AS NEEDED
Status: DISCONTINUED | OUTPATIENT
Start: 2024-10-01 | End: 2024-10-04 | Stop reason: HOSPADM

## 2024-10-01 RX ORDER — LORAZEPAM 1 MG/1
2 TABLET ORAL
Status: DISCONTINUED | OUTPATIENT
Start: 2024-10-01 | End: 2024-10-04 | Stop reason: HOSPADM

## 2024-10-01 RX ORDER — SODIUM CHLORIDE 9 MG/ML
40 INJECTION, SOLUTION INTRAVENOUS AS NEEDED
Status: DISCONTINUED | OUTPATIENT
Start: 2024-10-01 | End: 2024-10-04 | Stop reason: HOSPADM

## 2024-10-01 RX ORDER — FOLIC ACID 1 MG/1
1 TABLET ORAL DAILY
Status: DISCONTINUED | OUTPATIENT
Start: 2024-10-01 | End: 2024-10-04 | Stop reason: HOSPADM

## 2024-10-01 RX ORDER — AMOXICILLIN 250 MG
2 CAPSULE ORAL 2 TIMES DAILY PRN
Status: DISCONTINUED | OUTPATIENT
Start: 2024-10-01 | End: 2024-10-04 | Stop reason: HOSPADM

## 2024-10-01 RX ORDER — LORAZEPAM 2 MG/ML
1 INJECTION INTRAMUSCULAR
Status: DISCONTINUED | OUTPATIENT
Start: 2024-10-01 | End: 2024-10-04 | Stop reason: HOSPADM

## 2024-10-01 RX ORDER — POTASSIUM CHLORIDE 7.45 MG/ML
10 INJECTION INTRAVENOUS ONCE
Status: COMPLETED | OUTPATIENT
Start: 2024-10-01 | End: 2024-10-01

## 2024-10-01 RX ORDER — METOPROLOL TARTRATE 1 MG/ML
5 INJECTION, SOLUTION INTRAVENOUS EVERY 8 HOURS
Status: DISCONTINUED | OUTPATIENT
Start: 2024-10-01 | End: 2024-10-01

## 2024-10-01 RX ORDER — BISMUTH SUBSALICYLATE 262 MG/1
524 TABLET, CHEWABLE ORAL
Status: DISCONTINUED | OUTPATIENT
Start: 2024-10-01 | End: 2024-10-04 | Stop reason: HOSPADM

## 2024-10-01 RX ORDER — METOPROLOL TARTRATE 1 MG/ML
5 INJECTION, SOLUTION INTRAVENOUS EVERY 8 HOURS
Status: DISCONTINUED | OUTPATIENT
Start: 2024-10-01 | End: 2024-10-03

## 2024-10-01 RX ORDER — THIAMINE HYDROCHLORIDE 100 MG/ML
200 INJECTION, SOLUTION INTRAMUSCULAR; INTRAVENOUS EVERY 8 HOURS SCHEDULED
Status: DISCONTINUED | OUTPATIENT
Start: 2024-10-01 | End: 2024-10-04 | Stop reason: HOSPADM

## 2024-10-01 RX ORDER — PROMETHAZINE HYDROCHLORIDE 25 MG/1
25 TABLET ORAL 3 TIMES DAILY PRN
COMMUNITY

## 2024-10-01 RX ORDER — NIFEDIPINE 30 MG/1
30 TABLET, EXTENDED RELEASE ORAL
Status: DISCONTINUED | OUTPATIENT
Start: 2024-10-01 | End: 2024-10-03

## 2024-10-01 RX ORDER — LEVOTHYROXINE SODIUM ANHYDROUS 100 UG/5ML
75 INJECTION, POWDER, LYOPHILIZED, FOR SOLUTION INTRAVENOUS
Status: DISCONTINUED | OUTPATIENT
Start: 2024-10-01 | End: 2024-10-04 | Stop reason: HOSPADM

## 2024-10-01 RX ORDER — LORAZEPAM 2 MG/ML
2 INJECTION INTRAMUSCULAR
Status: DISCONTINUED | OUTPATIENT
Start: 2024-10-01 | End: 2024-10-04 | Stop reason: HOSPADM

## 2024-10-01 RX ORDER — LORAZEPAM 1 MG/1
1 TABLET ORAL
Status: DISCONTINUED | OUTPATIENT
Start: 2024-10-01 | End: 2024-10-04 | Stop reason: HOSPADM

## 2024-10-01 RX ORDER — LOPERAMIDE HCL 2 MG
4 CAPSULE ORAL 4 TIMES DAILY PRN
Status: DISCONTINUED | OUTPATIENT
Start: 2024-10-01 | End: 2024-10-04 | Stop reason: HOSPADM

## 2024-10-01 RX ORDER — ONDANSETRON 2 MG/ML
4 INJECTION INTRAMUSCULAR; INTRAVENOUS ONCE
Status: COMPLETED | OUTPATIENT
Start: 2024-10-01 | End: 2024-10-01

## 2024-10-01 RX ORDER — HYDROMORPHONE HYDROCHLORIDE 1 MG/ML
0.5 INJECTION, SOLUTION INTRAMUSCULAR; INTRAVENOUS; SUBCUTANEOUS EVERY 4 HOURS PRN
Status: COMPLETED | OUTPATIENT
Start: 2024-10-01 | End: 2024-10-02

## 2024-10-01 RX ORDER — FAMOTIDINE 10 MG/ML
20 INJECTION, SOLUTION INTRAVENOUS ONCE
Status: COMPLETED | OUTPATIENT
Start: 2024-10-01 | End: 2024-10-01

## 2024-10-01 RX ORDER — CHOLESTYRAMINE LIGHT 4 G/5.7G
1 POWDER, FOR SUSPENSION ORAL DAILY
Status: DISCONTINUED | OUTPATIENT
Start: 2024-10-01 | End: 2024-10-02

## 2024-10-01 RX ORDER — BISACODYL 10 MG
10 SUPPOSITORY, RECTAL RECTAL DAILY PRN
Status: DISCONTINUED | OUTPATIENT
Start: 2024-10-01 | End: 2024-10-04 | Stop reason: HOSPADM

## 2024-10-01 RX ORDER — ONDANSETRON 4 MG/1
4 TABLET, ORALLY DISINTEGRATING ORAL EVERY 6 HOURS PRN
Status: DISCONTINUED | OUTPATIENT
Start: 2024-10-01 | End: 2024-10-04 | Stop reason: HOSPADM

## 2024-10-01 RX ORDER — SODIUM CHLORIDE 0.9 % (FLUSH) 0.9 %
10 SYRINGE (ML) INJECTION EVERY 12 HOURS SCHEDULED
Status: DISCONTINUED | OUTPATIENT
Start: 2024-10-01 | End: 2024-10-04 | Stop reason: HOSPADM

## 2024-10-01 RX ORDER — POLYETHYLENE GLYCOL 3350 17 G/17G
17 POWDER, FOR SOLUTION ORAL DAILY PRN
Status: DISCONTINUED | OUTPATIENT
Start: 2024-10-01 | End: 2024-10-04 | Stop reason: HOSPADM

## 2024-10-01 RX ORDER — IOPAMIDOL 612 MG/ML
100 INJECTION, SOLUTION INTRAVASCULAR
Status: COMPLETED | OUTPATIENT
Start: 2024-10-01 | End: 2024-10-01

## 2024-10-01 RX ORDER — ACETAMINOPHEN 160 MG/5ML
650 SOLUTION ORAL EVERY 4 HOURS PRN
Status: DISCONTINUED | OUTPATIENT
Start: 2024-10-01 | End: 2024-10-04 | Stop reason: HOSPADM

## 2024-10-01 RX ORDER — HYDRALAZINE HYDROCHLORIDE 20 MG/ML
5 INJECTION INTRAMUSCULAR; INTRAVENOUS EVERY 4 HOURS PRN
Status: DISCONTINUED | OUTPATIENT
Start: 2024-10-01 | End: 2024-10-04 | Stop reason: HOSPADM

## 2024-10-01 RX ORDER — PANTOPRAZOLE SODIUM 40 MG/10ML
40 INJECTION, POWDER, LYOPHILIZED, FOR SOLUTION INTRAVENOUS EVERY 12 HOURS SCHEDULED
Status: DISCONTINUED | OUTPATIENT
Start: 2024-10-01 | End: 2024-10-04 | Stop reason: HOSPADM

## 2024-10-01 RX ORDER — ONDANSETRON 2 MG/ML
4 INJECTION INTRAMUSCULAR; INTRAVENOUS EVERY 6 HOURS PRN
Status: DISCONTINUED | OUTPATIENT
Start: 2024-10-01 | End: 2024-10-04 | Stop reason: HOSPADM

## 2024-10-01 RX ORDER — ACETAMINOPHEN 325 MG/1
650 TABLET ORAL EVERY 4 HOURS PRN
Status: DISCONTINUED | OUTPATIENT
Start: 2024-10-01 | End: 2024-10-04 | Stop reason: HOSPADM

## 2024-10-01 RX ADMIN — CHOLESTYRAMINE 4 G: 4 POWDER, FOR SUSPENSION ORAL at 19:20

## 2024-10-01 RX ADMIN — BISMUTH SUBSALICYLATE 524 MG: 262 TABLET ORAL at 19:19

## 2024-10-01 RX ADMIN — LOPERAMIDE HYDROCHLORIDE 4 MG: 2 CAPSULE ORAL at 20:42

## 2024-10-01 RX ADMIN — HYDROMORPHONE HYDROCHLORIDE 0.5 MG: 1 INJECTION, SOLUTION INTRAMUSCULAR; INTRAVENOUS; SUBCUTANEOUS at 20:46

## 2024-10-01 RX ADMIN — NIFEDIPINE 30 MG: 30 TABLET, FILM COATED, EXTENDED RELEASE ORAL at 21:59

## 2024-10-01 RX ADMIN — HYDROMORPHONE HYDROCHLORIDE 0.5 MG: 1 INJECTION, SOLUTION INTRAMUSCULAR; INTRAVENOUS; SUBCUTANEOUS at 12:52

## 2024-10-01 RX ADMIN — IOPAMIDOL 100 ML: 612 INJECTION, SOLUTION INTRAVENOUS at 14:13

## 2024-10-01 RX ADMIN — FOLIC ACID 1 MG: 1 TABLET ORAL at 20:09

## 2024-10-01 RX ADMIN — METOPROLOL TARTRATE 5 MG: 5 INJECTION INTRAVENOUS at 18:11

## 2024-10-01 RX ADMIN — POTASSIUM CHLORIDE 10 MEQ: 10 INJECTION, SOLUTION INTRAVENOUS at 14:25

## 2024-10-01 RX ADMIN — TOPIRAMATE 50 MG: 25 TABLET, FILM COATED ORAL at 20:11

## 2024-10-01 RX ADMIN — ONDANSETRON 4 MG: 2 INJECTION INTRAMUSCULAR; INTRAVENOUS at 12:52

## 2024-10-01 RX ADMIN — BISMUTH SUBSALICYLATE 524 MG: 262 TABLET ORAL at 21:59

## 2024-10-01 RX ADMIN — METOCLOPRAMIDE 10 MG: 5 INJECTION, SOLUTION INTRAMUSCULAR; INTRAVENOUS at 16:20

## 2024-10-01 RX ADMIN — FAMOTIDINE 20 MG: 10 INJECTION INTRAVENOUS at 12:52

## 2024-10-01 RX ADMIN — THIAMINE HYDROCHLORIDE 200 MG: 100 INJECTION, SOLUTION INTRAMUSCULAR; INTRAVENOUS at 18:11

## 2024-10-01 RX ADMIN — SODIUM CHLORIDE 100 ML/HR: 9 INJECTION, SOLUTION INTRAVENOUS at 18:17

## 2024-10-01 RX ADMIN — POTASSIUM CHLORIDE 40 MEQ: 1500 TABLET, EXTENDED RELEASE ORAL at 16:24

## 2024-10-01 RX ADMIN — SODIUM CHLORIDE 500 ML: 9 INJECTION, SOLUTION INTRAVENOUS at 12:48

## 2024-10-01 RX ADMIN — PANTOPRAZOLE SODIUM 40 MG: 40 INJECTION, POWDER, FOR SOLUTION INTRAVENOUS at 20:09

## 2024-10-01 NOTE — ED PROVIDER NOTES
Subjective   History of Present Illness  Patient is a 49-year-old female who presents to the ER with complaints of chronic abdominal pain and chronic diarrhea.  She states that she has had right upper quadrant and epigastric pain for the past several months.  She reports chronic nausea, vomiting, and diarrhea.  Patient does use alcohol on a regular basis.  She denies any black or bloody stools.  She had a recent admission to this facility for similar symptoms.      Per review patient was admitted:  9/16/2024 -  9/20/2024    Patient has history of lipidemia, hypothyroidism and chronic diarrhea for the past 8 months.  She was sent here by her GI doctor when she was found to have low potassium levels on BMP.  She reports that her diarrhea started progressively almost a year ago but has become worse over the past 8 months.  Patient has had 20 bowel movements per day including throughout the night.  She had a colonoscopy a few weeks ago which was normal per the patient.  She has also had an unremarkable MRCP which was done for elevated aminotransferases.  Seen in the gastroenterology office on 9/13, at that time fecal fat was negative and urine 5-HIAA were ordered but not yet turned into lab.  VIP levels were in process.  She told me she has a history of heavy drinking (6 beers daily for several years) but has cut down several years ago and started consuming 6 whiskey shots 3 times a week on the weekends.  However she has completely stopped consuming alcohol for the past 6 months due to the diarrhea.  No hx of pancreatitis. However on the office visit with GI in August she reported drinking 2 shots of liquor daily.     Discharge dx:    · Steatosis of liver, advanced    · ETOH abuse    · Nonspecific colitis    · Primary hypertension    · Pulmonary emphysema     Hospital Course:      Pt had a colonoscopy a few weeks ago which was normal per the patient.  She has also had an unremarkable MRCP which was done for elevated  aminotransferases.  Seen in the gastroenterology office on 9/13, at that time fecal fat was negative and urine 5-HIAA were ordered but not yet turned into lab.  VIP levels were in process.  She told me she has a history of heavy drinking (6 beers daily for several years) but has cut down several years ago and started consuming 6 whiskey shots 3 times a week on the weekends.  However she has completely stopped consuming alcohol for the past 6 months due to the diarrhea.  She denies having had history of pancreatitis in the past.  However on the office visit with GI in August she reported drinking 2 shots of liquor daily.      She came in and was found to have a potassium was low was 2.2 secondary to the diarrhea.  Potassium was adequately replaced and went up to 4.0 on the day of discharge.  She received as needed Imodium, colestipol and PPI.  She also received IV fluids.  She will follow-up with her gastroenterologist who had already started workup.  She also has a pending referral to a specialist in [facility].  She was advised to quit alcohol as it may be one of the culprits of her diarrhea.     Other chronic problems included hypertension and hypothyroidism which were managed with her home meds.     (See note for complete details)    As described above, patient returns with nausea, vomiting, diarrhea, and abdominal pain all described as chronic in nature.  She has had no recorded fevers.  Past medical history significant for anxiety, arthritis, asthma, bicuspid aortic valve, COPD, carpal tunnel syndrome, depression, diastolic dysfunction, emphysema, GERD, hyperlipidemia, hypertension, hypothyroidism, nonocclusive coronary disease, NSTEMI, pneumonia, emphysema, shingles, vocal cord polyp        Review of Systems   Constitutional:  Negative for fever.   HENT: Negative.  Negative for congestion.    Respiratory: Negative.  Negative for cough and shortness of breath.    Cardiovascular: Negative.  Negative for  chest pain.   Gastrointestinal:  Positive for abdominal pain, diarrhea, nausea and vomiting. Negative for constipation.   Genitourinary: Negative.  Negative for dysuria.   Skin: Negative.  Negative for rash.   Neurological:  Positive for weakness.   All other systems reviewed and are negative.      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    CTS (carpal tunnel syndrome) 2019    Dental disease     Depression     Diastolic dysfunction 2022    Dizziness     Emphysema of lung 2020    GERD (gastroesophageal reflux disease)     Headache     Hyperlipidemia     Hypertension     Hypothyroidism     Mixed simple and mucopurulent chronic bronchitis 04/18/2023    Non-occlusive coronary artery disease 05/04/2023    NSTEMI (non-ST elevated myocardial infarction) (HCC)     Pneumonia 2022    Pulmonary emphysema 04/21/2023    Shingles 2018    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       No Known Allergies    Past Surgical History:   Procedure Laterality Date    APPENDECTOMY      CARDIAC CATHETERIZATION N/A 11/04/2017    Procedure: Coronary angiography;  Surgeon: Luis Colvin MD;  Location: [facility] CATH INVASIVE LOCATION;  Service:     CARDIAC CATHETERIZATION N/A 05/31/2023    Procedure: Left Heart Cath;  Surgeon: Steffen Rossi MD;  Location: [facility] CATH INVASIVE LOCATION;  Service: Cardiology;  Laterality: N/A;    CARPAL TUNNEL RELEASE  2019    COLONOSCOPY N/A 8/2/2024    Procedure: COLONOSCOPY WITH ANESTHESIA;  Surgeon: Marty Browning MD;  Location: [facility] ENDOSCOPY;  Service: Gastroenterology;  Laterality: N/A;  pre op pancolitis    ENDOSCOPY N/A 7/8/2024    Procedure: ESOPHAGOGASTRODUODENOSCOPY WITH ANESTHESIA;  Surgeon: Gordy Wang MD;  Location: [facility] ENDOSCOPY;  Service: Gastroenterology;  Laterality: N/A;  pre: dysphagia.   post:  esophagus dilated.   no PCP    HYSTERECTOMY      PANENDOSCOPY N/A 2024    Procedure: DIRECT LARYNGOSCOPY WITH BIOPSY OF VOCAL CORD POLYPS WITH POSSIBLE TRACHEOSTOMY;  Surgeon: Mendez Padilla MD;  Location:  [facility] OR;  Service: ENT;  Laterality: N/A;    AK RT/LT HEART CATHETERS N/A 2017    Procedure: Percutaneous Coronary Intervention;  Surgeon: Luis Colvin MD;  Location:  [facility] CATH INVASIVE LOCATION;  Service: Cardiovascular    THYROID SURGERY      TOTAL THYROIDECTOMY      TRACHEOSTOMY N/A 2024    Procedure: POSSIBLE TRACHEOSTOMY;  Surgeon: Mendez Padilla MD;  Location:  [facility] OR;  Service: ENT;  Laterality: N/A;       Family History   Problem Relation Age of Onset    Asthma Mother     Cancer Mother     Emphysema Mother     Colon cancer Father     Cancer Father     Heart failure Father     Hypertension Father        Social History     Socioeconomic History    Marital status:    Tobacco Use    Smoking status: Former     Current packs/day: 0.00     Average packs/day: 0.7 packs/day for 45.9 years (30.0 ttl pk-yrs)     Types: Cigarettes     Start date: 1991     Quit date: 3/1/2024     Years since quittin.5     Passive exposure: Past    Smokeless tobacco: Never    Tobacco comments:     Less than 1/2 pack a day    Vaping Use    Vaping status: Never Used   Substance and Sexual Activity    Alcohol use: Not Currently     Comment: nothing to drink in 3 1/2 weeks    Drug use: No    Sexual activity: Not Currently     Partners: Male     Birth control/protection: Hysterectomy           Objective   Physical Exam  Vitals and nursing note reviewed.   Constitutional:       Appearance: She is well-developed.   HENT:      Head: Normocephalic and atraumatic.      Nose: Nose normal.   Eyes:      Conjunctiva/sclera: Conjunctivae normal.      Pupils: Pupils are equal, round, and reactive to light.   Cardiovascular:      Rate and Rhythm: Normal rate and regular rhythm.      Heart sounds:  Normal heart sounds.   Pulmonary:      Effort: Pulmonary effort is normal.      Breath sounds: Normal breath sounds.   Abdominal:      General: Bowel sounds are normal.      Palpations: Abdomen is soft.      Tenderness: There is abdominal tenderness in the right upper quadrant and epigastric area.   Musculoskeletal:         General: Normal range of motion.      Cervical back: Normal range of motion and neck supple.   Skin:     General: Skin is warm and dry.      Capillary Refill: Capillary refill takes less than 2 seconds.   Neurological:      Mental Status: She is alert and oriented to person, place, and time.   Psychiatric:         Behavior: Behavior normal.         Procedures           ED Course                                             Medical Decision Making  Patient is a 49-year-old female who presents to the ER with complaints of chronic abdominal pain and chronic diarrhea.  She states that she has had right upper quadrant and epigastric pain for the past several months.  She reports chronic nausea, vomiting, and diarrhea.  Patient does use alcohol on a regular basis.  She denies any black or bloody stools.  She had a recent admission to this facility for similar symptoms.      Per review patient was admitted:  9/16/2024 -  9/20/2024    Patient has history of lipidemia, hypothyroidism and chronic diarrhea for the past 8 months.  She was sent here by her GI doctor when she was found to have low potassium levels on BMP.  She reports that her diarrhea started progressively almost a year ago but has become worse over the past 8 months.  Patient has had 20 bowel movements per day including throughout the night.  She had a colonoscopy a few weeks ago which was normal per the patient.  She has also had an unremarkable MRCP which was done for elevated aminotransferases.  Seen in the gastroenterology office on 9/13, at that time fecal fat was negative and urine 5-HIAA were ordered but not yet turned into lab.  VIP  levels were in process.  She told me she has a history of heavy drinking (6 beers daily for several years) but has cut down several years ago and started consuming 6 whiskey shots 3 times a week on the weekends.  However she has completely stopped consuming alcohol for the past 6 months due to the diarrhea.  No hx of pancreatitis. However on the office visit with GI in August she reported drinking 2 shots of liquor daily.     Discharge dx:    · Steatosis of liver, advanced    · ETOH abuse    · Nonspecific colitis    · Primary hypertension    · Pulmonary emphysema     Hospital Course:      Pt had a colonoscopy a few weeks ago which was normal per the patient.  She has also had an unremarkable MRCP which was done for elevated aminotransferases.  Seen in the gastroenterology office on 9/13, at that time fecal fat was negative and urine 5-HIAA were ordered but not yet turned into lab.  VIP levels were in process.  She told me she has a history of heavy drinking (6 beers daily for several years) but has cut down several years ago and started consuming 6 whiskey shots 3 times a week on the weekends.  However she has completely stopped consuming alcohol for the past 6 months due to the diarrhea.  She denies having had history of pancreatitis in the past.  However on the office visit with GI in August she reported drinking 2 shots of liquor daily.      She came in and was found to have a potassium was low was 2.2 secondary to the diarrhea.  Potassium was adequately replaced and went up to 4.0 on the day of discharge.  She received as needed Imodium, colestipol and PPI.  She also received IV fluids.  She will follow-up with her gastroenterologist who had already started workup.  She also has a pending referral to a specialist in [facility].  She was advised to quit alcohol as it may be one of the culprits of her diarrhea.     Other chronic problems included hypertension and hypothyroidism which were managed with her  home meds.     (See note for complete details)    As described above, patient returns with nausea, vomiting, diarrhea, and abdominal pain all described as chronic in nature.  She has had no recorded fevers.  Past medical history significant for anxiety, arthritis, asthma, bicuspid aortic valve, COPD, carpal tunnel syndrome, depression, diastolic dysfunction, emphysema, GERD, hyperlipidemia, hypertension, hypothyroidism, nonocclusive coronary disease, NSTEMI, pneumonia, emphysema, shingles, vocal cord polyp    Differential diagnosis: Dehydration, colitis, alcohol abuse, electrolyte abnormality, and other    Labs Reviewed  COMPREHENSIVE METABOLIC PANEL - Abnormal; Notable for the following components:     BUN                           5 (*)                  Potassium                     2.9 (*)                Chloride                      92 (*)                 ALT (SGPT)                    58 (*)                 AST (SGOT)                    150 (*)                Alkaline Phosphatase          740 (*)                Anion Gap                     22.0 (*)            All other components within normal limits         Narrative: GFR Normal >60                  Chronic Kidney Disease <60                  Kidney Failure <15                    LIPASE - Abnormal; Notable for the following components:     Lipase                        12 (*)              All other components within normal limits  CBC WITH AUTO DIFFERENTIAL - Abnormal; Notable for the following components:     RBC                           3.51 (*)               MCV                           103.1 (*)               MCH                           34.8 (*)               RDW                           15.5 (*)               RDW-SD                        57.7 (*)               Immature Grans %              1.0 (*)                Monocytes, Absolute           0.91 (*)               Immature Grans, Absolute      0.09 (*)            All other components within normal  limits  TSH - Abnormal; Notable for the following components:     TSH                           142.800 (*)            All other components within normal limits  T4, FREE - Abnormal; Notable for the following components:     Free T4                       0.21 (*)            All other components within normal limits  MAGNESIUM - Normal  SINGLE HS TROPONIN T - Normal         Narrative: High Sensitive Troponin T Reference Range:                  <14.0 ng/L- Negative Female for AMI                  <22.0 ng/L- Negative Male for AMI                  >=14 - Abnormal Female indicating possible myocardial injury.                  >=22 - Abnormal Male indicating possible myocardial injury.                   Clinicians would have to utilize clinical acumen, EKG, Troponin, and serial changes to determine if it is an Acute Myocardial Infarction or myocardial injury due to an underlying chronic condition.                                       ETHANOL         Narrative: Not for legal purposes. Chain of Custody not followed.   URINALYSIS W/ MICROSCOPIC IF INDICATED (NO CULTURE)  T3, FREE  AMMONIA  CBC AND DIFFERENTIAL     CT Abdomen Pelvis With Contrast   Final Result    1. No acute abnormality of the abdomen or pelvis to account for    patient's symptoms.    2. The colon is decompressed with fatty infiltration of the wall of the    entire colon similar to the previous study. These are nonspecific    finding and may be seen in chronic inflammatory disease of the bowel. No    finding to suggest acute colitis.    3. A significant hepatic steatosis as in the previous study. Persistent    moderate hepatomegaly.    4. Bilateral femoral head osteonecrosis is similar to the previous    study.                                                                          This report was signed and finalized on 10/1/2024 2:25 PM by Dr. Karmen Teague MD.       Patient presents with chronic diarrhea as well as nausea with vomiting.  She had  hypokalemia which has been replaced in the emergency department.  She has an elevated TSH.  Spoke with ENT who was willing to assist with adjustment of medication if hospitalist wanted to consult and/or would see the patient as an outpatient to adjust medications.  Patient continued to have nausea with vomiting in the ER as well as abdominal discomfort. Dr. Eng evaluated and feels patient needs to be re-admitted. Discussed with hospitalist who is agreeable for admission. See their note for details.    Problems Addressed:  Alcohol abuse, daily use: chronic illness or injury  Chronic diarrhea: chronic illness or injury  Elevated TSH: acute illness or injury  Hepatic steatosis: chronic illness or injury  Hypokalemia: acute illness or injury     Details: Acute on chronic  Nausea and vomiting, unspecified vomiting type: chronic illness or injury    Amount and/or Complexity of Data Reviewed  Labs: ordered. Decision-making details documented in ED Course.  Radiology: ordered. Decision-making details documented in ED Course.  ECG/medicine tests: ordered. Decision-making details documented in ED Course.  Discussion of management or test interpretation with external provider(s): Spoke with ent  Spoke with hospitalist    Risk  Prescription drug management.  Decision regarding hospitalization.        Final diagnoses:   Nausea and vomiting, unspecified vomiting type   Elevated TSH   Hypokalemia   Chronic diarrhea   Alcohol abuse, daily use   Hepatic steatosis   Intractable vomiting with nausea       ED Disposition  ED Disposition       ED Disposition   Decision to Admit    Condition   --    Comment   Level of Care: Med/Surg [1]   Diagnosis: Intractable vomiting with nausea [[id number]]   Admitting Physician: LARRY BENDER [[id number]]   Attending Physician: LARRY BENDER [[id number]]                 No follow-up provider specified.       Medication List      No changes were made to your prescriptions during this visit.             May Headley, APRN  10/01/24 [id number]

## 2024-10-01 NOTE — PROGRESS NOTES
"[facility] GASTROENTEROLOGY - OFFICE NOTE    10/1/2024    Minnie Bang   1975    Primary Physician: Aidee Padilla APRN    Chief Complaint   Patient presents with    Follow-up     Recent hospital stay   N/v  Diarrhea  Abdominal pain   Abn lft's       HISTORY OF PRESENT ILLNESS:     Minnie Bang is a 49 y.o. female presents for follow up diarrhea, lower abdominal pain, n/v, and abnormal lft's. After last office visit she was hospitalized here at [facility] 9-17-24 with hypokalemia, lactic acid, and hypomagnesemia. Discharged on 9-20-24.  She felt better for about 2 days.  Today she says the n/v has gotten worse 2 days after recent hospital discharge. Vomiting daily. Also ronak pain that radiates to the back. The diarrhea has not changed. Having at least 15 stools, sometimes just squirts. No fever. No sign of active gi bleeding. No fever.       She has had extensive GI workup thus far.   I have referred her to [facility] for second opinion. Awaiting appointment date.         Was seen at [facility] last Friday, got phenergan shot and was told TSH was very high. I do not have those lab results.       Last drink of alcohol was 1 mo ago.     ==================================================================  Office visit  9-13-24  HPI   Minnie Bang is a 49 y.o. female presents for follow up diarrhea, n/v, abn lft's, and fatty liver.         She still continues with diarrhea.  Several stools per day. The amount can be only \" squirts\" at times. Has felt feverish at times.  Taking colestid twice daily. Taking imodium prn. Neither of these help. Trying to drink plenty of liquids. Also with nausea and dry heaves. Has gained 5 # since last office visit but reports \" not eating'.   She had been having lower abdominal cramping but now having ruq cramping. No fever. No rectal bleeding.            In regards to abnormal LFTs, she has had extensive workup.  Transaminases and alkaline phosphatase are " elevated.  Alkaline phosphatase is markedly elevated.  AMA negative.  MRCP unremarkable.        Discussed case with Dr. Wang.   Last drink of alcohol was 1 mo ago.                  MRI abdomen w wo contrast mrcp (09/10/2024 15:33)   CT Enterography Abdomen Pelvis w Contrast (09/05/2024 15:55)            ===========================================================  Office visit   8-30-24  HPI  Minnie Bang is a 49 y.o. female presents for f/u diarrhea, n/v, abn lft's, and fatty liver.            Diarrhea   This started the first of the year.  Awaiting stool for fecal fat and urine 5 HIAA ( has not been turned in to the lab). VIP is in process. Gastrin level mildly elevated but she is on a ppi.  She is scheduled to have a CT abdomen and pelvis with enterography next week.  Having stools 10 - 20 per day. Having nocturnal diarrhea. Has associated lower abdominal cramping and intermittent n/v.  Weight is fluctuating.  No recent antibiotics. Last drink of alcohol was 10 days ago.  No rectal bleeding.  No fever.           Abn lft's   Liver serologies unremarkable.         Colonoscopy (08/02/2024 11:11) path negative for colitis.   No family history of IBD.          ==========================================================  Office visit 8-19-24 HPI  Minnie Bang is a 49 y.o. female presents for f/u diarrhea.  I saw her in office 6-11-24. Since then she has had egd, colonoscopy, and was admitted here at [facility] 8/2024.  Dr. Browning with GI did see her and complete colonoscopy. Biopsies negative for microscopic colitis. Cdiff toxin was positive but antigen was negative. Stool for EPEC was positive as well. She had been treated with vancomycin outpt for cdiff toxin pos ( antigen was negative at that time as well). She was treated with dificd inpatient. She was seen by ID as well. She reports that her symptoms are not improving.           She continues to have diarrhea daily with intermittent nausea/vomiting.  "Having 10 + stools per day, watery.  She is not eating much. Has diarrhea even when does not eat. Having nocturnal diarrhea as well.  Has generalized abdomen pain as well. She reports that her entire body hurts. Questran did not help.  Imodium does not help. Her weight is actually up some. Has had lower extremity swelling. No fever. No sign of gi bleeding.            She has decreased etoh intake. Now 1-2 shots of liquor per day.   She also tried to decrease glutein unsure if has helped.         She does have abnormal lft's. This is chronic. Ultrasound showed fatty liver. Recent ama negative. She does drink etoh.               8-4-24 gliadin deamidated peptide Ab, IgA was strongly positive.  AMA negative 7/2024.   CT Abdomen Pelvis Without Contrast (08/01/2024 11:50)   US Liver (07/29/2024 18:04)   CT Angiogram Abdomen Pelvis (07/29/2024 13:48)               Colonoscopy (08/02/2024 11:11) by Dr. Browning.   Tissue Pathology Exam (08/02/2024 11:41)      Upper GI Endoscopy (07/08/2024 11:20)      [facility]   ==========================================================  Office visit  6-11-24 HPI Minnie Bang is a 49 y.o. female presents  with reflux and dysphagia.            GERD  Heartburn occurs daily. This has been for at least 1 year. Occurs at night and when bending over.  Has been on protonix daily for at least 1 year.  Has also used tums that helps for a short period. Tried otc zantac that did not help.  Drinking milk helps. Has noted upper abdominal pain x 4 -5 days, pressure.   No tobacco, quit 3/2024. Caffeine: \" drink mountain dew all day\".  No certain food triggers. No weight loss.            Dysphagia   This started 1 year. This was with pills and solid foods that has resolved.          Diarrhea  This started 6 mo ago. Having 6 stools per day. Can't remember last solid stool. Tried imodium that does not help. Fiber supplement did not help.  No new meds.  Recent antibiotic. No rectal bleeding. "            She was seen by [facility] 2023. EGD and colonoscopy was recommended but not done. Has history of chronic gerd and colon polyp.         No history of egd or colonoscopy.   Father had colon cancer in his 60's.     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    CTS (carpal tunnel syndrome) 2019    Dental disease     Depression     Diastolic dysfunction 2022    Dizziness     Emphysema of lung 2020    GERD (gastroesophageal reflux disease)     Headache     Hyperlipidemia     Hypertension     Hypothyroidism     Mixed simple and mucopurulent chronic bronchitis 04/18/2023    Non-occlusive coronary artery disease 05/04/2023    NSTEMI (non-ST elevated myocardial infarction) (HCC)     Pneumonia 2022    Pulmonary emphysema 04/21/2023    Shingles 2018    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       Past Surgical History:   Procedure Laterality Date    APPENDECTOMY      CARDIAC CATHETERIZATION N/A 11/04/2017    Procedure: Coronary angiography;  Surgeon: Luis Colvin MD;  Location: [facility] CATH INVASIVE LOCATION;  Service:     CARDIAC CATHETERIZATION N/A 05/31/2023    Procedure: Left Heart Cath;  Surgeon: Steffen Rossi MD;  Location: [facility] CATH INVASIVE LOCATION;  Service: Cardiology;  Laterality: N/A;    CARPAL TUNNEL RELEASE  2019    COLONOSCOPY N/A 8/2/2024    Procedure: COLONOSCOPY WITH ANESTHESIA;  Surgeon: Marty Browning MD;  Location: [facility] ENDOSCOPY;  Service: Gastroenterology;  Laterality: N/A;  pre op pancolitis    ENDOSCOPY N/A 7/8/2024    Procedure: ESOPHAGOGASTRODUODENOSCOPY WITH ANESTHESIA;  Surgeon: Gordy Wang MD;  Location: [facility] ENDOSCOPY;  Service: Gastroenterology;  Laterality: N/A;  pre: dysphagia.   post: esophagus dilated.   no PCP    HYSTERECTOMY      PANENDOSCOPY N/A 5/30/2024    Procedure: DIRECT  LARYNGOSCOPY WITH BIOPSY OF VOCAL CORD POLYPS WITH POSSIBLE TRACHEOSTOMY;  Surgeon: Mendez Padilla MD;  Location:  [facility] OR;  Service: ENT;  Laterality: N/A;    OK RT/LT HEART CATHETERS N/A 11/04/2017    Procedure: Percutaneous Coronary Intervention;  Surgeon: Luis Colvin MD;  Location:  [facility] CATH INVASIVE LOCATION;  Service: Cardiovascular    THYROID SURGERY      TOTAL THYROIDECTOMY      TRACHEOSTOMY N/A 5/30/2024    Procedure: POSSIBLE TRACHEOSTOMY;  Surgeon: Mendez Padilla MD;  Location:  [facility] OR;  Service: ENT;  Laterality: N/A;       Outpatient Medications Marked as Taking for the 10/1/24 encounter (Office Visit) with Rakel Padilla APRN   Medication Sig Dispense Refill    atorvastatin (LIPITOR) 10 MG tablet Take 1 tablet by mouth Daily. 30 tablet 0    bismuth subsalicylate (Pepto-Bismol) 262 MG chewable tablet Chew 2 tablets 4 (Four) Times a Day Before Meals & at Bedtime. 120 tablet 0    carvedilol (COREG) 25 MG tablet Take 1 tablet by mouth 2 (Two) Times a Day. 60 tablet 0    colestipol (COLESTID) 1 g tablet Take 1 tablet by mouth 2 (Two) Times a Day. 60 tablet 2    levothyroxine (SYNTHROID, LEVOTHROID) 175 MCG tablet Take 1 tablet by mouth Daily.      loperamide (IMODIUM) 2 MG capsule Take 2 capsules by mouth 4 (Four) Times a Day As Needed for Diarrhea. 120 capsule 0    nitroglycerin (NITROSTAT) 0.4 MG SL tablet Place 1 tablet under the tongue Every 5 (Five) Minutes As Needed for Chest Pain. Take no more than 3 doses in 15 minutes.      pantoprazole (PROTONIX) 40 MG EC tablet Take 1 tablet by mouth 2 (Two) Times a Day. 60 tablet 0    POTASSIUM PO Take  by mouth 2 (Two) Times a Day.      topiramate (TOPAMAX) 50 MG tablet Take 1 tablet by mouth 2 (Two) Times a Day. 60 tablet 5       No Known Allergies    Social History     Socioeconomic History    Marital status:    Tobacco Use    Smoking status: Former     Current packs/day: 0.00     Average packs/day: 0.7 packs/day for 45.9 years  "(30.0 ttl pk-yrs)     Types: Cigarettes     Start date: 1991     Quit date: 3/1/2024     Years since quittin.5     Passive exposure: Past    Smokeless tobacco: Never    Tobacco comments:     Less than 1/2 pack a day    Vaping Use    Vaping status: Never Used   Substance and Sexual Activity    Alcohol use: Not Currently     Comment: nothing to drink in 3 1/2 weeks    Drug use: No    Sexual activity: Not Currently     Partners: Male     Birth control/protection: Hysterectomy       Family History   Problem Relation Age of Onset    Asthma Mother     Cancer Mother     Emphysema Mother     Colon cancer Father     Cancer Father     Heart failure Father     Hypertension Father        Review of Systems   Constitutional:  Negative for chills, fever and unexpected weight change.   Respiratory:  Negative for shortness of breath.    Cardiovascular:  Negative for chest pain.   Gastrointestinal:  Negative for abdominal distention, anal bleeding, blood in stool and constipation.        Vitals:    10/01/24 1005   BP: 150/100   Pulse: 101   Temp: 97.8 °F (36.6 °C)   SpO2: 99%   Weight: 77.1 kg (170 lb)   Height: 175.3 cm (69\")      Body mass index is 25.1 kg/m².    Physical Exam  Vitals reviewed.   Constitutional:       General: She is not in acute distress.  Cardiovascular:      Rate and Rhythm: Normal rate and regular rhythm.      Heart sounds: Normal heart sounds.   Pulmonary:      Effort: Pulmonary effort is normal.      Breath sounds: Normal breath sounds.   Abdominal:      General: Bowel sounds are normal. There is no distension.      Palpations: Abdomen is soft.      Tenderness: There is no abdominal tenderness.   Skin:     General: Skin is warm and dry.   Neurological:      Mental Status: She is alert.         Results for orders placed or performed during the hospital encounter of 24   Clostridioides difficile Toxin, PCR - Stool, Per Rectum    Specimen: Per Rectum; Stool   Result Value Ref Range    Toxigenic C. " difficile by PCR Negative Negative   Comprehensive Metabolic Panel    Specimen: Arm, Left; Blood   Result Value Ref Range    Glucose 112 (H) 65 - 99 mg/dL    BUN 3 (L) 6 - 20 mg/dL    Creatinine 0.51 (L) 0.57 - 1.00 mg/dL    Sodium 137 136 - 145 mmol/L    Potassium 2.2 (C) 3.5 - 5.2 mmol/L    Chloride 91 (L) 98 - 107 mmol/L    CO2 24.0 22.0 - 29.0 mmol/L    Calcium 8.0 (L) 8.6 - 10.5 mg/dL    Total Protein 6.4 6.0 - 8.5 g/dL    Albumin 3.3 (L) 3.5 - 5.2 g/dL    ALT (SGPT) 109 (H) 1 - 33 U/L    AST (SGOT) 295 (H) 1 - 32 U/L    Alkaline Phosphatase 799 (H) 39 - 117 U/L    Total Bilirubin 0.7 0.0 - 1.2 mg/dL    Globulin 3.1 gm/dL    A/G Ratio 1.1 g/dL    BUN/Creatinine Ratio 5.9 (L) 7.0 - 25.0    Anion Gap 22.0 (H) 5.0 - 15.0 mmol/L    eGFR 113.2 >60.0 mL/min/1.73   Protime-INR    Specimen: Arm, Left; Blood   Result Value Ref Range    Protime 13.4 11.8 - 14.8 Seconds    INR 0.98 0.91 - 1.09   Lipase    Specimen: Arm, Left; Blood   Result Value Ref Range    Lipase 12 (L) 13 - 60 U/L   Urinalysis With Culture If Indicated - Urine, Clean Catch    Specimen: Urine, Clean Catch   Result Value Ref Range    Color, UA Yellow Yellow, Straw    Appearance, UA Clear Clear    pH, UA 7.0 5.0 - 8.0    Specific Gravity, UA 1.019 1.005 - 1.030    Glucose, UA Negative Negative    Ketones, UA Negative Negative    Bilirubin, UA Negative Negative    Blood, UA Negative Negative    Protein, UA Negative Negative    Leuk Esterase, UA Negative Negative    Nitrite, UA Negative Negative    Urobilinogen, UA 0.2 E.U./dL 0.2 - 1.0 E.U./dL   Magnesium    Specimen: Arm, Left; Blood   Result Value Ref Range    Magnesium 1.4 (L) 1.6 - 2.6 mg/dL   Sedimentation Rate    Specimen: Arm, Left; Blood   Result Value Ref Range    Sed Rate 19 0 - 30 mm/hr   C-reactive Protein    Specimen: Arm, Left; Blood   Result Value Ref Range    C-Reactive Protein 0.59 (H) 0.00 - 0.50 mg/dL   Lactic Acid, Plasma    Specimen: Arm, Left; Blood   Result Value Ref Range     Lactate 8.7 (C) 0.5 - 2.0 mmol/L   Amylase    Specimen: Arm, Left; Blood   Result Value Ref Range    Amylase 13 (L) 28 - 100 U/L   CBC Auto Differential    Specimen: Arm, Left; Blood   Result Value Ref Range    WBC 8.82 3.40 - 10.80 10*3/mm3    RBC 3.15 (L) 3.77 - 5.28 10*6/mm3    Hemoglobin 11.2 (L) 12.0 - 15.9 g/dL    Hematocrit 32.9 (L) 34.0 - 46.6 %    .4 (H) 79.0 - 97.0 fL    MCH 35.6 (H) 26.6 - 33.0 pg    MCHC 34.0 31.5 - 35.7 g/dL    RDW 15.9 (H) 12.3 - 15.4 %    RDW-SD 61.2 (H) 37.0 - 54.0 fl    MPV 11.1 6.0 - 12.0 fL    Platelets 227 140 - 450 10*3/mm3    Neutrophil % 47.5 42.7 - 76.0 %    Lymphocyte % 38.7 19.6 - 45.3 %    Monocyte % 9.5 5.0 - 12.0 %    Eosinophil % 2.0 0.3 - 6.2 %    Basophil % 1.2 0.0 - 1.5 %    Immature Grans % 1.1 (H) 0.0 - 0.5 %    Neutrophils, Absolute 4.18 1.70 - 7.00 10*3/mm3    Lymphocytes, Absolute 3.41 (H) 0.70 - 3.10 10*3/mm3    Monocytes, Absolute 0.84 0.10 - 0.90 10*3/mm3    Eosinophils, Absolute 0.18 0.00 - 0.40 10*3/mm3    Basophils, Absolute 0.11 0.00 - 0.20 10*3/mm3    Immature Grans, Absolute 0.10 (H) 0.00 - 0.05 10*3/mm3    nRBC 0.0 0.0 - 0.2 /100 WBC   STAT Lactic Acid, Reflex    Specimen: Blood   Result Value Ref Range    Lactate 5.9 (C) 0.5 - 2.0 mmol/L   Basic Metabolic Panel    Specimen: Arm, Left; Blood   Result Value Ref Range    Glucose 91 65 - 99 mg/dL    BUN 3 (L) 6 - 20 mg/dL    Creatinine 0.46 (L) 0.57 - 1.00 mg/dL    Sodium 141 136 - 145 mmol/L    Potassium 3.0 (L) 3.5 - 5.2 mmol/L    Chloride 99 98 - 107 mmol/L    CO2 29.0 22.0 - 29.0 mmol/L    Calcium 8.3 (L) 8.6 - 10.5 mg/dL    BUN/Creatinine Ratio 6.5 (L) 7.0 - 25.0    Anion Gap 13.0 5.0 - 15.0 mmol/L    eGFR 116.0 >60.0 mL/min/1.73   Magnesium    Specimen: Arm, Left; Blood   Result Value Ref Range    Magnesium 1.7 1.6 - 2.6 mg/dL   Phosphorus    Specimen: Blood   Result Value Ref Range    Phosphorus 3.2 2.5 - 4.5 mg/dL   Phosphorus    Specimen: Arm, Left; Blood   Result Value Ref Range     Phosphorus 3.3 2.5 - 4.5 mg/dL   CBC Auto Differential    Specimen: Arm, Left; Blood   Result Value Ref Range    WBC 8.72 3.40 - 10.80 10*3/mm3    RBC 3.19 (L) 3.77 - 5.28 10*6/mm3    Hemoglobin 11.1 (L) 12.0 - 15.9 g/dL    Hematocrit 33.5 (L) 34.0 - 46.6 %    .0 (H) 79.0 - 97.0 fL    MCH 34.8 (H) 26.6 - 33.0 pg    MCHC 33.1 31.5 - 35.7 g/dL    RDW 15.7 (H) 12.3 - 15.4 %    RDW-SD 60.4 (H) 37.0 - 54.0 fl    MPV 11.1 6.0 - 12.0 fL    Platelets 209 140 - 450 10*3/mm3    Neutrophil % 54.5 42.7 - 76.0 %    Lymphocyte % 31.1 19.6 - 45.3 %    Monocyte % 10.4 5.0 - 12.0 %    Eosinophil % 2.2 0.3 - 6.2 %    Basophil % 1.0 0.0 - 1.5 %    Immature Grans % 0.8 (H) 0.0 - 0.5 %    Neutrophils, Absolute 4.75 1.70 - 7.00 10*3/mm3    Lymphocytes, Absolute 2.71 0.70 - 3.10 10*3/mm3    Monocytes, Absolute 0.91 (H) 0.10 - 0.90 10*3/mm3    Eosinophils, Absolute 0.19 0.00 - 0.40 10*3/mm3    Basophils, Absolute 0.09 0.00 - 0.20 10*3/mm3    Immature Grans, Absolute 0.07 (H) 0.00 - 0.05 10*3/mm3    nRBC 0.0 0.0 - 0.2 /100 WBC   STAT Lactic Acid, Reflex    Specimen: Arm, Left; Blood   Result Value Ref Range    Lactate 5.3 (C) 0.5 - 2.0 mmol/L   STAT Lactic Acid, Reflex    Specimen: Blood   Result Value Ref Range    Lactate 5.7 (C) 0.5 - 2.0 mmol/L   STAT Lactic Acid, Reflex    Specimen: Blood   Result Value Ref Range    Lactate 3.7 (C) 0.5 - 2.0 mmol/L   Potassium    Specimen: Blood   Result Value Ref Range    Potassium 3.7 3.5 - 5.2 mmol/L   STAT Lactic Acid, Reflex    Specimen: Blood   Result Value Ref Range    Lactate 2.5 (C) 0.5 - 2.0 mmol/L   Magnesium    Specimen: Blood   Result Value Ref Range    Magnesium 1.5 (L) 1.6 - 2.6 mg/dL   Phosphorus    Specimen: Blood   Result Value Ref Range    Phosphorus 2.9 2.5 - 4.5 mg/dL   Comprehensive Metabolic Panel    Specimen: Blood   Result Value Ref Range    Glucose 85 65 - 99 mg/dL    BUN 4 (L) 6 - 20 mg/dL    Creatinine 0.42 (L) 0.57 - 1.00 mg/dL    Sodium 139 136 - 145 mmol/L     Potassium 3.4 (L) 3.5 - 5.2 mmol/L    Chloride 102 98 - 107 mmol/L    CO2 26.0 22.0 - 29.0 mmol/L    Calcium 7.8 (L) 8.6 - 10.5 mg/dL    Total Protein 5.5 (L) 6.0 - 8.5 g/dL    Albumin 2.9 (L) 3.5 - 5.2 g/dL    ALT (SGPT) 89 (H) 1 - 33 U/L    AST (SGOT) 240 (H) 1 - 32 U/L    Alkaline Phosphatase 714 (H) 39 - 117 U/L    Total Bilirubin 1.0 0.0 - 1.2 mg/dL    Globulin 2.6 gm/dL    A/G Ratio 1.1 g/dL    BUN/Creatinine Ratio 9.5 7.0 - 25.0    Anion Gap 11.0 5.0 - 15.0 mmol/L    eGFR 118.6 >60.0 mL/min/1.73   CBC Auto Differential    Specimen: Blood   Result Value Ref Range    WBC 7.35 3.40 - 10.80 10*3/mm3    RBC 2.94 (L) 3.77 - 5.28 10*6/mm3    Hemoglobin 10.4 (L) 12.0 - 15.9 g/dL    Hematocrit 31.2 (L) 34.0 - 46.6 %    .1 (H) 79.0 - 97.0 fL    MCH 35.4 (H) 26.6 - 33.0 pg    MCHC 33.3 31.5 - 35.7 g/dL    RDW 15.7 (H) 12.3 - 15.4 %    RDW-SD 61.6 (H) 37.0 - 54.0 fl    MPV 11.1 6.0 - 12.0 fL    Platelets 202 140 - 450 10*3/mm3    Neutrophil % 51.0 42.7 - 76.0 %    Lymphocyte % 33.3 19.6 - 45.3 %    Monocyte % 10.1 5.0 - 12.0 %    Eosinophil % 3.4 0.3 - 6.2 %    Basophil % 1.4 0.0 - 1.5 %    Immature Grans % 0.8 (H) 0.0 - 0.5 %    Neutrophils, Absolute 3.75 1.70 - 7.00 10*3/mm3    Lymphocytes, Absolute 2.45 0.70 - 3.10 10*3/mm3    Monocytes, Absolute 0.74 0.10 - 0.90 10*3/mm3    Eosinophils, Absolute 0.25 0.00 - 0.40 10*3/mm3    Basophils, Absolute 0.10 0.00 - 0.20 10*3/mm3    Immature Grans, Absolute 0.06 (H) 0.00 - 0.05 10*3/mm3   Potassium    Specimen: Blood   Result Value Ref Range    Potassium 3.3 (L) 3.5 - 5.2 mmol/L   Magnesium    Specimen: Blood   Result Value Ref Range    Magnesium 3.6 (H) 1.6 - 2.6 mg/dL   Basic Metabolic Panel    Specimen: Blood   Result Value Ref Range    Glucose 133 (H) 65 - 99 mg/dL    BUN 3 (L) 6 - 20 mg/dL    Creatinine 0.48 (L) 0.57 - 1.00 mg/dL    Sodium 138 136 - 145 mmol/L    Potassium 3.3 (L) 3.5 - 5.2 mmol/L    Chloride 98 98 - 107 mmol/L    CO2 24.0 22.0 - 29.0 mmol/L     Calcium 8.0 (L) 8.6 - 10.5 mg/dL    BUN/Creatinine Ratio 6.3 (L) 7.0 - 25.0    Anion Gap 16.0 (H) 5.0 - 15.0 mmol/L    eGFR 114.8 >60.0 mL/min/1.73   Magnesium    Specimen: Blood   Result Value Ref Range    Magnesium 2.3 1.6 - 2.6 mg/dL   Comprehensive Metabolic Panel    Specimen: Blood   Result Value Ref Range    Glucose 82 65 - 99 mg/dL    BUN 3 (L) 6 - 20 mg/dL    Creatinine 0.58 0.57 - 1.00 mg/dL    Sodium 139 136 - 145 mmol/L    Potassium 3.6 3.5 - 5.2 mmol/L    Chloride 101 98 - 107 mmol/L    CO2 27.0 22.0 - 29.0 mmol/L    Calcium 7.7 (L) 8.6 - 10.5 mg/dL    Total Protein 5.7 (L) 6.0 - 8.5 g/dL    Albumin 3.0 (L) 3.5 - 5.2 g/dL    ALT (SGPT) 78 (H) 1 - 33 U/L    AST (SGOT) 213 (H) 1 - 32 U/L    Alkaline Phosphatase 699 (H) 39 - 117 U/L    Total Bilirubin 0.8 0.0 - 1.2 mg/dL    Globulin 2.7 gm/dL    A/G Ratio 1.1 g/dL    BUN/Creatinine Ratio 5.2 (L) 7.0 - 25.0    Anion Gap 11.0 5.0 - 15.0 mmol/L    eGFR 109.7 >60.0 mL/min/1.73   CBC Auto Differential    Specimen: Blood   Result Value Ref Range    WBC 8.45 3.40 - 10.80 10*3/mm3    RBC 3.05 (L) 3.77 - 5.28 10*6/mm3    Hemoglobin 10.6 (L) 12.0 - 15.9 g/dL    Hematocrit 33.1 (L) 34.0 - 46.6 %    .5 (H) 79.0 - 97.0 fL    MCH 34.8 (H) 26.6 - 33.0 pg    MCHC 32.0 31.5 - 35.7 g/dL    RDW 16.1 (H) 12.3 - 15.4 %    RDW-SD 64.3 (H) 37.0 - 54.0 fl    MPV 11.6 6.0 - 12.0 fL    Platelets 210 140 - 450 10*3/mm3    Neutrophil % 50.0 42.7 - 76.0 %    Lymphocyte % 33.5 19.6 - 45.3 %    Monocyte % 10.7 5.0 - 12.0 %    Eosinophil % 3.0 0.3 - 6.2 %    Basophil % 1.4 0.0 - 1.5 %    Immature Grans % 1.4 (H) 0.0 - 0.5 %    Neutrophils, Absolute 4.23 1.70 - 7.00 10*3/mm3    Lymphocytes, Absolute 2.83 0.70 - 3.10 10*3/mm3    Monocytes, Absolute 0.90 0.10 - 0.90 10*3/mm3    Eosinophils, Absolute 0.25 0.00 - 0.40 10*3/mm3    Basophils, Absolute 0.12 0.00 - 0.20 10*3/mm3    Immature Grans, Absolute 0.12 (H) 0.00 - 0.05 10*3/mm3   CBC (No Diff)    Specimen: Blood   Result Value Ref  Range    WBC 7.59 3.40 - 10.80 10*3/mm3    RBC 3.16 (L) 3.77 - 5.28 10*6/mm3    Hemoglobin 10.9 (L) 12.0 - 15.9 g/dL    Hematocrit 34.3 34.0 - 46.6 %    .5 (H) 79.0 - 97.0 fL    MCH 34.5 (H) 26.6 - 33.0 pg    MCHC 31.8 31.5 - 35.7 g/dL    RDW 16.0 (H) 12.3 - 15.4 %    RDW-SD 64.3 (H) 37.0 - 54.0 fl    MPV 11.1 6.0 - 12.0 fL    Platelets 217 140 - 450 10*3/mm3   Basic Metabolic Panel    Specimen: Blood   Result Value Ref Range    Glucose 88 65 - 99 mg/dL    BUN 2 (L) 6 - 20 mg/dL    Creatinine 0.48 (L) 0.57 - 1.00 mg/dL    Sodium 137 136 - 145 mmol/L    Potassium 4.0 3.5 - 5.2 mmol/L    Chloride 102 98 - 107 mmol/L    CO2 25.0 22.0 - 29.0 mmol/L    Calcium 7.8 (L) 8.6 - 10.5 mg/dL    BUN/Creatinine Ratio 4.2 (L) 7.0 - 25.0    Anion Gap 10.0 5.0 - 15.0 mmol/L    eGFR 114.8 >60.0 mL/min/1.73   Magnesium    Specimen: Blood   Result Value Ref Range    Magnesium 2.0 1.6 - 2.6 mg/dL   Phosphorus    Specimen: Blood   Result Value Ref Range    Phosphorus 2.6 2.5 - 4.5 mg/dL   ECG 12 Lead Electrolyte Imbalance   Result Value Ref Range    QT Interval 418 ms    QTC Interval 497 ms           ASSESSMENT AND PLAN    Assessment & Plan     Diagnoses and all orders for this visit:    1. Nausea and vomiting, unspecified vomiting type (Primary)    2. Right upper quadrant abdominal pain    3. Diarrhea, unspecified type    4. Abnormal LFTs      Today she reports that the n/v has gotten worse and last evening was having ruq pain. She continues with diarrhea. She feels that she is getting dehydrated again. Last admission her potassium was down to 2.2.  She has decided to got to the ED today. I agree.     In regards to diarrhea, there has been no change. She denies drinking alcohol over the last 1 month. I recommend continue to abstain from alcohol. Stool studies have been negative. I recommend she continue colestid daily.     In regards to abnormal lft's, I'm uncertain at this time as to what is causing this other than history  alcohol. I have made referral to [facility] for second opinion.          She has had extensive workup for all of the above. I have put in referral to [facility] gastroenterology for second opinion.  Await appointment date.             Addendum:  labs 9-27-24 alt 59, ast 114, alk phos 728, bili normal, hgb12, wbc normal, .         * Surgery not found *           No follow-ups on file.          There are no Patient Instructions on file for this visit.      Rakel Padilla, APRN

## 2024-10-01 NOTE — H&P
[facility] Medicine Services  HISTORY AND PHYSICAL    Date of Admission: 10/1/2024  Primary Care Physician: Aidee Padilla APRN    Subjective   Primary Historian: Patient    Chief Complaint: Nominal discomfort, lethargy and continuation of chronic nausea and vomiting.    Abdominal Pain  Associated symptoms include nausea and vomiting.     Minnie Bang is a 49-year-old female with a past medical history of COPD, hypertension, diastolic dysfunction, pneumonia, depression, anxiety, hyperlipidemia, NSTEMI, pulmonary emphysema, bicuspid aortic valve, hypothyroidism, and significant history for chronic dysphagia, nausea and vomiting followed by Dr. Wang patient has been able to tolerate almost any p.o. approximately 9 months with increasing concern over the last 3 months.  Patient has been treated with multiple different medications per BREANA Nicholson with Dr. Wang, most recently they have exhausted all efforts and had transferred patient to be seen in consultation with [facility] gastroenterology which is currently being scheduled.  Patient has no new complaints, chronic nausea and vomiting associated with diarrhea and dysphagia.  Patient additionally stated she is just been feeling more rundown and was concerned that she had something else wrong with her.    Patient was made aware that we will try to make her as comfortable as possible but there will be no new interventions to assist with her chronic nausea and vomiting and she will need to continue her referral with [facility].  However  Did present with a CH level 1.4.8 and a free T4 of 0.21, most likely due to patient's inability to tolerate her p.o. medication.  Patient will be placed on IV Synthroid 75 mcg monitor closely for the next 24 to 48 hours.  Additionally is hypertensive as she has been unable to tolerate her p.o. medications, Patient will be placed on Lopressor 5 mg every 8 hours.   Patient's ethanol level was 0.039 upon admission, patient states she has not drank any alcohol for 1 month, patient placed on CIWA protocol.  Verbalized understanding of admission for hypothyroidism and hydration and that she will need to continue with her process with [facility] for her chronic nausea and vomiting.  All patient's questions were answered to the best my ability and she is in agreement for evaluation and treatment.      Review of Systems   Constitutional:  Positive for fatigue.   Respiratory:  Negative for shortness of breath.    Cardiovascular:  Negative for chest pain.   Gastrointestinal:  Positive for abdominal pain, nausea and vomiting.   Neurological:  Positive for weakness.      Otherwise complete ROS reviewed and negative except as mentioned in the HPI.    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    CTS (carpal tunnel syndrome) 2019    Dental disease     Depression     Diastolic dysfunction 2022    Dizziness     Emphysema of lung 2020    GERD (gastroesophageal reflux disease)     Headache     Hyperlipidemia     Hypertension     Hypothyroidism     Mixed simple and mucopurulent chronic bronchitis 04/18/2023    Non-occlusive coronary artery disease 05/04/2023    NSTEMI (non-ST elevated myocardial infarction) (HCC)     Pneumonia 2022    Pulmonary emphysema 04/21/2023    Shingles 2018    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     Past Surgical History:  Past Surgical History:   Procedure Laterality Date    APPENDECTOMY      CARDIAC CATHETERIZATION N/A 11/04/2017    Procedure: Coronary angiography;  Surgeon: Luis Colvin MD;  Location:  [facility] CATH INVASIVE LOCATION;  Service:     CARDIAC CATHETERIZATION N/A 05/31/2023    Procedure: Left Heart Cath;  Surgeon: Steffen Rossi MD;   Location: [facility] CATH INVASIVE LOCATION;  Service: Cardiology;  Laterality: N/A;    CARPAL TUNNEL RELEASE  2019    COLONOSCOPY N/A 8/2/2024    Procedure: COLONOSCOPY WITH ANESTHESIA;  Surgeon: Marty Browning MD;  Location: [facility] ENDOSCOPY;  Service: Gastroenterology;  Laterality: N/A;  pre op pancolitis    ENDOSCOPY N/A 7/8/2024    Procedure: ESOPHAGOGASTRODUODENOSCOPY WITH ANESTHESIA;  Surgeon: Gordy Wang MD;  Location: [facility] ENDOSCOPY;  Service: Gastroenterology;  Laterality: N/A;  pre: dysphagia.   post: esophagus dilated.   no PCP    HYSTERECTOMY      PANENDOSCOPY N/A 5/30/2024    Procedure: DIRECT LARYNGOSCOPY WITH BIOPSY OF VOCAL CORD POLYPS WITH POSSIBLE TRACHEOSTOMY;  Surgeon: Mendez Padilla MD;  Location: [facility] OR;  Service: ENT;  Laterality: N/A;    AZ RT/LT HEART CATHETERS N/A 11/04/2017    Procedure: Percutaneous Coronary Intervention;  Surgeon: Luis Colvin MD;  Location: [facility] CATH INVASIVE LOCATION;  Service: Cardiovascular    THYROID SURGERY      TOTAL THYROIDECTOMY      TRACHEOSTOMY N/A 5/30/2024    Procedure: POSSIBLE TRACHEOSTOMY;  Surgeon: Mendez Padilla MD;  Location: [facility] OR;  Service: ENT;  Laterality: N/A;     Social History:  reports that she quit smoking about 7 months ago. Her smoking use included cigarettes. She started smoking about 33 years ago. She has a 30 pack-year smoking history. She has been exposed to tobacco smoke. She has never used smokeless tobacco. She reports that she does not currently use alcohol. She reports that she does not use drugs.    Family History: family history includes Asthma in her mother; Cancer in her father and mother; Colon cancer in her father; Emphysema in her mother; Heart failure in her father; Hypertension in her father.       Allergies:  No Known Allergies    Medications:  Prior to Admission medications    Medication Sig Start Date End Date Taking? Authorizing Provider   atorvastatin (LIPITOR) 10 MG tablet Take 1  "tablet by mouth Daily. 8/7/24   Kirill Chakraborty,    bismuth subsalicylate (Pepto-Bismol) 262 MG chewable tablet Chew 2 tablets 4 (Four) Times a Day Before Meals & at Bedtime. 9/20/24   Mery Varnre MD   carvedilol (COREG) 25 MG tablet Take 1 tablet by mouth 2 (Two) Times a Day. 9/20/24   Mery Varner MD   colestipol (COLESTID) 1 g tablet Take 1 tablet by mouth 2 (Two) Times a Day. 9/6/24   Rakel Padilla APRN   levothyroxine (SYNTHROID, LEVOTHROID) 175 MCG tablet Take 1 tablet by mouth Daily.    ProviderGaldino MD   loperamide (IMODIUM) 2 MG capsule Take 2 capsules by mouth 4 (Four) Times a Day As Needed for Diarrhea. 9/20/24   Mery Varner MD   nitroglycerin (NITROSTAT) 0.4 MG SL tablet Place 1 tablet under the tongue Every 5 (Five) Minutes As Needed for Chest Pain. Take no more than 3 doses in 15 minutes.    Galdino Wei MD   pantoprazole (PROTONIX) 40 MG EC tablet Take 1 tablet by mouth 2 (Two) Times a Day. 9/20/24   Mery Varner MD   POTASSIUM PO Take  by mouth 2 (Two) Times a Day.    ProviderGaldino MD   topiramate (TOPAMAX) 50 MG tablet Take 1 tablet by mouth 2 (Two) Times a Day. 6/13/24   Domitila Duncan MD     I have utilized all available immediate resources to obtain, update, or review the patient's current medications (including all prescriptions, over-the-counter products, herbals, cannabis/cannabidiol products, and vitamin/mineral/dietary (nutritional) supplements).    Objective     Vital Signs: BP (!) 155/102   Pulse 89   Temp 97.7 °F (36.5 °C) (Oral)   Resp 18   Ht 175.3 cm (69\")   Wt 75.8 kg (167 lb)   LMP  (LMP Unknown)   SpO2 95%   BMI 24.66 kg/m²   Physical Exam  Vitals and nursing note reviewed.   Constitutional:       Appearance: Normal appearance.   HENT:      Head: Normocephalic and atraumatic.      Nose: Nose normal.      Mouth/Throat:      Mouth: Mucous membranes are moist.      Pharynx: Oropharynx is clear.   Eyes:      Pupils: " Pupils are equal, round, and reactive to light.   Cardiovascular:      Rate and Rhythm: Normal rate and regular rhythm.      Pulses: Normal pulses.      Heart sounds: Normal heart sounds.   Pulmonary:      Effort: No tachypnea, accessory muscle usage or respiratory distress.      Breath sounds: Normal breath sounds.   Abdominal:      General: Abdomen is protuberant. Bowel sounds are normal. There is no distension.      Palpations: Abdomen is soft.      Tenderness: There is generalized abdominal tenderness.   Genitourinary:     Comments: Voiding per bathroom privileges  Musculoskeletal:         General: Normal range of motion.      Cervical back: Normal range of motion and neck supple.      Right lower leg: No edema.      Left lower leg: No edema.   Skin:     General: Skin is warm.      Capillary Refill: Capillary refill takes less than 2 seconds.      Coloration: Skin is pale.   Neurological:      General: No focal deficit present.      Mental Status: She is alert and oriented to person, place, and time.   Psychiatric:         Mood and Affect: Mood normal.         Behavior: Behavior normal.         Thought Content: Thought content normal.         Judgment: Judgment normal.        Results Reviewed:  Lab Results (last 24 hours)       Procedure Component Value Units Date/Time    Urinalysis With Microscopic If Indicated (No Culture) - Urine, Clean Catch [[id number]]  (Abnormal) Collected: 10/01/24 1618    Specimen: Urine, Clean Catch Updated: 10/01/24 1639     Color, UA Yellow     Appearance, UA Clear     pH, UA 6.0     Specific Gravity, UA >1.030     Glucose, UA Negative     Ketones, UA Negative     Bilirubin, UA Negative     Blood, UA Negative     Protein, UA Negative     Leuk Esterase, UA Negative     Nitrite, UA Negative     Urobilinogen, UA 0.2 E.U./dL    Narrative:      Urine microscopic not indicated.    Ammonia [[id number]] Collected: 10/01/24 1617    Specimen: Blood Updated: 10/01/24 1633    T3, Free [[id number]]  Collected: 10/01/24 1247    Specimen: Blood Updated: 10/01/24 1437    Single High Sensitivity Troponin T [[id number]]  (Normal) Collected: 10/01/24 1247    Specimen: Blood Updated: 10/01/24 1355     HS Troponin T 11 ng/L     Narrative:      High Sensitive Troponin T Reference Range:  <14.0 ng/L- Negative Female for AMI  <22.0 ng/L- Negative Male for AMI  >=14 - Abnormal Female indicating possible myocardial injury.  >=22 - Abnormal Male indicating possible myocardial injury.   Clinicians would have to utilize clinical acumen, EKG, Troponin, and serial changes to determine if it is an Acute Myocardial Infarction or myocardial injury due to an underlying chronic condition.         Magnesium [[id number]]  (Normal) Collected: 10/01/24 1247    Specimen: Blood Updated: 10/01/24 1353     Magnesium 1.8 mg/dL     TSH [[id number]]  (Abnormal) Collected: 10/01/24 1247    Specimen: Blood Updated: 10/01/24 1352     .800 uIU/mL     T4, Free [[id number]]  (Abnormal) Collected: 10/01/24 1247    Specimen: Blood Updated: 10/01/24 1330     Free T4 0.21 ng/dL     Comprehensive Metabolic Panel [[id number]]  (Abnormal) Collected: 10/01/24 1247    Specimen: Blood Updated: 10/01/24 1327     Glucose 90 mg/dL      BUN 5 mg/dL      Creatinine 0.64 mg/dL      Sodium 139 mmol/L      Potassium 2.9 mmol/L      Chloride 92 mmol/L      CO2 25.0 mmol/L      Calcium 9.2 mg/dL      Total Protein 6.9 g/dL      Albumin 3.6 g/dL      ALT (SGPT) 58 U/L      AST (SGOT) 150 U/L      Alkaline Phosphatase 740 U/L      Total Bilirubin 0.8 mg/dL      Globulin 3.3 gm/dL      A/G Ratio 1.1 g/dL      BUN/Creatinine Ratio 7.8     Anion Gap 22.0 mmol/L      eGFR 107.1 mL/min/1.73     Narrative:      GFR Normal >60  Chronic Kidney Disease <60  Kidney Failure <15      Lipase [[id number]]  (Abnormal) Collected: 10/01/24 1247    Specimen: Blood Updated: 10/01/24 1322     Lipase 12 U/L     Ethanol [[id number]] Collected: 10/01/24 1247    Specimen: Blood Updated: 10/01/24  1322     Ethanol % 0.039 %     Narrative:      Not for legal purposes. Chain of Custody not followed.     CBC & Differential [[id number]]  (Abnormal) Collected: 10/01/24 1247    Specimen: Blood Updated: 10/01/24 1302    Narrative:      The following orders were created for panel order CBC & Differential.  Procedure                               Abnormality         Status                     ---------                               -----------         ------                     CBC Auto Differential[[id number]]        Abnormal            Final result                 Please view results for these tests on the individual orders.    CBC Auto Differential [[id number]]  (Abnormal) Collected: 10/01/24 1247    Specimen: Blood Updated: 10/01/24 1302     WBC 9.25 10*3/mm3      RBC 3.51 10*6/mm3      Hemoglobin 12.2 g/dL      Hematocrit 36.2 %      .1 fL      MCH 34.8 pg      MCHC 33.7 g/dL      RDW 15.5 %      RDW-SD 57.7 fl      MPV 10.8 fL      Platelets 325 10*3/mm3      Neutrophil % 54.2 %      Lymphocyte % 31.6 %      Monocyte % 9.8 %      Eosinophil % 2.1 %      Basophil % 1.3 %      Immature Grans % 1.0 %      Neutrophils, Absolute 5.02 10*3/mm3      Lymphocytes, Absolute 2.92 10*3/mm3      Monocytes, Absolute 0.91 10*3/mm3      Eosinophils, Absolute 0.19 10*3/mm3      Basophils, Absolute 0.12 10*3/mm3      Immature Grans, Absolute 0.09 10*3/mm3      nRBC 0.0 /100 WBC           Imaging Results (Last 24 Hours)       Procedure Component Value Units Date/Time    CT Abdomen Pelvis With Contrast [[id number]] Collected: 10/01/24 1415     Updated: 10/01/24 1428    Narrative:      EXAMINATION: CT ABDOMEN PELVIS W CONTRAST-      10/1/2024 1:08 PM     HISTORY: abdominal pain     In order to have a CT radiation dose as low as reasonably achievable  Automated Exposure Control was utilized for adjustment of the mA and/or  KV according to patient size.     Total DLP = 388.27 mGy.cm     The CT scan of the abdomen and pelvis should  performed after intravenous  contrast enhancement.     Images are acquired in axial plane and subsequent reconstruction in  coronal and sagittal planes.     The comparison is made with the previous study dated 9/16/2024.     The lung bases included in the study are unremarkable except for mild  dependent atelectatic changes.     Limited visualized cardiomediastinal structures are unremarkable.     There is significant hepatic steatosis. There is moderate hepatomegaly  similar to the previous study. No focal intrinsic abnormality. The  spleen is normal.     No radiopaque gallstones.     Pancreas is normal.     The adrenal glands bilaterally are normal.     Mild lobulation of renal contour bilaterally. No discrete mass. No  radiopaque calculi. No hydronephrosis. Limited visualized ureters are  nondilated. The urinary bladder is poorly distended. No intrinsic  abnormality.     The uterus is absent. No adnexal masses.     There is evidence of previous anterior abdominal wall hernia repair  surgery. No complication.     The stomach is decompressed. No focal abnormality [place]ast. Duodenum is  normal. Small bowel is nondistended. The appendix is surgically absent.  The cecum lies low in the pelvis. There is decompression of the entire  large bowel. There is fatty infiltration of the wall of the entire colon  which is a nonspecific finding and may suggest chronic inflammatory  process. This is similar to the previous study. No finding to suggest  acute inflammatory process. No surrounding peritoneal fat infiltration  or pericolonic increased vascularity.     Atheromatous changes of the abdominal aorta. No aneurysm dilatation.     There is no evidence of abdominal or pelvic lymphadenopathy.     Images reviewed in bone window show chronic degenerative changes of the  lumbar spine with severe degeneration of the disc L5-S1. There is  bilateral femoral head osteonecrosis slightly more pronounced on the  right side.        Impression:      1. No acute abnormality of the abdomen or pelvis to account for  patient's symptoms.  2. The colon is decompressed with fatty infiltration of the wall of the  entire colon similar to the previous study. These are nonspecific  finding and may be seen in chronic inflammatory disease of the bowel. No  finding to suggest acute colitis.  3. A significant hepatic steatosis as in the previous study. Persistent  moderate hepatomegaly.  4. Bilateral femoral head osteonecrosis is similar to the previous  study.                                            This report was signed and finalized on 10/1/2024 2:25 PM by Dr. Karmen Teague MD.             I have personally reviewed and interpreted the radiology studies and ECG obtained at time of admission.     Assessment / Plan   Assessment:   Active Hospital Problems    Diagnosis     Hypothyroidism, worsening     Nausea & vomiting, chronic     Steatosis of liver, advanced     ETOH abuse     Diarrhea     Primary hypertension        Treatment Plan  The patient will be admitted to Dr. Varner service here at [facility].    1.  Hypothyroidism-IV Synthroid 75 mcg's daily, will check a.m. TSH, T4    2.  Nausea vomiting and dysphagia, chronic-continue patient's gastroenterology protocol including Pepto-Bismol 4 times daily, Zofran and Compazine as needed.  Will attempt to see if patient can tolerate full liquids.    3.  Steatosis of the liver advanced, EtOH abuse-, ALT 58, total bilirubin 0.8, ammonia 40, lipase 12, will continue to monitor closely    4.  Primary hypertension-patient presented hypertensive at 155/102, states her blood pressure has been running very high lately most likely due to patient's inability to tolerate p.o. medications.  Initiate Lopressor 5 mg every 8 hours, every 4 vital signs will continue to monitor for titration if needed    VTE prophylaxis with SCDs  Labs in a.m.    Medical Decision Making  Number and Complexity of  problems: 6  Differential Diagnosis: None    Conditions and Status        Condition is unchanged.     [facility] Data  External documents reviewed: Extensive review of gastroenterology office notes and referrals   Cardiac tracing (EKG, telemetry) interpretation: 10/1/2024 EKG per cardiology reviewed  Radiology interpretation: 1/20/2024 CT of the abdomen pelvis per radiology reviewed  Labs reviewed: 1/20/2024 CMP, CBC, lipase, ethanol, TSH, magnesium, ammonia, urinalysis reviewed, repeat labs in a.m.  Any tests that were considered but not ordered: None     Decision rules/scores evaluated (example [id number], Wells, etc): None     Discussed with: Dr. Varner and patient     Care Planning  Shared decision making: Dr. Varner and patient  Code status and discussions: Full code per patient    Disposition  Social Determinants of Health that impact treatment or disposition: None  Estimated length of stay is 1-2 days.     I confirmed that the patient's advanced care plan is present, code status is documented, and a surrogate decision maker is listed in the patient's medical record.     The patient's surrogate decision maker is her  Kirill.     The patient was seen and examined by me on 10/1/2024 at 1719.    Electronically signed by BREANA Mckeon, 10/01/24, 16:54 CDT.

## 2024-10-01 NOTE — TELEPHONE ENCOUNTER
Rosalva, do we have any idea when appointment will be at [facility] gastroenterology?       I saw her this am and she is going to the Emergency Room today.           ----- Message from Rosalva HERMOSILLO sent at 10/1/2024  9:20 AM CDT -----  Regarding: FW: Minnie Bang  Contact: [phone]    ----- Message -----  From: Minnie Bang  Sent: 10/1/2024   8:11 AM CDT  To: [facility] Clinical Pool  Subject: Minnie Bob it's Minnie Bang. I have an appt with you today as a follow up from the hospital. I followed up with [facility] because I have been so sick. They did labs and called me yesterday and said my tsh was 159,my vitamin d was very low,my liver enzymes were very high yet nothing was done about it. The diarrhea is the same but my vomiting has tripled and I am having extreme pain right below my right boob. I'm so weak I can barely walk. I am up all night with nausea,vomiting,chills,diarrhea. I can even hold down water. What I'm asking is should I just go to the e.r.  Minnie. My BP this morning was 189/112, pulse 107

## 2024-10-02 ENCOUNTER — TELEPHONE (OUTPATIENT)
Dept: NEUROLOGY | Facility: CLINIC | Age: 49
End: 2024-10-02
Payer: COMMERCIAL

## 2024-10-02 ENCOUNTER — TELEPHONE (OUTPATIENT)
Dept: GASTROENTEROLOGY | Facility: CLINIC | Age: 49
End: 2024-10-02
Payer: COMMERCIAL

## 2024-10-02 PROBLEM — R74.01 TRANSAMINITIS: Status: ACTIVE | Noted: 2024-10-02

## 2024-10-02 LAB
ALBUMIN SERPL-MCNC: 3.1 G/DL (ref 3.5–5.2)
ALBUMIN/GLOB SERPL: 1.1 G/DL
ALP SERPL-CCNC: 618 U/L (ref 39–117)
ALT SERPL W P-5'-P-CCNC: 46 U/L (ref 1–33)
ANION GAP SERPL CALCULATED.3IONS-SCNC: 12 MMOL/L (ref 5–15)
AST SERPL-CCNC: 117 U/L (ref 1–32)
BILIRUB SERPL-MCNC: 0.6 MG/DL (ref 0–1.2)
BUN SERPL-MCNC: 6 MG/DL (ref 6–20)
BUN/CREAT SERPL: 8.5 (ref 7–25)
CALCIUM SPEC-SCNC: 8 MG/DL (ref 8.6–10.5)
CHLORIDE SERPL-SCNC: 99 MMOL/L (ref 98–107)
CHOLEST SERPL-MCNC: 280 MG/DL (ref 0–200)
CO2 SERPL-SCNC: 27 MMOL/L (ref 22–29)
CREAT SERPL-MCNC: 0.71 MG/DL (ref 0.57–1)
DEPRECATED RDW RBC AUTO: 59.7 FL (ref 37–54)
EGFRCR SERPLBLD CKD-EPI 2021: 103.1 ML/MIN/1.73
ERYTHROCYTE [DISTWIDTH] IN BLOOD BY AUTOMATED COUNT: 15.2 % (ref 12.3–15.4)
GLOBULIN UR ELPH-MCNC: 2.8 GM/DL
GLUCOSE SERPL-MCNC: 82 MG/DL (ref 65–99)
HCT VFR BLD AUTO: 33.6 % (ref 34–46.6)
HDLC SERPL-MCNC: 36 MG/DL (ref 40–60)
HGB BLD-MCNC: 10.9 G/DL (ref 12–15.9)
INR PPP: 0.93 (ref 0.91–1.09)
LDLC SERPL CALC-MCNC: 173 MG/DL (ref 0–100)
LDLC/HDLC SERPL: 4.76 {RATIO}
MAGNESIUM SERPL-MCNC: 1.6 MG/DL (ref 1.6–2.6)
MCH RBC QN AUTO: 34.4 PG (ref 26.6–33)
MCHC RBC AUTO-ENTMCNC: 32.4 G/DL (ref 31.5–35.7)
MCV RBC AUTO: 106 FL (ref 79–97)
PLATELET # BLD AUTO: 271 10*3/MM3 (ref 140–450)
PMV BLD AUTO: 10.5 FL (ref 6–12)
POTASSIUM SERPL-SCNC: 3.3 MMOL/L (ref 3.5–5.2)
POTASSIUM SERPL-SCNC: 3.4 MMOL/L (ref 3.5–5.2)
PROT SERPL-MCNC: 5.9 G/DL (ref 6–8.5)
PROTHROMBIN TIME: 12.8 SECONDS (ref 11.8–14.8)
QT INTERVAL: 390 MS
QTC INTERVAL: 490 MS
RBC # BLD AUTO: 3.17 10*6/MM3 (ref 3.77–5.28)
SODIUM SERPL-SCNC: 138 MMOL/L (ref 136–145)
T3FREE SERPL-MCNC: 1.21 PG/ML (ref 2–4.4)
T4 FREE SERPL-MCNC: 0.16 NG/DL (ref 0.93–1.7)
TRIGL SERPL-MCNC: 364 MG/DL (ref 0–150)
TSH SERPL DL<=0.05 MIU/L-ACNC: 254.6 UIU/ML (ref 0.27–4.2)
VLDLC SERPL-MCNC: 71 MG/DL (ref 5–40)
WBC NRBC COR # BLD AUTO: 8.76 10*3/MM3 (ref 3.4–10.8)

## 2024-10-02 PROCEDURE — 80061 LIPID PANEL: CPT

## 2024-10-02 PROCEDURE — 36415 COLL VENOUS BLD VENIPUNCTURE: CPT

## 2024-10-02 PROCEDURE — 25810000003 SODIUM CHLORIDE 0.9 % SOLUTION: Performed by: NURSE PRACTITIONER

## 2024-10-02 PROCEDURE — 84439 ASSAY OF FREE THYROXINE: CPT

## 2024-10-02 PROCEDURE — 83735 ASSAY OF MAGNESIUM: CPT

## 2024-10-02 PROCEDURE — 84132 ASSAY OF SERUM POTASSIUM: CPT | Performed by: FAMILY MEDICINE

## 2024-10-02 PROCEDURE — 25810000003 SODIUM CHLORIDE 0.9 % SOLUTION

## 2024-10-02 PROCEDURE — 80050 GENERAL HEALTH PANEL: CPT

## 2024-10-02 PROCEDURE — 82088 ASSAY OF ALDOSTERONE: CPT

## 2024-10-02 PROCEDURE — G0378 HOSPITAL OBSERVATION PER HR: HCPCS

## 2024-10-02 PROCEDURE — 25010000002 HYDRALAZINE PER 20 MG: Performed by: FAMILY MEDICINE

## 2024-10-02 PROCEDURE — 25010000002 THIAMINE HCL 200 MG/2ML SOLUTION: Performed by: STUDENT IN AN ORGANIZED HEALTH CARE EDUCATION/TRAINING PROGRAM

## 2024-10-02 PROCEDURE — 25010000002 LEVOTHYROXINE SODIUM 100 MCG RECONSTITUTED SOLUTION

## 2024-10-02 PROCEDURE — 25010000002 HYDROMORPHONE PER 4 MG: Performed by: FAMILY MEDICINE

## 2024-10-02 PROCEDURE — 85610 PROTHROMBIN TIME: CPT

## 2024-10-02 PROCEDURE — 25010000002 ONDANSETRON PER 1 MG

## 2024-10-02 RX ORDER — POTASSIUM CHLORIDE 750 MG/1
40 CAPSULE, EXTENDED RELEASE ORAL EVERY 4 HOURS
Status: COMPLETED | OUTPATIENT
Start: 2024-10-02 | End: 2024-10-02

## 2024-10-02 RX ORDER — FLUOXETINE 40 MG/1
40 CAPSULE ORAL DAILY
COMMUNITY

## 2024-10-02 RX ORDER — POTASSIUM CHLORIDE 1.5 G/1.58G
20 POWDER, FOR SOLUTION ORAL 2 TIMES DAILY
COMMUNITY

## 2024-10-02 RX ORDER — MONTELUKAST SODIUM 4 MG/1
1 TABLET, CHEWABLE ORAL DAILY
COMMUNITY

## 2024-10-02 RX ORDER — LIOTHYRONINE SODIUM 25 UG/1
25 TABLET ORAL DAILY
Status: DISCONTINUED | OUTPATIENT
Start: 2024-10-02 | End: 2024-10-04 | Stop reason: HOSPADM

## 2024-10-02 RX ORDER — POTASSIUM CHLORIDE 750 MG/1
40 CAPSULE, EXTENDED RELEASE ORAL EVERY 4 HOURS
Status: COMPLETED | OUTPATIENT
Start: 2024-10-02 | End: 2024-10-03

## 2024-10-02 RX ORDER — LIOTHYRONINE SODIUM 5 UG/1
7.5 TABLET ORAL DAILY
Status: DISCONTINUED | OUTPATIENT
Start: 2024-10-02 | End: 2024-10-02

## 2024-10-02 RX ORDER — BUTALBITAL, ACETAMINOPHEN AND CAFFEINE 50; 325; 40 MG/1; MG/1; MG/1
1 TABLET ORAL EVERY 6 HOURS PRN
Status: DISCONTINUED | OUTPATIENT
Start: 2024-10-02 | End: 2024-10-04 | Stop reason: HOSPADM

## 2024-10-02 RX ADMIN — POTASSIUM CHLORIDE 40 MEQ: 750 CAPSULE, EXTENDED RELEASE ORAL at 10:42

## 2024-10-02 RX ADMIN — METOPROLOL TARTRATE 5 MG: 5 INJECTION INTRAVENOUS at 08:37

## 2024-10-02 RX ADMIN — BUTALBITAL, ACETAMINOPHEN AND CAFFEINE 1 TABLET: 325; 50; 40 TABLET ORAL at 18:38

## 2024-10-02 RX ADMIN — LIOTHYRONINE SODIUM 25 MCG: 25 TABLET ORAL at 10:43

## 2024-10-02 RX ADMIN — FOLIC ACID 1 MG: 1 TABLET ORAL at 08:37

## 2024-10-02 RX ADMIN — BISMUTH SUBSALICYLATE 524 MG: 262 TABLET ORAL at 17:43

## 2024-10-02 RX ADMIN — ONDANSETRON 4 MG: 2 INJECTION INTRAMUSCULAR; INTRAVENOUS at 04:43

## 2024-10-02 RX ADMIN — TOPIRAMATE 50 MG: 25 TABLET, FILM COATED ORAL at 08:37

## 2024-10-02 RX ADMIN — SODIUM CHLORIDE 100 ML/HR: 9 INJECTION, SOLUTION INTRAVENOUS at 13:50

## 2024-10-02 RX ADMIN — PANTOPRAZOLE SODIUM 40 MG: 40 INJECTION, POWDER, FOR SOLUTION INTRAVENOUS at 08:38

## 2024-10-02 RX ADMIN — PANTOPRAZOLE SODIUM 40 MG: 40 INJECTION, POWDER, FOR SOLUTION INTRAVENOUS at 20:16

## 2024-10-02 RX ADMIN — ACETAMINOPHEN 650 MG: 325 TABLET ORAL at 11:38

## 2024-10-02 RX ADMIN — ACETAMINOPHEN 650 MG: 325 TABLET ORAL at 15:40

## 2024-10-02 RX ADMIN — THIAMINE HYDROCHLORIDE 200 MG: 100 INJECTION, SOLUTION INTRAMUSCULAR; INTRAVENOUS at 10:42

## 2024-10-02 RX ADMIN — THIAMINE HYDROCHLORIDE 200 MG: 100 INJECTION, SOLUTION INTRAMUSCULAR; INTRAVENOUS at 01:04

## 2024-10-02 RX ADMIN — TOPIRAMATE 50 MG: 25 TABLET, FILM COATED ORAL at 20:16

## 2024-10-02 RX ADMIN — NIFEDIPINE 30 MG: 30 TABLET, FILM COATED, EXTENDED RELEASE ORAL at 08:38

## 2024-10-02 RX ADMIN — THIAMINE HYDROCHLORIDE 200 MG: 100 INJECTION, SOLUTION INTRAMUSCULAR; INTRAVENOUS at 17:43

## 2024-10-02 RX ADMIN — HYDROMORPHONE HYDROCHLORIDE 0.5 MG: 1 INJECTION, SOLUTION INTRAMUSCULAR; INTRAVENOUS; SUBCUTANEOUS at 04:45

## 2024-10-02 RX ADMIN — POTASSIUM CHLORIDE 40 MEQ: 750 CAPSULE, EXTENDED RELEASE ORAL at 05:56

## 2024-10-02 RX ADMIN — HYDROMORPHONE HYDROCHLORIDE 0.5 MG: 1 INJECTION, SOLUTION INTRAMUSCULAR; INTRAVENOUS; SUBCUTANEOUS at 08:47

## 2024-10-02 RX ADMIN — HYDRALAZINE HYDROCHLORIDE 5 MG: 20 INJECTION, SOLUTION INTRAMUSCULAR; INTRAVENOUS at 00:12

## 2024-10-02 RX ADMIN — METOPROLOL TARTRATE 5 MG: 5 INJECTION INTRAVENOUS at 01:04

## 2024-10-02 RX ADMIN — SODIUM CHLORIDE 100 ML/HR: 9 INJECTION, SOLUTION INTRAVENOUS at 04:01

## 2024-10-02 RX ADMIN — HYOSCYAMINE SULFATE 250 MCG: 0.12 TABLET SUBLINGUAL at 11:38

## 2024-10-02 RX ADMIN — BISMUTH SUBSALICYLATE 524 MG: 262 TABLET ORAL at 05:56

## 2024-10-02 RX ADMIN — CHOLESTYRAMINE 4 G: 4 POWDER, FOR SUSPENSION ORAL at 08:37

## 2024-10-02 RX ADMIN — LEVOTHYROXINE SODIUM ANHYDROUS 75 MCG: 100 INJECTION, POWDER, LYOPHILIZED, FOR SOLUTION INTRAVENOUS at 10:42

## 2024-10-02 RX ADMIN — POTASSIUM CHLORIDE 40 MEQ: 750 CAPSULE, EXTENDED RELEASE ORAL at 20:16

## 2024-10-02 RX ADMIN — BISMUTH SUBSALICYLATE 524 MG: 262 TABLET ORAL at 20:16

## 2024-10-02 RX ADMIN — METOPROLOL TARTRATE 5 MG: 5 INJECTION INTRAVENOUS at 17:44

## 2024-10-02 RX ADMIN — BISMUTH SUBSALICYLATE 524 MG: 262 TABLET ORAL at 10:42

## 2024-10-02 NOTE — TELEPHONE ENCOUNTER
I spoke with Mrs. Bang regarding rescheduling her Botox. However, I did explain that Dr. Duncan is not the treating provider for her PAP therapy; that is Dr. Sanabria. I did let her know I would reach out to [facility] to help clear any confusion. Mrs. Bang voiced understanding.     I Spoke with Sweta, with [facility], to confirm Dr. Sanabria as the treating provider for Mrs. Bang's PAP therapy & that she would need to follow-up with that office. Sweta did acknowledge that it should be with Dr. Sanabria & is going to call Mrs. Bang back to explain.

## 2024-10-02 NOTE — PLAN OF CARE
Goal Outcome Evaluation:  Plan of Care Reviewed With: other (see comments), patient        Progress: no change  Outcome Evaluation: Initial nutrition assessment. Pt is readmitted to the hospital with ongoing N/V/D. She was recently in the hospital 9/17-9/20 for chronic abd pain, N/V/D and inability to tolerate po intake of food and meds. She has been followed/treated by Dr. Wang with no improvement. She has been referred to a specialist in [place] and is in the process of getting scheduled. She is currently admitted for IV hydration, IV synthroid since she has been unable to take meds, BP control, and electrolyte replacement. She has a hx of alcohol abuse but reports no use in at least a month. She is currently on a full liquid diet. No po intake as of yet. Recommend to try Boost Breeze supplements with meals to see if she tolerates a clear liquid supplement that is low in fat. WIll follow to establish diet tolerance.

## 2024-10-02 NOTE — TELEPHONE ENCOUNTER
Please contact Margi Lemus NP at [facility] and let them know that the patient was admitted yesterday. They are treating the high TSH. Let them know that Dr. Wang and I have discussed this patient and are concerned that a lot of her symptoms could be related  to the TSH.   Just wanted to give them a heads up. We area still working on referral to [facility] as well.       Rosalva , where are we at on referral to [facility]?

## 2024-10-02 NOTE — PLAN OF CARE
Goal Outcome Evaluation:  Plan of Care Reviewed With: patient        Progress: no change  Outcome Evaluation: Pt A&O X4. IVF infusing per orders. C/o pain; see mar. PRN headache medicatin given for HA not resolved with tylenol. Tolerating Gi soft diet. Up ad buck. Voiding. VSS safety maintained.

## 2024-10-02 NOTE — PLAN OF CARE
Problem: Adult Inpatient Plan of Care  Goal: Plan of Care Review  Outcome: Progressing  Flowsheets (Taken 10/2/2024 9827)  Progress: no change  Plan of Care Reviewed With: patient  Outcome Evaluation: ER admit to room 362 for chronic abd pain. VSS. AOx4. RA. PRN hydralazine given, see MAR. IV L AC with NS at 100. SCD. Up SBA. Ambulating to BR to void. Full liquid diet. No C/O pain thus far. Call light within reach, safety maintained.

## 2024-10-02 NOTE — PROGRESS NOTES
Patient Name: Minnie Bang  Date of Admission: 10/1/2024  Today's Date: 10/02/24  Length of Stay: 0  Primary Care Physician: Aidee Padilla APRN    Subjective   Chief Complaint: Extreme lethargy and continuation of chronic nausea and vomiting  Abdominal Pain  Associated symptoms include nausea. Pertinent negatives include no vomiting.      Minnie Bang is a 49-year-old female with a past medical history of COPD, hypertension, diastolic dysfunction, pneumonia, depression, anxiety, hyperlipidemia, NSTEMI, pulmonary emphysema, bicuspid aortic valve, hypothyroidism, and significant history for chronic dysphagia, nausea and vomiting followed by Dr. Wang patient has been able to tolerate almost any p.o. approximately 9 months with increasing concern over the last 3 months.  Patient has been treated with multiple different medications per BREANA Nicholson with Dr. Wang, most recently they have exhausted all efforts and had transferred patient to be seen in consultation with [facility] gastroenterology which is currently being scheduled.  Patient has no new complaints, chronic nausea and vomiting associated with diarrhea and dysphagia.  Patient additionally stated she is just been feeling more rundown and was concerned that she had something else wrong with her.     Patient was made aware that we will try to make her as comfortable as possible but there will be no new interventions to assist with her chronic nausea and vomiting and she will need to continue her referral with [facility].  However  Did present with a CH level 1.4.8 and a free T4 of 0.21, most likely due to patient's inability to tolerate her p.o. medication.  Patient will be placed on IV Synthroid 75 mcg monitor closely for the next 24 to 48 hours.  Additionally is hypertensive as she has been unable to tolerate her p.o. medications, Patient will be placed on Lopressor 5 mg every 8 hours.  Patient's ethanol level was  0.039 upon admission, patient states she has not drank any alcohol for 1 month, patient placed on CIWA protocol.  Verbalized understanding of admission for hypothyroidism and hydration and that she will need to continue with her process with [facility] for her chronic nausea and vomiting.  All patient's questions were answered to the best my ability and she is in agreement for evaluation and treatment.    Today:   Patient is resting in bed on room air with no family at bedside.  Patient states she has had some overnight relief to her nausea, abdominal cramping remains, will initiate Levsin and evaluate for symptomatic relief.  Discussing patient's TSH levels she does revealed to me that she is had extreme lethargy for the last several weeks, she has been losing a great deal of hair, chills, and low heart rate.  Called her primary care this morning as she had TSH levels drawn last week, TSH T4 last week were 159, according to PCP these were communicated to her on Monday morning however patient does not remember this conversation. Will continue IV Synthroid and initiate Cytomel for decreased T3, continue hydration and will advance diet as she tolerates.  Patient was very appreciative of our efforts to assist her with her chronic problem and was very tearful regarding her symptoms and realizing that the majority of these symptoms may be stemming from her hypothyroidism.  Panel this a.m. with total cholesterol of 280, HDL of 36, LDL of 173, triglycerides of 364 patient needs statin, however due to her extensive liver disease will recommend with PCP to start Repatha.  All patient's questions were answered to the best my ability and she is in agreement for continued plan of care    Documented weights    10/01/24 1132 10/01/24 2100   Weight: 75.8 kg (167 lb) 80.3 kg (177 lb)        Results for orders placed during the hospital encounter of 04/05/23    Adult Transthoracic Echo Complete w/ Color, Spectral and  Contrast if necessary per protocol    Interpretation Summary    Left ventricular systolic function is normal. Left ventricular ejection fraction appears to be 61 - 65%.    Left ventricular diastolic function was normal.    Normal global longitudinal LV strain (GLS) = -18.8%    The aortic valve is abnormal in structure. The aortic valve exhibits sclerosis. A bicuspid aortic valve is suggested. There is mild calcification of the aortic valve. There is thickening of the aortic valve    No aortic valve regurgitation or stenosis is present.    Estimated right ventricular systolic pressure from tricuspid regurgitation is normal (<35 mmHg).       Review of Systems   Constitutional:  Positive for fatigue.   Gastrointestinal:  Positive for abdominal pain and nausea. Negative for vomiting.   Neurological:  Positive for weakness.      All pertinent negatives and positives are as above. All other systems have been reviewed and are negative unless otherwise stated.     Objective    Temp:  [97.6 °F (36.4 °C)-98.4 °F (36.9 °C)] 97.8 °F (36.6 °C)  Heart Rate:  [64-94] 64  Resp:  [18] 18  BP: (123-185)/() 125/84  Physical Exam  Vitals and nursing note reviewed.   Constitutional:       Appearance: Normal appearance.   HENT:      Head: Normocephalic and atraumatic.      Nose: Nose normal.      Mouth/Throat:      Mouth: Mucous membranes are moist.      Pharynx: Oropharynx is clear.   Eyes:      Pupils: Pupils are equal, round, and reactive to light.   Cardiovascular:      Rate and Rhythm: Normal rate and regular rhythm.      Pulses: Normal pulses.      Heart sounds: Normal heart sounds.   Pulmonary:      Effort: Pulmonary effort is normal.      Breath sounds: Normal breath sounds.   Abdominal:      General: Abdomen is protuberant. Bowel sounds are increased.      Palpations: Abdomen is soft.      Tenderness: There is no abdominal tenderness.   Genitourinary:     Comments: Voiding per bathroom privileges  Musculoskeletal:          General: Normal range of motion.      Cervical back: Normal range of motion and neck supple.   Skin:     General: Skin is warm and dry.      Capillary Refill: Capillary refill takes less than 2 seconds.   Neurological:      General: No focal deficit present.      Mental Status: She is alert and oriented to person, place, and time.   Psychiatric:         Attention and Perception: Attention normal.         Mood and Affect: Mood normal. Affect is tearful.         Speech: Speech normal.         Behavior: Behavior normal.         Thought Content: Thought content normal.         Cognition and Memory: Cognition and memory normal.         Judgment: Judgment normal.       Results Review:  I have reviewed the labs, radiology results, and diagnostic studies.    Laboratory Data:   Results from last 7 days   Lab Units 10/02/24  0426 10/01/24  1247   WBC 10*3/mm3 8.76 9.25   HEMOGLOBIN g/dL 10.9* 12.2   HEMATOCRIT % 33.6* 36.2   PLATELETS 10*3/mm3 271 325        Results from last 7 days   Lab Units 10/02/24  0426 10/01/24  1247   SODIUM mmol/L 138 139   POTASSIUM mmol/L 3.4* 2.9*   CHLORIDE mmol/L 99 92*   CO2 mmol/L 27.0 25.0   BUN mg/dL 6 5*   CREATININE mg/dL 0.71 0.64   CALCIUM mg/dL 8.0* 9.2   BILIRUBIN mg/dL 0.6 0.8   ALK PHOS U/L 618* 740*   ALT (SGPT) U/L 46* 58*   AST (SGOT) U/L 117* 150*   GLUCOSE mg/dL 82 90       Culture Data:     Microbiology Results (last 10 days)       ** No results found for the last 240 hours. **             Radiology Data:   Imaging Results (Last 7 Days)       Procedure Component Value Units Date/Time    CT Abdomen Pelvis With Contrast [[id number]] Collected: 10/01/24 1415     Updated: 10/01/24 1428    Narrative:      EXAMINATION: CT ABDOMEN PELVIS W CONTRAST-      10/1/2024 1:08 PM     HISTORY: abdominal pain     In order to have a CT radiation dose as low as reasonably achievable  Automated Exposure Control was utilized for adjustment of the mA and/or  KV according to patient size.     Total  DLP = 388.27 mGy.cm     The CT scan of the abdomen and pelvis should performed after intravenous  contrast enhancement.     Images are acquired in axial plane and subsequent reconstruction in  coronal and sagittal planes.     The comparison is made with the previous study dated 9/16/2024.     The lung bases included in the study are unremarkable except for mild  dependent atelectatic changes.     Limited visualized cardiomediastinal structures are unremarkable.     There is significant hepatic steatosis. There is moderate hepatomegaly  similar to the previous study. No focal intrinsic abnormality. The  spleen is normal.     No radiopaque gallstones.     Pancreas is normal.     The adrenal glands bilaterally are normal.     Mild lobulation of renal contour bilaterally. No discrete mass. No  radiopaque calculi. No hydronephrosis. Limited visualized ureters are  nondilated. The urinary bladder is poorly distended. No intrinsic  abnormality.     The uterus is absent. No adnexal masses.     There is evidence of previous anterior abdominal wall hernia repair  surgery. No complication.     The stomach is decompressed. No focal abnormality Alamast. Duodenum is  normal. Small bowel is nondistended. The appendix is surgically absent.  The cecum lies low in the pelvis. There is decompression of the entire  large bowel. There is fatty infiltration of the wall of the entire colon  which is a nonspecific finding and may suggest chronic inflammatory  process. This is similar to the previous study. No finding to suggest  acute inflammatory process. No surrounding peritoneal fat infiltration  or pericolonic increased vascularity.     Atheromatous changes of the abdominal aorta. No aneurysm dilatation.     There is no evidence of abdominal or pelvic lymphadenopathy.     Images reviewed in bone window show chronic degenerative changes of the  lumbar spine with severe degeneration of the disc L5-S1. There is  bilateral femoral head  osteonecrosis slightly more pronounced on the  right side.       Impression:      1. No acute abnormality of the abdomen or pelvis to account for  patient's symptoms.  2. The colon is decompressed with fatty infiltration of the wall of the  entire colon similar to the previous study. These are nonspecific  finding and may be seen in chronic inflammatory disease of the bowel. No  finding to suggest acute colitis.  3. A significant hepatic steatosis as in the previous study. Persistent  moderate hepatomegaly.  4. Bilateral femoral head osteonecrosis is similar to the previous  study.                                            This report was signed and finalized on 10/1/2024 2:25 PM by Dr. Karmen Teague MD.                I have reviewed the patient's current medications.     bismuth subsalicylate, 524 mg, Oral, 4x Daily AC & at Bedtime  folic acid, 1 mg, Oral, Daily  levothyroxine sodium, 75 mcg, Intravenous, Daily  liothyronine, 25 mcg, Oral, Daily  metoprolol tartrate, 5 mg, Intravenous, Q8H  NIFEdipine XL, 30 mg, Oral, Q24H  pantoprazole, 40 mg, Intravenous, Q12H  sodium chloride, 10 mL, Intravenous, Q12H  thiamine (B-1) IV, 200 mg, Intravenous, Q8H   Followed by  [START ON 10/6/2024] thiamine, 100 mg, Oral, Daily  topiramate, 50 mg, Oral, BID         Intake/Output Summary (Last 24 hours) at 10/2/2024 1218  Last data filed at 10/2/2024 1100  Gross per 24 hour   Intake 648 ml   Output --   Net 648 ml        Assessment/Plan   Assessment  Active Hospital Problems    Diagnosis     **Hypothyroidism     Transaminitis     Nausea & vomiting, chronic     Steatosis of liver, advanced     ETOH abuse     Diarrhea     Hypokalemia     Primary hypertension     Hypercholesterolemia with hypertriglyceridemia        Treatment Plan    1.  Hypothyroidism-continue IV Synthroid 75 mcg's daily, TSH this a.m. is 254 with a free T4 of 0.16, and a T3 of 1.21.  Initiate Cytomel 25 mcg daily.  Called patient's PCP as she had a TSH level  drawn last Thursday.  They were able to let me know that her TSH at that time was 156, and she presented to the emergency room prior to her appointment later this week.  Will recheck TSH and 48 hours.      2.  Nausea ,vomiting ,diarrhea and dysphagia, chronic-continue patient's gastroenterology protocol including Pepto-Bismol 4 times daily, Zofran and Compazine as needed.  Initiate Levsin for abdominal cramping, states her nausea is slightly improved, tolerating full liquids will advance this afternoon if no vomiting this morning.  No overnight complaints of diarrhea.    3.  Steatosis of the liver advanced/transaminitis/, EtOH abuse-, 117, ALT 58, 46, total bilirubin 0.8, 0.6 ammonia 40, lipase 12, will continue to monitor closely.  CIWA protocol remains in place however no need for intervention overnight.    4.  Primary hypertension-patient presented hypertensive at 155/102, overnight after initiation of Lopressor patient's blood pressure has been stable at 141/79, 131/70, and 123/88.  Patient has complete resolution to chronic headache, will continue for an additional 24 hours to establish that patient can maintain tolerance to p.o. medications.      5.  Hyperkalemia-this is acute on chronic, patient presented with a potassium of 2.9 placed on potassium protocol this a.m. 3.4 will continue protocol.  Monitor with daily BMP.    6.  Hypercholesteremia and hypertriglyceridemia-lipid panel reveals a total cholesterol of 280, HDL of 36, LDL of 173, triglycerides of 364.  Patient would be intolerant to statins due to her extensive liver disease, put recommendation on PCP follow-up to initiate Repatha if available.      VTE prophylaxis with SCDs  Labs in a.m.    Medical Decision Making  Hypothyroidism, acute on chronic, high complexity, unchanged  Nausea and vomiting, acute on chronic, moderate complexity, improving  Transaminitis, chronic, moderate complexity, improving  Primary hypertension, chronic, high  complexity, improving  Hypokalemia, acute on chronic, moderate complexity, improving  ETOH, chronic, moderate complexity, stable    Number and Complexity of problems: 8  Differential Diagnosis: None    Conditions and Status        Condition is improving.     [facility] Data  External documents reviewed: None, however discussed patient's lab results with PCP by phone  Cardiac tracing (EKG, telemetry) interpretation: None  Radiology interpretation: None  Labs reviewed: 10/1/2024 CMP, TSH, lipid panel, ammonia, lipase, CBC, urinalysis reviewed, repeat labs in a.m.  Any tests that were considered but not ordered: None     Decision rules/scores evaluated (example [id number], [place], etc): None     Discussed with: Dr. Varner and patient     Care Planning  Shared decision making: Dr. Varner and patient  Code status and discussions: Full code per patient  Surrogate Decision Maker  Kirill Benavides  Social Determinants of Health that impact treatment or disposition: None  I expect the patient to be discharged to home in 1-2 days.     Electronically signed by REENA Mckeon, 10/02/24, 12:18 CDT.

## 2024-10-02 NOTE — TELEPHONE ENCOUNTER
----- [facility] from [facility] sent at 10/2/2024  1:07 PM CDT -----  Regarding: Appointment Request  Contact: [phone]  Appointment Request From: Minnie Bang    With Provider: Domitila Trejo [[facility] MEDICAL [facility] NEUROLOGY]    Preferred Date Range: 11/1/2024 – 11/11/2024    Preferred Times: Any Time    Reason for visit: Follow-up Sleep    Comments:  I need to see Dr trejo to discuss my cpap around 11-11-24 but no later but I also need a separate appointment with her rescheduled that I missed to get my botox injection that I missed.

## 2024-10-03 PROBLEM — E03.9 HYPOTHYROIDISM: Status: ACTIVE | Noted: 2024-10-03

## 2024-10-03 LAB
ALBUMIN SERPL-MCNC: 3.1 G/DL (ref 3.5–5.2)
ALBUMIN/GLOB SERPL: 1.2 G/DL
ALP SERPL-CCNC: 542 U/L (ref 39–117)
ALT SERPL W P-5'-P-CCNC: 39 U/L (ref 1–33)
ANION GAP SERPL CALCULATED.3IONS-SCNC: 12 MMOL/L (ref 5–15)
AST SERPL-CCNC: 99 U/L (ref 1–32)
BILIRUB SERPL-MCNC: 0.4 MG/DL (ref 0–1.2)
BUN SERPL-MCNC: 4 MG/DL (ref 6–20)
BUN/CREAT SERPL: 5.6 (ref 7–25)
CALCIUM SPEC-SCNC: 8.1 MG/DL (ref 8.6–10.5)
CHLORIDE SERPL-SCNC: 106 MMOL/L (ref 98–107)
CO2 SERPL-SCNC: 25 MMOL/L (ref 22–29)
CREAT SERPL-MCNC: 0.71 MG/DL (ref 0.57–1)
EGFRCR SERPLBLD CKD-EPI 2021: 103.1 ML/MIN/1.73
GLOBULIN UR ELPH-MCNC: 2.5 GM/DL
GLUCOSE SERPL-MCNC: 77 MG/DL (ref 65–99)
POTASSIUM SERPL-SCNC: 3.6 MMOL/L (ref 3.5–5.2)
PROT SERPL-MCNC: 5.6 G/DL (ref 6–8.5)
SODIUM SERPL-SCNC: 143 MMOL/L (ref 136–145)
T4 FREE SERPL-MCNC: 0.24 NG/DL (ref 0.93–1.7)
TSH SERPL DL<=0.05 MIU/L-ACNC: 168.8 UIU/ML (ref 0.27–4.2)

## 2024-10-03 PROCEDURE — 84132 ASSAY OF SERUM POTASSIUM: CPT | Performed by: FAMILY MEDICINE

## 2024-10-03 PROCEDURE — 84439 ASSAY OF FREE THYROXINE: CPT | Performed by: NURSE PRACTITIONER

## 2024-10-03 PROCEDURE — 25010000002 LEVOTHYROXINE SODIUM 100 MCG RECONSTITUTED SOLUTION

## 2024-10-03 PROCEDURE — 84443 ASSAY THYROID STIM HORMONE: CPT | Performed by: NURSE PRACTITIONER

## 2024-10-03 PROCEDURE — 25810000003 SODIUM CHLORIDE 0.9 % SOLUTION: Performed by: NURSE PRACTITIONER

## 2024-10-03 PROCEDURE — 80053 COMPREHEN METABOLIC PANEL: CPT | Performed by: NURSE PRACTITIONER

## 2024-10-03 PROCEDURE — 25010000002 THIAMINE HCL 200 MG/2ML SOLUTION: Performed by: STUDENT IN AN ORGANIZED HEALTH CARE EDUCATION/TRAINING PROGRAM

## 2024-10-03 RX ORDER — POTASSIUM CHLORIDE 750 MG/1
40 CAPSULE, EXTENDED RELEASE ORAL EVERY 4 HOURS
Status: COMPLETED | OUTPATIENT
Start: 2024-10-03 | End: 2024-10-03

## 2024-10-03 RX ORDER — LISINOPRIL 20 MG/1
40 TABLET ORAL
Status: DISCONTINUED | OUTPATIENT
Start: 2024-10-03 | End: 2024-10-04 | Stop reason: HOSPADM

## 2024-10-03 RX ORDER — CARVEDILOL 25 MG/1
25 TABLET ORAL 2 TIMES DAILY
Status: DISCONTINUED | OUTPATIENT
Start: 2024-10-03 | End: 2024-10-04 | Stop reason: HOSPADM

## 2024-10-03 RX ADMIN — METOPROLOL TARTRATE 5 MG: 5 INJECTION INTRAVENOUS at 00:26

## 2024-10-03 RX ADMIN — THIAMINE HYDROCHLORIDE 200 MG: 100 INJECTION, SOLUTION INTRAMUSCULAR; INTRAVENOUS at 01:15

## 2024-10-03 RX ADMIN — POTASSIUM CHLORIDE 40 MEQ: 750 CAPSULE, EXTENDED RELEASE ORAL at 05:18

## 2024-10-03 RX ADMIN — BISMUTH SUBSALICYLATE 524 MG: 262 TABLET ORAL at 09:14

## 2024-10-03 RX ADMIN — BISMUTH SUBSALICYLATE 524 MG: 262 TABLET ORAL at 18:12

## 2024-10-03 RX ADMIN — LISINOPRIL 40 MG: 20 TABLET ORAL at 13:52

## 2024-10-03 RX ADMIN — POTASSIUM CHLORIDE 40 MEQ: 750 CAPSULE, EXTENDED RELEASE ORAL at 22:16

## 2024-10-03 RX ADMIN — SODIUM CHLORIDE 75 ML/HR: 9 INJECTION, SOLUTION INTRAVENOUS at 13:53

## 2024-10-03 RX ADMIN — Medication 10 ML: at 20:30

## 2024-10-03 RX ADMIN — LEVOTHYROXINE SODIUM ANHYDROUS 75 MCG: 100 INJECTION, POWDER, LYOPHILIZED, FOR SOLUTION INTRAVENOUS at 13:53

## 2024-10-03 RX ADMIN — THIAMINE HYDROCHLORIDE 200 MG: 100 INJECTION, SOLUTION INTRAMUSCULAR; INTRAVENOUS at 09:49

## 2024-10-03 RX ADMIN — PANTOPRAZOLE SODIUM 40 MG: 40 INJECTION, POWDER, FOR SOLUTION INTRAVENOUS at 09:14

## 2024-10-03 RX ADMIN — BUTALBITAL, ACETAMINOPHEN AND CAFFEINE 1 TABLET: 325; 50; 40 TABLET ORAL at 09:46

## 2024-10-03 RX ADMIN — METOPROLOL TARTRATE 5 MG: 5 INJECTION INTRAVENOUS at 09:14

## 2024-10-03 RX ADMIN — CARVEDILOL 25 MG: 25 TABLET, FILM COATED ORAL at 20:29

## 2024-10-03 RX ADMIN — POTASSIUM CHLORIDE 40 MEQ: 750 CAPSULE, EXTENDED RELEASE ORAL at 18:13

## 2024-10-03 RX ADMIN — THIAMINE HYDROCHLORIDE 200 MG: 100 INJECTION, SOLUTION INTRAMUSCULAR; INTRAVENOUS at 18:12

## 2024-10-03 RX ADMIN — LIOTHYRONINE SODIUM 25 MCG: 25 TABLET ORAL at 09:14

## 2024-10-03 RX ADMIN — TOPIRAMATE 50 MG: 25 TABLET, FILM COATED ORAL at 20:29

## 2024-10-03 RX ADMIN — TOPIRAMATE 50 MG: 25 TABLET, FILM COATED ORAL at 09:14

## 2024-10-03 RX ADMIN — PANTOPRAZOLE SODIUM 40 MG: 40 INJECTION, POWDER, FOR SOLUTION INTRAVENOUS at 20:29

## 2024-10-03 RX ADMIN — POTASSIUM CHLORIDE 40 MEQ: 750 CAPSULE, EXTENDED RELEASE ORAL at 00:25

## 2024-10-03 RX ADMIN — NIFEDIPINE 30 MG: 30 TABLET, FILM COATED, EXTENDED RELEASE ORAL at 09:14

## 2024-10-03 RX ADMIN — CARVEDILOL 25 MG: 25 TABLET, FILM COATED ORAL at 13:53

## 2024-10-03 RX ADMIN — FOLIC ACID 1 MG: 1 TABLET ORAL at 09:14

## 2024-10-03 RX ADMIN — POTASSIUM CHLORIDE 40 MEQ: 750 CAPSULE, EXTENDED RELEASE ORAL at 09:48

## 2024-10-03 RX ADMIN — BUTALBITAL, ACETAMINOPHEN AND CAFFEINE 1 TABLET: 325; 50; 40 TABLET ORAL at 20:33

## 2024-10-03 RX ADMIN — Medication 10 ML: at 09:15

## 2024-10-03 RX ADMIN — BISMUTH SUBSALICYLATE 524 MG: 262 TABLET ORAL at 13:53

## 2024-10-03 NOTE — DISCHARGE SUMMARY
[facility] Medicine Services  DISCHARGE SUMMARY       Date of Admission: 10/1/2024  Date of Discharge:  10/4/2024  Primary Care Physician: Margi Lemus APRN    Presenting Problem/History of Present Illness:  Abdominal discomfort, lethargy, continued chronic nausea and vomiting    Final Discharge Diagnoses:  Active Hospital Problems    Diagnosis     **Hypothyroidism     Hypothyroidism     Transaminitis     Nausea & vomiting, chronic     Steatosis of liver, advanced     ETOH abuse     Diarrhea     Hypokalemia     Primary hypertension     Hypercholesterolemia with hypertriglyceridemia        Consults: None    Procedures Performed: None    Pertinent Test Results:   Results for orders placed during the hospital encounter of 04/05/23    Adult Transthoracic Echo Complete w/ Color, Spectral and Contrast if necessary per protocol    Interpretation Summary    Left ventricular systolic function is normal. Left ventricular ejection fraction appears to be 61 - 65%.    Left ventricular diastolic function was normal.    Normal global longitudinal LV strain (GLS) = -18.8%    The aortic valve is abnormal in structure. The aortic valve exhibits sclerosis. A bicuspid aortic valve is suggested. There is mild calcification of the aortic valve. There is thickening of the aortic valve    No aortic valve regurgitation or stenosis is present.    Estimated right ventricular systolic pressure from tricuspid regurgitation is normal (<35 mmHg).      Imaging Results (All)       Procedure Component Value Units Date/Time    CT Abdomen Pelvis With Contrast [[id number]] Collected: 10/01/24 1415     Updated: 10/01/24 1428    Narrative:      EXAMINATION: CT ABDOMEN PELVIS W CONTRAST-      10/1/2024 1:08 PM     HISTORY: abdominal pain     In order to have a CT radiation dose as low as reasonably achievable  Automated Exposure Control was utilized for adjustment of the mA and/or  KV according to patient size.      Total DLP = 388.27 mGy.cm     The CT scan of the abdomen and pelvis should performed after intravenous  contrast enhancement.     Images are acquired in axial plane and subsequent reconstruction in  coronal and sagittal planes.     The comparison is made with the previous study dated 9/16/2024.     The lung bases included in the study are unremarkable except for mild  dependent atelectatic changes.     Limited visualized cardiomediastinal structures are unremarkable.     There is significant hepatic steatosis. There is moderate hepatomegaly  similar to the previous study. No focal intrinsic abnormality. The  spleen is normal.     No radiopaque gallstones.     Pancreas is normal.     The adrenal glands bilaterally are normal.     Mild lobulation of renal contour bilaterally. No discrete mass. No  radiopaque calculi. No hydronephrosis. Limited visualized ureters are  nondilated. The urinary bladder is poorly distended. No intrinsic  abnormality.     The uterus is absent. No adnexal masses.     There is evidence of previous anterior abdominal wall hernia repair  surgery. No complication.     The stomach is decompressed. No focal abnormality Alamast. Duodenum is  normal. Small bowel is nondistended. The appendix is surgically absent.  The cecum lies low in the pelvis. There is decompression of the entire  large bowel. There is fatty infiltration of the wall of the entire colon  which is a nonspecific finding and may suggest chronic inflammatory  process. This is similar to the previous study. No finding to suggest  acute inflammatory process. No surrounding peritoneal fat infiltration  or pericolonic increased vascularity.     Atheromatous changes of the abdominal aorta. No aneurysm dilatation.     There is no evidence of abdominal or pelvic lymphadenopathy.     Images reviewed in bone window show chronic degenerative changes of the  lumbar spine with severe degeneration of the disc L5-S1. There is  bilateral femoral  head osteonecrosis slightly more pronounced on the  right side.       Impression:      1. No acute abnormality of the abdomen or pelvis to account for  patient's symptoms.  2. The colon is decompressed with fatty infiltration of the wall of the  entire colon similar to the previous study. These are nonspecific  finding and may be seen in chronic inflammatory disease of the bowel. No  finding to suggest acute colitis.  3. A significant hepatic steatosis as in the previous study. Persistent  moderate hepatomegaly.  4. Bilateral femoral head osteonecrosis is similar to the previous  study.   This report was signed and finalized on 10/1/2024 2:25 PM by Dr. Karmen Teague MD.             LAB RESULTS:      Lab 10/04/24  0123 10/02/24  1602 10/02/24  0426 10/01/24  1247   WBC 7.35  --  8.76 9.25   HEMOGLOBIN 9.3*  --  10.9* 12.2   HEMATOCRIT 29.0*  --  33.6* 36.2   PLATELETS 250  --  271 325   NEUTROS ABS  --   --   --  5.02   IMMATURE GRANS (ABS)  --   --   --  0.09*   LYMPHS ABS  --   --   --  2.92   MONOS ABS  --   --   --  0.91*   EOS ABS  --   --   --  0.19   .4*  --  106.0* 103.1*   PROTIME  --  12.8  --   --          Lab 10/04/24  0124 10/03/24  1251 10/03/24  0245 10/02/24  1602 10/02/24  0426 10/01/24  1247   SODIUM 144  --  143  --  138 139   POTASSIUM 4.0 3.6 3.6  3.6 3.3* 3.4* 2.9*   CHLORIDE 108*  --  106  --  99 92*   CO2 23.0  --  25.0  --  27.0 25.0   ANION GAP 13.0  --  12.0  --  12.0 22.0*   BUN 4*  --  4*  --  6 5*   CREATININE 0.59  --  0.71  --  0.71 0.64   EGFR 109.3  --  103.1  --  103.1 107.1   GLUCOSE 89  --  77  --  82 90   CALCIUM 8.2*  --  8.1*  --  8.0* 9.2   MAGNESIUM  --   --   --   --  1.6 1.8   .400*  --  168.800*  --  254.600* 142.800*         Lab 10/04/24  0124 10/03/24  0245 10/02/24  0426 10/01/24  1247   TOTAL PROTEIN 5.6* 5.6* 5.9* 6.9   ALBUMIN 3.0* 3.1* 3.1* 3.6   GLOBULIN 2.6 2.5 2.8 3.3   ALT (SGPT) 45* 39* 46* 58*   AST (SGOT) 152* 99* 117* 150*   BILIRUBIN 0.4  0.4 0.6 0.8   ALK PHOS 551* 542* 618* 740*   LIPASE  --   --   --  12*         Lab 10/02/24  1602 10/01/24  1247   HSTROP T  --  11   PROTIME 12.8  --    INR 0.93  --          Lab 10/02/24  0426   CHOLESTEROL 280*   LDL CHOL 173*   HDL CHOL 36*   TRIGLYCERIDES 364*             Brief Urine Lab Results  (Last result in the past 365 days)        Color   Clarity   Blood   Leuk Est   Nitrite   Protein   CREAT   Urine HCG        10/01/24 1618 Yellow   Clear   Negative   Negative   Negative   Negative                 Microbiology Results (last 10 days)       ** No results found for the last 240 hours. **            Hospital Course: Minnie Bang is a 49-year-old female with a past medical history of COPD, hypertension, diastolic dysfunction, pneumonia, depression, anxiety, hyperlipidemia, NSTEMI, pulmonary emphysema, bicuspid aortic valve, hypothyroidism, and significant history for chronic dysphagia, nausea and vomiting followed by Dr. Wang.  Patient has been able to tolerate almost nothing orally for approximately 9 months with increasing concern over the last 3 months.  Patient has been treated with multiple different medications per BREANA Nicholson with Dr. Wang. Most recently they have exhausted all efforts and had referred patient to be seen in consultation with [facility] gastroenterology which is currently being scheduled.  Patient has chronic nausea and vomiting associated with diarrhea and dysphagia.  Patient additionally stated she is just been feeling more rundown and was concerned that she had something else wrong with her.  Patient was made aware to make her comfortable as possible with no new interventions to assist with chronic nausea vomiting and she will need to continue with referral to [facility].  .8, free T40.21, ALT 58, , alkaline phosphatase 740.  Patient has been unable to tolerate most of her oral medications.  She was started on IV Synthroid.  In  addition, she was hypertensive on admission and had not been able to take oral medications and was placed on Lopressor 5 mg every 8 hours.  Ethanol level 0.39 on admission and patient states she has not drank any alcohol in greater than 1 month.    She was admitted to the medical floor with hypothyroidism.  .8 on 10/1, 256.4 on 10/2, 168.8 on 10/3.  .4 on date of discharge 10/4.  Free T4 0.21, 0.16, 0.24, 0.29.  Previous  per PCP.  Patient had not been taking levothyroxine at home due to nausea and vomiting.  She was started on IV Synthroid 75 mcg IV daily and thyroid levels monitored.  Cytomel 25 mg orally daily started on 10/2.  At discharge, patient will increase levothyroxine dose to 200 mcg orally daily for 1 week and primary care provider will repeat thyroid panel and adjust levothyroxine dose accordingly.    Patient has chronic nausea, vomiting, diarrhea and dysphagia.  Patient was continued on gastroenterology's protocol continue Pepto-Bismol 4 times daily. Compazine and Zofran added.  Primary care provider has ordered Phenergan as outpatient.  Levsin started for abdominal cramping and patient indicated nausea some improved.  Diet increased to bland diet.  Patient eating small amounts.  At discharge Colestid will be resumed.    She has steatosis of the liver with transaminitis and alcohol use.  , 117, 99, 152.  ALT 58, 46, 39, 45.  Bilirubin normal.  Ammonia 40, lipase 12.  Statin held due to transaminitis.    Blood pressure elevated 171/101 on admission.  Metoprolol 5 mg IV every 8 hours ordered.  Procardia XL ordered.  Blood pressure improved 148/92, 128/82.  As patient was tolerating diet, lisinopril and Coreg resumed.  Blood pressure 112/68 at discharge.    Hypokalemia, acute on chronic.  Potassium 2.9 on admission and replaced.  Follow-up potassium level 3.6 on 10/3.  Potassium 4 at discharge.    She has mixed hyperlipidemia.  Lipid panel total cholesterol 280, HDL 36, LDL  "173, triglycerides 364.  Patient would be intolerant to statins due to extensive liver disease.  Patient will follow-up with primary care provider and may consider Repatha as outpatient.  Atorvastatin not resume during hospitalization or discharge.    SCDs ordered for deep vein thrombosis prophylaxis and patient was ambulatory per self.    On 10//24, she is stable for discharge.  She has chronic nausea, vomiting, diarrhea and is followed by Dr. Wang with referral to [facility] gastroenterology and follow-up appointment pending.  Levothyroxine increased to 200 mcg orally daily for 1 week and patient will follow-up with primary care provider BREANA Gonzalez in 1 week with repeat thyroid panel and adjustment of levothyroxine at that time.      Physical Exam on Discharge:  /68 (BP Location: Left arm, Patient Position: Lying)   Pulse 76   Temp 98.1 °F (36.7 °C) (Oral)   Resp 16   Ht 175.3 cm (69\")   Wt 80.3 kg (177 lb)   LMP  (LMP Unknown)   SpO2 99%   BMI 26.14 kg/m²   Physical Exam  Vitals and nursing note reviewed.   Constitutional:       Comments: Sitting up.  No oxygen in use.  No visitors in room.   HENT:      Head: Normocephalic and atraumatic.      Nose: No congestion.      Mouth/Throat:      Pharynx: Oropharynx is clear. No oropharyngeal exudate or posterior oropharyngeal erythema.   Eyes:      Extraocular Movements: Extraocular movements intact.      Pupils: Pupils are equal, round, and reactive to light.   Cardiovascular:      Rate and Rhythm: Normal rate and regular rhythm.      Heart sounds: No murmur heard.  Pulmonary:      Breath sounds: No wheezing, rhonchi or rales.      Comments: No oxygen in use.  Abdominal:      General: There is no distension.      Palpations: Abdomen is soft.   Genitourinary:     Comments: Voiding.  Musculoskeletal:         General: No swelling or tenderness.      Cervical back: Normal range of motion and neck supple.   Skin:     General: Skin is warm " and dry.   Neurological:      General: No focal deficit present.      Mental Status: She is alert and oriented to person, place, and time.   Psychiatric:         Mood and Affect: Mood normal.         Behavior: Behavior normal.         Thought Content: Thought content normal.         Judgment: Judgment normal.         Condition on Discharge: Stable for discharge home    Discharge Disposition:  Home or Self Care    Discharge Medications:     Discharge Medications        Changes to Medications        Instructions Start Date   levothyroxine 200 MCG tablet  Commonly known as: Synthroid  What changed: You were already taking a medication with the same name, and this prescription was added. Make sure you understand how and when to take each.   200 mcg, Oral, Daily, For 7 days begin 10/5/2024 then resume 175 mcg daily on 10/12 per PCP   Start Date: October 5, 2024     levothyroxine 175 MCG tablet  Commonly known as: SYNTHROID, LEVOTHROID  What changed:   additional instructions  These instructions start on October 12, 2024. If you are unsure what to do until then, ask your doctor or other care provider.   175 mcg, Oral, Daily, Resume 175 mcg on 10/12/2024   Start Date: October 12, 2024            Continue These Medications        Instructions Start Date   bismuth subsalicylate 262 MG chewable tablet  Commonly known as: Pepto-Bismol   524 mg, Oral, 4 Times Daily Before Meals & Nightly      carvedilol 25 MG tablet  Commonly known as: COREG   25 mg, Oral, 2 Times Daily      colestipol 1 g tablet  Commonly known as: COLESTID   1 g, Oral, Daily      FLUoxetine 40 MG capsule  Commonly known as: PROzac   40 mg, Oral, Daily      lisinopril 40 MG tablet  Commonly known as: PRINIVIL,ZESTRIL   40 mg, Oral, Daily      loperamide 2 MG capsule  Commonly known as: IMODIUM   4 mg, Oral, 4 Times Daily PRN      nitroglycerin 0.4 MG SL tablet  Commonly known as: NITROSTAT   0.4 mg, Sublingual, Every 5 Minutes PRN, Take no more than 3 doses in  15 minutes.      pantoprazole 40 MG EC tablet  Commonly known as: PROTONIX   40 mg, Oral, 2 Times Daily      potassium chloride 20 MEQ packet  Commonly known as: KLOR-CON   20 mEq, Oral, 2 Times Daily      promethazine 25 MG tablet  Commonly known as: PHENERGAN   25 mg, Oral, 3 Times Daily PRN      topiramate 50 MG tablet  Commonly known as: TOPAMAX   50 mg, Oral, 2 Times Daily             Stop These Medications      atorvastatin 10 MG tablet  Commonly known as: LIPITOR              Discharge Diet:   Diet Instructions       Diet: Gastrointestinal Diets; Low Irritant; Regular (IDDSI 7); Thin (IDDSI 0)      Discharge Diet: Gastrointestinal Diets    Gastrointestinal Diet: Low Irritant    Texture: Regular (IDDSI 7)    Fluid Consistency: Thin (IDDSI 0)            Activity at Discharge:   Activity Instructions       Activity as Tolerated              Discharge instructions:  1.  For worsening abdominal discomfort, lethargy, worsening chronic nausea and vomiting with diarrhea seek medical attention.  2.  Increase levothyroxine to 200 mcg orally daily for 1 week beginning 10/5/2024.  Primary care BREANA Lloyd to repeat thyroid panel 1 week and adjust levothyroxine accordingly.  3.  Continue Pepto-Bismol per Dr. Wang.  Continue Phenergan per PCP.  4.  Discontinue atorvastatin due to elevated liver function test  5.  Follow-up with BREANA Gonzalez 1 week with CMP and thyroid panel  6.  Dr. Wang arranging appointment with [facility] gastroenterology.    Follow-up Appointments:   Future Appointments   Date Time Provider Department Center   10/11/2024  1:00 PM Domitila Duncan MD [facility]       Test Results Pending at Discharge: None    Electronically signed by BREANA Mallory, 10/04/24, 06:44 CDT.    Time: 35 minutes.  Discussed with Dr. Varner and patient.    The above documentation resulted from a face-to-face encounter by me Lucero TOUSSAINT, [facility].

## 2024-10-03 NOTE — PLAN OF CARE
Problem: Adult Inpatient Plan of Care  Goal: Plan of Care Review  Outcome: Progressing  Flowsheets (Taken 10/3/2024 0446)  Progress: improving  Plan of Care Reviewed With: patient  Outcome Evaluation: VSS. AOx4. RA. IV L AC with NS at 100. Up SBA. SCD. No C/O pain thus far. GI soft diet. K replaced. Call light within reach, safety maintained.

## 2024-10-03 NOTE — PLAN OF CARE
Goal Outcome Evaluation:  Plan of Care Reviewed With: patient        Progress: improving  Outcome Evaluation: Pt A&O x4. VSS. C/o headache. See MAR. Room air. Tolerating diet. K replacement ordered. Safety maintained.

## 2024-10-03 NOTE — PAYOR COMM NOTE
"Robles Singh (49 y.o. Female)    [id number]     New INPT  Request  Admit inpt order written 10/3   [facility]   npi [phone]     yuval phone    fax   Icd 10 code  E03.9  hypothyroidism        Mery Kirby   npi  [phone]   Note pt observation 10/1 and 10/2           [id number]         Date of Birth   1975    Social Security Number       Address   [address] [facility] [address]    Home Phone   [phone]    MRN   [id number]       Rastafarian   [facility]    Marital Status                               Admission Date   10/1/24    Admission Type   Emergency    Admitting Provider   Mery Varner MD    Attending Provider   Mery Varner MD    Department, Room/Bed   [facility] 3C, 362/1       Discharge Date       Discharge Disposition       Discharge Destination                                 Attending Provider: Mery Varner MD    Allergies: No Known Allergies    Isolation: None   Infection: None   Code Status: CPR    Ht: 175.3 cm (69\")   Wt: 80.3 kg (177 lb)    Admission Cmt: None   Principal Problem: Hypothyroidism [E03.9]                   Active Insurance as of 10/1/2024       Primary Coverage       Payor Plan Insurance Group Employer/Plan Group    [employer] [place] [employer] [place]ICA[place]        Payor Plan Address Payor Plan Phone Number Payor Plan Fax Number Effective Dates    [address] [phone]  6/8/2020 - None Entered    [facility] [id number]         Subscriber Name Subscriber Birth Date Member ID       ROBLES SINGH 1975 [id number]                     Emergency Contacts        (Rel.) Home Phone Work Phone Mobile Phone    Kirill Montgomery (Spouse) [phone] -- [phone]                 History & Physical        , BREANA Manning at 10/01/24 1651       Attestation signed by Mery Varner MD at 10/01/24 1749    I performed a substantive part of the MDM during the " patient’s E/M visit. I personally made or   approved the documented management plan and acknowledge its risk of complications.     Patient is known to me.  She was recently admitted here a couple of weeks ago with complaints of chronic diarrhea and was hypokalemic at the time.  She is back here today complaining of inability to tolerate p.o. intake due to vomiting and chronic diarrhea.  She has had this issue for several months and has seen a gastroenterologist and workup thus far has not yielded any results.  She was referred to specialists in [place].    She is now admitted for hydration with IV fluids, IV Synthroid since she has not been tolerating p.o. meds ,replacement of electrolytes and control of her blood pressure.    Imaging reviewed.     Management/test interpretation discussed with BREANA Mckeon.    Electronically signed by Mery Varner MD, 10/1/2024, 17:41 CDT.                      [facility] Medicine Services  HISTORY AND PHYSICAL    Date of Admission: 10/1/2024  Primary Care Physician: Aidee Padilla APRN    Subjective   Primary Historian: Patient    Chief Complaint: Nominal discomfort, lethargy and continuation of chronic nausea and vomiting.    Abdominal Pain  Associated symptoms include nausea and vomiting.     Minnie Bang is a 49-year-old female with a past medical history of COPD, hypertension, diastolic dysfunction, pneumonia, depression, anxiety, hyperlipidemia, NSTEMI, pulmonary emphysema, bicuspid aortic valve, hypothyroidism, and significant history for chronic dysphagia, nausea and vomiting followed by Dr. Wang patient has been able to tolerate almost any p.o. approximately 9 months with increasing concern over the last 3 months.  Patient has been treated with multiple different medications per BREANA Nicholson with Dr. Wang, most recently they have exhausted all efforts and had transferred patient to be seen in consultation with  [facility] gastroenterology which is currently being scheduled.  Patient has no new complaints, chronic nausea and vomiting associated with diarrhea and dysphagia.  Patient additionally stated she is just been feeling more rundown and was concerned that she had something else wrong with her.    Patient was made aware that we will try to make her as comfortable as possible but there will be no new interventions to assist with her chronic nausea and vomiting and she will need to continue her referral with [facility].  However  Did present with a CH level 1.4.8 and a free T4 of 0.21, most likely due to patient's inability to tolerate her p.o. medication.  Patient will be placed on IV Synthroid 75 mcg monitor closely for the next 24 to 48 hours.  Additionally is hypertensive as she has been unable to tolerate her p.o. medications, Patient will be placed on Lopressor 5 mg every 8 hours.  Patient's ethanol level was 0.039 upon admission, patient states she has not drank any alcohol for 1 month, patient placed on CIWA protocol.  Verbalized understanding of admission for hypothyroidism and hydration and that she will need to continue with her process with [facility] for her chronic nausea and vomiting.  All patient's questions were answered to the best my ability and she is in agreement for evaluation and treatment.      Review of Systems   Constitutional:  Positive for fatigue.   Respiratory:  Negative for shortness of breath.    Cardiovascular:  Negative for chest pain.   Gastrointestinal:  Positive for abdominal pain, nausea and vomiting.   Neurological:  Positive for weakness.      Otherwise complete ROS reviewed and negative except as mentioned in the HPI.    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    CTS (carpal tunnel syndrome) 2019    Dental disease     Depression     Diastolic dysfunction 2022    Dizziness     Emphysema of lung 2020    GERD (gastroesophageal reflux disease)     Headache     Hyperlipidemia     Hypertension     Hypothyroidism     Mixed simple and mucopurulent chronic bronchitis 04/18/2023    Non-occlusive coronary artery disease 05/04/2023    NSTEMI (non-ST elevated myocardial infarction) (HCC)     Pneumonia 2022    Pulmonary emphysema 04/21/2023    Shingles 2018    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     Past Surgical History:  Past Surgical History:   Procedure Laterality Date    APPENDECTOMY      CARDIAC CATHETERIZATION N/A 11/04/2017    Procedure: Coronary angiography;  Surgeon: Luis Covlin MD;  Location: [facility] CATH INVASIVE LOCATION;  Service:     CARDIAC CATHETERIZATION N/A 05/31/2023    Procedure: Left Heart Cath;  Surgeon: Steffen Rossi MD;  Location: [facility] CATH INVASIVE LOCATION;  Service: Cardiology;  Laterality: N/A;    CARPAL TUNNEL RELEASE  2019    COLONOSCOPY N/A 8/2/2024    Procedure: COLONOSCOPY WITH ANESTHESIA;  Surgeon: Marty Browning MD;  Location: [facility] ENDOSCOPY;  Service: Gastroenterology;  Laterality: N/A;  pre op pancolitis    ENDOSCOPY N/A 7/8/2024    Procedure: ESOPHAGOGASTRODUODENOSCOPY WITH ANESTHESIA;  Surgeon: Gordy Wang MD;  Location: [facility] ENDOSCOPY;  Service: Gastroenterology;  Laterality: N/A;  pre: dysphagia.   post: esophagus dilated.   no PCP    HYSTERECTOMY      PANENDOSCOPY N/A 5/30/2024    Procedure: DIRECT LARYNGOSCOPY WITH BIOPSY OF VOCAL CORD POLYPS WITH POSSIBLE TRACHEOSTOMY;  Surgeon: Mendez Padilla MD;  Location: [facility] OR;  Service: ENT;  Laterality: N/A;    KS RT/LT HEART CATHETERS N/A 11/04/2017    Procedure: Percutaneous Coronary Intervention;  Surgeon: Luis Colvin MD;  Location: [facility] CATH INVASIVE LOCATION;  Service: Cardiovascular    THYROID SURGERY      TOTAL THYROIDECTOMY       TRACHEOSTOMY N/A 5/30/2024    Procedure: POSSIBLE TRACHEOSTOMY;  Surgeon: Mendez Padilla MD;  Location: [facility];  Service: ENT;  Laterality: N/A;     Social History:  reports that she quit smoking about 7 months ago. Her smoking use included cigarettes. She started smoking about 33 years ago. She has a 30 pack-year smoking history. She has been exposed to tobacco smoke. She has never used smokeless tobacco. She reports that she does not currently use alcohol. She reports that she does not use drugs.    Family History: family history includes Asthma in her mother; Cancer in her father and mother; Colon cancer in her father; Emphysema in her mother; Heart failure in her father; Hypertension in her father.       Allergies:  No Known Allergies    Medications:  Prior to Admission medications    Medication Sig Start Date End Date Taking? Authorizing Provider   atorvastatin (LIPITOR) 10 MG tablet Take 1 tablet by mouth Daily. 8/7/24   Kirill Chakraborty,    bismuth subsalicylate (Pepto-Bismol) 262 MG chewable tablet Chew 2 tablets 4 (Four) Times a Day Before Meals & at Bedtime. 9/20/24   Mery Varner MD   carvedilol (COREG) 25 MG tablet Take 1 tablet by mouth 2 (Two) Times a Day. 9/20/24   Mery Varner MD   colestipol (COLESTID) 1 g tablet Take 1 tablet by mouth 2 (Two) Times a Day. 9/6/24   Rakel Padilla APRN   levothyroxine (SYNTHROID, LEVOTHROID) 175 MCG tablet Take 1 tablet by mouth Daily.    ProviderGaldino MD   loperamide (IMODIUM) 2 MG capsule Take 2 capsules by mouth 4 (Four) Times a Day As Needed for Diarrhea. 9/20/24   Mery Varner MD   nitroglycerin (NITROSTAT) 0.4 MG SL tablet Place 1 tablet under the tongue Every 5 (Five) Minutes As Needed for Chest Pain. Take no more than 3 doses in 15 minutes.    ProviderGaldino MD   pantoprazole (PROTONIX) 40 MG EC tablet Take 1 tablet by mouth 2 (Two) Times a Day. 9/20/24   Mery Varner MD   POTASSIUM PO Take  by mouth 2  "(Two) Times a Day.    Provider, MD Galdino   topiramate (TOPAMAX) 50 MG tablet Take 1 tablet by mouth 2 (Two) Times a Day. 6/13/24   Domitila Duncan MD     I have utilized all available immediate resources to obtain, update, or review the patient's current medications (including all prescriptions, over-the-counter products, herbals, cannabis/cannabidiol products, and vitamin/mineral/dietary (nutritional) supplements).    Objective     Vital Signs: BP (!) 155/102   Pulse 89   Temp 97.7 °F (36.5 °C) (Oral)   Resp 18   Ht 175.3 cm (69\")   Wt 75.8 kg (167 lb)   LMP  (LMP Unknown)   SpO2 95%   BMI 24.66 kg/m²   Physical Exam  Vitals and nursing note reviewed.   Constitutional:       Appearance: Normal appearance.   HENT:      Head: Normocephalic and atraumatic.      Nose: Nose normal.      Mouth/Throat:      Mouth: Mucous membranes are moist.      Pharynx: Oropharynx is clear.   Eyes:      Pupils: Pupils are equal, round, and reactive to light.   Cardiovascular:      Rate and Rhythm: Normal rate and regular rhythm.      Pulses: Normal pulses.      Heart sounds: Normal heart sounds.   Pulmonary:      Effort: No tachypnea, accessory muscle usage or respiratory distress.      Breath sounds: Normal breath sounds.   Abdominal:      General: Abdomen is protuberant. Bowel sounds are normal. There is no distension.      Palpations: Abdomen is soft.      Tenderness: There is generalized abdominal tenderness.   Genitourinary:     Comments: Voiding per bathroom privileges  Musculoskeletal:         General: Normal range of motion.      Cervical back: Normal range of motion and neck supple.      Right lower leg: No edema.      Left lower leg: No edema.   Skin:     General: Skin is warm.      Capillary Refill: Capillary refill takes less than 2 seconds.      Coloration: Skin is pale.   Neurological:      General: No focal deficit present.      Mental Status: She is alert and oriented to person, place, and time. "   Psychiatric:         Mood and Affect: Mood normal.         Behavior: Behavior normal.         Thought Content: Thought content normal.         Judgment: Judgment normal.        Results Reviewed:  Lab Results (last 24 hours)       Procedure Component Value Units Date/Time    Urinalysis With Microscopic If Indicated (No Culture) - Urine, Clean Catch [[id number]]  (Abnormal) Collected: 10/01/24 1618    Specimen: Urine, Clean Catch Updated: 10/01/24 1639     Color, UA Yellow     Appearance, UA Clear     pH, UA 6.0     Specific Gravity, UA >1.030     Glucose, UA Negative     Ketones, UA Negative     Bilirubin, UA Negative     Blood, UA Negative     Protein, UA Negative     Leuk Esterase, UA Negative     Nitrite, UA Negative     Urobilinogen, UA 0.2 E.U./dL    Narrative:      Urine microscopic not indicated.    Ammonia [[id number]] Collected: 10/01/24 1617    Specimen: Blood Updated: 10/01/24 1633    T3, Free [[id number]] Collected: 10/01/24 1247    Specimen: Blood Updated: 10/01/24 1437    Single High Sensitivity Troponin T [[id number]]  (Normal) Collected: 10/01/24 1247    Specimen: Blood Updated: 10/01/24 1355     HS Troponin T 11 ng/L     Narrative:      High Sensitive Troponin T Reference Range:  <14.0 ng/L- Negative Female for AMI  <22.0 ng/L- Negative Male for AMI  >=14 - Abnormal Female indicating possible myocardial injury.  >=22 - Abnormal Male indicating possible myocardial injury.   Clinicians would have to utilize clinical acumen, EKG, Troponin, and serial changes to determine if it is an Acute Myocardial Infarction or myocardial injury due to an underlying chronic condition.         Magnesium [[id number]]  (Normal) Collected: 10/01/24 1247    Specimen: Blood Updated: 10/01/24 1353     Magnesium 1.8 mg/dL     TSH [[id number]]  (Abnormal) Collected: 10/01/24 1247    Specimen: Blood Updated: 10/01/24 1352     .800 uIU/mL     T4, Free [[id number]]  (Abnormal) Collected: 10/01/24 1247    Specimen: Blood  Updated: 10/01/24 1330     Free T4 0.21 ng/dL     Comprehensive Metabolic Panel [[id number]]  (Abnormal) Collected: 10/01/24 1247    Specimen: Blood Updated: 10/01/24 1327     Glucose 90 mg/dL      BUN 5 mg/dL      Creatinine 0.64 mg/dL      Sodium 139 mmol/L      Potassium 2.9 mmol/L      Chloride 92 mmol/L      CO2 25.0 mmol/L      Calcium 9.2 mg/dL      Total Protein 6.9 g/dL      Albumin 3.6 g/dL      ALT (SGPT) 58 U/L      AST (SGOT) 150 U/L      Alkaline Phosphatase 740 U/L      Total Bilirubin 0.8 mg/dL      Globulin 3.3 gm/dL      A/G Ratio 1.1 g/dL      BUN/Creatinine Ratio 7.8     Anion Gap 22.0 mmol/L      eGFR 107.1 mL/min/1.73     Narrative:      GFR Normal >60  Chronic Kidney Disease <60  Kidney Failure <15      Lipase [[id number]]  (Abnormal) Collected: 10/01/24 1247    Specimen: Blood Updated: 10/01/24 1322     Lipase 12 U/L     Ethanol [[id number]] Collected: 10/01/24 1247    Specimen: Blood Updated: 10/01/24 1322     Ethanol % 0.039 %     Narrative:      Not for legal purposes. Chain of Custody not followed.     CBC & Differential [[id number]]  (Abnormal) Collected: 10/01/24 1247    Specimen: Blood Updated: 10/01/24 1302    Narrative:      The following orders were created for panel order CBC & Differential.  Procedure                               Abnormality         Status                     ---------                               -----------         ------                     CBC Auto Differential[[id number]]        Abnormal            Final result                 Please view results for these tests on the individual orders.    CBC Auto Differential [[id number]]  (Abnormal) Collected: 10/01/24 1247    Specimen: Blood Updated: 10/01/24 1302     WBC 9.25 10*3/mm3      RBC 3.51 10*6/mm3      Hemoglobin 12.2 g/dL      Hematocrit 36.2 %      .1 fL      MCH 34.8 pg      MCHC 33.7 g/dL      RDW 15.5 %      RDW-SD 57.7 fl      MPV 10.8 fL      Platelets 325 10*3/mm3      Neutrophil % 54.2 %       Lymphocyte % 31.6 %      Monocyte % 9.8 %      Eosinophil % 2.1 %      Basophil % 1.3 %      Immature Grans % 1.0 %      Neutrophils, Absolute 5.02 10*3/mm3      Lymphocytes, Absolute 2.92 10*3/mm3      Monocytes, Absolute 0.91 10*3/mm3      Eosinophils, Absolute 0.19 10*3/mm3      Basophils, Absolute 0.12 10*3/mm3      Immature Grans, Absolute 0.09 10*3/mm3      nRBC 0.0 /100 WBC           Imaging Results (Last 24 Hours)       Procedure Component Value Units Date/Time    CT Abdomen Pelvis With Contrast [[id number]] Collected: 10/01/24 1415     Updated: 10/01/24 1428    Narrative:      EXAMINATION: CT ABDOMEN PELVIS W CONTRAST-      10/1/2024 1:08 PM     HISTORY: abdominal pain     In order to have a CT radiation dose as low as reasonably achievable  Automated Exposure Control was utilized for adjustment of the mA and/or  KV according to patient size.     Total DLP = 388.27 mGy.cm     The CT scan of the abdomen and pelvis should performed after intravenous  contrast enhancement.     Images are acquired in axial plane and subsequent reconstruction in  coronal and sagittal planes.     The comparison is made with the previous study dated 9/16/2024.     The lung bases included in the study are unremarkable except for mild  dependent atelectatic changes.     Limited visualized cardiomediastinal structures are unremarkable.     There is significant hepatic steatosis. There is moderate hepatomegaly  similar to the previous study. No focal intrinsic abnormality. The  spleen is normal.     No radiopaque gallstones.     Pancreas is normal.     The adrenal glands bilaterally are normal.     Mild lobulation of renal contour bilaterally. No discrete mass. No  radiopaque calculi. No hydronephrosis. Limited visualized ureters are  nondilated. The urinary bladder is poorly distended. No intrinsic  abnormality.     The uterus is absent. No adnexal masses.     There is evidence of previous anterior abdominal wall hernia  repair  surgery. No complication.     The stomach is decompressed. No focal abnormality [place]ast. Duodenum is  normal. Small bowel is nondistended. The appendix is surgically absent.  The cecum lies low in the pelvis. There is decompression of the entire  large bowel. There is fatty infiltration of the wall of the entire colon  which is a nonspecific finding and may suggest chronic inflammatory  process. This is similar to the previous study. No finding to suggest  acute inflammatory process. No surrounding peritoneal fat infiltration  or pericolonic increased vascularity.     Atheromatous changes of the abdominal aorta. No aneurysm dilatation.     There is no evidence of abdominal or pelvic lymphadenopathy.     Images reviewed in bone window show chronic degenerative changes of the  lumbar spine with severe degeneration of the disc L5-S1. There is  bilateral femoral head osteonecrosis slightly more pronounced on the  right side.       Impression:      1. No acute abnormality of the abdomen or pelvis to account for  patient's symptoms.  2. The colon is decompressed with fatty infiltration of the wall of the  entire colon similar to the previous study. These are nonspecific  finding and may be seen in chronic inflammatory disease of the bowel. No  finding to suggest acute colitis.  3. A significant hepatic steatosis as in the previous study. Persistent  moderate hepatomegaly.  4. Bilateral femoral head osteonecrosis is similar to the previous  study.                                            This report was signed and finalized on 10/1/2024 2:25 PM by Dr. Karmen Teague MD.             I have personally reviewed and interpreted the radiology studies and ECG obtained at time of admission.     Assessment / Plan   Assessment:   Active Hospital Problems    Diagnosis     Hypothyroidism, worsening     Nausea & vomiting, chronic     Steatosis of liver, advanced     ETOH abuse     Diarrhea     Primary hypertension         Treatment Plan  The patient will be admitted to Dr. Varner service here at [facility].    1.  Hypothyroidism-IV Synthroid 75 mcg's daily, will check a.m. TSH, T4    2.  Nausea vomiting and dysphagia, chronic-continue patient's gastroenterology protocol including Pepto-Bismol 4 times daily, Zofran and Compazine as needed.  Will attempt to see if patient can tolerate full liquids.    3.  Steatosis of the liver advanced, EtOH abuse-, ALT 58, total bilirubin 0.8, ammonia 40, lipase 12, will continue to monitor closely    4.  Primary hypertension-patient presented hypertensive at 155/102, states her blood pressure has been running very high lately most likely due to patient's inability to tolerate p.o. medications.  Initiate Lopressor 5 mg every 8 hours, every 4 vital signs will continue to monitor for titration if needed    VTE prophylaxis with SCDs  Labs in a.m.    Medical Decision Making  Number and Complexity of problems: 6  Differential Diagnosis: None    Conditions and Status        Condition is unchanged.     [facility] Data  External documents reviewed: Extensive review of gastroenterology office notes and referrals   Cardiac tracing (EKG, telemetry) interpretation: 10/1/2024 EKG per cardiology reviewed  Radiology interpretation: 1/20/2024 CT of the abdomen pelvis per radiology reviewed  Labs reviewed: 1/20/2024 CMP, CBC, lipase, ethanol, TSH, magnesium, ammonia, urinalysis reviewed, repeat labs in a.m.  Any tests that were considered but not ordered: None     Decision rules/scores evaluated (example [id number], Wells, etc): None     Discussed with: Dr. Varner and patient     Care Planning  Shared decision making: Dr. Varner and patient  Code status and discussions: Full code per patient    Disposition  Social Determinants of Health that impact treatment or disposition: None  Estimated length of stay is 1-2 days.     I confirmed that the patient's advanced care plan is present, code  status is documented, and a surrogate decision maker is listed in the patient's medical record.     The patient's surrogate decision maker is her  Kirill.     The patient was seen and examined by me on 10/1/2024 at 1719.    Electronically signed by BREANA Mckeon, 10/01/24, 16:54 CDT.               Electronically signed by Mery Varner MD at 10/01/24 1746          Emergency Department Notes        May Headley APRN at 10/01/24 1256       Attestation signed by Juancarlos Eng DO at 10/02/24 1217        SUPERVISE: For this patient encounter, I reviewed the APC's documentation, treatment plan, and medical decision making.  Juancarlos Eng DO 10/2/2024 12:16 CDT                         Subjective   History of Present Illness  Patient is a 49-year-old female who presents to the ER with complaints of chronic abdominal pain and chronic diarrhea.  She states that she has had right upper quadrant and epigastric pain for the past several months.  She reports chronic nausea, vomiting, and diarrhea.  Patient does use alcohol on a regular basis.  She denies any black or bloody stools.  She had a recent admission to this facility for similar symptoms.      Per review patient was admitted:  9/16/2024 -  9/20/2024    Patient has history of lipidemia, hypothyroidism and chronic diarrhea for the past 8 months.  She was sent here by her GI doctor when she was found to have low potassium levels on BMP.  She reports that her diarrhea started progressively almost a year ago but has become worse over the past 8 months.  Patient has had 20 bowel movements per day including throughout the night.  She had a colonoscopy a few weeks ago which was normal per the patient.  She has also had an unremarkable MRCP which was done for elevated aminotransferases.  Seen in the gastroenterology office on 9/13, at that time fecal fat was negative and urine 5-HIAA were ordered but not yet turned into lab.  VIP levels  were in process.  She told me she has a history of heavy drinking (6 beers daily for several years) but has cut down several years ago and started consuming 6 whiskey shots 3 times a week on the weekends.  However she has completely stopped consuming alcohol for the past 6 months due to the diarrhea.  No hx of pancreatitis. However on the office visit with GI in August she reported drinking 2 shots of liquor daily.     Discharge dx:    · Steatosis of liver, advanced    · ETOH abuse    · Nonspecific colitis    · Primary hypertension    · Pulmonary emphysema     Hospital Course:      Pt had a colonoscopy a few weeks ago which was normal per the patient.  She has also had an unremarkable MRCP which was done for elevated aminotransferases.  Seen in the gastroenterology office on 9/13, at that time fecal fat was negative and urine 5-HIAA were ordered but not yet turned into lab.  VIP levels were in process.  She told me she has a history of heavy drinking (6 beers daily for several years) but has cut down several years ago and started consuming 6 whiskey shots 3 times a week on the weekends.  However she has completely stopped consuming alcohol for the past 6 months due to the diarrhea.  She denies having had history of pancreatitis in the past.  However on the office visit with GI in August she reported drinking 2 shots of liquor daily.      She came in and was found to have a potassium was low was 2.2 secondary to the diarrhea.  Potassium was adequately replaced and went up to 4.0 on the day of discharge.  She received as needed Imodium, colestipol and PPI.  She also received IV fluids.  She will follow-up with her gastroenterologist who had already started workup.  She also has a pending referral to a specialist in [facility].  She was advised to quit alcohol as it may be one of the culprits of her diarrhea.     Other chronic problems included hypertension and hypothyroidism which were managed with her home  meds.     (See note for complete details)    As described above, patient returns with nausea, vomiting, diarrhea, and abdominal pain all described as chronic in nature.  She has had no recorded fevers.  Past medical history significant for anxiety, arthritis, asthma, bicuspid aortic valve, COPD, carpal tunnel syndrome, depression, diastolic dysfunction, emphysema, GERD, hyperlipidemia, hypertension, hypothyroidism, nonocclusive coronary disease, NSTEMI, pneumonia, emphysema, shingles, vocal cord polyp        Review of Systems   Constitutional:  Negative for fever.   HENT: Negative.  Negative for congestion.    Respiratory: Negative.  Negative for cough and shortness of breath.    Cardiovascular: Negative.  Negative for chest pain.   Gastrointestinal:  Positive for abdominal pain, diarrhea, nausea and vomiting. Negative for constipation.   Genitourinary: Negative.  Negative for dysuria.   Skin: Negative.  Negative for rash.   Neurological:  Positive for weakness.   All other systems reviewed and are negative.      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    CTS (carpal tunnel syndrome) 2019    Dental disease     Depression     Diastolic dysfunction 2022    Dizziness     Emphysema of lung 2020    GERD (gastroesophageal reflux disease)     Headache     Hyperlipidemia     Hypertension     Hypothyroidism     Mixed simple and mucopurulent chronic bronchitis 04/18/2023    Non-occlusive coronary artery disease 05/04/2023    NSTEMI (non-ST elevated myocardial infarction) (HCC)     Pneumonia 2022    Pulmonary emphysema 04/21/2023    Shingles 2018    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       No Known Allergies    Past Surgical History:   Procedure Laterality Date    APPENDECTOMY      CARDIAC CATHETERIZATION N/A 11/04/2017    Procedure: Coronary  angiography;  Surgeon: Luis Colvin MD;  Location:  [facility] CATH INVASIVE LOCATION;  Service:     CARDIAC CATHETERIZATION N/A 2023    Procedure: Left Heart Cath;  Surgeon: Steffen Rossi MD;  Location:  [facility] CATH INVASIVE [facility];  Service: Cardiology;  Laterality: N/A;    CARPAL TUNNEL RELEASE  2019    COLONOSCOPY N/A 2024    Procedure: COLONOSCOPY WITH ANESTHESIA;  Surgeon: Marty Browning MD;  Location: [facility] ENDOSCOPY;  Service: Gastroenterology;  Laterality: N/A;  pre op pancolitis    ENDOSCOPY N/A 2024    Procedure: ESOPHAGOGASTRODUODENOSCOPY WITH ANESTHESIA;  Surgeon: Gordy Wang MD;  Location: [facility] ENDOSCOPY;  Service: Gastroenterology;  Laterality: N/A;  pre: dysphagia.   post: esophagus dilated.   no PCP    HYSTERECTOMY      PANENDOSCOPY N/A 2024    Procedure: DIRECT LARYNGOSCOPY WITH BIOPSY OF VOCAL CORD POLYPS WITH POSSIBLE TRACHEOSTOMY;  Surgeon: Mendez Padilla MD;  Location: [facility] OR;  Service: ENT;  Laterality: N/A;    IN RT/LT HEART CATHETERS N/A 2017    Procedure: Percutaneous Coronary Intervention;  Surgeon: Luis Colvin MD;  Location: [facility] CATH INVASIVE LOCATION;  Service: Cardiovascular    THYROID SURGERY      TOTAL THYROIDECTOMY      TRACHEOSTOMY N/A 2024    Procedure: POSSIBLE TRACHEOSTOMY;  Surgeon: Mendez Padilla MD;  Location: [facility] OR;  Service: ENT;  Laterality: N/A;       Family History   Problem Relation Age of Onset    Asthma Mother     Cancer Mother     Emphysema Mother     Colon cancer Father     Cancer Father     Heart failure Father     Hypertension Father        Social History     Socioeconomic History    Marital status:    Tobacco Use    Smoking status: Former     Current packs/day: 0.00     Average packs/day: 0.7 packs/day for 45.9 years (30.0 ttl pk-yrs)     Types: Cigarettes     Start date: 1991     Quit date: 3/1/2024     Years since quittin.5     Passive exposure: Past    Smokeless tobacco: Never     Tobacco comments:     Less than 1/2 pack a day    Vaping Use    Vaping status: Never Used   Substance and Sexual Activity    Alcohol use: Not Currently     Comment: nothing to drink in 3 1/2 weeks    Drug use: No    Sexual activity: Not Currently     Partners: Male     Birth control/protection: Hysterectomy           Objective   Physical Exam  Vitals and nursing note reviewed.   Constitutional:       Appearance: She is well-developed.   HENT:      Head: Normocephalic and atraumatic.      Nose: Nose normal.   Eyes:      Conjunctiva/sclera: Conjunctivae normal.      Pupils: Pupils are equal, round, and reactive to light.   Cardiovascular:      Rate and Rhythm: Normal rate and regular rhythm.      Heart sounds: Normal heart sounds.   Pulmonary:      Effort: Pulmonary effort is normal.      Breath sounds: Normal breath sounds.   Abdominal:      General: Bowel sounds are normal.      Palpations: Abdomen is soft.      Tenderness: There is abdominal tenderness in the right upper quadrant and epigastric area.   Musculoskeletal:         General: Normal range of motion.      Cervical back: Normal range of motion and neck supple.   Skin:     General: Skin is warm and dry.      Capillary Refill: Capillary refill takes less than 2 seconds.   Neurological:      Mental Status: She is alert and oriented to person, place, and time.   Psychiatric:         Behavior: Behavior normal.         Procedures          ED Course                                             Medical Decision Making  Patient is a 49-year-old female who presents to the ER with complaints of chronic abdominal pain and chronic diarrhea.  She states that she has had right upper quadrant and epigastric pain for the past several months.  She reports chronic nausea, vomiting, and diarrhea.  Patient does use alcohol on a regular basis.  She denies any black or bloody stools.  She had a recent admission to this facility for similar symptoms.      Per review patient was  admitted:  9/16/2024 -  9/20/2024    Patient has history of lipidemia, hypothyroidism and chronic diarrhea for the past 8 months.  She was sent here by her GI doctor when she was found to have low potassium levels on BMP.  She reports that her diarrhea started progressively almost a year ago but has become worse over the past 8 months.  Patient has had 20 bowel movements per day including throughout the night.  She had a colonoscopy a few weeks ago which was normal per the patient.  She has also had an unremarkable MRCP which was done for elevated aminotransferases.  Seen in the gastroenterology office on 9/13, at that time fecal fat was negative and urine 5-HIAA were ordered but not yet turned into lab.  VIP levels were in process.  She told me she has a history of heavy drinking (6 beers daily for several years) but has cut down several years ago and started consuming 6 whiskey shots 3 times a week on the weekends.  However she has completely stopped consuming alcohol for the past 6 months due to the diarrhea.  No hx of pancreatitis. However on the office visit with GI in August she reported drinking 2 shots of liquor daily.     Discharge dx:    · Steatosis of liver, advanced    · ETOH abuse    · Nonspecific colitis    · Primary hypertension    · Pulmonary emphysema     Hospital Course:      Pt had a colonoscopy a few weeks ago which was normal per the patient.  She has also had an unremarkable MRCP which was done for elevated aminotransferases.  Seen in the gastroenterology office on 9/13, at that time fecal fat was negative and urine 5-HIAA were ordered but not yet turned into lab.  VIP levels were in process.  She told me she has a history of heavy drinking (6 beers daily for several years) but has cut down several years ago and started consuming 6 whiskey shots 3 times a week on the weekends.  However she has completely stopped consuming alcohol for the past 6 months due to the diarrhea.  She denies having had  history of pancreatitis in the past.  However on the office visit with GI in August she reported drinking 2 shots of liquor daily.      She came in and was found to have a potassium was low was 2.2 secondary to the diarrhea.  Potassium was adequately replaced and went up to 4.0 on the day of discharge.  She received as needed Imodium, colestipol and PPI.  She also received IV fluids.  She will follow-up with her gastroenterologist who had already started workup.  She also has a pending referral to a specialist in [place].  She was advised to quit alcohol as it may be one of the culprits of her diarrhea.     Other chronic problems included hypertension and hypothyroidism which were managed with her home meds.     (See note for complete details)    As described above, patient returns with nausea, vomiting, diarrhea, and abdominal pain all described as chronic in nature.  She has had no recorded fevers.  Past medical history significant for anxiety, arthritis, asthma, bicuspid aortic valve, COPD, carpal tunnel syndrome, depression, diastolic dysfunction, emphysema, GERD, hyperlipidemia, hypertension, hypothyroidism, nonocclusive coronary disease, NSTEMI, pneumonia, emphysema, shingles, vocal cord polyp    Differential diagnosis: Dehydration, colitis, alcohol abuse, electrolyte abnormality, and other    Labs Reviewed  COMPREHENSIVE METABOLIC PANEL - Abnormal; Notable for the following components:     BUN                           5 (*)                  Potassium                     2.9 (*)                Chloride                      92 (*)                 ALT (SGPT)                    58 (*)                 AST (SGOT)                    150 (*)                Alkaline Phosphatase          740 (*)                Anion Gap                     22.0 (*)            All other components within normal limits         Narrative: GFR Normal >60                  Chronic Kidney Disease <60                  Kidney  Failure <15                    LIPASE - Abnormal; Notable for the following components:     Lipase                        12 (*)              All other components within normal limits  CBC WITH AUTO DIFFERENTIAL - Abnormal; Notable for the following components:     RBC                           3.51 (*)               MCV                           103.1 (*)               MCH                           34.8 (*)               RDW                           15.5 (*)               RDW-SD                        57.7 (*)               Immature Grans %              1.0 (*)                Monocytes, Absolute           0.91 (*)               Immature Grans, Absolute      0.09 (*)            All other components within normal limits  TSH - Abnormal; Notable for the following components:     TSH                           142.800 (*)            All other components within normal limits  T4, FREE - Abnormal; Notable for the following components:     Free T4                       0.21 (*)            All other components within normal limits  MAGNESIUM - Normal  SINGLE HS TROPONIN T - Normal         Narrative: High Sensitive Troponin T Reference Range:                  <14.0 ng/L- Negative Female for AMI                  <22.0 ng/L- Negative Male for AMI                  >=14 - Abnormal Female indicating possible myocardial injury.                  >=22 - Abnormal Male indicating possible myocardial injury.                   Clinicians would have to utilize clinical acumen, EKG, Troponin, and serial changes to determine if it is an Acute Myocardial Infarction or myocardial injury due to an underlying chronic condition.                                       ETHANOL         Narrative: Not for legal purposes. Chain of Custody not followed.   URINALYSIS W/ MICROSCOPIC IF INDICATED (NO CULTURE)  T3, FREE  AMMONIA  CBC AND DIFFERENTIAL     CT Abdomen Pelvis With Contrast   Final Result    1. No acute abnormality of the abdomen or pelvis to  account for    patient's symptoms.    2. The colon is decompressed with fatty infiltration of the wall of the    entire colon similar to the previous study. These are nonspecific    finding and may be seen in chronic inflammatory disease of the bowel. No    finding to suggest acute colitis.    3. A significant hepatic steatosis as in the previous study. Persistent    moderate hepatomegaly.    4. Bilateral femoral head osteonecrosis is similar to the previous    study.                                                                          This report was signed and finalized on 10/1/2024 2:25 PM by Dr. Karmen Teague MD.       Patient presents with chronic diarrhea as well as nausea with vomiting.  She had hypokalemia which has been replaced in the emergency department.  She has an elevated TSH.  Spoke with ENT who was willing to assist with adjustment of medication if hospitalist wanted to consult and/or would see the patient as an outpatient to adjust medications.  Patient continued to have nausea with vomiting in the ER as well as abdominal discomfort. Dr. Eng evaluated and feels patient needs to be re-admitted. Discussed with hospitalist who is agreeable for admission. See their note for details.    Problems Addressed:  Alcohol abuse, daily use: chronic illness or injury  Chronic diarrhea: chronic illness or injury  Elevated TSH: acute illness or injury  Hepatic steatosis: chronic illness or injury  Hypokalemia: acute illness or injury     Details: Acute on chronic  Nausea and vomiting, unspecified vomiting type: chronic illness or injury    Amount and/or Complexity of Data Reviewed  Labs: ordered. Decision-making details documented in ED Course.  Radiology: ordered. Decision-making details documented in ED Course.  ECG/medicine tests: ordered. Decision-making details documented in ED Course.  Discussion of management or test interpretation with external provider(s): Spoke with ent  Spoke with  hospitalist    Risk  Prescription drug management.  Decision regarding hospitalization.        Final diagnoses:   Nausea and vomiting, unspecified vomiting type   Elevated TSH   Hypokalemia   Chronic diarrhea   Alcohol abuse, daily use   Hepatic steatosis   Intractable vomiting with nausea       ED Disposition  ED Disposition       ED Disposition   Decision to Admit    Condition   --    Comment   Level of Care: Med/Surg [1]   Diagnosis: Intractable vomiting with nausea [[id number]]   Admitting Physician: LARRY BENDER [[id number]]   Attending Physician: LARRY BENDER [[id number]]                 No follow-up provider specified.       Medication List      No changes were made to your prescriptions during this visit.            May Headley, APRN  10/01/24 1626      Electronically signed by Juancarlos Eng DO at 10/02/24 1217       Vital Signs (last 3 days)       Date/Time Temp Temp src Pulse Resp BP Patient Position SpO2    10/03/24 0742 98.2 (36.8) Oral 80 18 148/92 Lying 97    10/03/24 0353 98.2 (36.8) Oral 74 18 128/82 Lying 98    10/03/24 0022 97.9 (36.6) Oral 80 18 135/71 Lying 100    10/02/24 1929 98.5 (36.9) Oral 79 18 109/63 Lying 96    10/02/24 1744 -- -- 78 -- 122/77 -- --    10/02/24 1525 97.9 (36.6) Oral 75 18 104/75 Lying 94    10/02/24 1137 97.8 (36.6) Oral 64 18 125/84 Lying 95    10/02/24 0750 98.4 (36.9) Oral 65 18 123/88 Lying 96    10/02/24 0332 97.6 (36.4) Oral 73 18 131/70 Lying 96    10/02/24 0102 -- -- -- -- 141/79 Lying --    10/02/24 0012 98 (36.7) Oral 68 18 171/102  Lying 97    BP: nurse noted at 10/02/24 0012    10/01/24 2100 98.3 (36.8) Oral 72 18 168/100  Lying 99    BP: nurse noted at 10/01/24 2100    10/01/24 1928 97.7 (36.5) Oral -- 18 173/110 Sitting --    10/01/24 1811 -- -- -- -- 173/113 -- --    10/01/24 1601 -- -- 89 -- 155/102 -- 95    10/01/24 1546 -- -- 92 -- 157/109 -- 97    10/01/24 1531 -- -- 88 -- 181/109 -- 96    10/01/24 1516 -- -- 88 -- 185/120 -- --     10/01/24 1515 -- -- 94 -- -- -- 94    10/01/24 1135 -- -- -- -- 158/110 Sitting --    10/01/24 1132 97.7 (36.5) Oral 109 18 165/104 Sitting 99          Current Facility-Administered Medications   Medication Dose Route Frequency Provider Last Rate Last Admin    acetaminophen (TYLENOL) tablet 650 mg  650 mg Oral Q4H PRN Kerri Ha APRN   650 mg at 10/02/24 1540    Or    acetaminophen (TYLENOL) 160 MG/5ML oral solution 650 mg  650 mg Oral Q4H PRN Kerri Ha APRN        Or    acetaminophen (TYLENOL) suppository 650 mg  650 mg Rectal Q4H PRN Kerri Ha APRN        sennosides-docusate (PERICOLACE) 8.6-50 MG per tablet 2 tablet  2 tablet Oral BID PRN Kerri Ha APRN        And    polyethylene glycol (MIRALAX) packet 17 g  17 g Oral Daily PRN Kerri Ha APRN        And    bisacodyl (DULCOLAX) EC tablet 5 mg  5 mg Oral Daily PRN Kerri Ha APRN        And    bisacodyl (DULCOLAX) suppository 10 mg  10 mg Rectal Daily PRN Kerri Ha APRN        bismuth subsalicylate (PEPTO BISMOL) chewable tablet 524 mg  524 mg Oral 4x Daily AC & at Bedtime Kerri Ha APRN   524 mg at 10/02/24 2016    butalbital-acetaminophen-caffeine (FIORICET, ESGIC) -40 MG per tablet 1 tablet  1 tablet Oral Q6H PRN Mery Varner MD   1 tablet at 10/02/24 1838    folic acid (FOLVITE) tablet 1 mg  1 mg Oral Daily Mery Varner MD   1 mg at 10/02/24 0837    hydrALAZINE (APRESOLINE) injection 5 mg  5 mg Intravenous Q4H PRN Jc Murillo MD   5 mg at 10/02/24 0012    hyoscyamine (LEVSIN) SL tablet 250 mcg  250 mcg Sublingual Q4H PRN Dilcia BREANA Manning   250 mcg at 10/02/24 1138    labetalol (NORMODYNE,TRANDATE) injection 10 mg  10 mg Intravenous Q6H PRN Jc Murillo MD        levothyroxine sodium injection 75 mcg  75 mcg Intravenous Daily , BREANA Manning   75 mcg at 10/02/24 1042    liothyronine (CYTOMEL) tablet 25 mcg  25 mcg Oral Daily , RBEANA Manning   25  mcg at 10/02/24 1043    loperamide (IMODIUM) capsule 4 mg  4 mg Oral 4x Daily PRN Kerri Ha APRN   4 mg at 10/01/24 2042    LORazepam (ATIVAN) tablet 1 mg  1 mg Oral Q1H PRN Mery Varner MD        Or    LORazepam (ATIVAN) injection 1 mg  1 mg Intravenous Q1H PRN Mery Varner MD        Or    LORazepam (ATIVAN) tablet 2 mg  2 mg Oral Q1H PRN Mery Varner MD        Or    LORazepam (ATIVAN) injection 2 mg  2 mg Intravenous Q1H PRN Mery Varner MD        Or    LORazepam (ATIVAN) injection 2 mg  2 mg Intravenous Q15 Min PRN Mery Varner MD        Or    LORazepam (ATIVAN) injection 2 mg  2 mg Intramuscular Q15 Min PRN Mery Varner MD        Magnesium Standard Dose Replacement - Follow Nurse / BPA Driven Protocol   Does not apply PRN Mery Varner MD        metoprolol tartrate (LOPRESSOR) injection 5 mg  5 mg Intravenous Q8H Kerri Ha APRN   5 mg at 10/03/24 0026    NIFEdipine XL (PROCARDIA XL) 24 hr tablet 30 mg  30 mg Oral Q24H Jc Murillo MD   30 mg at 10/02/24 0838    ondansetron ODT (ZOFRAN-ODT) disintegrating tablet 4 mg  4 mg Oral Q6H PRN Kerri Ha APRN        Or    ondansetron (ZOFRAN) injection 4 mg  4 mg Intravenous Q6H PRN Kerri Ha APRN   4 mg at 10/02/24 0443    pantoprazole (PROTONIX) injection 40 mg  40 mg Intravenous Q12H Kerri Ha APRN   40 mg at 10/02/24 2016    potassium chloride (MICRO-K/KLOR-CON) CR capsule  40 mEq Oral Q4H Jc Murillo MD   40 mEq at 10/03/24 0518    Potassium Replacement - Follow Nurse / BPA Driven Protocol   Does not apply PRN Kerri Ha APRN        sodium chloride 0.9 % flush 10 mL  10 mL Intravenous PRN May Headley, BREANA        sodium chloride 0.9 % flush 10 mL  10 mL Intravenous Q12H , BREANA Manning        sodium chloride 0.9 % flush 10 mL  10 mL Intravenous PRN , BREANA Manning        sodium chloride 0.9 % infusion 40 mL  40 mL Intravenous PRN ,  BREANA Manning        sodium chloride 0.9 % infusion  100 mL/hr Intravenous Continuous Kerri Ha APRN 100 mL/hr at 10/03/24 0434 100 mL/hr at 10/03/24 0434    thiamine (B-1) injection 200 mg  200 mg Intravenous Q8H Mery Varner MD   200 mg at 10/03/24 0115    Followed by    [START ON 10/6/2024] thiamine (VITAMIN B-1) tablet 100 mg  100 mg Oral Daily Mery Varner MD        topiramate (TOPAMAX) tablet 50 mg  50 mg Oral BID Kerri Ha APRN   50 mg at 10/02/24 2016        Physician Progress Notes (last 72 hours)        Kerri Ha APRN at 10/02/24 0918       Attestation signed by Mery Varner MD at 10/02/24 1606    I performed a substantive part of the MDM during the patient’s E/M visit. I personally approved the documented management plan and acknowledge its risk of complications.     Continue IV thyroid replacement.     Management/test interpretation discussed with BREANA Mckeon.    Electronically signed by Mery Varner MD, 10/2/2024, 16:06 CDT.                  Patient Name: Minnie Bang  Date of Admission: 10/1/2024  Today's Date: 10/02/24  Length of Stay: 0  Primary Care Physician: Aidee Padilla APRN    Subjective   Chief Complaint: Extreme lethargy and continuation of chronic nausea and vomiting  Abdominal Pain  Associated symptoms include nausea. Pertinent negatives include no vomiting.      Minnie Bang is a 49-year-old female with a past medical history of COPD, hypertension, diastolic dysfunction, pneumonia, depression, anxiety, hyperlipidemia, NSTEMI, pulmonary emphysema, bicuspid aortic valve, hypothyroidism, and significant history for chronic dysphagia, nausea and vomiting followed by Dr. Wang patient has been able to tolerate almost any p.o. approximately 9 months with increasing concern over the last 3 months.  Patient has been treated with multiple different medications per BREANA Nicholson with Dr. Wang, most recently they have  exhausted all efforts and had transferred patient to be seen in consultation with [facility] gastroenterology which is currently being scheduled.  Patient has no new complaints, chronic nausea and vomiting associated with diarrhea and dysphagia.  Patient additionally stated she is just been feeling more rundown and was concerned that she had something else wrong with her.     Patient was made aware that we will try to make her as comfortable as possible but there will be no new interventions to assist with her chronic nausea and vomiting and she will need to continue her referral with [facility].  However  Did present with a CH level 1.4.8 and a free T4 of 0.21, most likely due to patient's inability to tolerate her p.o. medication.  Patient will be placed on IV Synthroid 75 mcg monitor closely for the next 24 to 48 hours.  Additionally is hypertensive as she has been unable to tolerate her p.o. medications, Patient will be placed on Lopressor 5 mg every 8 hours.  Patient's ethanol level was 0.039 upon admission, patient states she has not drank any alcohol for 1 month, patient placed on CIWA protocol.  Verbalized understanding of admission for hypothyroidism and hydration and that she will need to continue with her process with [facility] for her chronic nausea and vomiting.  All patient's questions were answered to the best my ability and she is in agreement for evaluation and treatment.    Today:   Patient is resting in bed on room air with no family at bedside.  Patient states she has had some overnight relief to her nausea, abdominal cramping remains, will initiate Levsin and evaluate for symptomatic relief.  Discussing patient's TSH levels she does revealed to me that she is had extreme lethargy for the last several weeks, she has been losing a great deal of hair, chills, and low heart rate.  Called her primary care this morning as she had TSH levels drawn last week,  TSH T4 last week were 159, according to PCP these were communicated to her on Monday morning however patient does not remember this conversation. Will continue IV Synthroid and initiate Cytomel for decreased T3, continue hydration and will advance diet as she tolerates.  Patient was very appreciative of our efforts to assist her with her chronic problem and was very tearful regarding her symptoms and realizing that the majority of these symptoms may be stemming from her hypothyroidism.  Panel this a.m. with total cholesterol of 280, HDL of 36, LDL of 173, triglycerides of 364 patient needs statin, however due to her extensive liver disease will recommend with PCP to start Repatha.  All patient's questions were answered to the best my ability and she is in agreement for continued plan of care    Documented weights    10/01/24 1132 10/01/24 2100   Weight: 75.8 kg (167 lb) 80.3 kg (177 lb)        Results for orders placed during the hospital encounter of 04/05/23    Adult Transthoracic Echo Complete w/ Color, Spectral and Contrast if necessary per protocol    Interpretation Summary    Left ventricular systolic function is normal. Left ventricular ejection fraction appears to be 61 - 65%.    Left ventricular diastolic function was normal.    Normal global longitudinal LV strain (GLS) = -18.8%    The aortic valve is abnormal in structure. The aortic valve exhibits sclerosis. A bicuspid aortic valve is suggested. There is mild calcification of the aortic valve. There is thickening of the aortic valve    No aortic valve regurgitation or stenosis is present.    Estimated right ventricular systolic pressure from tricuspid regurgitation is normal (<35 mmHg).       Review of Systems   Constitutional:  Positive for fatigue.   Gastrointestinal:  Positive for abdominal pain and nausea. Negative for vomiting.   Neurological:  Positive for weakness.      All pertinent negatives and positives are as above. All other systems have  been reviewed and are negative unless otherwise stated.     Objective    Temp:  [97.6 °F (36.4 °C)-98.4 °F (36.9 °C)] 97.8 °F (36.6 °C)  Heart Rate:  [64-94] 64  Resp:  [18] 18  BP: (123-185)/() 125/84  Physical Exam  Vitals and nursing note reviewed.   Constitutional:       Appearance: Normal appearance.   HENT:      Head: Normocephalic and atraumatic.      Nose: Nose normal.      Mouth/Throat:      Mouth: Mucous membranes are moist.      Pharynx: Oropharynx is clear.   Eyes:      Pupils: Pupils are equal, round, and reactive to light.   Cardiovascular:      Rate and Rhythm: Normal rate and regular rhythm.      Pulses: Normal pulses.      Heart sounds: Normal heart sounds.   Pulmonary:      Effort: Pulmonary effort is normal.      Breath sounds: Normal breath sounds.   Abdominal:      General: Abdomen is protuberant. Bowel sounds are increased.      Palpations: Abdomen is soft.      Tenderness: There is no abdominal tenderness.   Genitourinary:     Comments: Voiding per bathroom privileges  Musculoskeletal:         General: Normal range of motion.      Cervical back: Normal range of motion and neck supple.   Skin:     General: Skin is warm and dry.      Capillary Refill: Capillary refill takes less than 2 seconds.   Neurological:      General: No focal deficit present.      Mental Status: She is alert and oriented to person, place, and time.   Psychiatric:         Attention and Perception: Attention normal.         Mood and Affect: Mood normal. Affect is tearful.         Speech: Speech normal.         Behavior: Behavior normal.         Thought Content: Thought content normal.         Cognition and Memory: Cognition and memory normal.         Judgment: Judgment normal.       Results Review:  I have reviewed the labs, radiology results, and diagnostic studies.    Laboratory Data:   Results from last 7 days   Lab Units 10/02/24  0426 10/01/24  1247   WBC 10*3/mm3 8.76 9.25   HEMOGLOBIN g/dL 10.9* 12.2    HEMATOCRIT % 33.6* 36.2   PLATELETS 10*3/mm3 271 325        Results from last 7 days   Lab Units 10/02/24  0426 10/01/24  1247   SODIUM mmol/L 138 139   POTASSIUM mmol/L 3.4* 2.9*   CHLORIDE mmol/L 99 92*   CO2 mmol/L 27.0 25.0   BUN mg/dL 6 5*   CREATININE mg/dL 0.71 0.64   CALCIUM mg/dL 8.0* 9.2   BILIRUBIN mg/dL 0.6 0.8   ALK PHOS U/L 618* 740*   ALT (SGPT) U/L 46* 58*   AST (SGOT) U/L 117* 150*   GLUCOSE mg/dL 82 90       Culture Data:     Microbiology Results (last 10 days)       ** No results found for the last 240 hours. **             Radiology Data:   Imaging Results (Last 7 Days)       Procedure Component Value Units Date/Time    CT Abdomen Pelvis With Contrast [[id number]] Collected: 10/01/24 1415     Updated: 10/01/24 1428    Narrative:      EXAMINATION: CT ABDOMEN PELVIS W CONTRAST-      10/1/2024 1:08 PM     HISTORY: abdominal pain     In order to have a CT radiation dose as low as reasonably achievable  Automated Exposure Control was utilized for adjustment of the mA and/or  KV according to patient size.     Total DLP = 388.27 mGy.cm     The CT scan of the abdomen and pelvis should performed after intravenous  contrast enhancement.     Images are acquired in axial plane and subsequent reconstruction in  coronal and sagittal planes.     The comparison is made with the previous study dated 9/16/2024.     The lung bases included in the study are unremarkable except for mild  dependent atelectatic changes.     Limited visualized cardiomediastinal structures are unremarkable.     There is significant hepatic steatosis. There is moderate hepatomegaly  similar to the previous study. No focal intrinsic abnormality. The  spleen is normal.     No radiopaque gallstones.     Pancreas is normal.     The adrenal glands bilaterally are normal.     Mild lobulation of renal contour bilaterally. No discrete mass. No  radiopaque calculi. No hydronephrosis. Limited visualized ureters are  nondilated. The urinary bladder  is poorly distended. No intrinsic  abnormality.     The uterus is absent. No adnexal masses.     There is evidence of previous anterior abdominal wall hernia repair  surgery. No complication.     The stomach is decompressed. No focal abnormality Alamast. Duodenum is  normal. Small bowel is nondistended. The appendix is surgically absent.  The cecum lies low in the pelvis. There is decompression of the entire  large bowel. There is fatty infiltration of the wall of the entire colon  which is a nonspecific finding and may suggest chronic inflammatory  process. This is similar to the previous study. No finding to suggest  acute inflammatory process. No surrounding peritoneal fat infiltration  or pericolonic increased vascularity.     Atheromatous changes of the abdominal aorta. No aneurysm dilatation.     There is no evidence of abdominal or pelvic lymphadenopathy.     Images reviewed in bone window show chronic degenerative changes of the  lumbar spine with severe degeneration of the disc L5-S1. There is  bilateral femoral head osteonecrosis slightly more pronounced on the  right side.       Impression:      1. No acute abnormality of the abdomen or pelvis to account for  patient's symptoms.  2. The colon is decompressed with fatty infiltration of the wall of the  entire colon similar to the previous study. These are nonspecific  finding and may be seen in chronic inflammatory disease of the bowel. No  finding to suggest acute colitis.  3. A significant hepatic steatosis as in the previous study. Persistent  moderate hepatomegaly.  4. Bilateral femoral head osteonecrosis is similar to the previous  study.                                            This report was signed and finalized on 10/1/2024 2:25 PM by Dr. Karmen Teague MD.                I have reviewed the patient's current medications.     bismuth subsalicylate, 524 mg, Oral, 4x Daily AC & at Bedtime  folic acid, 1 mg, Oral, Daily  levothyroxine sodium,  75 mcg, Intravenous, Daily  liothyronine, 25 mcg, Oral, Daily  metoprolol tartrate, 5 mg, Intravenous, Q8H  NIFEdipine XL, 30 mg, Oral, Q24H  pantoprazole, 40 mg, Intravenous, Q12H  sodium chloride, 10 mL, Intravenous, Q12H  thiamine (B-1) IV, 200 mg, Intravenous, Q8H   Followed by  [START ON 10/6/2024] thiamine, 100 mg, Oral, Daily  topiramate, 50 mg, Oral, BID         Intake/Output Summary (Last 24 hours) at 10/2/2024 1218  Last data filed at 10/2/2024 1100  Gross per 24 hour   Intake 648 ml   Output --   Net 648 ml        Assessment/Plan   Assessment  Active Hospital Problems    Diagnosis     **Hypothyroidism     Transaminitis     Nausea & vomiting, chronic     Steatosis of liver, advanced     ETOH abuse     Diarrhea     Hypokalemia     Primary hypertension     Hypercholesterolemia with hypertriglyceridemia        Treatment Plan    1.  Hypothyroidism-continue IV Synthroid 75 mcg's daily, TSH this a.m. is 254 with a free T4 of 0.16, and a T3 of 1.21.  Initiate Cytomel 25 mcg daily.  Called patient's PCP as she had a TSH level drawn last Thursday.  They were able to let me know that her TSH at that time was 156, and she presented to the emergency room prior to her appointment later this week.  Will recheck TSH and 48 hours.      2.  Nausea ,vomiting ,diarrhea and dysphagia, chronic-continue patient's gastroenterology protocol including Pepto-Bismol 4 times daily, Zofran and Compazine as needed.  Initiate Levsin for abdominal cramping, states her nausea is slightly improved, tolerating full liquids will advance this afternoon if no vomiting this morning.  No overnight complaints of diarrhea.    3.  Steatosis of the liver advanced/transaminitis/, EtOH abuse-, 117, ALT 58, 46, total bilirubin 0.8, 0.6 ammonia 40, lipase 12, will continue to monitor closely.  CIWA protocol remains in place however no need for intervention overnight.    4.  Primary hypertension-patient presented hypertensive at 155/102, overnight  after initiation of Lopressor patient's blood pressure has been stable at 141/79, 131/70, and 123/88.  Patient has complete resolution to chronic headache, will continue for an additional 24 hours to establish that patient can maintain tolerance to p.o. medications.      5.  Hyperkalemia-this is acute on chronic, patient presented with a potassium of 2.9 placed on potassium protocol this a.m. 3.4 will continue protocol.  Monitor with daily BMP.    6.  Hypercholesteremia and hypertriglyceridemia-lipid panel reveals a total cholesterol of 280, HDL of 36, LDL of 173, triglycerides of 364.  Patient would be intolerant to statins due to her extensive liver disease, put recommendation on PCP follow-up to initiate Repatha if available.      VTE prophylaxis with SCDs  Labs in a.m.    Medical Decision Making  Hypothyroidism, acute on chronic, high complexity, unchanged  Nausea and vomiting, acute on chronic, moderate complexity, improving  Transaminitis, chronic, moderate complexity, improving  Primary hypertension, chronic, high complexity, improving  Hypokalemia, acute on chronic, moderate complexity, improving  ETOH, chronic, moderate complexity, stable    Number and Complexity of problems: 8  Differential Diagnosis: None    Conditions and Status        Condition is improving.     [facility] Data  External documents reviewed: None, however discussed patient's lab results with PCP by phone  Cardiac tracing (EKG, telemetry) interpretation: None  Radiology interpretation: None  Labs reviewed: 10/1/2024 CMP, TSH, lipid panel, ammonia, lipase, CBC, urinalysis reviewed, repeat labs in a.m.  Any tests that were considered but not ordered: None     Decision rules/scores evaluated (example [id number], Wells, etc): None     Discussed with: Dr. Varner and patient     Care Planning  Shared decision making: Dr. Varner and patient  Code status and discussions: Full code per patient  Surrogate Decision Maker   Kirill    Disposition  Social Determinants of Health that impact treatment or disposition: None  I expect the patient to be discharged to home in 1-2 days.     Electronically signed by REENA Mckeon, 10/02/24, 12:18 CDT.     Electronically signed by Mery Varner MD at 10/02/24 9427     PRN'S  MEDS   10/2 apresoline iv x1     10/1 dilaudid iv x1   10/2 DILAUDID IV X2   ZOFRAN IV X1

## 2024-10-03 NOTE — PROGRESS NOTES
Abdominal discomfort, chronic nausea, vomiting, diarrhea [facility] Medicine Services  INPATIENT PROGRESS NOTE    Length of Stay: 0  Date of Admission: 10/1/2024  Primary Care Physician: Margi Lemus APRN    Subjective   Chief Complaint: Abdominal discomfort, chronic nausea, vomiting, diarrhea    HPI     Minnie Bang is a 49-year-old female with a past medical history of COPD, hypertension, diastolic dysfunction, pneumonia, depression, anxiety, hyperlipidemia, NSTEMI, pulmonary emphysema, bicuspid aortic valve, hypothyroidism, and significant history for chronic dysphagia, nausea and vomiting followed by Dr. Wang.  Patient has been able to tolerate almost nothing p.o. approximately 9 months with increasing concern over the last 3 months.  Patient has been treated with multiple different medications per BREANA Nicholson with Dr. Wang. Most recently they have exhausted all efforts and had referred patient to be seen in consultation with [facility] gastroenterology which is currently being scheduled.  Patient has no new complaints, chronic nausea and vomiting associated with diarrhea and dysphagia.  Patient additionally stated she is just been feeling more rundown and was concerned that she had something else wrong with her.  Patient was made aware to make her comfortable as possible with no new interventions to assist with chronic nausea vomiting and she will need to continue with referral to [facility].  .8, free T40.21, ALT 58, , alkaline phosphatase 740.  Patient has been unable to tolerate most of her oral medications.  She was started on IV Synthroid.  In addition she was hypertensive on admission and had not been able to take oral medications and was placed on Lopressor 5 mg every 8 hours.  Ethanol level 0.39 on admission and patient states she has not drank any alcohol in greater than 1 month.    Today  Lying in bed.   Patient reports vomiting x 2 yesterday and 4 episodes of diarrhea which are both chronic issues.  She is on a low residue diet and has tolerated some oral intake.  TSH today 168.8, free T4  0.24.  Potassium normal 3.6.  LFTs trending downward.  Continue IV Synthroid and IV Lopressor today.  Encourage oral intake.  Plan to transition to oral medications tomorrow.  Encourage out of bed and ambulate.  Abdominal cramping some improved with Levsin.  Cytomel noted 10/2.  She complains of some numbness right lower abdomen.  Some abdominal bloating noted today.      Review of Systems   Constitutional:  Positive for fatigue. Negative for chills.   HENT:  Negative for congestion and trouble swallowing.    Eyes:  Negative for photophobia and visual disturbance.   Respiratory:  Negative for shortness of breath and wheezing.    Cardiovascular:  Negative for chest pain, palpitations and leg swelling.   Gastrointestinal:  Positive for diarrhea (Chronic), nausea (Chronic) and vomiting (Chronic, intermittent).        Abdominal bloating   Endocrine: Negative for cold intolerance, heat intolerance and polyuria.   Genitourinary:  Negative for dysuria and frequency.   Musculoskeletal:  Negative for gait problem.   Skin:  Negative for color change, pallor, rash and wound.   Allergic/Immunologic: Negative for immunocompromised state.   Neurological:  Positive for weakness. Negative for light-headedness.   Hematological:  Negative for adenopathy. Does not bruise/bleed easily.   Psychiatric/Behavioral:  Negative for agitation, behavioral problems and confusion.           All pertinent negatives and positives are as above. All other systems have been reviewed and are negative unless otherwise stated.     Objective    Temp:  [97.8 °F (36.6 °C)-98.5 °F (36.9 °C)] 98.2 °F (36.8 °C)  Heart Rate:  [64-80] 74  Resp:  [18] 18  BP: (104-135)/(63-88) 128/82    Physical Exam  Vitals and nursing note reviewed.   Constitutional:       Comments: Lying in  bed.  No oxygen use.  No visitors in room.   HENT:      Head: Normocephalic and atraumatic.      Nose: No congestion.      Mouth/Throat:      Pharynx: No oropharyngeal exudate or posterior oropharyngeal erythema.   Eyes:      Extraocular Movements: Extraocular movements intact.      Pupils: Pupils are equal, round, and reactive to light.   Cardiovascular:      Rate and Rhythm: Normal rate and regular rhythm.      Heart sounds: No murmur heard.  Pulmonary:      Breath sounds: No wheezing, rhonchi or rales.      Comments: No oxygen in use.  Abdominal:      General: There is distension (Mild bloating).   Genitourinary:     Comments: Voiding.  Musculoskeletal:         General: No swelling or tenderness.      Cervical back: Normal range of motion and neck supple.      Comments: SCDs   Skin:     General: Skin is warm and dry.      Coloration: Skin is not pale.   Neurological:      General: No focal deficit present.      Mental Status: She is alert and oriented to person, place, and time.   Psychiatric:         Mood and Affect: Mood normal.         Behavior: Behavior normal.         Thought Content: Thought content normal.         Judgment: Judgment normal.           Results Review:  I have reviewed the labs, radiology results, and diagnostic studies.    Laboratory Data:      Results from last 7 days   Lab Units 10/02/24  0426 10/01/24  1247   WBC 10*3/mm3 8.76 9.25   HEMOGLOBIN g/dL 10.9* 12.2   HEMATOCRIT % 33.6* 36.2   PLATELETS 10*3/mm3 271 325        Results from last 7 days   Lab Units 10/03/24  0245 10/02/24  1602 10/02/24  0426 10/01/24  1247   SODIUM mmol/L 143  --  138 139   POTASSIUM mmol/L 3.6  3.6 3.3* 3.4* 2.9*   CHLORIDE mmol/L 106  --  99 92*   CO2 mmol/L 25.0  --  27.0 25.0   BUN mg/dL 4*  --  6 5*   CREATININE mg/dL 0.71  --  0.71 0.64   GLUCOSE mg/dL 77  --  82 90   CALCIUM mg/dL 8.1*  --  8.0* 9.2   ALT (SGPT) U/L 39*  --  46* 58*     TSH Results:  Lab Results   Component Value Date    .800 (H)  10/03/2024    .600 (H) 10/02/2024    .800 (H) 10/01/2024    TSH 0.249 (L) 05/23/2024    TSH 1.600 02/19/2023        Culture Data:      Microbiology Results (last 10 days)       ** No results found for the last 240 hours. **              Radiology Data:   Imaging Results (Last 72 Hours)       Procedure Component Value Units Date/Time    CT Abdomen Pelvis With Contrast [[id number]] Collected: 10/01/24 1415     Updated: 10/01/24 1428    Narrative:      EXAMINATION: CT ABDOMEN PELVIS W CONTRAST-      10/1/2024 1:08 PM     HISTORY: abdominal pain     In order to have a CT radiation dose as low as reasonably achievable  Automated Exposure Control was utilized for adjustment of the mA and/or  KV according to patient size.     Total DLP = 388.27 mGy.cm     The CT scan of the abdomen and pelvis should performed after intravenous  contrast enhancement.     Images are acquired in axial plane and subsequent reconstruction in  coronal and sagittal planes.     The comparison is made with the previous study dated 9/16/2024.     The lung bases included in the study are unremarkable except for mild  dependent atelectatic changes.     Limited visualized cardiomediastinal structures are unremarkable.     There is significant hepatic steatosis. There is moderate hepatomegaly  similar to the previous study. No focal intrinsic abnormality. The  spleen is normal.     No radiopaque gallstones.     Pancreas is normal.     The adrenal glands bilaterally are normal.     Mild lobulation of renal contour bilaterally. No discrete mass. No  radiopaque calculi. No hydronephrosis. Limited visualized ureters are  nondilated. The urinary bladder is poorly distended. No intrinsic  abnormality.     The uterus is absent. No adnexal masses.     There is evidence of previous anterior abdominal wall hernia repair  surgery. No complication.     The stomach is decompressed. No focal abnormality Alamast. Duodenum is  normal. Small bowel is  nondistended. The appendix is surgically absent.  The cecum lies low in the pelvis. There is decompression of the entire  large bowel. There is fatty infiltration of the wall of the entire colon  which is a nonspecific finding and may suggest chronic inflammatory  process. This is similar to the previous study. No finding to suggest  acute inflammatory process. No surrounding peritoneal fat infiltration  or pericolonic increased vascularity.     Atheromatous changes of the abdominal aorta. No aneurysm dilatation.     There is no evidence of abdominal or pelvic lymphadenopathy.     Images reviewed in bone window show chronic degenerative changes of the  lumbar spine with severe degeneration of the disc L5-S1. There is  bilateral femoral head osteonecrosis slightly more pronounced on the  right side.       Impression:      1. No acute abnormality of the abdomen or pelvis to account for  patient's symptoms.  2. The colon is decompressed with fatty infiltration of the wall of the  entire colon similar to the previous study. These are nonspecific  finding and may be seen in chronic inflammatory disease of the bowel. No  finding to suggest acute colitis.  3. A significant hepatic steatosis as in the previous study. Persistent  moderate hepatomegaly.  4. Bilateral femoral head osteonecrosis is similar to the previous  study.                                            This report was signed and finalized on 10/1/2024 2:25 PM by Dr. Karmen Teague MD.               Intake/Output    Intake/Output Summary (Last 24 hours) at 10/3/2024 0725  Last data filed at 10/2/2024 1744  Gross per 24 hour   Intake 1720 ml   Output --   Net 1720 ml       Scheduled Meds  bismuth subsalicylate, 524 mg, Oral, 4x Daily AC & at Bedtime  folic acid, 1 mg, Oral, Daily  levothyroxine sodium, 75 mcg, Intravenous, Daily  liothyronine, 25 mcg, Oral, Daily  metoprolol tartrate, 5 mg, Intravenous, Q8H  NIFEdipine XL, 30 mg, Oral, Q24H  pantoprazole,  40 mg, Intravenous, Q12H  potassium chloride ER, 40 mEq, Oral, Q4H  sodium chloride, 10 mL, Intravenous, Q12H  thiamine (B-1) IV, 200 mg, Intravenous, Q8H   Followed by  [START ON 10/6/2024] thiamine, 100 mg, Oral, Daily  topiramate, 50 mg, Oral, BID        I have reviewed the patient current medications.     Assessment/Plan     Active Hospital Problems    Diagnosis     **Hypothyroidism     Transaminitis     Nausea & vomiting, chronic     Steatosis of liver, advanced     ETOH abuse     Diarrhea     Hypokalemia     Primary hypertension     Hypercholesterolemia with hypertriglyceridemia        Treatment Plan:    1.  Hypothyroidism.  .8 on 10/1, 254.6 on 10/2, 168.8 on 10/3.  Free T4 0.21, 0.16, 0.24 today.  Previous  per PCP.  Patient has not been able to take Synthroid at home due to nausea and vomiting.  Continue IV Synthroid 75 mcg orally daily.  Cytomel 25 mcg orally daily started on 10/2.  Check TSH, free T4 in AM.      2.  Nausea ,vomiting ,diarrhea and dysphagia, chronic.  Continue patient's gastroenterology protocol including Pepto-Bismol 4 times daily, Compazine, Zofran.  Levsin started for abdominal cramping and patient indicated nausea some improved.  Increased diet to bland diet.  Reports 4 episodes of diarrhea.  Restart Colestid     3.  Steatosis of the liver advanced/transaminitis/, EtOH abuse. , 117, 99.  ALT 58, 46, 39.  Bilirubin 0.8, 0.6, 0.4.  Ammonia 40, lipase 12.  Repeat CMP in AM.     4.  Primary hypertension.  BP elevated 171/102 on 10/2.  Continue Lopressor 5 mg IV every 8 hours.  Continue Procardia XL p.o. blood pressure 148/92, 128/82 today.  Unsure if patient has had Coreg refilled recently.    5.  Hypokalemia.  Acute on chronic.  Potassium 2.9 on admission.  Replaced.  Potassium 3.6 today.  Repeat CMP in AM.     6.  Hypercholesteremia and hypertriglyceridemia. Lipid panel total cholesterol 280, HDL of 36, LDL of 173, triglycerides of 364.  Patient would be intolerant  to statins due to her extensive liver disease.  Follow-up with PCP and consider Repatha as outpatient.    7.  SCDs for deep vein thrombosis prophylaxis.  Patient ambulatory per self.    Medical Decision Making  Number and Complexity of problems: 6  Hypothyroidism: Acute on chronic, high complexity this threat to life and bodily function, not at baseline  Nausea, vomiting, diarrhea: Chronic, high complexity, not at baseline  Steatosis of the liver with transaminitis: Chronic, high complexity, improving  Primary hypertension: Chronic, high complexity, not at baseline  Hypokalemia: Chronic, high complexity, not at baseline  Mixed hyperlipidemia: Chronic complexity, not at baseline    Differential Diagnosis: None    Conditions and Status        Condition is improving.     [facility] Data  External documents reviewed: Epic  Cardiac tracing (EKG, telemetry) interpretation: None  Radiology interpretation: Viewed radiology interpretation CT abdomen  Labs reviewed:   CMP 10/3/2024.  Repeat CMP in AM.  CBC 10/2/2024.  Repeat CBC in AM.  TSH, free T4  Lipid panel      Any tests that were considered but not ordered: None     Decision rules/scores evaluated (example [id number], [place], etc): None     Discussed with: Dr. Varner and patient.     Care Planning  Shared decision making: Dr. Varner and patient.  Patient agrees to advance diet, IV synthroid, restart oral medications  Code status and discussions: Full code  Patient surrogate decision maker is her daughter, Nida Benavides  Social Determinants of Health that impact treatment or disposition: None  I expect the patient to be discharged to home in 1-2 days.     Electronically signed by BREANA Mallory, 10/03/24, 07:25 CDT.    The above documentation resulted from a face-to-face encounter by me Lucero TOUSSAINT, [employer].

## 2024-10-04 ENCOUNTER — READMISSION MANAGEMENT (OUTPATIENT)
Dept: CALL CENTER | Facility: HOSPITAL | Age: 49
End: 2024-10-04
Payer: COMMERCIAL

## 2024-10-04 VITALS
HEIGHT: 69 IN | WEIGHT: 177 LBS | HEART RATE: 68 BPM | RESPIRATION RATE: 18 BRPM | DIASTOLIC BLOOD PRESSURE: 88 MMHG | BODY MASS INDEX: 26.22 KG/M2 | SYSTOLIC BLOOD PRESSURE: 155 MMHG | TEMPERATURE: 98.5 F | OXYGEN SATURATION: 99 %

## 2024-10-04 LAB
ALBUMIN SERPL-MCNC: 3 G/DL (ref 3.5–5.2)
ALBUMIN/GLOB SERPL: 1.2 G/DL
ALP SERPL-CCNC: 551 U/L (ref 39–117)
ALT SERPL W P-5'-P-CCNC: 45 U/L (ref 1–33)
ANION GAP SERPL CALCULATED.3IONS-SCNC: 13 MMOL/L (ref 5–15)
AST SERPL-CCNC: 152 U/L (ref 1–32)
BILIRUB SERPL-MCNC: 0.4 MG/DL (ref 0–1.2)
BUN SERPL-MCNC: 4 MG/DL (ref 6–20)
BUN/CREAT SERPL: 6.8 (ref 7–25)
CALCIUM SPEC-SCNC: 8.2 MG/DL (ref 8.6–10.5)
CHLORIDE SERPL-SCNC: 108 MMOL/L (ref 98–107)
CO2 SERPL-SCNC: 23 MMOL/L (ref 22–29)
CREAT SERPL-MCNC: 0.59 MG/DL (ref 0.57–1)
DEPRECATED RDW RBC AUTO: 62.4 FL (ref 37–54)
EGFRCR SERPLBLD CKD-EPI 2021: 109.3 ML/MIN/1.73
ERYTHROCYTE [DISTWIDTH] IN BLOOD BY AUTOMATED COUNT: 15.7 % (ref 12.3–15.4)
GLOBULIN UR ELPH-MCNC: 2.6 GM/DL
GLUCOSE SERPL-MCNC: 89 MG/DL (ref 65–99)
HCT VFR BLD AUTO: 29 % (ref 34–46.6)
HGB BLD-MCNC: 9.3 G/DL (ref 12–15.9)
MCH RBC QN AUTO: 34.4 PG (ref 26.6–33)
MCHC RBC AUTO-ENTMCNC: 32.1 G/DL (ref 31.5–35.7)
MCV RBC AUTO: 107.4 FL (ref 79–97)
PLATELET # BLD AUTO: 250 10*3/MM3 (ref 140–450)
PMV BLD AUTO: 10.2 FL (ref 6–12)
POTASSIUM SERPL-SCNC: 4 MMOL/L (ref 3.5–5.2)
PROT SERPL-MCNC: 5.6 G/DL (ref 6–8.5)
RBC # BLD AUTO: 2.7 10*6/MM3 (ref 3.77–5.28)
SODIUM SERPL-SCNC: 144 MMOL/L (ref 136–145)
T4 FREE SERPL-MCNC: 0.29 NG/DL (ref 0.93–1.7)
TSH SERPL DL<=0.05 MIU/L-ACNC: 125.4 UIU/ML (ref 0.27–4.2)
WBC NRBC COR # BLD AUTO: 7.35 10*3/MM3 (ref 3.4–10.8)

## 2024-10-04 PROCEDURE — 25010000002 LEVOTHYROXINE SODIUM 100 MCG RECONSTITUTED SOLUTION

## 2024-10-04 PROCEDURE — 25010000002 THIAMINE HCL 200 MG/2ML SOLUTION: Performed by: STUDENT IN AN ORGANIZED HEALTH CARE EDUCATION/TRAINING PROGRAM

## 2024-10-04 PROCEDURE — 25810000003 SODIUM CHLORIDE 0.9 % SOLUTION: Performed by: NURSE PRACTITIONER

## 2024-10-04 PROCEDURE — 84439 ASSAY OF FREE THYROXINE: CPT | Performed by: NURSE PRACTITIONER

## 2024-10-04 PROCEDURE — 80050 GENERAL HEALTH PANEL: CPT | Performed by: NURSE PRACTITIONER

## 2024-10-04 RX ORDER — LISINOPRIL 40 MG/1
40 TABLET ORAL DAILY
COMMUNITY

## 2024-10-04 RX ORDER — LEVOTHYROXINE SODIUM 200 UG/1
200 TABLET ORAL DAILY
Qty: 7 TABLET | Refills: 0 | Status: SHIPPED | OUTPATIENT
Start: 2024-10-05 | End: 2024-10-12

## 2024-10-04 RX ORDER — LEVOTHYROXINE SODIUM 175 UG/1
175 TABLET ORAL DAILY
Start: 2024-10-12

## 2024-10-04 RX ADMIN — TOPIRAMATE 50 MG: 25 TABLET, FILM COATED ORAL at 08:50

## 2024-10-04 RX ADMIN — CARVEDILOL 25 MG: 25 TABLET, FILM COATED ORAL at 08:50

## 2024-10-04 RX ADMIN — Medication 10 ML: at 08:51

## 2024-10-04 RX ADMIN — PANTOPRAZOLE SODIUM 40 MG: 40 INJECTION, POWDER, FOR SOLUTION INTRAVENOUS at 08:50

## 2024-10-04 RX ADMIN — LEVOTHYROXINE SODIUM ANHYDROUS 75 MCG: 100 INJECTION, POWDER, LYOPHILIZED, FOR SOLUTION INTRAVENOUS at 06:35

## 2024-10-04 RX ADMIN — THIAMINE HYDROCHLORIDE 200 MG: 100 INJECTION, SOLUTION INTRAMUSCULAR; INTRAVENOUS at 02:29

## 2024-10-04 RX ADMIN — SODIUM CHLORIDE 75 ML/HR: 9 INJECTION, SOLUTION INTRAVENOUS at 02:29

## 2024-10-04 RX ADMIN — BISMUTH SUBSALICYLATE 524 MG: 262 TABLET ORAL at 08:50

## 2024-10-04 RX ADMIN — LIOTHYRONINE SODIUM 25 MCG: 25 TABLET ORAL at 08:50

## 2024-10-04 RX ADMIN — LISINOPRIL 40 MG: 20 TABLET ORAL at 08:50

## 2024-10-04 RX ADMIN — FOLIC ACID 1 MG: 1 TABLET ORAL at 08:50

## 2024-10-04 NOTE — PLAN OF CARE
Goal Outcome Evaluation:  Plan of Care Reviewed With: patient        Pt discharged home

## 2024-10-04 NOTE — PLAN OF CARE
Goal Outcome Evaluation:  Plan of Care Reviewed With: patient        Progress: no change          Pt wit complaints of headache at the beginning of the shift; see MAR. IVF infusing per order. CIWA. Up ad buck. Voiding. Tolerating GI soft diet. SCDs for VTE prevention. VSS. Safety maintained.

## 2024-10-04 NOTE — OUTREACH NOTE
Prep Survey      Flowsheet Row Responses   Worship facility patient discharged from? [facility]   Is LACE score < 7 ? No   Eligibility Readm Mgmt   Discharge diagnosis Hypothyroidism   Does the patient have one of the following disease processes/diagnoses(primary or secondary)? Other   Does the patient have Home health ordered? No   Is there a DME ordered? No   Prep survey completed? Yes            Mari LEONARD - Registered Nurse

## 2024-10-05 NOTE — PAYOR COMM NOTE
"DC HOME 10-4-24    Robles Singh (49 y.o. Female)       Date of Birth   1975    Social Security Number       Address   [address] [facility] [address]    Home Phone   [phone]    MRN   [id number]       [facility]    Marital Status                               Admission Date   10/1/24    Admission Type   Emergency    Admitting Provider   Mery Varner MD    Attending Provider       Department, Room/Bed   [facility] 3C, 362/1       Discharge Date   10/4/2024    Discharge Disposition   Home or Self Care    Discharge Destination                                 Attending Provider: (none)   Allergies: No Known Allergies    Isolation: None   Infection: None   Code Status: Prior    Ht: 175.3 cm (69\")   Wt: 80.3 kg (177 lb)    Admission Cmt: None   Principal Problem: Hypothyroidism [E03.9]                   Active Insurance as of 10/1/2024       Primary Coverage       Payor Plan Insurance Group Employer/Plan Group    [employer] [place] [employer] [place]        Payor Plan Address Payor Plan Phone Number Payor Plan Fax Number Effective Dates    [address] [address] [phone]  6/8/2020 - None Entered    [facility] [id number]         Subscriber Name Subscriber Birth Date Member ID       ROBLES SINGH 1975 [id number]                     Emergency Contacts        (Rel.) Home Phone Work Phone Mobile Phone    Kirill Montgomery (Spouse) [phone] -- [phone]                 Discharge Summary        Lucero Nelson APRN at 10/04/24 0622       Attestation signed by Mery Varner MD at 10/04/24 2038    I performed a substantive part of the MDM during the patient’s E/M visit. I personally made or approved the documented management plan and acknowledge its risk of complications.    Patients with chronic GI symptoms for several months being worked up by GI outpatient and has upcoming referral with specialist in [facility].  She was admitted " with recurrent symptoms and inability to tolerate p.o. which resulted in elevated TSH due to inability to keep her Synthroid down.  She received IV Synthroid and T3.  GI symptoms improved mildly.  She was discharged with increased dose of Synthroid for a week then decrease to regular dose.  She will be following up with her PCP for repeat labs in 1 week.    Imaging reviewed.    Management/test interpretation discussed with BREANA Dutton.    Electronically signed by Mery Varner MD, 10/4/2024, 20:36 CDT.                      [facility]  DISCHARGE SUMMARY       Date of Admission: 10/1/2024  Date of Discharge:  10/4/2024  Primary Care Physician: Margi Lemus APRN    Presenting Problem/History of Present Illness:  Abdominal discomfort, lethargy, continued chronic nausea and vomiting    Final Discharge Diagnoses:  Active Hospital Problems    Diagnosis     **Hypothyroidism     Hypothyroidism     Transaminitis     Nausea & vomiting, chronic     Steatosis of liver, advanced     ETOH abuse     Diarrhea     Hypokalemia     Primary hypertension     Hypercholesterolemia with hypertriglyceridemia        Consults: None    Procedures Performed: None    Pertinent Test Results:   Results for orders placed during the hospital encounter of 04/05/23    Adult Transthoracic Echo Complete w/ Color, Spectral and Contrast if necessary per protocol    Interpretation Summary    Left ventricular systolic function is normal. Left ventricular ejection fraction appears to be 61 - 65%.    Left ventricular diastolic function was normal.    Normal global longitudinal LV strain (GLS) = -18.8%    The aortic valve is abnormal in structure. The aortic valve exhibits sclerosis. A bicuspid aortic valve is suggested. There is mild calcification of the aortic valve. There is thickening of the aortic valve    No aortic valve regurgitation or stenosis is present.    Estimated right ventricular  systolic pressure from tricuspid regurgitation is normal (<35 mmHg).      Imaging Results (All)       Procedure Component Value Units Date/Time    CT Abdomen Pelvis With Contrast [[id number]] Collected: 10/01/24 1415     Updated: 10/01/24 1428    Narrative:      EXAMINATION: CT ABDOMEN PELVIS W CONTRAST-      10/1/2024 1:08 PM     HISTORY: abdominal pain     In order to have a CT radiation dose as low as reasonably achievable  Automated Exposure Control was utilized for adjustment of the mA and/or  KV according to patient size.     Total DLP = 388.27 mGy.cm     The CT scan of the abdomen and pelvis should performed after intravenous  contrast enhancement.     Images are acquired in axial plane and subsequent reconstruction in  coronal and sagittal planes.     The comparison is made with the previous study dated 9/16/2024.     The lung bases included in the study are unremarkable except for mild  dependent atelectatic changes.     Limited visualized cardiomediastinal structures are unremarkable.     There is significant hepatic steatosis. There is moderate hepatomegaly  similar to the previous study. No focal intrinsic abnormality. The  spleen is normal.     No radiopaque gallstones.     Pancreas is normal.     The adrenal glands bilaterally are normal.     Mild lobulation of renal contour bilaterally. No discrete mass. No  radiopaque calculi. No hydronephrosis. Limited visualized ureters are  nondilated. The urinary bladder is poorly distended. No intrinsic  abnormality.     The uterus is absent. No adnexal masses.     There is evidence of previous anterior abdominal wall hernia repair  surgery. No complication.     The stomach is decompressed. No focal abnormality Alamast. Duodenum is  normal. Small bowel is nondistended. The appendix is surgically absent.  The cecum lies low in the pelvis. There is decompression of the entire  large bowel. There is fatty infiltration of the wall of the entire colon  which is a  nonspecific finding and may suggest chronic inflammatory  process. This is similar to the previous study. No finding to suggest  acute inflammatory process. No surrounding peritoneal fat infiltration  or pericolonic increased vascularity.     Atheromatous changes of the abdominal aorta. No aneurysm dilatation.     There is no evidence of abdominal or pelvic lymphadenopathy.     Images reviewed in bone window show chronic degenerative changes of the  lumbar spine with severe degeneration of the disc L5-S1. There is  bilateral femoral head osteonecrosis slightly more pronounced on the  right side.       Impression:      1. No acute abnormality of the abdomen or pelvis to account for  patient's symptoms.  2. The colon is decompressed with fatty infiltration of the wall of the  entire colon similar to the previous study. These are nonspecific  finding and may be seen in chronic inflammatory disease of the bowel. No  finding to suggest acute colitis.  3. A significant hepatic steatosis as in the previous study. Persistent  moderate hepatomegaly.  4. Bilateral femoral head osteonecrosis is similar to the previous  study.   This report was signed and finalized on 10/1/2024 2:25 PM by Dr. Karmen Teague MD.             LAB RESULTS:      Lab 10/04/24  0123 10/02/24  1602 10/02/24  0426 10/01/24  1247   WBC 7.35  --  8.76 9.25   HEMOGLOBIN 9.3*  --  10.9* 12.2   HEMATOCRIT 29.0*  --  33.6* 36.2   PLATELETS 250  --  271 325   NEUTROS ABS  --   --   --  5.02   IMMATURE GRANS (ABS)  --   --   --  0.09*   LYMPHS ABS  --   --   --  2.92   MONOS ABS  --   --   --  0.91*   EOS ABS  --   --   --  0.19   .4*  --  106.0* 103.1*   PROTIME  --  12.8  --   --          Lab 10/04/24  0124 10/03/24  1251 10/03/24  0245 10/02/24  1602 10/02/24  0426 10/01/24  1247   SODIUM 144  --  143  --  138 139   POTASSIUM 4.0 3.6 3.6  3.6 3.3* 3.4* 2.9*   CHLORIDE 108*  --  106  --  99 92*   CO2 23.0  --  25.0  --  27.0 25.0   ANION GAP 13.0   --  12.0  --  12.0 22.0*   BUN 4*  --  4*  --  6 5*   CREATININE 0.59  --  0.71  --  0.71 0.64   EGFR 109.3  --  103.1  --  103.1 107.1   GLUCOSE 89  --  77  --  82 90   CALCIUM 8.2*  --  8.1*  --  8.0* 9.2   MAGNESIUM  --   --   --   --  1.6 1.8   .400*  --  168.800*  --  254.600* 142.800*         Lab 10/04/24  0124 10/03/24  0245 10/02/24  0426 10/01/24  1247   TOTAL PROTEIN 5.6* 5.6* 5.9* 6.9   ALBUMIN 3.0* 3.1* 3.1* 3.6   GLOBULIN 2.6 2.5 2.8 3.3   ALT (SGPT) 45* 39* 46* 58*   AST (SGOT) 152* 99* 117* 150*   BILIRUBIN 0.4 0.4 0.6 0.8   ALK PHOS 551* 542* 618* 740*   LIPASE  --   --   --  12*         Lab 10/02/24  1602 10/01/24  1247   HSTROP T  --  11   PROTIME 12.8  --    INR 0.93  --          Lab 10/02/24  0426   CHOLESTEROL 280*   LDL CHOL 173*   HDL CHOL 36*   TRIGLYCERIDES 364*             Brief Urine Lab Results  (Last result in the past 365 days)        Color   Clarity   Blood   Leuk Est   Nitrite   Protein   CREAT   Urine HCG        10/01/24 1618 Yellow   Clear   Negative   Negative   Negative   Negative                 Microbiology Results (last 10 days)       ** No results found for the last 240 hours. **            Hospital Course: Minnie Bang is a 49-year-old female with a past medical history of COPD, hypertension, diastolic dysfunction, pneumonia, depression, anxiety, hyperlipidemia, NSTEMI, pulmonary emphysema, bicuspid aortic valve, hypothyroidism, and significant history for chronic dysphagia, nausea and vomiting followed by Dr. Wang.  Patient has been able to tolerate almost nothing orally for approximately 9 months with increasing concern over the last 3 months.  Patient has been treated with multiple different medications per BREANA Nicholson with Dr. Wang. Most recently they have exhausted all efforts and had referred patient to be seen in consultation with [facility] gastroenterology which is currently being scheduled.  Patient has chronic nausea and vomiting  associated with diarrhea and dysphagia.  Patient additionally stated she is just been feeling more rundown and was concerned that she had something else wrong with her.  Patient was made aware to make her comfortable as possible with no new interventions to assist with chronic nausea vomiting and she will need to continue with referral to [facility].  .8, free T40.21, ALT 58, , alkaline phosphatase 740.  Patient has been unable to tolerate most of her oral medications.  She was started on IV Synthroid.  In addition, she was hypertensive on admission and had not been able to take oral medications and was placed on Lopressor 5 mg every 8 hours.  Ethanol level 0.39 on admission and patient states she has not drank any alcohol in greater than 1 month.    She was admitted to the medical floor with hypothyroidism.  .8 on 10/1, 256.4 on 10/2, 168.8 on 10/3.  .4 on date of discharge 10/4.  Free T4 0.21, 0.16, 0.24, 0.29.  Previous  per PCP.  Patient had not been taking levothyroxine at home due to nausea and vomiting.  She was started on IV Synthroid 75 mcg IV daily and thyroid levels monitored.  Cytomel 25 mg orally daily started on 10/2.  At discharge, patient will increase levothyroxine dose to 200 mcg orally daily for 1 week and primary care provider will repeat thyroid panel and adjust levothyroxine dose accordingly.    Patient has chronic nausea, vomiting, diarrhea and dysphagia.  Patient was continued on gastroenterology's protocol continue Pepto-Bismol 4 times daily. Compazine and Zofran added.  Primary care provider has ordered Phenergan as outpatient.  Levsin started for abdominal cramping and patient indicated nausea some improved.  Diet increased to bland diet.  Patient eating small amounts.  At discharge Colestid will be resumed.    She has steatosis of the liver with transaminitis and alcohol use.  , 117, 99, 152.  ALT 58, 46, 39, 45.  Bilirubin normal.   "Ammonia 40, lipase 12.  Statin held due to transaminitis.    Blood pressure elevated 171/101 on admission.  Metoprolol 5 mg IV every 8 hours ordered.  Procardia XL ordered.  Blood pressure improved 148/92, 128/82.  As patient was tolerating diet, lisinopril and Coreg resumed.  Blood pressure 112/68 at discharge.    Hypokalemia, acute on chronic.  Potassium 2.9 on admission and replaced.  Follow-up potassium level 3.6 on 10/3.  Potassium 4 at discharge.    She has mixed hyperlipidemia.  Lipid panel total cholesterol 280, HDL 36, , triglycerides 364.  Patient would be intolerant to statins due to extensive liver disease.  Patient will follow-up with primary care provider and may consider Repatha as outpatient.  Atorvastatin not resume during hospitalization or discharge.    SCDs ordered for deep vein thrombosis prophylaxis and patient was ambulatory per self.    On 10//24, she is stable for discharge.  She has chronic nausea, vomiting, diarrhea and is followed by Dr. Wang with referral to [facility] gastroenterology and follow-up appointment pending.  Levothyroxine increased to 200 mcg orally daily for 1 week and patient will follow-up with primary care provider BREANA Gonzalez in 1 week with repeat thyroid panel and adjustment of levothyroxine at that time.      Physical Exam on Discharge:  /68 (BP Location: Left arm, Patient Position: Lying)   Pulse 76   Temp 98.1 °F (36.7 °C) (Oral)   Resp 16   Ht 175.3 cm (69\")   Wt 80.3 kg (177 lb)   LMP  (LMP Unknown)   SpO2 99%   BMI 26.14 kg/m²   Physical Exam  Vitals and nursing note reviewed.   Constitutional:       Comments: Sitting up.  No oxygen in use.  No visitors in room.   HENT:      Head: Normocephalic and atraumatic.      Nose: No congestion.      Mouth/Throat:      Pharynx: Oropharynx is clear. No oropharyngeal exudate or posterior oropharyngeal erythema.   Eyes:      Extraocular Movements: Extraocular movements intact.      " Pupils: Pupils are equal, round, and reactive to light.   Cardiovascular:      Rate and Rhythm: Normal rate and regular rhythm.      Heart sounds: No murmur heard.  Pulmonary:      Breath sounds: No wheezing, rhonchi or rales.      Comments: No oxygen in use.  Abdominal:      General: There is no distension.      Palpations: Abdomen is soft.   Genitourinary:     Comments: Voiding.  Musculoskeletal:         General: No swelling or tenderness.      Cervical back: Normal range of motion and neck supple.   Skin:     General: Skin is warm and dry.   Neurological:      General: No focal deficit present.      Mental Status: She is alert and oriented to person, place, and time.   Psychiatric:         Mood and Affect: Mood normal.         Behavior: Behavior normal.         Thought Content: Thought content normal.         Judgment: Judgment normal.         Condition on Discharge: Stable for discharge home    Discharge Disposition:  Home or Self Care    Discharge Medications:     Discharge Medications        Changes to Medications        Instructions Start Date   levothyroxine 200 MCG tablet  Commonly known as: Synthroid  What changed: You were already taking a medication with the same name, and this prescription was added. Make sure you understand how and when to take each.   200 mcg, Oral, Daily, For 7 days begin 10/5/2024 then resume 175 mcg daily on 10/12 per PCP   Start Date: October 5, 2024     levothyroxine 175 MCG tablet  Commonly known as: SYNTHROID, LEVOTHROID  What changed:   additional instructions  These instructions start on October 12, 2024. If you are unsure what to do until then, ask your doctor or other care provider.   175 mcg, Oral, Daily, Resume 175 mcg on 10/12/2024   Start Date: October 12, 2024            Continue These Medications        Instructions Start Date   bismuth subsalicylate 262 MG chewable tablet  Commonly known as: Pepto-Bismol   524 mg, Oral, 4 Times Daily Before Meals & Nightly       carvedilol 25 MG tablet  Commonly known as: COREG   25 mg, Oral, 2 Times Daily      colestipol 1 g tablet  Commonly known as: COLESTID   1 g, Oral, Daily      FLUoxetine 40 MG capsule  Commonly known as: PROzac   40 mg, Oral, Daily      lisinopril 40 MG tablet  Commonly known as: PRINIVIL,ZESTRIL   40 mg, Oral, Daily      loperamide 2 MG capsule  Commonly known as: IMODIUM   4 mg, Oral, 4 Times Daily PRN      nitroglycerin 0.4 MG SL tablet  Commonly known as: NITROSTAT   0.4 mg, Sublingual, Every 5 Minutes PRN, Take no more than 3 doses in 15 minutes.      pantoprazole 40 MG EC tablet  Commonly known as: PROTONIX   40 mg, Oral, 2 Times Daily      potassium chloride 20 MEQ packet  Commonly known as: KLOR-CON   20 mEq, Oral, 2 Times Daily      promethazine 25 MG tablet  Commonly known as: PHENERGAN   25 mg, Oral, 3 Times Daily PRN      topiramate 50 MG tablet  Commonly known as: TOPAMAX   50 mg, Oral, 2 Times Daily             Stop These Medications      atorvastatin 10 MG tablet  Commonly known as: LIPITOR              Discharge Diet:   Diet Instructions       Diet: Gastrointestinal Diets; Low Irritant; Regular (IDDSI 7); Thin (IDDSI 0)      Discharge Diet: Gastrointestinal Diets    Gastrointestinal Diet: Low Irritant    Texture: Regular (IDDSI 7)    Fluid Consistency: Thin (IDDSI 0)            Activity at Discharge:   Activity Instructions       Activity as Tolerated              Discharge instructions:  1.  For worsening abdominal discomfort, lethargy, worsening chronic nausea and vomiting with diarrhea seek medical attention.  2.  Increase levothyroxine to 200 mcg orally daily for 1 week beginning 10/5/2024.  Primary care BREANA Lloyd to repeat thyroid panel 1 week and adjust levothyroxine accordingly.  3.  Continue Pepto-Bismol per Dr. Wang.  Continue Phenergan per PCP.  4.  Discontinue atorvastatin due to elevated liver function test  5.  Follow-up with BREANA Gonzalez 1 week with CMP and thyroid  panel  6.  Dr. Wang arranging appointment with [facility] gastroenterology.    Follow-up Appointments:   Future Appointments   Date Time Provider Department Center   10/11/2024  1:00 PM Domitila Duncan MD [facility]       Test Results Pending at Discharge: None    Electronically signed by BREANA Mallory, 10/04/24, 06:44 CDT.    Time: 35 minutes.  Discussed with Dr. Varner and patient.    The above documentation resulted from a face-to-face encounter by me Lucero TOUSSAINT, [facility].           Electronically signed by Mery Varner MD at 10/04/24 2038

## 2024-10-06 LAB — ALDOST SERPL-MCNC: <1 NG/DL (ref 0–30)

## 2024-10-08 ENCOUNTER — READMISSION MANAGEMENT (OUTPATIENT)
Dept: CALL CENTER | Facility: HOSPITAL | Age: 49
End: 2024-10-08
Payer: COMMERCIAL

## 2024-10-08 NOTE — OUTREACH NOTE
Medical Week 1 Survey      Flowsheet Row Responses   [facility] patient discharged from? [place]   Does the patient have one of the following disease processes/diagnoses(primary or secondary)? Other   Week 1 attempt successful? No   Unsuccessful attempts Attempt 1            Leigh Ann Braun Registered Nurse

## 2024-10-14 ENCOUNTER — TELEPHONE (OUTPATIENT)
Dept: GASTROENTEROLOGY | Facility: CLINIC | Age: 49
End: 2024-10-14
Payer: COMMERCIAL

## 2024-10-14 NOTE — TELEPHONE ENCOUNTER
Called PT just now.  PT said she hasn't heard anything.  She had spoke with them  and they told her they were working on it.

## 2024-10-15 ENCOUNTER — READMISSION MANAGEMENT (OUTPATIENT)
Dept: CALL CENTER | Facility: HOSPITAL | Age: 49
End: 2024-10-15
Payer: COMMERCIAL

## 2024-10-15 NOTE — OUTREACH NOTE
Medical Week 2 Survey      Flowsheet Row Responses   [facility] patient discharged from? [place]   Does the patient have one of the following disease processes/diagnoses(primary or secondary)? Other   Week 2 attempt successful? Yes   Call start time 1357   Discharge diagnosis Hypothyroidism   Call end time 1406   Meds reviewed with patient/caregiver? Yes   Is the patient taking all medications as directed (includes completed medication regime)? Yes   Does the patient have a primary care provider?  Yes   Has the patient kept scheduled appointments due by today? Yes   Did the patient receive a copy of their discharge instructions? Yes   Nursing interventions Reviewed instructions with patient   What is the patient's perception of their health status since discharge? Same  [diarrhea/vomiting/fatigue - pt was seen by her PCP since hosp dc,  nurse encouraged pt to speak to her PCP re: s/s]   Is the patient/caregiver able to teach back signs and symptoms related to disease process for when to call PCP? Yes   Is the patient/caregiver able to teach back signs and symptoms related to disease process for when to call 911? Yes   Is the patient/caregiver able to teach back the hierarchy of who to call/visit for symptoms/problems? PCP, Specialist, Home health nurse, Urgent Care, ED, 911 Yes   If the patient is a current smoker, are they able to teach back resources for cessation? Not a smoker   Week 2 Call Completed? Yes   Call end time 1406            Unique H - Registered Nurse

## 2024-10-24 ENCOUNTER — READMISSION MANAGEMENT (OUTPATIENT)
Dept: CALL CENTER | Facility: HOSPITAL | Age: 49
End: 2024-10-24
Payer: COMMERCIAL

## 2024-10-24 NOTE — OUTREACH NOTE
Medical Week 3 Survey      Flowsheet Row Responses   [facility] patient discharged from? [place]   Does the patient have one of the following disease processes/diagnoses(primary or secondary)? Other   Week 3 attempt successful? No   Call start time 6424   Unsuccessful attempts Attempt 1  [pt declines today but agreeable to call on another day]            BRIAN WOODSON - Registered Nurse

## 2024-10-28 ENCOUNTER — READMISSION MANAGEMENT (OUTPATIENT)
Dept: CALL CENTER | Facility: HOSPITAL | Age: 49
End: 2024-10-28
Payer: COMMERCIAL

## 2024-10-28 NOTE — OUTREACH NOTE
Medical Week 3 Survey      Flowsheet Row Responses   [facility] patient discharged from? [place]   Does the patient have one of the following disease processes/diagnoses(primary or secondary)? Other   Week 3 attempt successful? No   Unsuccessful attempts Attempt 2            CHRISTEL PENA - Registered Nurse

## 2024-12-27 NOTE — PATIENT INSTRUCTIONS
I reviewed the H&P, I examined the patient, and there are no changes in the patient's condition.     Omeprazole twice daily before every meal  Chantix  I advised the patient of the risks in continuing to use tobacco and recommended complete cessation, The inherent risks including the risk of disability, development of a malignancy and/or death was discussed.  The patient indicated understanding.  CT Scan    Gastroesophageal Reflux Disease (Laryngopharyngeal Reflux), Adult  Gastroesophageal reflux disease (GERD) and/or Laryngopharyngeal Reflux, (LPR) happens when acid from your stomach flows up into the esophagus and/or throat and voicebox or larynx. When acid comes in contact with the these organs, the acid can cause soreness (inflammation). Over time, GERD may create small holes (ulcers) in the lining of the esophagus and may lead to the development of hoarseness, difficulty swallowing,   feeling of something stuck in the throat, increased mucous or drainage and even predispose to the development of malignancies, (cancer).    CAUSES   Increased body weight. This puts pressure on the stomach, making acid rise from the stomach into the esophagus.  Smoking. This increases acid production in the stomach.  Drinking alcohol. This causes decreased pressure in the lower esophageal sphincter (valve or ring of muscle between the esophagus and stomach), allowing acid from the stomach into the esophagus.  Late evening meals and a full stomach. This increases pressure and acid production in the stomach.  A malformed lower esophageal sphincter  Diet which can include avoidance of gluten and dairy products  Age  SYMPTOMS   Burning pain in the lower part of the mid-chest behind the breastbone and in the mid-stomach area. This may occur twice a week or more often.  Trouble swallowing.  Sore throat.  Dry cough.  Asthma-like symptoms including chest tightness, shortness of breath, or wheezing.  Globus sensation-something stuck in the throat/fullness  Hoarseness  DIAGNOSIS   Your caregiver may be able to diagnose GERD based on your  symptoms. In some cases, X-rays and other tests may be done to check for complications or to check the condition of your stomach and esophagus.  You may need to see another doctor.  TREATMENT   Over-the-counter or prescription medicines to help decrease acid production.   Dietary and behavioral modifications or changes may be also recommended.  HOME CARE INSTRUCTIONS   Change the factors that you can control. Ask your caregiver for guidance concerning weight loss, quitting smoking, and alcohol consumption.  Avoid foods and drinks that make your symptoms worse, and MAY include such as:  Caffeine or alcoholic drinks.  Chocolate.  Gluten containing foods  Dairy  Peppermint or mint flavorings.  Garlic and onions.  Spicy foods.  Citrus fruits, such as oranges, rissa, or limes.  Tomato-based foods such as sauce, chili, salsa, and pizza.  Fried and fatty foods.  Avoid lying down for the 3 hours prior to your bedtime or prior to taking a nap.  Eat small, frequent meals instead of large meals.  Wear loose-fitting clothing. Do not wear anything tight around your waist that causes pressure on your stomach.  Raise the head of your bed 6 to 8 inches with wood blocks to help you sleep. Extra pillows will not help.  Only take over-the-counter or prescription medicines for pain, discomfort, or fever as directed by your caregiver.  Do not take aspirin, ibuprofen, or other nonsteroidal anti-inflammatory drugs if possible (NSAIDs).  SEEK IMMEDIATE MEDICAL CARE IF:   You have pain in your arms, neck, jaw, teeth, or back.  Your pain increases or changes in intensity or duration.  You develop nausea, vomiting, or sweating (diaphoresis).  You develop shortness of breath, or you faint.  Your vomit is green, yellow, black, or looks like coffee grounds or blood.  Your stool is red, bloody, or black.  These symptoms could be signs of other problems, such as heart disease, gastric bleeding, or esophageal bleeding.  MAKE SURE YOU:   Understand  these instructions.  Will watch your condition.  Will get help right away if you are not doing well or get worse.     This information is not intended to replace advice given to you by your physician. Make sure you discuss any questions you have with your health care provider.     Modified by Mendez Padilla MD, FACS 9/8/2016.  Document Released: 09/27/2006 Document Revised: 01/08/2016 Document Reviewed: 04/13/2016  [employer] Patient Education ©2016 [employer]

## 2024-12-28 ENCOUNTER — APPOINTMENT (OUTPATIENT)
Dept: MRI IMAGING | Facility: HOSPITAL | Age: 49
DRG: 640 | End: 2024-12-28
Payer: COMMERCIAL

## 2024-12-28 ENCOUNTER — HOSPITAL ENCOUNTER (INPATIENT)
Facility: HOSPITAL | Age: 49
LOS: 6 days | Discharge: LEFT BEFORE DISCHARGE | DRG: 640 | End: 2025-01-04
Attending: INTERNAL MEDICINE | Admitting: INTERNAL MEDICINE
Payer: COMMERCIAL

## 2024-12-28 ENCOUNTER — APPOINTMENT (OUTPATIENT)
Dept: GENERAL RADIOLOGY | Facility: HOSPITAL | Age: 49
DRG: 640 | End: 2024-12-28
Payer: COMMERCIAL

## 2024-12-28 DIAGNOSIS — E83.42 HYPOMAGNESEMIA: ICD-10-CM

## 2024-12-28 DIAGNOSIS — R20.2 NUMBNESS AND TINGLING: Primary | ICD-10-CM

## 2024-12-28 DIAGNOSIS — R26.9 GAIT DISTURBANCE: ICD-10-CM

## 2024-12-28 DIAGNOSIS — F10.929 ALCOHOLIC INTOXICATION WITH COMPLICATION: ICD-10-CM

## 2024-12-28 DIAGNOSIS — Z74.09 IMPAIRED MOBILITY: ICD-10-CM

## 2024-12-28 DIAGNOSIS — F15.10 METHAMPHETAMINE ABUSE: ICD-10-CM

## 2024-12-28 DIAGNOSIS — E87.6 HYPOKALEMIA: ICD-10-CM

## 2024-12-28 DIAGNOSIS — R20.0 NUMBNESS AND TINGLING: Primary | ICD-10-CM

## 2024-12-28 LAB
ALBUMIN SERPL-MCNC: 3.6 G/DL (ref 3.5–5.2)
ALBUMIN/GLOB SERPL: 1.2 G/DL
ALP SERPL-CCNC: 682 U/L (ref 39–117)
ALT SERPL W P-5'-P-CCNC: 35 U/L (ref 1–33)
AMPHET+METHAMPHET UR QL: POSITIVE
AMPHETAMINES UR QL: POSITIVE
ANION GAP SERPL CALCULATED.3IONS-SCNC: 18 MMOL/L (ref 5–15)
ARTERIAL PATENCY WRIST A: POSITIVE
AST SERPL-CCNC: 120 U/L (ref 1–32)
ATMOSPHERIC PRESS: 747 MMHG
BARBITURATES UR QL SCN: NEGATIVE
BASE EXCESS BLDA CALC-SCNC: 9.4 MMOL/L (ref 0–2)
BASOPHILS # BLD AUTO: 0.05 10*3/MM3 (ref 0–0.2)
BASOPHILS NFR BLD AUTO: 0.5 % (ref 0–1.5)
BDY SITE: ABNORMAL
BENZODIAZ UR QL SCN: NEGATIVE
BILIRUB SERPL-MCNC: 0.3 MG/DL (ref 0–1.2)
BILIRUB UR QL STRIP: NEGATIVE
BODY TEMPERATURE: 37
BUN SERPL-MCNC: 7 MG/DL (ref 6–20)
BUN/CREAT SERPL: 12.7 (ref 7–25)
BUPRENORPHINE SERPL-MCNC: NEGATIVE NG/ML
CALCIUM SPEC-SCNC: 9.5 MG/DL (ref 8.6–10.5)
CANNABINOIDS SERPL QL: NEGATIVE
CHLORIDE SERPL-SCNC: 89 MMOL/L (ref 98–107)
CLARITY UR: CLEAR
CO2 SERPL-SCNC: 32 MMOL/L (ref 22–29)
COCAINE UR QL: NEGATIVE
COHGB MFR BLD: 5.3 % (ref 0–5)
COLOR UR: YELLOW
CREAT SERPL-MCNC: 0.55 MG/DL (ref 0.57–1)
D-LACTATE SERPL-SCNC: 5.8 MMOL/L (ref 0.5–2)
DEPRECATED RDW RBC AUTO: 53.3 FL (ref 37–54)
EGFRCR SERPLBLD CKD-EPI 2021: 111.1 ML/MIN/1.73
EOSINOPHIL # BLD AUTO: 0.08 10*3/MM3 (ref 0–0.4)
EOSINOPHIL NFR BLD AUTO: 0.8 % (ref 0.3–6.2)
ERYTHROCYTE [DISTWIDTH] IN BLOOD BY AUTOMATED COUNT: 14.5 % (ref 12.3–15.4)
FENTANYL UR-MCNC: NEGATIVE NG/ML
GEN 5 1HR TROPONIN T REFLEX: 19 NG/L
GLOBULIN UR ELPH-MCNC: 3.1 GM/DL
GLUCOSE SERPL-MCNC: 105 MG/DL (ref 65–99)
GLUCOSE UR STRIP-MCNC: NEGATIVE MG/DL
HCO3 BLDA-SCNC: 32.4 MMOL/L (ref 20–26)
HCT VFR BLD AUTO: 26.5 % (ref 34–46.6)
HCT VFR BLD CALC: 27 % (ref 38–51)
HGB BLD-MCNC: 8.7 G/DL (ref 12–15.9)
HGB BLDA-MCNC: 8.8 G/DL (ref 12–16)
HGB UR QL STRIP.AUTO: NEGATIVE
IMM GRANULOCYTES # BLD AUTO: 0.08 10*3/MM3 (ref 0–0.05)
IMM GRANULOCYTES NFR BLD AUTO: 0.8 % (ref 0–0.5)
KETONES UR QL STRIP: NEGATIVE
LEUKOCYTE ESTERASE UR QL STRIP.AUTO: NEGATIVE
LYMPHOCYTES # BLD AUTO: 3.92 10*3/MM3 (ref 0.7–3.1)
LYMPHOCYTES NFR BLD AUTO: 39.8 % (ref 19.6–45.3)
Lab: ABNORMAL
Lab: ABNORMAL
MAGNESIUM SERPL-MCNC: 1.4 MG/DL (ref 1.6–2.6)
MCH RBC QN AUTO: 33.2 PG (ref 26.6–33)
MCHC RBC AUTO-ENTMCNC: 32.8 G/DL (ref 31.5–35.7)
MCV RBC AUTO: 101.1 FL (ref 79–97)
METHADONE UR QL SCN: NEGATIVE
METHGB BLD QL: 0.6 % (ref 0–3)
MODALITY: ABNORMAL
MONOCYTES # BLD AUTO: 1.26 10*3/MM3 (ref 0.1–0.9)
MONOCYTES NFR BLD AUTO: 12.8 % (ref 5–12)
NEUTROPHILS NFR BLD AUTO: 4.46 10*3/MM3 (ref 1.7–7)
NEUTROPHILS NFR BLD AUTO: 45.3 % (ref 42.7–76)
NITRITE UR QL STRIP: NEGATIVE
NOTIFIED BY: ABNORMAL
NOTIFIED WHO: ABNORMAL
NRBC BLD AUTO-RTO: 0 /100 WBC (ref 0–0.2)
OPIATES UR QL: NEGATIVE
OXYCODONE UR QL SCN: NEGATIVE
OXYHGB MFR BLDV: 92.6 % (ref 94–99)
PCO2 BLDA: 36.8 MM HG (ref 35–45)
PCO2 TEMP ADJ BLD: 36.8 MM HG (ref 35–45)
PCP UR QL SCN: NEGATIVE
PH BLDA: 7.55 PH UNITS (ref 7.35–7.45)
PH UR STRIP.AUTO: 6.5 [PH] (ref 5–8)
PH, TEMP CORRECTED: 7.55 PH UNITS (ref 7.35–7.45)
PLATELET # BLD AUTO: 422 10*3/MM3 (ref 140–450)
PMV BLD AUTO: 10 FL (ref 6–12)
PO2 BLDA: 83.1 MM HG (ref 83–108)
PO2 TEMP ADJ BLD: 83.1 MM HG (ref 83–108)
POTASSIUM BLDA-SCNC: 2.2 MMOL/L (ref 3.5–5.2)
POTASSIUM SERPL-SCNC: 2.3 MMOL/L (ref 3.5–5.2)
PROCALCITONIN SERPL-MCNC: 3.59 NG/ML (ref 0–0.25)
PROT SERPL-MCNC: 6.7 G/DL (ref 6–8.5)
PROT UR QL STRIP: NEGATIVE
RBC # BLD AUTO: 2.62 10*6/MM3 (ref 3.77–5.28)
SAO2 % BLDCOA: 98.4 % (ref 94–99)
SODIUM BLDA-SCNC: 140 MMOL/L (ref 136–145)
SODIUM SERPL-SCNC: 139 MMOL/L (ref 136–145)
SP GR UR STRIP: 1.01 (ref 1–1.03)
TRICYCLICS UR QL SCN: NEGATIVE
TROPONIN T % DELTA: -5 %
TROPONIN T NUMERIC DELTA: -1 NG/L
TROPONIN T SERPL HS-MCNC: 20 NG/L
UROBILINOGEN UR QL STRIP: NORMAL
VENTILATOR MODE: ABNORMAL
WBC NRBC COR # BLD AUTO: 9.85 10*3/MM3 (ref 3.4–10.8)

## 2024-12-28 PROCEDURE — 36600 WITHDRAWAL OF ARTERIAL BLOOD: CPT

## 2024-12-28 PROCEDURE — 81003 URINALYSIS AUTO W/O SCOPE: CPT | Performed by: INTERNAL MEDICINE

## 2024-12-28 PROCEDURE — 82077 ASSAY SPEC XCP UR&BREATH IA: CPT | Performed by: EMERGENCY MEDICINE

## 2024-12-28 PROCEDURE — 25010000002 LORAZEPAM PER 2 MG: Performed by: EMERGENCY MEDICINE

## 2024-12-28 PROCEDURE — 25010000002 DIAZEPAM PER 5 MG: Performed by: INTERNAL MEDICINE

## 2024-12-28 PROCEDURE — 25010000002 MAGNESIUM SULFATE 2 GM/50ML SOLUTION: Performed by: INTERNAL MEDICINE

## 2024-12-28 PROCEDURE — 80053 COMPREHEN METABOLIC PANEL: CPT | Performed by: INTERNAL MEDICINE

## 2024-12-28 PROCEDURE — 93005 ELECTROCARDIOGRAM TRACING: CPT | Performed by: INTERNAL MEDICINE

## 2024-12-28 PROCEDURE — 82805 BLOOD GASES W/O2 SATURATION: CPT

## 2024-12-28 PROCEDURE — 85025 COMPLETE CBC W/AUTO DIFF WBC: CPT | Performed by: INTERNAL MEDICINE

## 2024-12-28 PROCEDURE — 84484 ASSAY OF TROPONIN QUANT: CPT | Performed by: INTERNAL MEDICINE

## 2024-12-28 PROCEDURE — 71045 X-RAY EXAM CHEST 1 VIEW: CPT

## 2024-12-28 PROCEDURE — 83605 ASSAY OF LACTIC ACID: CPT | Performed by: INTERNAL MEDICINE

## 2024-12-28 PROCEDURE — 93010 ELECTROCARDIOGRAM REPORT: CPT | Performed by: EMERGENCY MEDICINE

## 2024-12-28 PROCEDURE — 83735 ASSAY OF MAGNESIUM: CPT | Performed by: INTERNAL MEDICINE

## 2024-12-28 PROCEDURE — 84145 PROCALCITONIN (PCT): CPT | Performed by: INTERNAL MEDICINE

## 2024-12-28 PROCEDURE — 80307 DRUG TEST PRSMV CHEM ANLYZR: CPT | Performed by: INTERNAL MEDICINE

## 2024-12-28 PROCEDURE — 93005 ELECTROCARDIOGRAM TRACING: CPT

## 2024-12-28 PROCEDURE — 82375 ASSAY CARBOXYHB QUANT: CPT

## 2024-12-28 PROCEDURE — 25810000003 SODIUM CHLORIDE 0.9 % SOLUTION: Performed by: INTERNAL MEDICINE

## 2024-12-28 PROCEDURE — 25010000002 POTASSIUM CHLORIDE 10 MEQ/100ML SOLUTION: Performed by: INTERNAL MEDICINE

## 2024-12-28 PROCEDURE — 99285 EMERGENCY DEPT VISIT HI MDM: CPT

## 2024-12-28 PROCEDURE — 83050 HGB METHEMOGLOBIN QUAN: CPT

## 2024-12-28 RX ORDER — MAGNESIUM SULFATE HEPTAHYDRATE 40 MG/ML
2 INJECTION, SOLUTION INTRAVENOUS ONCE
Status: COMPLETED | OUTPATIENT
Start: 2024-12-28 | End: 2024-12-28

## 2024-12-28 RX ORDER — POTASSIUM CHLORIDE 7.45 MG/ML
10 INJECTION INTRAVENOUS ONCE
Status: COMPLETED | OUTPATIENT
Start: 2024-12-28 | End: 2024-12-28

## 2024-12-28 RX ORDER — LORAZEPAM 2 MG/ML
1 INJECTION INTRAMUSCULAR ONCE
Status: COMPLETED | OUTPATIENT
Start: 2024-12-28 | End: 2024-12-28

## 2024-12-28 RX ORDER — DIAZEPAM 10 MG/2ML
2.5 INJECTION, SOLUTION INTRAMUSCULAR; INTRAVENOUS ONCE
Status: COMPLETED | OUTPATIENT
Start: 2024-12-28 | End: 2024-12-28

## 2024-12-28 RX ADMIN — DIAZEPAM 2.5 MG: 5 INJECTION, SOLUTION INTRAMUSCULAR; INTRAVENOUS at 22:21

## 2024-12-28 RX ADMIN — MAGNESIUM SULFATE HEPTAHYDRATE 2 G: 2 INJECTION, SOLUTION INTRAVENOUS at 21:34

## 2024-12-28 RX ADMIN — POTASSIUM CHLORIDE 10 MEQ: 7.46 INJECTION, SOLUTION INTRAVENOUS at 21:25

## 2024-12-28 RX ADMIN — SODIUM CHLORIDE 500 ML: 9 INJECTION, SOLUTION INTRAVENOUS at 21:32

## 2024-12-28 RX ADMIN — LORAZEPAM 1 MG: 2 INJECTION INTRAMUSCULAR; INTRAVENOUS at 23:35

## 2024-12-29 ENCOUNTER — APPOINTMENT (OUTPATIENT)
Dept: CT IMAGING | Facility: HOSPITAL | Age: 49
DRG: 640 | End: 2024-12-29
Payer: COMMERCIAL

## 2024-12-29 ENCOUNTER — APPOINTMENT (OUTPATIENT)
Dept: MRI IMAGING | Facility: HOSPITAL | Age: 49
DRG: 640 | End: 2024-12-29
Payer: COMMERCIAL

## 2024-12-29 PROBLEM — R26.9 GAIT DISTURBANCE: Status: ACTIVE | Noted: 2024-12-29

## 2024-12-29 PROBLEM — F19.10 SUBSTANCE ABUSE: Status: ACTIVE | Noted: 2024-12-29

## 2024-12-29 LAB
ANION GAP SERPL CALCULATED.3IONS-SCNC: 14 MMOL/L (ref 5–15)
BUN SERPL-MCNC: 6 MG/DL (ref 6–20)
BUN/CREAT SERPL: 14.3 (ref 7–25)
CALCIUM SPEC-SCNC: 8.8 MG/DL (ref 8.6–10.5)
CHLORIDE SERPL-SCNC: 93 MMOL/L (ref 98–107)
CO2 SERPL-SCNC: 34 MMOL/L (ref 22–29)
CREAT SERPL-MCNC: 0.42 MG/DL (ref 0.57–1)
D-LACTATE SERPL-SCNC: 2.1 MMOL/L (ref 0.5–2)
D-LACTATE SERPL-SCNC: 3.1 MMOL/L (ref 0.5–2)
D-LACTATE SERPL-SCNC: 3.4 MMOL/L (ref 0.5–2)
D-LACTATE SERPL-SCNC: 3.6 MMOL/L (ref 0.5–2)
D-LACTATE SERPL-SCNC: 3.7 MMOL/L (ref 0.5–2)
EGFRCR SERPLBLD CKD-EPI 2021: 118.6 ML/MIN/1.73
ETHANOL UR QL: 0.07 %
GLUCOSE SERPL-MCNC: 107 MG/DL (ref 65–99)
POTASSIUM SERPL-SCNC: 2.4 MMOL/L (ref 3.5–5.2)
POTASSIUM SERPL-SCNC: 2.8 MMOL/L (ref 3.5–5.2)
POTASSIUM SERPL-SCNC: 3.5 MMOL/L (ref 3.5–5.2)
QT INTERVAL: 480 MS
QTC INTERVAL: 649 MS
SODIUM SERPL-SCNC: 141 MMOL/L (ref 136–145)
T4 FREE SERPL-MCNC: 1.88 NG/DL (ref 0.93–1.7)
TSH SERPL DL<=0.05 MIU/L-ACNC: 0.04 UIU/ML (ref 0.27–4.2)

## 2024-12-29 PROCEDURE — 72157 MRI CHEST SPINE W/O & W/DYE: CPT

## 2024-12-29 PROCEDURE — 25010000002 LORAZEPAM PER 2 MG: Performed by: INTERNAL MEDICINE

## 2024-12-29 PROCEDURE — 72129 CT CHEST SPINE W/DYE: CPT

## 2024-12-29 PROCEDURE — 25010000002 THIAMINE HCL 200 MG/2ML SOLUTION: Performed by: EMERGENCY MEDICINE

## 2024-12-29 PROCEDURE — 25010000002 DIAZEPAM PER 5 MG: Performed by: NURSE PRACTITIONER

## 2024-12-29 PROCEDURE — 25510000001 IOPAMIDOL 61 % SOLUTION: Performed by: INTERNAL MEDICINE

## 2024-12-29 PROCEDURE — 72126 CT NECK SPINE W/DYE: CPT

## 2024-12-29 PROCEDURE — 80048 BASIC METABOLIC PNL TOTAL CA: CPT | Performed by: NURSE PRACTITIONER

## 2024-12-29 PROCEDURE — 83605 ASSAY OF LACTIC ACID: CPT | Performed by: INTERNAL MEDICINE

## 2024-12-29 PROCEDURE — 70450 CT HEAD/BRAIN W/O DYE: CPT

## 2024-12-29 PROCEDURE — 84132 ASSAY OF SERUM POTASSIUM: CPT | Performed by: NURSE PRACTITIONER

## 2024-12-29 PROCEDURE — 84439 ASSAY OF FREE THYROXINE: CPT | Performed by: NURSE PRACTITIONER

## 2024-12-29 PROCEDURE — 25010000002 LORAZEPAM PER 2 MG: Performed by: NURSE PRACTITIONER

## 2024-12-29 PROCEDURE — 72156 MRI NECK SPINE W/O & W/DYE: CPT

## 2024-12-29 PROCEDURE — 25010000002 ENOXAPARIN PER 10 MG: Performed by: NURSE PRACTITIONER

## 2024-12-29 PROCEDURE — 84132 ASSAY OF SERUM POTASSIUM: CPT | Performed by: INTERNAL MEDICINE

## 2024-12-29 PROCEDURE — 36415 COLL VENOUS BLD VENIPUNCTURE: CPT | Performed by: NURSE PRACTITIONER

## 2024-12-29 PROCEDURE — A9579 GAD-BASE MR CONTRAST NOS,1ML: HCPCS | Performed by: INTERNAL MEDICINE

## 2024-12-29 PROCEDURE — 84443 ASSAY THYROID STIM HORMONE: CPT | Performed by: NURSE PRACTITIONER

## 2024-12-29 PROCEDURE — 25510000001 GADOPICLENOL 0.5 MMOL/ML SOLUTION: Performed by: INTERNAL MEDICINE

## 2024-12-29 RX ORDER — DIAZEPAM 10 MG/2ML
5 INJECTION, SOLUTION INTRAMUSCULAR; INTRAVENOUS ONCE
Status: COMPLETED | OUTPATIENT
Start: 2024-12-29 | End: 2024-12-29

## 2024-12-29 RX ORDER — ONDANSETRON 2 MG/ML
4 INJECTION INTRAMUSCULAR; INTRAVENOUS EVERY 6 HOURS PRN
Status: DISCONTINUED | OUTPATIENT
Start: 2024-12-29 | End: 2025-01-04 | Stop reason: HOSPADM

## 2024-12-29 RX ORDER — LORAZEPAM 2 MG/ML
2 INJECTION INTRAMUSCULAR ONCE
Status: COMPLETED | OUTPATIENT
Start: 2024-12-29 | End: 2024-12-29

## 2024-12-29 RX ORDER — POTASSIUM CHLORIDE 1500 MG/1
40 TABLET, EXTENDED RELEASE ORAL EVERY 4 HOURS
Status: COMPLETED | OUTPATIENT
Start: 2024-12-29 | End: 2024-12-29

## 2024-12-29 RX ORDER — POTASSIUM CHLORIDE 750 MG/1
40 CAPSULE, EXTENDED RELEASE ORAL ONCE
Status: COMPLETED | OUTPATIENT
Start: 2024-12-29 | End: 2024-12-29

## 2024-12-29 RX ORDER — SODIUM CHLORIDE 9 MG/ML
40 INJECTION, SOLUTION INTRAVENOUS AS NEEDED
Status: DISCONTINUED | OUTPATIENT
Start: 2024-12-29 | End: 2025-01-04 | Stop reason: HOSPADM

## 2024-12-29 RX ORDER — LEVOTHYROXINE SODIUM 175 UG/1
175 TABLET ORAL
Status: DISCONTINUED | OUTPATIENT
Start: 2024-12-29 | End: 2025-01-03

## 2024-12-29 RX ORDER — SODIUM CHLORIDE 0.9 % (FLUSH) 0.9 %
10 SYRINGE (ML) INJECTION AS NEEDED
Status: DISCONTINUED | OUTPATIENT
Start: 2024-12-29 | End: 2025-01-04 | Stop reason: HOSPADM

## 2024-12-29 RX ORDER — POTASSIUM CHLORIDE 1500 MG/1
40 TABLET, EXTENDED RELEASE ORAL EVERY 4 HOURS
Status: COMPLETED | OUTPATIENT
Start: 2024-12-29 | End: 2024-12-30

## 2024-12-29 RX ORDER — BISACODYL 10 MG
10 SUPPOSITORY, RECTAL RECTAL DAILY PRN
Status: DISCONTINUED | OUTPATIENT
Start: 2024-12-29 | End: 2025-01-04 | Stop reason: HOSPADM

## 2024-12-29 RX ORDER — POTASSIUM CHLORIDE 1500 MG/1
40 TABLET, EXTENDED RELEASE ORAL EVERY 4 HOURS
Status: DISCONTINUED | OUTPATIENT
Start: 2024-12-29 | End: 2024-12-29

## 2024-12-29 RX ORDER — CHLORHEXIDINE GLUCONATE 500 MG/1
1 CLOTH TOPICAL EVERY 24 HOURS
Status: DISCONTINUED | OUTPATIENT
Start: 2024-12-30 | End: 2025-01-04

## 2024-12-29 RX ORDER — POLYETHYLENE GLYCOL 3350 17 G/17G
17 POWDER, FOR SOLUTION ORAL DAILY PRN
Status: DISCONTINUED | OUTPATIENT
Start: 2024-12-29 | End: 2025-01-04 | Stop reason: HOSPADM

## 2024-12-29 RX ORDER — LORAZEPAM 2 MG/ML
1 INJECTION INTRAMUSCULAR ONCE
Status: COMPLETED | OUTPATIENT
Start: 2024-12-29 | End: 2024-12-29

## 2024-12-29 RX ORDER — IPRATROPIUM BROMIDE AND ALBUTEROL SULFATE 2.5; .5 MG/3ML; MG/3ML
3 SOLUTION RESPIRATORY (INHALATION) EVERY 6 HOURS PRN
Status: DISCONTINUED | OUTPATIENT
Start: 2024-12-29 | End: 2025-01-04 | Stop reason: HOSPADM

## 2024-12-29 RX ORDER — BISACODYL 5 MG/1
5 TABLET, DELAYED RELEASE ORAL DAILY PRN
Status: DISCONTINUED | OUTPATIENT
Start: 2024-12-29 | End: 2025-01-04 | Stop reason: HOSPADM

## 2024-12-29 RX ORDER — THIAMINE HYDROCHLORIDE 100 MG/ML
100 INJECTION, SOLUTION INTRAMUSCULAR; INTRAVENOUS ONCE
Status: COMPLETED | OUTPATIENT
Start: 2024-12-29 | End: 2024-12-29

## 2024-12-29 RX ORDER — AMOXICILLIN 250 MG
2 CAPSULE ORAL 2 TIMES DAILY
Status: DISCONTINUED | OUTPATIENT
Start: 2024-12-29 | End: 2025-01-04 | Stop reason: HOSPADM

## 2024-12-29 RX ORDER — ENOXAPARIN SODIUM 100 MG/ML
40 INJECTION SUBCUTANEOUS
Status: DISCONTINUED | OUTPATIENT
Start: 2024-12-29 | End: 2025-01-01

## 2024-12-29 RX ORDER — IOPAMIDOL 612 MG/ML
100 INJECTION, SOLUTION INTRAVASCULAR
Status: COMPLETED | OUTPATIENT
Start: 2024-12-29 | End: 2024-12-29

## 2024-12-29 RX ORDER — CHLORHEXIDINE GLUCONATE 500 MG/1
1 CLOTH TOPICAL ONCE
Status: COMPLETED | OUTPATIENT
Start: 2024-12-29 | End: 2024-12-29

## 2024-12-29 RX ORDER — SODIUM CHLORIDE 0.9 % (FLUSH) 0.9 %
10 SYRINGE (ML) INJECTION EVERY 12 HOURS SCHEDULED
Status: DISCONTINUED | OUTPATIENT
Start: 2024-12-29 | End: 2025-01-04 | Stop reason: HOSPADM

## 2024-12-29 RX ORDER — PANTOPRAZOLE SODIUM 40 MG/1
40 TABLET, DELAYED RELEASE ORAL
Status: DISCONTINUED | OUTPATIENT
Start: 2024-12-29 | End: 2024-12-31

## 2024-12-29 RX ORDER — POTASSIUM CHLORIDE 1500 MG/1
40 TABLET, EXTENDED RELEASE ORAL
Status: DISCONTINUED | OUTPATIENT
Start: 2024-12-29 | End: 2024-12-29 | Stop reason: ALTCHOICE

## 2024-12-29 RX ORDER — NITROGLYCERIN 0.4 MG/1
0.4 TABLET SUBLINGUAL
Status: DISCONTINUED | OUTPATIENT
Start: 2024-12-29 | End: 2025-01-04 | Stop reason: HOSPADM

## 2024-12-29 RX ORDER — ACETAMINOPHEN 325 MG/1
650 TABLET ORAL EVERY 4 HOURS PRN
Status: DISCONTINUED | OUTPATIENT
Start: 2024-12-29 | End: 2025-01-04 | Stop reason: HOSPADM

## 2024-12-29 RX ORDER — ACETAMINOPHEN 650 MG/1
650 SUPPOSITORY RECTAL EVERY 4 HOURS PRN
Status: DISCONTINUED | OUTPATIENT
Start: 2024-12-29 | End: 2025-01-04 | Stop reason: HOSPADM

## 2024-12-29 RX ADMIN — POTASSIUM CHLORIDE 40 MEQ: 20 TABLET, EXTENDED RELEASE ORAL at 08:24

## 2024-12-29 RX ADMIN — GADOPICLENOL 10 ML: 485.1 INJECTION INTRAVENOUS at 08:04

## 2024-12-29 RX ADMIN — LORAZEPAM 2 MG: 2 INJECTION INTRAMUSCULAR; INTRAVENOUS at 19:01

## 2024-12-29 RX ADMIN — LEVOTHYROXINE SODIUM 175 MCG: 0.17 TABLET ORAL at 08:23

## 2024-12-29 RX ADMIN — POTASSIUM CHLORIDE 40 MEQ: 20 TABLET, EXTENDED RELEASE ORAL at 05:55

## 2024-12-29 RX ADMIN — Medication 10 ML: at 21:19

## 2024-12-29 RX ADMIN — DIAZEPAM 5 MG: 5 INJECTION, SOLUTION INTRAMUSCULAR; INTRAVENOUS at 17:42

## 2024-12-29 RX ADMIN — IOPAMIDOL 100 ML: 612 INJECTION, SOLUTION INTRAVENOUS at 01:27

## 2024-12-29 RX ADMIN — FOLIC ACID 1 MG: 5 INJECTION, SOLUTION INTRAMUSCULAR; INTRAVENOUS; SUBCUTANEOUS at 02:36

## 2024-12-29 RX ADMIN — CHLORHEXIDINE GLUCONATE 1 APPLICATION: 500 CLOTH TOPICAL at 05:55

## 2024-12-29 RX ADMIN — POTASSIUM CHLORIDE 40 MEQ: 20 TABLET, EXTENDED RELEASE ORAL at 22:50

## 2024-12-29 RX ADMIN — ACETAMINOPHEN 650 MG: 325 TABLET ORAL at 06:07

## 2024-12-29 RX ADMIN — ACETAMINOPHEN 650 MG: 325 TABLET ORAL at 21:35

## 2024-12-29 RX ADMIN — Medication 1 APPLICATION: at 21:19

## 2024-12-29 RX ADMIN — ENOXAPARIN SODIUM 40 MG: 100 INJECTION SUBCUTANEOUS at 08:24

## 2024-12-29 RX ADMIN — Medication 10 ML: at 08:30

## 2024-12-29 RX ADMIN — PANTOPRAZOLE SODIUM 40 MG: 40 TABLET, DELAYED RELEASE ORAL at 08:23

## 2024-12-29 RX ADMIN — Medication 1 APPLICATION: at 05:55

## 2024-12-29 RX ADMIN — LORAZEPAM 1 MG: 2 INJECTION INTRAMUSCULAR; INTRAVENOUS at 00:15

## 2024-12-29 RX ADMIN — POTASSIUM CHLORIDE 40 MEQ: 750 CAPSULE, EXTENDED RELEASE ORAL at 01:20

## 2024-12-29 RX ADMIN — THIAMINE HYDROCHLORIDE 100 MG: 100 INJECTION, SOLUTION INTRAMUSCULAR; INTRAVENOUS at 02:34

## 2024-12-29 RX ADMIN — POTASSIUM CHLORIDE 40 MEQ: 20 TABLET, EXTENDED RELEASE ORAL at 12:34

## 2024-12-29 NOTE — PLAN OF CARE
Goal Outcome Evaluation:  Plan of Care Reviewed With: patient        Progress: no change  Outcome Evaluation: alert and oriented x 4, sleepy but awaken to voice. C/o tingling, numbness, and weakness in upper and lower ext. (worse in lower). Moves all equally. Replacing potassium per protocol.

## 2024-12-29 NOTE — CONSULTS
"Pharmacy Dosing Service  Anticoagulant  Enoxaparin    Assessment/Action/Plan:  Pharmacy to Dose Lovenox request for VTE prophylaxis. Initiated Lovenox 40 mg SQ every 24 hours. Pharmacy will continue to monitor and adjust dose accordingly.       Subjective:  Minnie Bang is a 49 y.o. female on Enoxaparin 40 mg SQ every 24 hours for indication of VTE prophylaxis.  Objective:  [Ht: 175.3 cm (69\"); Wt: 77.1 kg (170 lb); BMI: Body mass index is 25.1 kg/m².]  Estimated Creatinine Clearance: 147.3 mL/min (A) (by C-G formula based on SCr of 0.55 mg/dL (L)). No results found for: \"DDIMER\"   Lab Results   Component Value Date      Lab Results   Component Value Date    HGB 8.7 (L) 12/28/2024      Lab Results   Component Value Date     12/28/2024       Brett Hernandez, PharmD  12/29/24 05:01 CST     "

## 2024-12-29 NOTE — PROGRESS NOTES
[facility] Intensivist Services    Date of Admission: 12/28/2024  Date of Note: 12/29/24  Primary Care Physician: Margi Lemus APRN    History     49 y.o. female admitted 12/28/2024 with progressive numbness and weakness of the trunk extending to the lower extremities, hypokalemia, EtOH intoxication and meth positive.  Patient endorses binge drinking cinnamon whiskey yesterday.  Endorses meth use within the last week.  She claims she does not chronically drink alcohol all the time and this meth smoking was just a one-time thing.  She states she noticed her stomach was numb to the touch within the last week and her lower extremities have become progressively weak with proximal numbness.  Of note on ER evaluation, patient found to be significantly hypokalemic with a potassium of 2.3..  She received 10 mEq of oral potassium in the ER with transient rise of potassium to 2.4.  Neurology consulted for patient's paresthesias and weakness.  Patient underwent CT of the cervical spine, CT of the head and CT of the thoracic spine, all with no acute findings.  Patient underwent MRI of the cervical and thoracic spine, but could not lay still for these initially.  Intensivist services were contacted for admission to the ICU.    On her chart (which can contain diagnoses that are not current) shows she  has Hypercholesterolemia with hypertriglyceridemia; Raynaud's disease without gangrene; Ex-smoker; Chest pain in adult; SORTO (dyspnea on exertion); Mixed simple and mucopurulent chronic bronchitis; Pulmonary emphysema; Non-occlusive coronary artery disease; Primary hypertension; Coronary-myocardial bridge; Bilateral lower extremity edema; Bicuspid aortic valve; Vocal cord polyps; Tobacco use disorder, continuous; Neoplasm of uncertain behavior; Hypokalemia; Dysphagia; Heartburn; Nonspecific colitis; Diarrhea; ETOH abuse; Steatosis of liver, advanced; Intractable vomiting with nausea;  Hypothyroidism; Nausea & vomiting, chronic; Transaminitis; Hypothyroidism; and Gait disturbance on their problem list..     The ICU team was asked to evaluate the patient for severe hypokalemia.  Noted minimal replacement in the ER.  She has since been given 2 doses of 40 mEq for total of 90 mEq.  Last potassium checked at 0902 was 2.8.  Patient continues to complain of numbness across her trunk and proximal lower extremities.  Vital signs remain stable.  No issues with respiratory muscles.  She continues to be jerky with spastic movements intermittently.    Past Medical History     Active and Resolved Problems  Active Hospital Problems    Diagnosis  POA    **Gait disturbance [R26.9]  Yes    Hypothyroidism [E03.9]  Yes    ETOH abuse [F10.10]  Yes    Hypokalemia [E87.6]  Yes      Resolved Hospital Problems   No resolved problems to display.       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    CTS (carpal tunnel syndrome) 2019    Dental disease     Depression     Diastolic dysfunction 2022    Dizziness     Emphysema of lung 2020    GERD (gastroesophageal reflux disease)     Headache     Hyperlipidemia     Hypertension     Hypothyroidism     Mixed simple and mucopurulent chronic bronchitis 04/18/2023    Non-occlusive coronary artery disease 05/04/2023    NSTEMI (non-ST elevated myocardial infarction) (HCC)     Pneumonia 2022    Pulmonary emphysema 04/21/2023    Shingles 2018    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       Prior Surgeries: She  has a past surgical history that includes Total thyroidectomy; Hysterectomy; Appendectomy; Thyroid surgery; pr rt/lt heart catheters (N/A, 11/04/2017); Cardiac catheterization (N/A, 11/04/2017); Cardiac catheterization (N/A, 05/31/2023); Carpal tunnel release (2019); Panendoscopy  (N/A, 5/30/2024); Tracheostomy tube placement (N/A, 5/30/2024); Esophagogastroduodenoscopy (N/A, 7/8/2024); and Colonoscopy (N/A, 8/2/2024).    Past Surgical History:   Past Surgical History:   Procedure Laterality Date    APPENDECTOMY      CARDIAC CATHETERIZATION N/A 11/04/2017    Procedure: Coronary angiography;  Surgeon: Luis Colvin MD;  Location: [facility] CATH INVASIVE LOCATION;  Service:     CARDIAC CATHETERIZATION N/A 05/31/2023    Procedure: Left Heart Cath;  Surgeon: Steffen Rossi MD;  Location:  [facility] CATH INVASIVE LOCATION;  Service: Cardiology;  Laterality: N/A;    CARPAL TUNNEL RELEASE  2019    COLONOSCOPY N/A 8/2/2024    Procedure: COLONOSCOPY WITH ANESTHESIA;  Surgeon: Marty Browning MD;  Location: [facility] ENDOSCOPY;  Service: Gastroenterology;  Laterality: N/A;  pre op pancolitis    ENDOSCOPY N/A 7/8/2024    Procedure: ESOPHAGOGASTRODUODENOSCOPY WITH ANESTHESIA;  Surgeon: Gordy Wang MD;  Location: [facility] ENDOSCOPY;  Service: Gastroenterology;  Laterality: N/A;  pre: dysphagia.   post: esophagus dilated.   no PCP    HYSTERECTOMY      PANENDOSCOPY N/A 5/30/2024    Procedure: DIRECT LARYNGOSCOPY WITH BIOPSY OF VOCAL CORD POLYPS WITH POSSIBLE TRACHEOSTOMY;  Surgeon: Mendez Padilla MD;  Location: [facility] OR;  Service: ENT;  Laterality: N/A;    AR RT/LT HEART CATHETERS N/A 11/04/2017    Procedure: Percutaneous Coronary Intervention;  Surgeon: Luis Colvin MD;  Location: [facility] CATH INVASIVE LOCATION;  Service: Cardiovascular    THYROID SURGERY      TOTAL THYROIDECTOMY      TRACHEOSTOMY N/A 5/30/2024    Procedure: POSSIBLE TRACHEOSTOMY;  Surgeon: Mendez Padilla MD;  Location: [facility] OR;  Service: ENT;  Laterality: N/A;       Social and Family History     Family History:  family history includes Asthma in her mother; Cancer in her father and mother; Colon cancer in her father; Emphysema in her mother; Heart failure in her father; Hypertension in her father.    Tobacco/Social History:   reports that she quit smoking about 9 months ago. Her smoking use included cigarettes. She started smoking about 33 years ago. She has a 30 pack-year smoking history. She has been exposed to tobacco smoke. She has never used smokeless tobacco. She reports that she does not currently use alcohol. She reports that she does not use drugs.    Allergies     Allergies:   She has No Known Allergies.    No Known Allergies    Labs     CBC          10/2/2024    04:26 10/4/2024    01:23 12/28/2024    20:28   CBC   WBC 8.76  7.35  9.85    RBC 3.17  2.70  2.62    Hemoglobin 10.9  9.3  8.7    Hematocrit 33.6  29.0  26.5    .0  107.4  101.1    MCH 34.4  34.4  33.2    MCHC 32.4  32.1  32.8    RDW 15.2  15.7  14.5    Platelets 271  250  422      CMP          10/4/2024    01:24 12/28/2024    20:28 12/28/2024    20:47 12/29/2024    04:31 12/29/2024    09:02   CMP   Glucose 89  105    107    BUN 4  7    6    Creatinine 0.59  0.55    0.42    EGFR 109.3  111.1    118.6    Sodium 144  139  140   141    Potassium 4.0  2.3   2.4  2.8    Chloride 108  89    93    Calcium 8.2  9.5    8.8    Total Protein 5.6  6.7       Albumin 3.0  3.6       Globulin 2.6  3.1       Total Bilirubin 0.4  0.3       Alkaline Phosphatase 551  682       AST (SGOT) 152  120       ALT (SGPT) 45  35       Albumin/Globulin Ratio 1.2  1.2       BUN/Creatinine Ratio 6.8  12.7    14.3    Anion Gap 13.0  18.0    14.0          Lab 12/29/24  0902 12/29/24  0401 12/29/24  0038 12/28/24 2028   LACTATE 3.4* 3.6* 3.7* 5.8*         Inpatient Medications     Medications: Scheduled Meds:[START ON 12/30/2024] Chlorhexidine Gluconate Cloth, 1 Application, Topical, Q24H  enoxaparin, 40 mg, Subcutaneous, Q24H  levothyroxine, 175 mcg, Oral, Q AM  mupirocin, 1 Application, Each Nare, BID  pantoprazole, 40 mg, Oral, QAM AC  potassium chloride ER, 40 mEq, Oral, Q4H  senna-docusate sodium, 2 tablet, Oral, BID  sodium chloride, 10 mL, Intravenous, Q12H      Continuous  Infusions:Pharmacy to Dose enoxaparin (LOVENOX),       PRN Meds:.  acetaminophen **OR** acetaminophen    senna-docusate sodium **AND** polyethylene glycol **AND** bisacodyl **AND** bisacodyl    Calcium Replacement - Follow Nurse / [facility] Driven Protocol    ipratropium-albuterol    Magnesium Standard Dose Replacement - Follow Nurse / [facility] Driven Protocol    nitroglycerin    ondansetron    Pharmacy to Dose enoxaparin (LOVENOX)    Phosphorus Replacement - Follow Nurse / [facility] Driven Protocol    Potassium Replacement - Follow Nurse / [facility] Driven Protocol    sodium chloride    sodium chloride    I have reviewed the patient's current medications.   Outpatient Medications     Outpatient Medications:   Outpatient Medications Marked as Taking for the 12/28/24 encounter (Hospital Encounter)   Medication Sig Dispense Refill    bismuth subsalicylate (Pepto-Bismol) 262 MG chewable tablet Chew 2 tablets 4 (Four) Times a Day Before Meals & at Bedtime. 120 tablet 0    carvedilol (COREG) 25 MG tablet Take 1 tablet by mouth 2 (Two) Times a Day. 60 tablet 0    colestipol (COLESTID) 1 g tablet Take 1 tablet by mouth Daily.      FLUoxetine (PROzac) 40 MG capsule Take 1 capsule by mouth Daily.      levothyroxine (SYNTHROID, LEVOTHROID) 175 MCG tablet Take 1 tablet by mouth Daily. Resume 175 mcg on 10/12/2024      loperamide (IMODIUM) 2 MG capsule Take 2 capsules by mouth 4 (Four) Times a Day As Needed for Diarrhea. 120 capsule 0    nitroglycerin (NITROSTAT) 0.4 MG SL tablet Place 1 tablet under the tongue Every 5 (Five) Minutes As Needed for Chest Pain. Take no more than 3 doses in 15 minutes.      pantoprazole (PROTONIX) 40 MG EC tablet Take 1 tablet by mouth 2 (Two) Times a Day. 60 tablet 0    potassium chloride (KLOR-CON) 20 MEQ packet Take 20 mEq by mouth 2 (Two) Times a Day.      promethazine (PHENERGAN) 25 MG tablet Take 1 tablet by mouth 3 (Three) Times a Day As Needed for Nausea or Vomiting.      topiramate (TOPAMAX) 50 MG tablet Take 1  "tablet by mouth 2 (Two) Times a Day. 60 tablet 5       Current Antibiotics     This patient does not have an active medication from one of the medication groupers.    Exam     Vitals: Her  height is 175.3 cm (69\") and weight is 71.3 kg (157 lb 3 oz). Her oral temperature is 98 °F (36.7 °C). Her blood pressure is 154/98 and her pulse is 94. Her respiration is 18 and oxygen saturation is 95%.     GENERAL:  Alert, no acute distress.   SKIN:  Warm, dry  EYES:  Pupils equal, round and reactive to light.  EOMs intact.    HEAD:  Normocephalic.  NECK:  Supple   RESP:  Lungs clear to auscultation. Good airflow. Normal respiratory effort.   CARDIAC:  Regular rate and rhythm. Normal S1,S2. No edema  GI:  Soft, nontender, normal bowel sounds  MSK: Generalized weakness   NEUROLOGICAL: Truncal numbness, proximal lower extremity numbness    Results and Cultures Review     Result Review:  I have personally reviewed the results from the time of this admission to 12/29/2024 10:28 CST and agree with these findings:  [x]  Laboratory list / accordion  []  Microbiology  [x]  Radiology  [x]  EKG/Telemetry   []  Cardiology/Vascular   []  Pathology  [x]  Old records  []  Other:    CT Thoracic Spine With Contrast    Result Date: 12/29/2024   No acute fracture or traumatic malalignment.  These findings are in agreement with the critical and emergent findings from the [facility] preliminary report.    This report was signed and finalized on 12/29/2024 7:19 AM by Sriram Hilton.      CT Cervical Spine With Contrast    Result Date: 12/29/2024   No acute fracture or traumatic malalignment.  No abnormal enhancement.  Multilevel spondylotic changes, similar to the prior study.  These findings are in agreement with the critical and emergent findings from the [facility] preliminary report.    This report was signed and finalized on 12/29/2024 7:17 AM by Sriram Hilton.      CT Head Without Contrast    Result Date: 12/29/2024  Impression:   No acute " intracranial abnormality.  These findings are in agreement with the critical and emergent findings from the [facility] preliminary report.  This report was signed and finalized on 12/29/2024 7:09 AM by Sriram Hilton.      XR Chest 1 View    Result Date: 12/28/2024   No acute findings.  This report was signed and finalized on 12/28/2024 8:14 PM by Dr. Bello Champagne MD.         Assessment/Plan   49-year-old female with hypokalemia, EtOH intoxication, meth positive with generalized weakness and truncal and proximal lower extremity numbness.  Neurology consulted.  She underwent CTA of the head, C-spine and thoracic spine with no acute findings.  She has attempted to undergo MRI of the C and T-spine, but could not lay still for this initially.  She was profoundly hypokalemic.  We are replacing this with oral supplementation her last potassium level was up to 2.8.  Weakness and paresthesias most likely related to her alcohol abuse, hypokalemia, and meth abuse; however, cannot rule out other neurologic process.  Follow neurology evaluation, formal consult placed in orders.    Hypokalemia  -Continue replacement, has received 90 mEq orally so far  -Last potassium 2.8 at 0902 this morning  -Repeat potassium level at noon  -Further repletion as indicated, goal potassium greater than 3.5 and ideally closer to 4  -Prior records review patient has had extensive GI workup for chronic diarrhea and liver disease, this in combination with her alcohol and drug abuse could be contributing to hypokalemia, though she is not currently complaining of diarrhea at this time    Truncal paresthesias, lower extremity weakness  -Neurology consulted, awaiting compliance with MRI for further C-spine and T-spine evaluation  -Follow neurology recommendations  -Most likely this is sequela of her alcohol abuse, meth use and hypokalemia    Hypothyroidism  -Restart Synthroid  -Check TSH and free T4 as this could also contribute to numbness and  weakness  -Strong question of medication compliance    EtOH abuse  -Encourage abstinence from alcohol given all of her chronic comorbidities listed in chart  -Monitor for symptoms of withdrawal during admission  -Initiate CIWA protocol if indicated      VTE Prophylaxis:    Pharmacologic VTE prophylaxis orders are present.    CODE STATUS: Full resuscitation  Diet: Regular    75 minutes minimum spent on patient, MDM high     This time included obtaining a history; examining the patient; pulse oximetry; ordering and review of studies; arranging urgent treatment with development of a management plan; evaluation of patient's response to treatment; frequent reassessment; and, discussions with other providers.     Please see rest of the note for further information on patient assessment, MDM, and treatment.     Part of this note may be an electronic transcription/translation of spoken language to printed text using the Dragon dictation system      Electronically signed by BREANA Polanco on 12/29/2024 at 10:30 CST

## 2024-12-29 NOTE — ED PROVIDER NOTES
Subjective   History of Present Illness  49-year-old female presents emergency department with numbness in her abdomen and lower extremities.  Patient also notes numbness in her bilateral fingertips.  She states she noticed that her abdomen went numb.  She states she can pinch it and not feel it.  She states she has some mild numbness around her mouth.  She has abnormal head movements, that she states she is unaware of and cannot control.  She lives in an RV.  She is not using the propane in the RV, she is using electric heat.  She states in the RV she has been falling down to her knees and has had multiple falls up to 3 times a day.  She states she tries to reach out for her pajamas in her cabinet and will fall.  She has had no new medication changes.  2 to 3 days ago she had an episode of chest pain.  She did take a nitroglycerin and it resolved.  She has had no further chest pain.  She does have shortness of breath.  She does have history of urine and stool incontinence, but this is been ongoing since her last admission.  She did have some diarrhea and vomiting, but says this also has not stopped since her last admission.  No fevers or chills.    Review of Systems   Neurological:  Positive for weakness and numbness.       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    CTS (carpal tunnel syndrome) 2019    Dental disease     Depression     Diastolic dysfunction 2022    Dizziness     Emphysema of lung 2020    GERD (gastroesophageal reflux disease)     Headache     Hyperlipidemia     Hypertension     Hypothyroidism     Mixed simple and mucopurulent chronic bronchitis 04/18/2023    Non-occlusive coronary artery disease 05/04/2023    NSTEMI (non-ST elevated myocardial infarction) ([facility])     Pneumonia 2022    Pulmonary emphysema  04/21/2023    Shingles 2018    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       No Known Allergies    Past Surgical History:   Procedure Laterality Date    APPENDECTOMY      CARDIAC CATHETERIZATION N/A 11/04/2017    Procedure: Coronary angiography;  Surgeon: Luis Colvin MD;  Location: [facility] CATH INVASIVE LOCATION;  Service:     CARDIAC CATHETERIZATION N/A 05/31/2023    Procedure: Left Heart Cath;  Surgeon: Steffen Rossi MD;  Location: [facility] CATH INVASIVE LOCATION;  Service: Cardiology;  Laterality: N/A;    CARPAL TUNNEL RELEASE  2019    COLONOSCOPY N/A 8/2/2024    Procedure: COLONOSCOPY WITH ANESTHESIA;  Surgeon: Marty Browning MD;  Location: [facility] ENDOSCOPY;  Service: Gastroenterology;  Laterality: N/A;  pre op pancolitis    ENDOSCOPY N/A 7/8/2024    Procedure: ESOPHAGOGASTRODUODENOSCOPY WITH ANESTHESIA;  Surgeon: Gordy Wang MD;  Location: [facility] ENDOSCOPY;  Service: Gastroenterology;  Laterality: N/A;  pre: dysphagia.   post: esophagus dilated.   no PCP    HYSTERECTOMY      PANENDOSCOPY N/A 5/30/2024    Procedure: DIRECT LARYNGOSCOPY WITH BIOPSY OF VOCAL CORD POLYPS WITH POSSIBLE TRACHEOSTOMY;  Surgeon: Mendez Padilla MD;  Location: [facility] OR;  Service: ENT;  Laterality: N/A;    [place] RT/LT HEART CATHETERS N/A 11/04/2017    Procedure: Percutaneous Coronary Intervention;  Surgeon: Luis Colvin MD;  Location: [facility] CATH INVASIVE LOCATION;  Service: Cardiovascular    THYROID SURGERY      TOTAL THYROIDECTOMY      TRACHEOSTOMY N/A 5/30/2024    Procedure: POSSIBLE TRACHEOSTOMY;  Surgeon: Mendez Padilla MD;  Location: [facility] OR;  Service: ENT;  Laterality: N/A;       Family History   Problem Relation Age of Onset    Asthma Mother     Cancer Mother     Emphysema Mother     Colon cancer Father     Cancer Father     Heart failure Father     Hypertension Father        Social History     Socioeconomic History    Marital status:    Tobacco Use    Smoking status: Former     Current packs/day: 0.00      Average packs/day: 0.7 packs/day for 45.9 years (30.0 ttl pk-yrs)     Types: Cigarettes     Start date: 1991     Quit date: 3/1/2024     Years since quittin.8     Passive exposure: Past    Smokeless tobacco: Never    Tobacco comments:     Less than 1/2 pack a day    Vaping Use    Vaping status: Never Used   Substance and Sexual Activity    Alcohol use: Not Currently     Comment: about a month ago per pjt    Drug use: No    Sexual activity: Not Currently     Partners: Male     Birth control/protection: Hysterectomy           Objective   Physical Exam  Vitals reviewed.   Constitutional:       Appearance: She is ill-appearing.      Comments: Abnormal movements of the head and upper extremities.   HENT:      Head: Normocephalic and atraumatic.      Nose: Nose normal.   Eyes:      Conjunctiva/sclera: Conjunctivae normal.   Cardiovascular:      Rate and Rhythm: Normal rate and regular rhythm.      Heart sounds: Normal heart sounds.   Pulmonary:      Effort: Pulmonary effort is normal.      Breath sounds: Normal breath sounds.   Abdominal:      General: Bowel sounds are normal.      Palpations: Abdomen is soft.   Musculoskeletal:         General: No tenderness.      Cervical back: Normal range of motion and neck supple.      Right lower leg: No edema.      Left lower leg: No edema.   Skin:     General: Skin is warm and dry.   Neurological:      Mental Status: She is alert.      Cranial Nerves: No cranial nerve deficit.      Comments: The patient is able to raise both legs off the bed, but she has bilateral lower extremity numbness.  She has numbness in the abdomen on both sides from the bra line down.  She does note some decrease sensation in the shoulders, but range of motion in the upper extremities is intact.  No facial asymmetry.  No facial numbness.         Procedures       Lab Results (last 24 hours)       Procedure Component Value Units Date/Time    CBC & Differential [[id number]]  (Abnormal) Collected:  12/28/24 2028    Specimen: Blood Updated: 12/28/24 2044    Narrative:      The following orders were created for panel order CBC & Differential.  Procedure                               Abnormality         Status                     ---------                               -----------         ------                     CBC Auto Differential[[id number]]        Abnormal            Final result                 Please view results for these tests on the individual orders.    Comprehensive Metabolic Panel [[id number]]  (Abnormal) Collected: 12/28/24 2028    Specimen: Blood Updated: 12/28/24 2113     Glucose 105 mg/dL      BUN 7 mg/dL      Creatinine 0.55 mg/dL      Sodium 139 mmol/L      Potassium 2.3 mmol/L      Chloride 89 mmol/L      CO2 32.0 mmol/L      Calcium 9.5 mg/dL      Total Protein 6.7 g/dL      Albumin 3.6 g/dL      ALT (SGPT) 35 U/L      AST (SGOT) 120 U/L      Alkaline Phosphatase 682 U/L      Total Bilirubin 0.3 mg/dL      Globulin 3.1 gm/dL      A/G Ratio 1.2 g/dL      BUN/Creatinine Ratio 12.7     Anion Gap 18.0 mmol/L      eGFR 111.1 mL/min/1.73     Narrative:      GFR Categories in Chronic Kidney Disease (CKD)      GFR Category          GFR (mL/min/1.73)    Interpretation  G1                     90 or greater         Normal or high (1)  G2                      60-89                Mild decrease (1)  G3a                   45-59                Mild to moderate decrease  G3b                   30-44                Moderate to severe decrease  G4                    15-29                Severe decrease  G5                    14 or less           Kidney failure          (1)In the absence of evidence of kidney disease, neither GFR category G1 or G2 fulfill the criteria for CKD.    eGFR calculation 2021 CKD-EPI creatinine equation, which does not include race as a factor    Lactic Acid, Plasma [[id number]]  (Abnormal) Collected: 12/28/24 2028    Specimen: Blood Updated: 12/28/24 2058     Lactate 5.8 mmol/L      "Procalcitonin [[id number]]  (Abnormal) Collected: 12/28/24 2028    Specimen: Blood Updated: 12/28/24 2108     Procalcitonin 3.59 ng/mL     Narrative:      As a Marker for Sepsis (Non-Neonates):    1. <0.5 ng/mL represents a low risk of severe sepsis and/or septic shock.  2. >2 ng/mL represents a high risk of severe sepsis and/or septic shock.    As a Marker for Lower Respiratory Tract Infections that require antibiotic therapy:    PCT on Admission    Antibiotic Therapy       6-12 Hrs later    >0.5                Strongly Recommended  >0.25 - <0.5        Recommended   0.1 - 0.25          Discouraged              Remeasure/reassess PCT  <0.1                Strongly Discouraged     Remeasure/reassess PCT    As 28 day mortality risk marker: \"Change in Procalcitonin Result\" (>80% or <=80%) if Day 0 (or Day 1) and Day 4 values are available. Refer to http://www."[employer] [email or web address]    Change in PCT <=80%  A decrease of PCT levels below or equal to 80% defines a positive change in PCT test result representing a higher risk for 28-day all-cause mortality of patients diagnosed with severe sepsis for septic shock.    Change in PCT >80%  A decrease of PCT levels of more than 80% defines a negative change in PCT result representing a lower risk for 28-day all-cause mortality of patients diagnosed with severe sepsis or septic shock.       Magnesium [[id number]]  (Abnormal) Collected: 12/28/24 2028    Specimen: Blood Updated: 12/28/24 2109     Magnesium 1.4 mg/dL     High Sensitivity Troponin T [[id number]]  (Abnormal) Collected: 12/28/24 2028    Specimen: Blood Updated: 12/28/24 2059     HS Troponin T 20 ng/L     Narrative:      High Sensitive Troponin T Reference Range:  <14.0 ng/L- Negative Female for AMI  <22.0 ng/L- Negative Male for AMI  >=14 - Abnormal Female indicating possible myocardial injury.  >=22 - Abnormal Male indicating possible myocardial injury.   Clinicians would have to utilize clinical acumen, EKG, " Troponin, and serial changes to determine if it is an Acute Myocardial Infarction or myocardial injury due to an underlying chronic condition.         CBC Auto Differential [[id number]]  (Abnormal) Collected: 12/28/24 2028    Specimen: Blood Updated: 12/28/24 2044     WBC 9.85 10*3/mm3      RBC 2.62 10*6/mm3      Hemoglobin 8.7 g/dL      Hematocrit 26.5 %      .1 fL      MCH 33.2 pg      MCHC 32.8 g/dL      RDW 14.5 %      RDW-SD 53.3 fl      MPV 10.0 fL      Platelets 422 10*3/mm3      Neutrophil % 45.3 %      Lymphocyte % 39.8 %      Monocyte % 12.8 %      Eosinophil % 0.8 %      Basophil % 0.5 %      Immature Grans % 0.8 %      Neutrophils, Absolute 4.46 10*3/mm3      Lymphocytes, Absolute 3.92 10*3/mm3      Monocytes, Absolute 1.26 10*3/mm3      Eosinophils, Absolute 0.08 10*3/mm3      Basophils, Absolute 0.05 10*3/mm3      Immature Grans, Absolute 0.08 10*3/mm3      nRBC 0.0 /100 WBC     Ethanol [[id number]] Collected: 12/28/24 2028    Specimen: Blood Updated: 12/29/24 0226     Ethanol % 0.065 %     Narrative:      Not for legal purposes. Chain of Custody not followed.     Urinalysis With Culture If Indicated - Straight Cath [[id number]]  (Normal) Collected: 12/28/24 2037    Specimen: Urine from Straight Cath Updated: 12/28/24 2048     Color, UA Yellow     Appearance, UA Clear     pH, UA 6.5     Specific Gravity, UA 1.010     Glucose, UA Negative     Ketones, UA Negative     Bilirubin, UA Negative     Blood, UA Negative     Protein, UA Negative     Leuk Esterase, UA Negative     Nitrite, UA Negative     Urobilinogen, UA 0.2 E.U./dL    Narrative:      In absence of clinical symptoms, the presence of pyuria, bacteria, and/or nitrites on the urinalysis result does not correlate with infection.  Urine microscopic not indicated.    Urine Drug Screen - Urine, Clean Catch [[id number]]  (Abnormal) Collected: 12/28/24 2037    Specimen: Urine, Clean Catch Updated: 12/28/24 2056     THC, Screen, Urine Negative      Phencyclidine (PCP), Urine Negative     Cocaine Screen, Urine Negative     Methamphetamine, Ur Positive     Opiate Screen Negative     Amphetamine Screen, Urine Positive     Benzodiazepine Screen, Urine Negative     Tricyclic Antidepressants Screen Negative     Methadone Screen, Urine Negative     Barbiturates Screen, Urine Negative     Oxycodone Screen, Urine Negative     Buprenorphine, Screen, Urine Negative    Narrative:      Cutoff For Drugs Screened:    Amphetamines               500 ng/ml  Barbiturates               200 ng/ml  Benzodiazepines            150 ng/ml  Cocaine                    150 ng/ml  Methadone                  200 ng/ml  Opiates                    100 ng/ml  Phencyclidine               25 ng/ml  THC                         50 ng/ml  Methamphetamine            500 ng/ml  Tricyclic Antidepressants  300 ng/ml  Oxycodone                  100 ng/ml  Buprenorphine               10 ng/ml    The normal value for all drugs tested is negative. This report includes unconfirmed screening results, with the cutoff values listed, to be used for medical treatment purposes only.  Unconfirmed results must not be used for non-medical purposes such as employment or legal testing.  Clinical consideration should be applied to any drug of abuse test, particularly when unconfirmed results are used.      Fentanyl, Urine - Urine, Clean Catch [[id number]]  (Normal) Collected: 12/28/24 2037    Specimen: Urine, Clean Catch Updated: 12/28/24 2057     Fentanyl, Urine Negative    Narrative:      Negative Threshold:      Fentanyl 5 ng/mL     The normal value for the drug tested is negative. This report includes final unconfirmed screening results to be used for medical treatment purposes only. Unconfirmed results must not be used for non-medical purposes such as employment or legal testing. Clinical consideration should be applied to any drug of abuse test, particularly when unconfirmed results are used.           Blood Gas,  Arterial With Co-Ox [[id number]]  (Abnormal) Collected: 12/28/24 2047    Specimen: Arterial Blood Updated: 12/28/24 2046     Site Right Radial     Jimmy's Test Positive     pH, Arterial 7.554 pH units      Comment: 83 Value above reference range        pCO2, Arterial 36.8 mm Hg      pO2, Arterial 83.1 mm Hg      HCO3, Arterial 32.4 mmol/L      Comment: 83 Value above reference range        Base Excess, Arterial 9.4 mmol/L      Comment: 83 Value above reference range        O2 Saturation, Arterial 98.4 %      Hemoglobin, Blood Gas 8.8 g/dL      Comment: 84 Value below reference range        Hematocrit, Blood Gas 27.0 %      Comment: 84 Value below reference range        Oxyhemoglobin 92.6 %      Comment: 84 Value below reference range        Methemoglobin 0.60 %      Carboxyhemoglobin 5.3 %      Comment: 83 Value above reference range        Temperature 37.0     Sodium, Arterial 140 mmol/L      Potassium, Arterial 2.2 mmol/L      Comment: 85 Value below critical limit        Barometric Pressure for Blood Gas 747 mmHg      Modality Room Air     Ventilator Mode NA     Notified Who DR. ROBLES     Notified By Jasmin Smith RRT     Notified Time 12/28/2024 20:48     Collected by [id number]     Comment: Meter: [id number]     :  Jasmin Smith RRT        pH, Temp Corrected 7.554 pH Units      pCO2, Temperature Corrected 36.8 mm Hg      pO2, Temperature Corrected 83.1 mm Hg     High Sensitivity Troponin T 1Hr [[id number]]  (Abnormal) Collected: 12/28/24 2131    Specimen: Blood Updated: 12/28/24 2158     HS Troponin T 19 ng/L      Troponin T Numeric Delta -1 ng/L      Troponin T % Delta -5 %     Narrative:      High Sensitive Troponin T Reference Range:  <14.0 ng/L- Negative Female for AMI  <22.0 ng/L- Negative Male for AMI  >=14 - Abnormal Female indicating possible myocardial injury.  >=22 - Abnormal Male indicating possible myocardial injury.   Clinicians would have to utilize clinical acumen, EKG, Troponin, and  serial changes to determine if it is an Acute Myocardial Infarction or myocardial injury due to an underlying chronic condition.         STAT Lactic Acid, Reflex [[id number]]  (Abnormal) Collected: 12/29/24 0038    Specimen: Blood Updated: 12/29/24 0105     Lactate 3.7 mmol/L     STAT Lactic Acid, Reflex [[id number]]  (Abnormal) Collected: 12/29/24 0401    Specimen: Blood from Arm, Left Updated: 12/29/24 0428     Lactate 3.6 mmol/L     Potassium [[id number]]  (Abnormal) Collected: 12/29/24 0431    Specimen: Blood from Hand, Left Updated: 12/29/24 0507     Potassium 2.4 mmol/L     STAT Lactic Acid, Reflex [[id number]]  (Abnormal) Collected: 12/29/24 0902    Specimen: Blood Updated: 12/29/24 0942     Lactate 3.4 mmol/L     Basic Metabolic Panel [[id number]]  (Abnormal) Collected: 12/29/24 0902    Specimen: Blood Updated: 12/29/24 0942     Glucose 107 mg/dL      BUN 6 mg/dL      Creatinine 0.42 mg/dL      Sodium 141 mmol/L      Potassium 2.8 mmol/L      Chloride 93 mmol/L      CO2 34.0 mmol/L      Calcium 8.8 mg/dL      BUN/Creatinine Ratio 14.3     Anion Gap 14.0 mmol/L      eGFR 118.6 mL/min/1.73     Narrative:      GFR Categories in Chronic Kidney Disease (CKD)      GFR Category          GFR (mL/min/1.73)    Interpretation  G1                     90 or greater         Normal or high (1)  G2                      60-89                Mild decrease (1)  G3a                   45-59                Mild to moderate decrease  G3b                   30-44                Moderate to severe decrease  G4                    15-29                Severe decrease  G5                    14 or less           Kidney failure          (1)In the absence of evidence of kidney disease, neither GFR category G1 or G2 fulfill the criteria for CKD.    eGFR calculation 2021 CKD-EPI creatinine equation, which does not include race as a factor          CT Head Without Contrast   Final Result   Impression:         No acute intracranial abnormality.        These findings are in agreement with the critical and emergent findings   from the [facility] preliminary report.       This report was signed and finalized on 12/29/2024 7:09 AM by Sriram Hilton.          CT Thoracic Spine With Contrast   Final Result       No acute fracture or traumatic malalignment.       These findings are in agreement with the critical and emergent findings   from the [facility] preliminary report.               This report was signed and finalized on 12/29/2024 7:19 AM by Sriram Hilton.          CT Cervical Spine With Contrast   Final Result       No acute fracture or traumatic malalignment.       No abnormal enhancement.       Multilevel spondylotic changes, similar to the prior study.       These findings are in agreement with the critical and emergent findings   from the [facility] preliminary report.               This report was signed and finalized on 12/29/2024 7:17 AM by Sriram Hilton.          XR Chest 1 View   Final Result       No acute findings.       This report was signed and finalized on 12/28/2024 8:14 PM by Dr. Bello Champagne MD.          MRI Cervical Spine With & Without Contrast    (Results Pending)   MRI Thoracic Spine With & Without Contrast    (Results Pending)         ED Course  ED Course as of 12/29/24 1010   Sat Dec 28, 2024   2204 I spoke with the patient regarding her methamphetamine use.  She notes this was over a week ago and she just took a little bit in order to get some energy.  I discussed the patient her method of use, and she states that she snorts it.  She denies any type of IV drug use. [AJ]   2250 Care resumed from Dr. Eng pending MRI. Will need admit for hypokalemia [KR]   Sun Dec 29, 2024   0038 Patient unable to do MRI due to not being able to stay still [KR]   0139 I have discussed case with on call neurologist. He stated that he will consult on the patient while admitted. Inpatient MRI is recommended. MRI unable to be obtained in the ER due  to inability to stay still despite sedation. Patient is somnolent, but is waking up when spoken to.  She is moving all of her extremities appropriately.  Dorsiflexion and plantarflexion equal bilaterally. [KR]   0143 Stat rad results CT head revealed no acute intracranial finding.  CT cervical spine revealed no acute findings.  CT thoracic spine revealed no acute findings. [KR]   0208 Case signed out to Dr. Costello. Pending admission. [KR]      ED Course User Index  [AJ] Juancarlos Eng DO  [KR] Cesia Hough APRN                                                       Medical Decision Making  DDX: intoxicant, transverse     Amount and/or Complexity of Data Reviewed  Labs: ordered.  Radiology: ordered.  ECG/medicine tests: ordered.    Risk  Prescription drug management.        Final diagnoses:   Numbness and tingling   Alcoholic intoxication with complication   Methamphetamine abuse   Hypokalemia   Hypomagnesemia   Gait disturbance       ED Disposition  ED Disposition       ED Disposition   Decision to Admit    Condition   --    Comment   Level of Care: Critical Care [6]   Diagnosis: Gait disturbance [135998]   Admitting Physician: ENZO HERNDON [[id number]]   Attending Physician: ENZO HERNDON [[id number]]   Certification: I Certify That Inpatient Hospital Services Are Medically Necessary For Greater Than 2 Midnights                 No follow-up provider specified.       Medication List      No changes were made to your prescriptions during this visit.            Juancarlos Eng DO  12/28/24 2128       Juancarlos Eng DO  12/29/24 1010

## 2024-12-29 NOTE — ED NOTES
MRI TECH SHE IS MOVING TOO MUCH FOR THERE SCAN  ATIVAN GIVEN  ASSISTED TECH WITH PLACING STRAPS ON LEGS AND ARMS TO ATTEMPT TO PREVENT MOVEMENT

## 2024-12-29 NOTE — H&P
[facility] Intensivist Services  HISTORY AND PHYSICAL    Date of Admission: 12/28/2024  Primary Care Physician: Margi Lemus APRN    Subjective   Primary Historian: ED physician.  Patient was confused and disoriented and unable to provide much history.      Chief Complaint: Ethanol intoxication, urinary drug screen positive for methamphetamine, progressive numbness and weakness of the trunk extent to the lower extremities, hypokalemia, anxiety    History of Present Illness  Patient is a 49 y.o. female admitted 12/28/2024 with progressive numbness and weakness of the trunk extending to the lower extremities, hypokalemia, EtOH intoxication and meth positive.  Patient endorses binge drinking cinnamon whiskey yesterday.  Endorses meth use within the last week.  She claims she does not chronically drink alcohol all the time and this meth smoking was just a one-time thing.  She states she noticed her stomach was numb to the touch within the last week and her lower extremities have become progressively weak with proximal numbness.  Of note on ER evaluation, patient found to be significantly hypokalemic with a potassium of 2.3..  She received 10 mEq of oral potassium in the ER with transient rise of potassium to 2.4.  Neurology consulted for patient's paresthesias and weakness.  Patient underwent CT of the cervical spine, CT of the head and CT of the thoracic spine, all with no acute findings.  Patient underwent MRI of the cervical and thoracic spine, but could not lay still for these initially.  Intensivist services were contacted for admission to the ICU.  On her chart (which can contain diagnoses that are not current) shows she  has Hypercholesterolemia with hypertriglyceridemia; Raynaud's disease without gangrene; Ex-smoker; Chest pain in adult; SORTO (dyspnea on exertion); Mixed simple and mucopurulent chronic bronchitis; Pulmonary emphysema; Non-occlusive coronary artery disease;  Primary hypertension; Coronary-myocardial bridge; Bilateral lower extremity edema; Bicuspid aortic valve; Vocal cord polyps; Tobacco use disorder, continuous; Neoplasm of uncertain behavior; Hypokalemia; Dysphagia; Heartburn; Nonspecific colitis; Diarrhea; ETOH abuse; Steatosis of liver, advanced; Intractable vomiting with nausea; Hypothyroidism; Nausea & vomiting, chronic; Transaminitis; Hypothyroidism; and Gait disturbance on their problem list..    The ICU team was asked to evaluate the patient for severe hypokalemia.  Noted minimal replacement in the ER.  She has since been given 2 doses of 40 mEq for total of 90 mEq.  Last potassium checked at 0902 was 2.8.  Patient continues to complain of numbness across her trunk and proximal lower extremities.  Vital signs remain stable.  No issues with respiratory muscles.  She continues to be jerky with spastic movements intermittently.        Review of Systems   Otherwise complete ROS reviewed and negative except as mentioned in the HPI.  Patient is not a very good historian and did not have much review of system except numbness and weakness of the lower extremities.    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    CTS (carpal tunnel syndrome) 2019    Dental disease     Depression     Diastolic dysfunction 2022    Dizziness     Emphysema of lung 2020    GERD (gastroesophageal reflux disease)     Headache     Hyperlipidemia     Hypertension     Hypothyroidism     Mixed simple and mucopurulent chronic bronchitis 04/18/2023    Non-occlusive coronary artery disease 05/04/2023    NSTEMI (non-ST elevated myocardial infarction) (HCC)     Pneumonia 2022    Pulmonary emphysema 04/21/2023    Shingles 2018    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     Past Surgical History:  Past  Surgical History:   Procedure Laterality Date    APPENDECTOMY      CARDIAC CATHETERIZATION N/A 11/04/2017    Procedure: Coronary angiography;  Surgeon: Luis Colvin MD;  Location: [facility] CATH INVASIVE LOCATION;  Service:     CARDIAC CATHETERIZATION N/A 05/31/2023    Procedure: Left Heart Cath;  Surgeon: Steffen Rossi MD;  Location: [facility] CATH INVASIVE LOCATION;  Service: Cardiology;  Laterality: N/A;    CARPAL TUNNEL RELEASE  2019    COLONOSCOPY N/A 8/2/2024    Procedure: COLONOSCOPY WITH ANESTHESIA;  Surgeon: Marty Browning MD;  Location: [facility] ENDOSCOPY;  Service: Gastroenterology;  Laterality: N/A;  pre op pancolitis    ENDOSCOPY N/A 7/8/2024    Procedure: ESOPHAGOGASTRODUODENOSCOPY WITH ANESTHESIA;  Surgeon: Gordy Wang MD;  Location: [facility] ENDOSCOPY;  Service: Gastroenterology;  Laterality: N/A;  pre: dysphagia.   post: esophagus dilated.   no PCP    HYSTERECTOMY      PANENDOSCOPY N/A 5/30/2024    Procedure: DIRECT LARYNGOSCOPY WITH BIOPSY OF VOCAL CORD POLYPS WITH POSSIBLE TRACHEOSTOMY;  Surgeon: Mendez Padilla MD;  Location: [facility] OR;  Service: ENT;  Laterality: N/A;    WA RT/LT HEART CATHETERS N/A 11/04/2017    Procedure: Percutaneous Coronary Intervention;  Surgeon: Luis Colvin MD;  Location: [facility] CATH INVASIVE LOCATION;  Service: Cardiovascular    THYROID SURGERY      TOTAL THYROIDECTOMY      TRACHEOSTOMY N/A 5/30/2024    Procedure: POSSIBLE TRACHEOSTOMY;  Surgeon: Mendez Padilla MD;  Location: [facility] OR;  Service: ENT;  Laterality: N/A;     Social History:  reports that she quit smoking about 9 months ago. Her smoking use included cigarettes. She started smoking about 33 years ago. She has a 30 pack-year smoking history. She has been exposed to tobacco smoke. She has never used smokeless tobacco. She reports that she does not currently use alcohol. She reports that she does not use drugs.    Family History: family history includes Asthma in her mother; Cancer in her  father and mother; Colon cancer in her father; Emphysema in her mother; Heart failure in her father; Hypertension in her father.       Allergies:  No Known Allergies    Medications:  Prior to Admission medications    Medication Sig Start Date End Date Taking? Authorizing Provider   bismuth subsalicylate (Pepto-Bismol) 262 MG chewable tablet Chew 2 tablets 4 (Four) Times a Day Before Meals & at Bedtime. 9/20/24  Yes Mery Varner MD   carvedilol (COREG) 25 MG tablet Take 1 tablet by mouth 2 (Two) Times a Day. 9/20/24  Yes Mery Varner MD   colestipol (COLESTID) 1 g tablet Take 1 tablet by mouth Daily.   Yes Galdino Wei MD   FLUoxetine (PROzac) 40 MG capsule Take 1 capsule by mouth Daily.   Yes Galdino Wei MD   levothyroxine (SYNTHROID, LEVOTHROID) 175 MCG tablet Take 1 tablet by mouth Daily. Resume 175 mcg on 10/12/2024 10/12/24  Yes Lucero Nelson APRN   loperamide (IMODIUM) 2 MG capsule Take 2 capsules by mouth 4 (Four) Times a Day As Needed for Diarrhea. 9/20/24  Yes Mery Varner MD   nitroglycerin (NITROSTAT) 0.4 MG SL tablet Place 1 tablet under the tongue Every 5 (Five) Minutes As Needed for Chest Pain. Take no more than 3 doses in 15 minutes.   Yes Galdino Wei MD   pantoprazole (PROTONIX) 40 MG EC tablet Take 1 tablet by mouth 2 (Two) Times a Day. 9/20/24  Yes Mery Varner MD   potassium chloride (KLOR-CON) 20 MEQ packet Take 20 mEq by mouth 2 (Two) Times a Day.   Yes Galdino eWi MD   promethazine (PHENERGAN) 25 MG tablet Take 1 tablet by mouth 3 (Three) Times a Day As Needed for Nausea or Vomiting.   Yes Galdino Wei MD   topiramate (TOPAMAX) 50 MG tablet Take 1 tablet by mouth 2 (Two) Times a Day. 6/13/24  Yes Domitila Duncan MD   levothyroxine (Synthroid) 200 MCG tablet Take 1 tablet by mouth Daily for 7 days. For 7 days begin 10/5/2024 then resume 175 mcg daily on 10/12 per PCP  Patient taking differently: Take 1 tablet by  "mouth Daily. 10/5/24 10/12/24  Lucero Nelson APRN   lisinopril (PRINIVIL,ZESTRIL) 40 MG tablet Take 1 tablet by mouth Daily.    Provider, MD RULA Ahmadi have utilized all available immediate resources to obtain, update, or review the patient's current medications (including all prescriptions, over-the-counter products, herbals, cannabis/cannabidiol products, and vitamin/mineral/dietary (nutritional) supplements).    Objective     Vital Signs: BP (!) 175/122   Pulse 116   Temp 98 °F (36.7 °C) (Oral)   Resp 18   Ht 175.3 cm (69\")   Wt 71.3 kg (157 lb 3 oz)   LMP  (LMP Unknown)   SpO2 97%   BMI 23.21 kg/m²     Physical Exam   Middle-aged obese  female sitting up in the bed  HEENT: Atraumatic normocephalic pupil equal reactive to light and accommodation  Neck: Supple no mass nuclear manage tension and lymphadenopathy  Heart: Sounds normal rate and rhythm regular no murmur no gallop  Lungs: Bibasilar good air entry no crackles or wheezes.  Decreased breath sound at the bases.  Abdomen: Soft nondistended nontender bowel sound present no organomegaly  Neurologic: Grossly intact there is some involuntary movement and jerking movement noted in the extremities  Skin: There is no breakdown  Psych: Anxiety and depression and denial noted      Results Reviewed:  Lab Results (last 24 hours)       Procedure Component Value Units Date/Time    STAT Lactic Acid, Reflex [[id number]] Collected: 12/29/24 1523    Specimen: Blood Updated: 12/29/24 1547    TSH [[id number]]  (Abnormal) Collected: 12/29/24 0902    Specimen: Blood Updated: 12/29/24 1054     TSH 0.038 uIU/mL     T4, Free [[id number]]  (Abnormal) Collected: 12/29/24 0902    Specimen: Blood Updated: 12/29/24 1054     Free T4 1.88 ng/dL     STAT Lactic Acid, Reflex [[id number]]  (Abnormal) Collected: 12/29/24 0902    Specimen: Blood Updated: 12/29/24 0942     Lactate 3.4 mmol/L     Basic Metabolic Panel [[id number]]  (Abnormal) Collected: 12/29/24 0902    " Specimen: Blood Updated: 12/29/24 0942     Glucose 107 mg/dL      BUN 6 mg/dL      Creatinine 0.42 mg/dL      Sodium 141 mmol/L      Potassium 2.8 mmol/L      Chloride 93 mmol/L      CO2 34.0 mmol/L      Calcium 8.8 mg/dL      BUN/Creatinine Ratio 14.3     Anion Gap 14.0 mmol/L      eGFR 118.6 mL/min/1.73     Narrative:      GFR Categories in Chronic Kidney Disease (CKD)      GFR Category          GFR (mL/min/1.73)    Interpretation  G1                     90 or greater         Normal or high (1)  G2                      60-89                Mild decrease (1)  G3a                   45-59                Mild to moderate decrease  G3b                   30-44                Moderate to severe decrease  G4                    15-29                Severe decrease  G5                    14 or less           Kidney failure          (1)In the absence of evidence of kidney disease, neither GFR category G1 or G2 fulfill the criteria for CKD.    eGFR calculation 2021 CKD-EPI creatinine equation, which does not include race as a factor    Potassium [[id number]]  (Abnormal) Collected: 12/29/24 0431    Specimen: Blood from Hand, Left Updated: 12/29/24 0507     Potassium 2.4 mmol/L     STAT Lactic Acid, Reflex [[id number]]  (Abnormal) Collected: 12/29/24 0401    Specimen: Blood from Arm, Left Updated: 12/29/24 0428     Lactate 3.6 mmol/L     Ethanol [[id number]] Collected: 12/28/24 2028    Specimen: Blood Updated: 12/29/24 0226     Ethanol % 0.065 %     Narrative:      Not for legal purposes. Chain of Custody not followed.     STAT Lactic Acid, Reflex [[id number]]  (Abnormal) Collected: 12/29/24 0038    Specimen: Blood Updated: 12/29/24 0105     Lactate 3.7 mmol/L     High Sensitivity Troponin T 1Hr [[id number]]  (Abnormal) Collected: 12/28/24 2131    Specimen: Blood Updated: 12/28/24 2158     HS Troponin T 19 ng/L      Troponin T Numeric Delta -1 ng/L      Troponin T % Delta -5 %     Narrative:      High Sensitive Troponin T Reference  Range:  <14.0 ng/L- Negative Female for AMI  <22.0 ng/L- Negative Male for AMI  >=14 - Abnormal Female indicating possible myocardial injury.  >=22 - Abnormal Male indicating possible myocardial injury.   Clinicians would have to utilize clinical acumen, EKG, Troponin, and serial changes to determine if it is an Acute Myocardial Infarction or myocardial injury due to an underlying chronic condition.         Comprehensive Metabolic Panel [[id number]]  (Abnormal) Collected: 12/28/24 2028    Specimen: Blood Updated: 12/28/24 2113     Glucose 105 mg/dL      BUN 7 mg/dL      Creatinine 0.55 mg/dL      Sodium 139 mmol/L      Potassium 2.3 mmol/L      Chloride 89 mmol/L      CO2 32.0 mmol/L      Calcium 9.5 mg/dL      Total Protein 6.7 g/dL      Albumin 3.6 g/dL      ALT (SGPT) 35 U/L      AST (SGOT) 120 U/L      Alkaline Phosphatase 682 U/L      Total Bilirubin 0.3 mg/dL      Globulin 3.1 gm/dL      A/G Ratio 1.2 g/dL      BUN/Creatinine Ratio 12.7     Anion Gap 18.0 mmol/L      eGFR 111.1 mL/min/1.73     Narrative:      GFR Categories in Chronic Kidney Disease (CKD)      GFR Category          GFR (mL/min/1.73)    Interpretation  G1                     90 or greater         Normal or high (1)  G2                      60-89                Mild decrease (1)  G3a                   45-59                Mild to moderate decrease  G3b                   30-44                Moderate to severe decrease  G4                    15-29                Severe decrease  G5                    14 or less           Kidney failure          (1)In the absence of evidence of kidney disease, neither GFR category G1 or G2 fulfill the criteria for CKD.    eGFR calculation 2021 CKD-EPI creatinine equation, which does not include race as a factor    Magnesium [[id number]]  (Abnormal) Collected: 12/28/24 2028    Specimen: Blood Updated: 12/28/24 2109     Magnesium 1.4 mg/dL     Procalcitonin [[id number]]  (Abnormal) Collected: 12/28/24 2028    Specimen:  "Blood Updated: 12/28/24 2108     Procalcitonin 3.59 ng/mL     Narrative:      As a Marker for Sepsis (Non-Neonates):    1. <0.5 ng/mL represents a low risk of severe sepsis and/or septic shock.  2. >2 ng/mL represents a high risk of severe sepsis and/or septic shock.    As a Marker for Lower Respiratory Tract Infections that require antibiotic therapy:    PCT on Admission    Antibiotic Therapy       6-12 Hrs later    >0.5                Strongly Recommended  >0.25 - <0.5        Recommended   0.1 - 0.25          Discouraged              Remeasure/reassess PCT  <0.1                Strongly Discouraged     Remeasure/reassess PCT    As 28 day mortality risk marker: \"Change in Procalcitonin Result\" (>80% or <=80%) if Day 0 (or Day 1) and Day 4 values are available. Refer to http://www.[employer] Systemspct-calculator.com    Change in PCT <=80%  A decrease of PCT levels below or equal to 80% defines a positive change in PCT test result representing a higher risk for 28-day all-cause mortality of patients diagnosed with severe sepsis for septic shock.    Change in PCT >80%  A decrease of PCT levels of more than 80% defines a negative change in PCT result representing a lower risk for 28-day all-cause mortality of patients diagnosed with severe sepsis or septic shock.       High Sensitivity Troponin T [[id number]]  (Abnormal) Collected: 12/28/24 2028    Specimen: Blood Updated: 12/28/24 2059     HS Troponin T 20 ng/L     Narrative:      High Sensitive Troponin T Reference Range:  <14.0 ng/L- Negative Female for AMI  <22.0 ng/L- Negative Male for AMI  >=14 - Abnormal Female indicating possible myocardial injury.  >=22 - Abnormal Male indicating possible myocardial injury.   Clinicians would have to utilize clinical acumen, EKG, Troponin, and serial changes to determine if it is an Acute Myocardial Infarction or myocardial injury due to an underlying chronic condition.         Lactic Acid, Plasma [[id number]]  (Abnormal) Collected: " 12/28/24 2028    Specimen: Blood Updated: 12/28/24 2058     Lactate 5.8 mmol/L     Fentanyl, Urine - Urine, Clean Catch [[id number]]  (Normal) Collected: 12/28/24 2037    Specimen: Urine, Clean Catch Updated: 12/28/24 2057     Fentanyl, Urine Negative    Narrative:      Negative Threshold:      Fentanyl 5 ng/mL     The normal value for the drug tested is negative. This report includes final unconfirmed screening results to be used for medical treatment purposes only. Unconfirmed results must not be used for non-medical purposes such as employment or legal testing. Clinical consideration should be applied to any drug of abuse test, particularly when unconfirmed results are used.           Urine Drug Screen - Urine, Clean Catch [[id number]]  (Abnormal) Collected: 12/28/24 2037    Specimen: Urine, Clean Catch Updated: 12/28/24 2056     THC, Screen, Urine Negative     Phencyclidine (PCP), Urine Negative     Cocaine Screen, Urine Negative     Methamphetamine, Ur Positive     Opiate Screen Negative     Amphetamine Screen, Urine Positive     Benzodiazepine Screen, Urine Negative     Tricyclic Antidepressants Screen Negative     Methadone Screen, Urine Negative     Barbiturates Screen, Urine Negative     Oxycodone Screen, Urine Negative     Buprenorphine, Screen, Urine Negative    Narrative:      Cutoff For Drugs Screened:    Amphetamines               500 ng/ml  Barbiturates               200 ng/ml  Benzodiazepines            150 ng/ml  Cocaine                    150 ng/ml  Methadone                  200 ng/ml  Opiates                    100 ng/ml  Phencyclidine               25 ng/ml  THC                         50 ng/ml  Methamphetamine            500 ng/ml  Tricyclic Antidepressants  300 ng/ml  Oxycodone                  100 ng/ml  Buprenorphine               10 ng/ml    The normal value for all drugs tested is negative. This report includes unconfirmed screening results, with the cutoff values listed, to be used for  medical treatment purposes only.  Unconfirmed results must not be used for non-medical purposes such as employment or legal testing.  Clinical consideration should be applied to any drug of abuse test, particularly when unconfirmed results are used.      Urinalysis With Culture If Indicated - Straight Cath [[id number]]  (Normal) Collected: 12/28/24 2037    Specimen: Urine from Straight Cath Updated: 12/28/24 2048     Color, UA Yellow     Appearance, UA Clear     pH, UA 6.5     Specific Gravity, UA 1.010     Glucose, UA Negative     Ketones, UA Negative     Bilirubin, UA Negative     Blood, UA Negative     Protein, UA Negative     Leuk Esterase, UA Negative     Nitrite, UA Negative     Urobilinogen, UA 0.2 E.U./dL    Narrative:      In absence of clinical symptoms, the presence of pyuria, bacteria, and/or nitrites on the urinalysis result does not correlate with infection.  Urine microscopic not indicated.    Blood Gas, Arterial With Co-Ox [[id number]]  (Abnormal) Collected: 12/28/24 2047    Specimen: Arterial Blood Updated: 12/28/24 2046     Site Right Radial     Jimmy's Test Positive     pH, Arterial 7.554 pH units      Comment: 83 Value above reference range        pCO2, Arterial 36.8 mm Hg      pO2, Arterial 83.1 mm Hg      HCO3, Arterial 32.4 mmol/L      Comment: 83 Value above reference range        Base Excess, Arterial 9.4 mmol/L      Comment: 83 Value above reference range        O2 Saturation, Arterial 98.4 %      Hemoglobin, Blood Gas 8.8 g/dL      Comment: 84 Value below reference range        Hematocrit, Blood Gas 27.0 %      Comment: 84 Value below reference range        Oxyhemoglobin 92.6 %      Comment: 84 Value below reference range        Methemoglobin 0.60 %      Carboxyhemoglobin 5.3 %      Comment: 83 Value above reference range        Temperature 37.0     Sodium, Arterial 140 mmol/L      Potassium, Arterial 2.2 mmol/L      Comment: 85 Value below critical limit        Barometric Pressure for Blood  Gas 747 mmHg      Modality Room Air     Ventilator Mode NA     Notified Who DR. ROBLES     Notified By Jasmin Smith, RRT     Notified Time 12/28/2024 20:48     Collected by [id number]     Comment: Meter: [id number]     :  Jasmin Smith, RENEE        pH, Temp Corrected 7.554 pH Units      pCO2, Temperature Corrected 36.8 mm Hg      pO2, Temperature Corrected 83.1 mm Hg     CBC & Differential [[id number]]  (Abnormal) Collected: 12/28/24 2028    Specimen: Blood Updated: 12/28/24 2044    Narrative:      The following orders were created for panel order CBC & Differential.  Procedure                               Abnormality         Status                     ---------                               -----------         ------                     CBC Auto Differential[[id number]]        Abnormal            Final result                 Please view results for these tests on the individual orders.    CBC Auto Differential [[id number]]  (Abnormal) Collected: 12/28/24 2028    Specimen: Blood Updated: 12/28/24 2044     WBC 9.85 10*3/mm3      RBC 2.62 10*6/mm3      Hemoglobin 8.7 g/dL      Hematocrit 26.5 %      .1 fL      MCH 33.2 pg      MCHC 32.8 g/dL      RDW 14.5 %      RDW-SD 53.3 fl      MPV 10.0 fL      Platelets 422 10*3/mm3      Neutrophil % 45.3 %      Lymphocyte % 39.8 %      Monocyte % 12.8 %      Eosinophil % 0.8 %      Basophil % 0.5 %      Immature Grans % 0.8 %      Neutrophils, Absolute 4.46 10*3/mm3      Lymphocytes, Absolute 3.92 10*3/mm3      Monocytes, Absolute 1.26 10*3/mm3      Eosinophils, Absolute 0.08 10*3/mm3      Basophils, Absolute 0.05 10*3/mm3      Immature Grans, Absolute 0.08 10*3/mm3      nRBC 0.0 /100 WBC              CT Thoracic Spine With Contrast    Result Date: 12/29/2024   No acute fracture or traumatic malalignment.  These findings are in agreement with the critical and emergent findings from the [facility] preliminary report.    This report was signed and finalized on  12/29/2024 7:19 AM by Sriram Hilton.      CT Cervical Spine With Contrast    Result Date: 12/29/2024   No acute fracture or traumatic malalignment.  No abnormal enhancement.  Multilevel spondylotic changes, similar to the prior study.  These findings are in agreement with the critical and emergent findings from the [facility] preliminary report.    This report was signed and finalized on 12/29/2024 7:17 AM by Sriram Hilton.      CT Head Without Contrast    Result Date: 12/29/2024  Impression:   No acute intracranial abnormality.  These findings are in agreement with the critical and emergent findings from the [facility] preliminary report.  This report was signed and finalized on 12/29/2024 7:09 AM by Sriram Hilton.      XR Chest 1 View    Result Date: 12/28/2024   No acute findings.  This report was signed and finalized on 12/28/2024 8:14 PM by Dr. Bello Champagne MD.       Result Review:  I have personally reviewed the results from the time of this admission to 12/29/2024 15:57 CST and agree with these findings:  []  Laboratory list / accordion  [x]  Microbiology  [x]  Radiology  []  EKG/Telemetry   []  Cardiology/Vascular   []  Pathology  []  Old records  []  Other:  Most notable findings include: Very high alcohol level and positive drug screen for vitamin methamphetamine noted      I have personally reviewed and interpreted the radiology studies and ECG obtained at time of admission.     Assessment / Plan   Assessment:   Active Hospital Problems    Diagnosis     **Hypokalemia     Gait disturbance     Substance abuse     Hypothyroidism     Hypothyroidism     ETOH abuse     Primary hypertension     Non-occlusive coronary artery disease     Ex-smoker     Hypercholesterolemia with hypertriglyceridemia     Raynaud's disease without gangrene        Treatment Plan  The patient will be admitted to my service here at [facility].    Patient was kept in the ICU overnight.  She was placed on the withdrawal  protocol  This morning she is more alert and awake and did not have much altered mental status  She denies drinking alcohol much although her alcohol level was very high on presentation and she was also noted to be positive for amphetamine and methamphetamine in the urine drug screen  I started the patient on Symbicort and DuoNeb for underlying possible COPD due to history of tobacco abuse  She is waiting for an MRI of the head for the tingling and numbness of the extremities she is getting electrolytes monitored and replaced.  She is getting potassium replacement for severe hypokalemia and potassium will be closely monitored.  Once MRI is done and if she is stable she can move out of the ICU.  We would recommend her to see pulmonary clinic for possible COPD.  CODE STATUS: Full.  Overall prognosis: Guarded  Total time spent in seeing this patient in ICU 35 minutes  Disposition: Patient may be transferred to the floor if the MRI is unremarkable    Electronically signed by    Juan Carlos Norris MD  Pulmonologist/Intensivist  12/29/2024 16:04 CST

## 2024-12-29 NOTE — ED PROVIDER NOTES
Subjective   History of Present Illness  Care resumed from Dr. Eng pending MRI. Please see her note for full HPI, ROS, physical exam.        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    CTS (carpal tunnel syndrome) 2019    Dental disease     Depression     Diastolic dysfunction 2022    Dizziness     Emphysema of lung 2020    GERD (gastroesophageal reflux disease)     Headache     Hyperlipidemia     Hypertension     Hypothyroidism     Mixed simple and mucopurulent chronic bronchitis 04/18/2023    Non-occlusive coronary artery disease 05/04/2023    NSTEMI (non-ST elevated myocardial infarction) (HCC)     Pneumonia 2022    Pulmonary emphysema 04/21/2023    Shingles 2018    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       No Known Allergies    Past Surgical History:   Procedure Laterality Date    APPENDECTOMY      CARDIAC CATHETERIZATION N/A 11/04/2017    Procedure: Coronary angiography;  Surgeon: Luis Colvin MD;  Location: [facility] CATH INVASIVE LOCATION;  Service:     CARDIAC CATHETERIZATION N/A 05/31/2023    Procedure: Left Heart Cath;  Surgeon: Steffen Rossi MD;  Location: [facility] CATH INVASIVE LOCATION;  Service: Cardiology;  Laterality: N/A;    CARPAL TUNNEL RELEASE  2019    COLONOSCOPY N/A 8/2/2024    Procedure: COLONOSCOPY WITH ANESTHESIA;  Surgeon: Marty Browning MD;  Location: [facility] ENDOSCOPY;  Service: Gastroenterology;  Laterality: N/A;  pre op pancolitis    ENDOSCOPY N/A 7/8/2024    Procedure: ESOPHAGOGASTRODUODENOSCOPY WITH ANESTHESIA;  Surgeon: Gordy Wang MD;  Location: [facility] ENDOSCOPY;  Service: Gastroenterology;  Laterality: N/A;  pre: dysphagia.   post: esophagus dilated.   no PCP    HYSTERECTOMY      PANENDOSCOPY N/A 5/30/2024    Procedure: DIRECT LARYNGOSCOPY WITH BIOPSY OF VOCAL CORD  POLYPS WITH POSSIBLE TRACHEOSTOMY;  Surgeon: Mendez Padilla MD;  Location:  [facility] OR;  Service: ENT;  Laterality: N/A;    TN RT/LT HEART CATHETERS N/A 2017    Procedure: Percutaneous Coronary Intervention;  Surgeon: Luis Colvin MD;  Location:  [facility] CATH INVASIVE LOCATION;  Service: Cardiovascular    THYROID SURGERY      TOTAL THYROIDECTOMY      TRACHEOSTOMY N/A 2024    Procedure: POSSIBLE TRACHEOSTOMY;  Surgeon: Mendez Padilla MD;  Location:  [facility] OR;  Service: ENT;  Laterality: N/A;       Family History   Problem Relation Age of Onset    Asthma Mother     Cancer Mother     Emphysema Mother     Colon cancer Father     Cancer Father     Heart failure Father     Hypertension Father        Social History     Socioeconomic History    Marital status:    Tobacco Use    Smoking status: Former     Current packs/day: 0.00     Average packs/day: 0.7 packs/day for 45.9 years (30.0 ttl pk-yrs)     Types: Cigarettes     Start date: 1991     Quit date: 3/1/2024     Years since quittin.8     Passive exposure: Past    Smokeless tobacco: Never    Tobacco comments:     Less than 1/2 pack a day    Vaping Use    Vaping status: Never Used   Substance and Sexual Activity    Alcohol use: Not Currently     Comment: about a month ago per pjt    Drug use: No    Sexual activity: Not Currently     Partners: Male     Birth control/protection: Hysterectomy           Objective   Physical Exam    Procedures           ED Course  ED Course as of 24 0210   Sat Dec 28, 2024   2204 I spoke with the patient regarding her methamphetamine use.  She notes this was over a week ago and she just took a little bit in order to get some energy.  I discussed the patient her method of use, and she states that she snorts it.  She denies any type of IV drug use. [AJ]   2250 Care resumed from Dr. Eng pending MRI. Will need admit for hypokalemia [KR]   Sun Dec 29, 2024   0038 Patient unable to do MRI due to not  being able to stay still [KR]   0139 I have discussed case with on call neurologist. He stated that he will consult on the patient while admitted. Inpatient MRI is recommended. MRI unable to be obtained in the ER due to inability to stay still despite sedation. Patient is somnolent, but is waking up when spoken to.  She is moving all of her extremities appropriately.  Dorsiflexion and plantarflexion equal bilaterally. [KR]   0143 Stat rad results CT head revealed no acute intracranial finding.  CT cervical spine revealed no acute findings.  CT thoracic spine revealed no acute findings. [KR]   0208 Case signed out to Dr. Costello. Pending admission. [KR]      ED Course User Index  [AJ] Juancarlos Eng DO  [KR] Cesia Hough APRN      Labs Reviewed   COMPREHENSIVE METABOLIC PANEL - Abnormal; Notable for the following components:       Result Value    Glucose 105 (*)     Creatinine 0.55 (*)     Potassium 2.3 (*)     Chloride 89 (*)     CO2 32.0 (*)     ALT (SGPT) 35 (*)     AST (SGOT) 120 (*)     Alkaline Phosphatase 682 (*)     Anion Gap 18.0 (*)     All other components within normal limits    Narrative:     GFR Categories in Chronic Kidney Disease (CKD)      GFR Category          GFR (mL/min/1.73)    Interpretation  G1                     90 or greater         Normal or high (1)  G2                      60-89                Mild decrease (1)  G3a                   45-59                Mild to moderate decrease  G3b                   30-44                Moderate to severe decrease  G4                    15-29                Severe decrease  G5                    14 or less           Kidney failure          (1)In the absence of evidence of kidney disease, neither GFR category G1 or G2 fulfill the criteria for CKD.    eGFR calculation 2021 CKD-EPI creatinine equation, which does not include race as a factor   LACTIC ACID, PLASMA - Abnormal; Notable for the following components:    Lactate 5.8 (*)     All other  "components within normal limits   PROCALCITONIN - Abnormal; Notable for the following components:    Procalcitonin 3.59 (*)     All other components within normal limits    Narrative:     As a Marker for Sepsis (Non-Neonates):    1. <0.5 ng/mL represents a low risk of severe sepsis and/or septic shock.  2. >2 ng/mL represents a high risk of severe sepsis and/or septic shock.    As a Marker for Lower Respiratory Tract Infections that require antibiotic therapy:    PCT on Admission    Antibiotic Therapy       6-12 Hrs later    >0.5                Strongly Recommended  >0.25 - <0.5        Recommended   0.1 - 0.25          Discouraged              Remeasure/reassess PCT  <0.1                Strongly Discouraged     Remeasure/reassess PCT    As 28 day mortality risk marker: \"Change in Procalcitonin Result\" (>80% or <=80%) if Day 0 (or Day 1) and Day 4 values are available. Refer to http://[email or web address] [email or web address]pct[place]cal[place]    Change in PCT <=80%  A decrease of PCT levels below or equal to 80% defines a positive change in PCT test result representing a higher risk for 28-day all-cause mortality of patients diagnosed with severe sepsis for septic shock.    Change in PCT >80%  A decrease of PCT levels of more than 80% defines a negative change in PCT result representing a lower risk for 28-day all-cause mortality of patients diagnosed with severe sepsis or septic shock.      MAGNESIUM - Abnormal; Notable for the following components:    Magnesium 1.4 (*)     All other components within normal limits   TROPONIN - Abnormal; Notable for the following components:    HS Troponin T 20 (*)     All other components within normal limits    Narrative:     High Sensitive Troponin T Reference Range:  <14.0 ng/L- Negative Female for AMI  <22.0 ng/L- Negative Male for AMI  >=14 - Abnormal Female indicating possible myocardial injury.  >=22 - Abnormal Male indicating possible myocardial injury.   Clinicians would have to utilize clinical " acumen, EKG, Troponin, and serial changes to determine if it is an Acute Myocardial Infarction or myocardial injury due to an underlying chronic condition.        CBC WITH AUTO DIFFERENTIAL - Abnormal; Notable for the following components:    RBC 2.62 (*)     Hemoglobin 8.7 (*)     Hematocrit 26.5 (*)     .1 (*)     MCH 33.2 (*)     Monocyte % 12.8 (*)     Immature Grans % 0.8 (*)     Lymphocytes, Absolute 3.92 (*)     Monocytes, Absolute 1.26 (*)     Immature Grans, Absolute 0.08 (*)     All other components within normal limits   URINE DRUG SCREEN - Abnormal; Notable for the following components:    Methamphetamine, Ur Positive (*)     Amphetamine Screen, Urine Positive (*)     All other components within normal limits    Narrative:     Cutoff For Drugs Screened:    Amphetamines               500 ng/ml  Barbiturates               200 ng/ml  Benzodiazepines            150 ng/ml  Cocaine                    150 ng/ml  Methadone                  200 ng/ml  Opiates                    100 ng/ml  Phencyclidine               25 ng/ml  THC                         50 ng/ml  Methamphetamine            500 ng/ml  Tricyclic Antidepressants  300 ng/ml  Oxycodone                  100 ng/ml  Buprenorphine               10 ng/ml    The normal value for all drugs tested is negative. This report includes unconfirmed screening results, with the cutoff values listed, to be used for medical treatment purposes only.  Unconfirmed results must not be used for non-medical purposes such as employment or legal testing.  Clinical consideration should be applied to any drug of abuse test, particularly when unconfirmed results are used.     BLOOD GAS, ARTERIAL W/CO-OXIMETRY - Abnormal; Notable for the following components:    pH, Arterial 7.554 (*)     HCO3, Arterial 32.4 (*)     Base Excess, Arterial 9.4 (*)     Hemoglobin, Blood Gas 8.8 (*)     Hematocrit, Blood Gas 27.0 (*)     Oxyhemoglobin 92.6 (*)     Carboxyhemoglobin 5.3 (*)      Potassium, Arterial 2.2 (*)     pH, Temp Corrected 7.554 (*)     All other components within normal limits   LACTIC ACID, REFLEX - Abnormal; Notable for the following components:    Lactate 3.7 (*)     All other components within normal limits   HIGH SENSITIVITIY TROPONIN T 1HR - Abnormal; Notable for the following components:    HS Troponin T 19 (*)     All other components within normal limits    Narrative:     High Sensitive Troponin T Reference Range:  <14.0 ng/L- Negative Female for AMI  <22.0 ng/L- Negative Male for AMI  >=14 - Abnormal Female indicating possible myocardial injury.  >=22 - Abnormal Male indicating possible myocardial injury.   Clinicians would have to utilize clinical acumen, EKG, Troponin, and serial changes to determine if it is an Acute Myocardial Infarction or myocardial injury due to an underlying chronic condition.        URINALYSIS W/ CULTURE IF INDICATED - Normal    Narrative:     In absence of clinical symptoms, the presence of pyuria, bacteria, and/or nitrites on the urinalysis result does not correlate with infection.  Urine microscopic not indicated.   FENTANYL, URINE - Normal    Narrative:     Negative Threshold:      Fentanyl 5 ng/mL     The normal value for the drug tested is negative. This report includes final unconfirmed screening results to be used for medical treatment purposes only. Unconfirmed results must not be used for non-medical purposes such as employment or legal testing. Clinical consideration should be applied to any drug of abuse test, particularly when unconfirmed results are used.          BLOOD GAS, ARTERIAL W/CO-OXIMETRY   ETHANOL    Narrative:     Not for legal purposes. Chain of Custody not followed.    LACTIC ACID, REFLEX   CBC AND DIFFERENTIAL    Narrative:     The following orders were created for panel order CBC & Differential.  Procedure                               Abnormality         Status                     ---------                                -----------         ------                     CBC Auto Differential[[id number]]        Abnormal            Final result                 Please view results for these tests on the individual orders.     XR Chest 1 View   Final Result       No acute findings.       This report was signed and finalized on 12/28/2024 8:14 PM by Dr. Bello Champagne MD.          MRI Cervical Spine With & Without Contrast    (Results Pending)   MRI Thoracic Spine With & Without Contrast    (Results Pending)   CT Head Without Contrast    (Results Pending)   CT Thoracic Spine With Contrast    (Results Pending)   CT Cervical Spine With Contrast    (Results Pending)                                                      Medical Decision Making  Pt was t/o to me from Cesia oHugh - she has been in the EC some time today with report of some falls and numbness in her mid-lower body.  Pt had CT head and CT cspine/tspine with contrast with no acute pathology.  She was noted to have hypokalemia and hypomagnesemia - has been given potassium and magnesium replacement in EC.  Was also given thiamine/folate as it was noted that she had + etoh - this was checked several hours after presentation so was potentially much higher earlier.  + methamphetamine that she reportedly smokes.  She has been slowly improving in EC.  She had MRIs ordered but would not be still for these.  Cesia did speak with teleneuro who recommended admit and MRI as inpatient.  I believe that much of her symptomatology is due to her etoh/methamphetamine abuse and subsequent malnourishment/electrolyte depletion.  May have some dorsal column issue from this.  Pt was d/w the hospitalist by Cesia - he felt that she required ICU admission.   Spoke with Natalie Woods with intens[place] [facility] [place] further mgmt.    Amount and/or Complexity of Data Reviewed  Labs: ordered.  Radiology: ordered.  ECG/medicine tests: ordered.    Risk  Prescription drug management.        Final  diagnoses:   Numbness and tingling   Alcoholic intoxication with complication   Methamphetamine abuse   Hypokalemia   Hypomagnesemia   Gait disturbance       ED Disposition  ED Disposition       ED Disposition   Intended Admit    Condition   --    Comment   --               No follow-up provider specified.       Medication List      No changes were made to your prescriptions during this visit.            Antoni Costello, DO  12/29/24 0334       Antoni Costello, DO  12/29/24 0342

## 2024-12-29 NOTE — ED NOTES
Nursing report ED to floor  Minnie Bang  49 y.o.  female    HPI:   Chief Complaint   Patient presents with    Chest Pain       Admitting doctor:   Juan Carlos Norris MD    Consulting provider(s):  Consults       No orders found for last 30 day(s).             Admitting diagnosis:   The primary encounter diagnosis was Numbness and tingling. Diagnoses of Alcoholic intoxication with complication, Methamphetamine abuse, Hypokalemia, Hypomagnesemia, and Gait disturbance were also pertinent to this visit.    Code status:   Current Code Status       Date Active Code Status Order ID Comments User Context       Prior            Allergies:   Patient has no known allergies.    Intake and Output  No intake or output data in the 24 hours ending 12/29/24 0404    Weight:       12/28/24  1842   Weight: 77.1 kg (170 lb)       Most recent vitals:   Vitals:    12/29/24 0201 12/29/24 0231 12/29/24 0346 12/29/24 0403   BP: 128/89 118/76 126/82 125/86   Pulse: 104 102 98 96   Resp:  18 18 18   Temp: 98.4 °F (36.9 °C)   97.9 °F (36.6 °C)   TempSrc:    Oral   SpO2: 95% 94% 98% 96%   Weight:       Height:         Oxygen Therapy: .    Active LDAs/IV Access:   Lines, Drains & Airways       Active LDAs       Name Placement date Placement time Site Days    Peripheral IV 12/28/24 2041 Right Antecubital 12/28/24 2041  Antecubital  less than 1                    Labs (abnormal labs have a star):   Labs Reviewed   COMPREHENSIVE METABOLIC PANEL - Abnormal; Notable for the following components:       Result Value    Glucose 105 (*)     Creatinine 0.55 (*)     Potassium 2.3 (*)     Chloride 89 (*)     CO2 32.0 (*)     ALT (SGPT) 35 (*)     AST (SGOT) 120 (*)     Alkaline Phosphatase 682 (*)     Anion Gap 18.0 (*)     All other components within normal limits    Narrative:     GFR Categories in Chronic Kidney Disease (CKD)      GFR Category          GFR (mL/min/1.73)    Interpretation  G1                     90 or greater         Normal or high  "(1)  G2                      60-89                Mild decrease (1)  G3a                   45-59                Mild to moderate decrease  G3b                   30-44                Moderate to severe decrease  G4                    15-29                Severe decrease  G5                    14 or less           Kidney failure          (1)In the absence of evidence of kidney disease, neither GFR category G1 or G2 fulfill the criteria for CKD.    eGFR calculation 2021 CKD-EPI creatinine equation, which does not include race as a factor   LACTIC ACID, PLASMA - Abnormal; Notable for the following components:    Lactate 5.8 (*)     All other components within normal limits   PROCALCITONIN - Abnormal; Notable for the following components:    Procalcitonin 3.59 (*)     All other components within normal limits    Narrative:     As a Marker for Sepsis (Non-Neonates):    1. <0.5 ng/mL represents a low risk of severe sepsis and/or septic shock.  2. >2 ng/mL represents a high risk of severe sepsis and/or septic shock.    As a Marker for Lower Respiratory Tract Infections that require antibiotic therapy:    PCT on Admission    Antibiotic Therapy       6-12 Hrs later    >0.5                Strongly Recommended  >0.25 - <0.5        Recommended   0.1 - 0.25          Discouraged              Remeasure/reassess PCT  <0.1                Strongly Discouraged     Remeasure/reassess PCT    As 28 day mortality risk marker: \"Change in Procalcitonin Result\" (>80% or <=80%) if Day 0 (or Day 1) and Day 4 values are available. Refer to [email or web address]-[email or web address]    Change in PCT <=80%  A decrease of PCT levels below or equal to 80% defines a positive change in PCT test result representing a higher risk for 28-day all-cause mortality of patients diagnosed with severe sepsis for septic shock.    Change in PCT >80%  A decrease of PCT levels of more than 80% defines a negative change in PCT result representing a lower risk for 28-day " all-cause mortality of patients diagnosed with severe sepsis or septic shock.      MAGNESIUM - Abnormal; Notable for the following components:    Magnesium 1.4 (*)     All other components within normal limits   TROPONIN - Abnormal; Notable for the following components:    HS Troponin T 20 (*)     All other components within normal limits    Narrative:     High Sensitive Troponin T Reference Range:  <14.0 ng/L- Negative Female for AMI  <22.0 ng/L- Negative Male for AMI  >=14 - Abnormal Female indicating possible myocardial injury.  >=22 - Abnormal Male indicating possible myocardial injury.   Clinicians would have to utilize clinical acumen, EKG, Troponin, and serial changes to determine if it is an Acute Myocardial Infarction or myocardial injury due to an underlying chronic condition.        CBC WITH AUTO DIFFERENTIAL - Abnormal; Notable for the following components:    RBC 2.62 (*)     Hemoglobin 8.7 (*)     Hematocrit 26.5 (*)     .1 (*)     MCH 33.2 (*)     Monocyte % 12.8 (*)     Immature Grans % 0.8 (*)     Lymphocytes, Absolute 3.92 (*)     Monocytes, Absolute 1.26 (*)     Immature Grans, Absolute 0.08 (*)     All other components within normal limits   URINE DRUG SCREEN - Abnormal; Notable for the following components:    Methamphetamine, Ur Positive (*)     Amphetamine Screen, Urine Positive (*)     All other components within normal limits    Narrative:     Cutoff For Drugs Screened:    Amphetamines               500 ng/ml  Barbiturates               200 ng/ml  Benzodiazepines            150 ng/ml  Cocaine                    150 ng/ml  Methadone                  200 ng/ml  Opiates                    100 ng/ml  Phencyclidine               25 ng/ml  THC                         50 ng/ml  Methamphetamine            500 ng/ml  Tricyclic Antidepressants  300 ng/ml  Oxycodone                  100 ng/ml  Buprenorphine               10 ng/ml    The normal value for all drugs tested is negative. This report  includes unconfirmed screening results, with the cutoff values listed, to be used for medical treatment purposes only.  Unconfirmed results must not be used for non-medical purposes such as employment or legal testing.  Clinical consideration should be applied to any drug of abuse test, particularly when unconfirmed results are used.     BLOOD GAS, ARTERIAL W/CO-OXIMETRY - Abnormal; Notable for the following components:    pH, Arterial 7.554 (*)     HCO3, Arterial 32.4 (*)     Base Excess, Arterial 9.4 (*)     Hemoglobin, Blood Gas 8.8 (*)     Hematocrit, Blood Gas 27.0 (*)     Oxyhemoglobin 92.6 (*)     Carboxyhemoglobin 5.3 (*)     Potassium, Arterial 2.2 (*)     pH, Temp Corrected 7.554 (*)     All other components within normal limits   LACTIC ACID, REFLEX - Abnormal; Notable for the following components:    Lactate 3.7 (*)     All other components within normal limits   HIGH SENSITIVITIY TROPONIN T 1HR - Abnormal; Notable for the following components:    HS Troponin T 19 (*)     All other components within normal limits    Narrative:     High Sensitive Troponin T Reference Range:  <14.0 ng/L- Negative Female for AMI  <22.0 ng/L- Negative Male for AMI  >=14 - Abnormal Female indicating possible myocardial injury.  >=22 - Abnormal Male indicating possible myocardial injury.   Clinicians would have to utilize clinical acumen, EKG, Troponin, and serial changes to determine if it is an Acute Myocardial Infarction or myocardial injury due to an underlying chronic condition.        URINALYSIS W/ CULTURE IF INDICATED - Normal    Narrative:     In absence of clinical symptoms, the presence of pyuria, bacteria, and/or nitrites on the urinalysis result does not correlate with infection.  Urine microscopic not indicated.   FENTANYL, URINE - Normal    Narrative:     Negative Threshold:      Fentanyl 5 ng/mL     The normal value for the drug tested is negative. This report includes final unconfirmed screening results to be  used for medical treatment purposes only. Unconfirmed results must not be used for non-medical purposes such as employment or legal testing. Clinical consideration should be applied to any drug of abuse test, particularly when unconfirmed results are used.          BLOOD GAS, ARTERIAL W/CO-OXIMETRY   ETHANOL    Narrative:     Not for legal purposes. Chain of Custody not followed.    LACTIC ACID, REFLEX   CBC AND DIFFERENTIAL    Narrative:     The following orders were created for panel order CBC & Differential.  Procedure                               Abnormality         Status                     ---------                               -----------         ------                     CBC Auto Differential[[id number]]        Abnormal            Final result                 Please view results for these tests on the individual orders.       Meds given in ED:   Medications   Gadopiclenol (VUEWAY) injection 10 mL (has no administration in time range)   potassium chloride 10 mEq in 100 mL IVPB (0 mEq Intravenous Stopped 12/28/24 2229)   magnesium sulfate 2g/50 mL (PREMIX) infusion (0 g Intravenous Stopped 12/28/24 2216)   sodium chloride 0.9 % bolus 500 mL (0 mL Intravenous Stopped 12/28/24 2229)   diazePAM (VALIUM) injection 2.5 mg (2.5 mg Intravenous Given 12/28/24 2221)   LORazepam (ATIVAN) injection 1 mg (1 mg Intravenous Given 12/28/24 2335)   LORazepam (ATIVAN) injection 1 mg (1 mg Intravenous Given 12/29/24 0015)   potassium chloride (MICRO-K/KLOR-CON) CR capsule (40 mEq Oral Given 12/29/24 0120)   iopamidol (ISOVUE-300) 61 % injection 100 mL (100 mL Intravenous Given 12/29/24 0127)   folic acid 1 mg in sodium chloride 0.9 % 50 mL IVPB (0 mg Intravenous Stopped 12/29/24 0300)   thiamine (B-1) injection 100 mg (100 mg Intravenous Given 12/29/24 0234)           NIH Stroke Scale:  Interval: baseline    Isolation/Infection(s):  No active isolations   No active infections     COVID Testing  Collected .NO  Resulted  .    Nursing report ED to floor:  Mental status: .A/O X4  Ambulatory status: .ASSIST ONLY  Precautions: .FALL    ED nurse phone extentsion- ..

## 2024-12-30 ENCOUNTER — APPOINTMENT (OUTPATIENT)
Dept: CT IMAGING | Facility: HOSPITAL | Age: 49
DRG: 640 | End: 2024-12-30
Payer: COMMERCIAL

## 2024-12-30 PROBLEM — E43 SEVERE PROTEIN-CALORIE MALNUTRITION: Status: ACTIVE | Noted: 2024-12-30

## 2024-12-30 LAB
ALBUMIN SERPL-MCNC: 3.2 G/DL (ref 3.5–5.2)
ALBUMIN/GLOB SERPL: 1.3 G/DL
ALP SERPL-CCNC: 634 U/L (ref 39–117)
ALT SERPL W P-5'-P-CCNC: 51 U/L (ref 1–33)
ANION GAP SERPL CALCULATED.3IONS-SCNC: 12 MMOL/L (ref 5–15)
ANISOCYTOSIS BLD QL: ABNORMAL
AST SERPL-CCNC: 214 U/L (ref 1–32)
BASO STIPL COARSE BLD QL SMEAR: ABNORMAL
BASOPHILS # BLD MANUAL: 0.08 10*3/MM3 (ref 0–0.2)
BASOPHILS NFR BLD MANUAL: 1 % (ref 0–1.5)
BILIRUB SERPL-MCNC: 0.5 MG/DL (ref 0–1.2)
BUN SERPL-MCNC: 6 MG/DL (ref 6–20)
BUN/CREAT SERPL: 15.8 (ref 7–25)
CALCIUM SPEC-SCNC: 8.2 MG/DL (ref 8.6–10.5)
CHLORIDE SERPL-SCNC: 100 MMOL/L (ref 98–107)
CK SERPL-CCNC: 22 U/L (ref 20–180)
CO2 SERPL-SCNC: 31 MMOL/L (ref 22–29)
CREAT SERPL-MCNC: 0.38 MG/DL (ref 0.57–1)
DEPRECATED RDW RBC AUTO: 55.8 FL (ref 37–54)
EGFRCR SERPLBLD CKD-EPI 2021: 121.5 ML/MIN/1.73
EOSINOPHIL # BLD MANUAL: 0.24 10*3/MM3 (ref 0–0.4)
EOSINOPHIL NFR BLD MANUAL: 3 % (ref 0.3–6.2)
ERYTHROCYTE [DISTWIDTH] IN BLOOD BY AUTOMATED COUNT: 14.7 % (ref 12.3–15.4)
FOLATE SERPL-MCNC: 3.32 NG/ML (ref 4.78–24.2)
GIANT PLATELETS: ABNORMAL
GLOBULIN UR ELPH-MCNC: 2.5 GM/DL
GLUCOSE SERPL-MCNC: 135 MG/DL (ref 65–99)
HCT VFR BLD AUTO: 24.6 % (ref 34–46.6)
HGB BLD-MCNC: 8 G/DL (ref 12–15.9)
INR PPP: 0.97 (ref 0.91–1.09)
LYMPHOCYTES # BLD MANUAL: 2.01 10*3/MM3 (ref 0.7–3.1)
LYMPHOCYTES NFR BLD MANUAL: 5 % (ref 5–12)
MAGNESIUM SERPL-MCNC: 1.6 MG/DL (ref 1.6–2.6)
MCH RBC QN AUTO: 34 PG (ref 26.6–33)
MCHC RBC AUTO-ENTMCNC: 32.5 G/DL (ref 31.5–35.7)
MCV RBC AUTO: 104.7 FL (ref 79–97)
METAMYELOCYTES NFR BLD MANUAL: 1 % (ref 0–0)
MONOCYTES # BLD: 0.4 10*3/MM3 (ref 0.1–0.9)
MYELOCYTES NFR BLD MANUAL: 1 % (ref 0–0)
NEUTROPHILS # BLD AUTO: 5.15 10*3/MM3 (ref 1.7–7)
NEUTROPHILS NFR BLD MANUAL: 64 % (ref 42.7–76)
NEUTS HYPERSEG # BLD: PRESENT 10*3/UL
PHOSPHATE SERPL-MCNC: 2.6 MG/DL (ref 2.5–4.5)
PLATELET # BLD AUTO: 374 10*3/MM3 (ref 140–450)
PMV BLD AUTO: 10.4 FL (ref 6–12)
POIKILOCYTOSIS BLD QL SMEAR: ABNORMAL
POLYCHROMASIA BLD QL SMEAR: ABNORMAL
POTASSIUM SERPL-SCNC: 4.4 MMOL/L (ref 3.5–5.2)
PROT SERPL-MCNC: 5.7 G/DL (ref 6–8.5)
PROTHROMBIN TIME: 13.3 SECONDS (ref 11.8–14.8)
RBC # BLD AUTO: 2.35 10*6/MM3 (ref 3.77–5.28)
SODIUM SERPL-SCNC: 143 MMOL/L (ref 136–145)
STOMATOCYTES BLD QL SMEAR: ABNORMAL
VARIANT LYMPHS NFR BLD MANUAL: 1 % (ref 0–5)
VARIANT LYMPHS NFR BLD MANUAL: 24 % (ref 19.6–45.3)
VIT B12 BLD-MCNC: 708 PG/ML (ref 211–946)
WBC NRBC COR # BLD AUTO: 8.05 10*3/MM3 (ref 3.4–10.8)

## 2024-12-30 PROCEDURE — 70450 CT HEAD/BRAIN W/O DYE: CPT

## 2024-12-30 PROCEDURE — 83921 ORGANIC ACID SINGLE QUANT: CPT | Performed by: PSYCHIATRY & NEUROLOGY

## 2024-12-30 PROCEDURE — 85025 COMPLETE CBC W/AUTO DIFF WBC: CPT | Performed by: INTERNAL MEDICINE

## 2024-12-30 PROCEDURE — 82607 VITAMIN B-12: CPT | Performed by: PSYCHIATRY & NEUROLOGY

## 2024-12-30 PROCEDURE — 80053 COMPREHEN METABOLIC PANEL: CPT | Performed by: INTERNAL MEDICINE

## 2024-12-30 PROCEDURE — 82550 ASSAY OF CK (CPK): CPT | Performed by: INTERNAL MEDICINE

## 2024-12-30 PROCEDURE — 84425 ASSAY OF VITAMIN B-1: CPT | Performed by: PSYCHIATRY & NEUROLOGY

## 2024-12-30 PROCEDURE — 82746 ASSAY OF FOLIC ACID SERUM: CPT | Performed by: PSYCHIATRY & NEUROLOGY

## 2024-12-30 PROCEDURE — 84100 ASSAY OF PHOSPHORUS: CPT | Performed by: INTERNAL MEDICINE

## 2024-12-30 PROCEDURE — 99223 1ST HOSP IP/OBS HIGH 75: CPT | Performed by: PSYCHIATRY & NEUROLOGY

## 2024-12-30 PROCEDURE — 85610 PROTHROMBIN TIME: CPT | Performed by: PSYCHIATRY & NEUROLOGY

## 2024-12-30 PROCEDURE — 25010000002 ENOXAPARIN PER 10 MG: Performed by: NURSE PRACTITIONER

## 2024-12-30 PROCEDURE — 83735 ASSAY OF MAGNESIUM: CPT | Performed by: INTERNAL MEDICINE

## 2024-12-30 PROCEDURE — 81271 HTT GENE DETC ABNOR ALLELES: CPT | Performed by: PSYCHIATRY & NEUROLOGY

## 2024-12-30 PROCEDURE — 85007 BL SMEAR W/DIFF WBC COUNT: CPT | Performed by: INTERNAL MEDICINE

## 2024-12-30 RX ORDER — CHOLESTYRAMINE LIGHT 4 G/5.7G
1 POWDER, FOR SUSPENSION ORAL DAILY
Status: DISCONTINUED | OUTPATIENT
Start: 2024-12-31 | End: 2025-01-04 | Stop reason: HOSPADM

## 2024-12-30 RX ORDER — LISINOPRIL 20 MG/1
40 TABLET ORAL DAILY
Status: DISCONTINUED | OUTPATIENT
Start: 2024-12-30 | End: 2025-01-04 | Stop reason: HOSPADM

## 2024-12-30 RX ORDER — LEVOTHYROXINE SODIUM 200 UG/1
200 TABLET ORAL DAILY
COMMUNITY

## 2024-12-30 RX ORDER — CARVEDILOL 25 MG/1
25 TABLET ORAL 2 TIMES DAILY WITH MEALS
Status: DISCONTINUED | OUTPATIENT
Start: 2024-12-30 | End: 2025-01-04 | Stop reason: HOSPADM

## 2024-12-30 RX ORDER — TOPIRAMATE 25 MG/1
50 TABLET, FILM COATED ORAL 2 TIMES DAILY
Status: DISCONTINUED | OUTPATIENT
Start: 2024-12-30 | End: 2025-01-01

## 2024-12-30 RX ORDER — CALCIUM CARBONATE 500 MG/1
2 TABLET, CHEWABLE ORAL 3 TIMES DAILY
Status: DISPENSED | OUTPATIENT
Start: 2024-12-30 | End: 2024-12-31

## 2024-12-30 RX ADMIN — ANTACID TABLETS 2 TABLET: 500 TABLET, CHEWABLE ORAL at 16:42

## 2024-12-30 RX ADMIN — ACETAMINOPHEN 650 MG: 325 TABLET ORAL at 12:25

## 2024-12-30 RX ADMIN — Medication 10 ML: at 08:01

## 2024-12-30 RX ADMIN — Medication 10 ML: at 20:31

## 2024-12-30 RX ADMIN — POTASSIUM CHLORIDE 40 MEQ: 20 TABLET, EXTENDED RELEASE ORAL at 06:28

## 2024-12-30 RX ADMIN — LEVOTHYROXINE SODIUM 175 MCG: 0.17 TABLET ORAL at 06:27

## 2024-12-30 RX ADMIN — CARVEDILOL 25 MG: 25 TABLET, FILM COATED ORAL at 17:13

## 2024-12-30 RX ADMIN — CARVEDILOL 25 MG: 25 TABLET, FILM COATED ORAL at 06:28

## 2024-12-30 RX ADMIN — PANTOPRAZOLE SODIUM 40 MG: 40 TABLET, DELAYED RELEASE ORAL at 08:01

## 2024-12-30 RX ADMIN — ENOXAPARIN SODIUM 40 MG: 100 INJECTION SUBCUTANEOUS at 08:00

## 2024-12-30 RX ADMIN — Medication 1 APPLICATION: at 20:29

## 2024-12-30 RX ADMIN — TOPIRAMATE 50 MG: 25 TABLET, FILM COATED ORAL at 20:30

## 2024-12-30 RX ADMIN — Medication 1 APPLICATION: at 08:00

## 2024-12-30 RX ADMIN — FLUOXETINE HYDROCHLORIDE 40 MG: 20 CAPSULE ORAL at 14:31

## 2024-12-30 RX ADMIN — TIZANIDINE 4 MG: 4 TABLET ORAL at 22:55

## 2024-12-30 RX ADMIN — LISINOPRIL 40 MG: 10 TABLET ORAL at 08:01

## 2024-12-30 RX ADMIN — ANTACID TABLETS 2 TABLET: 500 TABLET, CHEWABLE ORAL at 20:30

## 2024-12-30 NOTE — CASE MANAGEMENT/SOCIAL WORK
Discharge Planning Assessment  [facility]     Patient Name: Minnie Bang  MRN: [id number]  Today's Date: 12/30/2024    Admit Date: 12/28/2024        Discharge Needs Assessment       Row Name 12/30/24 1334       Living Environment    People in Home spouse    Current Living Arrangements other (see comments)  Camper    Primary Care Provided by self    Able to Return to Prior Arrangements yes       Resource/Environmental Concerns    Transportation Concerns none       Transition Planning    Patient/Family Anticipates Transition to home       Discharge Needs Assessment    Equipment Currently Used at Home cpap    Current Discharge Risk substance use/abuse    Discharge Coordination/Progress Spoke with patient for dc planning. Patient lives with spouse in a camper. Patient was able to care for self prior to admission but has concerns with current illness and unsure of what she may need at discharge. Insurance coverage is with [employer]. Will continue to follow for discharge needs.                   Discharge Plan    No documentation.                 Continued Care and Services - Admitted Since 12/28/2024    No active coordination exists for this encounter.          Demographic Summary    No documentation.                  Functional Status    No documentation.                  Psychosocial    No documentation.                  Abuse/Neglect    No documentation.                  Legal    No documentation.                  Substance Abuse    No documentation.                  Patient Forms    No documentation.                     Merlina A Fletcher, RN

## 2024-12-30 NOTE — CONSULTS
Neurology Consult Note    Consult Date: 2024  Referring MD: No ref. provider found  Reason for Consult: numbness    Patient: Minnie Bang (49 y.o. female)  MRN: [id number]  : 1975    History of Present Illness:   Minnie Bang is a 49 y.o. female who has a complex presentation.  Her story actually starts approximately a year ago.  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  She got COVID at that time and had to quit her job because of it.  This resulted in a constellation of symptoms that she dealt with over several months to a year that were GI mostly in nature including nausea and diarrhea.    She presents now saying that 3 to 4 weeks ago she was laying in bed and noticed a pressure-like sensation on her stomach.  She went to touch her stomach and eventually even pinched her stomach and could not feel it.  Over the next several weeks that sensation moved down to her upper thighs and eventually to her knees bilaterally.  Over the last several days these paresthesias and numbness have reached her chin, lips, and fingertips bilaterally.  She is also began falling.  When she attempts to ambulate she falls to the ground.  She tells me that it is not just because of numbness but is also because of some weakness.  She denies any bowel or bladder incontinence.    She denies any recent illnesses.  She denies URIs and also denies GI symptoms.  She denies tick bite.  She does live in a camper on some property and owns dogs but is not out in the woods any significant amount of time.    She has noticed some presyncope type symptoms when standing but denies any other bulbar symptomatology such as diplopia.  She has a history of drug use that ended approximately 1-1/2 years ago.  She did recently try meth again but had a bad experience with it.  Her last time using meth was 2-1/2 weeks ago.  Her current urine drug screen is positive.  She drinks alcohol weekly.  She says that 2-3  "times a week she engages in several shots but does not use it daily.    She had constant movement during her MRI despite Ativan.  She tells me that her mother was \"fidgety \"as well.  She tells me that her mother also had some concerning findings and dementia.  She herself is also having concerns of worsening memory.  She gives the example that she is afraid to drive recently as she may get lost.      Medical History:   Past Medical/Surgical Hx:  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    CTS (carpal tunnel syndrome) 2019    Dental disease     Depression     Diastolic dysfunction 2022    Dizziness     Emphysema of lung 2020    GERD (gastroesophageal reflux disease)     Headache     Hyperlipidemia     Hypertension     Hypothyroidism     Mixed simple and mucopurulent chronic bronchitis 04/18/2023    Non-occlusive coronary artery disease 05/04/2023    NSTEMI (non-ST elevated myocardial infarction) ([facility])     Pneumonia 2022    Pulmonary emphysema 04/21/2023    Shingles 2018    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     Past Surgical History:   Procedure Laterality Date    APPENDECTOMY      CARDIAC CATHETERIZATION N/A 11/04/2017    Procedure: Coronary angiography;  Surgeon: Luis Colvin MD;  Location: [facility] CATH INVASIVE LOCATION;  Service:     CARDIAC CATHETERIZATION N/A 05/31/2023    Procedure: Left Heart Cath;  Surgeon: Steffen Rossi MD;  Location: [facility] CATH INVASIVE LOCATION;  Service: Cardiology;  Laterality: N/A;    CARPAL TUNNEL RELEASE  2019    COLONOSCOPY N/A 8/2/2024    Procedure: COLONOSCOPY WITH ANESTHESIA;  Surgeon: Marty Browning MD;  Location: [facility] ENDOSCOPY;  Service: Gastroenterology;  Laterality: N/A;  pre op pancolitis    ENDOSCOPY N/A 7/8/2024    Procedure: ESOPHAGOGASTRODUODENOSCOPY WITH ANESTHESIA;  Surgeon: " Gordy Wang MD;  Location: [facility] ENDOSCOPY;  Service: Gastroenterology;  Laterality: N/A;  pre: dysphagia.   post: esophagus dilated.   no PCP    HYSTERECTOMY      PANENDOSCOPY N/A 5/30/2024    Procedure: DIRECT LARYNGOSCOPY WITH BIOPSY OF VOCAL CORD POLYPS WITH POSSIBLE TRACHEOSTOMY;  Surgeon: Mendez Padilla MD;  Location: [facility] OR;  Service: ENT;  Laterality: N/A;    CT RT/LT HEART CATHETERS N/A 11/04/2017    Procedure: Percutaneous Coronary Intervention;  Surgeon: Luis Colvin MD;  Location: [facility] CATH INVASIVE LOCATION;  Service: Cardiovascular    THYROID SURGERY      TOTAL THYROIDECTOMY      TRACHEOSTOMY N/A 5/30/2024    Procedure: POSSIBLE TRACHEOSTOMY;  Surgeon: Mendez Padilla MD;  Location: [facility] OR;  Service: ENT;  Laterality: N/A;       Medications On Admission:  Medications Prior to Admission   Medication Sig Dispense Refill Last Dose/Taking    bismuth subsalicylate (Pepto-Bismol) 262 MG chewable tablet Chew 2 tablets 4 (Four) Times a Day Before Meals & at Bedtime. 120 tablet 0 12/28/2024    carvedilol (COREG) 25 MG tablet Take 1 tablet by mouth 2 (Two) Times a Day. 60 tablet 0 12/28/2024    colestipol (COLESTID) 1 g tablet Take 1 tablet by mouth Daily.   12/28/2024    FLUoxetine (PROzac) 40 MG capsule Take 1 capsule by mouth Daily.   12/28/2024    levothyroxine (SYNTHROID, LEVOTHROID) 175 MCG tablet Take 1 tablet by mouth Daily. Resume 175 mcg on 10/12/2024   12/28/2024    lisinopril (PRINIVIL,ZESTRIL) 40 MG tablet Take 1 tablet by mouth Daily.   12/28/2024    loperamide (IMODIUM) 2 MG capsule Take 2 capsules by mouth 4 (Four) Times a Day As Needed for Diarrhea. 120 capsule 0 12/28/2024    nitroglycerin (NITROSTAT) 0.4 MG SL tablet Place 1 tablet under the tongue Every 5 (Five) Minutes As Needed for Chest Pain. Take no more than 3 doses in 15 minutes.   Past Week    pantoprazole (PROTONIX) 40 MG EC tablet Take 1 tablet by mouth 2 (Two) Times a Day. 60 tablet 0 12/28/2024     potassium chloride (KLOR-CON) 20 MEQ packet Take 20 mEq by mouth 2 (Two) Times a Day.   12/28/2024    promethazine (PHENERGAN) 25 MG tablet Take 1 tablet by mouth 3 (Three) Times a Day As Needed for Nausea or Vomiting.   12/28/2024    topiramate (TOPAMAX) 50 MG tablet Take 1 tablet by mouth 2 (Two) Times a Day. 60 tablet 5 12/28/2024    levothyroxine (Synthroid) 200 MCG tablet Take 1 tablet by mouth Daily for 7 days. For 7 days begin 10/5/2024 then resume 175 mcg daily on 10/12 per PCP (Patient taking differently: Take 1 tablet by mouth Daily.) 7 tablet 0        Current Medications:    Current Facility-Administered Medications:     acetaminophen (TYLENOL) tablet 650 mg, 650 mg, Oral, Q4H PRN, 650 mg at 12/29/24 2135 **OR** acetaminophen (TYLENOL) suppository 650 mg, 650 mg, Rectal, Q4H PRN, Natalie Woods APRN    sennosides-docusate (PERICOLACE) 8.6-50 MG per tablet 2 tablet, 2 tablet, Oral, BID **AND** polyethylene glycol (MIRALAX) packet 17 g, 17 g, Oral, Daily PRN **AND** bisacodyl (DULCOLAX) EC tablet 5 mg, 5 mg, Oral, Daily PRN **AND** bisacodyl (DULCOLAX) suppository 10 mg, 10 mg, Rectal, Daily PRN, Natalie Woods APRN    Calcium Replacement - Follow Nurse / BPA Driven Protocol, , Not Applicable, PRN, Natalie Woods APRN    carvedilol (COREG) tablet 25 mg, 25 mg, Oral, BID With Meals, Tej Parada APRN, 25 mg at 12/30/24 0628    Chlorhexidine Gluconate Cloth 2 % pads 1 Application, 1 Application, Topical, Q24H, Natalie Woods APRN    Enoxaparin Sodium (LOVENOX) syringe 40 mg, 40 mg, Subcutaneous, Q24H, Natalie Woods APRN, 40 mg at 12/30/24 0800    ipratropium-albuterol (DUO-NEB) nebulizer solution 3 mL, 3 mL, Nebulization, Q6H PRN, Natalie Woods APRN    levothyroxine (SYNTHROID, LEVOTHROID) tablet 175 mcg, 175 mcg, Oral, Q AM, Cuate Washington APRN, 175 mcg at 12/30/24 0627    lisinopril (PRINIVIL,ZESTRIL) tablet 40 mg, 40 mg, Oral, Daily, Tej Parada APRN, 40 mg at 12/30/24  0801    Magnesium Standard Dose Replacement - Follow Nurse / BPA Driven Protocol, , Not Applicable, PRN, Natalie Woods D, APRN    mupirocin (BACTROBAN) 2 % nasal ointment 1 Application, 1 Application, Each Nare, BID, Natalie Woods, APRN, 1 Application at 24 0800    nitroglycerin (NITROSTAT) SL tablet 0.4 mg, 0.4 mg, Sublingual, Q5 Min PRN, Natalie Woods, APRN    ondansetron (ZOFRAN) injection 4 mg, 4 mg, Intravenous, Q6H PRN, Natalie Woods, APRN    pantoprazole (PROTONIX) EC tablet 40 mg, 40 mg, Oral, QAM AC, Cuate Washington, APRN, 40 mg at 24 0801    Pharmacy to Dose enoxaparin (LOVENOX), , Not Applicable, Continuous PRN, Natalie Woods, APRN    Phosphorus Replacement - Follow Nurse / BPA Driven Protocol, , Not Applicable, PRN, Natalie Woods, APRN    Potassium Replacement - Follow Nurse / BPA Driven Protocol, , Not Applicable, PRN, Natalie Woods, APRN    sodium chloride 0.9 % flush 10 mL, 10 mL, Intravenous, Q12H, Natalie Woods, APRN, 10 mL at 24 0801    sodium chloride 0.9 % flush 10 mL, 10 mL, Intravenous, PRN, Natalie Woods, APRN    sodium chloride 0.9 % infusion 40 mL, 40 mL, Intravenous, PRN, Natalie Woods, APRN     Allergies:  No Known Allergies    Social Hx:  Social History     Socioeconomic History    Marital status:    Tobacco Use    Smoking status: Former     Current packs/day: 0.00     Average packs/day: 0.7 packs/day for 45.9 years (30.0 ttl pk-yrs)     Types: Cigarettes     Start date: 1991     Quit date: 3/1/2024     Years since quittin.8     Passive exposure: Past    Smokeless tobacco: Never    Tobacco comments:     Less than 1/2 pack a day    Vaping Use    Vaping status: Never Used   Substance and Sexual Activity    Alcohol use: Not Currently     Comment: about a month ago per pjt    Drug use: No    Sexual activity: Not Currently     Partners: Male     Birth control/protection: Hysterectomy       Family Hx:  Family History   Problem  Relation Age of Onset    Asthma Mother     Cancer Mother     Emphysema Mother     Colon cancer Father     Cancer Father     Heart failure Father     Hypertension Father      Physical Examination:   Vital Signs:  Vitals:    12/30/24 0700 12/30/24 0730 12/30/24 0800 12/30/24 0955   BP: 137/99 100/86  118/71   Pulse: 101 87  91   Resp:       Temp:   98.1 °F (36.7 °C)    TempSrc:   Oral    SpO2: 90% 93%     Weight:       Height:             General Exam:  Head:  Normocephalic, atraumatic  HEENT:  Neck supple  Fundoscopic Exam:  No signs of disc edema  CVS:  Regular rate and rhythm.  No murmurs  Carotid Examination:  No bruits  Lungs:  Clear to auscultation  Abdomen:  Nontender, Nondistended  Extremities:  No signs of peripheral edema  Skin:  No rashes    Neurologic Exam:    Mental Status:    -Awake, Alert, Oriented X 3  -No word finding difficulties  -No aphasia  -No dysarthria  -Follows simple and complex commands    CN II:  Visual fields full.  Pupils equally reactive to light  CN III, IV, VI:  Extraocular Muscles full with no signs of nystagmus  CN V:  Facial sensory is symmetric with no asymmetries.  CN VII:  Facial motor symmetric  CN VIII:  Gross hearing intact bilaterally  CN IX:  Palate elevates symmetrically  CN X:  Palate elevates symmetrically  CN XI:  Shoulder shrug symmetric  CN XII:  Tongue is midline on protrusion    Motor: (strength out of 5:  1= minimal movement, 2 = movement in plane of gravity, 3 = movement against gravity, 4 = movement against some resistance, 5 = full strength)      -5- out of 5 strength throughout in all 4 extremities with no appreciation for distal weakness.  No appreciation for fatigability.    DTR:  -Areflexic throughout in all 4 extremities.  Downgoing toes bilaterally.    Sensory:  -Endorses numbness to light touch and pinprick over entire body.  There is no proximal to distal changes.  This occurs all the way through her chin and face.    Coordination:  -She seems to have  choreiform type movements that are seen throughout my examination.  Also, when the MRI was attempted the patient had multiple rounds of Ativan and still had continuous movement.  She has some past-pointing on finger-nose    Gait  -Not tested    Recent Diagnostics:   Laboratory Results:   - Reviewed in EMR  Lab Results   Component Value Date    GLUCOSE 135 (H) 12/30/2024    CALCIUM 8.2 (L) 12/30/2024     12/30/2024    K 4.4 12/30/2024    CO2 31.0 (H) 12/30/2024     12/30/2024    BUN 6 12/30/2024    CREATININE 0.38 (L) 12/30/2024    EGFRIFAFRI >59 03/11/2021    EGFRIFNONA >60 03/11/2021    BCR 15.8 12/30/2024    ANIONGAP 12.0 12/30/2024     Lab Results   Component Value Date    WBC 8.05 12/30/2024    HGB 8.0 (L) 12/30/2024    HCT 24.6 (L) 12/30/2024    .7 (H) 12/30/2024     12/30/2024     Lab Results   Component Value Date    PTT 28.5 05/23/2024    INR 0.93 10/02/2024     Lab Results   Component Value Date    TRIG 364 (H) 10/02/2024    HDL 36 (L) 10/02/2024     (H) 10/02/2024     Lab Results   Component Value Date    HGBA1C 4.8 11/05/2017       Imaging Results:  Imaging Results (Last 24 Hours)       Procedure Component Value Units Date/Time    MRI Thoracic Spine With & Without Contrast [[id number]] Collected: 12/29/24 2014     Updated: 12/29/24 2021    Narrative:      MRI thoracic spine 12/29/2024 5:50 PM     HISTORY: Loss of sensation about T5 down into the bilateral legs.;  R20.0-Anesthesia of skin; R20.2-Paresthesia of skin; F10.929-Alcohol  use, unspecified with intoxication, unspecified; F15.10-Other stimulant  abuse, uncomplicated; E87.6-Hypokalemia; E83.42-Hypomagnesemia;  R26.9-Unspecified abnormalities of gait and mobility     COMPARISON : NONE      Technique: Multiplanar, multisequence MRI of the thoracic spine was  obtained with and without the use of contrast. 10 cc Vueway intravenous  contrast.     Findings:      Exam is severely limited by motion artifact. In particular,  assessment  for cord signal abnormalities is severely limited on T2 and STIR. The  postcontrast imaging is essentially nondiagnostic.     The spine is imaged from C7-L1. Normal thoracic spinal alignment. There  is no spondylolisthesis. Vertebral body heights are well-maintained. No  evidence of acute fracture. No pathologic marrow infiltrate. Posterior  elements appear intact. There is loss of disc height with what appears  to be a mild diffuse disc bulging at T6-T7, T7-T8 and T8-T9. No obvious  high-grade spinal stenosis. Again, motion artifact limits assessment for  cord signal abnormalities. No high-grade neuroforaminal narrowing.       Impression:      Impression:   1. Exam is severely limited by motion artifact. The postcontrast imaging  is near completely nondiagnostic. Assessment for cord signal abnormality  is extremely limited on T2 and STIR.  2. Alignment is normal.  3. No evidence of acute fracture. No pathologic marrow infiltrate/bone  lesions or obvious enhancing soft tissue masses.  4. Mild disc bulging at several midthoracic levels. No obvious  high-grade spinal stenosis. Again, there is very limited assessment for  any cord signal abnormalities.     This report was signed and finalized on 12/29/2024 8:18 PM by Dr Negrito Wu.       MRI Cervical Spine With & Without Contrast [[id number]] Collected: 12/29/24 2008     Updated: 12/29/24 2016    Narrative:      MRI CERVICAL SPINE W WO CONTRAST- 12/29/2024 5:21 PM     HISTORY: Change in sensation to bilateral hands; R20.0-Anesthesia of  skin; R20.2-Paresthesia of skin; F10.929-Alcohol use, unspecified with  intoxication, unspecified; F15.10-Other stimulant abuse, uncomplicated;  E87.6-Hypokalemia; E83.42-Hypomagnesemia; R26.9-Unspecified  abnormalities of gait and mobility     COMPARISON: None      TECHNIQUE: Multiplanar, multisequence MRI of the cervical spine was  obtained with and without contrast. 10 cc Vueway IV contrast.     FINDINGS:     Exam is  severely limited by motion artifact. The postcontrast imaging is  completely nondiagnostic.     The spine is imaged from the skull base through T2. Normal alignment  across the craniocervical junction. Flattened cervical lordosis. There  is a slight degenerative anterolisthesis at C4-C5. Vertebral body  heights are well-maintained. No evidence of acute fracture. Modic type I  degenerative endplate signal changes at C5-C6 and C6-C7. The posterior  elements are intact. Synovitis with facet joint capsular distention on  the right at C7-T1. Assessment of the thoracic cord is limited due to  motion artifact. There does not appear to be any high-grade spinal  stenosis or any obvious compressive myelopathy or myelomalacia. No cord  syrinx. Loss of disc height with mild endplate spurring and disc bulging  at C4-C5, C5-C6 and C6-C7. There appears to be a high-grade right-sided  neuroforaminal narrowing at C4-C5 and C5-C6 and perhaps on the left at  C6-C7 due to uncovertebral spurring and facet arthropathy. Again, the  assessment of the oblique imaging is quite limited by motion artifact.  Paraspinal soft tissues are unremarkable.       Impression:      1. Exam is severely limited by motion artifact. The postcontrast imaging  is completely nondiagnostic due to motion artifact.  2. Flattened cervical lordosis as well as a slight degenerative  anterolisthesis at C4-C5.  3. No obvious high-grade spinal stenosis or evidence of compressive  myelopathy or myelomalacia. No cord syrinx.  4. Uncovertebral spurring and facet arthropathy resulting in what  appears to be high-grade neuroforaminal narrowing at C4-C5, C5-C6 and  C6-C7.           This report was signed and finalized on 12/29/2024 8:13 PM by Dr Negrito Wu.                Other labs:  Potassium on admission was 2.3  Sodium on admission was 141  Calcium normal at 8.8  TSH decreased at 0.038  T4 elevated at 1.88  CK level normal at 22  Phosphorus level currently normal at  "2.6  Magnesium level low at 1.6  Urine drug screen positive for methamphetamines  Alcohol level 2 days ago was 0.065.  7 months ago was 0.154 and 10 months ago was 0.149  Other RAD:  MRI of cervical and thoracic spine reviewed by me.  Significant motion artifact noted.  There is multiple levels of disc disease but I see no evidence of impingement on the cord.  There is no flair signal abnormalities as well.      Assessment & Plan:   Patient presenting with an atypical constellation of symptoms with worsening weakness that did not occur in an \"ascending \"pattern.  She also complains of forgetfulness and difficulties with ambulation causing falls.  On examination she is areflexic but has no oculomotor dysfunction.  I do have concerns that the entire time that I was watching her that she was having choreiform type movements.  Given the fact that her mother may have had some similar things and that there may be some cognitive decline as well I would have to include [place]'s in my differential diagnosis.  A drug withdrawal is also possible.  She denies continuous drug and alcohol usage but her urine drug screen is positive for methamphetamines.  She also utilizes alcohol somewhat frequently based on ethanol levels taken over the past year.    Impression:  Full body numbness and paresthesias of unclear etiology.  I do want to rule out a demyelinating neuropathy.  She also has multiple electrolyte abnormalities including low magnesium and low potassium.  These could easily cause weakness and may cause some neuropathic features as well.  I believe would be reasonable to do a lumbar puncture as well looking for evidence of elevated protein in her CSF.  I also like to perform an MRI of the brain  Choreiform like movements: These could represent a withdrawal syndrome although I need to include [place]'s in my differential diagnosis.  Hypokalemia  Low magnesium  Altered thyroid panel  Significant alcohol " usage  Methamphetamine usage      Plan:   Given her inability to hold still during the MRI despite Ativan I think the safest way moving forward would be to plan an elective intubation tomorrow.  We will do an intubation tomorrow morning and then perform an MRI of the brain with and without contrast along with a lumbar puncture at bedside.  Replacement of magnesium and potassium per primary team  Consider thiamine replacement given long-term utilization of alcohol  B12 and folate levels  [place]'s testing    Luis Gifford MD  12/30/24  10:54 CST    Medical Decision Making    Number/Complexity of Problems  Moderate  1 undiagnosed new problem with uncertain prognosis -   1 acute illness with systemic symptoms -   High  1 acute or chronic illness that poses a threat to life/body function -   High     MDM Data  Moderate - 1/3 categories  Extensive - 2/3 categories    Category 1: 3 of the following  Review of external notes  Review of results  Ordering of each unique test  Independent historian  Category 2:  Independent interpretation of test (ex: imaging)  Category 3:  Discussion of management with another provider    Extensive     Treatment Plan  Moderate - Prescription Drug management  High  Drug therapy requiring intensive monitoring for toxicity  Decision regarding hospitalization or escalation of care  De-escalate care/DNR decisions

## 2024-12-30 NOTE — PLAN OF CARE
Problem: Adult Inpatient Plan of Care  Goal: Plan of Care Review  Outcome: Progressing  Goal: Patient-Specific Goal (Individualized)  Outcome: Progressing  Goal: Absence of Hospital-Acquired Illness or Injury  Outcome: Progressing  Intervention: Identify and Manage Fall Risk  Recent Flowsheet Documentation  Taken 12/30/2024 1700 by Freddy Virk RN  Safety Promotion/Fall Prevention:   safety round/check completed   fall prevention program maintained  Taken 12/30/2024 1600 by Freddy Virk RN  Safety Promotion/Fall Prevention:   safety round/check completed   fall prevention program maintained  Taken 12/30/2024 1500 by Freddy Virk RN  Safety Promotion/Fall Prevention: safety round/check completed  Taken 12/30/2024 1400 by Freddy Virk RN  Safety Promotion/Fall Prevention: safety round/check completed  Taken 12/30/2024 1300 by Freddy Virk RN  Safety Promotion/Fall Prevention:   safety round/check completed   fall prevention program maintained  Taken 12/30/2024 1200 by Freddy Virk RN  Safety Promotion/Fall Prevention:   safety round/check completed   fall prevention program maintained  Taken 12/30/2024 1100 by Freddy Virk RN  Safety Promotion/Fall Prevention:   safety round/check completed   fall prevention program maintained  Taken 12/30/2024 1000 by Freddy Virk RN  Safety Promotion/Fall Prevention:   safety round/check completed   fall prevention program maintained  Taken 12/30/2024 0900 by Freddy Virk RN  Safety Promotion/Fall Prevention:   safety round/check completed   fall prevention program maintained  Taken 12/30/2024 0800 by Freddy Virk RN  Safety Promotion/Fall Prevention:   safety round/check completed   fall prevention program maintained  Taken 12/30/2024 0700 by Freddy Virk RN  Safety Promotion/Fall Prevention:   safety round/check completed   fall prevention program maintained  Intervention: Prevent Skin Injury  Recent Flowsheet Documentation  Taken 12/30/2024 1700  by Freddy Virk RN  Body Position: position changed independently  Taken 12/30/2024 1600 by Freddy Virk RN  Body Position: position changed independently  Taken 12/30/2024 1500 by Freddy Virk RN  Body Position: position changed independently  Taken 12/30/2024 1400 by Freddy Virk RN  Body Position: position changed independently  Taken 12/30/2024 1300 by Freddy Virk RN  Body Position: position changed independently  Taken 12/30/2024 1200 by Freddy Virk RN  Body Position: position changed independently  Taken 12/30/2024 1100 by Freddy Virk RN  Body Position: position changed independently  Taken 12/30/2024 1000 by Freddy Virk RN  Body Position: position changed independently  Taken 12/30/2024 0900 by Freddy Virk RN  Body Position: position changed independently  Taken 12/30/2024 0800 by Freddy Virk RN  Body Position: position changed independently  Intervention: Prevent and Manage VTE (Venous Thromboembolism) Risk  Recent Flowsheet Documentation  Taken 12/30/2024 0800 by Freddy Virk RN  VTE Prevention/Management: (See MAR) other (see comments)  Intervention: Prevent Infection  Recent Flowsheet Documentation  Taken 12/30/2024 0800 by Freddy Virk RN  Infection Prevention:   single patient room provided   rest/sleep promoted  Goal: Optimal Comfort and Wellbeing  Outcome: Progressing  Intervention: Provide Person-Centered Care  Recent Flowsheet Documentation  Taken 12/30/2024 0800 by Freddy Virk RN  Trust Relationship/Rapport:   care explained   reassurance provided   questions encouraged  Goal: Readiness for Transition of Care  Outcome: Progressing     Problem: Comorbidity Management  Goal: Maintenance of Asthma Control  Outcome: Progressing  Intervention: Maintain Asthma Symptom Control  Recent Flowsheet Documentation  Taken 12/30/2024 0800 by Freddy Virk RN  Medication Review/Management: medications reviewed  Goal: Maintenance of COPD Symptom Control  Outcome:  Progressing  Intervention: Maintain COPD (Chronic Obstructive Pulmonary Disease) Symptom Control  Recent Flowsheet Documentation  Taken 12/30/2024 0800 by Freddy Virk RN  Medication Review/Management: medications reviewed  Goal: Blood Pressure in Desired Range  Outcome: Progressing  Intervention: Maintain Blood Pressure Management  Recent Flowsheet Documentation  Taken 12/30/2024 0800 by Freddy Virk RN  Medication Review/Management: medications reviewed     Problem: Skin Injury Risk Increased  Goal: Skin Health and Integrity  Outcome: Progressing  Intervention: Optimize Skin Protection  Recent Flowsheet Documentation  Taken 12/30/2024 1000 by Freddy Virk RN  Activity Management: activity encouraged  Taken 12/30/2024 0900 by Freddy Virk RN  Activity Management: activity encouraged  Taken 12/30/2024 0800 by Freddy Virk RN  Activity Management: activity encouraged  Pressure Reduction Devices: pressure-redistributing mattress utilized     Problem: Fall Injury Risk  Goal: Absence of Fall and Fall-Related Injury  Outcome: Progressing  Intervention: Identify and Manage Contributors  Recent Flowsheet Documentation  Taken 12/30/2024 0800 by Freddy Virk RN  Medication Review/Management: medications reviewed  Intervention: Promote Injury-Free Environment  Recent Flowsheet Documentation  Taken 12/30/2024 1700 by Frdedy Virk RN  Safety Promotion/Fall Prevention:   safety round/check completed   fall prevention program maintained  Taken 12/30/2024 1600 by Freddy Virk RN  Safety Promotion/Fall Prevention:   safety round/check completed   fall prevention program maintained  Taken 12/30/2024 1500 by Freddy Virk RN  Safety Promotion/Fall Prevention: safety round/check completed  Taken 12/30/2024 1400 by Freddy Virk RN  Safety Promotion/Fall Prevention: safety round/check completed  Taken 12/30/2024 1300 by Freddy Virk RN  Safety Promotion/Fall Prevention:   safety round/check completed    fall prevention program maintained  Taken 12/30/2024 1200 by Freddy Virk RN  Safety Promotion/Fall Prevention:   safety round/check completed   fall prevention program maintained  Taken 12/30/2024 1100 by Freddy Virk RN  Safety Promotion/Fall Prevention:   safety round/check completed   fall prevention program maintained  Taken 12/30/2024 1000 by Freddy Virk RN  Safety Promotion/Fall Prevention:   safety round/check completed   fall prevention program maintained  Taken 12/30/2024 0900 by Freddy Virk RN  Safety Promotion/Fall Prevention:   safety round/check completed   fall prevention program maintained  Taken 12/30/2024 0800 by Freddy Virk RN  Safety Promotion/Fall Prevention:   safety round/check completed   fall prevention program maintained  Taken 12/30/2024 0700 by Freddy Virk RN  Safety Promotion/Fall Prevention:   safety round/check completed   fall prevention program maintained     Problem: Malnutrition  Goal: Improved Nutritional Intake  Outcome: Progressing   Goal Outcome Evaluation:

## 2024-12-30 NOTE — PAYOR COMM NOTE
"ADMIT INPT 12-28-24   [id number]    Robles Singh (49 y.o. Female)       Date of Birth   1975    Social Security Number       Address   [address] [facility] [address]    Home Phone   [phone]    MRN   [id number]       [facility]    Marital Status                               Admission Date   12/28/24    Admission Type   Emergency    Admitting Provider   Juan Carlos Norris MD    Attending Provider   Juan Carlos Norris MD    Department, Room/Bed   [facility] INTENSIVE CARE, I008/1       Discharge Date       Discharge Disposition       Discharge Destination                                 Attending Provider: Juan Carlos Norris MD    Allergies: No Known Allergies    Isolation: None   Infection: None   Code Status: CPR    Ht: 175.3 cm (69\")   Wt: 71.3 kg (157 lb 3 oz)    Admission Cmt: None   Principal Problem: Hypokalemia [E87.6]                   Active Insurance as of 12/28/2024       Primary Coverage       Payor Plan Insurance Group Employer/Plan Group    [employer] [place] [employer] [place]        Payor Plan Address Payor Plan Phone Number Payor Plan Fax Number Effective Dates    [address] [address] [phone]  6/8/2020 - None Entered    [facility] [address]         Subscriber Name Subscriber Birth Date Member ID       ROBLES SINGH 1975 [id number]                     Emergency Contacts        (Rel.) Home Phone Work Phone Mobile Phone    Kirill Montgomery (Spouse) [phone] -- [phone]                 History & Physical        Juan Carlos Norris MD at 12/29/24 [address] [place] [address] Intensivist Services  HISTORY AND PHYSICAL    Date of Admission: 12/28/2024  Primary Care Physician: Margi Lemus APRN    Subjective   Primary Historian: ED physician.  Patient was confused and disoriented and unable to provide much history.      Chief Complaint: Ethanol intoxication, urinary drug screen positive for " methamphetamine, progressive numbness and weakness of the trunk extent to the lower extremities, hypokalemia, anxiety    History of Present Illness  Patient is a 49 y.o. female admitted 12/28/2024 with progressive numbness and weakness of the trunk extending to the lower extremities, hypokalemia, EtOH intoxication and meth positive.  Patient endorses binge drinking cinnamon whiskey yesterday.  Endorses meth use within the last week.  She claims she does not chronically drink alcohol all the time and this meth smoking was just a one-time thing.  She states she noticed her stomach was numb to the touch within the last week and her lower extremities have become progressively weak with proximal numbness.  Of note on ER evaluation, patient found to be significantly hypokalemic with a potassium of 2.3..  She received 10 mEq of oral potassium in the ER with transient rise of potassium to 2.4.  Neurology consulted for patient's paresthesias and weakness.  Patient underwent CT of the cervical spine, CT of the head and CT of the thoracic spine, all with no acute findings.  Patient underwent MRI of the cervical and thoracic spine, but could not lay still for these initially.  Intensivist services were contacted for admission to the ICU.  On her chart (which can contain diagnoses that are not current) shows she  has Hypercholesterolemia with hypertriglyceridemia; Raynaud's disease without gangrene; Ex-smoker; Chest pain in adult; SORTO (dyspnea on exertion); Mixed simple and mucopurulent chronic bronchitis; Pulmonary emphysema; Non-occlusive coronary artery disease; Primary hypertension; Coronary-myocardial bridge; Bilateral lower extremity edema; Bicuspid aortic valve; Vocal cord polyps; Tobacco use disorder, continuous; Neoplasm of uncertain behavior; Hypokalemia; Dysphagia; Heartburn; Nonspecific colitis; Diarrhea; ETOH abuse; Steatosis of liver, advanced; Intractable vomiting with nausea; Hypothyroidism; Nausea & vomiting,  chronic; Transaminitis; Hypothyroidism; and Gait disturbance on their problem list..    The ICU team was asked to evaluate the patient for severe hypokalemia.  Noted minimal replacement in the ER.  She has since been given 2 doses of 40 mEq for total of 90 mEq.  Last potassium checked at 0902 was 2.8.  Patient continues to complain of numbness across her trunk and proximal lower extremities.  Vital signs remain stable.  No issues with respiratory muscles.  She continues to be jerky with spastic movements intermittently.        Review of Systems   Otherwise complete ROS reviewed and negative except as mentioned in the HPI.  Patient is not a very good historian and did not have much review of system except numbness and weakness of the lower extremities.    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    CTS (carpal tunnel syndrome) 2019    Dental disease     Depression     Diastolic dysfunction 2022    Dizziness     Emphysema of lung 2020    GERD (gastroesophageal reflux disease)     Headache     Hyperlipidemia     Hypertension     Hypothyroidism     Mixed simple and mucopurulent chronic bronchitis 04/18/2023    Non-occlusive coronary artery disease 05/04/2023    NSTEMI (non-ST elevated myocardial infarction) (HCC)     Pneumonia 2022    Pulmonary emphysema 04/21/2023    Shingles 2018    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     Past Surgical History:  Past Surgical History:   Procedure Laterality Date    APPENDECTOMY      CARDIAC CATHETERIZATION N/A 11/04/2017    Procedure: Coronary angiography;  Surgeon: Luis Colvin MD;  Location:  [facility] CATH INVASIVE LOCATION;  Service:     CARDIAC CATHETERIZATION N/A 05/31/2023    Procedure: Left Heart Cath;  Surgeon: Steffen Rossi MD;  Location:  [facility] CATH INVASIVE LOCATION;   Service: Cardiology;  Laterality: N/A;    CARPAL TUNNEL RELEASE  2019    COLONOSCOPY N/A 8/2/2024    Procedure: COLONOSCOPY WITH ANESTHESIA;  Surgeon: Marty Browning MD;  Location: [facility] ENDOSCOPY;  Service: Gastroenterology;  Laterality: N/A;  pre op pancolitis    ENDOSCOPY N/A 7/8/2024    Procedure: ESOPHAGOGASTRODUODENOSCOPY WITH ANESTHESIA;  Surgeon: Gordy Wang MD;  Location: [facility] ENDOSCOPY;  Service: Gastroenterology;  Laterality: N/A;  pre: dysphagia.   post: esophagus dilated.   no PCP    HYSTERECTOMY      PANENDOSCOPY N/A 5/30/2024    Procedure: DIRECT LARYNGOSCOPY WITH BIOPSY OF VOCAL CORD POLYPS WITH POSSIBLE TRACHEOSTOMY;  Surgeon: Mendez Padilla MD;  Location: [facility] OR;  Service: ENT;  Laterality: N/A;    VT RT/LT HEART CATHETERS N/A 11/04/2017    Procedure: Percutaneous Coronary Intervention;  Surgeon: Luis Colvin MD;  Location: [facility] CATH INVASIVE LOCATION;  Service: Cardiovascular    THYROID SURGERY      TOTAL THYROIDECTOMY      TRACHEOSTOMY N/A 5/30/2024    Procedure: POSSIBLE TRACHEOSTOMY;  Surgeon: Mendez Padilla MD;  Location: [facility] OR;  Service: ENT;  Laterality: N/A;     Social History:  reports that she quit smoking about 9 months ago. Her smoking use included cigarettes. She started smoking about 33 years ago. She has a 30 pack-year smoking history. She has been exposed to tobacco smoke. She has never used smokeless tobacco. She reports that she does not currently use alcohol. She reports that she does not use drugs.    Family History: family history includes Asthma in her mother; Cancer in her father and mother; Colon cancer in her father; Emphysema in her mother; Heart failure in her father; Hypertension in her father.       Allergies:  No Known Allergies    Medications:  Prior to Admission medications    Medication Sig Start Date End Date Taking? Authorizing Provider   bismuth subsalicylate (Pepto-Bismol) 262 MG chewable tablet Chew 2 tablets 4 (Four) Times  a Day Before Meals & at Bedtime. 9/20/24  Yes Mery Varner MD   carvedilol (COREG) 25 MG tablet Take 1 tablet by mouth 2 (Two) Times a Day. 9/20/24  Yes Mery Varner MD   colestipol (COLESTID) 1 g tablet Take 1 tablet by mouth Daily.   Yes Galdino Wei MD   FLUoxetine (PROzac) 40 MG capsule Take 1 capsule by mouth Daily.   Yes Galdino Wei MD   levothyroxine (SYNTHROID, LEVOTHROID) 175 MCG tablet Take 1 tablet by mouth Daily. Resume 175 mcg on 10/12/2024 10/12/24  Yes Lucero Nelson APRN   loperamide (IMODIUM) 2 MG capsule Take 2 capsules by mouth 4 (Four) Times a Day As Needed for Diarrhea. 9/20/24  Yes Mery Varner MD   nitroglycerin (NITROSTAT) 0.4 MG SL tablet Place 1 tablet under the tongue Every 5 (Five) Minutes As Needed for Chest Pain. Take no more than 3 doses in 15 minutes.   Yes Galdino Wei MD   pantoprazole (PROTONIX) 40 MG EC tablet Take 1 tablet by mouth 2 (Two) Times a Day. 9/20/24  Yes Mery Varner MD   potassium chloride (KLOR-CON) 20 MEQ packet Take 20 mEq by mouth 2 (Two) Times a Day.   Yes Galdino Wei MD   promethazine (PHENERGAN) 25 MG tablet Take 1 tablet by mouth 3 (Three) Times a Day As Needed for Nausea or Vomiting.   Yes Galdino Wei MD   topiramate (TOPAMAX) 50 MG tablet Take 1 tablet by mouth 2 (Two) Times a Day. 6/13/24  Yes Domitila Duncan MD   levothyroxine (Synthroid) 200 MCG tablet Take 1 tablet by mouth Daily for 7 days. For 7 days begin 10/5/2024 then resume 175 mcg daily on 10/12 per PCP  Patient taking differently: Take 1 tablet by mouth Daily. 10/5/24 10/12/24  Lucero Nelson APRN   lisinopril (PRINIVIL,ZESTRIL) 40 MG tablet Take 1 tablet by mouth Daily.    Galdino Wei MD     I have utilized all available immediate resources to obtain, update, or review the patient's current medications (including all prescriptions, over-the-counter products, herbals, cannabis/cannabidiol products, and  "vitamin/mineral/dietary (nutritional) supplements).    Objective     Vital Signs: BP (!) 175/122   Pulse 116   Temp 98 °F (36.7 °C) (Oral)   Resp 18   Ht 175.3 cm (69\")   Wt 71.3 kg (157 lb 3 oz)   LMP  (LMP Unknown)   SpO2 97%   BMI 23.21 kg/m²     Physical Exam   Middle-aged obese  female sitting up in the bed  HEENT: Atraumatic normocephalic pupil equal reactive to light and accommodation  Neck: Supple no mass nuclear manage tension and lymphadenopathy  Heart: Sounds normal rate and rhythm regular no murmur no gallop  Lungs: Bibasilar good air entry no crackles or wheezes.  Decreased breath sound at the bases.  Abdomen: Soft nondistended nontender bowel sound present no organomegaly  Neurologic: Grossly intact there is some involuntary movement and jerking movement noted in the extremities  Skin: There is no breakdown  Psych: Anxiety and depression and denial noted      Results Reviewed:  Lab Results (last 24 hours)       Procedure Component Value Units Date/Time    STAT Lactic Acid, Reflex [[id number]] Collected: 12/29/24 1523    Specimen: Blood Updated: 12/29/24 1547    TSH [[id number]]  (Abnormal) Collected: 12/29/24 0902    Specimen: Blood Updated: 12/29/24 1054     TSH 0.038 uIU/mL     T4, Free [[id number]]  (Abnormal) Collected: 12/29/24 0902    Specimen: Blood Updated: 12/29/24 1054     Free T4 1.88 ng/dL     STAT Lactic Acid, Reflex [[id number]]  (Abnormal) Collected: 12/29/24 0902    Specimen: Blood Updated: 12/29/24 0942     Lactate 3.4 mmol/L     Basic Metabolic Panel [[id number]]  (Abnormal) Collected: 12/29/24 0902    Specimen: Blood Updated: 12/29/24 0942     Glucose 107 mg/dL      BUN 6 mg/dL      Creatinine 0.42 mg/dL      Sodium 141 mmol/L      Potassium 2.8 mmol/L      Chloride 93 mmol/L      CO2 34.0 mmol/L      Calcium 8.8 mg/dL      BUN/Creatinine Ratio 14.3     Anion Gap 14.0 mmol/L      eGFR 118.6 mL/min/1.73     Narrative:      GFR Categories in Chronic Kidney Disease " (CKD)      GFR Category          GFR (mL/min/1.73)    Interpretation  G1                     90 or greater         Normal or high (1)  G2                      60-89                Mild decrease (1)  G3a                   45-59                Mild to moderate decrease  G3b                   30-44                Moderate to severe decrease  G4                    15-29                Severe decrease  G5                    14 or less           Kidney failure          (1)In the absence of evidence of kidney disease, neither GFR category G1 or G2 fulfill the criteria for CKD.    eGFR calculation 2021 CKD-EPI creatinine equation, which does not include race as a factor    Potassium [[id number]]  (Abnormal) Collected: 12/29/24 0431    Specimen: Blood from Hand, Left Updated: 12/29/24 0507     Potassium 2.4 mmol/L     STAT Lactic Acid, Reflex [[id number]]  (Abnormal) Collected: 12/29/24 0401    Specimen: Blood from Arm, Left Updated: 12/29/24 0428     Lactate 3.6 mmol/L     Ethanol [[id number]] Collected: 12/28/24 2028    Specimen: Blood Updated: 12/29/24 0226     Ethanol % 0.065 %     Narrative:      Not for legal purposes. Chain of Custody not followed.     STAT Lactic Acid, Reflex [[id number]]  (Abnormal) Collected: 12/29/24 0038    Specimen: Blood Updated: 12/29/24 0105     Lactate 3.7 mmol/L     High Sensitivity Troponin T 1Hr [[id number]]  (Abnormal) Collected: 12/28/24 2131    Specimen: Blood Updated: 12/28/24 2158     HS Troponin T 19 ng/L      Troponin T Numeric Delta -1 ng/L      Troponin T % Delta -5 %     Narrative:      High Sensitive Troponin T Reference Range:  <14.0 ng/L- Negative Female for AMI  <22.0 ng/L- Negative Male for AMI  >=14 - Abnormal Female indicating possible myocardial injury.  >=22 - Abnormal Male indicating possible myocardial injury.   Clinicians would have to utilize clinical acumen, EKG, Troponin, and serial changes to determine if it is an Acute Myocardial Infarction or myocardial injury due  to an underlying chronic condition.         Comprehensive Metabolic Panel [[id number]]  (Abnormal) Collected: 12/28/24 2028    Specimen: Blood Updated: 12/28/24 2113     Glucose 105 mg/dL      BUN 7 mg/dL      Creatinine 0.55 mg/dL      Sodium 139 mmol/L      Potassium 2.3 mmol/L      Chloride 89 mmol/L      CO2 32.0 mmol/L      Calcium 9.5 mg/dL      Total Protein 6.7 g/dL      Albumin 3.6 g/dL      ALT (SGPT) 35 U/L      AST (SGOT) 120 U/L      Alkaline Phosphatase 682 U/L      Total Bilirubin 0.3 mg/dL      Globulin 3.1 gm/dL      A/G Ratio 1.2 g/dL      BUN/Creatinine Ratio 12.7     Anion Gap 18.0 mmol/L      eGFR 111.1 mL/min/1.73     Narrative:      GFR Categories in Chronic Kidney Disease (CKD)      GFR Category          GFR (mL/min/1.73)    Interpretation  G1                     90 or greater         Normal or high (1)  G2                      60-89                Mild decrease (1)  G3a                   45-59                Mild to moderate decrease  G3b                   30-44                Moderate to severe decrease  G4                    15-29                Severe decrease  G5                    14 or less           Kidney failure          (1)In the absence of evidence of kidney disease, neither GFR category G1 or G2 fulfill the criteria for CKD.    eGFR calculation 2021 CKD-EPI creatinine equation, which does not include race as a factor    Magnesium [[id number]]  (Abnormal) Collected: 12/28/24 2028    Specimen: Blood Updated: 12/28/24 2109     Magnesium 1.4 mg/dL     Procalcitonin [[id number]]  (Abnormal) Collected: 12/28/24 2028    Specimen: Blood Updated: 12/28/24 2108     Procalcitonin 3.59 ng/mL     Narrative:      As a Marker for Sepsis (Non-Neonates):    1. <0.5 ng/mL represents a low risk of severe sepsis and/or septic shock.  2. >2 ng/mL represents a high risk of severe sepsis and/or septic shock.    As a Marker for Lower Respiratory Tract Infections that require antibiotic therapy:    PCT on  "Admission    Antibiotic Therapy       6-12 Hrs later    >0.5                Strongly Recommended  >0.25 - <0.5        Recommended   0.1 - 0.25          Discouraged              Remeasure/reassess PCT  <0.1                Strongly Discouraged     Remeasure/reassess PCT    As 28 day mortality risk marker: \"Change in Procalcitonin Result\" (>80% or <=80%) if Day 0 (or Day 1) and Day 4 values are available. Refer to http://www.[facility]-pct-calculator.com    Change in PCT <=80%  A decrease of PCT levels below or equal to 80% defines a positive change in PCT test result representing a higher risk for 28-day all-cause mortality of patients diagnosed with severe sepsis for septic shock.    Change in PCT >80%  A decrease of PCT levels of more than 80% defines a negative change in PCT result representing a lower risk for 28-day all-cause mortality of patients diagnosed with severe sepsis or septic shock.       High Sensitivity Troponin T [[id number]]  (Abnormal) Collected: 12/28/24 2028    Specimen: Blood Updated: 12/28/24 2059     HS Troponin T 20 ng/L     Narrative:      High Sensitive Troponin T Reference Range:  <14.0 ng/L- Negative Female for AMI  <22.0 ng/L- Negative Male for AMI  >=14 - Abnormal Female indicating possible myocardial injury.  >=22 - Abnormal Male indicating possible myocardial injury.   Clinicians would have to utilize clinical acumen, EKG, Troponin, and serial changes to determine if it is an Acute Myocardial Infarction or myocardial injury due to an underlying chronic condition.         Lactic Acid, Plasma [[id number]]  (Abnormal) Collected: 12/28/24 2028    Specimen: Blood Updated: 12/28/24 2058     Lactate 5.8 mmol/L     Fentanyl, Urine - Urine, Clean Catch [[id number]]  (Normal) Collected: 12/28/24 2037    Specimen: Urine, Clean Catch Updated: 12/28/24 2057     Fentanyl, Urine Negative    Narrative:      Negative Threshold:      Fentanyl 5 ng/mL     The normal value for the drug tested is negative. This " report includes final unconfirmed screening results to be used for medical treatment purposes only. Unconfirmed results must not be used for non-medical purposes such as employment or legal testing. Clinical consideration should be applied to any drug of abuse test, particularly when unconfirmed results are used.           Urine Drug Screen - Urine, Clean Catch [[id number]]  (Abnormal) Collected: 12/28/24 2037    Specimen: Urine, Clean Catch Updated: 12/28/24 2056     THC, Screen, Urine Negative     Phencyclidine (PCP), Urine Negative     Cocaine Screen, Urine Negative     Methamphetamine, Ur Positive     Opiate Screen Negative     Amphetamine Screen, Urine Positive     Benzodiazepine Screen, Urine Negative     Tricyclic Antidepressants Screen Negative     Methadone Screen, Urine Negative     Barbiturates Screen, Urine Negative     Oxycodone Screen, Urine Negative     Buprenorphine, Screen, Urine Negative    Narrative:      Cutoff For Drugs Screened:    Amphetamines               500 ng/ml  Barbiturates               200 ng/ml  Benzodiazepines            150 ng/ml  Cocaine                    150 ng/ml  Methadone                  200 ng/ml  Opiates                    100 ng/ml  Phencyclidine               25 ng/ml  THC                         50 ng/ml  Methamphetamine            500 ng/ml  Tricyclic Antidepressants  300 ng/ml  Oxycodone                  100 ng/ml  Buprenorphine               10 ng/ml    The normal value for all drugs tested is negative. This report includes unconfirmed screening results, with the cutoff values listed, to be used for medical treatment purposes only.  Unconfirmed results must not be used for non-medical purposes such as employment or legal testing.  Clinical consideration should be applied to any drug of abuse test, particularly when unconfirmed results are used.      Urinalysis With Culture If Indicated - Straight Cath [[id number]]  (Normal) Collected: 12/28/24 2037    Specimen: Urine  from Straight Cath Updated: 12/28/24 2048     Color, UA Yellow     Appearance, UA Clear     pH, UA 6.5     Specific Gravity, UA 1.010     Glucose, UA Negative     Ketones, UA Negative     Bilirubin, UA Negative     Blood, UA Negative     Protein, UA Negative     Leuk Esterase, UA Negative     Nitrite, UA Negative     Urobilinogen, UA 0.2 E.U./dL    Narrative:      In absence of clinical symptoms, the presence of pyuria, bacteria, and/or nitrites on the urinalysis result does not correlate with infection.  Urine microscopic not indicated.    Blood Gas, Arterial With Co-Ox [[id number]]  (Abnormal) Collected: 12/28/24 2047    Specimen: Arterial Blood Updated: 12/28/24 2046     Site Right Radial     Jimmy's Test Positive     pH, Arterial 7.554 pH units      Comment: 83 Value above reference range        pCO2, Arterial 36.8 mm Hg      pO2, Arterial 83.1 mm Hg      HCO3, Arterial 32.4 mmol/L      Comment: 83 Value above reference range        Base Excess, Arterial 9.4 mmol/L      Comment: 83 Value above reference range        O2 Saturation, Arterial 98.4 %      Hemoglobin, Blood Gas 8.8 g/dL      Comment: 84 Value below reference range        Hematocrit, Blood Gas 27.0 %      Comment: 84 Value below reference range        Oxyhemoglobin 92.6 %      Comment: 84 Value below reference range        Methemoglobin 0.60 %      Carboxyhemoglobin 5.3 %      Comment: 83 Value above reference range        Temperature 37.0     Sodium, Arterial 140 mmol/L      Potassium, Arterial 2.2 mmol/L      Comment: 85 Value below critical limit        Barometric Pressure for Blood Gas 747 mmHg      Modality Room Air     Ventilator Mode NA     Notified Who DR. ROBLES     Notified By Jasmin Smith RRT     Notified Time 12/28/2024 20:48     Collected by [id number]     Comment: Meter: [id number]     :  Jasmin Smith RRT        pH, Temp Corrected 7.554 pH Units      pCO2, Temperature Corrected 36.8 mm Hg      pO2, Temperature Corrected  83.1 mm Hg     CBC & Differential [[id number]]  (Abnormal) Collected: 12/28/24 2028    Specimen: Blood Updated: 12/28/24 2044    Narrative:      The following orders were created for panel order CBC & Differential.  Procedure                               Abnormality         Status                     ---------                               -----------         ------                     CBC Auto Differential[[id number]]        Abnormal            Final result                 Please view results for these tests on the individual orders.    CBC Auto Differential [[id number]]  (Abnormal) Collected: 12/28/24 2028    Specimen: Blood Updated: 12/28/24 2044     WBC 9.85 10*3/mm3      RBC 2.62 10*6/mm3      Hemoglobin 8.7 g/dL      Hematocrit 26.5 %      .1 fL      MCH 33.2 pg      MCHC 32.8 g/dL      RDW 14.5 %      RDW-SD 53.3 fl      MPV 10.0 fL      Platelets 422 10*3/mm3      Neutrophil % 45.3 %      Lymphocyte % 39.8 %      Monocyte % 12.8 %      Eosinophil % 0.8 %      Basophil % 0.5 %      Immature Grans % 0.8 %      Neutrophils, Absolute 4.46 10*3/mm3      Lymphocytes, Absolute 3.92 10*3/mm3      Monocytes, Absolute 1.26 10*3/mm3      Eosinophils, Absolute 0.08 10*3/mm3      Basophils, Absolute 0.05 10*3/mm3      Immature Grans, Absolute 0.08 10*3/mm3      nRBC 0.0 /100 WBC              CT Thoracic Spine With Contrast    Result Date: 12/29/2024   No acute fracture or traumatic malalignment.  These findings are in agreement with the critical and emergent findings from the [facility] preliminary report.    This report was signed and finalized on 12/29/2024 7:19 AM by Sriram Hilton.      CT Cervical Spine With Contrast    Result Date: 12/29/2024   No acute fracture or traumatic malalignment.  No abnormal enhancement.  Multilevel spondylotic changes, similar to the prior study.  These findings are in agreement with the critical and emergent findings from the [facility] preliminary report.    This report was signed and  finalized on 12/29/2024 7:17 AM by Sriram Hilton.      CT Head Without Contrast    Result Date: 12/29/2024  Impression:   No acute intracranial abnormality.  These findings are in agreement with the critical and emergent findings from the [facility] preliminary report.  This report was signed and finalized on 12/29/2024 7:09 AM by Sriram Hitlon.      XR Chest 1 View    Result Date: 12/28/2024   No acute findings.  This report was signed and finalized on 12/28/2024 8:14 PM by Dr. Bello Champagne MD.       Result Review:  I have personally reviewed the results from the time of this admission to 12/29/2024 15:57 CST and agree with these findings:  []  Laboratory list / accordion  [x]  Microbiology  [x]  Radiology  []  EKG/Telemetry   []  Cardiology/Vascular   []  Pathology  []  Old records  []  Other:  Most notable findings include: Very high alcohol level and positive drug screen for vitamin methamphetamine noted      I have personally reviewed and interpreted the radiology studies and ECG obtained at time of admission.     Assessment / Plan   Assessment:   Active Hospital Problems    Diagnosis     **Hypokalemia     Gait disturbance     Substance abuse     Hypothyroidism     Hypothyroidism     ETOH abuse     Primary hypertension     Non-occlusive coronary artery disease     Ex-smoker     Hypercholesterolemia with hypertriglyceridemia     Raynaud's disease without gangrene        Treatment Plan  The patient will be admitted to my service here at [facility].    Patient was kept in the ICU overnight.  She was placed on the withdrawal protocol  This morning she is more alert and awake and did not have much altered mental status  She denies drinking alcohol much although her alcohol level was very high on presentation and she was also noted to be positive for amphetamine and methamphetamine in the urine drug screen  I started the patient on Symbicort and DuoNeb for underlying possible COPD due to history of  tobacco abuse  She is waiting for an MRI of the head for the tingling and numbness of the extremities she is getting electrolytes monitored and replaced.  She is getting potassium replacement for severe hypokalemia and potassium will be closely monitored.  Once MRI is done and if she is stable she can move out of the ICU.  We would recommend her to see pulmonary clinic for possible COPD.  CODE STATUS: Full.  Overall prognosis: Guarded  Total time spent in seeing this patient in ICU 35 minutes  Disposition: Patient may be transferred to the floor if the MRI is unremarkable    Electronically signed by    Juan Carlos Norris MD  Pulmonologist/Intensivist  12/29/2024 16:04 CST                Electronically signed by Juan Carlos Norris MD at 12/29/24 1606          Emergency Department Notes        Ginger Foster, RN at 12/29/24 0409          ATTEMPTED TO CALL REPORT, PUT ON HOLD UNTIL CALLING HOUSE SUPERVISOR    Electronically signed by Ginger Foster RN at 12/29/24 0410       Ginger Foster, RN at 12/29/24 0404          Nursing report ED to floor  Minnie Bang  49 y.o.  female    HPI:   Chief Complaint   Patient presents with    Chest Pain       Admitting doctor:   Juan Carlos Norris MD    Consulting provider(s):  Consults       No orders found for last 30 day(s).             Admitting diagnosis:   The primary encounter diagnosis was Numbness and tingling. Diagnoses of Alcoholic intoxication with complication, Methamphetamine abuse, Hypokalemia, Hypomagnesemia, and Gait disturbance were also pertinent to this visit.    Code status:   Current Code Status       Date Active Code Status Order ID Comments User Context       Prior            Allergies:   Patient has no known allergies.    Intake and Output  No intake or output data in the 24 hours ending 12/29/24 0404    Weight:       12/28/24  1842   Weight: 77.1 kg (170 lb)       Most recent vitals:   Vitals:    12/29/24 0201 12/29/24 0231 12/29/24 0346 12/29/24  0403   BP: 128/89 118/76 126/82 125/86   Pulse: 104 102 98 96   Resp:  18 18 18   Temp: 98.4 °F (36.9 °C)   97.9 °F (36.6 °C)   TempSrc:    Oral   SpO2: 95% 94% 98% 96%   Weight:       Height:         Oxygen Therapy: .    Active LDAs/IV Access:   Lines, Drains & Airways       Active LDAs       Name Placement date Placement time Site Days    Peripheral IV 12/28/24 2041 Right Antecubital 12/28/24 2041  Antecubital  less than 1                    Labs (abnormal labs have a star):   Labs Reviewed   COMPREHENSIVE METABOLIC PANEL - Abnormal; Notable for the following components:       Result Value    Glucose 105 (*)     Creatinine 0.55 (*)     Potassium 2.3 (*)     Chloride 89 (*)     CO2 32.0 (*)     ALT (SGPT) 35 (*)     AST (SGOT) 120 (*)     Alkaline Phosphatase 682 (*)     Anion Gap 18.0 (*)     All other components within normal limits    Narrative:     GFR Categories in Chronic Kidney Disease (CKD)      GFR Category          GFR (mL/min/1.73)    Interpretation  G1                     90 or greater         Normal or high (1)  G2                      60-89                Mild decrease (1)  G3a                   45-59                Mild to moderate decrease  G3b                   30-44                Moderate to severe decrease  G4                    15-29                Severe decrease  G5                    14 or less           Kidney failure          (1)In the absence of evidence of kidney disease, neither GFR category G1 or G2 fulfill the criteria for CKD.    eGFR calculation 2021 CKD-EPI creatinine equation, which does not include race as a factor   LACTIC ACID, PLASMA - Abnormal; Notable for the following components:    Lactate 5.8 (*)     All other components within normal limits   PROCALCITONIN - Abnormal; Notable for the following components:    Procalcitonin 3.59 (*)     All other components within normal limits    Narrative:     As a Marker for Sepsis (Non-Neonates):    1. <0.5 ng/mL represents a low risk  "of severe sepsis and/or septic shock.  2. >2 ng/mL represents a high risk of severe sepsis and/or septic shock.    As a Marker for Lower Respiratory Tract Infections that require antibiotic therapy:    PCT on Admission    Antibiotic Therapy       6-12 Hrs later    >0.5                Strongly Recommended  >0.25 - <0.5        Recommended   0.1 - 0.25          Discouraged              Remeasure/reassess PCT  <0.1                Strongly Discouraged     Remeasure/reassess PCT    As 28 day mortality risk marker: \"Change in Procalcitonin Result\" (>80% or <=80%) if Day 0 (or Day 1) and Day 4 values are available. Refer to [email or web address]://[email or web address][email or web address]    Change in PCT <=80%  A decrease of PCT levels below or equal to 80% defines a positive change in PCT test result representing a higher risk for 28-day all-cause mortality of patients diagnosed with severe sepsis for septic shock.    Change in PCT >80%  A decrease of PCT levels of more than 80% defines a negative change in PCT result representing a lower risk for 28-day all-cause mortality of patients diagnosed with severe sepsis or septic shock.      MAGNESIUM - Abnormal; Notable for the following components:    Magnesium 1.4 (*)     All other components within normal limits   TROPONIN - Abnormal; Notable for the following components:    HS Troponin T 20 (*)     All other components within normal limits    Narrative:     High Sensitive Troponin T Reference Range:  <14.0 ng/L- Negative Female for AMI  <22.0 ng/L- Negative Male for AMI  >=14 - Abnormal Female indicating possible myocardial injury.  >=22 - Abnormal Male indicating possible myocardial injury.   Clinicians would have to utilize clinical acumen, EKG, Troponin, and serial changes to determine if it is an Acute Myocardial Infarction or myocardial injury due to an underlying chronic condition.        CBC WITH AUTO DIFFERENTIAL - Abnormal; Notable for the following components:    RBC 2.62 (*)     Hemoglobin " 8.7 (*)     Hematocrit 26.5 (*)     .1 (*)     MCH 33.2 (*)     Monocyte % 12.8 (*)     Immature Grans % 0.8 (*)     Lymphocytes, Absolute 3.92 (*)     Monocytes, Absolute 1.26 (*)     Immature Grans, Absolute 0.08 (*)     All other components within normal limits   URINE DRUG SCREEN - Abnormal; Notable for the following components:    Methamphetamine, Ur Positive (*)     Amphetamine Screen, Urine Positive (*)     All other components within normal limits    Narrative:     Cutoff For Drugs Screened:    Amphetamines               500 ng/ml  Barbiturates               200 ng/ml  Benzodiazepines            150 ng/ml  Cocaine                    150 ng/ml  Methadone                  200 ng/ml  Opiates                    100 ng/ml  Phencyclidine               25 ng/ml  THC                         50 ng/ml  Methamphetamine            500 ng/ml  Tricyclic Antidepressants  300 ng/ml  Oxycodone                  100 ng/ml  Buprenorphine               10 ng/ml    The normal value for all drugs tested is negative. This report includes unconfirmed screening results, with the cutoff values listed, to be used for medical treatment purposes only.  Unconfirmed results must not be used for non-medical purposes such as employment or legal testing.  Clinical consideration should be applied to any drug of abuse test, particularly when unconfirmed results are used.     BLOOD GAS, ARTERIAL W/CO-OXIMETRY - Abnormal; Notable for the following components:    pH, Arterial 7.554 (*)     HCO3, Arterial 32.4 (*)     Base Excess, Arterial 9.4 (*)     Hemoglobin, Blood Gas 8.8 (*)     Hematocrit, Blood Gas 27.0 (*)     Oxyhemoglobin 92.6 (*)     Carboxyhemoglobin 5.3 (*)     Potassium, Arterial 2.2 (*)     pH, Temp Corrected 7.554 (*)     All other components within normal limits   LACTIC ACID, REFLEX - Abnormal; Notable for the following components:    Lactate 3.7 (*)     All other components within normal limits   HIGH SENSITIVITIY  TROPONIN T 1HR - Abnormal; Notable for the following components:    HS Troponin T 19 (*)     All other components within normal limits    Narrative:     High Sensitive Troponin T Reference Range:  <14.0 ng/L- Negative Female for AMI  <22.0 ng/L- Negative Male for AMI  >=14 - Abnormal Female indicating possible myocardial injury.  >=22 - Abnormal Male indicating possible myocardial injury.   Clinicians would have to utilize clinical acumen, EKG, Troponin, and serial changes to determine if it is an Acute Myocardial Infarction or myocardial injury due to an underlying chronic condition.        URINALYSIS W/ CULTURE IF INDICATED - Normal    Narrative:     In absence of clinical symptoms, the presence of pyuria, bacteria, and/or nitrites on the urinalysis result does not correlate with infection.  Urine microscopic not indicated.   FENTANYL, URINE - Normal    Narrative:     Negative Threshold:      Fentanyl 5 ng/mL     The normal value for the drug tested is negative. This report includes final unconfirmed screening results to be used for medical treatment purposes only. Unconfirmed results must not be used for non-medical purposes such as employment or legal testing. Clinical consideration should be applied to any drug of abuse test, particularly when unconfirmed results are used.          BLOOD GAS, ARTERIAL W/CO-OXIMETRY   ETHANOL    Narrative:     Not for legal purposes. Chain of Custody not followed.    LACTIC ACID, REFLEX   CBC AND DIFFERENTIAL    Narrative:     The following orders were created for panel order CBC & Differential.  Procedure                               Abnormality         Status                     ---------                               -----------         ------                     CBC Auto Differential[[id number]]        Abnormal            Final result                 Please view results for these tests on the individual orders.       Meds given in ED:   Medications   Gadopiclenol (VUEWAY)  injection 10 mL (has no administration in time range)   potassium chloride 10 mEq in 100 mL IVPB (0 mEq Intravenous Stopped 12/28/24 2229)   magnesium sulfate 2g/50 mL (PREMIX) infusion (0 g Intravenous Stopped 12/28/24 2216)   sodium chloride 0.9 % bolus 500 mL (0 mL Intravenous Stopped 12/28/24 2229)   diazePAM (VALIUM) injection 2.5 mg (2.5 mg Intravenous Given 12/28/24 2221)   LORazepam (ATIVAN) injection 1 mg (1 mg Intravenous Given 12/28/24 2335)   LORazepam (ATIVAN) injection 1 mg (1 mg Intravenous Given 12/29/24 0015)   potassium chloride (MICRO-K/KLOR-CON) CR capsule (40 mEq Oral Given 12/29/24 0120)   iopamidol (ISOVUE-300) 61 % injection 100 mL (100 mL Intravenous Given 12/29/24 0127)   folic acid 1 mg in sodium chloride 0.9 % 50 mL IVPB (0 mg Intravenous Stopped 12/29/24 0300)   thiamine (B-1) injection 100 mg (100 mg Intravenous Given 12/29/24 0234)           NIH Stroke Scale:  Interval: baseline    Isolation/Infection(s):  No active isolations   No active infections     COVID Testing  Collected .NO  Resulted .    Nursing report ED to floor:  Mental status: .A/O X4  Ambulatory status: .ASSIST ONLY  Precautions: .FALL    ED nurse phone extentsion- ..      Electronically signed by Ginger Foster RN at 12/29/24 0405       Ginger Foster, RN at 12/29/24 0015          MRI TECH SHE IS MOVING TOO MUCH FOR THERE SCAN  ATIVAN GIVEN  ASSISTED TECH WITH PLACING STRAPS ON LEGS AND ARMS TO ATTEMPT TO PREVENT MOVEMENT    Electronically signed by Ginger Foster RN at 12/29/24 0028       Ginger Foster, RN at 12/28/24 2335          Pt remains in MRI LEG MOVEMENT NOTED  ATIVAN 1MG GIVEN FOR RELAXTION    Electronically signed by Ginger Foster, RN at 12/28/24 2341       Antoni Costello S, DO at 12/28/24 2301          Subjective   History of Present Illness  Care resumed from Dr. Eng pending MRI. Please see her note for full HPI, ROS, physical exam.        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    CTS (carpal tunnel syndrome) 2019    Dental disease     Depression     Diastolic dysfunction 2022    Dizziness     Emphysema of lung 2020    GERD (gastroesophageal reflux disease)     Headache     Hyperlipidemia     Hypertension     Hypothyroidism     Mixed simple and mucopurulent chronic bronchitis 04/18/2023    Non-occlusive coronary artery disease 05/04/2023    NSTEMI (non-ST elevated myocardial infarction) (HCC)     Pneumonia 2022    Pulmonary emphysema 04/21/2023    Shingles 2018    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       No Known Allergies    Past Surgical History:   Procedure Laterality Date    APPENDECTOMY      CARDIAC CATHETERIZATION N/A 11/04/2017    Procedure: Coronary angiography;  Surgeon: Luis Colvin MD;  Location: [facility] CATH INVASIVE LOCATION;  Service:     CARDIAC CATHETERIZATION N/A 05/31/2023    Procedure: Left Heart Cath;  Surgeon: Steffen Rossi MD;  Location: [facility] CATH INVASIVE LOCATION;  Service: Cardiology;  Laterality: N/A;    CARPAL TUNNEL RELEASE  2019    COLONOSCOPY N/A 8/2/2024    Procedure: COLONOSCOPY WITH ANESTHESIA;  Surgeon: Marty Browning MD;  Location: [facility] ENDOSCOPY;  Service: Gastroenterology;  Laterality: N/A;  pre op pancolitis    ENDOSCOPY N/A 7/8/2024    Procedure: ESOPHAGOGASTRODUODENOSCOPY WITH ANESTHESIA;  Surgeon: Gordy Wang MD;  Location: [facility] ENDOSCOPY;  Service: Gastroenterology;  Laterality: N/A;  pre: dysphagia.   post: esophagus dilated.   no PCP    HYSTERECTOMY      PANENDOSCOPY N/A 5/30/2024    Procedure: DIRECT LARYNGOSCOPY WITH BIOPSY OF VOCAL CORD POLYPS WITH POSSIBLE TRACHEOSTOMY;  Surgeon: Mendez Padilla MD;  Location: [facility] OR;  Service: ENT;  Laterality: N/A;    RI RT/LT HEART CATHETERS N/A 11/04/2017    Procedure:  Percutaneous Coronary Intervention;  Surgeon: Luis Colvin MD;  Location:  [facility] CATH INVASIVE LOCATION;  Service: Cardiovascular    THYROID SURGERY      TOTAL THYROIDECTOMY      TRACHEOSTOMY N/A 2024    Procedure: POSSIBLE TRACHEOSTOMY;  Surgeon: Mendez Padilla MD;  Location:  [facility] OR;  Service: ENT;  Laterality: N/A;       Family History   Problem Relation Age of Onset    Asthma Mother     Cancer Mother     Emphysema Mother     Colon cancer Father     Cancer Father     Heart failure Father     Hypertension Father        Social History     Socioeconomic History    Marital status:    Tobacco Use    Smoking status: Former     Current packs/day: 0.00     Average packs/day: 0.7 packs/day for 45.9 years (30.0 ttl pk-yrs)     Types: Cigarettes     Start date: 1991     Quit date: 3/1/2024     Years since quittin.8     Passive exposure: Past    Smokeless tobacco: Never    Tobacco comments:     Less than 1/2 pack a day    Vaping Use    Vaping status: Never Used   Substance and Sexual Activity    Alcohol use: Not Currently     Comment: about a month ago per pjt    Drug use: No    Sexual activity: Not Currently     Partners: Male     Birth control/protection: Hysterectomy           Objective   Physical Exam    Procedures          ED Course  ED Course as of 24 0210   Sat Dec 28, 2024   2204 I spoke with the patient regarding her methamphetamine use.  She notes this was over a week ago and she just took a little bit in order to get some energy.  I discussed the patient her method of use, and she states that she snorts it.  She denies any type of IV drug use. [AJ]   2250 Care resumed from Dr. Eng pending MRI. Will need admit for hypokalemia [KR]   Sun Dec 29, 2024   0038 Patient unable to do MRI due to not being able to stay still [KR]   0139 I have discussed case with on call neurologist. He stated that he will consult on the patient while admitted. Inpatient MRI is recommended. MRI  unable to be obtained in the ER due to inability to stay still despite sedation. Patient is somnolent, but is waking up when spoken to.  She is moving all of her extremities appropriately.  Dorsiflexion and plantarflexion equal bilaterally. [KR]   0143 Stat rad results CT head revealed no acute intracranial finding.  CT cervical spine revealed no acute findings.  CT thoracic spine revealed no acute findings. [KR]   0208 Case signed out to Dr. Costello. Pending admission. [KR]      ED Course User Index  [AJ] Juancarlos Eng DO  [KR] Cesia Hough APRN      Labs Reviewed   COMPREHENSIVE METABOLIC PANEL - Abnormal; Notable for the following components:       Result Value    Glucose 105 (*)     Creatinine 0.55 (*)     Potassium 2.3 (*)     Chloride 89 (*)     CO2 32.0 (*)     ALT (SGPT) 35 (*)     AST (SGOT) 120 (*)     Alkaline Phosphatase 682 (*)     Anion Gap 18.0 (*)     All other components within normal limits    Narrative:     GFR Categories in Chronic Kidney Disease (CKD)      GFR Category          GFR (mL/min/1.73)    Interpretation  G1                     90 or greater         Normal or high (1)  G2                      60-89                Mild decrease (1)  G3a                   45-59                Mild to moderate decrease  G3b                   30-44                Moderate to severe decrease  G4                    15-29                Severe decrease  G5                    14 or less           Kidney failure          (1)In the absence of evidence of kidney disease, neither GFR category G1 or G2 fulfill the criteria for CKD.    eGFR calculation 2021 CKD-EPI creatinine equation, which does not include race as a factor   LACTIC ACID, PLASMA - Abnormal; Notable for the following components:    Lactate 5.8 (*)     All other components within normal limits   PROCALCITONIN - Abnormal; Notable for the following components:    Procalcitonin 3.59 (*)     All other components within normal limits     "Narrative:     As a Marker for Sepsis (Non-Neonates):    1. <0.5 ng/mL represents a low risk of severe sepsis and/or septic shock.  2. >2 ng/mL represents a high risk of severe sepsis and/or septic shock.    As a Marker for Lower Respiratory Tract Infections that require antibiotic therapy:    PCT on Admission    Antibiotic Therapy       6-12 Hrs later    >0.5                Strongly Recommended  >0.25 - <0.5        Recommended   0.1 - 0.25          Discouraged              Remeasure/reassess PCT  <0.1                Strongly Discouraged     Remeasure/reassess PCT    As 28 day mortality risk marker: \"Change in Procalcitonin Result\" (>80% or <=80%) if Day 0 (or Day 1) and Day 4 values are available. Refer to [email or web address]    Change in PCT <=80%  A decrease of PCT levels below or equal to 80% defines a positive change in PCT test result representing a higher risk for 28-day all-cause mortality of patients diagnosed with severe sepsis for septic shock.    Change in PCT >80%  A decrease of PCT levels of more than 80% defines a negative change in PCT result representing a lower risk for 28-day all-cause mortality of patients diagnosed with severe sepsis or septic shock.      MAGNESIUM - Abnormal; Notable for the following components:    Magnesium 1.4 (*)     All other components within normal limits   TROPONIN - Abnormal; Notable for the following components:    HS Troponin T 20 (*)     All other components within normal limits    Narrative:     High Sensitive Troponin T Reference Range:  <14.0 ng/L- Negative Female for AMI  <22.0 ng/L- Negative Male for AMI  >=14 - Abnormal Female indicating possible myocardial injury.  >=22 - Abnormal Male indicating possible myocardial injury.   Clinicians would have to utilize clinical acumen, EKG, Troponin, and serial changes to determine if it is an Acute Myocardial Infarction or myocardial injury due to an underlying chronic condition.        CBC WITH AUTO " DIFFERENTIAL - Abnormal; Notable for the following components:    RBC 2.62 (*)     Hemoglobin 8.7 (*)     Hematocrit 26.5 (*)     .1 (*)     MCH 33.2 (*)     Monocyte % 12.8 (*)     Immature Grans % 0.8 (*)     Lymphocytes, Absolute 3.92 (*)     Monocytes, Absolute 1.26 (*)     Immature Grans, Absolute 0.08 (*)     All other components within normal limits   URINE DRUG SCREEN - Abnormal; Notable for the following components:    Methamphetamine, Ur Positive (*)     Amphetamine Screen, Urine Positive (*)     All other components within normal limits    Narrative:     Cutoff For Drugs Screened:    Amphetamines               500 ng/ml  Barbiturates               200 ng/ml  Benzodiazepines            150 ng/ml  Cocaine                    150 ng/ml  Methadone                  200 ng/ml  Opiates                    100 ng/ml  Phencyclidine               25 ng/ml  THC                         50 ng/ml  Methamphetamine            500 ng/ml  Tricyclic Antidepressants  300 ng/ml  Oxycodone                  100 ng/ml  Buprenorphine               10 ng/ml    The normal value for all drugs tested is negative. This report includes unconfirmed screening results, with the cutoff values listed, to be used for medical treatment purposes only.  Unconfirmed results must not be used for non-medical purposes such as employment or legal testing.  Clinical consideration should be applied to any drug of abuse test, particularly when unconfirmed results are used.     BLOOD GAS, ARTERIAL W/CO-OXIMETRY - Abnormal; Notable for the following components:    pH, Arterial 7.554 (*)     HCO3, Arterial 32.4 (*)     Base Excess, Arterial 9.4 (*)     Hemoglobin, Blood Gas 8.8 (*)     Hematocrit, Blood Gas 27.0 (*)     Oxyhemoglobin 92.6 (*)     Carboxyhemoglobin 5.3 (*)     Potassium, Arterial 2.2 (*)     pH, Temp Corrected 7.554 (*)     All other components within normal limits   LACTIC ACID, REFLEX - Abnormal; Notable for the following  components:    Lactate 3.7 (*)     All other components within normal limits   HIGH SENSITIVITIY TROPONIN T 1HR - Abnormal; Notable for the following components:    HS Troponin T 19 (*)     All other components within normal limits    Narrative:     High Sensitive Troponin T Reference Range:  <14.0 ng/L- Negative Female for AMI  <22.0 ng/L- Negative Male for AMI  >=14 - Abnormal Female indicating possible myocardial injury.  >=22 - Abnormal Male indicating possible myocardial injury.   Clinicians would have to utilize clinical acumen, EKG, Troponin, and serial changes to determine if it is an Acute Myocardial Infarction or myocardial injury due to an underlying chronic condition.        URINALYSIS W/ CULTURE IF INDICATED - Normal    Narrative:     In absence of clinical symptoms, the presence of pyuria, bacteria, and/or nitrites on the urinalysis result does not correlate with infection.  Urine microscopic not indicated.   FENTANYL, URINE - Normal    Narrative:     Negative Threshold:      Fentanyl 5 ng/mL     The normal value for the drug tested is negative. This report includes final unconfirmed screening results to be used for medical treatment purposes only. Unconfirmed results must not be used for non-medical purposes such as employment or legal testing. Clinical consideration should be applied to any drug of abuse test, particularly when unconfirmed results are used.          BLOOD GAS, ARTERIAL W/CO-OXIMETRY   ETHANOL    Narrative:     Not for legal purposes. Chain of Custody not followed.    LACTIC ACID, REFLEX   CBC AND DIFFERENTIAL    Narrative:     The following orders were created for panel order CBC & Differential.  Procedure                               Abnormality         Status                     ---------                               -----------         ------                     CBC Auto Differential[[id number]]        Abnormal            Final result                 Please view results for these  tests on the individual orders.     XR Chest 1 View   Final Result       No acute findings.       This report was signed and finalized on 12/28/2024 8:14 PM by Dr. Bello Champagne MD.          MRI Cervical Spine With & Without Contrast    (Results Pending)   MRI Thoracic Spine With & Without Contrast    (Results Pending)   CT Head Without Contrast    (Results Pending)   CT Thoracic Spine With Contrast    (Results Pending)   CT Cervical Spine With Contrast    (Results Pending)                                                      Medical Decision Making  Pt was t/o to me from Cesia Hough - she has been in the EC some time today with report of some falls and numbness in her mid-lower body.  Pt had CT head and CT cspine/tspine with contrast with no acute pathology.  She was noted to have hypokalemia and hypomagnesemia - has been given potassium and magnesium replacement in EC.  Was also given thiamine/folate as it was noted that she had + etoh - this was checked several hours after presentation so was potentially much higher earlier.  + methamphetamine that she reportedly smokes.  She has been slowly improving in EC.  She had MRIs ordered but would not be still for these.  Cesia did speak with teleneuro who recommended admit and MRI as inpatient.  I believe that much of her symptomatology is due to her etoh/methamphetamine abuse and subsequent malnourishment/electrolyte depletion.  May have some dorsal column issue from this.  Pt was d/w the hospitalist by Cesia - he felt that she required ICU admission.   Spoke with Natalie Woods with intensivist [facility] - will accept for further mgmt.    Amount and/or Complexity of Data Reviewed  Labs: ordered.  Radiology: ordered.  ECG/medicine tests: ordered.    Risk  Prescription drug management.        Final diagnoses:   Numbness and tingling   Alcoholic intoxication with complication   Methamphetamine abuse   Hypokalemia   Hypomagnesemia   Gait disturbance       ED Disposition  ED  Disposition       ED Disposition   Intended Admit    Condition   --    Comment   --               No follow-up provider specified.       Medication List      No changes were made to your prescriptions during this visit.            Antoni Costello, DO  12/29/24 0334       Antoni Costello, DO  12/29/24 0345      Electronically signed by Antoni Costello, DO at 12/29/24 0345       Juancarlos Eng, DO at 12/28/24 2051          Subjective   History of Present Illness  49-year-old female presents emergency department with numbness in her abdomen and lower extremities.  Patient also notes numbness in her bilateral fingertips.  She states she noticed that her abdomen went numb.  She states she can pinch it and not feel it.  She states she has some mild numbness around her mouth.  She has abnormal head movements, that she states she is unaware of and cannot control.  She lives in an RV.  She is not using the propane in the RV, she is using electric heat.  She states in the RV she has been falling down to her knees and has had multiple falls up to 3 times a day.  She states she tries to reach out for her pajamas in her cabinet and will fall.  She has had no new medication changes.  2 to 3 days ago she had an episode of chest pain.  She did take a nitroglycerin and it resolved.  She has had no further chest pain.  She does have shortness of breath.  She does have history of urine and stool incontinence, but this is been ongoing since her last admission.  She did have some diarrhea and vomiting, but says this also has not stopped since her last admission.  No fevers or chills.    Review of Systems   Neurological:  Positive for weakness and numbness.       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    CTS (carpal  tunnel syndrome) 2019    Dental disease     Depression     Diastolic dysfunction 2022    Dizziness     Emphysema of lung 2020    GERD (gastroesophageal reflux disease)     Headache     Hyperlipidemia     Hypertension     Hypothyroidism     Mixed simple and mucopurulent chronic bronchitis 04/18/2023    Non-occlusive coronary artery disease 05/04/2023    NSTEMI (non-ST elevated myocardial infarction) (HCC)     Pneumonia 2022    Pulmonary emphysema 04/21/2023    Shingles 2018    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       No Known Allergies    Past Surgical History:   Procedure Laterality Date    APPENDECTOMY      CARDIAC CATHETERIZATION N/A 11/04/2017    Procedure: Coronary angiography;  Surgeon: Luis Colvin MD;  Location: [facility] CATH INVASIVE LOCATION;  Service:     CARDIAC CATHETERIZATION N/A 05/31/2023    Procedure: Left Heart Cath;  Surgeon: Steffen Rossi MD;  Location: [facility] CATH INVASIVE LOCATION;  Service: Cardiology;  Laterality: N/A;    CARPAL TUNNEL RELEASE  2019    COLONOSCOPY N/A 8/2/2024    Procedure: COLONOSCOPY WITH ANESTHESIA;  Surgeon: Marty Browning MD;  Location: [facility] ENDOSCOPY;  Service: Gastroenterology;  Laterality: N/A;  pre op pancolitis    ENDOSCOPY N/A 7/8/2024    Procedure: ESOPHAGOGASTRODUODENOSCOPY WITH ANESTHESIA;  Surgeon: Gordy Wang MD;  Location: [facility] ENDOSCOPY;  Service: Gastroenterology;  Laterality: N/A;  pre: dysphagia.   post: esophagus dilated.   no PCP    HYSTERECTOMY      PANENDOSCOPY N/A 5/30/2024    Procedure: DIRECT LARYNGOSCOPY WITH BIOPSY OF VOCAL CORD POLYPS WITH POSSIBLE TRACHEOSTOMY;  Surgeon: Mendez Padilla MD;  Location: [facility] OR;  Service: ENT;  Laterality: N/A;    CT RT/LT HEART CATHETERS N/A 11/04/2017    Procedure: Percutaneous Coronary Intervention;  Surgeon: Luis Colvin MD;  Location: [facility] CATH INVASIVE LOCATION;  Service: Cardiovascular    THYROID SURGERY      TOTAL THYROIDECTOMY      TRACHEOSTOMY N/A 5/30/2024    Procedure: POSSIBLE  TRACHEOSTOMY;  Surgeon: Mendez Padilla MD;  Location: [facility];  Service: ENT;  Laterality: N/A;       Family History   Problem Relation Age of Onset    Asthma Mother     Cancer Mother     Emphysema Mother     Colon cancer Father     Cancer Father     Heart failure Father     Hypertension Father        Social History     Socioeconomic History    Marital status:    Tobacco Use    Smoking status: Former     Current packs/day: 0.00     Average packs/day: 0.7 packs/day for 45.9 years (30.0 ttl pk-yrs)     Types: Cigarettes     Start date: 1991     Quit date: 3/1/2024     Years since quittin.8     Passive exposure: Past    Smokeless tobacco: Never    Tobacco comments:     Less than 1/2 pack a day    Vaping Use    Vaping status: Never Used   Substance and Sexual Activity    Alcohol use: Not Currently     Comment: about a month ago per pjt    Drug use: No    Sexual activity: Not Currently     Partners: Male     Birth control/protection: Hysterectomy           Objective   Physical Exam  Vitals reviewed.   Constitutional:       Appearance: She is ill-appearing.      Comments: Abnormal movements of the head and upper extremities.   HENT:      Head: Normocephalic and atraumatic.      Nose: Nose normal.   Eyes:      Conjunctiva/sclera: Conjunctivae normal.   Cardiovascular:      Rate and Rhythm: Normal rate and regular rhythm.      Heart sounds: Normal heart sounds.   Pulmonary:      Effort: Pulmonary effort is normal.      Breath sounds: Normal breath sounds.   Abdominal:      General: Bowel sounds are normal.      Palpations: Abdomen is soft.   Musculoskeletal:         General: No tenderness.      Cervical back: Normal range of motion and neck supple.      Right lower leg: No edema.      Left lower leg: No edema.   Skin:     General: Skin is warm and dry.   Neurological:      Mental Status: She is alert.      Cranial Nerves: No cranial nerve deficit.      Comments: The patient is able to raise both legs  off the bed, but she has bilateral lower extremity numbness.  She has numbness in the abdomen on both sides from the bra line down.  She does note some decrease sensation in the shoulders, but range of motion in the upper extremities is intact.  No facial asymmetry.  No facial numbness.         Procedures      Lab Results (last 24 hours)       Procedure Component Value Units Date/Time    CBC & Differential [[id number]]  (Abnormal) Collected: 12/28/24 2028    Specimen: Blood Updated: 12/28/24 2044    Narrative:      The following orders were created for panel order CBC & Differential.  Procedure                               Abnormality         Status                     ---------                               -----------         ------                     CBC Auto Differential[[id number]]        Abnormal            Final result                 Please view results for these tests on the individual orders.    Comprehensive Metabolic Panel [[id number]]  (Abnormal) Collected: 12/28/24 2028    Specimen: Blood Updated: 12/28/24 2113     Glucose 105 mg/dL      BUN 7 mg/dL      Creatinine 0.55 mg/dL      Sodium 139 mmol/L      Potassium 2.3 mmol/L      Chloride 89 mmol/L      CO2 32.0 mmol/L      Calcium 9.5 mg/dL      Total Protein 6.7 g/dL      Albumin 3.6 g/dL      ALT (SGPT) 35 U/L      AST (SGOT) 120 U/L      Alkaline Phosphatase 682 U/L      Total Bilirubin 0.3 mg/dL      Globulin 3.1 gm/dL      A/G Ratio 1.2 g/dL      BUN/Creatinine Ratio 12.7     Anion Gap 18.0 mmol/L      eGFR 111.1 mL/min/1.73     Narrative:      GFR Categories in Chronic Kidney Disease (CKD)      GFR Category          GFR (mL/min/1.73)    Interpretation  G1                     90 or greater         Normal or high (1)  G2                      60-89                Mild decrease (1)  G3a                   45-59                Mild to moderate decrease  G3b                   30-44                Moderate to severe decrease  G4                    15-29    "             Severe decrease  G5                    14 or less           Kidney failure          (1)In the absence of evidence of kidney disease, neither GFR category G1 or G2 fulfill the criteria for CKD.    eGFR calculation 2021 CKD-EPI creatinine equation, which does not include race as a factor    Lactic Acid, Plasma [[id number]]  (Abnormal) Collected: 12/28/24 2028    Specimen: Blood Updated: 12/28/24 2058     Lactate 5.8 mmol/L     Procalcitonin [[id number]]  (Abnormal) Collected: 12/28/24 2028    Specimen: Blood Updated: 12/28/24 2108     Procalcitonin 3.59 ng/mL     Narrative:      As a Marker for Sepsis (Non-Neonates):    1. <0.5 ng/mL represents a low risk of severe sepsis and/or septic shock.  2. >2 ng/mL represents a high risk of severe sepsis and/or septic shock.    As a Marker for Lower Respiratory Tract Infections that require antibiotic therapy:    PCT on Admission    Antibiotic Therapy       6-12 Hrs later    >0.5                Strongly Recommended  >0.25 - <0.5        Recommended   0.1 - 0.25          Discouraged              Remeasure/reassess PCT  <0.1                Strongly Discouraged     Remeasure/reassess PCT    As 28 day mortality risk marker: \"Change in Procalcitonin Result\" (>80% or <=80%) if Day 0 (or Day 1) and Day 4 values are available. Refer to http://www.[facility]-pct-calculator.com    Change in PCT <=80%  A decrease of PCT levels below or equal to 80% defines a positive change in PCT test result representing a higher risk for 28-day all-cause mortality of patients diagnosed with severe sepsis for septic shock.    Change in PCT >80%  A decrease of PCT levels of more than 80% defines a negative change in PCT result representing a lower risk for 28-day all-cause mortality of patients diagnosed with severe sepsis or septic shock.       Magnesium [[id number]]  (Abnormal) Collected: 12/28/24 2028    Specimen: Blood Updated: 12/28/24 2109     Magnesium 1.4 mg/dL     High Sensitivity Troponin T " [[id number]]  (Abnormal) Collected: 12/28/24 2028    Specimen: Blood Updated: 12/28/24 2059     HS Troponin T 20 ng/L     Narrative:      High Sensitive Troponin T Reference Range:  <14.0 ng/L- Negative Female for AMI  <22.0 ng/L- Negative Male for AMI  >=14 - Abnormal Female indicating possible myocardial injury.  >=22 - Abnormal Male indicating possible myocardial injury.   Clinicians would have to utilize clinical acumen, EKG, Troponin, and serial changes to determine if it is an Acute Myocardial Infarction or myocardial injury due to an underlying chronic condition.         CBC Auto Differential [[id number]]  (Abnormal) Collected: 12/28/24 2028    Specimen: Blood Updated: 12/28/24 2044     WBC 9.85 10*3/mm3      RBC 2.62 10*6/mm3      Hemoglobin 8.7 g/dL      Hematocrit 26.5 %      .1 fL      MCH 33.2 pg      MCHC 32.8 g/dL      RDW 14.5 %      RDW-SD 53.3 fl      MPV 10.0 fL      Platelets 422 10*3/mm3      Neutrophil % 45.3 %      Lymphocyte % 39.8 %      Monocyte % 12.8 %      Eosinophil % 0.8 %      Basophil % 0.5 %      Immature Grans % 0.8 %      Neutrophils, Absolute 4.46 10*3/mm3      Lymphocytes, Absolute 3.92 10*3/mm3      Monocytes, Absolute 1.26 10*3/mm3      Eosinophils, Absolute 0.08 10*3/mm3      Basophils, Absolute 0.05 10*3/mm3      Immature Grans, Absolute 0.08 10*3/mm3      nRBC 0.0 /100 WBC     Ethanol [[id number]] Collected: 12/28/24 2028    Specimen: Blood Updated: 12/29/24 0226     Ethanol % 0.065 %     Narrative:      Not for legal purposes. Chain of Custody not followed.     Urinalysis With Culture If Indicated - Straight Cath [[id number]]  (Normal) Collected: 12/28/24 2037    Specimen: Urine from Straight Cath Updated: 12/28/24 2048     Color, UA Yellow     Appearance, UA Clear     pH, UA 6.5     Specific Gravity, UA 1.010     Glucose, UA Negative     Ketones, UA Negative     Bilirubin, UA Negative     Blood, UA Negative     Protein, UA Negative     Leuk Esterase, UA Negative      Nitrite, UA Negative     Urobilinogen, UA 0.2 E.U./dL    Narrative:      In absence of clinical symptoms, the presence of pyuria, bacteria, and/or nitrites on the urinalysis result does not correlate with infection.  Urine microscopic not indicated.    Urine Drug Screen - Urine, Clean Catch [[id number]]  (Abnormal) Collected: 12/28/24 2037    Specimen: Urine, Clean Catch Updated: 12/28/24 2056     THC, Screen, Urine Negative     Phencyclidine (PCP), Urine Negative     Cocaine Screen, Urine Negative     Methamphetamine, Ur Positive     Opiate Screen Negative     Amphetamine Screen, Urine Positive     Benzodiazepine Screen, Urine Negative     Tricyclic Antidepressants Screen Negative     Methadone Screen, Urine Negative     Barbiturates Screen, Urine Negative     Oxycodone Screen, Urine Negative     Buprenorphine, Screen, Urine Negative    Narrative:      Cutoff For Drugs Screened:    Amphetamines               500 ng/ml  Barbiturates               200 ng/ml  Benzodiazepines            150 ng/ml  Cocaine                    150 ng/ml  Methadone                  200 ng/ml  Opiates                    100 ng/ml  Phencyclidine               25 ng/ml  THC                         50 ng/ml  Methamphetamine            500 ng/ml  Tricyclic Antidepressants  300 ng/ml  Oxycodone                  100 ng/ml  Buprenorphine               10 ng/ml    The normal value for all drugs tested is negative. This report includes unconfirmed screening results, with the cutoff values listed, to be used for medical treatment purposes only.  Unconfirmed results must not be used for non-medical purposes such as employment or legal testing.  Clinical consideration should be applied to any drug of abuse test, particularly when unconfirmed results are used.      Fentanyl, Urine - Urine, Clean Catch [[id number]]  (Normal) Collected: 12/28/24 2037    Specimen: Urine, Clean Catch Updated: 12/28/24 2057     Fentanyl, Urine Negative    Narrative:       Negative Threshold:      Fentanyl 5 ng/mL     The normal value for the drug tested is negative. This report includes final unconfirmed screening results to be used for medical treatment purposes only. Unconfirmed results must not be used for non-medical purposes such as employment or legal testing. Clinical consideration should be applied to any drug of abuse test, particularly when unconfirmed results are used.           Blood Gas, Arterial With Co-Ox [[id number]]  (Abnormal) Collected: 12/28/24 2047    Specimen: Arterial Blood Updated: 12/28/24 2046     Site Right Radial     Jimmy's Test Positive     pH, Arterial 7.554 pH units      Comment: 83 Value above reference range        pCO2, Arterial 36.8 mm Hg      pO2, Arterial 83.1 mm Hg      HCO3, Arterial 32.4 mmol/L      Comment: 83 Value above reference range        Base Excess, Arterial 9.4 mmol/L      Comment: 83 Value above reference range        O2 Saturation, Arterial 98.4 %      Hemoglobin, Blood Gas 8.8 g/dL      Comment: 84 Value below reference range        Hematocrit, Blood Gas 27.0 %      Comment: 84 Value below reference range        Oxyhemoglobin 92.6 %      Comment: 84 Value below reference range        Methemoglobin 0.60 %      Carboxyhemoglobin 5.3 %      Comment: 83 Value above reference range        Temperature 37.0     Sodium, Arterial 140 mmol/L      Potassium, Arterial 2.2 mmol/L      Comment: 85 Value below critical limit        Barometric Pressure for Blood Gas 747 mmHg      Modality Room Air     Ventilator Mode NA     Notified Who DR. ROBLES     Notified By Jasmin Smith RRT     Notified Time 12/28/2024 20:48     Collected by [id number]     Comment: Meter: [id number]     :  Jasmin Smith RRT        pH, Temp Corrected 7.554 pH Units      pCO2, Temperature Corrected 36.8 mm Hg      pO2, Temperature Corrected 83.1 mm Hg     High Sensitivity Troponin T 1Hr [[id number]]  (Abnormal) Collected: 12/28/24 2131    Specimen: Blood Updated:  12/28/24 2158     HS Troponin T 19 ng/L      Troponin T Numeric Delta -1 ng/L      Troponin T % Delta -5 %     Narrative:      High Sensitive Troponin T Reference Range:  <14.0 ng/L- Negative Female for AMI  <22.0 ng/L- Negative Male for AMI  >=14 - Abnormal Female indicating possible myocardial injury.  >=22 - Abnormal Male indicating possible myocardial injury.   Clinicians would have to utilize clinical acumen, EKG, Troponin, and serial changes to determine if it is an Acute Myocardial Infarction or myocardial injury due to an underlying chronic condition.         STAT Lactic Acid, Reflex [[id number]]  (Abnormal) Collected: 12/29/24 0038    Specimen: Blood Updated: 12/29/24 0105     Lactate 3.7 mmol/L     STAT Lactic Acid, Reflex [[id number]]  (Abnormal) Collected: 12/29/24 0401    Specimen: Blood from Arm, Left Updated: 12/29/24 0428     Lactate 3.6 mmol/L     Potassium [[id number]]  (Abnormal) Collected: 12/29/24 0431    Specimen: Blood from Hand, Left Updated: 12/29/24 0507     Potassium 2.4 mmol/L     STAT Lactic Acid, Reflex [[id number]]  (Abnormal) Collected: 12/29/24 0902    Specimen: Blood Updated: 12/29/24 0942     Lactate 3.4 mmol/L     Basic Metabolic Panel [[id number]]  (Abnormal) Collected: 12/29/24 0902    Specimen: Blood Updated: 12/29/24 0942     Glucose 107 mg/dL      BUN 6 mg/dL      Creatinine 0.42 mg/dL      Sodium 141 mmol/L      Potassium 2.8 mmol/L      Chloride 93 mmol/L      CO2 34.0 mmol/L      Calcium 8.8 mg/dL      BUN/Creatinine Ratio 14.3     Anion Gap 14.0 mmol/L      eGFR 118.6 mL/min/1.73     Narrative:      GFR Categories in Chronic Kidney Disease (CKD)      GFR Category          GFR (mL/min/1.73)    Interpretation  G1                     90 or greater         Normal or high (1)  G2                      60-89                Mild decrease (1)  G3a                   45-59                Mild to moderate decrease  G3b                   30-44                Moderate to severe decrease  G4                     15-29                Severe decrease  G5                    14 or less           Kidney failure          (1)In the absence of evidence of kidney disease, neither GFR category G1 or G2 fulfill the criteria for CKD.    eGFR calculation 2021 CKD-EPI creatinine equation, which does not include race as a factor          CT Head Without Contrast   Final Result   Impression:         No acute intracranial abnormality.       These findings are in agreement with the critical and emergent findings   from the [facility] preliminary report.       This report was signed and finalized on 12/29/2024 7:09 AM by Sriram Hilton.          CT Thoracic Spine With Contrast   Final Result       No acute fracture or traumatic malalignment.       These findings are in agreement with the critical and emergent findings   from the [facility] preliminary report.               This report was signed and finalized on 12/29/2024 7:19 AM by Sriram Hilton.          CT Cervical Spine With Contrast   Final Result       No acute fracture or traumatic malalignment.       No abnormal enhancement.       Multilevel spondylotic changes, similar to the prior study.       These findings are in agreement with the critical and emergent findings   from the [facility] preliminary report.               This report was signed and finalized on 12/29/2024 7:17 AM by Sriram Hilton.          XR Chest 1 View   Final Result       No acute findings.       This report was signed and finalized on 12/28/2024 8:14 PM by Dr. Bello Champagne MD.          MRI Cervical Spine With & Without Contrast    (Results Pending)   MRI Thoracic Spine With & Without Contrast    (Results Pending)         ED Course  ED Course as of 12/29/24 1010   Sat Dec 28, 2024   8773 I spoke with the patient regarding her methamphetamine use.  She notes this was over a week ago and she just took a little bit in order to get some energy.  I discussed the patient her method of use, and  she states that she snorts it.  She denies any type of IV drug use. [AJ]   2250 Care resumed from Dr. Eng pending MRI. Will need admit for hypokalemia [KR]   Sun Dec 29, 2024   0038 Patient unable to do MRI due to not being able to stay still [KR]   0139 I have discussed case with on call neurologist. He stated that he will consult on the patient while admitted. Inpatient MRI is recommended. MRI unable to be obtained in the ER due to inability to stay still despite sedation. Patient is somnolent, but is waking up when spoken to.  She is moving all of her extremities appropriately.  Dorsiflexion and plantarflexion equal bilaterally. [KR]   0143 Stat rad results CT head revealed no acute intracranial finding.  CT cervical spine revealed no acute findings.  CT thoracic spine revealed no acute findings. [KR]   0208 Case signed out to Dr. Costello. Pending admission. [KR]      ED Course User Index  [AJ] Juancarlos Eng DO  [KR] Cesia Hough APRN                                                       Medical Decision Making  DDX: intoxicant, transverse     Amount and/or Complexity of Data Reviewed  Labs: ordered.  Radiology: ordered.  ECG/medicine tests: ordered.    Risk  Prescription drug management.        Final diagnoses:   Numbness and tingling   Alcoholic intoxication with complication   Methamphetamine abuse   Hypokalemia   Hypomagnesemia   Gait disturbance       ED Disposition  ED Disposition       ED Disposition   Decision to Admit    Condition   --    Comment   Level of Care: Critical Care [6]   Diagnosis: Gait disturbance [709495]   Admitting Physician: ENZO HERNDON [[id number]]   Attending Physician: ENZO HERNDON [[id number]]   Certification: I Certify That Inpatient Hospital Services Are Medically Necessary For Greater Than 2 Midnights                 No follow-up provider specified.       Medication List      No changes were made to your prescriptions during this visit.            Juancarlos Eng  DO Elvia  12/28/24 2128       Juancarlos Eng DO  12/29/24 1010      Electronically signed by Juancarlos Eng DO at 12/29/24 1010       Facility-Administered Medications as of 12/29/2024   Medication Dose Route Frequency Provider Last Rate Last Admin    acetaminophen (TYLENOL) tablet 650 mg  650 mg Oral Q4H PRN Natalie Woods APRN   650 mg at 12/29/24 0607    Or    acetaminophen (TYLENOL) suppository 650 mg  650 mg Rectal Q4H PRN Natalie Woods APRN        sennosides-docusate (PERICOLACE) 8.6-50 MG per tablet 2 tablet  2 tablet Oral BID Natalie Woods APRN        And    polyethylene glycol (MIRALAX) packet 17 g  17 g Oral Daily PRN Natalie Woods APRN        And    bisacodyl (DULCOLAX) EC tablet 5 mg  5 mg Oral Daily PRN Natalie Woods APRN        And    bisacodyl (DULCOLAX) suppository 10 mg  10 mg Rectal Daily PRN Natalie Woods APRN        Calcium Replacement - Follow Nurse / BPA Driven Protocol   Not Applicable PRN Natalie Woods APRN        [COMPLETED] Chlorhexidine Gluconate Cloth 2 % pads 1 Application  1 Application Topical Once Natalie Woods APRN   1 Application at 12/29/24 0555    [START ON 12/30/2024] Chlorhexidine Gluconate Cloth 2 % pads 1 Application  1 Application Topical Q24H Natalie Woods APRN        [COMPLETED] diazePAM (VALIUM) injection 2.5 mg  2.5 mg Intravenous Once Juancarlos Eng DO   2.5 mg at 12/28/24 2221    [COMPLETED] diazePAM (VALIUM) injection 5 mg  5 mg Intravenous Once Cuate Washington APRN   5 mg at 12/29/24 1742    Enoxaparin Sodium (LOVENOX) syringe 40 mg  40 mg Subcutaneous Q24H Natalie Woods APRN   40 mg at 12/29/24 0824    [COMPLETED] folic acid 1 mg in sodium chloride 0.9 % 50 mL IVPB  1 mg Intravenous Once Antoni Costello DO   Stopped at 12/29/24 0300    [COMPLETED] Gadopiclenol (VUEWAY) injection 10 mL  10 mL Intravenous Once in imaging Juancarlos Eng DO   10 mL at 12/29/24 0804    [COMPLETED] iopamidol  (ISOVUE-300) 61 % injection 100 mL  100 mL Intravenous Once in imaging Juancarlos Eng, DO   100 mL at 12/29/24 0127    ipratropium-albuterol (DUO-NEB) nebulizer solution 3 mL  3 mL Nebulization Q6H PRN Natalie Woods APRN        levothyroxine (SYNTHROID, LEVOTHROID) tablet 175 mcg  175 mcg Oral Q AM uCate Washington APRN   175 mcg at 12/29/24 0823    [COMPLETED] LORazepam (ATIVAN) injection 1 mg  1 mg Intravenous Once HelpAntoni romo DO   1 mg at 12/28/24 2335    [COMPLETED] LORazepam (ATIVAN) injection 1 mg  1 mg Intravenous Once Juancarlos Eng DO   1 mg at 12/29/24 0015    Magnesium Standard Dose Replacement - Follow Nurse / BPA Driven Protocol   Not Applicable PRN Natalie Woods APRN        [COMPLETED] magnesium sulfate 2g/50 mL (PREMIX) infusion  2 g Intravenous Once Juancarlos Eng DO   Stopped at 12/28/24 2216    mupirocin (BACTROBAN) 2 % nasal ointment 1 Application  1 Application Each Nare BID Natalie Woods APRN   1 Application at 12/29/24 0555    nitroglycerin (NITROSTAT) SL tablet 0.4 mg  0.4 mg Sublingual Q5 Min PRN Natalie Woods APRN        ondansetron (ZOFRAN) injection 4 mg  4 mg Intravenous Q6H PRN Natalie Woods APRN        pantoprazole (PROTONIX) EC tablet 40 mg  40 mg Oral QAM AC Cuate Washington APRN   40 mg at 12/29/24 0823    Pharmacy to Dose enoxaparin (LOVENOX)   Not Applicable Continuous PRN Natalie Woods APRN        Phosphorus Replacement - Follow Nurse / BPA Driven Protocol   Not Applicable PRN Natalie Woods APRN        [COMPLETED] potassium chloride (KLOR-CON M20) CR tablet 40 mEq  40 mEq Oral Q4H Cuate Washington APRN   40 mEq at 12/29/24 1234    [COMPLETED] potassium chloride (MICRO-K/KLOR-CON) CR capsule  40 mEq Oral Once Cesia Hough APRN   40 mEq at 12/29/24 0120    [COMPLETED] potassium chloride 10 mEq in 100 mL IVPB  10 mEq Intravenous Once Juancarlos Eng DO   Stopped at 12/28/24 2921    Potassium Replacement - Follow Nurse /  BPA Driven Protocol   Not Applicable PRN Natalie Woods APRN        [COMPLETED] sodium chloride 0.9 % bolus 500 mL  500 mL Intravenous Once Juancarlos Egn DO   Stopped at 12/28/24 2229    sodium chloride 0.9 % flush 10 mL  10 mL Intravenous Q12H Natalie Woods APRN   10 mL at 12/29/24 0830    sodium chloride 0.9 % flush 10 mL  10 mL Intravenous PRN Natalie Woods APRN        sodium chloride 0.9 % infusion 40 mL  40 mL Intravenous PRN Natalie Woods APRN        [COMPLETED] thiamine (B-1) injection 100 mg  100 mg Intravenous Once Antoni Costello DO   100 mg at 12/29/24 0234     Orders (all)        Start     Ordered    12/30/24 0400  Chlorhexidine Gluconate Cloth 2 % pads 1 Application  Every 24 Hours         12/29/24 0455    12/29/24 2123  STAT Lactic Acid, Reflex  PROCEDURE ONCE         12/29/24 1617    12/29/24 2018  Potassium  Timed         12/29/24 0717    12/29/24 1900  Potassium  Timed,   Status:  Canceled         12/29/24 0549    12/29/24 1830  diazePAM (VALIUM) injection 5 mg  Once         12/29/24 1738    12/29/24 1823  CIWA Q12 Hours X3  Every Shift       12/29/24 0622    12/29/24 1502  STAT Lactic Acid, Reflex  PROCEDURE ONCE         12/29/24 0942    12/29/24 1230  potassium chloride (KLOR-CON M20) CR tablet 40 mEq  Every 4 Hours         12/29/24 1004    12/29/24 1200  Potassium  Timed,   Status:  Canceled         12/29/24 0715    12/29/24 1006  TSH  Once         12/29/24 1005    12/29/24 1006  T4, Free  Once         12/29/24 1005    12/29/24 0957  Inpatient Neurology Consult General  Once        Specialty:  Neurology  Provider:  Luis Gifford MD    12/29/24 0956    12/29/24 0942  Inpatient Neurology Consult General  Once,   Status:  Canceled        Specialty:  Neurology  Provider:  Luis Gifford MD    12/29/24 0942    12/29/24 0900  thiamine (B-1) 100 mg in sodium chloride 0.9 % 100 mL IVPB  Daily,   Status:  Discontinued         12/29/24 0206    12/29/24 0900  sodium  "chloride 0.9 % flush 10 mL  Every 12 Hours Scheduled         12/29/24 0455    12/29/24 0900  sennosides-docusate (PERICOLACE) 8.6-50 MG per tablet 2 tablet  2 Times Daily        Placed in \"And\" Linked Group    12/29/24 0455    12/29/24 0900  Enoxaparin Sodium (LOVENOX) syringe 40 mg  Every 24 Hours Scheduled         12/29/24 0501    12/29/24 0830  potassium chloride (KLOR-CON M20) CR tablet 40 mEq  Every 4 Hours,   Status:  Discontinued         12/29/24 0717    12/29/24 0800  pantoprazole (PROTONIX) EC tablet 40 mg  Every Morning Before Breakfast         12/29/24 0710    12/29/24 0800  potassium chloride (KLOR-CON M20) CR tablet 40 mEq  Every 2 Hours,   Status:  Discontinued         12/29/24 0713    12/29/24 0715  levothyroxine (SYNTHROID, LEVOTHROID) tablet 175 mcg  Every Early Morning         12/29/24 0710    12/29/24 0701  STAT Lactic Acid, Reflex  PROCEDURE ONCE         12/29/24 0428    12/29/24 0645  potassium chloride (KLOR-CON M20) CR tablet 40 mEq  Every 4 Hours,   Status:  Discontinued         12/29/24 0549    12/29/24 0643  Inpatient Nutrition Consult  Once        Provider:  (Not yet assigned)    12/29/24 0643    12/29/24 0623  CIWA Q4 Hours X3  Every 4 Hours       12/29/24 0622    12/29/24 0616  Basic Metabolic Panel  STAT         12/29/24 0451    12/29/24 0600  mupirocin (BACTROBAN) 2 % nasal ointment 1 Application  2 Times Daily         12/29/24 0455    12/29/24 0511  Chlorhexidine Gluconate Cloth 2 % pads 1 Application  Once         12/29/24 0455    12/29/24 0500  Vital Signs Every Hour and Per Hospital Policy Based on Patient Condition  Every Hour       12/29/24 0455    12/29/24 0500  Intake & Output  Every Hour,   Status:  Canceled       12/29/24 0455    12/29/24 0456  Daily Weights  Daily       12/29/24 0455    12/29/24 0455  ondansetron (ZOFRAN) injection 4 mg  Every 6 Hours PRN         12/29/24 0455    12/29/24 0455  Continuous Cardiac Monitoring  Continuous        Comments: Follow Standing Orders " "As Outlined in Process Instructions (Open Order Report to View Full Instructions)    12/29/24 0455 12/29/24 0455  Maintain IV Access  Continuous,   Status:  Canceled         12/29/24 0455 12/29/24 0455  Telemetry - Place Orders & Notify Provider of Results When Patient Experiences Acute Chest Pain, Dysrhythmia or Respiratory Distress  Continuous        Comments: Open Order Report to View Parameters Requiring Provider Notification    12/29/24 0455 12/29/24 0455  Continuous Pulse Oximetry  Continuous         12/29/24 0455    12/29/24 0455  Height & Weight  Once         12/29/24 0455 12/29/24 0455  Oral Care - Patient Not on NPPV & Not Intubated  Every Shift       12/29/24 0455 12/29/24 0455  Target Arousal Level RASS 0 to -2  Continuous         12/29/24 0455 12/29/24 0455  Use Mobility Guidelines for Advancement of Activity  Continuous         12/29/24 0455 12/29/24 0455  Insert Peripheral IV  Once         12/29/24 0455 12/29/24 0455  Saline Lock & Maintain IV Access  Continuous         12/29/24 0455 12/29/24 0455  Code Status and Medical Interventions: CPR (Attempt to Resuscitate); Full Support  Continuous         12/29/24 0455 12/29/24 0455  If Patient has BG Less Than 80 & is Symptomatic But Not on IV Insulin Protocol - Use Adult Hypoglycemia Treatment Orders  Continuous         12/29/24 0455 12/29/24 0455  Diet: Regular/House; Fluid Consistency: Thin (IDDSI 0)  Diet Effective Now         12/29/24 0455 12/29/24 0455  ipratropium-albuterol (DUO-NEB) nebulizer solution 3 mL  Every 6 Hours PRN         12/29/24 0455 12/29/24 0454  acetaminophen (TYLENOL) tablet 650 mg  Every 4 Hours PRN        Placed in \"Or\" Linked Group    12/29/24 0455 12/29/24 0454  acetaminophen (TYLENOL) suppository 650 mg  Every 4 Hours PRN        Placed in \"Or\" Linked Group    12/29/24 0455 12/29/24 0454  Potassium Replacement - Follow Nurse / BPA Driven Protocol  As Needed         12/29/24 0455    " "12/29/24 0454  Magnesium Standard Dose Replacement - Follow Nurse / BPA Driven Protocol  As Needed         12/29/24 0455    12/29/24 0454  Phosphorus Replacement - Follow Nurse / BPA Driven Protocol  As Needed         12/29/24 0455    12/29/24 0454  Calcium Replacement - Follow Nurse / BPA Driven Protocol  As Needed         12/29/24 0455    12/29/24 0454  Pharmacy to Dose enoxaparin (LOVENOX)  Continuous PRN         12/29/24 0455    12/29/24 0454  sodium chloride 0.9 % flush 10 mL  As Needed         12/29/24 0455    12/29/24 0454  sodium chloride 0.9 % infusion 40 mL  As Needed         12/29/24 0455    12/29/24 0454  polyethylene glycol (MIRALAX) packet 17 g  Daily PRN        Placed in \"And\" Linked Group    12/29/24 0455    12/29/24 0454  bisacodyl (DULCOLAX) EC tablet 5 mg  Daily PRN        Placed in \"And\" Linked Group    12/29/24 0455    12/29/24 0454  bisacodyl (DULCOLAX) suppository 10 mg  Daily PRN        Placed in \"And\" Linked Group    12/29/24 0455    12/29/24 0454  nitroglycerin (NITROSTAT) SL tablet 0.4 mg  Every 5 Minutes PRN         12/29/24 0455    12/29/24 0425  Potassium  STAT         12/29/24 0425    12/29/24 0347  Inpatient Admission  Once         12/29/24 0346    12/29/24 0347  Cardiac Monitoring  Continuous,   Status:  Canceled        Comments: Follow Standing Orders As Outlined in Process Instructions (Open Order Report to View Full Instructions)    12/29/24 0346    12/29/24 0338  STAT Lactic Acid, Reflex  PROCEDURE ONCE         12/29/24 0105    12/29/24 0224  thiamine (B-1) injection 100 mg  Once         12/29/24 0208    12/29/24 0208  folic acid 1 mg in sodium chloride 0.9 % 50 mL IVPB  Once         12/29/24 0206    12/29/24 0207  Ethanol  STAT         12/29/24 0206    12/29/24 0143  iopamidol (ISOVUE-300) 61 % injection 100 mL  Once in Imaging         12/29/24 0127    12/29/24 0106  potassium chloride (MICRO-K/KLOR-CON) CR capsule  Once         12/29/24 0050    12/29/24 0103  CT Thoracic Spine " With Contrast  1 Time Imaging         12/29/24 0102    12/29/24 0103  CT Cervical Spine With Contrast  1 Time Imaging         12/29/24 0102    12/29/24 0102  CT Head Without Contrast  1 Time Imaging         12/29/24 0102    12/29/24 0039  LORazepam (ATIVAN) injection 1 mg  Once         12/29/24 0023    12/28/24 2341  LORazepam (ATIVAN) injection 1 mg  Once         12/28/24 2325    12/28/24 2328  STAT Lactic Acid, Reflex  PROCEDURE ONCE         12/28/24 2058 12/28/24 2324  Gadopiclenol (VUEWAY) injection 10 mL  Once in Imaging         12/28/24 2308    12/28/24 2232  diazePAM (VALIUM) injection 2.5 mg  Once         12/28/24 2216 12/28/24 2144  magnesium sulfate 2g/50 mL (PREMIX) infusion  Once         12/28/24 2128 12/28/24 2144  sodium chloride 0.9 % bolus 500 mL  Once         12/28/24 2128 12/28/24 2128  High Sensitivity Troponin T 1Hr  PROCEDURE ONCE         12/28/24 2059 12/28/24 2106  potassium chloride 10 mEq in 100 mL IVPB  Once         12/28/24 2050 12/28/24 2047  Blood Gas, Arterial With Co-Ox  PROCEDURE ONCE         12/28/24 2047 12/28/24 2043  Fentanyl, Urine - Urine, Clean Catch  Once         12/28/24 2042 12/28/24 2018  Blood Gas, Arterial With Co-Ox  Once         12/28/24 2017    12/28/24 2009  Urine Drug Screen - Urine, Clean Catch  STAT         12/28/24 2008 12/28/24 2008  MRI Thoracic Spine With & Without Contrast  1 Time Imaging         12/28/24 2008 12/28/24 2007  Lactic Acid, Plasma  STAT         12/28/24 2008 12/28/24 2007  Procalcitonin  STAT         12/28/24 2008 12/28/24 2007  Urinalysis With Culture If Indicated - Straight Cath  STAT         12/28/24 2008 12/28/24 2007  Magnesium  STAT         12/28/24 2008 12/28/24 2007  XR Chest 1 View  1 Time Imaging         12/28/24 2008 12/28/24 2007  High Sensitivity Troponin T  STAT         12/28/24 2008 12/28/24 2007  MRI Cervical Spine With & Without Contrast  1 Time Imaging         12/28/24 2008     12/28/24 2006  CBC & Differential  STAT         12/28/24 2008 12/28/24 2006  Comprehensive Metabolic Panel  STAT         12/28/24 2008 12/28/24 2006  CBC Auto Differential  PROCEDURE ONCE         12/28/24 2008 12/28/24 1838  ECG 12 Lead Chest Pain  Once         12/28/24 1837                  Ventilator/Non-Invasive Ventilation Settings (From admission, onward)      None             Physician Progress Notes (all)        Cuate Washington APRN at 12/29/24 0942       Attestation signed by Juan Carlos Norris MD at 12/29/24 1615    I have reviewed this documentation and agree.    Clinical history:    Patient was seen in the follow-up visit again in the ICU.  She is stable overall but appears to be confused at times.  She was noted to have hypokalemia which is slowly improving.  She has history of alcohol use and positive dressing for amphetamine and methamphetamine.  She is doing better now but waiting for MRI once MRI is done she will be transferred to the floor.    Physical examination was unremarkable.  Patient appears stable    Impression    Ethanol abuse  Substance abuse  Former smoker  Possible COPD  Truncal paresthesia and lower extremity weakness  Hypokalemia  History of hypertension    Assessment and plan:  Replace potassium.  Repeat labs  Supportive care.  Patient had GI workup done and has history of chronic diarrhea  Most likely cause of hypokalemia is meth abuse and alcohol use  Continue monitoring for neurologic symptoms  Wait for MRI.  Plan for outpatient pulmonary clinic follow-up for underlying COPD and sleep apnea  Patient has history of alcohol abuse and substance abuse  Continue monitoring in the ICU  Neurology consulted  CODE STATUS: Full.  Overall process: Guarded.  We will follow when in the ICU.  Total time spent in seeing this patient was 35 minutes    Electronically signed by    Juan Carlos Norris MD  Pulmonologist/Intensivist  12/29/2024 16:15 CST                     [facility]  [facility] Intensivist Services    Date of Admission: 12/28/2024  Date of Note: 12/29/24  Primary Care Physician: Margi Lemus APRN    History     49 y.o. female admitted 12/28/2024 with progressive numbness and weakness of the trunk extending to the lower extremities, hypokalemia, EtOH intoxication and meth positive.  Patient endorses binge drinking cinnamon whiskey yesterday.  Endorses meth use within the last week.  She claims she does not chronically drink alcohol all the time and this meth smoking was just a one-time thing.  She states she noticed her stomach was numb to the touch within the last week and her lower extremities have become progressively weak with proximal numbness.  Of note on ER evaluation, patient found to be significantly hypokalemic with a potassium of 2.3..  She received 10 mEq of oral potassium in the ER with transient rise of potassium to 2.4.  Neurology consulted for patient's paresthesias and weakness.  Patient underwent CT of the cervical spine, CT of the head and CT of the thoracic spine, all with no acute findings.  Patient underwent MRI of the cervical and thoracic spine, but could not lay still for these initially.  Intensivist services were contacted for admission to the ICU.    On her chart (which can contain diagnoses that are not current) shows she  has Hypercholesterolemia with hypertriglyceridemia; Raynaud's disease without gangrene; Ex-smoker; Chest pain in adult; SORTO (dyspnea on exertion); Mixed simple and mucopurulent chronic bronchitis; Pulmonary emphysema; Non-occlusive coronary artery disease; Primary hypertension; Coronary-myocardial bridge; Bilateral lower extremity edema; Bicuspid aortic valve; Vocal cord polyps; Tobacco use disorder, continuous; Neoplasm of uncertain behavior; Hypokalemia; Dysphagia; Heartburn; Nonspecific colitis; Diarrhea; ETOH abuse; Steatosis of liver, advanced; Intractable vomiting with nausea; Hypothyroidism; Nausea &  vomiting, chronic; Transaminitis; Hypothyroidism; and Gait disturbance on their problem list..     The ICU team was asked to evaluate the patient for severe hypokalemia.  Noted minimal replacement in the ER.  She has since been given 2 doses of 40 mEq for total of 90 mEq.  Last potassium checked at 0902 was 2.8.  Patient continues to complain of numbness across her trunk and proximal lower extremities.  Vital signs remain stable.  No issues with respiratory muscles.  She continues to be jerky with spastic movements intermittently.    Past Medical History     Active and Resolved Problems  Active Hospital Problems    Diagnosis  POA    **Gait disturbance [R26.9]  Yes    Hypothyroidism [E03.9]  Yes    ETOH abuse [F10.10]  Yes    Hypokalemia [E87.6]  Yes      Resolved Hospital Problems   No resolved problems to display.       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    CTS (carpal tunnel syndrome) 2019    Dental disease     Depression     Diastolic dysfunction 2022    Dizziness     Emphysema of lung 2020    GERD (gastroesophageal reflux disease)     Headache     Hyperlipidemia     Hypertension     Hypothyroidism     Mixed simple and mucopurulent chronic bronchitis 04/18/2023    Non-occlusive coronary artery disease 05/04/2023    NSTEMI (non-ST elevated myocardial infarction) (HCC)     Pneumonia 2022    Pulmonary emphysema 04/21/2023    Shingles 2018    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       Prior Surgeries: She  has a past surgical history that includes Total thyroidectomy; Hysterectomy; Appendectomy; Thyroid surgery; pr rt/lt heart catheters (N/A, 11/04/2017); Cardiac catheterization (N/A, 11/04/2017); Cardiac catheterization (N/A, 05/31/2023); Carpal tunnel release (2019); Panendoscopy (N/A, 5/30/2024);  Tracheostomy tube placement (N/A, 5/30/2024); Esophagogastroduodenoscopy (N/A, 7/8/2024); and Colonoscopy (N/A, 8/2/2024).    Past Surgical History:   Past Surgical History:   Procedure Laterality Date    APPENDECTOMY      CARDIAC CATHETERIZATION N/A 11/04/2017    Procedure: Coronary angiography;  Surgeon: Luis Colvin MD;  Location: [facility] CATH INVASIVE LOCATION;  Service:     CARDIAC CATHETERIZATION N/A 05/31/2023    Procedure: Left Heart Cath;  Surgeon: Steffen Rossi MD;  Location: [facility] CATH INVASIVE LOCATION;  Service: Cardiology;  Laterality: N/A;    CARPAL TUNNEL RELEASE  2019    COLONOSCOPY N/A 8/2/2024    Procedure: COLONOSCOPY WITH ANESTHESIA;  Surgeon: Marty Browning MD;  Location: [facility] ENDOSCOPY;  Service: Gastroenterology;  Laterality: N/A;  pre op pancolitis    ENDOSCOPY N/A 7/8/2024    Procedure: ESOPHAGOGASTRODUODENOSCOPY WITH ANESTHESIA;  Surgeon: Gordy Wang MD;  Location: [facility] ENDOSCOPY;  Service: Gastroenterology;  Laterality: N/A;  pre: dysphagia.   post: esophagus dilated.   no PCP    HYSTERECTOMY      PANENDOSCOPY N/A 5/30/2024    Procedure: DIRECT LARYNGOSCOPY WITH BIOPSY OF VOCAL CORD POLYPS WITH POSSIBLE TRACHEOSTOMY;  Surgeon: Mendez Padilla MD;  Location: [facility] OR;  Service: ENT;  Laterality: N/A;    AL RT/LT HEART CATHETERS N/A 11/04/2017    Procedure: Percutaneous Coronary Intervention;  Surgeon: Luis Colvin MD;  Location: [facility] CATH INVASIVE LOCATION;  Service: Cardiovascular    THYROID SURGERY      TOTAL THYROIDECTOMY      TRACHEOSTOMY N/A 5/30/2024    Procedure: POSSIBLE TRACHEOSTOMY;  Surgeon: Mendez Padilla MD;  Location: [facility] OR;  Service: ENT;  Laterality: N/A;       Social and Family History     Family History:  family history includes Asthma in her mother; Cancer in her father and mother; Colon cancer in her father; Emphysema in her mother; Heart failure in her father; Hypertension in her father.    Tobacco/Social History:  reports that she  quit smoking about 9 months ago. Her smoking use included cigarettes. She started smoking about 33 years ago. She has a 30 pack-year smoking history. She has been exposed to tobacco smoke. She has never used smokeless tobacco. She reports that she does not currently use alcohol. She reports that she does not use drugs.    Allergies     Allergies:   She has No Known Allergies.    No Known Allergies    Labs     CBC          10/2/2024    04:26 10/4/2024    01:23 12/28/2024    20:28   CBC   WBC 8.76  7.35  9.85    RBC 3.17  2.70  2.62    Hemoglobin 10.9  9.3  8.7    Hematocrit 33.6  29.0  26.5    .0  107.4  101.1    MCH 34.4  34.4  33.2    MCHC 32.4  32.1  32.8    RDW 15.2  15.7  14.5    Platelets 271  250  422      CMP          10/4/2024    01:24 12/28/2024    20:28 12/28/2024    20:47 12/29/2024    04:31 12/29/2024    09:02   CMP   Glucose 89  105    107    BUN 4  7    6    Creatinine 0.59  0.55    0.42    EGFR 109.3  111.1    118.6    Sodium 144  139  140   141    Potassium 4.0  2.3   2.4  2.8    Chloride 108  89    93    Calcium 8.2  9.5    8.8    Total Protein 5.6  6.7       Albumin 3.0  3.6       Globulin 2.6  3.1       Total Bilirubin 0.4  0.3       Alkaline Phosphatase 551  682       AST (SGOT) 152  120       ALT (SGPT) 45  35       Albumin/Globulin Ratio 1.2  1.2       BUN/Creatinine Ratio 6.8  12.7    14.3    Anion Gap 13.0  18.0    14.0          Lab 12/29/24  0902 12/29/24  0401 12/29/24  0038 12/28/24 2028   LACTATE 3.4* 3.6* 3.7* 5.8*         Inpatient Medications     Medications: Scheduled Meds:[START ON 12/30/2024] Chlorhexidine Gluconate Cloth, 1 Application, Topical, Q24H  enoxaparin, 40 mg, Subcutaneous, Q24H  levothyroxine, 175 mcg, Oral, Q AM  mupirocin, 1 Application, Each Nare, BID  pantoprazole, 40 mg, Oral, QAM AC  potassium chloride ER, 40 mEq, Oral, Q4H  senna-docusate sodium, 2 tablet, Oral, BID  sodium chloride, 10 mL, Intravenous, Q12H      Continuous Infusions:Pharmacy to Dose  enoxaparin (LOVENOX),       PRN Meds:.  acetaminophen **OR** acetaminophen    senna-docusate sodium **AND** polyethylene glycol **AND** bisacodyl **AND** bisacodyl    Calcium Replacement - Follow Nurse / BPA Driven Protocol    ipratropium-albuterol    Magnesium Standard Dose Replacement - Follow Nurse / BPA Driven Protocol    nitroglycerin    ondansetron    Pharmacy to Dose enoxaparin (LOVENOX)    Phosphorus Replacement - Follow Nurse / BPA Driven Protocol    Potassium Replacement - Follow Nurse / BPA Driven Protocol    sodium chloride    sodium chloride    I have reviewed the patient's current medications.   Outpatient Medications     Outpatient Medications:   Outpatient Medications Marked as Taking for the 12/28/24 encounter (Hospital Encounter)   Medication Sig Dispense Refill    bismuth subsalicylate (Pepto-Bismol) 262 MG chewable tablet Chew 2 tablets 4 (Four) Times a Day Before Meals & at Bedtime. 120 tablet 0    carvedilol (COREG) 25 MG tablet Take 1 tablet by mouth 2 (Two) Times a Day. 60 tablet 0    colestipol (COLESTID) 1 g tablet Take 1 tablet by mouth Daily.      FLUoxetine (PROzac) 40 MG capsule Take 1 capsule by mouth Daily.      levothyroxine (SYNTHROID, LEVOTHROID) 175 MCG tablet Take 1 tablet by mouth Daily. Resume 175 mcg on 10/12/2024      loperamide (IMODIUM) 2 MG capsule Take 2 capsules by mouth 4 (Four) Times a Day As Needed for Diarrhea. 120 capsule 0    nitroglycerin (NITROSTAT) 0.4 MG SL tablet Place 1 tablet under the tongue Every 5 (Five) Minutes As Needed for Chest Pain. Take no more than 3 doses in 15 minutes.      pantoprazole (PROTONIX) 40 MG EC tablet Take 1 tablet by mouth 2 (Two) Times a Day. 60 tablet 0    potassium chloride (KLOR-CON) 20 MEQ packet Take 20 mEq by mouth 2 (Two) Times a Day.      promethazine (PHENERGAN) 25 MG tablet Take 1 tablet by mouth 3 (Three) Times a Day As Needed for Nausea or Vomiting.      topiramate (TOPAMAX) 50 MG tablet Take 1 tablet by mouth 2 (Two)  "Times a Day. 60 tablet 5       Current Antibiotics     This patient does not have an active medication from one of the medication groupers.    Exam     Vitals: Her  height is 175.3 cm (69\") and weight is 71.3 kg (157 lb 3 oz). Her oral temperature is 98 °F (36.7 °C). Her blood pressure is 154/98 and her pulse is 94. Her respiration is 18 and oxygen saturation is 95%.     GENERAL:  Alert, no acute distress.   SKIN:  Warm, dry  EYES:  Pupils equal, round and reactive to light.  EOMs intact.    HEAD:  Normocephalic.  NECK:  Supple   RESP:  Lungs clear to auscultation. Good airflow. Normal respiratory effort.   CARDIAC:  Regular rate and rhythm. Normal S1,S2. No edema  GI:  Soft, nontender, normal bowel sounds  MSK: Generalized weakness   NEUROLOGICAL: Truncal numbness, proximal lower extremity numbness    Results and Cultures Review     Result Review:  I have personally reviewed the results from the time of this admission to 12/29/2024 10:28 CST and agree with these findings:  [x]  Laboratory list / accordion  []  Microbiology  [x]  Radiology  [x]  EKG/Telemetry   []  Cardiology/Vascular   []  Pathology  [x]  Old records  []  Other:    CT Thoracic Spine With Contrast    Result Date: 12/29/2024   No acute fracture or traumatic malalignment.  These findings are in agreement with the critical and emergent findings from the [facility] preliminary report.    This report was signed and finalized on 12/29/2024 7:19 AM by Sriram Hilton.      CT Cervical Spine With Contrast    Result Date: 12/29/2024   No acute fracture or traumatic malalignment.  No abnormal enhancement.  Multilevel spondylotic changes, similar to the prior study.  These findings are in agreement with the critical and emergent findings from the [facility] preliminary report.    This report was signed and finalized on 12/29/2024 7:17 AM by Sriram Hilton.      CT Head Without Contrast    Result Date: 12/29/2024  Impression:   No acute intracranial abnormality.  " These findings are in agreement with the critical and emergent findings from the [facility] preliminary report.  This report was signed and finalized on 12/29/2024 7:09 AM by Sriram Hilton.      XR Chest 1 View    Result Date: 12/28/2024   No acute findings.  This report was signed and finalized on 12/28/2024 8:14 PM by Dr. Bello Champagne MD.         Assessment/Plan   49-year-old female with hypokalemia, EtOH intoxication, meth positive with generalized weakness and truncal and proximal lower extremity numbness.  Neurology consulted.  She underwent CTA of the head, C-spine and thoracic spine with no acute findings.  She has attempted to undergo MRI of the C and T-spine, but could not lay still for this initially.  She was profoundly hypokalemic.  We are replacing this with oral supplementation her last potassium level was up to 2.8.  Weakness and paresthesias most likely related to her alcohol abuse, hypokalemia, and meth abuse; however, cannot rule out other neurologic process.  Follow neurology evaluation, formal consult placed in orders.    Hypokalemia  -Continue replacement, has received 90 mEq orally so far  -Last potassium 2.8 at 0902 this morning  -Repeat potassium level at noon  -Further repletion as indicated, goal potassium greater than 3.5 and ideally closer to 4  -Prior records review patient has had extensive GI workup for chronic diarrhea and liver disease, this in combination with her alcohol and drug abuse could be contributing to hypokalemia, though she is not currently complaining of diarrhea at this time    Truncal paresthesias, lower extremity weakness  -Neurology consulted, awaiting compliance with MRI for further C-spine and T-spine evaluation  -Follow neurology recommendations  -Most likely this is sequela of her alcohol abuse, meth use and hypokalemia    Hypothyroidism  -Restart Synthroid  -Check TSH and free T4 as this could also contribute to numbness and weakness  -Strong question of  "medication compliance    EtOH abuse  -Encourage abstinence from alcohol given all of her chronic comorbidities listed in chart  -Monitor for symptoms of withdrawal during admission  -Initiate CIWA protocol if indicated      VTE Prophylaxis:    Pharmacologic VTE prophylaxis orders are present.    CODE STATUS: Full resuscitation  Diet: Regular    75 minutes minimum spent on patient, MDM high     This time included obtaining a history; examining the patient; pulse oximetry; ordering and review of studies; arranging urgent treatment with development of a management plan; evaluation of patient's response to treatment; frequent reassessment; and, discussions with other providers.     Please see rest of the note for further information on patient assessment, MDM, and treatment.     Part of this note may be an electronic transcription/translation of spoken language to printed text using the Dragon dictation system      Electronically signed by BREANA Polanco on 12/29/2024 at 10:30 CST                Electronically signed by Juan Carlos Norris MD at 12/29/24 1615          Consult Notes (all)        Brett Hernandez, PharmD at 12/29/24 0502          Pharmacy Dosing Service  Anticoagulant  Enoxaparin    Assessment/Action/Plan:  Pharmacy to Dose Lovenox request for VTE prophylaxis. Initiated Lovenox 40 mg SQ every 24 hours. Pharmacy will continue to monitor and adjust dose accordingly.       Subjective:  Minnie Bang is a 49 y.o. female on Enoxaparin 40 mg SQ every 24 hours for indication of VTE prophylaxis.  Objective:  [Ht: 175.3 cm (69\"); Wt: 77.1 kg (170 lb); BMI: Body mass index is 25.1 kg/m².]  Estimated Creatinine Clearance: 147.3 mL/min (A) (by C-G formula based on SCr of 0.55 mg/dL (L)). No results found for: \"DDIMER\"   Lab Results   Component Value Date      Lab Results   Component Value Date    HGB 8.7 (L) 12/28/2024      Lab Results   Component Value Date     12/28/2024       Brett Hernandez, " PharmD  12/29/24 05:01 CST       Electronically signed by Brett Hernandez, PharmD at 12/29/24 9619

## 2024-12-30 NOTE — PROGRESS NOTES
Malnutrition Severity Assessment    Patient Name:  Minnie Bang  YOB: 1975  MRN: [id number]  Admit Date:  12/28/2024    Patient meets criteria for : Severe Malnutrition (Secondary signs: generalized weakness)    Malnutrition Severity Assessment  Malnutrition Type: Starvation - Related Malnutrition  Malnutrition Type (Last 8 Hours)       Malnutrition Severity Assessment       Row Name 12/30/24 1315       Malnutrition Severity Assessment    Malnutrition Type Starvation - Related Malnutrition      Row Name 12/30/24 1315       Insufficient Energy Intake     Insufficient Energy Intake Findings Severe    Insufficient Energy Intake  <75% of est. energy requirement for > or equal to 1 month  Pt reports she eats one meal every 3-4 days; due to no taste for food.      Row Name 12/30/24 1315       Unintentional Weight Loss     Unintentional Weight Loss Findings Severe    Unintentional Weight Loss  Weight loss greater than 7.5% in three months  22 lbs (12%) over the past four months      Row Name 12/30/24 1315       Muscle Loss    Loss of Muscle Mass Findings Moderate    [place] Region Moderate - slight depression      Row Name 12/30/24 1315       Fat Loss    Subcutaneous Fat Loss Findings Moderate    Orbital Region  Moderate -  somewhat hollowness, slightly dark circles    Upper Arm Region Moderate - some fat tissue, not ample      Row Name 12/30/24 1315       Criteria Met (Must meet criteria for severity in at least 2 of these categories: M Wasting, Fat Loss, Fluid, Secondary Signs, Wt. Status, Intake)    Patient meets criteria for  Severe Malnutrition  Secondary signs: generalized weakness                    Electronically signed by:  Lola Brooks RDN, [facility]  12/30/24 13:34 CST

## 2024-12-30 NOTE — PROGRESS NOTES
[facility] Intensivist Services    Date of Admission: 12/28/2024  Date of Note: 12/30/24  Primary Care Physician: Margi Lemus APRN   LOS: 1 day     History   Next of Kin:  Primary Emergency Contact: Kirill Montgomery, [place] Phone: [phone]   Code Status: Code Status and Medical Interventions: CPR (Attempt to Resuscitate); Full Support    She is a 49 y.o. female admitted 12/28/2024 after presenting to the emergency department with numbness in her fingers and abdomen.  Patient lives in an , she has been falling down multiple times per day.  She also began having some chest discomfort and presented to the emergency department with diarrhea and vomiting.  Neurology saw the patient is concerned she may have [place]'s disease.  Patient also complains of restless leg and restless arm type symptoms worse at night.  Neurology request intubation for MRI and lumbar puncture tomorrow.  Patient has areflexia.  Discussed with patient, risk benefits alternatives.  Patient would like to proceed with the procedure.    Patient was just admitted 10/4/2024 with chronic GI symptoms and struggling to keep her Synthroid down.  Imaging at that admission showed pretty significant bilateral femoral head osteonecrosis, significant hepatic steatosis and possible chronic inflammatory disease with fatty infiltration of the entire colon.    EF 61 to 65% on 2023 echocardiogram.  Bicuspid aortic valve noted.    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    On her chart (which can contain diagnoses that are not current) shows she  has Hypercholesterolemia with hypertriglyceridemia; Raynaud's disease without gangrene; Ex-smoker; Chest pain in adult; SORTO (dyspnea on exertion); Mixed simple and mucopurulent chronic bronchitis; Pulmonary emphysema; Non-occlusive coronary artery disease; Primary hypertension; Coronary-myocardial bridge; Bilateral lower extremity edema; Bicuspid aortic valve; Vocal cord polyps; Tobacco use disorder, continuous; Neoplasm of uncertain behavior; Hypokalemia; Dysphagia; Heartburn; Nonspecific colitis; Diarrhea; ETOH abuse; Steatosis of liver, advanced; Intractable vomiting with nausea; Hypothyroidism; Nausea & vomiting, chronic; Transaminitis; Hypothyroidism; Gait disturbance; and Substance abuse on their problem list..     She takes FLUoxetine, Inclisiran Sodium, bismuth subsalicylate, carvedilol, colestipol, levothyroxine, lisinopril, loperamide, nitroglycerin, pantoprazole, potassium chloride, promethazine, and topiramate at home based on most current med reconciliation.   Complete list is below.     12/29/24: Patient was confused.  Concern for alcohol abuse, substance abuse noted.  Drug screen showed amphetamine/methamphetamine positive.  Plan for MRI then transferred to floor.    12/30/24: I took over care.  Discussed with neurology.  Patient was not transferred to the floor due to concerns about possible [place]'s disease.  They request elective intubation tomorrow.  We will make that happen tomorrow.    Past Medical History     Active and Resolved Problems  Active Hospital Problems    Diagnosis  POA    **Hypokalemia [E87.6]  Yes    Gait disturbance [R26.9]  Yes    Substance abuse [F19.10]  Unknown    Hypothyroidism  [E03.9]  Yes    Hypothyroidism [E03.9]  Yes    ETOH abuse [F10.10]  Yes    Primary hypertension [I10]  Yes    Non-occlusive coronary artery disease [I25.10]  Yes    Ex-smoker [Z87.891]  Not Applicable    Hypercholesterolemia with hypertriglyceridemia [E78.2]  Yes    Raynaud's disease without gangrene [I73.00]  Yes      Resolved Hospital Problems   No resolved problems to display.       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    CTS (carpal tunnel syndrome) 2019    Dental disease     Depression     Diastolic dysfunction 2022    Dizziness     Emphysema of lung 2020    GERD (gastroesophageal reflux disease)     Headache     Hyperlipidemia     Hypertension     Hypothyroidism     Mixed simple and mucopurulent chronic bronchitis 04/18/2023    Non-occlusive coronary artery disease 05/04/2023    NSTEMI (non-ST elevated myocardial infarction) ([facility])     Pneumonia 2022    Pulmonary emphysema 04/21/2023    Shingles 2018    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       Prior Surgeries: She  has a past surgical history that includes Total thyroidectomy; Hysterectomy; Appendectomy; Thyroid surgery; pr rt/lt heart catheters (N/A, 11/04/2017); Cardiac catheterization (N/A, 11/04/2017); Cardiac catheterization (N/A, 05/31/2023); Carpal tunnel release (2019); Panendoscopy (N/A, 5/30/2024); Tracheostomy tube placement (N/A, 5/30/2024); Esophagogastroduodenoscopy (N/A, 7/8/2024); and Colonoscopy (N/A, 8/2/2024).    Past Surgical History:   Past Surgical History:   Procedure Laterality Date    APPENDECTOMY      CARDIAC CATHETERIZATION N/A 11/04/2017    Procedure: Coronary angiography;  Surgeon: Luis Colvin MD;  Location: [facility] CATH INVASIVE LOCATION;  Service:     CARDIAC CATHETERIZATION N/A 05/31/2023    Procedure: Left Heart  Cath;  Surgeon: Steffen Rossi MD;  Location: [facility] CATH INVASIVE LOCATION;  Service: Cardiology;  Laterality: N/A;    CARPAL TUNNEL RELEASE  2019    COLONOSCOPY N/A 8/2/2024    Procedure: COLONOSCOPY WITH ANESTHESIA;  Surgeon: Marty Browning MD;  Location: [facility] ENDOSCOPY;  Service: Gastroenterology;  Laterality: N/A;  pre op pancolitis    ENDOSCOPY N/A 7/8/2024    Procedure: ESOPHAGOGASTRODUODENOSCOPY WITH ANESTHESIA;  Surgeon: Gordy Wang MD;  Location: [facility] ENDOSCOPY;  Service: Gastroenterology;  Laterality: N/A;  pre: dysphagia.   post: esophagus dilated.   no PCP    HYSTERECTOMY      PANENDOSCOPY N/A 5/30/2024    Procedure: DIRECT LARYNGOSCOPY WITH BIOPSY OF VOCAL CORD POLYPS WITH POSSIBLE TRACHEOSTOMY;  Surgeon: Mendez Padilla MD;  Location: [facility] OR;  Service: ENT;  Laterality: N/A;    CA RT/LT HEART CATHETERS N/A 11/04/2017    Procedure: Percutaneous Coronary Intervention;  Surgeon: Luis Colvin MD;  Location: [facility] CATH INVASIVE LOCATION;  Service: Cardiovascular    THYROID SURGERY      TOTAL THYROIDECTOMY      TRACHEOSTOMY N/A 5/30/2024    Procedure: POSSIBLE TRACHEOSTOMY;  Surgeon: Mendez Padilla MD;  Location: [facility] OR;  Service: ENT;  Laterality: N/A;       Social and Family History     Family History:  family history includes Asthma in her mother; Cancer in her father and mother; Colon cancer in her father; Emphysema in her mother; Heart failure in her father; Hypertension in her father.    Tobacco/Social History:  reports that she quit smoking about 10 months ago. Her smoking use included cigarettes. She started smoking about 33 years ago. She has a 30 pack-year smoking history. She has been exposed to tobacco smoke. She has never used smokeless tobacco. She reports that she does not currently use alcohol. She reports that she does not use drugs.    Allergies     Allergies:   She has No Known Allergies.    Labs   Basic Labs:  CBC:      Lab 12/30/24  0655 12/28/24 2028    WBC 8.05 9.85   HEMOGLOBIN 8.0* 8.7*   HEMATOCRIT 24.6* 26.5*   PLATELETS 374 422   NEUTROS ABS 5.15 4.46   IMMATURE GRANS (ABS)  --  0.08*   LYMPHS ABS  --  3.92*   MONOS ABS  --  1.26*   EOS ABS 0.24 0.08   .7* 101.1*       LIVER FUNCTION AND COAG TESTS:      Lab 12/30/24  0919 12/28/24 2028   TOTAL PROTEIN 5.7* 6.7   ALBUMIN 3.2* 3.6   GLOBULIN 2.5 3.1   ALT (SGPT) 51* 35*   AST (SGOT) 214* 120*   BILIRUBIN 0.5 0.3   ALK PHOS 634* 682*       ABG:      Lab 12/30/24  0919 12/29/24  0902 12/28/24 2047 12/28/24 2028   PH, ARTERIAL  --   --  7.554*  --    PO2 ART  --   --  83.1  --    PCO2, ARTERIAL  --   --  36.8  --    HCO3 ART  --   --  32.4*  --    ANION GAP 12.0 14.0  --  18.0*       LACTATE:      Lab 12/29/24  2111 12/29/24  1523 12/29/24  0902 12/29/24  0401 12/29/24  0038   LACTATE 2.1* 3.1* 3.4* 3.6* 3.7*       CMP:      Lab 12/30/24  0919 12/30/24  0655 12/29/24 2110 12/29/24  0902 12/29/24  0431 12/28/24 2047 12/28/24 2028   SODIUM 143  --   --  141  --   --  139   SODIUM, ARTERIAL  --   --   --   --   --  140  --    POTASSIUM 4.4  --  3.5 2.8* 2.4*  --  2.3*   CHLORIDE 100  --   --  93*  --   --  89*   BUN 6  --   --  6  --   --  7   CREATININE 0.38*  --   --  0.42*  --   --  0.55*   CALCIUM 8.2*  --   --  8.8  --   --  9.5   MAGNESIUM 1.6  --   --   --   --   --  1.4*   PHOSPHORUS  --  2.6  --   --   --   --   --    GLUCOSE 135*  --   --  107*  --   --  105*   EGFR 121.5  --   --  118.6  --   --  111.1       Diabetic:      Inpatient Medications   Scheduled Meds:carvedilol, 25 mg, Oral, BID With Meals  Chlorhexidine Gluconate Cloth, 1 Application, Topical, Q24H  enoxaparin, 40 mg, Subcutaneous, Q24H  levothyroxine, 175 mcg, Oral, Q AM  lisinopril, 40 mg, Oral, Daily  mupirocin, 1 Application, Each Nare, BID  pantoprazole, 40 mg, Oral, QAM AC  senna-docusate sodium, 2 tablet, Oral, BID  sodium chloride, 10 mL, Intravenous, Q12H      Continuous Infusions:Pharmacy to Dose enoxaparin (LOVENOX),  "      PRN Meds:.  acetaminophen **OR** acetaminophen    senna-docusate sodium **AND** polyethylene glycol **AND** bisacodyl **AND** bisacodyl    Calcium Replacement - Follow Nurse / BPA Driven Protocol    ipratropium-albuterol    Magnesium Standard Dose Replacement - Follow Nurse / BPA Driven Protocol    nitroglycerin    ondansetron    Pharmacy to Dose enoxaparin (LOVENOX)    Phosphorus Replacement - Follow Nurse / BPA Driven Protocol    Potassium Replacement - Follow Nurse / BPA Driven Protocol    sodium chloride    sodium chloride    I have reviewed the patient's current medications.   Outpatient Medications     Current Outpatient Medications   Medication Instructions    bismuth subsalicylate (PEPTO-BISMOL) 524 mg, Oral, 4 Times Daily Before Meals & Nightly    carvedilol (COREG) 25 mg, Oral, 2 Times Daily    colestipol (COLESTID) 1 g, Daily    FLUoxetine (PROZAC) 40 mg, Daily    Inclisiran Sodium 284 mg, Every 6 Months    levothyroxine (SYNTHROID, LEVOTHROID) 200 mcg, Daily    lisinopril (PRINIVIL,ZESTRIL) 40 mg, Daily    loperamide (IMODIUM) 4 mg, Oral, 4 Times Daily PRN    nitroglycerin (NITROSTAT) 0.4 mg, Every 5 Minutes PRN    pantoprazole (PROTONIX) 40 mg, Oral, 2 Times Daily    potassium chloride (KLOR-CON) 20 MEQ packet 20 mEq, 2 Times Daily    promethazine (PHENERGAN) 25 mg, 3 Times Daily PRN    topiramate (TOPAMAX) 50 mg, Oral, 2 Times Daily       Current Antibiotics   This patient does not have an active medication from one of the medication groupers.    Exam   Vent settings for last 24 hours:       Vital signs for last 24 hours:  Temp:  [97.8 °F (36.6 °C)-98.1 °F (36.7 °C)] 97.8 °F (36.6 °C)  Heart Rate:  [] 97  Resp:  [16] 16  BP: (100-175)/() 163/130    Vitals: Her  height is 175.3 cm (69\") and weight is 73.2 kg (161 lb 6 oz). Her oral temperature is 97.8 °F (36.6 °C). Her blood pressure is 163/130 (abnormal) and her pulse is 97. Her respiration is 16 and oxygen saturation is 98%. "     PHYSICAL FINDINGS: SEE VITALS ABOVE  GENERAL APPEARANCE: ° Awake. ° Alert. ° Oriented to time, place, and person. ° Well developed. ° Well nourished. ° In no acute distress. ° Not ill-appearing. ° No jaundice.  HEAD: Injuries: No evidence of a head injury. ° Appearance: Head normocephalic.  NECK: No masses ° Thyroid: ° Not diffusely enlarged.  EYES: General/bilateral: Extraocular Movements: ° Normal. Pupils: ° PERRLA. Sclera: ° Showed no icterus.  THROAT: No stridor auscultated/heard.  EARS: Hearing: ° No hearing loss noted.  NOSE: General/bilateral: External Deformities: ° No external nose deformities.  LYMPH NODES: No cervical or generalized adenopathy.  CHEST: ° Visual inspection revealed no abnormalities.  LUNGS: ° Respiration rhythm and depth was normal. ° Normal breath sounds sounds. ° No wheezing was heard bilaterally. ° No rhonchi were heard. ° No rales/crackles were heard. ° No clubbing noted.  CARDIOVASCULAR: JVD not increased. Heart Rate And Rhythm: Normal. ° Heart Sounds: No S3/ S4 heard. ° Murmurs: No murmurs were heard. ° Edema: No edema noted.  BACK: No obvious deformity noted.  ABDOMEN: Visual Inspection: Abdomen was not distended. Auscultation: ° Abdominal auscultation revealed no abnormalities. ° Bowel sounds were normal. ° Palpation: ° Abdomen was soft. ° No abdominal tenderness. ° No mass was palpated in the abdomen. ° Soft. °Liver: Not visibly enlarged. Spleen: Not visibly enlarged.  MUSCULOSKELETAL SYSTEM : General/bilateral: ° Normal movement of all extremities.  FEET/EXTREMITIES: General/bilateral: ° No visible swelling of the feet.  NEUROLOGICAL: Patient a little confused but does know person place and why she is here.  Patient does have some abnormal movements more pronounced when she is concentrating on them.  Concern for possible Choreaform movements.  SKIN: ° General appearance was normal. ° No new rashes noted.    Intake/Output   Intake/Output this shift:  No intake/output data  recorded.    Intake/Output last 3 shifts:  I/O last 3 completed shifts:  In: 480 [P.O.:480]  Out: 1300 [Urine:1300]    Results and Cultures Review     Result Review:  I have personally reviewed the results from the time of this admission to 12/30/2024 12:33 CST and agree with these findings:  [x]  Laboratory list / accordion  [x]  Microbiology  [x]  Radiology  [x]  EKG/Telemetry   []  Cardiology/Vascular   []  Pathology  []  Old records  []  Other:  Most notable findings include: Mild anemia.  MRI spine severely limited by motion artifact.  Neuroforaminal narrowing high-grade noted C4 6, C5-C6 and C6-C7.    Radiology     Imaging Results (Last 72 Hours)       Procedure Component Value Units Date/Time    MRI Thoracic Spine With & Without Contrast [[id number]] Collected: 12/29/24 2014     Updated: 12/29/24 2021    Narrative:      MRI thoracic spine 12/29/2024 5:50 PM     HISTORY: Loss of sensation about T5 down into the bilateral legs.;  R20.0-Anesthesia of skin; R20.2-Paresthesia of skin; F10.929-Alcohol  use, unspecified with intoxication, unspecified; F15.10-Other stimulant  abuse, uncomplicated; E87.6-Hypokalemia; E83.42-Hypomagnesemia;  R26.9-Unspecified abnormalities of gait and mobility     COMPARISON : NONE      Technique: Multiplanar, multisequence MRI of the thoracic spine was  obtained with and without the use of contrast. 10 cc Vueway intravenous  contrast.     Findings:      Exam is severely limited by motion artifact. In particular, assessment  for cord signal abnormalities is severely limited on T2 and STIR. The  postcontrast imaging is essentially nondiagnostic.     The spine is imaged from C7-L1. Normal thoracic spinal alignment. There  is no spondylolisthesis. Vertebral body heights are well-maintained. No  evidence of acute fracture. No pathologic marrow infiltrate. Posterior  elements appear intact. There is loss of disc height with what appears  to be a mild diffuse disc bulging at T6-T7, T7-T8 and  T8-T9. No obvious  high-grade spinal stenosis. Again, motion artifact limits assessment for  cord signal abnormalities. No high-grade neuroforaminal narrowing.       Impression:      Impression:   1. Exam is severely limited by motion artifact. The postcontrast imaging  is near completely nondiagnostic. Assessment for cord signal abnormality  is extremely limited on T2 and STIR.  2. Alignment is normal.  3. No evidence of acute fracture. No pathologic marrow infiltrate/bone  lesions or obvious enhancing soft tissue masses.  4. Mild disc bulging at several midthoracic levels. No obvious  high-grade spinal stenosis. Again, there is very limited assessment for  any cord signal abnormalities.     This report was signed and finalized on 12/29/2024 8:18 PM by Dr Negrito Wu.       MRI Cervical Spine With & Without Contrast [[id number]] Collected: 12/29/24 2008     Updated: 12/29/24 2016    Narrative:      MRI CERVICAL SPINE W WO CONTRAST- 12/29/2024 5:21 PM     HISTORY: Change in sensation to bilateral hands; R20.0-Anesthesia of  skin; R20.2-Paresthesia of skin; F10.929-Alcohol use, unspecified with  intoxication, unspecified; F15.10-Other stimulant abuse, uncomplicated;  E87.6-Hypokalemia; E83.42-Hypomagnesemia; R26.9-Unspecified  abnormalities of gait and mobility     COMPARISON: None      TECHNIQUE: Multiplanar, multisequence MRI of the cervical spine was  obtained with and without contrast. 10 cc Vueway IV contrast.     FINDINGS:     Exam is severely limited by motion artifact. The postcontrast imaging is  completely nondiagnostic.     The spine is imaged from the skull base through T2. Normal alignment  across the craniocervical junction. Flattened cervical lordosis. There  is a slight degenerative anterolisthesis at C4-C5. Vertebral body  heights are well-maintained. No evidence of acute fracture. Modic type I  degenerative endplate signal changes at C5-C6 and C6-C7. The posterior  elements are intact. Synovitis  with facet joint capsular distention on  the right at C7-T1. Assessment of the thoracic cord is limited due to  motion artifact. There does not appear to be any high-grade spinal  stenosis or any obvious compressive myelopathy or myelomalacia. No cord  syrinx. Loss of disc height with mild endplate spurring and disc bulging  at C4-C5, C5-C6 and C6-C7. There appears to be a high-grade right-sided  neuroforaminal narrowing at C4-C5 and C5-C6 and perhaps on the left at  C6-C7 due to uncovertebral spurring and facet arthropathy. Again, the  assessment of the oblique imaging is quite limited by motion artifact.  Paraspinal soft tissues are unremarkable.       Impression:      1. Exam is severely limited by motion artifact. The postcontrast imaging  is completely nondiagnostic due to motion artifact.  2. Flattened cervical lordosis as well as a slight degenerative  anterolisthesis at C4-C5.  3. No obvious high-grade spinal stenosis or evidence of compressive  myelopathy or myelomalacia. No cord syrinx.  4. Uncovertebral spurring and facet arthropathy resulting in what  appears to be high-grade neuroforaminal narrowing at C4-C5, C5-C6 and  C6-C7.           This report was signed and finalized on 12/29/2024 8:13 PM by Dr Negrito Wu.       CT Thoracic Spine With Contrast [[id number]] Collected: 12/29/24 0716     Updated: 12/29/24 0722    Narrative:      Exam: CT THORACIC SPINE W CONTRAST- 12/29/2024 12:05 AM     HISTORY: numbness     COMPARISON: None      DOSE LENGTH PRODUCT: 1834.22 mGy.cm mGy cm. Automated exposure control  was also utilized to decrease patient radiation dose.     TECHNIQUE: Serial helical tomographic images of the thoracic spine were  obtained with the use of intravenous contrast. Additionally, sagittal  and coronal reformatted images were also provided for review.     FINDINGS:     Preserved alignment.     No acute fracture.     The vertebral body heights are preserved.     Mild multilevel spondylotic  changes.     No visible significant spinal canal or foraminal narrowing.     Soft tissues are grossly unremarkable.     Other: Mild atherosclerosis.       Impression:         No acute fracture or traumatic malalignment.     These findings are in agreement with the critical and emergent findings  from the [facility] preliminary report.           This report was signed and finalized on 12/29/2024 7:19 AM by Sriram Hilton.       CT Cervical Spine With Contrast [[id number]] Collected: 12/29/24 0711     Updated: 12/29/24 0720    Narrative:      Exam: CT CERVICAL SPINE W CONTRAST- 12/29/2024 12:05 AM     HISTORY: numbness     COMPARISON: 2/26/2021     DOSE LENGTH PRODUCT: 1834.22 mGy.cm mGy cm. Automated exposure control  was also utilized to decrease patient radiation dose.     TECHNIQUE: Serial helical tomographic images of the cervical spine were  obtained with the use of intravenous contrast. Additionally, sagittal  and coronal reformatted images were also provided for review.     FINDINGS:     Grade 1 degenerative anterolisthesis of C4-C5.     No acute fracture. The vertebral body heights are preserved.     Multilevel degenerative disc disease with posterior disc osteophyte  complexes with no significant disc height loss at C5-C6 and C6-C7.  Multilevel uncovertebral and facet hypertrophic changes. This is similar  to the prior study.     No visible high-grade bony spinal canal narrowing. Variable degrees of  mild to moderate foraminal narrowing, most notable at the left C5-C6  level.     Paraspinal soft tissues are grossly unremarkable.     Other: Mild carotid atherosclerosis. No abnormal enhancement.       Impression:         No acute fracture or traumatic malalignment.     No abnormal enhancement.     Multilevel spondylotic changes, similar to the prior study.     These findings are in agreement with the critical and emergent findings  from the [facility] preliminary report.           This report was signed and  finalized on 12/29/2024 7:17 AM by Sriram Hilton.       CT Head Without Contrast [[id number]] Collected: 12/29/24 0706     Updated: 12/29/24 0712    Narrative:      Exam: CT HEAD WO CONTRAST- 12/29/2024 12:05 AM     HISTORY: numbness       DOSE LENGTH PRODUCT: 1834.22 mGy.cm mGy cm. Automated exposure control  was also utilized to decrease patient radiation dose.     Technique:  Helically acquired CT of the brain without IV contrast was performed.  Sagittal and coronal reformations are also provided for review. Soft  tissue and bone kernels are available for interpretation.     Comparison: 2/26/2024.     Findings:     Ventricles and extra-axial CSF spaces are normal in size.     No intraparenchymal or extra-axial hemorrhage.     Gray-white matter differentiation is preserved.     Orbits are grossly unremarkable. Paranasal sinuses are grossly clear.  Mastoid air cells are grossly clear.     No suspicious calvarial or extracranial soft tissue abnormality.       Impression:      Impression:       No acute intracranial abnormality.     These findings are in agreement with the critical and emergent findings  from the [facility] preliminary report.     This report was signed and finalized on 12/29/2024 7:09 AM by Sriram Hilton.       XR Chest 1 View [[id number]] Collected: 12/28/24 2014     Updated: 12/28/24 2017    Narrative:      EXAM/TECHNIQUE: XR CHEST 1 VW-     INDICATION: Chest pain     COMPARISON: 7/29/2024     FINDINGS:     Cardiomediastinal silhouette is within normal limits.      No pleural effusion. No visible pneumothorax. No focal consolidation.      No acute osseous findings.       Impression:         No acute findings.     This report was signed and finalized on 12/28/2024 8:14 PM by Dr. Bello Champagne MD.               Assessment/Plan       Altered mental status with abnormal motion: Appreciate neurology input.  They want to get a lumbar puncture tomorrow.  Concerned that her motion artifact on MRI and  significant fidgeting might be difficult to safely performed a lumbar puncture.  They are requesting elective intubation.  Risks benefits alternatives including death disability damage to structures loss of enjoyment of life were discussed with the patient.  She would like to proceed tomorrow.  Plan for 11 AM.  Hypokalemia: Improved.  Methamphetamine abuse: Methamphetamine is a toxic medication with many multiple potential side effects.  Recommend patient discontinue methamphetamine abuse.  Elevated carboxyhemoglobin on patient's ABG: Likely due to tobacco smoke.  Elevated liver enzymes: Likely due to fatty liver.  Hypothyroidism: Continue home medications.  Hypertension: Continue home Coreg.  PPI: Continue home medication.  Takes pantoprazole at home.  Hyperlipidemia: Hold off on statin for today.  Reassess after lumbar puncture etc. tomorrow.  Severe protein calorie malnutrition: Encourage p.o. intake.  Has boost supplements available.  Unfortunately needs to be n.p.o. tonight for procedure tomorrow.   VTE Prophylaxis: Continue Lovenox 40 subcu daily.    CODE STATUS: Full code.  Code Status and Medical Interventions: CPR (Attempt to Resuscitate); Full Support   Ordered at: 12/29/24 0455     Code Status (Patient has no pulse and is not breathing):    CPR (Attempt to Resuscitate)     Medical Interventions (Patient has pulse or is breathing):    Full Support   Disposition: Pending further workup per neurology tomorrow.    Total critical care time: 30 minutes    Neurological impairment not otherwise specified.    Due to a high probability of clinically significant, life threatening deterioration, the patient required my highest level of preparedness to intervene emergently and I personally spent this critical care time directly and personally managing the patient.     This critical care time included obtaining a history, examining the patient, pulse oximetry and vital sign review, ordering and review of studies,  arranging urgent treatment with development of a management plan with the multidisciplinary team, evaluation of patient's response to treatment, frequent reassessment, and discussions with other providers.    This critical care time was performed to assess and manage the high probability of imminent, life-threatening deterioration that could result in multi-organ failure. It was exclusive of separately billable procedures and treating other patients and teaching time.    Please see the rest of the note for further information on patient assessment and treatment.    Part of this note may be an electronic transcription/translation of spoken language to printed text using the Dragon Dictation System    Chet Littlejohn MD  12/30/2024 at 12:33 CST  Pulmonary, Critical Care  [facility]  Please call with any questions  (cell [phone])

## 2024-12-31 ENCOUNTER — APPOINTMENT (OUTPATIENT)
Dept: GENERAL RADIOLOGY | Facility: HOSPITAL | Age: 49
DRG: 640 | End: 2024-12-31
Payer: COMMERCIAL

## 2024-12-31 ENCOUNTER — APPOINTMENT (OUTPATIENT)
Dept: MRI IMAGING | Facility: HOSPITAL | Age: 49
DRG: 640 | End: 2024-12-31
Payer: COMMERCIAL

## 2024-12-31 ENCOUNTER — ANESTHESIA EVENT (OUTPATIENT)
Dept: GASTROENTEROLOGY | Facility: HOSPITAL | Age: 49
DRG: 640 | End: 2024-12-31
Payer: COMMERCIAL

## 2024-12-31 ENCOUNTER — ANESTHESIA (OUTPATIENT)
Dept: GASTROENTEROLOGY | Facility: HOSPITAL | Age: 49
DRG: 640 | End: 2024-12-31
Payer: COMMERCIAL

## 2024-12-31 LAB
ABO GROUP BLD: NORMAL
ALBUMIN SERPL-MCNC: 3 G/DL (ref 3.5–5.2)
ALBUMIN/GLOB SERPL: 1.3 G/DL
ALP SERPL-CCNC: 586 U/L (ref 39–117)
ALT SERPL W P-5'-P-CCNC: 42 U/L (ref 1–33)
ANION GAP SERPL CALCULATED.3IONS-SCNC: 7 MMOL/L (ref 5–15)
ANISOCYTOSIS BLD QL: ABNORMAL
APPEARANCE CSF: CLEAR
APPEARANCE CSF: CLEAR
AST SERPL-CCNC: 141 U/L (ref 1–32)
BASOPHILS # BLD AUTO: 0.12 10*3/MM3 (ref 0–0.2)
BASOPHILS # BLD MANUAL: 0 10*3/MM3 (ref 0–0.2)
BASOPHILS NFR BLD AUTO: 0.8 % (ref 0–1.5)
BASOPHILS NFR BLD MANUAL: 0 % (ref 0–1.5)
BILIRUB SERPL-MCNC: 0.4 MG/DL (ref 0–1.2)
BLD GP AB SCN SERPL QL: NEGATIVE
BUN SERPL-MCNC: 7 MG/DL (ref 6–20)
BUN/CREAT SERPL: 15.9 (ref 7–25)
CALCIUM SPEC-SCNC: 8 MG/DL (ref 8.6–10.5)
CERULOPLASMIN SERPL-MCNC: 23 MG/DL (ref 19–39)
CHLORIDE SERPL-SCNC: 99 MMOL/L (ref 98–107)
CK SERPL-CCNC: 21 U/L (ref 20–180)
CLUMPED PLATELETS: PRESENT
CO2 SERPL-SCNC: 29 MMOL/L (ref 22–29)
COLOR CSF: COLORLESS
COLOR CSF: COLORLESS
COLOR SPUN CSF: COLORLESS
COLOR SPUN CSF: COLORLESS
CREAT SERPL-MCNC: 0.44 MG/DL (ref 0.57–1)
DEPRECATED RDW RBC AUTO: 57.9 FL (ref 37–54)
DEPRECATED RDW RBC AUTO: 67.9 FL (ref 37–54)
EGFRCR SERPLBLD CKD-EPI 2021: 117.3 ML/MIN/1.73
EOSINOPHIL # BLD AUTO: 0.39 10*3/MM3 (ref 0–0.4)
EOSINOPHIL # BLD MANUAL: 0.48 10*3/MM3 (ref 0–0.4)
EOSINOPHIL NFR BLD AUTO: 2.7 % (ref 0.3–6.2)
EOSINOPHIL NFR BLD MANUAL: 4.1 % (ref 0.3–6.2)
ERYTHROCYTE [DISTWIDTH] IN BLOOD BY AUTOMATED COUNT: 14.6 % (ref 12.3–15.4)
ERYTHROCYTE [DISTWIDTH] IN BLOOD BY AUTOMATED COUNT: 18.4 % (ref 12.3–15.4)
GLOBULIN UR ELPH-MCNC: 2.4 GM/DL
GLUCOSE CSF-MCNC: 64 MG/DL (ref 40–70)
GLUCOSE SERPL-MCNC: 94 MG/DL (ref 65–99)
HCT VFR BLD AUTO: 21.6 % (ref 34–46.6)
HCT VFR BLD AUTO: 22.2 % (ref 34–46.6)
HCT VFR BLD AUTO: 29.9 % (ref 34–46.6)
HGB BLD-MCNC: 6.8 G/DL (ref 12–15.9)
HGB BLD-MCNC: 6.9 G/DL (ref 12–15.9)
HGB BLD-MCNC: 9.4 G/DL (ref 12–15.9)
HOLD SPECIMEN: NORMAL
HOLD SPECIMEN: NORMAL
HYPOCHROMIA BLD QL: ABNORMAL
IMM GRANULOCYTES # BLD AUTO: 0.33 10*3/MM3 (ref 0–0.05)
IMM GRANULOCYTES NFR BLD AUTO: 2.3 % (ref 0–0.5)
INR PPP: 0.89 (ref 0.91–1.09)
LYMPHOCYTES # BLD AUTO: 3.59 10*3/MM3 (ref 0.7–3.1)
LYMPHOCYTES # BLD MANUAL: 3.36 10*3/MM3 (ref 0.7–3.1)
LYMPHOCYTES NFR BLD AUTO: 24.9 % (ref 19.6–45.3)
LYMPHOCYTES NFR BLD MANUAL: 9.2 % (ref 5–12)
MACROCYTES BLD QL SMEAR: ABNORMAL
MAGNESIUM SERPL-MCNC: 1.5 MG/DL (ref 1.6–2.6)
MAGNESIUM SERPL-MCNC: 1.6 MG/DL (ref 1.6–2.6)
MCH RBC QN AUTO: 32 PG (ref 26.6–33)
MCH RBC QN AUTO: 33.5 PG (ref 26.6–33)
MCHC RBC AUTO-ENTMCNC: 31.4 G/DL (ref 31.5–35.7)
MCHC RBC AUTO-ENTMCNC: 31.5 G/DL (ref 31.5–35.7)
MCV RBC AUTO: 101.7 FL (ref 79–97)
MCV RBC AUTO: 106.4 FL (ref 79–97)
METHOD: NORMAL
METHOD: NORMAL
MONOCYTES # BLD AUTO: 1.4 10*3/MM3 (ref 0.1–0.9)
MONOCYTES # BLD: 1.08 10*3/MM3 (ref 0.1–0.9)
MONOCYTES NFR BLD AUTO: 9.7 % (ref 5–12)
MYELOCYTES NFR BLD MANUAL: 3.1 % (ref 0–0)
NEUTROPHILS # BLD AUTO: 6.36 10*3/MM3 (ref 1.7–7)
NEUTROPHILS NFR BLD AUTO: 59.6 % (ref 42.7–76)
NEUTROPHILS NFR BLD AUTO: 8.56 10*3/MM3 (ref 1.7–7)
NEUTROPHILS NFR BLD MANUAL: 54.1 % (ref 42.7–76)
NRBC BLD AUTO-RTO: 0.6 /100 WBC (ref 0–0.2)
NUC CELL # CSF MANUAL: 1 /MM3 (ref 0–5)
NUC CELL # CSF MANUAL: 1 /MM3 (ref 0–5)
PHOSPHATE SERPL-MCNC: 2.5 MG/DL (ref 2.5–4.5)
PLATELET # BLD AUTO: 338 10*3/MM3 (ref 140–450)
PLATELET # BLD AUTO: 381 10*3/MM3 (ref 140–450)
PMV BLD AUTO: 10.8 FL (ref 6–12)
PMV BLD AUTO: 11 FL (ref 6–12)
POIKILOCYTOSIS BLD QL SMEAR: ABNORMAL
POLYCHROMASIA BLD QL SMEAR: ABNORMAL
POTASSIUM SERPL-SCNC: 4.6 MMOL/L (ref 3.5–5.2)
PROMYELOCYTES NFR BLD MANUAL: 1 % (ref 0–0)
PROT CSF-MCNC: 33 MG/DL (ref 15–45)
PROT SERPL-MCNC: 5.4 G/DL (ref 6–8.5)
PROTHROMBIN TIME: 12.4 SECONDS (ref 11.8–14.8)
RBC # BLD AUTO: 2.03 10*6/MM3 (ref 3.77–5.28)
RBC # BLD AUTO: 2.94 10*6/MM3 (ref 3.77–5.28)
RBC # CSF MANUAL: 0 /MM3 (ref 0–0)
RBC # CSF MANUAL: 0 /MM3 (ref 0–0)
RH BLD: POSITIVE
SODIUM SERPL-SCNC: 135 MMOL/L (ref 136–145)
SPECIMEN VOL CSF: 21 ML
SPECIMEN VOL CSF: 21 ML
STOMATOCYTES BLD QL SMEAR: ABNORMAL
T&S EXPIRATION DATE: NORMAL
TUBE # CSF: 1
TUBE # CSF: 3
VARIANT LYMPHS NFR BLD MANUAL: 1 % (ref 0–5)
VARIANT LYMPHS NFR BLD MANUAL: 27.6 % (ref 19.6–45.3)
WBC MORPH BLD: NORMAL
WBC NRBC COR # BLD AUTO: 11.76 10*3/MM3 (ref 3.4–10.8)
WBC NRBC COR # BLD AUTO: 14.39 10*3/MM3 (ref 3.4–10.8)

## 2024-12-31 PROCEDURE — 85018 HEMOGLOBIN: CPT

## 2024-12-31 PROCEDURE — 86255 FLUORESCENT ANTIBODY SCREEN: CPT | Performed by: PSYCHIATRY & NEUROLOGY

## 2024-12-31 PROCEDURE — 25010000002 PROPOFOL 1000 MG/100ML EMULSION: Performed by: INTERNAL MEDICINE

## 2024-12-31 PROCEDURE — P9016 RBC LEUKOCYTES REDUCED: HCPCS

## 2024-12-31 PROCEDURE — 25010000002 CALCIUM GLUCONATE PER 10 ML: Performed by: INTERNAL MEDICINE

## 2024-12-31 PROCEDURE — 25010000002 MAGNESIUM SULFATE 2 GM/50ML SOLUTION

## 2024-12-31 PROCEDURE — 0DJ08ZZ INSPECTION OF UPPER INTESTINAL TRACT, VIA NATURAL OR ARTIFICIAL OPENING ENDOSCOPIC: ICD-10-PCS | Performed by: INTERNAL MEDICINE

## 2024-12-31 PROCEDURE — 84630 ASSAY OF ZINC: CPT | Performed by: PSYCHIATRY & NEUROLOGY

## 2024-12-31 PROCEDURE — 83873 ASSAY OF CSF PROTEIN: CPT | Performed by: PSYCHIATRY & NEUROLOGY

## 2024-12-31 PROCEDURE — 85610 PROTHROMBIN TIME: CPT | Performed by: PSYCHIATRY & NEUROLOGY

## 2024-12-31 PROCEDURE — 86850 RBC ANTIBODY SCREEN: CPT

## 2024-12-31 PROCEDURE — 94002 VENT MGMT INPAT INIT DAY: CPT

## 2024-12-31 PROCEDURE — 83916 OLIGOCLONAL BANDS: CPT | Performed by: PSYCHIATRY & NEUROLOGY

## 2024-12-31 PROCEDURE — 84157 ASSAY OF PROTEIN OTHER: CPT | Performed by: PSYCHIATRY & NEUROLOGY

## 2024-12-31 PROCEDURE — 009U3ZX DRAINAGE OF SPINAL CANAL, PERCUTANEOUS APPROACH, DIAGNOSTIC: ICD-10-PCS | Performed by: PSYCHIATRY & NEUROLOGY

## 2024-12-31 PROCEDURE — 82040 ASSAY OF SERUM ALBUMIN: CPT | Performed by: PSYCHIATRY & NEUROLOGY

## 2024-12-31 PROCEDURE — B01B1ZZ FLUOROSCOPY OF SPINAL CORD USING LOW OSMOLAR CONTRAST: ICD-10-PCS | Performed by: PSYCHIATRY & NEUROLOGY

## 2024-12-31 PROCEDURE — 25810000003 SODIUM CHLORIDE 0.9 % SOLUTION: Performed by: INTERNAL MEDICINE

## 2024-12-31 PROCEDURE — 85025 COMPLETE CBC W/AUTO DIFF WBC: CPT | Performed by: INTERNAL MEDICINE

## 2024-12-31 PROCEDURE — 86901 BLOOD TYPING SEROLOGIC RH(D): CPT

## 2024-12-31 PROCEDURE — 25010000002 PROPOFOL 10 MG/ML EMULSION: Performed by: INTERNAL MEDICINE

## 2024-12-31 PROCEDURE — 99233 SBSQ HOSP IP/OBS HIGH 50: CPT | Performed by: PSYCHIATRY & NEUROLOGY

## 2024-12-31 PROCEDURE — 83735 ASSAY OF MAGNESIUM: CPT

## 2024-12-31 PROCEDURE — 0BH17EZ INSERTION OF ENDOTRACHEAL AIRWAY INTO TRACHEA, VIA NATURAL OR ARTIFICIAL OPENING: ICD-10-PCS | Performed by: INTERNAL MEDICINE

## 2024-12-31 PROCEDURE — 70553 MRI BRAIN STEM W/O & W/DYE: CPT

## 2024-12-31 PROCEDURE — 86900 BLOOD TYPING SEROLOGIC ABO: CPT

## 2024-12-31 PROCEDURE — 86051 AQUAPORIN-4 ANTB ELISA: CPT | Performed by: PSYCHIATRY & NEUROLOGY

## 2024-12-31 PROCEDURE — 82042 OTHER SOURCE ALBUMIN QUAN EA: CPT | Performed by: PSYCHIATRY & NEUROLOGY

## 2024-12-31 PROCEDURE — 25510000001 GADOPICLENOL 0.5 MMOL/ML SOLUTION: Performed by: INTERNAL MEDICINE

## 2024-12-31 PROCEDURE — 82525 ASSAY OF COPPER: CPT | Performed by: PSYCHIATRY & NEUROLOGY

## 2024-12-31 PROCEDURE — 80053 COMPREHEN METABOLIC PANEL: CPT | Performed by: INTERNAL MEDICINE

## 2024-12-31 PROCEDURE — 86920 COMPATIBILITY TEST SPIN: CPT

## 2024-12-31 PROCEDURE — 43235 EGD DIAGNOSTIC BRUSH WASH: CPT | Performed by: INTERNAL MEDICINE

## 2024-12-31 PROCEDURE — 25010000002 FENTANYL CITRATE (PF) 2500 MCG/50ML SOLUTION: Performed by: INTERNAL MEDICINE

## 2024-12-31 PROCEDURE — 82784 ASSAY IGA/IGD/IGG/IGM EACH: CPT | Performed by: PSYCHIATRY & NEUROLOGY

## 2024-12-31 PROCEDURE — A9579 GAD-BASE MR CONTRAST NOS,1ML: HCPCS | Performed by: INTERNAL MEDICINE

## 2024-12-31 PROCEDURE — 84100 ASSAY OF PHOSPHORUS: CPT | Performed by: INTERNAL MEDICINE

## 2024-12-31 PROCEDURE — 62270 DX LMBR SPI PNXR: CPT | Performed by: PSYCHIATRY & NEUROLOGY

## 2024-12-31 PROCEDURE — 82550 ASSAY OF CK (CPK): CPT | Performed by: INTERNAL MEDICINE

## 2024-12-31 PROCEDURE — 82945 GLUCOSE OTHER FLUID: CPT | Performed by: PSYCHIATRY & NEUROLOGY

## 2024-12-31 PROCEDURE — 5A1935Z RESPIRATORY VENTILATION, LESS THAN 24 CONSECUTIVE HOURS: ICD-10-PCS | Performed by: INTERNAL MEDICINE

## 2024-12-31 PROCEDURE — 36430 TRANSFUSION BLD/BLD COMPNT: CPT

## 2024-12-31 PROCEDURE — 82390 ASSAY OF CERULOPLASMIN: CPT | Performed by: PSYCHIATRY & NEUROLOGY

## 2024-12-31 PROCEDURE — 94799 UNLISTED PULMONARY SVC/PX: CPT

## 2024-12-31 PROCEDURE — 71045 X-RAY EXAM CHEST 1 VIEW: CPT

## 2024-12-31 PROCEDURE — 99222 1ST HOSP IP/OBS MODERATE 55: CPT | Performed by: INTERNAL MEDICINE

## 2024-12-31 PROCEDURE — 83735 ASSAY OF MAGNESIUM: CPT | Performed by: INTERNAL MEDICINE

## 2024-12-31 PROCEDURE — 85007 BL SMEAR W/DIFF WBC COUNT: CPT | Performed by: INTERNAL MEDICINE

## 2024-12-31 PROCEDURE — 25010000002 OCTREOTIDE PER 25 MCG: Performed by: INTERNAL MEDICINE

## 2024-12-31 PROCEDURE — 82164 ANGIOTENSIN I ENZYME TEST: CPT | Performed by: PSYCHIATRY & NEUROLOGY

## 2024-12-31 PROCEDURE — 89050 BODY FLUID CELL COUNT: CPT | Performed by: PSYCHIATRY & NEUROLOGY

## 2024-12-31 PROCEDURE — 85014 HEMATOCRIT: CPT

## 2024-12-31 RX ORDER — PANTOPRAZOLE SODIUM 40 MG/10ML
80 INJECTION, POWDER, LYOPHILIZED, FOR SOLUTION INTRAVENOUS ONCE
Status: COMPLETED | OUTPATIENT
Start: 2024-12-31 | End: 2024-12-31

## 2024-12-31 RX ORDER — PANTOPRAZOLE SODIUM 40 MG/10ML
40 INJECTION, POWDER, LYOPHILIZED, FOR SOLUTION INTRAVENOUS EVERY 12 HOURS SCHEDULED
Status: DISCONTINUED | OUTPATIENT
Start: 2024-12-31 | End: 2025-01-01

## 2024-12-31 RX ORDER — MAGNESIUM SULFATE HEPTAHYDRATE 40 MG/ML
2 INJECTION, SOLUTION INTRAVENOUS
Status: COMPLETED | OUTPATIENT
Start: 2024-12-31 | End: 2024-12-31

## 2024-12-31 RX ADMIN — FENTANYL CITRATE 50 MCG/HR: 50 INJECTION, SOLUTION INTRAMUSCULAR; INTRAVENOUS at 11:24

## 2024-12-31 RX ADMIN — MAGNESIUM SULFATE HEPTAHYDRATE 2 G: 2 INJECTION, SOLUTION INTRAVENOUS at 14:04

## 2024-12-31 RX ADMIN — PANTOPRAZOLE SODIUM 80 MG: 40 INJECTION, POWDER, FOR SOLUTION INTRAVENOUS at 08:00

## 2024-12-31 RX ADMIN — Medication 10 ML: at 20:07

## 2024-12-31 RX ADMIN — MAGNESIUM SULFATE HEPTAHYDRATE 2 G: 2 INJECTION, SOLUTION INTRAVENOUS at 07:57

## 2024-12-31 RX ADMIN — GADOPICLENOL 7.5 ML: 485.1 INJECTION INTRAVENOUS at 12:56

## 2024-12-31 RX ADMIN — CHLORHEXIDINE GLUCONATE 1 APPLICATION: 500 CLOTH TOPICAL at 05:26

## 2024-12-31 RX ADMIN — OCTREOTIDE ACETATE 50 MCG/HR: 500 INJECTION, SOLUTION INTRAVENOUS; SUBCUTANEOUS at 20:06

## 2024-12-31 RX ADMIN — PROPOFOL INJECTABLE EMULSION 30 MCG/KG/MIN: 10 INJECTION, EMULSION INTRAVENOUS at 11:24

## 2024-12-31 RX ADMIN — TOPIRAMATE 50 MG: 25 TABLET, FILM COATED ORAL at 20:06

## 2024-12-31 RX ADMIN — OCTREOTIDE ACETATE 50 MCG/HR: 500 INJECTION, SOLUTION INTRAVENOUS; SUBCUTANEOUS at 06:54

## 2024-12-31 RX ADMIN — PANTOPRAZOLE SODIUM 40 MG: 40 INJECTION, POWDER, FOR SOLUTION INTRAVENOUS at 17:47

## 2024-12-31 RX ADMIN — Medication 1 APPLICATION: at 20:06

## 2024-12-31 RX ADMIN — Medication 1 APPLICATION: at 09:45

## 2024-12-31 RX ADMIN — CARVEDILOL 25 MG: 25 TABLET, FILM COATED ORAL at 17:47

## 2024-12-31 RX ADMIN — Medication 10 ML: at 09:44

## 2024-12-31 RX ADMIN — CALCIUM GLUCONATE 3000 MG: 98 INJECTION, SOLUTION INTRAVENOUS at 07:56

## 2024-12-31 RX ADMIN — MAGNESIUM SULFATE HEPTAHYDRATE 2 G: 2 INJECTION, SOLUTION INTRAVENOUS at 05:57

## 2024-12-31 RX ADMIN — PROPOFOL INJECTABLE EMULSION 45 MCG/KG/MIN: 10 INJECTION, EMULSION INTRAVENOUS at 13:13

## 2024-12-31 RX ADMIN — ACETAMINOPHEN 650 MG: 325 TABLET ORAL at 21:28

## 2024-12-31 NOTE — PROGRESS NOTES
Full note to follow. I've been asked to perform EGD because of patient's falling Hb. She will be intubated in  order to obtain neuro studies. We can perform the EGD at the bedside.     Marty Browning MD

## 2024-12-31 NOTE — PLAN OF CARE
Goal Outcome Evaluation:   Pt A&Ox4; rested well throughout the night. NSR 90s. BP stable. Tolerates RA when awake; requires 2L n/c while sleeping, tolerates well. Multiple small Bms over the shift; UOP adequate. One time dose of Zanaflex given for leg cramping per MAR. First Hgb check low this morning at 6.8, APRN notified and ordered recheck d/t extreme decrease in one day. Safety maintained.

## 2024-12-31 NOTE — PROGRESS NOTES
[facility] Intensivist Services    Date of Admission: 12/28/2024  Date of Note: 12/31/24  Primary Care Physician: Margi Lemus APRN   LOS: 2 days     History   Next of Kin:  Primary Emergency Contact: Kirill Montgomery, [place] Phone: [phone]   Code Status: Code Status and Medical Interventions: CPR (Attempt to Resuscitate); Full Support    She is a 49 y.o. female who denies drug use but tested positive for methamphetamine, also a reported former smoker who reportedly stopped 10 months ago with a 30-pack-year history who was admitted 12/28/2024 after presenting to the emergency department with numbness in her fingers and abdomen.  Patient lives in an , she has been falling down multiple times per day.  She also began having some chest discomfort and presented to the emergency department with diarrhea and vomiting.  Neurology saw the patient is concerned she may have Tattnall's disease.  Patient also complains of restless leg and restless arm type symptoms worse at night.  Neurology request intubation for MRI and lumbar puncture to rule out Tattnall's disease as patient has significant areflexia.       Patient was just admitted 10/4/2024 with chronic GI symptoms and struggling to keep her Synthroid down.  Imaging at that admission showed pretty significant bilateral femoral head osteonecrosis, significant hepatic steatosis and possible chronic inflammatory disease with fatty infiltration of the entire colon.     EF 61 to 65% on 2023 echocardiogram.  Bicuspid aortic valve noted.     12/29/24: Patient was confused.  Concern for alcohol abuse, substance abuse noted.  Drug screen showed amphetamine/methamphetamine positive.  Plan for MRI then transfer to floor.     12/30/24: I took over care.  Discussed with neurology.  Patient was not transferred to the floor due to concerns about possible Tattnall's disease.  They request elective intubation tomorrow.  We will make that  happen tomorrow.    12/31: Hemoglobin declined slightly overnight.  As noted above:  possible chronic inflammatory disease with fatty infiltration of the entire colon.  Had some diarrhea but no obvious blood overnight.  Consulted GI, PPI and octreotide started.  Plan for intubation today.  Will try to coordinate with GI whether she can get a scope at the same time.      Allergies     Allergies:   She has No Known Allergies.    Labs   Basic Labs:  CBC:      Lab 12/31/24  0552 12/31/24  0351 12/30/24  0655 12/28/24 2028   WBC  --  11.76* 8.05 9.85   HEMOGLOBIN 6.9* 6.8* 8.0* 8.7*   HEMATOCRIT 22.2* 21.6* 24.6* 26.5*   PLATELETS  --  338 374 422   NEUTROS ABS  --  6.36 5.15 4.46   IMMATURE GRANS (ABS)  --   --   --  0.08*   LYMPHS ABS  --   --   --  3.92*   MONOS ABS  --   --   --  1.26*   EOS ABS  --  0.48* 0.24 0.08   MCV  --  106.4* 104.7* 101.1*       LIVER FUNCTION AND COAG TESTS:      Lab 12/31/24  0351 12/30/24  1227 12/30/24  0919 12/28/24 2028   TOTAL PROTEIN 5.4*  --  5.7* 6.7   ALBUMIN 3.0*  --  3.2* 3.6   GLOBULIN 2.4  --  2.5 3.1   ALT (SGPT) 42*  --  51* 35*   AST (SGOT) 141*  --  214* 120*   BILIRUBIN 0.4  --  0.5 0.3   ALK PHOS 586*  --  634* 682*   PROTIME  --  13.3  --   --    INR  --  0.97  --   --        ABG:      Lab 12/31/24  0351 12/30/24  0919 12/29/24  0902 12/28/24 2047 12/28/24 2028   PH, ARTERIAL  --   --   --  7.554*  --    PO2 ART  --   --   --  83.1  --    PCO2, ARTERIAL  --   --   --  36.8  --    HCO3 ART  --   --   --  32.4*  --    ANION GAP 7.0 12.0 14.0  --  18.0*       LACTATE:      Lab 12/29/24  2111 12/29/24  1523 12/29/24  0902 12/29/24  0401 12/29/24  0038   LACTATE 2.1* 3.1* 3.4* 3.6* 3.7*       CMP:      Lab 12/31/24  0351 12/30/24  0919 12/30/24  0655 12/29/24  2110 12/29/24  0902 12/29/24  0431 12/28/24 2047 12/28/24 2028   SODIUM 135* 143  --   --  141  --   --  139   SODIUM, ARTERIAL  --   --   --   --   --   --  140  --    POTASSIUM 4.6 4.4  --  3.5 2.8* 2.4*  --   2.3*   CHLORIDE 99 100  --   --  93*  --   --  89*   BUN 7 6  --   --  6  --   --  7   CREATININE 0.44* 0.38*  --   --  0.42*  --   --  0.55*   CALCIUM 8.0* 8.2*  --   --  8.8  --   --  9.5   MAGNESIUM 1.5* 1.6  --   --   --   --   --  1.4*   PHOSPHORUS 2.5  --  2.6  --   --   --   --   --    GLUCOSE 94 135*  --   --  107*  --   --  105*   EGFR 117.3 121.5  --   --  118.6  --   --  111.1       Inpatient Medications   Scheduled Meds:calcium carbonate, 2 tablet, Oral, TID  calcium gluconate, 3,000 mg, Intravenous, Once  carvedilol, 25 mg, Oral, BID With Meals  Chlorhexidine Gluconate Cloth, 1 Application, Topical, Q24H  cholestyramine light, 1 packet, Oral, Daily  enoxaparin, 40 mg, Subcutaneous, Q24H  FLUoxetine, 40 mg, Oral, Daily  levothyroxine, 175 mcg, Oral, Q AM  lisinopril, 40 mg, Oral, Daily  magnesium sulfate, 2 g, Intravenous, Q2H  mupirocin, 1 Application, Each Nare, BID  pantoprazole, 40 mg, Intravenous, Q12H  pantoprazole, 80 mg, Intravenous, Once  senna-docusate sodium, 2 tablet, Oral, BID  sodium chloride, 10 mL, Intravenous, Q12H  topiramate, 50 mg, Oral, BID      Continuous Infusions:octreotide (sandoSTATIN) 500 mcg in sodium chloride 0.9 % 100 mL (5 mcg/mL) infusion, 50 mcg/hr  Pharmacy to Dose enoxaparin (LOVENOX),       PRN Meds:.  acetaminophen **OR** acetaminophen    senna-docusate sodium **AND** polyethylene glycol **AND** bisacodyl **AND** bisacodyl    Calcium Replacement - Follow Nurse / BPA Driven Protocol    ipratropium-albuterol    Magnesium Standard Dose Replacement - Follow Nurse / BPA Driven Protocol    nitroglycerin    ondansetron    Pharmacy to Dose enoxaparin (LOVENOX)    Phosphorus Replacement - Follow Nurse / BPA Driven Protocol    Potassium Replacement - Follow Nurse / BPA Driven Protocol    sodium chloride    sodium chloride    I have reviewed the patient's current medications.   Outpatient Medications     Current Outpatient Medications   Medication Instructions    bismuth  "subsalicylate (PEPTO-BISMOL) 524 mg, Oral, 4 Times Daily Before Meals & Nightly    carvedilol (COREG) 25 mg, Oral, 2 Times Daily    colestipol (COLESTID) 1 g, Daily    FLUoxetine (PROZAC) 40 mg, Daily    Inclisiran Sodium 284 mg, Every 6 Months    levothyroxine (SYNTHROID, LEVOTHROID) 200 mcg, Daily    loperamide (IMODIUM) 4 mg, Oral, 4 Times Daily PRN    nitroglycerin (NITROSTAT) 0.4 mg, Every 5 Minutes PRN    pantoprazole (PROTONIX) 40 mg, Oral, 2 Times Daily    potassium chloride (KLOR-CON) 20 MEQ packet 20 mEq, 2 Times Daily    promethazine (PHENERGAN) 25 mg, 3 Times Daily PRN    topiramate (TOPAMAX) 50 mg, Oral, 2 Times Daily       Current Antibiotics   This patient does not have an active medication from one of the medication groupers.    Exam   Vent settings for last 24 hours:       Vital signs for last 24 hours:  Temp:  [97.8 °F (36.6 °C)-98.4 °F (36.9 °C)] 97.9 °F (36.6 °C)  Heart Rate:  [] 81  Resp:  [17] 17  BP: ()/() 94/65    Vitals: Her  height is 175.3 cm (69\") and weight is 74.2 kg (163 lb 9.3 oz). Her oral temperature is 97.9 °F (36.6 °C). Her blood pressure is 94/65 and her pulse is 81. Her respiration is 17 and oxygen saturation is 94%.     PHYSICAL FINDINGS: SEE VITALS ABOVE  GENERAL APPEARANCE: ° Awake. ° Alert. ° Oriented to time, place, and person. ° Well developed. ° Well nourished. ° In no acute distress. ° Not ill-appearing. ° No jaundice.   HEAD: Injuries: No evidence of a head injury. ° Appearance: Head normocephalic.  NECK: No masses ° Thyroid: ° Not diffusely enlarged.  EYES: General/bilateral: Extraocular Movements: ° Normal. Pupils: ° PERRLA. Sclera: ° Showed no icterus.  THROAT: No stridor auscultated/heard.  EARS: Hearing: ° No hearing loss noted.  NOSE: General/bilateral: External Deformities: ° No external nose deformities.  LYMPH NODES: No cervical or generalized adenopathy.  CHEST: ° Visual inspection revealed no abnormalities.  LUNGS: ° Respiration rhythm and " depth was normal. ° Normal breath sounds sounds. ° No wheezing was heard bilaterally. ° No rhonchi were heard. ° No rales/crackles were heard. ° No clubbing noted.  CARDIOVASCULAR: JVD not increased. Heart Rate And Rhythm: Normal. ° Heart Sounds: No S3/ S4 heard. ° Murmurs: No murmurs were heard. ° Edema: No edema noted.  BACK: No obvious deformity noted.  ABDOMEN: Visual Inspection: Abdomen was not distended. Auscultation: ° Abdominal auscultation revealed no abnormalities. ° Bowel sounds were normal. ° Palpation: ° Abdomen was soft. ° No abdominal tenderness. ° No mass was palpated in the abdomen. ° Soft. °Liver: Not visibly enlarged. Spleen: Not visibly enlarged.  MUSCULOSKELETAL SYSTEM : General/bilateral: ° Normal movement of all extremities.  FEET/EXTREMITIES: General/bilateral: ° No visible swelling of the feet.  NEUROLOGICAL:  Patient does have some abnormal movements more pronounced when she is concentrating on them.  Concern for possible Choreaform movements.  SKIN: ° General appearance was normal. ° No new rashes noted.    Intake/Output   Intake/Output this shift:  I/O this shift:  In: 100 [P.O.:100]  Out: 100 [Urine:100]    Intake/Output last 3 shifts:  I/O last 3 completed shifts:  In: 480 [P.O.:480]  Out: 2050 [Urine:2050]    Results and Cultures Review     Result Review:  I have personally reviewed the results from the time of this admission to 12/31/2024 06:29 CST and agree with these findings:  [x]  Laboratory list / accordion  [x]  Microbiology  [x]  Radiology  []  EKG/Telemetry   [x]  Cardiology/Vascular   []  Pathology  []  Old records  []  Other:  Most notable findings include: Hemoglobin 6.9.  20 mL of clear fluid from lumbar puncture.  MRI brain pending.    Assessment/Plan       Altered mental status with abnormal motion: Appreciate neurology input.  They want to get a lumbar puncture today. Concerned given that she had significant motion artifact on MRI and has baseline significant fidgeting  it might be difficult to safely performed a lumbar puncture.  They are requesting elective intubation.  Risks benefits alternatives including death disability damage to structures loss of enjoyment of life were discussed with the patient yesterday.  She would like to proceed: Plan for 11 AM.  Fatty infiltration noted on imaging along with new hemoglobin drop without clear source of bleeding.  Will start on PPI, octreotide due to elevated liver enzymes in a patient with suspected drug abuse and unclear alcohol use--patient does not give a clear or on his history about her activities.  Will consult Dr. Browning from GI.  Possible scope today once intubated versus watchful waiting: Defer to Dr. Browning.  Hypocalcemia: Replaced.  Patient requested Tums yesterday.  Did not augment calcium so we will give IV today.  Hypokalemia: Replace as needed.  Methamphetamine abuse: Methamphetamine is a toxic medication with many multiple potential side effects.  Recommend patient discontinue methamphetamine abuse in the strongest terms possible.  Elevated carboxyhemoglobin on patient's ABG: Likely due to tobacco smoke.  Unclear etiology.  Patient does not appear to be toxic.  Elevated liver enzymes: Likely due to fatty liver along with methamphetamine abuse.  GI consultation today.  Patient denies alcohol use but she may not be honest with us.  Hypothyroidism: Continue home medications.  Hypertension: Continue home Coreg.  PPI: Takes pantoprazole at home.  Increase dose to 40 twice daily for suspected GI bleed.  Reassess daily.  Hyperlipidemia: Hold off on statin for today.  Reassess after lumbar puncture etc. tomorrow.  Severe protein calorie malnutrition: Encourage p.o. intake.  Has boost supplements available.  Unfortunately needs to be n.p.o. tonight for procedure tomorrow.   VTE Prophylaxis: Held Lovenox today.  Start SCDs.  CODE STATUS: Full code.    Total critical care time: 30 minutes    Due to a high probability of clinically  significant, life threatening deterioration, the patient required my highest level of preparedness to intervene emergently and I personally spent this critical care time directly and personally managing the patient.     This critical care time included obtaining a history, examining the patient, pulse oximetry and vital sign review, ordering and review of studies, arranging urgent treatment with development of a management plan with the multidisciplinary team, evaluation of patient's response to treatment, frequent reassessment, and discussions with other providers.    This critical care time was performed to assess and manage the high probability of imminent, life-threatening deterioration that could result in multi-organ failure. It was exclusive of separately billable procedures and treating other patients and teaching time.    Please see the rest of the note for further information on patient assessment and treatment.    Part of this note may be an electronic transcription/translation of spoken language to printed text using the Dragon Dictation System    Chet Littlejohn MD  12/31/2024 at 06:29 CST  Pulmonary, Critical Care  [facility]  Please call with any questions  (cell [phone])

## 2024-12-31 NOTE — PROGRESS NOTES
Neurology Progress Note      Chief Complaint:  full body numbness  Length of Stay:  2   Subjective     Subjective:    No significant changes overnight continues to have paresthesias and numbness all over her body.  She continues to have movements although when I speak with her nurse this morning she tells me that when she is laying on her side resting she does not notice any movements.  Repeat labs this morning concerning for an acute blood loss anemia although she has no dark and tarry stools.  Medications:  Current Facility-Administered Medications   Medication Dose Route Frequency Provider Last Rate Last Admin    acetaminophen (TYLENOL) tablet 650 mg  650 mg Oral Q4H PRN Natalie Woods APRN   650 mg at 12/30/24 1225    Or    acetaminophen (TYLENOL) suppository 650 mg  650 mg Rectal Q4H PRN Natalie Woods APRN        sennosides-docusate (PERICOLACE) 8.6-50 MG per tablet 2 tablet  2 tablet Oral BID Natalie Woods APRN        And    polyethylene glycol (MIRALAX) packet 17 g  17 g Oral Daily PRN Natalie Woods APRN        And    bisacodyl (DULCOLAX) EC tablet 5 mg  5 mg Oral Daily PRN Natalie Woods APRN        And    bisacodyl (DULCOLAX) suppository 10 mg  10 mg Rectal Daily PRN Natalie Woods APRN        calcium carbonate (TUMS) chewable tablet 500 mg (200 mg elemental)  2 tablet Oral TID Chet Littlejohn MD   2 tablet at 12/30/24 2030    Calcium Replacement - Follow Nurse / BPA Driven Protocol   Not Applicable PRN Natalie Woods APRN        carvedilol (COREG) tablet 25 mg  25 mg Oral BID With Meals Tej Parada APRN   25 mg at 12/30/24 1713    Chlorhexidine Gluconate Cloth 2 % pads 1 Application  1 Application Topical Q24H Natalie Woods APRN   1 Application at 12/31/24 0526    cholestyramine light packet 4 g  1 packet Oral Daily Jovany Solis PA-C        Enoxaparin Sodium (LOVENOX) syringe 40 mg  40 mg Subcutaneous Q24H Natalie Woods APRN   40 mg at 12/30/24 0800    fentaNYL  2500 mcg/250 mL NS infusion  50 mcg/hr Intravenous Titrated Chet Littlejohn MD 5 mL/hr at 12/31/24 1124 50 mcg/hr at 12/31/24 1124    FLUoxetine (PROzac) capsule 40 mg  40 mg Oral Daily Jovany Solis PA-C   40 mg at 12/30/24 1431    ipratropium-albuterol (DUO-NEB) nebulizer solution 3 mL  3 mL Nebulization Q6H PRN Natalie Woods APRN        levothyroxine (SYNTHROID, LEVOTHROID) tablet 175 mcg  175 mcg Oral Q AM Cuate [place] APRN   175 mcg at 12/30/24 0627    lisinopril (PRINIVIL,ZESTRIL) tablet 40 mg  40 mg Oral Daily Tej Parada APRN   40 mg at 12/30/24 0801    Magnesium Standard Dose Replacement - Follow Nurse / BPA Driven Protocol   Not Applicable PRN Natalie Woods APRN        mupirocin (BACTROBAN) 2 % nasal ointment 1 Application  1 Application Each Nare BID Natalie Woods APRN   1 Application at 12/31/24 0945    nitroglycerin (NITROSTAT) SL tablet 0.4 mg  0.4 mg Sublingual Q5 Min PRN Natalie Woods APRN        octreotide (sandoSTATIN) 500 mcg in sodium chloride 0.9 % 100 mL (5 mcg/mL) infusion  50 mcg/hr Intravenous Continuous Chet Littlejohn MD 10 mL/hr at 12/31/24 0654 50 mcg/hr at 12/31/24 0654    ondansetron (ZOFRAN) injection 4 mg  4 mg Intravenous Q6H PRN Natalie Woods APRN        pantoprazole (PROTONIX) injection 40 mg  40 mg Intravenous Q12H Chet Littlejohn MD        [Held by provider] Pharmacy to Dose enoxaparin (LOVENOX)   Not Applicable Continuous PRN Natalie Woods APRN        Phosphorus Replacement - Follow Nurse / BPA Driven Protocol   Not Applicable PRN Natalie Woods APRN        Potassium Replacement - Follow Nurse / BPA Driven Protocol   Not Applicable PRN Natalie Woods APRN        propofol (DIPRIVAN) infusion 10 mg/mL 100 mL  5-50 mcg/kg/min Intravenous Titrated Chet Littlejohn MD 13.36 mL/hr at 12/31/24 1124 30 mcg/kg/min at 12/31/24 1124    sodium chloride 0.9 % flush 10 mL  10 mL Intravenous Q12H Natalie Woods APRN   10 mL at 12/31/24 0973     sodium chloride 0.9 % flush 10 mL  10 mL Intravenous PRN Natalie Woods APRN        sodium chloride 0.9 % infusion 40 mL  40 mL Intravenous PRN Natalie Woods APRN        topiramate (TOPAMAX) tablet 50 mg  50 mg Oral BID Jovany Solis PA-C   50 mg at 12/30/24 2030             Objective      Vital Signs  Temp:  [97.9 °F (36.6 °C)-98.4 °F (36.9 °C)] 98 °F (36.7 °C)  Heart Rate:  [] 87  Resp:  [17] 17  BP: ()/() 131/88  FiO2 (%):  [100 %] 100 %    Physical Exam:    HEENT:  Neck is supple  CVS:  RRR  Lungs:  CTA - B/L  Abd:  NT/ND  Ext:  No edema  Skin:  No rashes    Pertinent Neuro Exam:  Mental Status:    -Awake, Alert, Oriented X 3  -No word finding difficulties  -No aphasia  -No dysarthria  -Follows simple and complex commands     CN II:  Visual fields full.  Pupils equally reactive to light  CN III, IV, VI:  Extraocular Muscles full with no signs of nystagmus  CN V:  Facial sensory is symmetric with no asymmetries.  CN VII:  Facial motor symmetric  CN VIII:  Gross hearing intact bilaterally  CN IX:  Palate elevates symmetrically  CN X:  Palate elevates symmetrically  CN XI:  Shoulder shrug symmetric  CN XII:  Tongue is midline on protrusion     Motor: (strength out of 5:  1= minimal movement, 2 = movement in plane of gravity, 3 = movement against gravity, 4 = movement against some resistance, 5 = full strength)        -5- out of 5 strength throughout in all 4 extremities with no appreciation for distal weakness.  No appreciation for fatigability.     DTR:  -Areflexic throughout in all 4 extremities.  Downgoing toes bilaterally.     Sensory:  -Endorses numbness to light touch and pinprick over entire body.  There is no proximal to distal changes.  This occurs all the way through her chin and face.     Coordination:  -She seems to have choreiform type movements that are seen throughout my examination.  Also, when the MRI was attempted the patient had multiple rounds of Ativan and still had  continuous movement.  She has some past-pointing on finger-nose     Gait  -Not tested  Last nurse assessment:  Interval: baseline  1a. Level of Consciousness: 0-->Alert, keenly responsive  1b. LOC Questions: 0-->Answers both questions correctly  1c. LOC Commands: 0-->Performs both tasks correctly  2. Best Gaze: 0-->Normal  3. Visual: 0-->No visual loss  4. Facial Palsy: 0-->Normal symmetrical movements  5a. Motor Arm, Left: 0-->No drift, limb holds 90 (or 45) degrees for full 10 secs  5b. Motor Arm, Right: 0-->No drift, limb holds 90 (or 45) degrees for full 10 secs  6a. Motor Leg, Left: 0-->No drift, leg holds 30 degree position for full 5 secs  6b. Motor Leg, Right: 0-->No drift, leg holds 30 degree position for full 5 secs  7. Limb Ataxia: 0-->Absent  8. Sensory: 1-->Mild-to-moderate sensory loss, patient feels pinprick is less sharp or is dull on the affected side, or there is a loss of superficial pain with pinprick, but patient is aware of being touched  9. Best Language: 0-->No aphasia, normal  10. Dysarthria: 0-->Normal  11. Extinction and Inattention (formerly Neglect): 0-->No abnormality    Total (NIH Stroke Scale): 1       Results Review:      Labs:        Potassium on admission was 2.3  Sodium on admission was 141  Calcium normal at 8.8  TSH decreased at 0.038  T4 elevated at 1.88  CK level normal at 22  Phosphorus level currently normal at 2.6  Magnesium level low at 1.6  Urine drug screen positive for methamphetamines  Alcohol level 2 days ago was 0.065.  7 months ago was 0.154 and 10 months ago was 0.149  Other RAD:  MRI of cervical and thoracic spine reviewed by me.  Significant motion artifact noted.  There is multiple levels of disc disease but I see no evidence of impingement on the cord.  There is no flair signal abnormalities as well.      Assessment/Plan     Hospital Problem List      Hypokalemia    Hypercholesterolemia with hypertriglyceridemia    Raynaud's disease without gangrene     Ex-smoker    Non-occlusive coronary artery disease    Primary hypertension    ETOH abuse    Hypothyroidism    Hypothyroidism    Gait disturbance    Substance abuse    Severe protein-calorie malnutrition    Impression:  Full body numbness and paresthesias of unclear etiology.  I do want to rule out a demyelinating neuropathy.  She also has multiple electrolyte abnormalities including low magnesium and low potassium.  These could easily cause weakness and may cause some neuropathic features as well.  I believe would be reasonable to do a lumbar puncture as well looking for evidence of elevated protein in her CSF.  I also like to perform an MRI of the brain  Choreiform like movements: These could represent a withdrawal syndrome although I need to include [place]'s in my differential diagnosis.  Concern for acute GI bleed  History of GERD, dysphagia, and elevated LFTs being seen by [facility].  They had worked her up and found no etiology behind this.  They were arranging a referral to [facility]  Hypokalemia  Low magnesium  Altered thyroid panel  Significant alcohol usage  Methamphetamine usage    Plan:  Intubated this morning.  This was done electively to facilitate further imaging and procedures as patient's movement did not allow for these to be done safely  MRI of the brain with without contrast  Lumbar puncture attempted at bedside.  This was attempted at 2 different spinal levels and continued to hit bone.  Patient has known lumbar disc disease and is status post fusion and therefore we will transition to a fluoroscopy guided lumbar puncture  GI plans to scope patient looking for the etiology behind the GI bleed.  I may add copper and zinc levels given the patient has components of a movement disorder combined with LFT elevation.  A serial plasma level may be reasonable as well.      Medical Decision Making    Number/Complexity of Problems  Moderate  1 undiagnosed new problem with uncertain prognosis -   1 acute  illness with systemic symptoms -   High  1 acute or chronic illness that poses a threat to life/body function -   High    MDM Data  Moderate - 1/3 categories  Extensive - 2/3 categories    Category 1: 3 of the following  Review of external notes  Review of results  Ordering of each unique test  Independent historian  Category 2:  Independent interpretation of test (ex: imaging)  Category 3:  Discussion of management with another provider    Next of     Treatment Plan  Moderate - Prescription Drug management  High  Drug therapy requiring intensive monitoring for toxicity  Decision regarding hospitalization or escalation of care  De-escalate care/DNR decisions         Luis Gifford MD  12/31/24  12:25 CST

## 2024-12-31 NOTE — PROCEDURES
Procedure: Lumbar puncture  Indication for procedure: Rule out elevated protein suggestive of an autoimmune process  Procedure description: Informed consent was obtained.  A timeout was performed.  The patient was laid on her left lateral side.  She was already intubated.  The L3-L4 intervertebral disc base was identified using palpating techniques.  ChloraPrep was used as a sterilizing agent and she was draped in sterile fashion.  1.5 cc of 1% lidocaine was used.  A standard lumbar puncture needle was inserted into the L3-L4 intervertebral disc base.  Multiple attempts were made to retrieve CSF.  Each time I would hit bone with a needle on top to back out.  Eventually I decided to go up to the L2-L3 intervertebral to space.  I put an additional 1.5 cc of 1% lidocaine in that area.  I then attempted multiple times with a standard lumbar puncture needle to retrieve CSF was unable to do so as each time I hit a hard object which I assume was bone although could be hardware as well as she is status post a lumbar fusion.  I removed the lumbar puncture needle.  No blood was seen.  No CSF was seen.  I terminated the procedure with plans to transition to a fluoroscopy base lumbar puncture.  There was no blood loss no complications.    Electronically signed by Luis Gifford MD, 12/31/24, 11:28 AM CST.

## 2024-12-31 NOTE — CONSULTS
Inpatient Gastroenterology Consult  Referring Provider: Chet Littlejohn MD  Gastroenterologist of Record: Kathleen  Reason for Consultation: Unexplained fall in hemoglobin    Subjective     History of present illness: Complex patient, will limit my comments to that of GI relevance.  We have seen her in the past for GI bleeding and upper tract symptoms.  She underwent endoscopy by Dr. Wang in July of this year, normal examination.  She underwent a colonoscopy at my hands in August, examination showed moderate size hemorrhoids but nothing else.  She is now in with neurologic issues which are being carefully evaluated.  She had an unexplained hemoglobin drop of about 2 g in the last 24 hours.  It does not appear to be delusional as she has not received any significant amounts of fluid over the last 24 hours.  She does have upper tract symptoms as manifested by heartburn.  There is no report of melena.  The patient currently is having choreoathetoid movements which are under evaluation.  She will be intubated to allow LP and MRI of the brain and we have been asked to consider EGD while she is intubated.    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    CTS (carpal tunnel syndrome) 2019    Dental disease     Depression     Diastolic dysfunction 2022    Dizziness     Emphysema of lung 2020    GERD (gastroesophageal reflux disease)     Headache     Hyperlipidemia     Hypertension     Hypothyroidism     Mixed simple and mucopurulent chronic bronchitis 04/18/2023    Non-occlusive coronary artery disease 05/04/2023    NSTEMI (non-ST elevated myocardial infarction) (HCC)     Pneumonia 2022    Pulmonary emphysema 04/21/2023    Shingles 2018    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       Past  Surgical History:  Past Surgical History:   Procedure Laterality Date    APPENDECTOMY      CARDIAC CATHETERIZATION N/A 2017    Procedure: Coronary angiography;  Surgeon: Luis Colvin MD;  Location: [facility] CATH INVASIVE LOCATION;  Service:     CARDIAC CATHETERIZATION N/A 2023    Procedure: Left Heart Cath;  Surgeon: Steffen Rossi MD;  Location: [facility] CATH INVASIVE LOCATION;  Service: Cardiology;  Laterality: N/A;    CARPAL TUNNEL RELEASE  2019    COLONOSCOPY N/A 2024    Procedure: COLONOSCOPY WITH ANESTHESIA;  Surgeon: Marty Browning MD;  Location: [facility] ENDOSCOPY;  Service: Gastroenterology;  Laterality: N/A;  pre op pancolitis    ENDOSCOPY N/A 2024    Procedure: ESOPHAGOGASTRODUODENOSCOPY WITH ANESTHESIA;  Surgeon: Gordy Wang MD;  Location: [facility] ENDOSCOPY;  Service: Gastroenterology;  Laterality: N/A;  pre: dysphagia.   post: esophagus dilated.   no PCP    HYSTERECTOMY      PANENDOSCOPY N/A 2024    Procedure: DIRECT LARYNGOSCOPY WITH BIOPSY OF VOCAL CORD POLYPS WITH POSSIBLE TRACHEOSTOMY;  Surgeon: Mendez Padilla MD;  Location: [facility] OR;  Service: ENT;  Laterality: N/A;    DE RT/LT HEART CATHETERS N/A 2017    Procedure: Percutaneous Coronary Intervention;  Surgeon: Luis Colvin MD;  Location: [facility] CATH INVASIVE LOCATION;  Service: Cardiovascular    THYROID SURGERY      TOTAL THYROIDECTOMY      TRACHEOSTOMY N/A 2024    Procedure: POSSIBLE TRACHEOSTOMY;  Surgeon: Mendez Padilla MD;  Location: [facility] OR;  Service: ENT;  Laterality: N/A;        Social History:   Social History     Tobacco Use    Smoking status: Former     Current packs/day: 0.00     Average packs/day: 0.7 packs/day for 45.9 years (30.0 ttl pk-yrs)     Types: Cigarettes     Start date: 1991     Quit date: 3/1/2024     Years since quittin.8     Passive exposure: Past    Smokeless tobacco: Never    Tobacco comments:     Less than 1/2 pack a day    Substance Use Topics     Alcohol use: Not Currently     Comment: about a month ago per pjt        Family History:  Family History   Problem Relation Age of Onset    Asthma Mother     Cancer Mother     Emphysema Mother     Colon cancer Father     Cancer Father     Heart failure Father     Hypertension Father        Home Meds:  Medications Prior to Admission   Medication Sig Dispense Refill Last Dose/Taking    bismuth subsalicylate (Pepto-Bismol) 262 MG chewable tablet Chew 2 tablets 4 (Four) Times a Day Before Meals & at Bedtime. 120 tablet 0 12/28/2024    carvedilol (COREG) 25 MG tablet Take 1 tablet by mouth 2 (Two) Times a Day. 60 tablet 0 12/28/2024    colestipol (COLESTID) 1 g tablet Take 1 tablet by mouth Daily.   12/28/2024    FLUoxetine (PROzac) 40 MG capsule Take 1 capsule by mouth Daily.   12/28/2024    Inclisiran Sodium 284 MG/1.5ML solution prefilled syringe Inject 1.5 mL under the skin into the appropriate area as directed Every 6 (Six) Months.   Taking    levothyroxine (SYNTHROID, LEVOTHROID) 200 MCG tablet Take 1 tablet by mouth Daily.   Taking    nitroglycerin (NITROSTAT) 0.4 MG SL tablet Place 1 tablet under the tongue Every 5 (Five) Minutes As Needed for Chest Pain. Take no more than 3 doses in 15 minutes.   Past Week    pantoprazole (PROTONIX) 40 MG EC tablet Take 1 tablet by mouth 2 (Two) Times a Day. 60 tablet 0 12/28/2024    potassium chloride (KLOR-CON) 20 MEQ packet Take 20 mEq by mouth 2 (Two) Times a Day.   12/28/2024    topiramate (TOPAMAX) 50 MG tablet Take 1 tablet by mouth 2 (Two) Times a Day. 60 tablet 5 12/28/2024    loperamide (IMODIUM) 2 MG capsule Take 2 capsules by mouth 4 (Four) Times a Day As Needed for Diarrhea. 120 capsule 0     promethazine (PHENERGAN) 25 MG tablet Take 1 tablet by mouth 3 (Three) Times a Day As Needed for Nausea or Vomiting.          Current Meds:   calcium carbonate, 2 tablet, Oral, TID  carvedilol, 25 mg, Oral, BID With Meals  Chlorhexidine Gluconate Cloth, 1 Application, Topical,  Q24H  cholestyramine light, 1 packet, Oral, Daily  enoxaparin, 40 mg, Subcutaneous, Q24H  FLUoxetine, 40 mg, Oral, Daily  levothyroxine, 175 mcg, Oral, Q AM  lisinopril, 40 mg, Oral, Daily  magnesium sulfate, 2 g, Intravenous, Q2H  mupirocin, 1 Application, Each Nare, BID  pantoprazole, 40 mg, Intravenous, Q12H  senna-docusate sodium, 2 tablet, Oral, BID  sodium chloride, 10 mL, Intravenous, Q12H  topiramate, 50 mg, Oral, BID        Allergies:  No Known Allergies    Review of Systems  Pertinent items are noted in HPI, all other systems reviewed and negative    Objective     Vital Signs  Temp:  [97.8 °F (36.6 °C)-98.4 °F (36.9 °C)] 98 °F (36.7 °C)  Heart Rate:  [] 87  Resp:  [17] 17  BP: ()/() 131/88    Physical Exam:     General Appearance:  Alert, cooperative, in no acute distress   Head:  Normocephalic, without obvious abnormality, atraumatic   Eyes:  Lids and lashes normal, conjunctivae and sclerae normal, no icterus, no pallor, corneas clear, PERRLA   Ears:  Ears appear intact with no abnormalities noted   Throat:  No oral lesions, no thrush, oral mucosa moist   Neck:  No adenopathy, supple, trachea midline, no thyromegaly, no carotid bruit, no JVD   Back:  No kyphosis present, no scoliosis present, no skin lesions, erythema or scars, no tenderness to percussion, or palpation, range of motion normal   Lungs:  Clear to auscultation,respirations regular, even and unlabored    Heart:  Regular rhythm and normal rate, normal S1 and S2, no murmur, no gallop, no rub, no click   Breast Exam:  Deferred   Abdomen:  Normal bowel sounds, no masses, no organomegaly, soft non-tender, non-distended, no guarding, no rebound tenderness   Genitalia:  Deferred   Extremities:  Moves all extremities well, no edema, no cyanosis, no redness   Pulses:  Pulses palpable and equal bilaterally   Skin:  No bleeding, bruising or rash   Lymph nodes:  No palpable adenopathy   Neurologic:  Cranial nerves 2 - 12 grossly intact,  "sensation intact, DTR present and equal bilaterally       WBC No results found for: \"WBCS\"   HGB Hemoglobin   Date Value Ref Range Status   12/31/2024 6.9 (C) 12.0 - 15.9 g/dL Final   12/31/2024 6.8 (C) 12.0 - 15.9 g/dL Final   12/30/2024 8.0 (L) 12.0 - 15.9 g/dL Final   12/28/2024 8.7 (L) 12.0 - 15.9 g/dL Final      HCT Hematocrit   Date Value Ref Range Status   12/31/2024 22.2 (L) 34.0 - 46.6 % Final   12/31/2024 21.6 (L) 34.0 - 46.6 % Final   12/30/2024 24.6 (L) 34.0 - 46.6 % Final   12/28/2024 26.5 (L) 34.0 - 46.6 % Final      Platelets No results found for: \"LABPLAT\"   MCV MCV   Date Value Ref Range Status   12/31/2024 106.4 (H) 79.0 - 97.0 fL Final   12/30/2024 104.7 (H) 79.0 - 97.0 fL Final   12/28/2024 101.1 (H) 79.0 - 97.0 fL Final          Sodium Sodium   Date Value Ref Range Status   12/31/2024 135 (L) 136 - 145 mmol/L Final   12/30/2024 143 136 - 145 mmol/L Final   12/29/2024 141 136 - 145 mmol/L Final   12/28/2024 139 136 - 145 mmol/L Final     Sodium, Arterial   Date Value Ref Range Status   12/28/2024 140 136 - 145 mmol/L Final      Potassium Potassium   Date Value Ref Range Status   12/31/2024 4.6 3.5 - 5.2 mmol/L Final   12/30/2024 4.4 3.5 - 5.2 mmol/L Final   12/29/2024 3.5 3.5 - 5.2 mmol/L Final   12/29/2024 2.8 (L) 3.5 - 5.2 mmol/L Final   12/29/2024 2.4 (C) 3.5 - 5.2 mmol/L Final   12/28/2024 2.3 (C) 3.5 - 5.2 mmol/L Final      Chloride Chloride   Date Value Ref Range Status   12/31/2024 99 98 - 107 mmol/L Final   12/30/2024 100 98 - 107 mmol/L Final   12/29/2024 93 (L) 98 - 107 mmol/L Final   12/28/2024 89 (L) 98 - 107 mmol/L Final      CO2 CO2   Date Value Ref Range Status   12/31/2024 29.0 22.0 - 29.0 mmol/L Final   12/30/2024 31.0 (H) 22.0 - 29.0 mmol/L Final   12/29/2024 34.0 (H) 22.0 - 29.0 mmol/L Final   12/28/2024 32.0 (H) 22.0 - 29.0 mmol/L Final      BUN BUN   Date Value Ref Range Status   12/31/2024 7 6 - 20 mg/dL Final   12/30/2024 6 6 - 20 mg/dL Final   12/29/2024 6 6 - 20 mg/dL " Final   12/28/2024 7 6 - 20 mg/dL Final      Creatinine Creatinine   Date Value Ref Range Status   12/31/2024 0.44 (L) 0.57 - 1.00 mg/dL Final   12/30/2024 0.38 (L) 0.57 - 1.00 mg/dL Final   12/29/2024 0.42 (L) 0.57 - 1.00 mg/dL Final   12/28/2024 0.55 (L) 0.57 - 1.00 mg/dL Final      Calcium Calcium   Date Value Ref Range Status   12/31/2024 8.0 (L) 8.6 - 10.5 mg/dL Final   12/30/2024 8.2 (L) 8.6 - 10.5 mg/dL Final   12/29/2024 8.8 8.6 - 10.5 mg/dL Final   12/28/2024 9.5 8.6 - 10.5 mg/dL Final      Albumin Albumin   Date Value Ref Range Status   12/31/2024 3.0 (L) 3.5 - 5.2 g/dL Final   12/30/2024 3.2 (L) 3.5 - 5.2 g/dL Final   12/28/2024 3.6 3.5 - 5.2 g/dL Final      AST  ALT  PT/INR:   AST (SGOT)   Date Value Ref Range Status   12/31/2024 141 (H) 1 - 32 U/L Final   12/30/2024 214 (H) 1 - 32 U/L Final   12/28/2024 120 (H) 1 - 32 U/L Final     ALT (SGPT)   Date Value Ref Range Status   12/31/2024 42 (H) 1 - 33 U/L Final   12/30/2024 51 (H) 1 - 33 U/L Final   12/28/2024 35 (H) 1 - 33 U/L Final     Protime   Date Value Ref Range Status   12/30/2024 13.3 11.8 - 14.8 Seconds Final   /  INR   Date Value Ref Range Status   12/30/2024 0.97 0.91 - 1.09 Final            Imaging Results (Last 72 Hours)       Procedure Component Value Units Date/Time    CT Head Without Contrast [[id number]] Collected: 12/30/24 1400     Updated: 12/30/24 1412    Narrative:      EXAM/TECHNIQUE: CT head without contrast     INDICATION: headache not relieved by pain control; R20.0-Anesthesia of  skin; R20.2-Paresthesia of skin; F10.929-Alcohol use, unspecified with  intoxication, unspecified; F15.10-Other stimulant abuse, uncomplicated;  E87.6-Hypokalemia; E83.42-Hypomagnesemia; R26.9-Unspecified  abnormalities of gait and mobility     COMPARISON: 12/29/2024     DLP: 842.82 mGy.cm. Automated exposure control was utilized to decrease  patient radiation dose.     FINDINGS:     No evidence of intracranial hemorrhage. Gray-white differentiation  is  maintained. No midline shift or mass effect. Lateral ventricles are  nondilated. Basilar cisterns are patent. No acute orbital finding.  Mastoid air cells are clear. Paranasal sinuses are clear. No acute  osseous finding.       Impression:         No acute intracranial findings.        This report was signed and finalized on 12/30/2024 2:09 PM by Dr. Bello Champagne MD.       MRI Thoracic Spine With & Without Contrast [[id number]] Collected: 12/29/24 2014     Updated: 12/29/24 2021    Narrative:      MRI thoracic spine 12/29/2024 5:50 PM     HISTORY: Loss of sensation about T5 down into the bilateral legs.;  R20.0-Anesthesia of skin; R20.2-Paresthesia of skin; F10.929-Alcohol  use, unspecified with intoxication, unspecified; F15.10-Other stimulant  abuse, uncomplicated; E87.6-Hypokalemia; E83.42-Hypomagnesemia;  R26.9-Unspecified abnormalities of gait and mobility     COMPARISON : NONE      Technique: Multiplanar, multisequence MRI of the thoracic spine was  obtained with and without the use of contrast. 10 cc Vueway intravenous  contrast.     Findings:      Exam is severely limited by motion artifact. In particular, assessment  for cord signal abnormalities is severely limited on T2 and STIR. The  postcontrast imaging is essentially nondiagnostic.     The spine is imaged from C7-L1. Normal thoracic spinal alignment. There  is no spondylolisthesis. Vertebral body heights are well-maintained. No  evidence of acute fracture. No pathologic marrow infiltrate. Posterior  elements appear intact. There is loss of disc height with what appears  to be a mild diffuse disc bulging at T6-T7, T7-T8 and T8-T9. No obvious  high-grade spinal stenosis. Again, motion artifact limits assessment for  cord signal abnormalities. No high-grade neuroforaminal narrowing.       Impression:      Impression:   1. Exam is severely limited by motion artifact. The postcontrast imaging  is near completely nondiagnostic. Assessment for cord  signal abnormality  is extremely limited on T2 and STIR.  2. Alignment is normal.  3. No evidence of acute fracture. No pathologic marrow infiltrate/bone  lesions or obvious enhancing soft tissue masses.  4. Mild disc bulging at several midthoracic levels. No obvious  high-grade spinal stenosis. Again, there is very limited assessment for  any cord signal abnormalities.     This report was signed and finalized on 12/29/2024 8:18 PM by Dr Negrito Wu.       MRI Cervical Spine With & Without Contrast [[id number]] Collected: 12/29/24 2008     Updated: 12/29/24 2016    Narrative:      MRI CERVICAL SPINE W WO CONTRAST- 12/29/2024 5:21 PM     HISTORY: Change in sensation to bilateral hands; R20.0-Anesthesia of  skin; R20.2-Paresthesia of skin; F10.929-Alcohol use, unspecified with  intoxication, unspecified; F15.10-Other stimulant abuse, uncomplicated;  E87.6-Hypokalemia; E83.42-Hypomagnesemia; R26.9-Unspecified  abnormalities of gait and mobility     COMPARISON: None      TECHNIQUE: Multiplanar, multisequence MRI of the cervical spine was  obtained with and without contrast. 10 cc Vueway IV contrast.     FINDINGS:     Exam is severely limited by motion artifact. The postcontrast imaging is  completely nondiagnostic.     The spine is imaged from the skull base through T2. Normal alignment  across the craniocervical junction. Flattened cervical lordosis. There  is a slight degenerative anterolisthesis at C4-C5. Vertebral body  heights are well-maintained. No evidence of acute fracture. Modic type I  degenerative endplate signal changes at C5-C6 and C6-C7. The posterior  elements are intact. Synovitis with facet joint capsular distention on  the right at C7-T1. Assessment of the thoracic cord is limited due to  motion artifact. There does not appear to be any high-grade spinal  stenosis or any obvious compressive myelopathy or myelomalacia. No cord  syrinx. Loss of disc height with mild endplate spurring and disc  bulging  at C4-C5, C5-C6 and C6-C7. There appears to be a high-grade right-sided  neuroforaminal narrowing at C4-C5 and C5-C6 and perhaps on the left at  C6-C7 due to uncovertebral spurring and facet arthropathy. Again, the  assessment of the oblique imaging is quite limited by motion artifact.  Paraspinal soft tissues are unremarkable.       Impression:      1. Exam is severely limited by motion artifact. The postcontrast imaging  is completely nondiagnostic due to motion artifact.  2. Flattened cervical lordosis as well as a slight degenerative  anterolisthesis at C4-C5.  3. No obvious high-grade spinal stenosis or evidence of compressive  myelopathy or myelomalacia. No cord syrinx.  4. Uncovertebral spurring and facet arthropathy resulting in what  appears to be high-grade neuroforaminal narrowing at C4-C5, C5-C6 and  C6-C7.           This report was signed and finalized on 12/29/2024 8:13 PM by Dr Negrito Wu.       CT Thoracic Spine With Contrast [[id number]] Collected: 12/29/24 0716     Updated: 12/29/24 0722    Narrative:      Exam: CT THORACIC SPINE W CONTRAST- 12/29/2024 12:05 AM     HISTORY: numbness     COMPARISON: None      DOSE LENGTH PRODUCT: 1834.22 mGy.cm mGy cm. Automated exposure control  was also utilized to decrease patient radiation dose.     TECHNIQUE: Serial helical tomographic images of the thoracic spine were  obtained with the use of intravenous contrast. Additionally, sagittal  and coronal reformatted images were also provided for review.     FINDINGS:     Preserved alignment.     No acute fracture.     The vertebral body heights are preserved.     Mild multilevel spondylotic changes.     No visible significant spinal canal or foraminal narrowing.     Soft tissues are grossly unremarkable.     Other: Mild atherosclerosis.       Impression:         No acute fracture or traumatic malalignment.     These findings are in agreement with the critical and emergent findings  from the [facility]  preliminary report.           This report was signed and finalized on 12/29/2024 7:19 AM by Sriram Hilton.       CT Cervical Spine With Contrast [[id number]] Collected: 12/29/24 0711     Updated: 12/29/24 0720    Narrative:      Exam: CT CERVICAL SPINE W CONTRAST- 12/29/2024 12:05 AM     HISTORY: numbness     COMPARISON: 2/26/2021     DOSE LENGTH PRODUCT: 1834.22 mGy.cm mGy cm. Automated exposure control  was also utilized to decrease patient radiation dose.     TECHNIQUE: Serial helical tomographic images of the cervical spine were  obtained with the use of intravenous contrast. Additionally, sagittal  and coronal reformatted images were also provided for review.     FINDINGS:     Grade 1 degenerative anterolisthesis of C4-C5.     No acute fracture. The vertebral body heights are preserved.     Multilevel degenerative disc disease with posterior disc osteophyte  complexes with no significant disc height loss at C5-C6 and C6-C7.  Multilevel uncovertebral and facet hypertrophic changes. This is similar  to the prior study.     No visible high-grade bony spinal canal narrowing. Variable degrees of  mild to moderate foraminal narrowing, most notable at the left C5-C6  level.     Paraspinal soft tissues are grossly unremarkable.     Other: Mild carotid atherosclerosis. No abnormal enhancement.       Impression:         No acute fracture or traumatic malalignment.     No abnormal enhancement.     Multilevel spondylotic changes, similar to the prior study.     These findings are in agreement with the critical and emergent findings  from the [facility] preliminary report.           This report was signed and finalized on 12/29/2024 7:17 AM by Sriram Hilton.       CT Head Without Contrast [[id number]] Collected: 12/29/24 0706     Updated: 12/29/24 0712    Narrative:      Exam: CT HEAD WO CONTRAST- 12/29/2024 12:05 AM     HISTORY: numbness       DOSE LENGTH PRODUCT: 1834.22 mGy.cm mGy cm. Automated exposure control  was also  utilized to decrease patient radiation dose.     Technique:  Helically acquired CT of the brain without IV contrast was performed.  Sagittal and coronal reformations are also provided for review. Soft  tissue and bone kernels are available for interpretation.     Comparison: 2/26/2024.     Findings:     Ventricles and extra-axial CSF spaces are normal in size.     No intraparenchymal or extra-axial hemorrhage.     Gray-white matter differentiation is preserved.     Orbits are grossly unremarkable. Paranasal sinuses are grossly clear.  Mastoid air cells are grossly clear.     No suspicious calvarial or extracranial soft tissue abnormality.       Impression:      Impression:       No acute intracranial abnormality.     These findings are in agreement with the critical and emergent findings  from the [facility] preliminary report.     This report was signed and finalized on 12/29/2024 7:09 AM by Sriram Hilton.       XR Chest 1 View [[id number]] Collected: 12/28/24 2014     Updated: 12/28/24 2017    Narrative:      EXAM/TECHNIQUE: XR CHEST 1 VW-     INDICATION: Chest pain     COMPARISON: 7/29/2024     FINDINGS:     Cardiomediastinal silhouette is within normal limits.      No pleural effusion. No visible pneumothorax. No focal consolidation.      No acute osseous findings.       Impression:         No acute findings.     This report was signed and finalized on 12/28/2024 8:14 PM by Dr. Bello Champagne MD.               Result Review:  I have personally reviewed the results from the time of this admission to 12/31/2024 11:05 CST and agree with these findings:  [x]  Laboratory list / accordion  []  Microbiology  [x]  Radiology  []  EKG/Telemetry   []  Cardiology/Vascular   []  Pathology  [x]  Old records  []  Other:  Most notable findings include: No new interpretation      Assessment & Plan       Hypokalemia    Hypercholesterolemia with hypertriglyceridemia    Raynaud's disease without gangrene    Ex-smoker     Non-occlusive coronary artery disease    Primary hypertension    ETOH abuse    Hypothyroidism    Hypothyroidism    Gait disturbance    Substance abuse    Severe protein-calorie malnutrition      It should be noted that the patient promptly recognized to me upon my entrance into the room.  In any case, she has had an unexplained fall and hemoglobin and she will be intubated and will be that way for 2 to 3 hours.  It would be reasonable to take advantage of the situation and perform upper endoscopy, looking for any new bleeding lesions that might be of significance.  This was discussed with the nurse, Dr. Littlejohn, and patient.  Will proceed today as the schedule allows.    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  Any results not available at time of inpatient discharge and/or GI clinic follow up should be managed by the hospitalist team, PCP, or outpatient gastroenterologist.

## 2024-12-31 NOTE — PROCEDURES
PROCEDURAL NOTE  Attending/Performing: Dr. Chet Littlejohn MD, CM  Procedure: Endotracheal tube placement    Medications:  Rocuronium - Nondepolarizing 50 mg X 1  Etomidate 20 mg X 1.     Indication: need for MRI , LP and GI eval--patient restless nonstop. Decrease risk of movement with LP.     Consent  Risks and benefits were explained to the patient. These included but were not limited to: Bleeding, infection, failure of the procedure, infection, loss of limb, death from complications of the procedure/failure of the procedure/medication reaction and loss enjoyment of life among others. Informed Consent obtained from the patient.     (Reference: [email or web address] )    Technique:   The procedure was accomplished using a standard technique.   I washed my hands with warm water and soap with appropriate scrub time.   After a time-out to confirm patient identity and procedure site confirmation we preoxygenated the patient.  Once the patient had been pre oxygenated, I gave the patient the sedative then the paralytic and then using the glide scope 3.0 the patient was intubated on the first attempt with good EtCO2, good color change, saturations stable, good BS bilaterally.    CXR ordered to confirm placement.    ndGndrndanddndend:nd nd2nd View  Difficulty: DRY thick secretions.  Estimated Blood Loss: 0 mL.  Location of endotracheal tube: 22 cm at the upper teeth  Size of endotracheal tube:   8.0 mm  ETT tube placed  Complications: None    Time spent on this procedure was independent of the time spent in critical care noted elsewhere.    Pt tolerated well.    Chet Littlejohn MD, CM

## 2024-12-31 NOTE — PROGRESS NOTES
EGD is normal. Full note to follow. Colonoscopy earlier this year was normal except for hemorrhoids. Will sign off for now.    Marty Browning MD

## 2024-12-31 NOTE — PROGRESS NOTES
RT EQUIPMENT DEVICE RELATED - SKIN ASSESSMENT    Lex Score:  Lex Score: 18     RT Medical Equipment/Device:     ETT Britton/Anchorfast    Skin Assessment:      Cheek:  Intact  Lips:  Intact  Mouth:  Intact    Device Skin Pressure Protection:  Skin-to-device areas padded:  Anchorfast    Nurse Notification:  Shalini Barnard, RRT

## 2025-01-01 ENCOUNTER — APPOINTMENT (OUTPATIENT)
Dept: CT IMAGING | Facility: HOSPITAL | Age: 50
DRG: 640 | End: 2025-01-01
Payer: COMMERCIAL

## 2025-01-01 PROBLEM — F15.10 METHAMPHETAMINE ABUSE: Status: ACTIVE | Noted: 2025-01-01

## 2025-01-01 PROBLEM — K90.41 GLUTEN ENTEROPATHY: Status: ACTIVE | Noted: 2025-01-01

## 2025-01-01 LAB
ALBUMIN SERPL-MCNC: 3.3 G/DL (ref 3.5–5.2)
ALBUMIN/GLOB SERPL: 1.1 G/DL
ALP SERPL-CCNC: 544 U/L (ref 39–117)
ALT SERPL W P-5'-P-CCNC: 31 U/L (ref 1–33)
ANION GAP SERPL CALCULATED.3IONS-SCNC: 11 MMOL/L (ref 5–15)
AST SERPL-CCNC: 70 U/L (ref 1–32)
BASOPHILS # BLD AUTO: 0.1 10*3/MM3 (ref 0–0.2)
BASOPHILS NFR BLD AUTO: 0.7 % (ref 0–1.5)
BH BB BLOOD EXPIRATION DATE: NORMAL
BH BB BLOOD TYPE BARCODE: 5100
BH BB DISPENSE STATUS: NORMAL
BH BB PRODUCT CODE: NORMAL
BH BB UNIT NUMBER: NORMAL
BILIRUB SERPL-MCNC: 0.7 MG/DL (ref 0–1.2)
BUN SERPL-MCNC: 7 MG/DL (ref 6–20)
BUN/CREAT SERPL: 12.3 (ref 7–25)
CALCIUM SPEC-SCNC: 8.1 MG/DL (ref 8.6–10.5)
CHLORIDE SERPL-SCNC: 100 MMOL/L (ref 98–107)
CK SERPL-CCNC: 32 U/L (ref 20–180)
CO2 SERPL-SCNC: 25 MMOL/L (ref 22–29)
CREAT SERPL-MCNC: 0.57 MG/DL (ref 0.57–1)
CROSSMATCH INTERPRETATION: NORMAL
DEPRECATED RDW RBC AUTO: 68 FL (ref 37–54)
EGFRCR SERPLBLD CKD-EPI 2021: 110.2 ML/MIN/1.73
EOSINOPHIL # BLD AUTO: 0.36 10*3/MM3 (ref 0–0.4)
EOSINOPHIL NFR BLD AUTO: 2.5 % (ref 0.3–6.2)
ERYTHROCYTE [DISTWIDTH] IN BLOOD BY AUTOMATED COUNT: 18.6 % (ref 12.3–15.4)
GLOBULIN UR ELPH-MCNC: 2.9 GM/DL
GLUCOSE SERPL-MCNC: 131 MG/DL (ref 65–99)
HCT VFR BLD AUTO: 26.3 % (ref 34–46.6)
HGB BLD-MCNC: 8.5 G/DL (ref 12–15.9)
IMM GRANULOCYTES # BLD AUTO: 0.25 10*3/MM3 (ref 0–0.05)
IMM GRANULOCYTES NFR BLD AUTO: 1.7 % (ref 0–0.5)
LYMPHOCYTES # BLD AUTO: 2.63 10*3/MM3 (ref 0.7–3.1)
LYMPHOCYTES NFR BLD AUTO: 18.2 % (ref 19.6–45.3)
MAGNESIUM SERPL-MCNC: 2.2 MG/DL (ref 1.6–2.6)
MCH RBC QN AUTO: 32.4 PG (ref 26.6–33)
MCHC RBC AUTO-ENTMCNC: 32.3 G/DL (ref 31.5–35.7)
MCV RBC AUTO: 100.4 FL (ref 79–97)
MONOCYTES # BLD AUTO: 2.04 10*3/MM3 (ref 0.1–0.9)
MONOCYTES NFR BLD AUTO: 14.1 % (ref 5–12)
NEUTROPHILS NFR BLD AUTO: 62.8 % (ref 42.7–76)
NEUTROPHILS NFR BLD AUTO: 9.08 10*3/MM3 (ref 1.7–7)
NRBC BLD AUTO-RTO: 0.6 /100 WBC (ref 0–0.2)
PHOSPHATE SERPL-MCNC: 3.5 MG/DL (ref 2.5–4.5)
PLATELET # BLD AUTO: 343 10*3/MM3 (ref 140–450)
PMV BLD AUTO: 10.5 FL (ref 6–12)
POTASSIUM SERPL-SCNC: 4.5 MMOL/L (ref 3.5–5.2)
PROT SERPL-MCNC: 6.2 G/DL (ref 6–8.5)
RBC # BLD AUTO: 2.62 10*6/MM3 (ref 3.77–5.28)
SODIUM SERPL-SCNC: 136 MMOL/L (ref 136–145)
UNIT  ABO: NORMAL
UNIT  RH: NORMAL
WBC NRBC COR # BLD AUTO: 14.46 10*3/MM3 (ref 3.4–10.8)

## 2025-01-01 PROCEDURE — 25010000002 ENOXAPARIN PER 10 MG: Performed by: NURSE PRACTITIONER

## 2025-01-01 PROCEDURE — 99233 SBSQ HOSP IP/OBS HIGH 50: CPT | Performed by: PSYCHIATRY & NEUROLOGY

## 2025-01-01 PROCEDURE — 85025 COMPLETE CBC W/AUTO DIFF WBC: CPT | Performed by: INTERNAL MEDICINE

## 2025-01-01 PROCEDURE — 80053 COMPREHEN METABOLIC PANEL: CPT | Performed by: INTERNAL MEDICINE

## 2025-01-01 PROCEDURE — 25010000002 CALCIUM GLUCONATE PER 10 ML: Performed by: INTERNAL MEDICINE

## 2025-01-01 PROCEDURE — 74176 CT ABD & PELVIS W/O CONTRAST: CPT

## 2025-01-01 PROCEDURE — 82550 ASSAY OF CK (CPK): CPT | Performed by: INTERNAL MEDICINE

## 2025-01-01 PROCEDURE — 84100 ASSAY OF PHOSPHORUS: CPT | Performed by: INTERNAL MEDICINE

## 2025-01-01 PROCEDURE — 83735 ASSAY OF MAGNESIUM: CPT | Performed by: INTERNAL MEDICINE

## 2025-01-01 RX ORDER — ENOXAPARIN SODIUM 100 MG/ML
40 INJECTION SUBCUTANEOUS EVERY 24 HOURS
Status: DISCONTINUED | OUTPATIENT
Start: 2025-01-02 | End: 2025-01-04 | Stop reason: HOSPADM

## 2025-01-01 RX ORDER — CALCIUM CARBONATE 500 MG/1
2 TABLET, CHEWABLE ORAL 3 TIMES DAILY
Status: DISPENSED | OUTPATIENT
Start: 2025-01-01 | End: 2025-01-02

## 2025-01-01 RX ORDER — PANTOPRAZOLE SODIUM 40 MG/1
40 TABLET, DELAYED RELEASE ORAL
Status: DISCONTINUED | OUTPATIENT
Start: 2025-01-02 | End: 2025-01-04 | Stop reason: HOSPADM

## 2025-01-01 RX ADMIN — Medication 1 APPLICATION: at 20:00

## 2025-01-01 RX ADMIN — ENOXAPARIN SODIUM 40 MG: 100 INJECTION SUBCUTANEOUS at 08:14

## 2025-01-01 RX ADMIN — LEVOTHYROXINE SODIUM 175 MCG: 0.17 TABLET ORAL at 06:07

## 2025-01-01 RX ADMIN — ANTACID TABLETS 2 TABLET: 500 TABLET, CHEWABLE ORAL at 12:11

## 2025-01-01 RX ADMIN — CARVEDILOL 25 MG: 25 TABLET, FILM COATED ORAL at 17:19

## 2025-01-01 RX ADMIN — Medication 10 ML: at 08:15

## 2025-01-01 RX ADMIN — CARVEDILOL 25 MG: 25 TABLET, FILM COATED ORAL at 08:14

## 2025-01-01 RX ADMIN — PANTOPRAZOLE SODIUM 40 MG: 40 INJECTION, POWDER, FOR SOLUTION INTRAVENOUS at 06:07

## 2025-01-01 RX ADMIN — CALCIUM GLUCONATE 3000 MG: 98 INJECTION, SOLUTION INTRAVENOUS at 12:11

## 2025-01-01 RX ADMIN — LISINOPRIL 40 MG: 10 TABLET ORAL at 08:14

## 2025-01-01 RX ADMIN — Medication 1 APPLICATION: at 08:14

## 2025-01-01 RX ADMIN — Medication 10 ML: at 20:00

## 2025-01-01 RX ADMIN — CHOLESTYRAMINE 4 G: 4 POWDER, FOR SUSPENSION ORAL at 12:11

## 2025-01-01 RX ADMIN — FLUOXETINE HYDROCHLORIDE 40 MG: 20 CAPSULE ORAL at 08:14

## 2025-01-01 RX ADMIN — TOPIRAMATE 50 MG: 25 TABLET, FILM COATED ORAL at 08:14

## 2025-01-01 NOTE — PROGRESS NOTES
[facility] Intensivist Services    Date of Admission: 12/28/2024  Date of Note: 01/01/25  Primary Care Physician: Margi Lemus APRN   LOS: 3 days     History   Next of Kin:  Primary Emergency Contact: Kirill Montgomery, [place] Phone: [phone]   Code Status: Code Status and Medical Interventions: CPR (Attempt to Resuscitate); Full Support    She is a 49 y.o. female who denies drug use but tested positive for methamphetamine, also a reported former smoker who reportedly stopped 10 months ago with a 30-pack-year history who was admitted 12/28/2024 after presenting to the emergency department with numbness in her fingers and abdomen.  Patient lives in an , she has been falling down multiple times per day.  She also began having some chest discomfort and presented to the emergency department with diarrhea and vomiting.  Neurology saw the patient is concerned she may have Fergus's disease.  Patient also complains of restless leg and restless arm type symptoms worse at night.  Neurology request intubation for MRI and lumbar puncture to rule out Fergus's disease as patient has significant areflexia.       Patient was just admitted 10/4/2024 with chronic GI symptoms and struggling to keep her Synthroid down.  Imaging at that admission showed pretty significant bilateral femoral head osteonecrosis, significant hepatic steatosis and possible chronic inflammatory disease with fatty infiltration of the entire colon.     EF 61 to 65% on 2023 echocardiogram.  Bicuspid aortic valve noted.     12/29/24: Patient was confused.  Concern for alcohol abuse, substance abuse noted.  Drug screen showed amphetamine/methamphetamine positive.  Plan for MRI then transfer to floor.     12/30/24: I took over care.  Discussed with neurology.  Patient was not transferred to the floor due to concerns about possible Fergus's disease.  They request elective intubation tomorrow.  We will make that  happen tomorrow.     12/31: Hemoglobin declined slightly overnight.  As noted above:  possible chronic inflammatory disease with fatty infiltration of the entire colon.  Had some diarrhea but no obvious blood overnight.  Consulted GI, PPI and octreotide started.  Plan for intubation today.  Will try to coordinate with GI whether she can get a scope at the same time.    1/1/25: LP and EGD performed yesterday so far noncontributory.  Plan for nerve conduction velocities tomorrow.  Still unclear cause of patient's paresthesia: perioral and abdominal.  CT abdomen today shows same findings as previous: Fatty liver and some infiltration of her colon.  Although blood work concerning for possible celiac disease.  Changed diet to avoid gluten.  Appreciate neurology input.  Patient ready for transfer to floor.    Allergies     Allergies:   She has No Known Allergies.    Labs   Basic Labs:  CBC:      Lab 01/01/25  0639 12/31/24  2027 12/31/24  0552 12/31/24  0351 12/30/24  0655 12/28/24 2028   WBC 14.46* 14.39*  --  11.76* 8.05 9.85   HEMOGLOBIN 8.5* 9.4*   < > 6.8* 8.0* 8.7*   HEMATOCRIT 26.3* 29.9*   < > 21.6* 24.6* 26.5*   PLATELETS 343 381  --  338 374 422   NEUTROS ABS 9.08* 8.56*  --  6.36 5.15 4.46   IMMATURE GRANS (ABS) 0.25* 0.33*  --   --   --  0.08*   LYMPHS ABS 2.63 3.59*  --   --   --  3.92*   MONOS ABS 2.04* 1.40*  --   --   --  1.26*   EOS ABS 0.36 0.39  --  0.48* 0.24 0.08   .4* 101.7*  --  106.4* 104.7* 101.1*    < > = values in this interval not displayed.       LIVER FUNCTION AND COAG TESTS:      Lab 01/01/25  0639 12/31/24  1332 12/31/24  0351 12/30/24  1227 12/30/24  0919 12/28/24 2028   TOTAL PROTEIN 6.2  --  5.4*  --  5.7* 6.7   ALBUMIN 3.3*  --  3.0*  --  3.2* 3.6   GLOBULIN 2.9  --  2.4  --  2.5 3.1   ALT (SGPT) 31  --  42*  --  51* 35*   AST (SGOT) 70*  --  141*  --  214* 120*   BILIRUBIN 0.7  --  0.4  --  0.5 0.3   ALK PHOS 544*  --  586*  --  634* 682*   PROTIME  --  12.4  --  13.3  --   --     INR  --  0.89*  --  0.97  --   --        ABG:      Lab 01/01/25  0639 12/31/24  0351 12/30/24  0919 12/29/24  0902 12/28/24 2047 12/28/24 2028   PH, ARTERIAL  --   --   --   --  7.554*  --    PO2 ART  --   --   --   --  83.1  --    PCO2, ARTERIAL  --   --   --   --  36.8  --    HCO3 ART  --   --   --   --  32.4*  --    ANION GAP 11.0 7.0 12.0 14.0  --  18.0*       LACTATE:      Lab 12/29/24  2111 12/29/24  1523 12/29/24  0902 12/29/24  0401 12/29/24  0038   LACTATE 2.1* 3.1* 3.4* 3.6* 3.7*       CMP:      Lab 01/01/25  0826 01/01/25  0639 12/31/24  0552 12/31/24  0351 12/30/24  0919 12/30/24  0655 12/29/24 2110 12/29/24  0902 12/29/24 0431 12/28/24 2047 12/28/24 2028   SODIUM  --  136  --  135* 143  --   --  141  --   --  139   SODIUM, ARTERIAL  --   --   --   --   --   --   --   --   --  140  --    POTASSIUM  --  4.5  --  4.6 4.4  --  3.5 2.8*   < >  --  2.3*   CHLORIDE  --  100  --  99 100  --   --  93*  --   --  89*   BUN  --  7  --  7 6  --   --  6  --   --  7   CREATININE  --  0.57  --  0.44* 0.38*  --   --  0.42*  --   --  0.55*   CALCIUM  --  8.1*  --  8.0* 8.2*  --   --  8.8  --   --  9.5   MAGNESIUM 2.2  --  1.6 1.5* 1.6  --   --   --   --   --  1.4*   PHOSPHORUS 3.5  --   --  2.5  --  2.6  --   --   --   --   --    GLUCOSE  --  131*  --  94 135*  --   --  107*  --   --  105*   EGFR  --  110.2  --  117.3 121.5  --   --  118.6  --   --  111.1    < > = values in this interval not displayed.           Inpatient Medications   Scheduled Meds:carvedilol, 25 mg, Oral, BID With Meals  Chlorhexidine Gluconate Cloth, 1 Application, Topical, Q24H  cholestyramine light, 1 packet, Oral, Daily  enoxaparin, 40 mg, Subcutaneous, Q24H  FLUoxetine, 40 mg, Oral, Daily  levothyroxine, 175 mcg, Oral, Q AM  lisinopril, 40 mg, Oral, Daily  mupirocin, 1 Application, Each Nare, BID  pantoprazole, 40 mg, Intravenous, Q12H  senna-docusate sodium, 2 tablet, Oral, BID  sodium chloride, 10 mL, Intravenous, Q12H  topiramate, 50  mg, Oral, BID      Continuous Infusions:fentanyl 10 mcg/mL, 50 mcg/hr, Last Rate: Stopped (12/31/24 1500)  propofol, 5-50 mcg/kg/min, Last Rate: 45 mcg/kg/min (12/31/24 1313)      PRN Meds:.  acetaminophen **OR** acetaminophen    senna-docusate sodium **AND** polyethylene glycol **AND** bisacodyl **AND** bisacodyl    Calcium Replacement - Follow Nurse / BPA Driven Protocol    ipratropium-albuterol    Magnesium Standard Dose Replacement - Follow Nurse / BPA Driven Protocol    nitroglycerin    ondansetron    Phosphorus Replacement - Follow Nurse / BPA Driven Protocol    Potassium Replacement - Follow Nurse / BPA Driven Protocol    sodium chloride    sodium chloride    I have reviewed the patient's current medications.   Outpatient Medications     Current Outpatient Medications   Medication Instructions    bismuth subsalicylate (PEPTO-BISMOL) 524 mg, Oral, 4 Times Daily Before Meals & Nightly    carvedilol (COREG) 25 mg, Oral, 2 Times Daily    colestipol (COLESTID) 1 g, Daily    FLUoxetine (PROZAC) 40 mg, Daily    Inclisiran Sodium 284 mg, Every 6 Months    levothyroxine (SYNTHROID, LEVOTHROID) 200 mcg, Daily    loperamide (IMODIUM) 4 mg, Oral, 4 Times Daily PRN    nitroglycerin (NITROSTAT) 0.4 mg, Every 5 Minutes PRN    pantoprazole (PROTONIX) 40 mg, Oral, 2 Times Daily    potassium chloride (KLOR-CON) 20 MEQ packet 20 mEq, 2 Times Daily    promethazine (PHENERGAN) 25 mg, 3 Times Daily PRN    topiramate (TOPAMAX) 50 mg, Oral, 2 Times Daily       Current Antibiotics   This patient does not have an active medication from one of the medication groupers.    Exam   Vent settings for last 24 hours:  Mode: VC/AC  FiO2 (%):  [100 %] 100 %  S RR:  [24] 24  S VT:  [450 mL] 450 mL  PEEP/CPAP (cm H2O):  [5 cm H20] 5 cm H20  MAP (cm H2O):  [9.9] 9.9    Vital signs for last 24 hours:  Temp:  [98.1 °F (36.7 °C)-99.3 °F (37.4 °C)] 99.3 °F (37.4 °C)  Heart Rate:  [] 91  Resp:  [14-27] 25  BP: ()/() 141/96  FiO2 (%):   "[100 %] 100 %    Vitals: Her  height is 175.3 cm (69\") and weight is 75.5 kg (166 lb 7.2 oz). Her oral temperature is 99.3 °F (37.4 °C). Her blood pressure is 141/96 and her pulse is 91. Her respiration is 25 and oxygen saturation is 98%.     PHYSICAL FINDINGS: SEE VITALS ABOVE  GENERAL APPEARANCE: ° Awake. ° Alert. °  Patient is complaining of discomfort.  Cannot really localize it.  Rubbing her belly.  Has perioral paresthesia/esthesia as well as abdominal paresthesia/anesthesia.  HEAD: Injuries: No evidence of a head injury. ° Appearance: Head normocephalic.  NECK: No masses ° Thyroid: ° Not diffusely enlarged.  EYES: General/bilateral: Extraocular Movements: ° Normal. Pupils: ° PERRLA. Sclera: ° Showed no icterus.  THROAT: No stridor auscultated/heard.  EARS: Hearing: ° No hearing loss noted.  NOSE: General/bilateral: External Deformities: ° No external nose deformities.  LYMPH NODES: No cervical or generalized adenopathy.  CHEST: ° Visual inspection revealed no abnormalities.  LUNGS: ° Respiration rhythm and depth was normal. ° Normal breath sounds sounds. ° No wheezing was heard bilaterally. ° No rhonchi were heard. ° No rales/crackles were heard. ° No clubbing noted.  CARDIOVASCULAR: JVD not increased. Heart Rate And Rhythm: Normal. ° Heart Sounds: No S3/ S4 heard. ° Murmurs: No murmurs were heard. ° Edema: No edema noted.  BACK: No obvious deformity noted.  ABDOMEN: Patient says she has no sensation over her stomach.  Bowel sounds are present.  No pain tenderness guarding or rebound.  MUSCULOSKELETAL SYSTEM : General/bilateral: ° Normal movement of all extremities.  FEET/EXTREMITIES: General/bilateral: ° No visible swelling of the feet.  NEUROLOGICAL: Patient moves all extremities slowly.  SKIN: ° Chronic skin changes.  Some picking noted.      Intake/Output   Intake/Output this shift:  No intake/output data recorded.    Intake/Output last 3 shifts:  I/O last 3 completed shifts:  In: 1118.3 [P.O.:340; " I.V.:256.3; Blood:522]  Out: 100 [Urine:100]    Results and Cultures Review     Result Review:  I have personally reviewed the results from the time of this admission to 1/1/2025 10:50 CST and agree with these findings:  [x]  Laboratory list / accordion  [x]  Microbiology  [x]  Radiology  []  EKG/Telemetry   []  Cardiology/Vascular   []  Pathology  []  Old records  []  Other:  Most notable findings include: Elevated LFTs.  CSF glucose 64, protein 33,    IMPRESSION:     Limited evaluation for GI bleeding without intravenous contrast.     No acute findings in the abdomen/pelvis.        Pulmonary edema with trace bilateral pleural effusions.     Marked hepatic steatosis.     Fatty infiltration of the cecum and proximal ascending colon wall. Most  commonly due to body habitus and possibly insulin resistance. A chronic  process such as inflammatory bowel disease is an additional  consideration, however there are no additional findings to suggest this  etiology.     Bilateral femoral head AVN without evidence of subchondral collapse.      Assessment/Plan       Altered mental status with abnormal motion and sensation, elevated white count: Appreciate neurology input. Had lumbar puncture/MRI which did not elucidate the cause.  Plan for NCV tomorrow.  Fatty infiltration noted on imaging along with new hemoglobin drop without clear source of bleeding.  EGD is normal. Colonoscopy earlier this year was normal except for hemorrhoids.  Discontinued octreotide, use once per day PPI.  Reassess need in 30 days.  Patient did have some abdominal discomfort and has requested Tums.  Reportedly has some reflux disease. Takes pantoprazole at home. Chart review: Patient has a positive gliadin deamidated peptide antibody IgA: Normal tissue transglutaminase IgA.  This might be gluten enteropathy on imaging?  Mucosa of the EGD looked pretty normal.  Will discuss celiac disease with neurology.  Hypocalcemia: Replaced several times.  Still low.   Ionized low as well.  Give Tums and IV calcium today.  Hypokalemia: Resolved.    Methamphetamine abuse: Methamphetamine is a toxic and variable drug with many multiple potential side effects.  Recommend patient discontinue methamphetamine abuse in the strongest terms possible.  Unclear if current falls and other symptoms are related to methamphetamine or a neuromuscular disease.  Appreciate neurology guidance.  Workup for Osvaldo's disease so far negative.  Metal studies pending.  Elevated carboxyhemoglobin on patient's ABG: Likely due to tobacco smoke.  Unclear etiology.  Patient does not appear to be toxic.  Elevated liver enzymes: Likely due to fatty liver along with methamphetamine abuse.  Scope performed.  Denies alcohol abuse.  Stop CIWA score.  Prior normal alpha-1 antitrypsin disease workup.  Hypothyroidism: Continue home medications.  Hypertension: Continue home Coreg.  Hyperlipidemia: Holding treatment due to muscle concerns.  Holding statins at this time.  Might consider restarting them if patient's neuromuscular status improves.  Severe protein calorie malnutrition: Encourage p.o. intake.  Has boost ordered.  VTE Prophylaxis: Lovenox.  Discontinue SCDs.  CODE STATUS: Full code.    Ready for floor.    35 minutes.    Part of this note may be an electronic transcription/translation of spoken language to printed text using the Dragon Dictation System    Chet Littlejohn MD  1/1/2025 at 10:50 CST  Pulmonary, Critical Care  [facility]  Please call with any questions  (Cell [phone])

## 2025-01-01 NOTE — PROGRESS NOTES
Neurology Progress Note      Chief Complaint:  full body numbness  Length of Stay:  3   Subjective     Subjective:    She is extubated today but remains in the critical care unit secondary to a bed shortage in the normal hospital.  She continues to have numbness throughout her entire body.  She tells me that she still cannot stand without assistance.  She denies any headaches today or any sequelae from yesterday's intubation and lumbar puncture.  Her endoscopy went well and GI has now signed off with no acute findings.  Lumbar puncture showed evidence only of normal findings with no evidence of elevated protein.    Medications:  Current Facility-Administered Medications   Medication Dose Route Frequency Provider Last Rate Last Admin    acetaminophen (TYLENOL) tablet 650 mg  650 mg Oral Q4H PRN Natalie Woods APRN   650 mg at 12/31/24 2128    Or    acetaminophen (TYLENOL) suppository 650 mg  650 mg Rectal Q4H PRN Natalie Woods APRN        sennosides-docusate (PERICOLACE) 8.6-50 MG per tablet 2 tablet  2 tablet Oral BID Natalie Woods APRN        And    polyethylene glycol (MIRALAX) packet 17 g  17 g Oral Daily PRN Natalie Woods APRN        And    bisacodyl (DULCOLAX) EC tablet 5 mg  5 mg Oral Daily PRN Natalie Woods APRN        And    bisacodyl (DULCOLAX) suppository 10 mg  10 mg Rectal Daily PRN Natalie Woods APRN        calcium carbonate (TUMS) chewable tablet 500 mg (200 mg elemental)  2 tablet Oral TID Chet Littlejohn MD        calcium gluconate 3,000 mg in sodium chloride 0.9 % 100 mL IVPB  3,000 mg Intravenous Once Chet Littlejohn MD        Calcium Replacement - Follow Nurse / BPA Driven Protocol   Not Applicable PRN Natalie Woods APRN        carvedilol (COREG) tablet 25 mg  25 mg Oral BID With Meals Tej Parada APRN   25 mg at 01/01/25 0814    Chlorhexidine Gluconate Cloth 2 % pads 1 Application  1 Application Topical Q24H Natalie Woods APRN   1 Application at 12/31/24  0526    cholestyramine light packet 4 g  1 packet Oral Daily Jovany Solis PA-C        [START ON 1/2/2025] Enoxaparin Sodium (LOVENOX) syringe 40 mg  40 mg Subcutaneous Q24H Chet Littlejohn MD        FLUoxetine (PROzac) capsule 40 mg  40 mg Oral Daily Jovany Solis PA-C   40 mg at 01/01/25 0814    ipratropium-albuterol (DUO-NEB) nebulizer solution 3 mL  3 mL Nebulization Q6H PRN Natalie Woods APRN        levothyroxine (SYNTHROID, LEVOTHROID) tablet 175 mcg  175 mcg Oral Q AM Cuate Washington APRN   175 mcg at 01/01/25 0607    lisinopril (PRINIVIL,ZESTRIL) tablet 40 mg  40 mg Oral Daily Tej Parada APRN   40 mg at 01/01/25 0814    Magnesium Standard Dose Replacement - Follow Nurse / BPA Driven Protocol   Not Applicable PRN Natalie Woods APRN        mupirocin (BACTROBAN) 2 % nasal ointment 1 Application  1 Application Each Nare BID Natalie Woods APRN   1 Application at 01/01/25 0814    nitroglycerin (NITROSTAT) SL tablet 0.4 mg  0.4 mg Sublingual Q5 Min PRN Natalie Woods APRN        ondansetron (ZOFRAN) injection 4 mg  4 mg Intravenous Q6H PRN Natalie Woods APRN        [START ON 1/2/2025] pantoprazole (PROTONIX) EC tablet 40 mg  40 mg Oral Q AM Chet Littlejohn MD        Phosphorus Replacement - Follow Nurse / BPA Driven Protocol   Not Applicable PRN Natalie Woods APRN        Potassium Replacement - Follow Nurse / BPA Driven Protocol   Not Applicable PRN Natalie Woods APRN        sodium chloride 0.9 % flush 10 mL  10 mL Intravenous Q12H Natalie Woods APRN   10 mL at 01/01/25 0815    sodium chloride 0.9 % flush 10 mL  10 mL Intravenous PRN Natalie Woods APRN        sodium chloride 0.9 % infusion 40 mL  40 mL Intravenous PRN Natalie Woods APRN        topiramate (TOPAMAX) tablet 50 mg  50 mg Oral BID Jovany Solis PA-C   50 mg at 01/01/25 0814             Objective      Vital Signs  Temp:  [98.1 °F (36.7 °C)-99.3 °F (37.4 °C)] 99.3 °F (37.4 °C)  Heart Rate:   [] 91  Resp:  [14-27] 25  BP: ()/() 141/96    Physical Exam:    HEENT:  Neck is supple  CVS:  RRR  Lungs:  CTA - B/L  Abd:  NT/ND  Ext:  No edema  Skin:  No rashes    Pertinent Neuro Exam:  Mental Status:    -Awake, Alert, Oriented X 3  -No word finding difficulties  -No aphasia  -No dysarthria  -Follows simple and complex commands     CN II:  Visual fields full.  Pupils equally reactive to light  CN III, IV, VI:  Extraocular Muscles full with no signs of nystagmus  CN V:  Facial sensory is symmetric with no asymmetries.  CN VII:  Facial motor symmetric  CN VIII:  Gross hearing intact bilaterally  CN IX:  Palate elevates symmetrically  CN X:  Palate elevates symmetrically  CN XI:  Shoulder shrug symmetric  CN XII:  Tongue is midline on protrusion     Motor: (strength out of 5:  1= minimal movement, 2 = movement in plane of gravity, 3 = movement against gravity, 4 = movement against some resistance, 5 = full strength)        -5- out of 5 strength throughout in all 4 extremities with no appreciation for distal weakness.  No appreciation for fatigability.     DTR:  -Areflexic throughout in all 4 extremities.  Downgoing toes bilaterally.     Sensory:  -Endorses numbness to light touch and pinprick over entire body.  There is no proximal to distal changes.  This occurs all the way through her chin and face.     Coordination:  -She seems to have choreiform type movements that are seen throughout my examination.  Also, when the MRI was attempted the patient had multiple rounds of Ativan and still had continuous movement.  She has some past-pointing on finger-nose     Gait  -Not tested  Last nurse assessment:  Interval: baseline  1a. Level of Consciousness: 0-->Alert, keenly responsive  1b. LOC Questions: 0-->Answers both questions correctly  1c. LOC Commands: 0-->Performs both tasks correctly  2. Best Gaze: 0-->Normal  3. Visual: 0-->No visual loss  4. Facial Palsy: 0-->Normal symmetrical movements  5a.  Motor Arm, Left: 0-->No drift, limb holds 90 (or 45) degrees for full 10 secs  5b. Motor Arm, Right: 0-->No drift, limb holds 90 (or 45) degrees for full 10 secs  6a. Motor Leg, Left: 0-->No drift, leg holds 30 degree position for full 5 secs  6b. Motor Leg, Right: 0-->No drift, leg holds 30 degree position for full 5 secs  7. Limb Ataxia: 0-->Absent  8. Sensory: 1-->Mild-to-moderate sensory loss, patient feels pinprick is less sharp or is dull on the affected side, or there is a loss of superficial pain with pinprick, but patient is aware of being touched  9. Best Language: 0-->No aphasia, normal  10. Dysarthria: 0-->Normal  11. Extinction and Inattention (formerly Neglect): 0-->No abnormality    Total (NIH Stroke Scale): 1       Results Review:      Labs:    Potassium on admission was 2.3  Sodium on admission was 141  Calcium normal at 8.8  TSH decreased at 0.038  T4 elevated at 1.88  CK level normal at 22  Phosphorus level currently normal at 2.6  Magnesium level low at 1.6  Urine drug screen positive for methamphetamines  Alcohol level 2 days ago was 0.065.  7 months ago was 0.154 and 10 months ago was 0.149  Serial plasmin level was negative  Lumbar puncture showed evidence of 1 white cell, 0 red cells, a protein of 33, and a glucose of 64    Imaging:  MRI of the brain 12/30 with and without contrast showed no acute findings  MRI of cervical spine with without contrast on 12/28 showed movement artifact but no signs of obvious signal abnormalities in the cord  MRI of the thoracic spine on 12/28 once again showed motion artifact but no significant cord abnormalities.  Endoscopy performed 12/30 was unremarkable      Assessment/Plan     Hospital Problem List      Methamphetamine abuse    Hypercholesterolemia with hypertriglyceridemia    Raynaud's disease without gangrene    Ex-smoker    Non-occlusive coronary artery disease    Primary hypertension    Hypokalemia    ETOH abuse    Hypothyroidism    Hypothyroidism     Gait disturbance    Substance abuse    Severe protein-calorie malnutrition    Gluten enteropathy    Impression:  Full body numbness of undetermined etiology.  I see no evidence of spinal cord abnormalities on the cervical or thoracic cords.  It does not localize well to these given the fact that she has facial numbness as well.  An MRI of the brain showed no evidence of abnormalities ruling out CNS structural abnormalities.  A lumbar puncture shows no evidence of elevated protein to suggest an acute demyelinating polyneuropathy.  She does have areflexia noted which could be consistent with an acute neuropathy still.  I think it may be reasonable to consider doing a nerve conduction study tomorrow if possible.  Choreiform like movements: Unclear etiology.  [place]'s disease testing is pending.  Copper and zinc levels are pending.  Serial plasma level is negative for Osvaldo's disease  Concern for acute GI bleed -hemoglobin is stabilized and GI has performed an endoscopy showing no acute findings.  They have now signed off.  LFT elevation of uncertain etiology.  Primary team questioning the possibility of celiac disease with previous positive antibodies.  It may be reasonable to ask GI to weigh in on this.  I think her alcohol usage and methamphetamine usage also may be behind LFT elevation.  Hypokalemia  Low magnesium  Altered thyroid panel  Significant alcohol usage  Methamphetamine usage    Plan:  Follow-up copper and zinc levels  Continue to follow LP levels as they return  May consider nerve conduction study tomorrow  We will I do not believe this to be high yield I think it would be reasonable to discontinue Topamax.  This can cause paresthesias.  This will be done with a concern that the patient is articulating numbness but means paresthesias.  She shows no improvement off Topamax this can be restarted.  Physical and Occupational Therapy consultations  Clear to move out of critical care unit if a bed becomes  available.      Medical Decision Making    Number/Complexity of Problems  Moderate  1 undiagnosed new problem with uncertain prognosis -   1 acute illness with systemic symptoms -   High  1 acute or chronic illness that poses a threat to life/body function -   High    MDM Data  Moderate - 1/3 categories  Extensive - 2/3 categories    Category 1: 3 of the following  Review of external notes  Review of results  Ordering of each unique test  Independent historian  Category 2:  Independent interpretation of test (ex: imaging)  Category 3:  Discussion of management with another provider    Next of     Treatment Plan  Moderate - Prescription Drug management  High  Drug therapy requiring intensive monitoring for toxicity  Decision regarding hospitalization or escalation of care  De-escalate care/DNR decisions         Luis Gifford MD  01/01/25  11:53 CST

## 2025-01-01 NOTE — PLAN OF CARE
Problem: Adult Inpatient Plan of Care  Goal: Plan of Care Review  Outcome: Not Progressing  Goal: Patient-Specific Goal (Individualized)  Outcome: Not Progressing  Goal: Absence of Hospital-Acquired Illness or Injury  Outcome: Not Progressing  Intervention: Identify and Manage Fall Risk  Recent Flowsheet Documentation  Taken 1/1/2025 0420 by Stephanie Zuniga RN  Safety Promotion/Fall Prevention: safety round/check completed  Taken 1/1/2025 0300 by Stephanie Zuniga RN  Safety Promotion/Fall Prevention: safety round/check completed  Taken 1/1/2025 0200 by Stephanie Zuniga RN  Safety Promotion/Fall Prevention: safety round/check completed  Taken 1/1/2025 0100 by Stephanie Zuniga RN  Safety Promotion/Fall Prevention: safety round/check completed  Taken 12/31/2024 2345 by Stephanie Zuniga RN  Safety Promotion/Fall Prevention: safety round/check completed  Taken 12/31/2024 2300 by Stephanie Zuniga RN  Safety Promotion/Fall Prevention: safety round/check completed  Taken 12/31/2024 2200 by Stephanie Zuniga RN  Safety Promotion/Fall Prevention: safety round/check completed  Taken 12/31/2024 2100 by Stephanie Zuniga RN  Safety Promotion/Fall Prevention: safety round/check completed  Taken 12/31/2024 2000 by Stephanie Zuniga RN  Safety Promotion/Fall Prevention: safety round/check completed  Intervention: Prevent Skin Injury  Recent Flowsheet Documentation  Taken 1/1/2025 0300 by Stephanie Zuniga RN  Body Position: position changed independently  Taken 1/1/2025 0100 by Stephanie Zuniga RN  Body Position: position changed independently  Taken 12/31/2024 2345 by Stephanie Zuniga RN  Body Position: position changed independently  Taken 12/31/2024 2300 by Stephanie Zuniga RN  Body Position: position changed independently  Taken 12/31/2024 2000 by Stephanie Zuniga RN  Body Position: position changed independently  Intervention: Prevent and Manage VTE (Venous Thromboembolism) Risk  Recent Flowsheet Documentation  Taken 1/1/2025 0420 by Nadia  LENORE Brown  VTE Prevention/Management:   bilateral   SCDs (sequential compression devices) on  Taken 12/31/2024 2000 by Stephanie Zuniga RN  VTE Prevention/Management:   bilateral   SCDs (sequential compression devices) on  Goal: Optimal Comfort and Wellbeing  Outcome: Not Progressing  Intervention: Monitor Pain and Promote Comfort  Recent Flowsheet Documentation  Taken 12/31/2024 2200 by Stephanie Zuniga RN  Pain Management Interventions:   quiet environment facilitated   relaxation techniques promoted  Taken 12/31/2024 2128 by Stephanie Zuniga RN  Pain Management Interventions: pain medication given  Intervention: Provide Person-Centered Care  Recent Flowsheet Documentation  Taken 1/1/2025 0420 by Stephanie Zuniga RN  Trust Relationship/Rapport:   care explained   choices provided   questions answered   questions encouraged  Taken 12/31/2024 2345 by Stephanie Zuniga RN  Trust Relationship/Rapport:   care explained   choices provided   questions answered   questions encouraged  Taken 12/31/2024 2000 by Stephanie Zuniga RN  Trust Relationship/Rapport:   care explained   choices provided   questions answered   questions encouraged  Goal: Readiness for Transition of Care  Outcome: Not Progressing     Problem: Comorbidity Management  Goal: Maintenance of Asthma Control  Outcome: Not Progressing  Goal: Maintenance of COPD Symptom Control  Outcome: Not Progressing  Goal: Blood Pressure in Desired Range  Outcome: Not Progressing     Problem: Skin Injury Risk Increased  Goal: Skin Health and Integrity  Outcome: Not Progressing  Intervention: Optimize Skin Protection  Recent Flowsheet Documentation  Taken 1/1/2025 0420 by Stephanie Zuniga RN  Activity Management: bedrest  Taken 12/31/2024 2345 by Stephanie Zuniga, RN  Activity Management: bedrest  Head of Bed (HOB) Positioning: HOB elevated  Taken 12/31/2024 2300 by Stephanie Zuniga RN  Head of Bed (HOB) Positioning: HOB lowered  Taken 12/31/2024 2000 by Stephanie Zuniga, RN  Activity  Management:   bedrest   activity encouraged  Head of Bed (HOB) Positioning: HOB elevated     Problem: Fall Injury Risk  Goal: Absence of Fall and Fall-Related Injury  Outcome: Not Progressing  Intervention: Promote Injury-Free Environment  Recent Flowsheet Documentation  Taken 1/1/2025 0420 by Stephanie Zuniga RN  Safety Promotion/Fall Prevention: safety round/check completed  Taken 1/1/2025 0300 by Stephanie Zuniga RN  Safety Promotion/Fall Prevention: safety round/check completed  Taken 1/1/2025 0200 by Stephanie Zuniga RN  Safety Promotion/Fall Prevention: safety round/check completed  Taken 1/1/2025 0100 by Stephanie Zuniga RN  Safety Promotion/Fall Prevention: safety round/check completed  Taken 12/31/2024 2345 by Stephanie Zuniga RN  Safety Promotion/Fall Prevention: safety round/check completed  Taken 12/31/2024 2300 by Stephanie Zuniga RN  Safety Promotion/Fall Prevention: safety round/check completed  Taken 12/31/2024 2200 by Stephanie Zuniga RN  Safety Promotion/Fall Prevention: safety round/check completed  Taken 12/31/2024 2100 by Stephanie Zuniga RN  Safety Promotion/Fall Prevention: safety round/check completed  Taken 12/31/2024 2000 by Stephanie Zuniga RN  Safety Promotion/Fall Prevention: safety round/check completed     Problem: Malnutrition  Goal: Improved Nutritional Intake  Outcome: Not Progressing   Goal Outcome Evaluation:

## 2025-01-02 ENCOUNTER — APPOINTMENT (OUTPATIENT)
Dept: NEUROLOGY | Facility: HOSPITAL | Age: 50
DRG: 640 | End: 2025-01-02
Payer: COMMERCIAL

## 2025-01-02 LAB
ACE CSF-CCNC: <1.5 U/L (ref 0–3.1)
ALBUMIN SERPL-MCNC: 3.1 G/DL (ref 3.5–5.2)
ALBUMIN/GLOB SERPL: 0.9 G/DL
ALP SERPL-CCNC: 466 U/L (ref 39–117)
ALT SERPL W P-5'-P-CCNC: 23 U/L (ref 1–33)
ANION GAP SERPL CALCULATED.3IONS-SCNC: 11 MMOL/L (ref 5–15)
AST SERPL-CCNC: 39 U/L (ref 1–32)
BASOPHILS # BLD AUTO: 0.08 10*3/MM3 (ref 0–0.2)
BASOPHILS NFR BLD AUTO: 0.6 % (ref 0–1.5)
BILIRUB SERPL-MCNC: 1 MG/DL (ref 0–1.2)
BUN SERPL-MCNC: 7 MG/DL (ref 6–20)
BUN/CREAT SERPL: 12.7 (ref 7–25)
CALCIUM SPEC-SCNC: 8.7 MG/DL (ref 8.6–10.5)
CHLORIDE SERPL-SCNC: 99 MMOL/L (ref 98–107)
CO2 SERPL-SCNC: 25 MMOL/L (ref 22–29)
CREAT SERPL-MCNC: 0.55 MG/DL (ref 0.57–1)
DEPRECATED RDW RBC AUTO: 64.5 FL (ref 37–54)
EGFRCR SERPLBLD CKD-EPI 2021: 111.1 ML/MIN/1.73
EOSINOPHIL # BLD AUTO: 0.17 10*3/MM3 (ref 0–0.4)
EOSINOPHIL NFR BLD AUTO: 1.4 % (ref 0.3–6.2)
ERYTHROCYTE [DISTWIDTH] IN BLOOD BY AUTOMATED COUNT: 17.2 % (ref 12.3–15.4)
GLOBULIN UR ELPH-MCNC: 3.3 GM/DL
GLUCOSE SERPL-MCNC: 108 MG/DL (ref 65–99)
HCT VFR BLD AUTO: 26.8 % (ref 34–46.6)
HGB BLD-MCNC: 8.3 G/DL (ref 12–15.9)
IMM GRANULOCYTES # BLD AUTO: 0.17 10*3/MM3 (ref 0–0.05)
IMM GRANULOCYTES NFR BLD AUTO: 1.4 % (ref 0–0.5)
LYMPHOCYTES # BLD AUTO: 2.61 10*3/MM3 (ref 0.7–3.1)
LYMPHOCYTES NFR BLD AUTO: 20.9 % (ref 19.6–45.3)
MAGNESIUM SERPL-MCNC: 2 MG/DL (ref 1.6–2.6)
MCH RBC QN AUTO: 31.6 PG (ref 26.6–33)
MCHC RBC AUTO-ENTMCNC: 31 G/DL (ref 31.5–35.7)
MCV RBC AUTO: 101.9 FL (ref 79–97)
MONOCYTES # BLD AUTO: 2.26 10*3/MM3 (ref 0.1–0.9)
MONOCYTES NFR BLD AUTO: 18.1 % (ref 5–12)
NEUTROPHILS NFR BLD AUTO: 57.6 % (ref 42.7–76)
NEUTROPHILS NFR BLD AUTO: 7.19 10*3/MM3 (ref 1.7–7)
NRBC BLD AUTO-RTO: 0.3 /100 WBC (ref 0–0.2)
PHOSPHATE SERPL-MCNC: 4.1 MG/DL (ref 2.5–4.5)
PLATELET # BLD AUTO: 306 10*3/MM3 (ref 140–450)
PMV BLD AUTO: 11 FL (ref 6–12)
POTASSIUM SERPL-SCNC: 4.2 MMOL/L (ref 3.5–5.2)
PROT SERPL-MCNC: 6.4 G/DL (ref 6–8.5)
RBC # BLD AUTO: 2.63 10*6/MM3 (ref 3.77–5.28)
SODIUM SERPL-SCNC: 135 MMOL/L (ref 136–145)
WBC NRBC COR # BLD AUTO: 12.48 10*3/MM3 (ref 3.4–10.8)

## 2025-01-02 PROCEDURE — 85025 COMPLETE CBC W/AUTO DIFF WBC: CPT | Performed by: INTERNAL MEDICINE

## 2025-01-02 PROCEDURE — 95910 NRV CNDJ TEST 7-8 STUDIES: CPT

## 2025-01-02 PROCEDURE — 80053 COMPREHEN METABOLIC PANEL: CPT | Performed by: INTERNAL MEDICINE

## 2025-01-02 PROCEDURE — 25010000002 ENOXAPARIN PER 10 MG: Performed by: INTERNAL MEDICINE

## 2025-01-02 PROCEDURE — 84100 ASSAY OF PHOSPHORUS: CPT | Performed by: INTERNAL MEDICINE

## 2025-01-02 PROCEDURE — 95910 NRV CNDJ TEST 7-8 STUDIES: CPT | Performed by: PSYCHIATRY & NEUROLOGY

## 2025-01-02 PROCEDURE — 97165 OT EVAL LOW COMPLEX 30 MIN: CPT

## 2025-01-02 PROCEDURE — 97162 PT EVAL MOD COMPLEX 30 MIN: CPT | Performed by: PHYSICAL THERAPIST

## 2025-01-02 PROCEDURE — 99233 SBSQ HOSP IP/OBS HIGH 50: CPT | Performed by: PSYCHIATRY & NEUROLOGY

## 2025-01-02 PROCEDURE — 83735 ASSAY OF MAGNESIUM: CPT | Performed by: INTERNAL MEDICINE

## 2025-01-02 RX ADMIN — Medication 1 APPLICATION: at 08:20

## 2025-01-02 RX ADMIN — CHOLESTYRAMINE 4 G: 4 POWDER, FOR SUSPENSION ORAL at 12:05

## 2025-01-02 RX ADMIN — Medication 10 ML: at 20:50

## 2025-01-02 RX ADMIN — FLUOXETINE HYDROCHLORIDE 40 MG: 20 CAPSULE ORAL at 08:20

## 2025-01-02 RX ADMIN — LISINOPRIL 40 MG: 10 TABLET ORAL at 08:20

## 2025-01-02 RX ADMIN — LEVOTHYROXINE SODIUM 175 MCG: 0.17 TABLET ORAL at 06:25

## 2025-01-02 RX ADMIN — Medication 1 APPLICATION: at 20:50

## 2025-01-02 RX ADMIN — CARVEDILOL 25 MG: 25 TABLET, FILM COATED ORAL at 08:20

## 2025-01-02 RX ADMIN — PANTOPRAZOLE SODIUM 40 MG: 40 TABLET, DELAYED RELEASE ORAL at 06:25

## 2025-01-02 RX ADMIN — ENOXAPARIN SODIUM 40 MG: 100 INJECTION SUBCUTANEOUS at 08:20

## 2025-01-02 RX ADMIN — Medication 10 ML: at 08:22

## 2025-01-02 NOTE — PLAN OF CARE
Goal Outcome Evaluation:  Plan of Care Reviewed With: patient        Progress: no change  Outcome Evaluation: Pt A&Ox4. Up stand-by ast to BR. Unsteady. C/o of n/t in upper thighs and lower abd. Call light in reach. Safety maintained.

## 2025-01-02 NOTE — PROGRESS NOTES
"    [facility] Medicine Services  INPATIENT PROGRESS NOTE    Patient Name: Minnie Bang  Date of Admission: 12/28/2024  Today's Date: 01/02/25  Length of Stay: 4  Primary Care Physician: Margi Lemus APRN    Subjective   Chief Complaint: f/u   HPI    at bedside and a bit surprise.  I introduced myself and my role in her care.   I mentioned to both of them the findings of test and recommendations by intensivist and neurologist.  They said they weren't told about the diagnosis.   I mentioned it to them and showed on my phone the information written  She said \"there is no way (I)could have it\" -- she refers to amphetamine   indicates they drink \"fireball\"  Current goal is to check functional status.   Work up per neurology is completed  Therapist yet to see patient     I reviewed the progress note as outlined by Dr. Littlejohn:    he is a 49 y.o. female who denies drug use but tested positive for methamphetamine, also a reported former smoker who reportedly stopped 10 months ago with a 30-pack-year history who was admitted 12/28/2024 after presenting to the emergency department with numbness in her fingers and abdomen.     12/29/24: Patient was confused.  Concern for alcohol abuse, substance abuse noted.  Drug screen showed amphetamine/methamphetamine positive.  Plan for MRI then transfer to floor.     12/30/24: I took over care.  Discussed with neurology.  Patient was not transferred to the floor due to concerns about possible [place]'s disease.  They request elective intubation tomorrow.  We will make that happen tomorrow.     12/31: Hemoglobin declined slightly overnight.  As noted above:  possible chronic inflammatory disease with fatty infiltration of the entire colon.  Had some diarrhea but no obvious blood overnight.  Consulted GI, PPI and octreotide started.  Plan for intubation today.  Will try to coordinate with GI whether she can get a scope at the same " time.     1/1/25: LP and EGD performed yesterday so far noncontributory.  Plan for nerve conduction velocities tomorrow.  Still unclear cause of patient's paresthesia: perioral and abdominal.  CT abdomen today shows same findings as previous: Fatty liver and some infiltration of her colon.  Although blood work concerning for possible celiac disease.  Changed diet to avoid gluten.  Appreciate neurology input.  Patient ready for transfer to floor.    Review of Systems   All pertinent negatives and positives are as above. All other systems have been reviewed and are negative unless otherwise stated.     Objective    Temp:  [98.2 °F (36.8 °C)-99.5 °F (37.5 °C)] 98.8 °F (37.1 °C)  Heart Rate:  [86-98] 86  Resp:  [18-22] 18  BP: (101-135)/(67-83) 124/83  Physical Exam  No distress  GEN: Awake, alert, interactive, in NAD  HEENT: Atraumatic, PERRLA, EOMI, Anicteric, Trachea midline  Lungs: CTAB, no wheezing/rales/rhonchi  Heart: RRR, +S1/s2, no rub  ABD: soft, nt/nd, +BS, no guarding/rebound  Extremities: atraumatic, no cyanosis, no edema  Skin: no rashes or lesions  Neuro:  defer to neurologist   Psych: normal mood & affect        Results Review:  I have reviewed the labs, radiology results, and diagnostic studies.    Laboratory Data:   Results from last 7 days   Lab Units 01/02/25  0407 01/01/25  0639 12/31/24 2027   WBC 10*3/mm3 12.48* 14.46* 14.39*   HEMOGLOBIN g/dL 8.3* 8.5* 9.4*   HEMATOCRIT % 26.8* 26.3* 29.9*   PLATELETS 10*3/mm3 306 343 381        Results from last 7 days   Lab Units 01/02/25  0407 01/01/25  0639 12/31/24  0351   SODIUM mmol/L 135* 136 135*   POTASSIUM mmol/L 4.2 4.5 4.6   CHLORIDE mmol/L 99 100 99   CO2 mmol/L 25.0 25.0 29.0   BUN mg/dL 7 7 7   CREATININE mg/dL 0.55* 0.57 0.44*   CALCIUM mg/dL 8.7 8.1* 8.0*   BILIRUBIN mg/dL 1.0 0.7 0.4   ALK PHOS U/L 466* 544* 586*   ALT (SGPT) U/L 23 31 42*   AST (SGOT) U/L 39* 70* 141*   GLUCOSE mg/dL 108* 131* 94       Culture Data:   No results found for:  "\"BLOODCX\", \"URINECX\", \"WOUNDCX\", \"MRSACX\", \"RESPCX\", \"STOOLCX\"    Radiology Data:   Imaging Results (Last 24 Hours)       ** No results found for the last 24 hours. **            I have reviewed the patient's current medications.     Assessment/Plan   Assessment  Active Hospital Problems    Diagnosis     **Methamphetamine abuse     Gluten enteropathy     Severe protein-calorie malnutrition     Gait disturbance     Substance abuse     Hypothyroidism     Hypothyroidism     ETOH abuse     Hypokalemia     Primary hypertension     Non-occlusive coronary artery disease     Ex-smoker     Hypercholesterolemia with hypertriglyceridemia     Raynaud's disease without gangrene              Medical Decision Making  Number and Complexity of problems:  Problem list to which patient came out from the unit  Altered mental status with abnormal motion and sensation elevated white count  Fatty infiltration of the liver  Hypocalcemia  Hypokalemia  Methamphetamine abuse  Elevated carboxyhemoglobin on patient's ABG likely tobacco smoke  Elevated liver enzyme  Hypothyroidism  Hypertension  Hyperlipidemia  Severe protein calorie malnutrition      Treatment Plan  It is my understanding from reading through Ирина's note that  The full body numbness is still undetermined etiology patient had multiple neurologic workup and has completed it.  Etiology remains unknown but find no evidence of active neurologic disease.  So far patient remains to be evaluated by therapist when I saw her and her   Discussed with social service as well as nurse.  Discharge planning in process  Continue present management  Medications reviewed    carvedilol, 25 mg, Oral, BID With Meals  Chlorhexidine Gluconate Cloth, 1 Application, Topical, Q24H  cholestyramine light, 1 packet, Oral, Daily  enoxaparin, 40 mg, Subcutaneous, Q24H  FLUoxetine, 40 mg, Oral, Daily  levothyroxine, 175 mcg, Oral, Q AM  lisinopril, 40 mg, Oral, Daily  mupirocin, 1 Application, Each " Nare, BID  pantoprazole, 40 mg, Oral, Q AM  senna-docusate sodium, 2 tablet, Oral, BID  sodium chloride, 10 mL, Intravenous, Q12H        Conditions and Status  Fair, stable     MDM Data  External documents reviewed: Reviewed epic record  Cardiac tracing (EKG, telemetry) interpretation: -  Radiology interpretation: Reviewed  Labs reviewed: Yes  Any tests that were considered but not ordered: None-     Decision rules/scores evaluated (example [id number], [place], etc): -     Discussed with: Patient, , social service and nurse     Care Planning  Shared decision making: Patient  and consultant  Code status and discussions: Full code    Disposition  Social Determinants of Health that impact treatment or disposition: None identified at this time  I expect the patient to be discharged to (TBD)  Will depend on social issue and functional capacity plus decision by patient with family        Electronically signed by Johan Toney MD, 01/02/25, 14:11 CST.

## 2025-01-02 NOTE — THERAPY EVALUATION
Patient Name: Minnie Bang  : 1975    MRN: [id number]                              Today's Date: 2025       Admit Date: 2024    Visit Dx:     ICD-10-CM ICD-9-CM   1. Numbness and tingling  R20.0 782.0    R20.2    2. Alcoholic intoxication with complication  F10.929 305.00   3. Methamphetamine abuse  F15.10 305.70   4. Hypokalemia  E87.6 276.8   5. Hypomagnesemia  E83.42 275.2   6. Gait disturbance  R26.9 781.2   7. Impaired mobility [Z74.09]  Z74.09 799.89     Patient Active Problem List   Diagnosis    Hypercholesterolemia with hypertriglyceridemia    Raynaud's disease without gangrene    Ex-smoker    Chest pain in adult    SORTO (dyspnea on exertion)    Mixed simple and mucopurulent chronic bronchitis    Pulmonary emphysema    Non-occlusive coronary artery disease    Primary hypertension    Coronary-myocardial bridge    Bilateral lower extremity edema    Bicuspid aortic valve    Vocal cord polyps    Tobacco use disorder, continuous    Neoplasm of uncertain behavior    Hypokalemia    Dysphagia    Heartburn    Nonspecific colitis    Diarrhea    ETOH abuse    Steatosis of liver, advanced    Intractable vomiting with nausea    Hypothyroidism    Nausea & vomiting, chronic    Transaminitis    Hypothyroidism    Gait disturbance    Substance abuse    Severe protein-calorie malnutrition    Methamphetamine abuse    Gluten enteropathy     Past Medical History:   Diagnosis Date    Abnormal ECG 2023    Anxiety     Arthritis     back    Asthma     Bicuspid aortic valve 2023    COPD (chronic obstructive pulmonary disease)     Coronary-myocardial bridge 2023    CTS (carpal tunnel syndrome) 2019    Dental disease     Depression     Diastolic dysfunction     Dizziness     Emphysema of lung     GERD (gastroesophageal reflux disease)     Headache     Hyperlipidemia     Hypertension     Hypothyroidism     Mixed simple and mucopurulent chronic bronchitis 2023    Non-occlusive  coronary artery disease 05/04/2023    NSTEMI (non-ST elevated myocardial infarction) (HCC)     Pneumonia 2022    Pulmonary emphysema 04/21/2023    Shingles 2018    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     Past Surgical History:   Procedure Laterality Date    APPENDECTOMY      CARDIAC CATHETERIZATION N/A 11/04/2017    Procedure: Coronary angiography;  Surgeon: Luis Colvin MD;  Location: [facility] CATH INVASIVE LOCATION;  Service:     CARDIAC CATHETERIZATION N/A 05/31/2023    Procedure: Left Heart Cath;  Surgeon: Steffen Rossi MD;  Location: [facility] CATH INVASIVE LOCATION;  Service: Cardiology;  Laterality: N/A;    CARPAL TUNNEL RELEASE  2019    COLONOSCOPY N/A 8/2/2024    Procedure: COLONOSCOPY WITH ANESTHESIA;  Surgeon: Marty Browning MD;  Location: [facility] ENDOSCOPY;  Service: Gastroenterology;  Laterality: N/A;  pre op pancolitis    ENDOSCOPY N/A 7/8/2024    Procedure: ESOPHAGOGASTRODUODENOSCOPY WITH ANESTHESIA;  Surgeon: Gordy Wang MD;  Location: [facility] ENDOSCOPY;  Service: Gastroenterology;  Laterality: N/A;  pre: dysphagia.   post: esophagus dilated.   no PCP    HYSTERECTOMY      PANENDOSCOPY N/A 5/30/2024    Procedure: DIRECT LARYNGOSCOPY WITH BIOPSY OF VOCAL CORD POLYPS WITH POSSIBLE TRACHEOSTOMY;  Surgeon: Mendez Padilla MD;  Location: [facility] OR;  Service: ENT;  Laterality: N/A;    IN RT/LT HEART CATHETERS N/A 11/04/2017    Procedure: Percutaneous Coronary Intervention;  Surgeon: Luis Colvin MD;  Location: [facility] CATH INVASIVE LOCATION;  Service: Cardiovascular    THYROID SURGERY      TOTAL THYROIDECTOMY      TRACHEOSTOMY N/A 5/30/2024    Procedure: POSSIBLE TRACHEOSTOMY;  Surgeon: Mendez Padilla MD;  Location: [facility] OR;  Service: ENT;  Laterality: N/A;      General Information       Row Name 01/02/25 1417          Physical Therapy Time and Intention    Document Type evaluation  -SB     Mode of Treatment physical therapy  -SB       Row Name 01/02/25 1417          General Information     Patient Profile Reviewed yes  -SB     Prior Level of Function independent:;all household mobility;ADL's;home management;cooking;cleaning;driving;shopping  walk in shower  -SB     Existing Precautions/Restrictions fall  -SB     Barriers to Rehab medically complex  -SB       Row Name 01/02/25 1417          Living Environment    People in Home spouse  -SB       Row Name 01/02/25 1417          Home Main Entrance    Number of Stairs, Main Entrance four  patient showers at family members house- 2 steps to enter with no handrails and 8 steps to get to the bathroom with B handrails  -SB     Stair Railings, Main Entrance railings on both sides of stairs  -SB       Row Name 01/02/25 1417          Stairs Within Home, Primary    Number of Stairs, Within Home, Primary none  -SB       Row Name 01/02/25 1417          Cognition    Orientation Status (Cognition) oriented x 4  -SB       Row Name 01/02/25 1417          Safety Issues/Impairments Affecting Functional Mobility    Impairments Affecting Function (Mobility) endurance/activity tolerance;strength;shortness of breath;sensation/sensory awareness;balance;postural/trunk control  -SB               User Key  (r) = Recorded By, (t) = Taken By, (c) = Cosigned By      Initials Name Provider Type    SB Jessica Slater PT DPT Physical Therapist                   Mobility       Row Name 01/02/25 1417          Bed Mobility    Bed Mobility supine-sit;sit-supine  -SB     Supine-Sit [place] (Bed Mobility) modified independence  -SB     Sit-Supine [place] (Bed Mobility) modified independence  -SB     Assistive Device (Bed Mobility) head of bed elevated  -SB       Row Name 01/02/25 1417          Sit-Stand Transfer    Sit-Stand [place] (Transfers) contact guard;verbal cues  -SB     Assistive Device (Sit-Stand Transfers) walker, front-wheeled  -SB       Row Name 01/02/25 1417          Gait/Stairs (Locomotion)    [place] Level (Gait) verbal cues;contact guard;moderate assist  (50% patient effort)  -SB     Assistive Device (Gait) walker, front-wheeled  -SB     Patient was able to Ambulate yes  -SB     Distance in Feet (Gait) 40  -SB     Deviations/Abnormal Patterns (Gait) gait speed decreased;base of support, narrow;scissoring  -SB     Bilateral Gait Deviations forward flexed posture  -SB     Comment, (Gait/Stairs) unsteady gait that worsens with fatigue; pt begins with narrow LIANE that progresses to scissoring upon return to room; pt with difficulty maintaining upright posture during gait or while seated  -SB               User Key  (r) = Recorded By, (t) = Taken By, (c) = Cosigned By      Initials Name Provider Type    SB Jessica Slater, NONA DPT Physical Therapist                   Obj/Interventions       Row Name 01/02/25 1417          Range of Motion Comprehensive    General Range of Motion bilateral lower extremity ROM WFL  -SB       Row Name 01/02/25 1417          Strength Comprehensive (MMT)    General Manual Muscle Testing (MMT) Assessment lower extremity strength deficits identified  -SB     Comment, General Manual Muscle Testing (MMT) Assessment B hip flex 4-/5, all others 4/5  -SB       Row Name 01/02/25 1417          Motor Skills    Motor Skills coordination  -SB     Coordination bilateral;lower extremity;WFL;heel to shin  -SB       Row Name 01/02/25 1417          Balance    Balance Assessment sitting static balance;sitting dynamic balance;standing static balance;standing dynamic balance  -SB     Static Sitting Balance standby assist  -SB     Dynamic Sitting Balance standby assist  -SB     Position, Sitting Balance sitting edge of bed;unsupported  -SB     Static Standing Balance contact guard  -SB     Dynamic Standing Balance minimal assist  -SB     Position/Device Used, Standing Balance supported;walker, front-wheeled  -SB       Row Name 01/02/25 1417          Sensory Assessment (Somatosensory)    Sensory Assessment (Somatosensory) other (see comments)  light touch sensation  intact BLE; proprioception deficit in B great toe  -SB               User Key  (r) = Recorded By, (t) = Taken By, (c) = Cosigned By      Initials Name Provider Type    Jessica Church PT DPT Physical Therapist                   Goals/Plan       Row Name 01/02/25 1417          Bed Mobility Goal 1 (PT)    Activity/Assistive Device (Bed Mobility Goal 1, PT) sit to supine;supine to sit;rolling to left;rolling to right  -SB     [place] Level/Cues Needed (Bed Mobility Goal 1, PT) independent  -SB     Time Frame (Bed Mobility Goal 1, PT) long term goal (LTG)  -SB     Progress/Outcomes (Bed Mobility Goal 1, PT) new goal  -SB       Row Name 01/02/25 1417          Transfer Goal 1 (PT)    Activity/Assistive Device (Transfer Goal 1, PT) sit-to-stand/stand-to-sit;bed-to-chair/chair-to-bed;walker, rolling  -SB     [place] Level/Cues Needed (Transfer Goal 1, PT) modified independence  -SB     Time Frame (Transfer Goal 1, PT) long term goal (LTG)  -SB     Progress/Outcome (Transfer Goal 1, PT) new goal  -SB       Row Name 01/02/25 1417          Gait Training Goal 1 (PT)    Activity/Assistive Device (Gait Training Goal 1, PT) gait (walking locomotion);decrease fall risk;diminish gait deviation;improve balance and speed;decrease asymmetrical patterns;walker, rolling  -SB     [place] Level (Gait Training Goal 1, PT) standby assist  -SB     Distance (Gait Training Goal 1, PT) 60  -SB     Time Frame (Gait Training Goal 1, PT) long term goal (LTG)  -SB     Progress/Outcome (Gait Training Goal 1, PT) new goal  -SB       Row Name 01/02/25 1417          Therapy Assessment/Plan (PT)    Planned Therapy Interventions (PT) balance training;strengthening;bed mobility training;gait training;patient/family education;transfer training;postural re-education  -SB               User Key  (r) = Recorded By, (t) = Taken By, (c) = Cosigned By      Initials Name Provider Type    Jessica Church PT DPT Physical Therapist                    Clinical Impression       Row Name 01/02/25 1417          Pain    Pretreatment Pain Rating 0/10 - no pain  -SB     Posttreatment Pain Rating 0/10 - no pain  -SB     Pre/Posttreatment Pain Comment c/o weakness, fatigue, full body numbness  -SB       Row Name 01/02/25 1417          Plan of Care Review    Plan of Care Reviewed With patient  -SB     Progress no change  -SB     Outcome Evaluation PT eval completed. Pt alert and oriented x4 with c/o weakness, fatigue and full body numbness. Pt reports independence at home prior to arrival. Today, pt performs bed mob with mod I and demos generalized weakness in BLE with greatest deficits in hip flexion. Pt has intact light touch sensation in BLE but does have impaired proprioception in B great toes. When sitting EOB, pt demos inc kyphotic posture with decreased head control in seated and standing despite VC for posture. Pt performs sit to stand t/f and gait training into hallway with CGA and RW, beginning with narrow LIANE that progresses to scissoring toward end of session. Pt fatigues quickly and is a high fall risk due to decreased balance and safety awareness. Pt will benefit from skilled PT to improve fxl mob, balance and endurance. Rec d/c SNF.  -SB       Row Name 01/02/25 1417          Therapy Assessment/Plan (PT)    Patient/Family Therapy Goals Statement (PT) improve function  -SB     Rehab Potential (PT) good  -SB     Criteria for Skilled Interventions Met (PT) yes;meets criteria;skilled treatment is necessary  -SB     Therapy Frequency (PT) 2 times/day  -SB     Predicted Duration of Therapy Intervention (PT) until d/c or goals met  -SB       Row Name 01/02/25 1417          Vital Signs    O2 Delivery Pre Treatment room air  -SB     O2 Delivery Intra Treatment room air  -SB     Post SpO2 (%) 93  -SB     O2 Delivery Post Treatment room air  -SB       Row Name 01/02/25 1417          Positioning and Restraints    Pre-Treatment Position in bed  -SB     Post  Treatment Position bed  -SB     In Bed notified nsg;side lying right;call light within reach;encouraged to call for assist;exit alarm on;with family/caregiver  -SB               User Key  (r) = Recorded By, (t) = Taken By, (c) = Cosigned By      Initials Name Provider Type    Jessica Church, PT DPT Physical Therapist                   Outcome Measures       Row Name 01/02/25 1417 01/02/25 0820       How much help from another person do you currently need...    Turning from your back to your side while in flat bed without using bedrails? 4  -SB 4  -CP    Moving from lying on back to sitting on the side of a flat bed without bedrails? 4  -SB 4  -CP    Moving to and from a bed to a chair (including a wheelchair)? 3  -SB 3  -CP    Standing up from a chair using your arms (e.g., wheelchair, bedside chair)? 3  -SB 3  -CP    Climbing 3-5 steps with a railing? 3  -SB 3  -CP    To walk in hospital room? 3  -SB 3  -CP    AM-PAC 6 Clicks Score (PT) 20  -SB 20  -CP    Highest Level of Mobility Goal 6 --> Walk 10 steps or more  -SB 6 --> Walk 10 steps or more  -CP      Row Name 01/02/25 1418 01/02/25 1417       Functional Assessment    Outcome Measure Options AM-PAC 6 Clicks Daily Activity (OT)  -AC AM-PAC 6 Clicks Basic Mobility (PT)  -SB              User Key  (r) = Recorded By, (t) = Taken By, (c) = Cosigned By      Initials Name Provider Type    Saqib Juarez, OTR/L, CNT Occupational Therapist    Jessica Church, PT DPT Physical Therapist    CP Zaina Garcia RN Registered Nurse                                 Physical Therapy Education       Title: PT OT SLP Therapies (In Progress)       Topic: Physical Therapy (In Progress)       Point: Mobility training (Done)       Learning Progress Summary            Patient Acceptance, E, VU by SB at 1/2/2025 1421    Comment: pt edu on POC, benefits of act and d/c plans                      Point: Home exercise program (Not Started)       Learner Progress:  Not  documented in this visit.              Point: Body mechanics (Not Started)       Learner Progress:  Not documented in this visit.              Point: Precautions (Done)       Learning Progress Summary            Patient Acceptance, E, VU by SB at 1/2/2025 1421    Comment: pt edu on POC, benefits of act and d/c plans                                      User Key       Initials Effective Dates Name Provider Type Discipline    SB 07/11/23 -  Jessica Slater, PT DPT Physical Therapist PT                  PT Recommendation and Plan  Planned Therapy Interventions (PT): balance training, strengthening, bed mobility training, gait training, patient/family education, transfer training, postural re-education  Progress: no change  Outcome Evaluation: PT eval completed. Pt alert and oriented x4 with c/o weakness, fatigue and full body numbness. Pt reports independence at home prior to arrival. Today, pt performs bed mob with mod I and demos generalized weakness in BLE with greatest deficits in hip flexion. Pt has intact light touch sensation in BLE but does have impaired proprioception in B great toes. When sitting EOB, pt demos inc kyphotic posture with decreased head control in seated and standing despite VC for posture. Pt performs sit to stand t/f and gait training into hallway with CGA and RW, beginning with narrow LIANE that progresses to scissoring toward end of session. Pt fatigues quickly and is a high fall risk due to decreased balance and safety awareness. Pt will benefit from skilled PT to improve fxl mob, balance and endurance. Rec d/c SNF.     Time Calculation:         PT Charges       Row Name 01/02/25 1605             Time Calculation    Start Time 1410  -SB      Stop Time 1450  -SB      Time Calculation (min) 40 min  -SB      PT Received On 01/02/25  -SB      PT Goal Re-Cert Due Date 01/12/25  -SB         Untimed Charges    PT Eval/Re-eval Minutes 40  -SB         Total Minutes    Untimed Charges Total Minutes 40   -SB       Total Minutes 40  -SB                User Key  (r) = Recorded By, (t) = Taken By, (c) = Cosigned By      Initials Name Provider Type    SB Jessica Slater, PT DPT Physical Therapist                  Therapy Charges for Today       Code Description Service Date Service Provider Modifiers Qty    52769411778 HC PT EVAL MOD COMPLEXITY 3 1/2/2025 Jessica Slater PT DPT GP 1            PT G-Codes  Outcome Measure Options: AM-PAC 6 Clicks Daily Activity (OT)  AM-PAC 6 Clicks Score (PT): 20  AM-PAC 6 Clicks Score (OT): 21  PT Discharge Summary  Anticipated Discharge Disposition (PT): skilled nursing facility    Jessica Slater PT DPT  1/2/2025

## 2025-01-02 NOTE — PLAN OF CARE
Goal Outcome Evaluation:  Plan of Care Reviewed With: patient        Progress: improving  Outcome Evaluation: Patient A/O x 4. VSS. Transfer from ICU at beginning of shift. Still c/o baseline numbness and tingling. Up x 1; unsteady. No PRNs requested. Safety maintained.

## 2025-01-02 NOTE — PLAN OF CARE
Goal Outcome Evaluation:  Plan of Care Reviewed With: patient        Progress: no change  Outcome Evaluation: OT palmer completed.  Pt alert and oriented x4.  Spouse present.  Pt typically independent at baseline.  Lives in motor home.  She had no c/o pain but c/o weakness and fatgiue and is SOA.  Pt came to EOB with SBA.  She has decreased head control noted with head falling into flexion and rotating toward R.  She is diffusely weak with BUE 4-/5.  Sensation significantly impaired including proprioception and light/deep touch localization.  Pt donned/doffed socks with SBA and good balance despite sensory deficits.  She stood with CGA and amb in hallway with rw.  As she walked a greater distance she became more fatigued and unsteady.  SOA also increased.  Pt is deemed a high fall risk.  Her O2 sat post activity was 92% on room air.  OT will continue to treat pt to educate on fall prevention, home safety and adaptations/home modifications.  Will also address decreased balance and weakness.  Recommend further rehab at discharge.

## 2025-01-02 NOTE — PLAN OF CARE
Goal Outcome Evaluation:  Plan of Care Reviewed With: patient        Progress: no change  Outcome Evaluation: PT eval completed. Pt alert and oriented x4 with c/o weakness, fatigue and full body numbness. Pt reports independence at home prior to arrival. Today, pt performs bed mob with mod I and demos generalized weakness in BLE with greatest deficits in hip flexion. Pt has intact light touch sensation in BLE but does have impaired proprioception in B great toes. When sitting EOB, pt demos inc kyphotic posture with decreased head control in seated and standing despite VC for posture. Pt performs sit to stand t/f and gait training into hallway with CGA and RW, beginning with narrow LIANE that progresses to scissoring toward end of session. Pt fatigues quickly and is a high fall risk due to decreased balance and safety awareness. Pt will benefit from skilled PT to improve fxl mob, balance and endurance. Rec d/c SNF.    Anticipated Discharge Disposition (PT): skilled nursing facility

## 2025-01-02 NOTE — THERAPY EVALUATION
Acute Care - Occupational Therapy Initial Evaluation  [facility]     Patient Name: Minnie Bang  : 1975  MRN: [id number]  Today's Date: 2025  Onset of Illness/Injury or Date of Surgery: 24  Date of Referral to OT: 25  Referring Physician: Dr. Gifford    Admit Date: 2024       ICD-10-CM ICD-9-CM   1. Numbness and tingling  R20.0 782.0    R20.2    2. Alcoholic intoxication with complication  F10.929 305.00   3. Methamphetamine abuse  F15.10 305.70   4. Hypokalemia  E87.6 276.8   5. Hypomagnesemia  E83.42 275.2   6. Gait disturbance  R26.9 781.2     Patient Active Problem List   Diagnosis    Hypercholesterolemia with hypertriglyceridemia    Raynaud's disease without gangrene    Ex-smoker    Chest pain in adult    SORTO (dyspnea on exertion)    Mixed simple and mucopurulent chronic bronchitis    Pulmonary emphysema    Non-occlusive coronary artery disease    Primary hypertension    Coronary-myocardial bridge    Bilateral lower extremity edema    Bicuspid aortic valve    Vocal cord polyps    Tobacco use disorder, continuous    Neoplasm of uncertain behavior    Hypokalemia    Dysphagia    Heartburn    Nonspecific colitis    Diarrhea    ETOH abuse    Steatosis of liver, advanced    Intractable vomiting with nausea    Hypothyroidism    Nausea & vomiting, chronic    Transaminitis    Hypothyroidism    Gait disturbance    Substance abuse    Severe protein-calorie malnutrition    Methamphetamine abuse    Gluten enteropathy     Past Medical History:   Diagnosis Date    Abnormal ECG 2023    Anxiety     Arthritis     back    Asthma     Bicuspid aortic valve 2023    COPD (chronic obstructive pulmonary disease)     Coronary-myocardial bridge 2023    CTS (carpal tunnel syndrome)     Dental disease     Depression     Diastolic dysfunction     Dizziness     Emphysema of lung     GERD (gastroesophageal reflux disease)     Headache     Hyperlipidemia     Hypertension      Hypothyroidism     Mixed simple and mucopurulent chronic bronchitis 04/18/2023    Non-occlusive coronary artery disease 05/04/2023    NSTEMI (non-ST elevated myocardial infarction) (HCC)     Pneumonia 2022    Pulmonary emphysema 04/21/2023    Shingles 2018    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     Past Surgical History:   Procedure Laterality Date    APPENDECTOMY      CARDIAC CATHETERIZATION N/A 11/04/2017    Procedure: Coronary angiography;  Surgeon: Luis Colvin MD;  Location: [facility] CATH INVASIVE LOCATION;  Service:     CARDIAC CATHETERIZATION N/A 05/31/2023    Procedure: Left Heart Cath;  Surgeon: Steffen Rossi MD;  Location: [facility] CATH INVASIVE LOCATION;  Service: Cardiology;  Laterality: N/A;    CARPAL TUNNEL RELEASE  2019    COLONOSCOPY N/A 8/2/2024    Procedure: COLONOSCOPY WITH ANESTHESIA;  Surgeon: Marty Browning MD;  Location: [facility] ENDOSCOPY;  Service: Gastroenterology;  Laterality: N/A;  pre op pancolitis    ENDOSCOPY N/A 7/8/2024    Procedure: ESOPHAGOGASTRODUODENOSCOPY WITH ANESTHESIA;  Surgeon: Gordy Wang MD;  Location: [facility] ENDOSCOPY;  Service: Gastroenterology;  Laterality: N/A;  pre: dysphagia.   post: esophagus dilated.   no PCP    HYSTERECTOMY      PANENDOSCOPY N/A 5/30/2024    Procedure: DIRECT LARYNGOSCOPY WITH BIOPSY OF VOCAL CORD POLYPS WITH POSSIBLE TRACHEOSTOMY;  Surgeon: Mendez Padilla MD;  Location: [facility] OR;  Service: ENT;  Laterality: N/A;    [place] RT/LT HEART CATHETERS N/A 11/04/2017    Procedure: Percutaneous Coronary Intervention;  Surgeon: Luis Colvin MD;  Location: [facility] CATH INVASIVE LOCATION;  Service: Cardiovascular    THYROID SURGERY      TOTAL THYROIDECTOMY      TRACHEOSTOMY N/A 5/30/2024    Procedure: POSSIBLE TRACHEOSTOMY;  Surgeon: Mendez Padilla MD;  Location: [facility] OR;  Service: ENT;  Laterality: N/A;         OT ASSESSMENT FLOWSHEET (Last 12 Hours)       OT Evaluation and Treatment       Row Name 01/02/25 1418 01/02/25 1024                OT Time  and Intention    Subjective Information complains of;weakness;fatigue;numbness  pins and needles sensation diffusely  -AC --       Document Type evaluation  -AC --       Mode of Treatment occupational therapy  -AC --       Session Not Performed -- patient unavailable for evaluation  -AC       Comment, Session Not Performed -- off the floor  -AC       Symptoms Noted During/After Treatment fatigue  -AC --          General Information    Patient Profile Reviewed yes  -AC --       Onset of Illness/Injury or Date of Surgery 12/28/24  -AC --       Referring Physician Dr. Gifford  - --       Prior Level of Function independent:;all household mobility;community mobility;gait;transfer;bed mobility;ADL's;home management;cooking;cleaning;driving  -AC --       Equipment Currently Used at Home none  - --       Existing Precautions/Restrictions fall  -AC --       Risks Reviewed LOB;increased discomfort;change in vital signs;increased drainage;lines disloged;patient:  -AC --       Benefits Reviewed improve function;increase independence;increase strength;increase balance;decrease pain;decrease risk of DVT;improve skin integrity;increase knowledge;patient:  -AC --       Barriers to Rehab medically complex  -AC --          Living Environment    Current Living Arrangements other (see comments)  motor home; walk in shower  -AC --       Home Accessibility stairs to enter home  -AC --       People in Home spouse  -AC --          Home Main Entrance    Number of Stairs, Main Entrance four  -AC --       Stair Railings, Main Entrance railings on both sides of stairs  -AC --       Stairs Comment, Main Entrance takes a shower at her MIL's house, 2 steps to enter home without rails, 8 stairs with 2 hand rails to get to second level  -AC --          Pain Assessment    Pretreatment Pain Rating 0/10 - no pain  -AC --       Posttreatment Pain Rating 0/10 - no pain  -AC --       Pain Management Interventions --  -AC --       Response to Pain  Interventions --  -AC --          Cognition    Orientation Status (Cognition) oriented x 4  -AC --       Follows Commands (Cognition) WFL  -AC --       Personal Safety Interventions fall prevention program maintained;gait belt;muscle strengthening facilitated;nonskid shoes/slippers when out of bed;supervised activity  -AC --          Range of Motion Comprehensive    General Range of Motion bilateral upper extremity ROM WFL  -AC --          Strength Comprehensive (MMT)    Comment, General Manual Muscle Testing (MMT) Assessment BUE 4-/5  -AC --          Sensory Assessment (Somatosensory)    Sensory Assessment (Somatosensory) bilateral UE  -AC --       Bilateral UE Sensory Assessment light touch awareness;light touch localization;proprioception;impaired  -AC --          Activities of Daily Living    BADL Assessment/Intervention lower body dressing  -AC --          Lower Body Dressing Assessment/Training    [place] Level (Lower Body Dressing) don;doff;socks;standby assist  -AC --       Position (Lower Body Dressing) edge of bed sitting  -AC --          BADL Safety/Performance    Impairments, BADL Safety/Performance balance;pain;range of motion;endurance/activity tolerance;strength  -AC --          Bed Mobility    Bed Mobility supine-sit  -AC --       Supine-Sit [place] (Bed Mobility) modified independence  -AC --       Assistive Device (Bed Mobility) bed rails;head of bed elevated  -AC --          Functional Mobility    Functional Mobility- Ind. Level minimum assist (75% patient effort)  -AC --       Functional Mobility- Device walker, front-wheeled  -AC --       Functional Mobility- Comment in hallway, to bed, unsteady with ambulation  -AC --          Transfer Assessment/Treatment    Transfers sit-stand transfer;stand-sit transfer  -AC --          Sit-Stand Transfer    Sit-Stand [place] (Transfers) contact guard;verbal cues  -AC --       Assistive Device (Sit-Stand Transfers) walker, front-wheeled  -AC  --          Stand-Sit Transfer    Stand-Sit [place] (Transfers) contact guard;verbal cues  -AC --       Assistive Device (Stand-Sit Transfers) walker, front-wheeled  -AC --          Safety Issues/Impairments Affecting Functional Mobility    Impairments Affecting Function (Mobility) balance;pain;range of motion (ROM);strength;endurance/activity tolerance  -AC --          Motor Skills    Motor Skills coordination  -AC --       Coordination bilateral;upper extremity;WFL  -AC --          Balance    Balance Assessment sitting static balance;sitting dynamic balance;standing static balance;standing dynamic balance  -AC --       Static Sitting Balance standby assist  -AC --       Dynamic Sitting Balance standby assist  -AC --       Position, Sitting Balance sitting edge of bed;unsupported  -AC --       Static Standing Balance contact guard  -AC --       Dynamic Standing Balance minimal assist  -AC --       Position/Device Used, Standing Balance walker, front-wheeled;supported  -AC --          Plan of Care Review    Plan of Care Reviewed With patient  -AC --       Progress no change  -AC --       Outcome Evaluation OT eval completed.  Pt alert and oriented x4.  Spouse present.  Pt typically independent at baseline.  Lives in motor home.  She had no c/o pain but c/o weakness and fatgiue and is SOA.  Pt came to EOB with SBA.  She has decreased head control noted with head falling into flexion and rotating toward R.  She is diffusely weak with BUE 4-/5.  Sensation significantly impaired including proprioception and light/deep touch localization.  Pt donned/doffed socks with SBA and good balance despite sensory deficits.  She stood with CGA and amb in hallway with rw.  As she walked a greater distance she became more fatigued and unsteady.  SOA also increased.  Pt is deemed a high fall risk.  Her O2 sat post activity was 92% on room air.  OT will continue to treat pt to educate on fall prevention, home safety and  adaptations/home modifications.  Will also address decreased balance and weakness.  Recommend further rehab at discharge.  -AC --          Positioning and Restraints    Pre-Treatment Position in bed  -AC --       Post Treatment Position bed  -AC --       In Bed side lying right;call light within reach;encouraged to call for assist;with family/caregiver;side rails up x2  -AC --          Therapy Assessment/Plan (OT)    Date of Referral to OT 01/01/25  -AC --       OT Diagnosis decreased adl  -AC --       Rehab Potential (OT) good  -AC --       Criteria for Skilled Therapeutic Interventions Met (OT) yes;meets criteria;skilled treatment is necessary  -AC --       Therapy Frequency (OT) 3 times/wk  -AC --       Predicted Duration of Therapy Intervention (OT) 10 days  -AC --       Activity Limitations Related to Problem List (OT) BADLs not performed adequately or safely  -AC --       Planned Therapy Interventions (OT) activity tolerance training;BADL retraining;functional balance retraining;occupation/activity based interventions;patient/caregiver education/training;strengthening exercise;transfer/mobility retraining  -AC --          OT Goals    Transfer Goal Selection (OT) transfer, OT goal 1  -AC --       Bathing Goal Selection (OT) bathing, OT goal 1  -AC --       Dressing Goal Selection (OT) dressing, OT goal 1  -AC --       Strength Goal Selection (OT) strength, OT goal 1  -AC --       Problem Specific Goal Selection (OT) --  -AC --          Transfer Goal 1 (OT)    Activity/Assistive Device (Transfer Goal 1, OT) bed-to-chair/chair-to-bed;toilet;shower chair  -AC --       [place] Level/Cues Needed (Transfer Goal 1, OT) standby assist  -AC --       Time Frame (Transfer Goal 1, OT) long term goal (LTG);10 days  -AC --       Progress/Outcome (Transfer Goal 1, OT) new goal  -AC --          Bathing Goal 1 (OT)    Activity/Device (Bathing Goal 1, OT) shower chair;bathing skills, all  -AC --       [place]  Level/Cues Needed (Bathing Goal 1, OT) standby assist  -AC --       Time Frame (Bathing Goal 1, OT) long term goal (LTG);10 days  -AC --       Progress/Outcomes (Bathing Goal 1, OT) new goal  -AC --          Dressing Goal 1 (OT)    Activity/Device (Dressing Goal 1, OT) --  -AC --       Time Frame (Dressing Goal 1, OT) --  -AC --       Progress/Outcome (Dressing Goal 1, OT) --  -AC --          Strength Goal 1 (OT)    Strength Goal 1 (OT) Increase BUE strength to 4+/5 to increase independence with ADL  -AC --       Time Frame (Strength Goal 1, OT) long term goal (LTG);10 days  -AC --       Progress/Outcome (Strength Goal 1, OT) new goal  -AC --          Problem Specific Goal 1 (OT)    Problem Specific Goal 1 (OT) --  -AC --       Time Frame (Problem Specific Goal 1, OT) --  -AC --       Progress/Outcome (Problem Specific Goal 1, OT) --  -AC --                 User Key  (r) = Recorded By, (t) = Taken By, (c) = Cosigned By      Initials Name Effective Dates    AC Saqib Wong, OTR/L, CNT 02/03/23 -                      Occupational Therapy Education       Title: PT OT SLP Therapies (In Progress)       Topic: Occupational Therapy (Done)       Point: ADL training (Done)       Description:   Instruct learner(s) on proper safety adaptation and remediation techniques during self care or transfers.   Instruct in proper use of assistive devices.                  Learning Progress Summary            Patient Acceptance, E, VU by  at 1/2/2025 1547   Family Acceptance, E, VU by  at 1/2/2025 1547                      Point: Home exercise program (Done)       Description:   Instruct learner(s) on appropriate technique for monitoring, assisting and/or progressing therapeutic exercises/activities.                  Learning Progress Summary            Patient Acceptance, E, VU by  at 1/2/2025 1547   Family Acceptance, E, VU by  at 1/2/2025 1547                      Point: Body mechanics (Done)       Description:   Instruct  learner(s) on proper positioning and spine alignment during self-care, functional mobility activities and/or exercises.                  Learning Progress Summary            Patient Acceptance, E, VU by  at 1/2/2025 1547   Family Acceptance, E, VU by  at 1/2/2025 1547                                      User Key       Initials Effective Dates Name Provider Type Discipline     02/03/23 -  Saqib Wong, OTR/L, CNT Occupational Therapist OT                      OT Recommendation and Plan  Planned Therapy Interventions (OT): activity tolerance training, BADL retraining, functional balance retraining, occupation/activity based interventions, patient/caregiver education/training, strengthening exercise, transfer/mobility retraining  Therapy Frequency (OT): 3 times/wk  Plan of Care Review  Plan of Care Reviewed With: patient  Progress: no change  Outcome Evaluation: OT eval completed.  Pt alert and oriented x4.  Spouse present.  Pt typically independent at baseline.  Lives in motor home.  She had no c/o pain but c/o weakness and fatgiue and is SOA.  Pt came to EOB with SBA.  She has decreased head control noted with head falling into flexion and rotating toward R.  She is diffusely weak with BUE 4-/5.  Sensation significantly impaired including proprioception and light/deep touch localization.  Pt donned/doffed socks with SBA and good balance despite sensory deficits.  She stood with CGA and amb in hallway with rw.  As she walked a greater distance she became more fatigued and unsteady.  SOA also increased.  Pt is deemed a high fall risk.  Her O2 sat post activity was 92% on room air.  OT will continue to treat pt to educate on fall prevention, home safety and adaptations/home modifications.  Will also address decreased balance and weakness.  Recommend further rehab at discharge.  Plan of Care Reviewed With: patient  Outcome Evaluation: OT eval completed.  Pt alert and oriented x4.  Spouse present.  Pt typically  independent at baseline.  Lives in motor home.  She had no c/o pain but c/o weakness and fatgiue and is SOA.  Pt came to EOB with SBA.  She has decreased head control noted with head falling into flexion and rotating toward R.  She is diffusely weak with BUE 4-/5.  Sensation significantly impaired including proprioception and light/deep touch localization.  Pt donned/doffed socks with SBA and good balance despite sensory deficits.  She stood with CGA and amb in hallway with rw.  As she walked a greater distance she became more fatigued and unsteady.  SOA also increased.  Pt is deemed a high fall risk.  Her O2 sat post activity was 92% on room air.  OT will continue to treat pt to educate on fall prevention, home safety and adaptations/home modifications.  Will also address decreased balance and weakness.  Recommend further rehab at discharge.     Outcome Measures       Row Name 01/02/25 8498             How much help from another is currently needed...    Putting on and taking off regular lower body clothing? 3  -AC      Bathing (including washing, rinsing, and drying) 3  -AC      Toileting (which includes using toilet bed pan or urinal) 3  -AC      Putting on and taking off regular upper body clothing 4  -AC      Taking care of personal grooming (such as brushing teeth) 4  -AC      Eating meals 4  -AC      AM-PAC 6 Clicks Score (OT) 21  -AC         Functional Assessment    Outcome Measure Options AM-PAC 6 Clicks Daily Activity (OT)  -AC                User Key  (r) = Recorded By, (t) = Taken By, (c) = Cosigned By      Initials Name Provider Type    AC Saqib Wong, OTR/L, CNT Occupational Therapist                    Time Calculation:    Time Calculation- OT       Row Name 01/02/25 1547             Time Calculation- OT    OT Start Time 1418  +10 min chart review  -      OT Stop Time 1501  -      OT Time Calculation (min) 43 min  -      OT Received On 01/02/25  -      OT Goal Re-Cert Due Date 01/12/25   -                User Key  (r) = Recorded By, (t) = Taken By, (c) = Cosigned By      Initials Name Provider Type     Saqib Wong, OTR/L, KALE Occupational Therapist                  Therapy Charges for Today       Code Description Service Date Service Provider Modifiers Qty    35378472829 HC OT EVAL LOW COMPLEXITY 4 1/2/2025 Saqib Wong OTR/L, KALE GO 1                 GREGG Villalobos/L, CNT  1/2/2025

## 2025-01-02 NOTE — PROGRESS NOTES
Neurology Progress Note      Chief Complaint:  full body numbness  Length of Stay:  4   Subjective     Subjective:    She has moved to the floor and is doing well this morning.  She continues to have some atypical movements that may represent a withdrawal syndrome.  She was transferred down to the nerve conduction study laboratory this morning where she had a nerve conduction studies.  Those were unremarkable.    Medications:  Current Facility-Administered Medications   Medication Dose Route Frequency Provider Last Rate Last Admin    acetaminophen (TYLENOL) tablet 650 mg  650 mg Oral Q4H PRN Natalie Woods APRN   650 mg at 12/31/24 2128    Or    acetaminophen (TYLENOL) suppository 650 mg  650 mg Rectal Q4H PRN Natalie Woods APRN        sennosides-docusate (PERICOLACE) 8.6-50 MG per tablet 2 tablet  2 tablet Oral BID Natalie Woods APRN        And    polyethylene glycol (MIRALAX) packet 17 g  17 g Oral Daily PRN Natalie Woods APRN        And    bisacodyl (DULCOLAX) EC tablet 5 mg  5 mg Oral Daily PRN Natalie Woods APRN        And    bisacodyl (DULCOLAX) suppository 10 mg  10 mg Rectal Daily PRN Natalie Woods APRN        Calcium Replacement - Follow Nurse / BPA Driven Protocol   Not Applicable PRN Natalie Woods APRN        carvedilol (COREG) tablet 25 mg  25 mg Oral BID With Meals Tej Parada APRN   25 mg at 01/02/25 0820    Chlorhexidine Gluconate Cloth 2 % pads 1 Application  1 Application Topical Q24H Natalie Woods APRN   1 Application at 12/31/24 0526    cholestyramine light packet 4 g  1 packet Oral Daily Jovany Solis PA-C   4 g at 01/01/25 1211    Enoxaparin Sodium (LOVENOX) syringe 40 mg  40 mg Subcutaneous Q24H Chet Littlejohn MD   40 mg at 01/02/25 0820    FLUoxetine (PROzac) capsule 40 mg  40 mg Oral Daily Jovany Solis PA-C   40 mg at 01/02/25 0820    ipratropium-albuterol (DUO-NEB) nebulizer solution 3 mL  3 mL Nebulization Q6H PRN Natalie Woods APRN         levothyroxine (SYNTHROID, LEVOTHROID) tablet 175 mcg  175 mcg Oral Q AM Cuate Washington APRN   175 mcg at 01/02/25 0625    lisinopril (PRINIVIL,ZESTRIL) tablet 40 mg  40 mg Oral Daily Tej Parada APRN   40 mg at 01/02/25 0820    Magnesium Standard Dose Replacement - Follow Nurse / BPA Driven Protocol   Not Applicable PRN Natalie Woods APRN        mupirocin (BACTROBAN) 2 % nasal ointment 1 Application  1 Application Each Nare BID Natalie Woods APRN   1 Application at 01/02/25 0820    nitroglycerin (NITROSTAT) SL tablet 0.4 mg  0.4 mg Sublingual Q5 Min PRN Natalie Woods APRN        ondansetron (ZOFRAN) injection 4 mg  4 mg Intravenous Q6H PRN Natalie oWods APRN        pantoprazole (PROTONIX) EC tablet 40 mg  40 mg Oral Q AM Chet Littlejohn MD   40 mg at 01/02/25 0625    Phosphorus Replacement - Follow Nurse / BPA Driven Protocol   Not Applicable PRN Natalie Woods APRN        Potassium Replacement - Follow Nurse / BPA Driven Protocol   Not Applicable PRN Natalie Woods APRN        sodium chloride 0.9 % flush 10 mL  10 mL Intravenous Q12H Natalie Woods APRN   10 mL at 01/02/25 0822    sodium chloride 0.9 % flush 10 mL  10 mL Intravenous PRN Natalie Woods APRN        sodium chloride 0.9 % infusion 40 mL  40 mL Intravenous PRN Natalie Woods APRN                 Objective      Vital Signs  Temp:  [98.2 °F (36.8 °C)-99.5 °F (37.5 °C)] 98.8 °F (37.1 °C)  Heart Rate:  [86-98] 86  Resp:  [18-22] 18  BP: (101-145)/(67-92) 124/83    Physical Exam:    HEENT:  Neck is supple  CVS:  RRR  Lungs:  CTA - B/L  Abd:  NT/ND  Ext:  No edema  Skin:  No rashes    Pertinent Neuro Exam:  Mental Status:    -Awake, Alert, Oriented X 3  -No word finding difficulties  -No aphasia  -No dysarthria  -Follows simple and complex commands     CN II:  Visual fields full.  Pupils equally reactive to light  CN III, IV, VI:  Extraocular Muscles full with no signs of nystagmus  CN V:  Facial sensory is symmetric  with no asymmetries.  CN VII:  Facial motor symmetric  CN VIII:  Gross hearing intact bilaterally  CN IX:  Palate elevates symmetrically  CN X:  Palate elevates symmetrically  CN XI:  Shoulder shrug symmetric  CN XII:  Tongue is midline on protrusion     Motor: (strength out of 5:  1= minimal movement, 2 = movement in plane of gravity, 3 = movement against gravity, 4 = movement against some resistance, 5 = full strength)        -5- out of 5 strength throughout in all 4 extremities with no appreciation for distal weakness.  No appreciation for fatigability.     DTR:  -Areflexic throughout in all 4 extremities.  Downgoing toes bilaterally.     Sensory:  -Endorses numbness to light touch and pinprick over entire body.  There is no proximal to distal changes.  This occurs all the way through her chin and face.     Coordination:  -She seems to have choreiform type movements that are seen throughout my examination.  Also, when the MRI was attempted the patient had multiple rounds of Ativan and still had continuous movement.  She has some past-pointing on finger-nose     Gait  -Not tested  Last nurse assessment:  Interval: baseline  1a. Level of Consciousness: 0-->Alert, keenly responsive  1b. LOC Questions: 0-->Answers both questions correctly  1c. LOC Commands: 0-->Performs both tasks correctly  2. Best Gaze: 0-->Normal  3. Visual: 0-->No visual loss  4. Facial Palsy: 0-->Normal symmetrical movements  5a. Motor Arm, Left: 0-->No drift, limb holds 90 (or 45) degrees for full 10 secs  5b. Motor Arm, Right: 0-->No drift, limb holds 90 (or 45) degrees for full 10 secs  6a. Motor Leg, Left: 0-->No drift, leg holds 30 degree position for full 5 secs  6b. Motor Leg, Right: 0-->No drift, leg holds 30 degree position for full 5 secs  7. Limb Ataxia: 0-->Absent  8. Sensory: 1-->Mild-to-moderate sensory loss, patient feels pinprick is less sharp or is dull on the affected side, or there is a loss of superficial pain with pinprick,  but patient is aware of being touched  9. Best Language: 0-->No aphasia, normal  10. Dysarthria: 0-->Normal  11. Extinction and Inattention (formerly Neglect): 0-->No abnormality    Total (NIH Stroke Scale): 1       Results Review:      Labs:    Potassium on admission was 2.3  Sodium on admission was 141  Calcium normal at 8.8  TSH decreased at 0.038  T4 elevated at 1.88  CK level normal at 22  Phosphorus level currently normal at 2.6  Magnesium level low at 1.6  Urine drug screen positive for methamphetamines  Alcohol level 2 days ago was 0.065.  7 months ago was 0.154 and 10 months ago was 0.149  Ceruloplasm level was negative  Lumbar puncture showed evidence of 1 white cell, 0 red cells, a protein of 33, and a glucose of 64    Imaging:  MRI of the brain 12/30 with and without contrast showed no acute findings  MRI of cervical spine with without contrast on 12/28 showed movement artifact but no signs of obvious signal abnormalities in the cord  MRI of the thoracic spine on 12/28 once again showed motion artifact but no significant cord abnormalities.  Endoscopy performed 12/30 was unremarkable  Nerve conduction study on the right arm and leg on 1/2 shows no evidence of acute demyelinating neuropathy nor does it show any evidence of an axonal neuropathy.      Assessment/Plan     Hospital Problem List      Methamphetamine abuse    Hypercholesterolemia with hypertriglyceridemia    Raynaud's disease without gangrene    Ex-smoker    Non-occlusive coronary artery disease    Primary hypertension    Hypokalemia    ETOH abuse    Hypothyroidism    Hypothyroidism    Gait disturbance    Substance abuse    Severe protein-calorie malnutrition    Gluten enteropathy    Impression:  Full body numbness of undetermined etiology.    No evidence of CNS pathology on brain, thoracic, and lumbar MRIs  No evidence of an autoimmune processes in the CSF  Nerve conduction study shows no signs of an acute neuropathy  Choreiform like  movements: Unclear etiology.  Dexter's disease testing is pending.  Copper and zinc levels are pending.  Serial plasma level is negative for Osvaldo's disease  Concern for acute GI bleed -hemoglobin is stabilized and GI has performed an endoscopy showing no acute findings.  They have now signed off.  LFT elevation of uncertain etiology.  Primary team questioning the possibility of celiac disease with previous positive antibodies.  It may be reasonable to ask GI to weigh in on this.  I think her alcohol usage and methamphetamine usage also may be behind LFT elevation.  Hypokalemia  Low magnesium  Altered thyroid panel  Significant alcohol usage  Methamphetamine usage    Plan:  Follow-up copper and zinc levels  Continue to follow LP levels as they return  Follow-up Dexter's panel  No further inpatient neurologic workup required.  Etiology remains unknown but I find no evidence of active neurologic disease.  I have spoken with her primary neurologist who has agreed to see her in follow-up.  We are awaiting physical and Occupational Therapy evaluations.  Once these have been completed she can be discharged with close follow-up in the outpatient neurology clinic.      Medical Decision Making    Number/Complexity of Problems  Moderate  1 undiagnosed new problem with uncertain prognosis -   1 acute illness with systemic symptoms -   High  1 acute or chronic illness that poses a threat to life/body function -   High    MDM Data  Moderate - 1/3 categories  Extensive - 2/3 categories    Category 1: 3 of the following  Review of external notes  Review of results  Ordering of each unique test  Independent historian  Category 2:  Independent interpretation of test (ex: imaging)  Category 3:  Discussion of management with another provider    Next of     Treatment Plan  Moderate - Prescription Drug management  High  Drug therapy requiring intensive monitoring for toxicity  Decision regarding hospitalization or escalation of  care  De-escalate care/DNR decisions         Luis Gifford MD  01/02/25  11:28 CST

## 2025-01-03 LAB
ALB CSF/SERPL: 6 {RATIO} (ref 0–8)
ALBUMIN CSF-MCNC: 18 MG/DL (ref 8–37)
ALBUMIN SERPL-MCNC: 3 G/DL (ref 3.8–4.9)
ALBUMIN SERPL-MCNC: 3.1 G/DL (ref 3.5–5.2)
ALBUMIN/GLOB SERPL: 1 G/DL
ALP SERPL-CCNC: 442 U/L (ref 39–117)
ALT SERPL W P-5'-P-CCNC: 21 U/L (ref 1–33)
ANION GAP SERPL CALCULATED.3IONS-SCNC: 12 MMOL/L (ref 5–15)
AQP4 H2O CHANNEL IGG SERPL IA-ACNC: 23.8 U/ML (ref 0–3)
AST SERPL-CCNC: 48 U/L (ref 1–32)
BASOPHILS # BLD AUTO: 0.08 10*3/MM3 (ref 0–0.2)
BASOPHILS NFR BLD AUTO: 0.7 % (ref 0–1.5)
BILIRUB SERPL-MCNC: 1 MG/DL (ref 0–1.2)
BUN SERPL-MCNC: 7 MG/DL (ref 6–20)
BUN/CREAT SERPL: 13.5 (ref 7–25)
CALCIUM SPEC-SCNC: 8.7 MG/DL (ref 8.6–10.5)
CHLORIDE SERPL-SCNC: 99 MMOL/L (ref 98–107)
CO2 SERPL-SCNC: 24 MMOL/L (ref 22–29)
COPPER SERPL-MCNC: 92 UG/DL (ref 80–158)
CREAT SERPL-MCNC: 0.52 MG/DL (ref 0.57–1)
DEPRECATED RDW RBC AUTO: 59.7 FL (ref 37–54)
EGFRCR SERPLBLD CKD-EPI 2021: 112.6 ML/MIN/1.73
EOSINOPHIL # BLD AUTO: 0.2 10*3/MM3 (ref 0–0.4)
EOSINOPHIL NFR BLD AUTO: 1.6 % (ref 0.3–6.2)
ERYTHROCYTE [DISTWIDTH] IN BLOOD BY AUTOMATED COUNT: 16.2 % (ref 12.3–15.4)
GLOBULIN UR ELPH-MCNC: 3 GM/DL
GLUCOSE SERPL-MCNC: 97 MG/DL (ref 65–99)
HCT VFR BLD AUTO: 25.1 % (ref 34–46.6)
HGB BLD-MCNC: 7.8 G/DL (ref 12–15.9)
IGG CSF-MCNC: 2.9 MG/DL (ref 0–6.7)
IGG SERPL-MCNC: 724 MG/DL (ref 586–1602)
IGG SYNTH RATE SER+CSF CALC-MRATE: 2.1 MG/DAY
IGG/ALB CLEAR SER+CSF-RTO: 0.7 (ref 0–0.7)
IGG/ALB CSF: 0.16 {RATIO} (ref 0–0.25)
IMM GRANULOCYTES # BLD AUTO: 0.15 10*3/MM3 (ref 0–0.05)
IMM GRANULOCYTES NFR BLD AUTO: 1.2 % (ref 0–0.5)
LYMPHOCYTES # BLD AUTO: 2.3 10*3/MM3 (ref 0.7–3.1)
LYMPHOCYTES NFR BLD AUTO: 18.8 % (ref 19.6–45.3)
MAGNESIUM SERPL-MCNC: 1.9 MG/DL (ref 1.6–2.6)
MBP CSF-MCNC: 6.9 NG/ML (ref 0–3.7)
MCH RBC QN AUTO: 31.2 PG (ref 26.6–33)
MCHC RBC AUTO-ENTMCNC: 31.1 G/DL (ref 31.5–35.7)
MCV RBC AUTO: 100.4 FL (ref 79–97)
METHYLMALONATE SERPL-SCNC: 325 NMOL/L (ref 0–378)
MONOCYTES # BLD AUTO: 2.12 10*3/MM3 (ref 0.1–0.9)
MONOCYTES NFR BLD AUTO: 17.3 % (ref 5–12)
NEUTROPHILS NFR BLD AUTO: 60.4 % (ref 42.7–76)
NEUTROPHILS NFR BLD AUTO: 7.4 10*3/MM3 (ref 1.7–7)
NRBC BLD AUTO-RTO: 0.2 /100 WBC (ref 0–0.2)
OLIGOCLONAL BANDS.IT SER+CSF QL: ABNORMAL
PHOSPHATE SERPL-MCNC: 3.3 MG/DL (ref 2.5–4.5)
PLATELET # BLD AUTO: 312 10*3/MM3 (ref 140–450)
PMV BLD AUTO: 11 FL (ref 6–12)
POTASSIUM SERPL-SCNC: 3.7 MMOL/L (ref 3.5–5.2)
PROT SERPL-MCNC: 6.1 G/DL (ref 6–8.5)
RBC # BLD AUTO: 2.5 10*6/MM3 (ref 3.77–5.28)
SODIUM SERPL-SCNC: 135 MMOL/L (ref 136–145)
VIT B1 BLD-SCNC: 110.8 NMOL/L (ref 66.5–200)
WBC NRBC COR # BLD AUTO: 12.25 10*3/MM3 (ref 3.4–10.8)
ZINC SERPL-MCNC: 51 UG/DL (ref 44–115)

## 2025-01-03 PROCEDURE — 97110 THERAPEUTIC EXERCISES: CPT

## 2025-01-03 PROCEDURE — 80053 COMPREHEN METABOLIC PANEL: CPT | Performed by: INTERNAL MEDICINE

## 2025-01-03 PROCEDURE — 94799 UNLISTED PULMONARY SVC/PX: CPT

## 2025-01-03 PROCEDURE — 84100 ASSAY OF PHOSPHORUS: CPT | Performed by: INTERNAL MEDICINE

## 2025-01-03 PROCEDURE — 25010000002 ENOXAPARIN PER 10 MG: Performed by: INTERNAL MEDICINE

## 2025-01-03 PROCEDURE — 97116 GAIT TRAINING THERAPY: CPT

## 2025-01-03 PROCEDURE — 85025 COMPLETE CBC W/AUTO DIFF WBC: CPT | Performed by: INTERNAL MEDICINE

## 2025-01-03 PROCEDURE — 83735 ASSAY OF MAGNESIUM: CPT | Performed by: INTERNAL MEDICINE

## 2025-01-03 PROCEDURE — 94640 AIRWAY INHALATION TREATMENT: CPT

## 2025-01-03 RX ORDER — FOLIC ACID 1 MG/1
1 TABLET ORAL DAILY
Status: DISCONTINUED | OUTPATIENT
Start: 2025-01-03 | End: 2025-01-04 | Stop reason: HOSPADM

## 2025-01-03 RX ADMIN — CHOLESTYRAMINE 4 G: 4 POWDER, FOR SUSPENSION ORAL at 13:00

## 2025-01-03 RX ADMIN — IPRATROPIUM BROMIDE AND ALBUTEROL SULFATE 3 ML: .5; 3 SOLUTION RESPIRATORY (INHALATION) at 16:00

## 2025-01-03 RX ADMIN — CARVEDILOL 25 MG: 25 TABLET, FILM COATED ORAL at 08:20

## 2025-01-03 RX ADMIN — LEVOTHYROXINE SODIUM 175 MCG: 0.17 TABLET ORAL at 06:19

## 2025-01-03 RX ADMIN — PANTOPRAZOLE SODIUM 40 MG: 40 TABLET, DELAYED RELEASE ORAL at 06:19

## 2025-01-03 RX ADMIN — Medication 10 ML: at 08:21

## 2025-01-03 RX ADMIN — FOLIC ACID 1 MG: 1 TABLET ORAL at 17:56

## 2025-01-03 RX ADMIN — DOCUSATE SODIUM 50 MG AND SENNOSIDES 8.6 MG 2 TABLET: 8.6; 5 TABLET, FILM COATED ORAL at 08:20

## 2025-01-03 RX ADMIN — ENOXAPARIN SODIUM 40 MG: 100 INJECTION SUBCUTANEOUS at 08:20

## 2025-01-03 RX ADMIN — LISINOPRIL 40 MG: 10 TABLET ORAL at 08:20

## 2025-01-03 RX ADMIN — FLUOXETINE HYDROCHLORIDE 40 MG: 20 CAPSULE ORAL at 08:20

## 2025-01-03 RX ADMIN — CARVEDILOL 25 MG: 25 TABLET, FILM COATED ORAL at 17:56

## 2025-01-03 RX ADMIN — Medication 10 ML: at 20:05

## 2025-01-03 NOTE — PLAN OF CARE
Goal Outcome Evaluation:  Plan of Care Reviewed With: patient        Progress: improving  Outcome Evaluation: PT tx completed. Pt reports pain in abdomen as well as heart burn. Mod I for supine to sit with HOB elevated. S for static sitting balance and CGA for dynamic sitting balance while EOB. CGA for sit to stands with cues for hand placement. Relies heavily on RWX for ambulation. No LOB noted, however, BLE scissoring increases with fatigue and  she requires cues to stop and rest. Will cont to follow.    Anticipated Discharge Disposition (PT): skilled nursing facility

## 2025-01-03 NOTE — PROGRESS NOTES
Nutrition Services    Patient Name:  Minnie Bang  YOB: 1975  MRN: [id number]  Admit Date:  12/28/2024    Nutrition follow up. Pt receiving gluten sensitive GI diet. Boost Orignal BID. PO intake avg. 55% of five meals over the past three days. Per progress note review blood work concerning for possible celiac disease. No new wt to review at this time from being transferred to med/surg floor. Labs: Na+ 135,Cr 0.52,Alb 3.1. Medications reviewed lovenox,folic acid,protonix. Pt continues with generalized weakness. Cont to follow for plan of care.     Electronically signed by:  Lola Brooks RDN, [facility]  01/03/25 17:43 CST

## 2025-01-03 NOTE — PLAN OF CARE
Goal Outcome Evaluation:  Plan of Care Reviewed With: patient        Progress: no change  Outcome Evaluation: Patient A/O 3-4. VSS. Impulsive in movements. Unsteady. Up x 1 to bathroom. Baseline numbness and tingling still present. On room air. Safety maintained.

## 2025-01-03 NOTE — PROGRESS NOTES
".[facility] Medicine Services  INPATIENT PROGRESS NOTE    Patient Name: Minnie Bang  Date of Admission: 12/28/2024  Today's Date: 01/03/25  Length of Stay: 5  Primary Care Physician: Margi Lemus APRN    Subjective   Chief Complaint: f/u   HPI    at bedside and a bit surprise.  I introduced myself and my role in her care.   I mentioned to both of them the findings of test and recommendations by intensivist and neurologist.  They said they weren't told about the diagnosis.   I mentioned it to them and showed on my phone the information written  She said \"there is no way (I)could have it\" -- she refers to amphetamine   indicates they drink \"fireball\"  Current goal is to check functional status.   Work up per neurology is completed  Therapist yet to see patient     I reviewed the progress note as outlined by Dr. Littlejohn:    he is a 49 y.o. female who denies drug use but tested positive for methamphetamine, also a reported former smoker who reportedly stopped 10 months ago with a 30-pack-year history who was admitted 12/28/2024 after presenting to the emergency department with numbness in her fingers and abdomen.     12/29/24: Patient was confused.  Concern for alcohol abuse, substance abuse noted.  Drug screen showed amphetamine/methamphetamine positive.  Plan for MRI then transfer to floor.     12/30/24: I took over care.  Discussed with neurology.  Patient was not transferred to the floor due to concerns about possible Alton's disease.  They request elective intubation tomorrow.  We will make that happen tomorrow.     12/31: Hemoglobin declined slightly overnight.  As noted above:  possible chronic inflammatory disease with fatty infiltration of the entire colon.  Had some diarrhea but no obvious blood overnight.  Consulted GI, PPI and octreotide started.  Plan for intubation today.  Will try to coordinate with GI whether she can get a scope at the " same time.     1/1/25: LP and EGD performed yesterday so far noncontributory.  Plan for nerve conduction velocities tomorrow.  Still unclear cause of patient's paresthesia: perioral and abdominal.  CT abdomen today shows same findings as previous: Fatty liver and some infiltration of her colon.  Although blood work concerning for possible celiac disease.  Changed diet to avoid gluten.  Appreciate neurology input.  Patient ready for transfer to floor.      January 3.  Patient has no new complaints  Informed her of therapist recommendation.  She is willing to be referred for rehab.    She informed me of prior thyroidectomy for goiter  Informed her of low TSH.  Noted that she is on replacement therapy    Folate also low.    No bleeding  Review of Systems   All pertinent negatives and positives are as above. All other systems have been reviewed and are negative unless otherwise stated.     Objective    Temp:  [97.6 °F (36.4 °C)-98.8 °F (37.1 °C)] 97.6 °F (36.4 °C)  Heart Rate:  [86-89] 88  Resp:  [18-20] 18  BP: ()/(57-88) 156/88  Physical Exam  No distress  GEN: Awake, alert, interactive, in NAD  HEENT: Atraumatic, PERRLA, EOMI, Anicteric, Trachea midline  Lungs: CTAB, no wheezing/rales/rhonchi  Heart: RRR, +S1/s2, no rub  ABD: soft, nt/nd, +BS, no guarding/rebound  Extremities: atraumatic, no cyanosis, no edema  Skin: no rashes or lesions  Neuro:  defer to neurologist   Psych: normal mood & affect  No significant change from yesterday's exam      Results Review:  I have reviewed the labs, radiology results, and diagnostic studies.    Laboratory Data:   Results from last 7 days   Lab Units 01/03/25  0428 01/02/25  0407 01/01/25  0639   WBC 10*3/mm3 12.25* 12.48* 14.46*   HEMOGLOBIN g/dL 7.8* 8.3* 8.5*   HEMATOCRIT % 25.1* 26.8* 26.3*   PLATELETS 10*3/mm3 312 306 343        Results from last 7 days   Lab Units 01/03/25  0427 01/02/25  0407 01/01/25  0639   SODIUM mmol/L 135* 135* 136   POTASSIUM mmol/L 3.7 4.2 4.5  "  CHLORIDE mmol/L 99 99 100   CO2 mmol/L 24.0 25.0 25.0   BUN mg/dL 7 7 7   CREATININE mg/dL 0.52* 0.55* 0.57   CALCIUM mg/dL 8.7 8.7 8.1*   BILIRUBIN mg/dL 1.0 1.0 0.7   ALK PHOS U/L 442* 466* 544*   ALT (SGPT) U/L 21 23 31   AST (SGOT) U/L 48* 39* 70*   GLUCOSE mg/dL 97 108* 131*       Culture Data:   No results found for: \"BLOODCX\", \"URINECX\", \"WOUNDCX\", \"MRSACX\", \"RESPCX\", \"STOOLCX\"    Radiology Data:   Imaging Results (Last 24 Hours)       ** No results found for the last 24 hours. **            I have reviewed the patient's current medications.     Assessment/Plan   Assessment  Active Hospital Problems    Diagnosis     **Methamphetamine abuse     Gluten enteropathy     Severe protein-calorie malnutrition     Gait disturbance     Substance abuse     Hypothyroidism     Hypothyroidism     ETOH abuse     Hypokalemia     Primary hypertension     Non-occlusive coronary artery disease     Ex-smoker     Hypercholesterolemia with hypertriglyceridemia     Raynaud's disease without gangrene              Medical Decision Making  Number and Complexity of problems:  Problem list to which patient came out from the unit  Altered mental status with abnormal motion and sensation elevated white count  Fatty infiltration of the liver -history of fireball consumption   hypocalcemia-resolved  Hypokalemia-resolved  Hypomagnesemia-resolved methamphetamine abuse  Elevated carboxyhemoglobin on patient's ABG likely tobacco smoke  Elevated liver enzyme (AST ALT ratio greater than 2 is to 1 consistent with history of alcohol intake) elevated alkaline phosphatase   hypothyroidism-thyroid function noted to have an overcorrected hormone replacement.  Hypertension-monitor  Hyperlipidemia  Severe protein calorie malnutrition  Abnormal imaging/incidental finding of bilateral femoral head AVN without evidence of subchondral collapse.  Macrocytic anemia (folic acid deficiency-3.32) with transfusion of packed RBC on December 31 while in the unit " hemoglobin of 6.9  Abnormal thyroid function tests      Treatment Plan  It is my understanding from reading through Ирина's note that  The full body numbness is still undetermined etiology patient had multiple neurologic workup and has completed it.  Etiology remains unknown but find no evidence of active neurologic disease.  So far patient remains to be evaluated by therapist when I saw her and her   Discussed with social service as well as nurse.  Discharge planning in process  Continue present management  Medications reviewed      January 3  I will discontinue levothyroxine for now and likely recheck in 3 to 5 days TSH through primary care provider and as patient still here.  PT and OT recommends skilled nursing facility; Social service referral  Pending test in relation to neurologic workup as outlined below  Monitor blood pressure and adjust medication accordingly  Supplement folic acid      Medications reviewed   carvedilol, 25 mg, Oral, BID With Meals  Chlorhexidine Gluconate Cloth, 1 Application, Topical, Q24H  cholestyramine light, 1 packet, Oral, Daily  enoxaparin, 40 mg, Subcutaneous, Q24H  FLUoxetine, 40 mg, Oral, Daily  levothyroxine, 175 mcg, Oral, Q AM  lisinopril, 40 mg, Oral, Daily  pantoprazole, 40 mg, Oral, Q AM  senna-docusate sodium, 2 tablet, Oral, BID  sodium chloride, 10 mL, Intravenous, Q12H        Conditions and Status  Fair, stable     MDM Data  External documents reviewed: Reviewed epic record        Cardiac tracing (EKG, telemetry) interpretation: -  Radiology interpretation: Reviewed  Labs reviewed: Yes  Any tests that were considered but not ordered: None-     Decision rules/scores evaluated (example [id number], [place], etc): -     Discussed with: Patient, , social service and nurse     Care Planning  Shared decision making: Patient  and consultant  Code status and discussions: Full code    Disposition  Social Determinants of Health that impact treatment or  disposition: None identified at this time  I expect the patient to be discharged to (TBD)  Will depend on social issue and functional capacity plus decision by patient with family    Pending test noted     Electronically signed by Johan Toney MD, 01/03/25, 10:17 CST.

## 2025-01-03 NOTE — CASE MANAGEMENT/SOCIAL WORK
Continued Stay Note  [facility]     Patient Name: Minnie Bang  MRN: [id number]  Today's Date: 1/3/2025    Admit Date: 12/28/2024        Discharge Plan       Row Name 01/03/25 164       Plan    Plan Comments SW spoke to pt about dc planning and rehab. Pt states she is hoping she will be able to return home at dc but is agreeable for a referral to [facility]. Referral is being sent to [facility]. SW will follow up with pt on Monday.                   Discharge Codes    No documentation.                       ALESSANDRO Naik

## 2025-01-03 NOTE — THERAPY TREATMENT NOTE
Acute Care - Physical Therapy Treatment Note  [facility]     Patient Name: Minnie Bang  : 1975  MRN: [id number]  Today's Date: 1/3/2025   Onset of Illness/Injury or Date of Surgery: 24  Visit Dx:     ICD-10-CM ICD-9-CM   1. Numbness and tingling  R20.0 782.0    R20.2    2. Alcoholic intoxication with complication  F10.929 305.00   3. Methamphetamine abuse  F15.10 305.70   4. Hypokalemia  E87.6 276.8   5. Hypomagnesemia  E83.42 275.2   6. Gait disturbance  R26.9 781.2   7. Impaired mobility [Z74.09]  Z74.09 799.89     Patient Active Problem List   Diagnosis    Hypercholesterolemia with hypertriglyceridemia    Raynaud's disease without gangrene    Ex-smoker    Chest pain in adult    SORTO (dyspnea on exertion)    Mixed simple and mucopurulent chronic bronchitis    Pulmonary emphysema    Non-occlusive coronary artery disease    Primary hypertension    Coronary-myocardial bridge    Bilateral lower extremity edema    Bicuspid aortic valve    Vocal cord polyps    Tobacco use disorder, continuous    Neoplasm of uncertain behavior    Hypokalemia    Dysphagia    Heartburn    Nonspecific colitis    Diarrhea    ETOH abuse    Steatosis of liver, advanced    Intractable vomiting with nausea    Hypothyroidism    Nausea & vomiting, chronic    Transaminitis    Hypothyroidism    Gait disturbance    Substance abuse    Severe protein-calorie malnutrition    Methamphetamine abuse    Gluten enteropathy     Past Medical History:   Diagnosis Date    Abnormal ECG 2023    Anxiety     Arthritis     back    Asthma     Bicuspid aortic valve 2023    COPD (chronic obstructive pulmonary disease)     Coronary-myocardial bridge 2023    CTS (carpal tunnel syndrome) 2019    Dental disease     Depression     Diastolic dysfunction     Dizziness     Emphysema of lung     GERD (gastroesophageal reflux disease)     Headache     Hyperlipidemia     Hypertension     Hypothyroidism     Mixed simple and  mucopurulent chronic bronchitis 04/18/2023    Non-occlusive coronary artery disease 05/04/2023    NSTEMI (non-ST elevated myocardial infarction) (HCC)     Pneumonia 2022    Pulmonary emphysema 04/21/2023    Shingles 2018    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     Past Surgical History:   Procedure Laterality Date    APPENDECTOMY      CARDIAC CATHETERIZATION N/A 11/04/2017    Procedure: Coronary angiography;  Surgeon: Luis Colvin MD;  Location: [facility] CATH INVASIVE LOCATION;  Service:     CARDIAC CATHETERIZATION N/A 05/31/2023    Procedure: Left Heart Cath;  Surgeon: Steffen Rossi MD;  Location: [facility] CATH INVASIVE LOCATION;  Service: Cardiology;  Laterality: N/A;    CARPAL TUNNEL RELEASE  2019    COLONOSCOPY N/A 8/2/2024    Procedure: COLONOSCOPY WITH ANESTHESIA;  Surgeon: Marty Browning MD;  Location: [facility] ENDOSCOPY;  Service: Gastroenterology;  Laterality: N/A;  pre op pancolitis    ENDOSCOPY N/A 7/8/2024    Procedure: ESOPHAGOGASTRODUODENOSCOPY WITH ANESTHESIA;  Surgeon: Gordy Wang MD;  Location: [facility] ENDOSCOPY;  Service: Gastroenterology;  Laterality: N/A;  pre: dysphagia.   post: esophagus dilated.   no PCP    ENDOSCOPY N/A 12/31/2024    Procedure: ESOPHAGOGASTRODUODENOSCOPY AT BEDSIDE;  Surgeon: Marty Browning MD;  Location: [facility] ENDOSCOPY;  Service: Gastroenterology;  Laterality: N/A;  pre op: anemia  post op: normal  PCP:Margi Lemus APRN    HYSTERECTOMY      PANENDOSCOPY N/A 5/30/2024    Procedure: DIRECT LARYNGOSCOPY WITH BIOPSY OF VOCAL CORD POLYPS WITH POSSIBLE TRACHEOSTOMY;  Surgeon: Mendez Padilla MD;  Location: [facility] OR;  Service: ENT;  Laterality: N/A;    RI RT/LT HEART CATHETERS N/A 11/04/2017    Procedure: Percutaneous Coronary Intervention;  Surgeon: Luis Colvin MD;  Location: [facility] CATH INVASIVE LOCATION;  Service: Cardiovascular    THYROID SURGERY      TOTAL THYROIDECTOMY      TRACHEOSTOMY N/A 5/30/2024    Procedure: POSSIBLE TRACHEOSTOMY;  Surgeon: Mendez Padilla  MD Ed;  Location:  [facility] OR;  Service: ENT;  Laterality: N/A;     PT Assessment (Last 12 Hours)       PT Evaluation and Treatment       Row Name 01/03/25 0820          Physical Therapy Time and Intention    Subjective Information complains of;pain  -LY     Document Type therapy note (daily note)  -LY     Mode of Treatment physical therapy  -LY       Row Name 01/03/25 0820          General Information    Existing Precautions/Restrictions fall  -LY       Row Name 01/03/25 0820          Pain    Pretreatment Pain Rating 5/10  -LY     Posttreatment Pain Rating 5/10  -LY     Pain Location abdomen  -LY     Pain Management Interventions exercise or physical activity utilized  -LY     Response to Pain Interventions activity participation with tolerable pain  -LY     Pre/Posttreatment Pain Comment pt reports heartburn  -LY       Row Name 01/03/25 0820          Bed Mobility    Supine-Sit [place] (Bed Mobility) modified independence  -LY     Sit-Supine [place] (Bed Mobility) modified independence  -LY     Assistive Device (Bed Mobility) bed rails;head of bed elevated  -LY       Row Name 01/03/25 0820          Sit-Stand Transfer    Sit-Stand [place] (Transfers) contact guard;verbal cues  -LY     Assistive Device (Sit-Stand Transfers) walker, front-wheeled  -LY       Row Name 01/03/25 0820          Stand-Sit Transfer    Stand-Sit [place] (Transfers) contact guard;verbal cues  -LY     Assistive Device (Stand-Sit Transfers) walker, front-wheeled  -LY       Row Name 01/03/25 0820          Gait/Stairs (Locomotion)    [place] Level (Gait) verbal cues;contact guard;moderate assist (50% patient effort)  -LY     Assistive Device (Gait) walker, front-wheeled  -LY     Distance in Feet (Gait) 30  x2  -LY     Deviations/Abnormal Patterns (Gait) gait speed decreased;base of support, narrow;scissoring  -LY     Bilateral Gait Deviations forward flexed posture  -LY     Comment, (Gait/Stairs) increased scissoring  when fatigued  -LY       Row Name 01/03/25 0820          Motor Skills    Comments, Therapeutic Exercise BLE AROM seated x15 reps  -LY     Additional Documentation Comments, Therapeutic Exercise (Row)  -LY       Row Name 01/03/25 0820          Plan of Care Review    Plan of Care Reviewed With patient  -LY     Progress improving  -LY     Outcome Evaluation PT tx completed. Pt reports pain in abdomen as well as heart burn. Mod I for supine to sit with HOB elevated. S for static sitting balance and CGA for dynamic sitting balance while EOB. CGA for sit to stands with cues for hand placement. Relies heavily on RWX for ambulation. No LOB noted, however, BLE scissoring increases with fatigue and  she requires cues to stop and rest. Will cont to follow.  -LY       Row Name 01/03/25 0820          Positioning and Restraints    Pre-Treatment Position in bed  -LY     Post Treatment Position bed  -LY     In Bed fowlers;call light within reach;encouraged to call for assist;exit alarm on  -LY               User Key  (r) = Recorded By, (t) = Taken By, (c) = Cosigned By      Initials Name Provider Type    LY Cecille Buck, PTA Physical Therapist Assistant                    Physical Therapy Education       Title: PT OT SLP Therapies (In Progress)       Topic: Physical Therapy (In Progress)       Point: Mobility training (Done)       Learning Progress Summary            Patient Acceptance, E, VU by SB at 1/2/2025 1421    Comment: pt edu on POC, benefits of act and d/c plans                      Point: Home exercise program (Not Started)       Learner Progress:  Not documented in this visit.              Point: Body mechanics (Not Started)       Learner Progress:  Not documented in this visit.              Point: Precautions (Done)       Learning Progress Summary            Patient Acceptance, E, VU by SB at 1/2/2025 1421    Comment: pt edu on POC, benefits of act and d/c plans                                      User Key        Initials Effective Dates Name Provider Type Discipline    SB 07/11/23 -  Jessica Slater, PT DPT Physical Therapist PT                  PT Recommendation and Plan  Anticipated Discharge Disposition (PT): skilled nursing facility  Plan of Care Reviewed With: patient  Progress: improving  Outcome Evaluation: PT tx completed. Pt reports pain in abdomen as well as heart burn. Mod I for supine to sit with HOB elevated. S for static sitting balance and CGA for dynamic sitting balance while EOB. CGA for sit to stands with cues for hand placement. Relies heavily on RWX for ambulation. No LOB noted, however, BLE scissoring increases with fatigue and  she requires cues to stop and rest. Will cont to follow.   Outcome Measures       Row Name 01/02/25 1418             How much help from another is currently needed...    Putting on and taking off regular lower body clothing? 3  -AC      Bathing (including washing, rinsing, and drying) 3  -AC      Toileting (which includes using toilet bed pan or urinal) 3  -AC      Putting on and taking off regular upper body clothing 4  -AC      Taking care of personal grooming (such as brushing teeth) 4  -AC      Eating meals 4  -AC      AM-PAC 6 Clicks Score (OT) 21  -AC         Functional Assessment    Outcome Measure Options AM-PAC 6 Clicks Daily Activity (OT)  -AC                User Key  (r) = Recorded By, (t) = Taken By, (c) = Cosigned By      Initials Name Provider Type    AC Saqib Wong, OTR/L, CNT Occupational Therapist                     Time Calculation:    PT Charges       Row Name 01/03/25 0922             Time Calculation    Start Time 0820  -LY      Stop Time 0845  -LY      Time Calculation (min) 25 min  -LY      PT Received On 01/03/25  -LY         Time Calculation- PT    Total Timed Code Minutes- PT 25 minute(s)  -LY         Timed Charges    45311 - PT Therapeutic Exercise Minutes 12  -LY      41542 - Gait Training Minutes  13  -LY         Total Minutes    Timed Charges  Total Minutes 25  -LY       Total Minutes 25  -LY                User Key  (r) = Recorded By, (t) = Taken By, (c) = Cosigned By      Initials Name Provider Type    Cecille Watson PTA Physical Therapist Assistant                  Therapy Charges for Today       Code Description Service Date Service Provider Modifiers Qty    99071954149 HC PT THER PROC EA 15 MIN 1/3/2025 Cecille Buck, MILAGROS GP 1    85184058181 HC GAIT TRAINING EA 15 MIN 1/3/2025 Cecille Buck, MILAGROS GP 1            PT G-Codes  Outcome Measure Options: AM-PAC 6 Clicks Daily Activity (OT)  AM-PAC 6 Clicks Score (PT): 20  AM-PAC 6 Clicks Score (OT): 21    Cecille Buck PTA  1/3/2025

## 2025-01-04 VITALS
BODY MASS INDEX: 24.62 KG/M2 | OXYGEN SATURATION: 96 % | RESPIRATION RATE: 16 BRPM | SYSTOLIC BLOOD PRESSURE: 168 MMHG | HEART RATE: 80 BPM | WEIGHT: 162.48 LBS | HEIGHT: 68 IN | TEMPERATURE: 97.7 F | DIASTOLIC BLOOD PRESSURE: 100 MMHG

## 2025-01-04 LAB
ALBUMIN SERPL-MCNC: 2.9 G/DL (ref 3.5–5.2)
ALBUMIN/GLOB SERPL: 0.9 G/DL
ALP SERPL-CCNC: 459 U/L (ref 39–117)
ALT SERPL W P-5'-P-CCNC: 20 U/L (ref 1–33)
ANION GAP SERPL CALCULATED.3IONS-SCNC: 12 MMOL/L (ref 5–15)
AST SERPL-CCNC: 50 U/L (ref 1–32)
BASOPHILS # BLD AUTO: 0.07 10*3/MM3 (ref 0–0.2)
BASOPHILS NFR BLD AUTO: 0.8 % (ref 0–1.5)
BILIRUB SERPL-MCNC: 0.7 MG/DL (ref 0–1.2)
BUN SERPL-MCNC: 6 MG/DL (ref 6–20)
BUN/CREAT SERPL: 13.3 (ref 7–25)
CALCIUM SPEC-SCNC: 8.3 MG/DL (ref 8.6–10.5)
CHLORIDE SERPL-SCNC: 105 MMOL/L (ref 98–107)
CO2 SERPL-SCNC: 23 MMOL/L (ref 22–29)
CREAT SERPL-MCNC: 0.45 MG/DL (ref 0.57–1)
DEPRECATED RDW RBC AUTO: 59.7 FL (ref 37–54)
EGFRCR SERPLBLD CKD-EPI 2021: 116.6 ML/MIN/1.73
EOSINOPHIL # BLD AUTO: 0.42 10*3/MM3 (ref 0–0.4)
EOSINOPHIL NFR BLD AUTO: 4.5 % (ref 0.3–6.2)
ERYTHROCYTE [DISTWIDTH] IN BLOOD BY AUTOMATED COUNT: 16.1 % (ref 12.3–15.4)
GLOBULIN UR ELPH-MCNC: 3.1 GM/DL
GLUCOSE SERPL-MCNC: 96 MG/DL (ref 65–99)
HCT VFR BLD AUTO: 26.3 % (ref 34–46.6)
HGB BLD-MCNC: 8.2 G/DL (ref 12–15.9)
IMM GRANULOCYTES # BLD AUTO: 0.08 10*3/MM3 (ref 0–0.05)
IMM GRANULOCYTES NFR BLD AUTO: 0.9 % (ref 0–0.5)
LYMPHOCYTES # BLD AUTO: 2.17 10*3/MM3 (ref 0.7–3.1)
LYMPHOCYTES NFR BLD AUTO: 23.4 % (ref 19.6–45.3)
MAGNESIUM SERPL-MCNC: 2 MG/DL (ref 1.6–2.6)
MCH RBC QN AUTO: 31.4 PG (ref 26.6–33)
MCHC RBC AUTO-ENTMCNC: 31.2 G/DL (ref 31.5–35.7)
MCV RBC AUTO: 100.8 FL (ref 79–97)
MONOCYTES # BLD AUTO: 1.48 10*3/MM3 (ref 0.1–0.9)
MONOCYTES NFR BLD AUTO: 15.9 % (ref 5–12)
NEUTROPHILS NFR BLD AUTO: 5.07 10*3/MM3 (ref 1.7–7)
NEUTROPHILS NFR BLD AUTO: 54.5 % (ref 42.7–76)
NRBC BLD AUTO-RTO: 0.2 /100 WBC (ref 0–0.2)
PHOSPHATE SERPL-MCNC: 3.4 MG/DL (ref 2.5–4.5)
PLATELET # BLD AUTO: 390 10*3/MM3 (ref 140–450)
PMV BLD AUTO: 11.1 FL (ref 6–12)
POTASSIUM SERPL-SCNC: 3.5 MMOL/L (ref 3.5–5.2)
PROT SERPL-MCNC: 6 G/DL (ref 6–8.5)
RBC # BLD AUTO: 2.61 10*6/MM3 (ref 3.77–5.28)
SODIUM SERPL-SCNC: 140 MMOL/L (ref 136–145)
WBC NRBC COR # BLD AUTO: 9.29 10*3/MM3 (ref 3.4–10.8)

## 2025-01-04 PROCEDURE — 80053 COMPREHEN METABOLIC PANEL: CPT | Performed by: INTERNAL MEDICINE

## 2025-01-04 PROCEDURE — 83735 ASSAY OF MAGNESIUM: CPT | Performed by: INTERNAL MEDICINE

## 2025-01-04 PROCEDURE — 97116 GAIT TRAINING THERAPY: CPT

## 2025-01-04 PROCEDURE — 85025 COMPLETE CBC W/AUTO DIFF WBC: CPT | Performed by: INTERNAL MEDICINE

## 2025-01-04 PROCEDURE — 84100 ASSAY OF PHOSPHORUS: CPT | Performed by: INTERNAL MEDICINE

## 2025-01-04 PROCEDURE — 25010000002 ENOXAPARIN PER 10 MG: Performed by: INTERNAL MEDICINE

## 2025-01-04 RX ADMIN — CHOLESTYRAMINE 4 G: 4 POWDER, FOR SUSPENSION ORAL at 13:24

## 2025-01-04 RX ADMIN — LISINOPRIL 40 MG: 10 TABLET ORAL at 08:09

## 2025-01-04 RX ADMIN — Medication 10 ML: at 08:09

## 2025-01-04 RX ADMIN — CARVEDILOL 25 MG: 25 TABLET, FILM COATED ORAL at 08:09

## 2025-01-04 RX ADMIN — ENOXAPARIN SODIUM 40 MG: 100 INJECTION SUBCUTANEOUS at 08:09

## 2025-01-04 RX ADMIN — FOLIC ACID 1 MG: 1 TABLET ORAL at 08:09

## 2025-01-04 RX ADMIN — PANTOPRAZOLE SODIUM 40 MG: 40 TABLET, DELAYED RELEASE ORAL at 05:17

## 2025-01-04 RX ADMIN — DOCUSATE SODIUM 50 MG AND SENNOSIDES 8.6 MG 2 TABLET: 8.6; 5 TABLET, FILM COATED ORAL at 08:09

## 2025-01-04 RX ADMIN — CARVEDILOL 25 MG: 25 TABLET, FILM COATED ORAL at 16:33

## 2025-01-04 RX ADMIN — FLUOXETINE HYDROCHLORIDE 40 MG: 20 CAPSULE ORAL at 08:09

## 2025-01-04 NOTE — PLAN OF CARE
Goal Outcome Evaluation:  Plan of Care Reviewed With: patient, friend        Progress: improving  Outcome Evaluation: pt trans to EOB sba, sit-stand cga-sba, pt amb 60 feet rwx cga-sbaa, stair training up/down 4 steps with one handrail cga, amb another 60 feet rwx cga, pt worried about getting home to her dogs, pt reports that she lives in a camper, pt is unsteady at times, would benefit from cont therapy    Anticipated Discharge Disposition (PT): skilled nursing facility

## 2025-01-04 NOTE — THERAPY TREATMENT NOTE
Acute Care - Physical Therapy Treatment Note  [facility]     Patient Name: Minnie Bang  : 1975  MRN: [id number]  Today's Date: 2025   Onset of Illness/Injury or Date of Surgery: 24  Visit Dx:     ICD-10-CM ICD-9-CM   1. Numbness and tingling  R20.0 782.0    R20.2    2. Alcoholic intoxication with complication  F10.929 305.00   3. Methamphetamine abuse  F15.10 305.70   4. Hypokalemia  E87.6 276.8   5. Hypomagnesemia  E83.42 275.2   6. Gait disturbance  R26.9 781.2   7. Impaired mobility [Z74.09]  Z74.09 799.89     Patient Active Problem List   Diagnosis    Hypercholesterolemia with hypertriglyceridemia    Raynaud's disease without gangrene    Ex-smoker    Chest pain in adult    SORTO (dyspnea on exertion)    Mixed simple and mucopurulent chronic bronchitis    Pulmonary emphysema    Non-occlusive coronary artery disease    Primary hypertension    Coronary-myocardial bridge    Bilateral lower extremity edema    Bicuspid aortic valve    Vocal cord polyps    Tobacco use disorder, continuous    Neoplasm of uncertain behavior    Hypokalemia    Dysphagia    Heartburn    Nonspecific colitis    Diarrhea    ETOH abuse    Steatosis of liver, advanced    Intractable vomiting with nausea    Hypothyroidism    Nausea & vomiting, chronic    Transaminitis    Hypothyroidism    Gait disturbance    Substance abuse    Severe protein-calorie malnutrition    Methamphetamine abuse    Gluten enteropathy     Past Medical History:   Diagnosis Date    Abnormal ECG 2023    Anxiety     Arthritis     back    Asthma     Bicuspid aortic valve 2023    COPD (chronic obstructive pulmonary disease)     Coronary-myocardial bridge 2023    CTS (carpal tunnel syndrome) 2019    Dental disease     Depression     Diastolic dysfunction     Dizziness     Emphysema of lung     GERD (gastroesophageal reflux disease)     Headache     Hyperlipidemia     Hypertension     Hypothyroidism     Mixed simple and  mucopurulent chronic bronchitis 04/18/2023    Non-occlusive coronary artery disease 05/04/2023    NSTEMI (non-ST elevated myocardial infarction) (HCC)     Pneumonia 2022    Pulmonary emphysema 04/21/2023    Shingles 2018    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     Past Surgical History:   Procedure Laterality Date    APPENDECTOMY      CARDIAC CATHETERIZATION N/A 11/04/2017    Procedure: Coronary angiography;  Surgeon: Luis Colvin MD;  Location: [facility] CATH INVASIVE LOCATION;  Service:     CARDIAC CATHETERIZATION N/A 05/31/2023    Procedure: Left Heart Cath;  Surgeon: Steffen Rossi MD;  Location: [facility] CATH INVASIVE LOCATION;  Service: Cardiology;  Laterality: N/A;    CARPAL TUNNEL RELEASE  2019    COLONOSCOPY N/A 8/2/2024    Procedure: COLONOSCOPY WITH ANESTHESIA;  Surgeon: Marty Browning MD;  Location: [facility] ENDOSCOPY;  Service: Gastroenterology;  Laterality: N/A;  pre op pancolitis    ENDOSCOPY N/A 7/8/2024    Procedure: ESOPHAGOGASTRODUODENOSCOPY WITH ANESTHESIA;  Surgeon: Gordy Wang MD;  Location: [facility] ENDOSCOPY;  Service: Gastroenterology;  Laterality: N/A;  pre: dysphagia.   post: esophagus dilated.   no PCP    ENDOSCOPY N/A 12/31/2024    Procedure: ESOPHAGOGASTRODUODENOSCOPY AT BEDSIDE;  Surgeon: Marty Browning MD;  Location: [facility] ENDOSCOPY;  Service: Gastroenterology;  Laterality: N/A;  pre op: anemia  post op: normal  PCP:Margi Lemus APRN    HYSTERECTOMY      PANENDOSCOPY N/A 5/30/2024    Procedure: DIRECT LARYNGOSCOPY WITH BIOPSY OF VOCAL CORD POLYPS WITH POSSIBLE TRACHEOSTOMY;  Surgeon: Mendez Padilla MD;  Location: [facility] OR;  Service: ENT;  Laterality: N/A;    [place] RT/LT HEART CATHETERS N/A 11/04/2017    Procedure: Percutaneous Coronary Intervention;  Surgeon: Luis Colvin MD;  Location: [facility] CATH INVASIVE LOCATION;  Service: Cardiovascular    THYROID SURGERY      TOTAL THYROIDECTOMY      TRACHEOSTOMY N/A 5/30/2024    Procedure: POSSIBLE TRACHEOSTOMY;  Surgeon: Mendez Padilla  MD Ed;  Location:  [facility] OR;  Service: ENT;  Laterality: N/A;     PT Assessment (Last 12 Hours)       PT Evaluation and Treatment       Row Name 01/04/25 1401          Physical Therapy Time and Intention    Subjective Information complains of;weakness;fatigue;pain  -     Document Type therapy note (daily note)  -     Mode of Treatment physical therapy  -       Row Name 01/04/25 1401          General Information    Existing Precautions/Restrictions fall  -       Row Name 01/04/25 1401          Pain    Pretreatment Pain Rating 5/10  -     Posttreatment Pain Rating 5/10  -     Pain Location back  -     Pain Management Interventions exercise or physical activity utilized  -     Response to Pain Interventions activity participation with tolerable pain  -       Row Name 01/04/25 1401          Bed Mobility    Supine-Sit [place] (Bed Mobility) modified independence  -     Sit-Supine [place] (Bed Mobility) modified independence  -     Assistive Device (Bed Mobility) bed rails;head of bed elevated  -       Row Name 01/04/25 1401          Sit-Stand Transfer    Sit-Stand [place] (Transfers) contact guard;verbal cues;standby assist  [facility]     Assistive Device (Sit-Stand Transfers) walker, front-wheeled  -       Row Name 01/04/25 1401          Stand-Sit Transfer    Stand-Sit [place] (Transfers) standby assist  [facility]     Assistive Device (Stand-Sit Transfers) walker, front-wheeled  -[facility] Name 01/04/25 1401          Gait/Stairs (Locomotion)    [place] Level (Gait) verbal cues;contact guard;standby assist  [facility]     Assistive Device (Gait) walker, front-wheeled  -     Distance in Feet (Gait) 60  60 x 3  -     Deviations/Abnormal Patterns (Gait) gait speed decreased;base of support, narrow;scissoring  -     Bilateral Gait Deviations forward flexed posture  -     [place] Level (Stairs) contact guard  -     Handrail Location (Stairs) right side (ascending);left  side (descending)  -     Number of Steps (Stairs) 4  -AH     Ascending Technique (Stairs) step-to-step  -     Descending Technique (Stairs) step-to-step  -     Comment, (Gait/Stairs) pt unsteady at times cga to correct  -       Row Name 01/04/25 1401          Plan of Care Review    Plan of Care Reviewed With patient;friend  -     Progress improving  -     Outcome Evaluation pt trans to EOB sba, sit-stand cga-sba, pt amb 60 feet rwx cga-sbaa, stair training up/down 4 steps with one handrail cga, amb another 60 feet rwx cga, pt worried about getting home to her dogs, pt reports that she lives in a camper, pt is unsteady at times, would benefit from cont therapy  -       Row Name 01/04/25 1401          Positioning and Restraints    Pre-Treatment Position in bed  -     Post Treatment Position bed  -     In Bed fowlers;call light within reach;encouraged to call for assist;exit alarm on;with family/caregiver  -               User Key  (r) = Recorded By, (t) = Taken By, (c) = Cosigned By      Initials Name Provider Type     Lilia Shelton, PTA Physical Therapist Assistant                    Physical Therapy Education       Title: PT OT SLP Therapies (In Progress)       Topic: Physical Therapy (In Progress)       Point: Mobility training (Done)       Learning Progress Summary            Patient Acceptance, E, VU by SB at 1/2/2025 1421    Comment: pt edu on POC, benefits of act and d/c plans                      Point: Home exercise program (Not Started)       Learner Progress:  Not documented in this visit.              Point: Body mechanics (Not Started)       Learner Progress:  Not documented in this visit.              Point: Precautions (Done)       Learning Progress Summary            Patient Acceptance, E, VU by SB at 1/2/2025 1421    Comment: pt edu on POC, benefits of act and d/c plans                                      User Key       Initials Effective Dates Name Provider Type Discipline     SB 07/11/23 -  Jessica Slater, PT DPT Physical Therapist PT                  PT Recommendation and Plan  Anticipated Discharge Disposition (PT): skilled nursing facility  Plan of Care Reviewed With: patient, friend  Progress: improving  Outcome Evaluation: pt trans to EOB sba, sit-stand cga-sba, pt amb 60 feet rwx cga-sbaa, stair training up/down 4 steps with one handrail cga, amb another 60 feet rwx cga, pt worried about getting home to her dogs, pt reports that she lives in a camper, pt is unsteady at times, would benefit from cont therapy   Outcome Measures       Row Name 01/04/25 1400 01/02/25 1418          How much help from another person do you currently need...    Turning from your back to your side while in flat bed without using bedrails? 4  -AH --     Moving from lying on back to sitting on the side of a flat bed without bedrails? 4  -AH --     Moving to and from a bed to a chair (including a wheelchair)? 4  -AH --     Standing up from a chair using your arms (e.g., wheelchair, bedside chair)? 3  -AH --     Climbing 3-5 steps with a railing? 3  -AH --     To walk in hospital room? 3  -AH --     AM-PAC 6 Clicks Score (PT) 21  -AH --        How much help from another is currently needed...    Putting on and taking off regular lower body clothing? -- 3  -AC     Bathing (including washing, rinsing, and drying) -- 3  -AC     Toileting (which includes using toilet bed pan or urinal) -- 3  -AC     Putting on and taking off regular upper body clothing -- 4  -AC     Taking care of personal grooming (such as brushing teeth) -- 4  -AC     Eating meals -- 4  -AC     AM-PAC 6 Clicks Score (OT) -- 21  -AC        Functional Assessment    Outcome Measure [facility] AM-PAC 6 Clicks Basic Mobility (PT)  - AM-PAC 6 Clicks Daily Activity (OT)  -               User Key  (r) = Recorded By, (t) = Taken By, (c) = Cosigned By      Initials Name Provider Type    AC Saqib Wong, OTR/L, CNT Occupational Therapist    LOUISE Shelton,  Lilia WOODSON PTA Physical Therapist Assistant                     Time Calculation:    PT Charges       Row Name 01/04/25 1426             Time Calculation    Start Time 1401  -      Stop Time 1424  -      Time Calculation (min) 23 min  -      PT Received On 01/04/25  -         Time Calculation- PT    Total Timed Code Minutes- PT 23 minute(s)  -         Timed Charges    12432 - Gait Training Minutes  23  -AH         Total Minutes    Timed Charges Total Minutes 23  -AH       Total Minutes 23  -AH                User Key  (r) = Recorded By, (t) = Taken By, (c) = Cosigned By      Initials Name Provider Type     Lilia Shelton PTA Physical Therapist Assistant                  Therapy Charges for Today       Code Description Service Date Service Provider Modifiers Qty    78638993914 HC GAIT TRAINING EA 15 MIN 1/4/2025 Lilia Shelton PTA GP 2            PT G-Codes  Outcome Measure Options: AM-PAC 6 Clicks Basic Mobility (PT)  AM-PAC 6 Clicks Score (PT): 21  AM-PAC 6 Clicks Score (OT): 21    Lilia Shelton PTA  1/4/2025

## 2025-01-04 NOTE — PROGRESS NOTES
".[facility] Medicine Services  INPATIENT PROGRESS NOTE    Patient Name: Minnie Bang  Date of Admission: 12/28/2024  Today's Date: 01/04/25  Length of Stay: 6  Primary Care Physician: Margi Lemus APRN    Subjective   Chief Complaint: f/u   HPI    at bedside and a bit surprise.  I introduced myself and my role in her care.   I mentioned to both of them the findings of test and recommendations by intensivist and neurologist.  They said they weren't told about the diagnosis.   I mentioned it to them and showed on my phone the information written  She said \"there is no way (I)could have it\" -- she refers to amphetamine   indicates they drink \"fireball\"  Current goal is to check functional status.   Work up per neurology is completed  Therapist yet to see patient     I reviewed the progress note as outlined by Dr. Littlejohn:    he is a 49 y.o. female who denies drug use but tested positive for methamphetamine, also a reported former smoker who reportedly stopped 10 months ago with a 30-pack-year history who was admitted 12/28/2024 after presenting to the emergency department with numbness in her fingers and abdomen.     12/29/24: Patient was confused.  Concern for alcohol abuse, substance abuse noted.  Drug screen showed amphetamine/methamphetamine positive.  Plan for MRI then transfer to floor.     12/30/24: I took over care.  Discussed with neurology.  Patient was not transferred to the floor due to concerns about possible [place]'s disease.  They request elective intubation tomorrow.  We will make that happen tomorrow.     12/31: Hemoglobin declined slightly overnight.  As noted above:  possible chronic inflammatory disease with fatty infiltration of the entire colon.  Had some diarrhea but no obvious blood overnight.  Consulted GI, PPI and octreotide started.  Plan for intubation today.  Will try to coordinate with GI whether she can get a scope at the " same time.     1/1/25: LP and EGD performed yesterday so far noncontributory.  Plan for nerve conduction velocities tomorrow.  Still unclear cause of patient's paresthesia: perioral and abdominal.  CT abdomen today shows same findings as previous: Fatty liver and some infiltration of her colon.  Although blood work concerning for possible celiac disease.  Changed diet to avoid gluten.  Appreciate neurology input.  Patient ready for transfer to floor.      January 3.  Patient has no new complaints  Informed her of therapist recommendation.  She is willing to be referred for rehab.    She informed me of prior thyroidectomy for goiter  Informed her of low TSH.  Noted that she is on replacement therapy    Folate also low.    No bleeding    January 4.  Safety has been discussed with staff, patient and therapist  Therapist recommends skilled nursing facility  Discussed yesterday with social service challenges that can be faced in the light of positive urine drug screen.  Otherwise, referrals has been sent by social service for placement.  Home health also considered however with increment weather not clear whether this is something reasonable at this time.  I am given the feedback that patient is very unsteady.  Therefore from my standpoint I do not think it is reasonable to discharge patient medically.  Patient expressed about her how her other family members are in the hospital right now and she has some dogs at home.  She expressed that she does not want to sign out AGAINST MEDICAL ADVICE and she understand the limitation of what she can do.    Review of Systems   All pertinent negatives and positives are as above. All other systems have been reviewed and are negative unless otherwise stated.     Objective    Temp:  [97.5 °F (36.4 °C)-98.7 °F (37.1 °C)] 97.7 °F (36.5 °C)  Heart Rate:  [75-83] 80  Resp:  [16] 16  BP: (124-181)/() 181/104  Physical Exam  No distress  GEN: Awake, alert, interactive, in NAD  HEENT:  "Atraumatic, PERRLA, EOMI, Anicteric, Trachea midline  Lungs: CTAB, no wheezing/rales/rhonchi  Heart: RRR, +S1/s2, no rub  ABD: soft, nt/nd, +BS, no guarding/rebound  Extremities: atraumatic, no cyanosis, no edema  Skin: no rashes or lesions  Neuro:  defer to neurologist   Psych: normal mood & affect  No significant change from yesterday's exam      Results Review:  I have reviewed the labs, radiology results, and diagnostic studies.    Laboratory Data:   Results from last 7 days   Lab Units 01/04/25  0513 01/03/25  0428 01/02/25  0407   WBC 10*3/mm3 9.29 12.25* 12.48*   HEMOGLOBIN g/dL 8.2* 7.8* 8.3*   HEMATOCRIT % 26.3* 25.1* 26.8*   PLATELETS 10*3/mm3 390 312 306        Results from last 7 days   Lab Units 01/04/25  0513 01/03/25  0427 01/02/25  0407   SODIUM mmol/L 140 135* 135*   POTASSIUM mmol/L 3.5 3.7 4.2   CHLORIDE mmol/L 105 99 99   CO2 mmol/L 23.0 24.0 25.0   BUN mg/dL 6 7 7   CREATININE mg/dL 0.45* 0.52* 0.55*   CALCIUM mg/dL 8.3* 8.7 8.7   BILIRUBIN mg/dL 0.7 1.0 1.0   ALK PHOS U/L 459* 442* 466*   ALT (SGPT) U/L 20 21 23   AST (SGOT) U/L 50* 48* 39*   GLUCOSE mg/dL 96 97 108*       Culture Data:   No results found for: \"BLOODCX\", \"URINECX\", \"WOUNDCX\", \"MRSACX\", \"RESPCX\", \"STOOLCX\"    Radiology Data:   Imaging Results (Last 24 Hours)       ** No results found for the last 24 hours. **            I have reviewed the patient's current medications.     Assessment/Plan   Assessment  Active Hospital Problems    Diagnosis     **Methamphetamine abuse     Gluten enteropathy     Severe protein-calorie malnutrition     Gait disturbance     Substance abuse     Hypothyroidism     Hypothyroidism     ETOH abuse     Hypokalemia     Primary hypertension     Non-occlusive coronary artery disease     Ex-smoker     Hypercholesterolemia with hypertriglyceridemia     Raynaud's disease without gangrene              Medical Decision Making  Number and Complexity of problems:  Problem list to which patient came out from the " unit  Altered mental status with abnormal motion and sensation elevated white count  Fatty infiltration of the liver -history of fireball consumption   hypocalcemia-resolved  Hypokalemia-resolved  Hypomagnesemia-resolved methamphetamine abuse  Elevated carboxyhemoglobin on patient's ABG likely tobacco smoke  Elevated liver enzyme (AST ALT ratio greater than 2 is to 1 consistent with history of alcohol intake) elevated alkaline phosphatase   hypothyroidism-thyroid function noted to have an overcorrected hormone replacement.  Hypertension-monitor  Hyperlipidemia  Severe protein calorie malnutrition  Abnormal imaging/incidental finding of bilateral femoral head AVN without evidence of subchondral collapse.  Macrocytic anemia (folic acid deficiency-3.32) with transfusion of packed RBC on December 31 while in the unit hemoglobin of 6.9  Abnormal thyroid function tests      Treatment Plan  It is my understanding from reading through Ирина's note that  The full body numbness is still undetermined etiology patient had multiple neurologic workup and has completed it.  Etiology remains unknown but find no evidence of active neurologic disease.  So far patient remains to be evaluated by therapist when I saw her and her   Discussed with social service as well as nurse.  Discharge planning in process  Continue present management  Medications reviewed      January 3  I will discontinue levothyroxine for now and likely recheck in 3 to 5 days TSH through primary care provider and as patient still here.  PT and OT recommends skilled nursing facility; Social service referral  Pending test in relation to neurologic workup as outlined below  Monitor blood pressure and adjust medication accordingly  Supplement folic acid    January 4  See discussion above.  Continue present management.  Monitor blood pressure and adjust medications as needed  See discussion in the interval history     Medications reviewed   carvedilol, 25 mg,  Oral, BID With Meals  cholestyramine light, 1 packet, Oral, Daily  enoxaparin, 40 mg, Subcutaneous, Q24H  FLUoxetine, 40 mg, Oral, Daily  folic acid, 1 mg, Oral, Daily  lisinopril, 40 mg, Oral, Daily  pantoprazole, 40 mg, Oral, Q AM  senna-docusate sodium, 2 tablet, Oral, BID  sodium chloride, 10 mL, Intravenous, Q12H        Conditions and Status  Fair, stable     MDM Data  External documents reviewed: Reviewed epic record        Cardiac tracing (EKG, telemetry) interpretation: -  Radiology interpretation: Reviewed  Labs reviewed: Yes  Any tests that were considered but not ordered: None-     Decision rules/scores evaluated (example [id number], Wells, etc): -     Discussed with: Patient, therapist, nurse and social service  Care Planning  Shared decision making: Patient  and consultant  Code status and discussions: Full code    Disposition  Social Determinants of Health that impact treatment or disposition: None identified at this time  I expect the patient to be discharged to (TBD)  Will depend on social issue and functional capacity plus decision by patient with family    Pending test noted     Electronically signed by Johan Toney MD, 01/04/25, 17:43 CST.

## 2025-01-04 NOTE — PLAN OF CARE
Goal Outcome Evaluation:   VSS. A&Ox3-4. Room air. Up stand by. Patient impulsive. Bed alarm in use. Patient expressing she wants to go home and not to a SNF. Provider notified.

## 2025-01-04 NOTE — PROGRESS NOTES
/Allergy Clarification  Progress Note  Patient Name:  Minnie Bang  YOB: 1975  MRN: [id number]  Admit Date:  12/28/2024  Assessment Date:  1/4/2025   Reason for Assessment       Row Name 01/04/25 1028          Reason for Assessment    Reason For Assessment other (see comments)  Progress Note          Nutrition/Diet History       Row Name 01/04/25 1028          Nutrition/Diet History    Typical Intake (Food/Fluid/EN/PN) Pt denies intolerance to gluten and does not want gluten free restriction. Gluten enteropathy noted in neuro note; GI has not indicated gluten intolerance or Celiac disease. EGD normal. Discontinued gluten free diet and removed gluten from EHR allergen list.         Electronically signed by:  Park Milton RDN, LD  01/04/25 11:00 CST

## 2025-01-04 NOTE — PLAN OF CARE
"Goal Outcome Evaluation:   VSS. A&Ox3-4. Room air. Up stand by. Patient impulsive. Bed alarm in use. Patient complained \"chest tightness\" and \"hard to breath\". On assessment, O2 saturaiton 97% unlabored breathing and clear breath sounds. Notified RT to administer PRN duoneb. Waiting SNF placement. No complaints at this time.                                          "

## 2025-01-05 NOTE — PAYOR COMM NOTE
"DC 1-4-25 LEFT AMA    Robles Singh (49 y.o. Female)       Date of Birth   1975    Social Security Number       Address   [address] [facility] [address]    Home Phone   [phone]    MRN   [id number]       [facility]    Marital Status                               Admission Date   12/28/24    Admission Type   Emergency    Admitting Provider   Johan Toney MD    Attending Provider       Department, Room/Bed   [facility] 3A, 339/1       Discharge Date   1/4/2025    Discharge Disposition   Left Against Medical Advice    Discharge Destination                                 Attending Provider: (none)   Allergies: No Active Allergies    Isolation: None   Infection: None   Code Status: Prior    Ht: 172.7 cm (68\")   Wt: 73.7 kg (162 lb 7.7 oz)    Admission Cmt: None   Principal Problem: Methamphetamine abuse [F15.10]                   Active Insurance as of 12/28/2024       Primary Coverage       Payor Plan Insurance Group Employer/Plan Group    [employer] [place] [employer] [place]ICA[place]        Payor Plan Address Payor Plan Phone Number Payor Plan Fax Number Effective Dates    [address] [address] [phone]  6/8/2020 - None Entered    [facility] [id number]         Subscriber Name Subscriber Birth Date Member ID       ROBLES SINGH 1975 [id number]                     Emergency Contacts        (Rel.) Home Phone Work Phone Mobile Phone    Kirill Montgomery (Spouse) [phone] -- [phone]              Discharge Summary    No notes of this type exist for this encounter.       "

## 2025-01-05 NOTE — DISCHARGE SUMMARY
I learned today that patient signed out AMA on January 4 at 1950-hour.  Problem list to which patient came out from the unit  Altered mental status with abnormal motion and sensation elevated white count  Fatty infiltration of the liver -history of fireball consumption   hypocalcemia-resolved  Hypokalemia-resolved  Hypomagnesemia-resolved methamphetamine abuse  Elevated carboxyhemoglobin on patient's ABG likely tobacco smoke  Elevated liver enzyme (AST ALT ratio greater than 2 is to 1 consistent with history of alcohol intake) elevated alkaline phosphatase   hypothyroidism-thyroid function noted to have an overcorrected hormone replacement.  Hypertension-monitor  Hyperlipidemia  Severe protein calorie malnutrition  Abnormal imaging/incidental finding of bilateral femoral head AVN without evidence of subchondral collapse.  Macrocytic anemia (folic acid deficiency-3.32) with transfusion of packed RBC on December 31 while in the unit hemoglobin of 6.9  Abnormal thyroid function tests     The interested reader is referred to my progress note dated January for for details surrounding encounter.  It was down to safety.    The interested reader is referred to progress notes from intensivist and neurologist for details surrounding hospitalization.

## 2025-01-05 NOTE — THERAPY DISCHARGE NOTE
Acute Care - Physical Therapy Discharge Summary  [facility]       Patient Name: Minnie Bang  : 1975  MRN: [id number]    Today's Date: 2025  Onset of Illness/Injury or Date of Surgery: 24       Referring Physician: Dr. Gifford      Admit Date: 2024      PT Recommendation and Plan    Visit Dx:    ICD-10-CM ICD-9-CM   1. Numbness and tingling  R20.0 782.0    R20.2    2. Alcoholic intoxication with complication  F10.929 305.00   3. Methamphetamine abuse  F15.10 305.70   4. Hypokalemia  E87.6 276.8   5. Hypomagnesemia  E83.42 275.2   6. Gait disturbance  R26.9 781.2   7. Impaired mobility [Z74.09]  Z74.09 799.89        Outcome Measures       Row Name 25 1400 25 1418          How much help from another person do you currently need...    Turning from your back to your side while in flat bed without using bedrails? 4  -AH --     Moving from lying on back to sitting on the side of a flat bed without bedrails? 4  -AH --     Moving to and from a bed to a chair (including a wheelchair)? 4  -AH --     Standing up from a chair using your arms (e.g., wheelchair, bedside chair)? 3  -AH --     Climbing 3-5 steps with a railing? 3  -AH --     To walk in hospital room? 3  -AH --     AM-PAC 6 Clicks Score (PT) 21  -AH --        How much help from another is currently needed...    Putting on and taking off regular lower body clothing? -- 3  -AC     Bathing (including washing, rinsing, and drying) -- 3  -AC     Toileting (which includes using toilet bed pan or urinal) -- 3  -AC     Putting on and taking off regular upper body clothing -- 4  -AC     Taking care of personal grooming (such as brushing teeth) -- 4  -AC     Eating meals -- 4  -AC     AM-PAC 6 Clicks Score (OT) -- 21  -AC        Functional Assessment    Outcome Measure Options AM-PAC 6 Clicks Basic Mobility (PT)  - AM-PAC 6 Clicks Daily Activity (OT)  -AC               User Key  (r) = Recorded By, (t) = Taken By, (c) = Cosigned By       Initials Name Provider Type    AC Saqib Wong N, OTR/L, CNT Occupational Therapist    Lilia Contreras, PTA Physical Therapist Assistant                         PT Rehab Goals       Row Name 01/05/25 0710             Bed Mobility Goal 1 (PT)    Activity/Assistive Device (Bed Mobility Goal 1, PT) sit to supine;supine to sit;rolling to left;rolling to right  -KJ      [place] Level/Cues Needed (Bed Mobility Goal 1, PT) independent  -KJ      Time Frame (Bed Mobility Goal 1, PT) long term goal (LTG)  -KJ      Progress/Outcomes (Bed Mobility Goal 1, PT) goal not met  -KJ         Transfer Goal 1 (PT)    Activity/Assistive Device (Transfer Goal 1, PT) sit-to-stand/stand-to-sit;bed-to-chair/chair-to-bed;walker, rolling  -KJ      [place] Level/Cues Needed (Transfer Goal 1, PT) modified independence  -KJ      Time Frame (Transfer Goal 1, PT) long term goal (LTG)  -KJ      Progress/Outcome (Transfer Goal 1, PT) goal not met  -KJ         Gait Training Goal 1 (PT)    Activity/Assistive Device (Gait Training Goal 1, PT) gait (walking locomotion);decrease fall risk;diminish gait deviation;improve balance and speed;decrease asymmetrical patterns;walker, rolling  -KJ      [place] Level (Gait Training Goal 1, PT) standby assist  -KJ      Distance (Gait Training Goal 1, PT) 60  -KJ      Time Frame (Gait Training Goal 1, PT) long term goal (LTG)  -KJ      Progress/Outcome (Gait Training Goal 1, PT) goal not met  -KJ                User Key  (r) = Recorded By, (t) = Taken By, (c) = Cosigned By      Initials Name Provider Type Ana Martin, MILAGROS Physical Therapist Assistant PT                        PT Discharge Summary  Anticipated Discharge Disposition (PT): home  Reason for Discharge: Discharge from facility  Outcomes Achieved: Refer to plan of care for updates on goals achieved  Discharge Destination: Home      Ana Lucio PTA   1/5/2025

## 2025-01-05 NOTE — THERAPY DISCHARGE NOTE
Acute Care - Occupational Therapy Discharge Summary  [facility]     Patient Name: Minnie aBng  : 1975  MRN: [id number]    Today's Date: 2025  Onset of Illness/Injury or Date of Surgery: 24    Date of Referral to OT: 25  Referring Physician: Dr. Gifford      Admit Date: 2024        OT Recommendation and Plan    Visit Dx:    ICD-10-CM ICD-9-CM   1. Numbness and tingling  R20.0 782.0    R20.2    2. Alcoholic intoxication with complication  F10.929 305.00   3. Methamphetamine abuse  F15.10 305.70   4. Hypokalemia  E87.6 276.8   5. Hypomagnesemia  E83.42 275.2   6. Gait disturbance  R26.9 781.2   7. Impaired mobility [Z74.09]  Z74.09 799.89                OT Rehab Goals       Row Name 25 1400             Transfer Goal 1 (OT)    Activity/Assistive Device (Transfer Goal 1, OT) bed-to-chair/chair-to-bed;toilet;shower chair  -EC      [place] Level/Cues Needed (Transfer Goal 1, OT) standby assist  -EC      Time Frame (Transfer Goal 1, OT) long term goal (LTG);10 days  -EC      Progress/Outcome (Transfer Goal 1, OT) goal not met  -EC         Bathing Goal 1 (OT)    Activity/Device (Bathing Goal 1, OT) shower chair;bathing skills, all  -EC      [place] Level/Cues Needed (Bathing Goal 1, OT) standby assist  -EC      Time Frame (Bathing Goal 1, OT) long term goal (LTG);10 days  -EC      Progress/Outcomes (Bathing Goal 1, OT) goal not met  -EC         Strength Goal 1 (OT)    Strength Goal 1 (OT) Increase BUE strength to 4+/5 to increase independence with ADL  -EC      Time Frame (Strength Goal 1, OT) long term goal (LTG);10 days  -EC      Progress/Outcome (Strength Goal 1, OT) goal not met  -EC                User Key  (r) = Recorded By, (t) = Taken By, (c) = Cosigned By      Initials Name Provider Type Discipline    EC Aidee Cortez, OTR/L Occupational Therapist OT                     Outcome Measures       Row Name 25 1400             How much help from another person  do you currently need...    Turning from your back to your side while in flat bed without using bedrails? 4  -AH      Moving from lying on back to sitting on the side of a flat bed without bedrails? 4  -AH      Moving to and from a bed to a chair (including a wheelchair)? 4  -AH      Standing up from a chair using your arms (e.g., wheelchair, bedside chair)? 3  -AH      Climbing 3-5 steps with a railing? 3  -AH      To walk in hospital room? 3  -AH      AM-PAC 6 Clicks Score (PT) 21  -         Functional Assessment    Outcome Measure Options AM-PAC 6 Clicks Basic Mobility (PT)  -                User Key  (r) = Recorded By, (t) = Taken By, (c) = Cosigned By      Initials Name Provider Type     Lilia Shelton, PTA Physical Therapist Assistant                            OT Discharge Summary  Anticipated Discharge Disposition (OT): home with assist  Reason for Discharge: other (comment) (left AMA)  Outcomes Achieved: Refer to plan of care for updates on goals achieved  Discharge Destination: Home with assist      Aidee Cortez OTR/L  1/5/2025

## 2025-01-06 LAB
AMPHIPHYSIN AB CSF QL IF: NEGATIVE
CASPR2 AB CSF QL CBA IFA: NEGATIVE
CV2 AB CSF QL IF: NEGATIVE
GLIAL NUC TYPE 1 AB CSF QL IF: NEGATIVE
HU1 AB CSF QL IF: NEGATIVE
HU2 AB CSF QL IF: NEGATIVE
HU3 AB CSF QL IF: NEGATIVE
IMP & REVIEW OF LAB RESULTS: NEGATIVE
LGI1 AB CSF QL CBA IFA: NEGATIVE
PCA-2 AB CSF QL IF: NEGATIVE
PCA-TR AB CSF QL IF: NEGATIVE
PURKINJE CELLS AB CSF QL IF: NEGATIVE

## 2025-01-15 LAB — HTT GENE CAG RPT ENTNUM BLD/T: NORMAL

## 2025-04-07 NOTE — PROGRESS NOTES
Subjective        Minnie Bang presents to [facility]    History of Present Illness  49-year-old female here for follow-up.  Previously seen for headache and neck pain and treated for cervical dystonia.  We did Botox once and then she was lost to follow-up.    Presents now after being hospitalized in December for paresthesia in hands and feet and abnormal movements.  She had MRI brain, MRI C-spine, MRI T-spine, NCV's, LP, and testing for [place]'s all which were without cause of her symptoms.  Neurology saw her while in the hospital and thought this was possibly related to her alcohol and methamphetamine use.                     Current Outpatient Medications:     bismuth subsalicylate (Pepto-Bismol) 262 MG chewable tablet, Chew 2 tablets 4 (Four) Times a Day Before Meals & at Bedtime., Disp: 120 tablet, Rfl: 0    carvedilol (COREG) 25 MG tablet, Take 1 tablet by mouth 2 (Two) Times a Day., Disp: 60 tablet, Rfl: 0    colestipol (COLESTID) 1 g tablet, Take 1 tablet by mouth Daily., Disp: , Rfl:     FLUoxetine (PROzac) 40 MG capsule, Take 1 capsule by mouth Daily., Disp: , Rfl:     Inclisiran Sodium 284 MG/1.5ML solution prefilled syringe, Inject 1.5 mL under the skin into the appropriate area as directed Every 6 (Six) Months., Disp: , Rfl:     levothyroxine (SYNTHROID, LEVOTHROID) 200 MCG tablet, Take 1 tablet by mouth Daily., Disp: , Rfl:     loperamide (IMODIUM) 2 MG capsule, Take 2 capsules by mouth 4 (Four) Times a Day As Needed for Diarrhea., Disp: 120 capsule, Rfl: 0    nitroglycerin (NITROSTAT) 0.4 MG SL tablet, Place 1 tablet under the tongue Every 5 (Five) Minutes As Needed for Chest Pain. Take no more than 3 doses in 15 minutes., Disp: , Rfl:     pantoprazole (PROTONIX) 40 MG EC tablet, Take 1 tablet by mouth 2 (Two) Times a Day., Disp: 60 tablet, Rfl: 0    potassium chloride (KLOR-CON) 20 MEQ packet, Take 20 mEq by mouth 2 (Two) Times a Day., Disp: , Rfl:      promethazine (PHENERGAN) 25 MG tablet, Take 1 tablet by mouth 3 (Three) Times a Day As Needed for Nausea or Vomiting., Disp: , Rfl:     topiramate (TOPAMAX) 50 MG tablet, Take 1 tablet by mouth 2 (Two) Times a Day., Disp: 60 tablet, Rfl: 5       Objective   Vital Signs:   There were no vitals taken for this visit.    Physical Exam  Constitutional:       General: She is awake.   Eyes:      Extraocular Movements: Extraocular movements intact.      Pupils: Pupils are equal, round, and reactive to light.   Neurological:      Mental Status: She is alert.      Motor: Motor strength is normal.     Coordination: Romberg sign negative.      Deep Tendon Reflexes:      Reflex Scores:       Bicep reflexes are 1+ on the right side and 1+ on the left side.       Brachioradialis reflexes are 1+ on the right side and 1+ on the left side.       Patellar reflexes are 1+ on the right side and 1+ on the left side.       Achilles reflexes are 0 on the right side and 0 on the left side.  Psychiatric:         Speech: Speech normal.      Neurological Exam  Mental Status  Awake and alert. Oriented to person, place and time. Speech is normal. Language is fluent with no aphasia.    Cranial Nerves  CN II: Visual fields full to confrontation.  CN III, IV, VI: Extraocular movements intact bilaterally. Pupils equal round and reactive to light bilaterally.  CN V: Facial sensation is normal.  CN VII: Full and symmetric facial movement.  CN IX, X: Palate elevates symmetrically  CN XI: Shoulder shrug strength is normal.  CN XII: Tongue midline without atrophy or fasciculations.    Motor  Normal muscle bulk throughout. Normal muscle tone. Strength is 5/5 throughout all four extremities.  Some giveaway weakness.    Sensory  Reports absent vibration both great toes  Inconsistent proprioception both great toes  .    Reflexes                                            Right                      Left  Brachioradialis                    1+                          1+  Biceps                                 1+                         1+  Patellar                                1+                         1+  Achilles                                0                         0    Coordination  Right: Finger-to-nose normal.Left: Finger-to-nose normal.    Gait  Casual gait is normal including stance, stride, and arm swing. Romberg is absent.      Result Review :                        Assessment and Plan   49-year-old female with hypertension, chronic obstructive pulmonary disease, and hyperlipidemia who was previously sent for headache and now being seen after recent hospitalization for paresthesias and abnormal movements.  For headache this was previously thought to be secondary to cervical dystonia and she received Botox once and then did not keep follow-up appointment.  Had been on Topamax as well.  Also concern for NATHALY but had not had sleep study.    Hospitalized in December for paresthesia of entire body that progressed over the course of several weeks causing falling.  She was also noted to have some abnormal movements.  She had MRI brain, MRI C-spine, MRI T-spine, NCV's, LP, and testing for [place]'s all which were without cause of her symptoms.  Neurology saw her while in the hospital and thought this was possibly related to her alcohol and methamphetamine use.  Could have underlying neuropathy related to alcohol use.  Of note she has also had significant GI issues with anemia and also elevated AST, ALT, alk phos ongoing in the past.  UDS was positive at time of admission for amphetamines and methamphetamines.  Ethanol level was elevated as well.  B12, thiamine normal.  Folate low. Signed out AMA.    Certainly may have some degree of neuropathy causing some of these symptoms but this would not cause numbness of face, lips, entire body and that would be more likely related to stress and/or substance.     Can get emg to confirm neuropahty otherwise has had full w/u.   She is interested in getting an EMG.  I otherwise do not recommend any further workup neurologically.    I spent 40 minutes caring for Minnie on this date of service. This time includes time spent by me in the following activities:preparing for the visit, reviewing tests, performing a medically appropriate examination and/or evaluation , counseling and educating the patient/family/caregiver, ordering medications, tests, or procedures, and documenting information in the medical record      Follow Up   No follow-ups on file.  Patient was given instructions and counseling regarding her condition or for health maintenance advice. Please see specific information pulled into the AVS if appropriate.

## 2025-04-08 ENCOUNTER — TELEPHONE (OUTPATIENT)
Dept: NEUROLOGY | Facility: CLINIC | Age: 50
End: 2025-04-08

## 2025-04-08 ENCOUNTER — OFFICE VISIT (OUTPATIENT)
Dept: NEUROLOGY | Facility: CLINIC | Age: 50
End: 2025-04-08
Payer: COMMERCIAL

## 2025-04-08 VITALS
OXYGEN SATURATION: 96 % | WEIGHT: 154 LBS | HEIGHT: 68 IN | HEART RATE: 86 BPM | SYSTOLIC BLOOD PRESSURE: 142 MMHG | DIASTOLIC BLOOD PRESSURE: 88 MMHG | BODY MASS INDEX: 23.34 KG/M2

## 2025-04-08 DIAGNOSIS — R20.2 PARESTHESIA: Primary | ICD-10-CM

## 2025-04-08 RX ORDER — DICYCLOMINE HYDROCHLORIDE 10 MG/1
10 CAPSULE ORAL
COMMUNITY
Start: 2025-04-02

## 2025-04-08 RX ORDER — GABAPENTIN 300 MG/1
300 CAPSULE ORAL 3 TIMES DAILY
COMMUNITY
Start: 2025-04-02

## 2025-04-08 RX ORDER — ERGOCALCIFEROL 1.25 MG/1
50000 CAPSULE, LIQUID FILLED ORAL
COMMUNITY
Start: 2025-02-26

## 2025-04-08 RX ORDER — FLUCONAZOLE 100 MG/1
TABLET ORAL
COMMUNITY
Start: 2025-04-02

## 2025-04-08 NOTE — PATIENT INSTRUCTIONS
Stop topiramate to see if it's causing any of the numb/tingling/pain.  You can take OTC folic acid supplementation 400 mcg daily.  Will get you set up for nerve study. They will call you with appointment.

## 2025-07-08 ENCOUNTER — OFFICE VISIT (OUTPATIENT)
Dept: NEUROLOGY | Facility: CLINIC | Age: 50
End: 2025-07-08
Payer: COMMERCIAL

## 2025-07-08 VITALS
BODY MASS INDEX: 23.98 KG/M2 | HEIGHT: 68 IN | DIASTOLIC BLOOD PRESSURE: 100 MMHG | OXYGEN SATURATION: 99 % | WEIGHT: 158.2 LBS | HEART RATE: 101 BPM | SYSTOLIC BLOOD PRESSURE: 138 MMHG

## 2025-07-08 DIAGNOSIS — R20.2 PARESTHESIA: Primary | ICD-10-CM

## 2025-07-08 PROCEDURE — 1160F RVW MEDS BY RX/DR IN RCRD: CPT | Performed by: PSYCHIATRY & NEUROLOGY

## 2025-07-08 PROCEDURE — 3075F SYST BP GE 130 - 139MM HG: CPT | Performed by: PSYCHIATRY & NEUROLOGY

## 2025-07-08 PROCEDURE — 1159F MED LIST DOCD IN RCRD: CPT | Performed by: PSYCHIATRY & NEUROLOGY

## 2025-07-08 PROCEDURE — 3080F DIAST BP >= 90 MM HG: CPT | Performed by: PSYCHIATRY & NEUROLOGY

## 2025-07-08 PROCEDURE — 99214 OFFICE O/P EST MOD 30 MIN: CPT | Performed by: PSYCHIATRY & NEUROLOGY

## 2025-07-08 RX ORDER — BUPRENORPHINE HYDROCHLORIDE AND NALOXONE HYDROCHLORIDE DIHYDRATE 8; 2 MG/1; MG/1
1 TABLET SUBLINGUAL DAILY
COMMUNITY

## 2025-07-08 RX ORDER — GABAPENTIN 300 MG/1
300 CAPSULE ORAL 2 TIMES DAILY
Qty: 60 CAPSULE | Refills: 2 | Status: SHIPPED | OUTPATIENT
Start: 2025-07-08 | End: 2025-10-06

## 2025-07-08 RX ORDER — LISINOPRIL 40 MG/1
20 TABLET ORAL DAILY
COMMUNITY
Start: 2025-07-02

## 2025-07-08 NOTE — PROGRESS NOTES
Subjective        Minnie Bang presents to [facility]    History of Present Illness  50-year-old female presents for follow-up of paresthesias.  Also reports no energy, difficulty lifting things.  Burning in her fingers and toes.  Had been given gabapentin by her PCP after hospitalization in [place].  She has been out of this.  Reports she was taking 300 mg twice daily.                         Current Outpatient Medications:     bismuth subsalicylate (Pepto-Bismol) 262 MG chewable tablet, Chew 2 tablets 4 (Four) Times a Day Before Meals & at Bedtime., Disp: 120 tablet, Rfl: 0    buprenorphine-naloxone (SUBOXONE) 8-2 MG per SL tablet, Place 1 tablet under the tongue Daily., Disp: , Rfl:     carvedilol (COREG) 25 MG tablet, Take 1 tablet by mouth 2 (Two) Times a Day., Disp: 60 tablet, Rfl: 0    dicyclomine (BENTYL) 10 MG capsule, Take 1 capsule by mouth 4 (Four) Times a Day Before Meals & at Bedtime., Disp: , Rfl:     fluconazole (DIFLUCAN) 100 MG tablet, Take 2 tablets on day 1, then 1 tablet daily on days 2-21, Disp: , Rfl:     FLUoxetine (PROzac) 20 MG capsule, Take 1 capsule by mouth Daily., Disp: , Rfl:     levothyroxine (SYNTHROID, LEVOTHROID) 200 MCG tablet, Take 1 tablet by mouth Daily., Disp: , Rfl:     lisinopril (PRINIVIL,ZESTRIL) 40 MG tablet, Take 0.5 tablets by mouth Daily., Disp: , Rfl:     loperamide (IMODIUM) 2 MG capsule, Take 2 capsules by mouth 4 (Four) Times a Day As Needed for Diarrhea., Disp: 120 capsule, Rfl: 0    nitroglycerin (NITROSTAT) 0.4 MG SL tablet, Place 1 tablet under the tongue Every 5 (Five) Minutes As Needed for Chest Pain. Take no more than 3 doses in 15 minutes., Disp: , Rfl:     pantoprazole (PROTONIX) 40 MG EC tablet, Take 1 tablet by mouth 2 (Two) Times a Day., Disp: 60 tablet, Rfl: 0    potassium chloride (KLOR-CON) 20 MEQ packet, Take 20 mEq by mouth 3 (Three) Times a Day., Disp: , Rfl:     promethazine (PHENERGAN) 25 MG tablet, Take 1  "tablet by mouth 3 (Three) Times a Day As Needed for Nausea or Vomiting., Disp: , Rfl:     vitamin D (ERGOCALCIFEROL) 1.25 MG (24230 UT) capsule capsule, Take 1 capsule by mouth Every 7 (Seven) Days., Disp: , Rfl:     gabapentin (NEURONTIN) 300 MG capsule, Take 1 capsule by mouth 3 (Three) Times a Day. (Patient not taking: Reported on 7/8/2025), Disp: , Rfl:     topiramate (TOPAMAX) 50 MG tablet, Take 1 tablet by mouth 2 (Two) Times a Day. (Patient not taking: Reported on 7/8/2025), Disp: 60 tablet, Rfl: 5       Objective   Vital Signs:   /100 (BP Location: Left arm, Patient Position: Sitting, Cuff Size: Adult)   Pulse 101   Ht 172.7 cm (68\")   Wt 71.8 kg (158 lb 3.2 oz)   SpO2 99%   BMI 24.05 kg/m²     Physical Exam  Constitutional:       General: She is awake.   Eyes:      Extraocular Movements: Extraocular movements intact.   Neurological:      Mental Status: She is alert.   Psychiatric:         Speech: Speech normal.      Neurological Exam  Mental Status  Awake and alert. Speech is normal. Language is fluent with no aphasia.    Cranial Nerves  CN III, IV, VI: Extraocular movements intact bilaterally.  CN VII: Full and symmetric facial movement.    Gait  Casual gait is normal including stance, stride, and arm swing.      Result Review :                        Assessment and Plan   50-year-old female with HTN, HLD, COPD who was previously sent for headache and now being seen after recent hospitalization for paresthesias and abnormal movements.  For headache this was previously thought to be secondary to cervical dystonia and she received Botox once and then did not keep follow-up appointment.  Had been on Topamax as well.  Also concern for NATHALY but had not had sleep study.    Hospitalized in December for paresthesia of entire body that progressed over the course of several weeks causing falling.  She was also noted to have some abnormal movements.  She had MRI brain, MRI C-spine, MRI T-spine, NCV's, LP, " and testing for [place]'s all which were without cause of her symptoms.  Neurology saw her while in the hospital and thought this was possibly related to her alcohol and methamphetamine use.  Could have underlying neuropathy related to alcohol use.  Of note she has also had significant GI issues with anemia and also elevated AST, ALT, alk phos ongoing in the past.  UDS was positive at time of admission for amphetamines and methamphetamines.  Ethanol level was elevated as well.  B12, thiamine normal.  Folate low. Signed out AMA.    Certainly may have some degree of neuropathy causing some of these symptoms, potentially even small fiber neuropathy although this would not explain many of her symptoms.    Ordered EMG to look for a neuropathy but unfortunately had to be rescheduled.  She inquires about restarting gabapentin that her PCP previously had started.  She potentially is needing a new PCP due to the previous 1 moving locations.    Plan:    1.  Will reschedule EMG.  2.  Restart gabapentin 300 mg twice daily.    Follow Up   No follow-ups on file.  Patient was given instructions and counseling regarding her condition or for health maintenance advice. Please see specific information pulled into the AVS if appropriate.

## 2025-08-20 ENCOUNTER — HOSPITAL ENCOUNTER (OUTPATIENT)
Dept: NEUROLOGY | Facility: HOSPITAL | Age: 50
Discharge: HOME | End: 2025-08-20
Admitting: PSYCHIATRY & NEUROLOGY
Payer: COMMERCIAL

## 2025-08-20 DIAGNOSIS — R20.2 PARESTHESIA: ICD-10-CM

## 2025-08-20 PROCEDURE — 95885 MUSC TST DONE W/NERV TST LIM: CPT

## 2025-08-20 PROCEDURE — 95909 NRV CNDJ TST 5-6 STUDIES: CPT

## (undated) DEVICE — GW STARTER FXD CORE J .035 3X260CM 3MM

## (undated) DEVICE — YANKAUER,BULB TIP WITH VENT: Brand: ARGYLE

## (undated) DEVICE — GW PRESSUREWIRE X WIRELESS FFR 175CM

## (undated) DEVICE — STERILE COTTON BALLS LARGE 5/P: Brand: MEDLINE

## (undated) DEVICE — GLV SURG BIOGEL M LTX PF 7 1/2

## (undated) DEVICE — INTENDED FOR TISSUE SEPARATION, AND OTHER PROCEDURES THAT REQUIRE A SHARP SURGICAL BLADE TO PUNCTURE OR CUT.: Brand: BARD-PARKER ® STAINLESS STEEL BLADES

## (undated) DEVICE — MODEL AT P65, P/N 701554-001KIT CONTENTS: HAND CONTROLLER, 3-WAY HIGH-PRESSURE STOPCOCK WITH ROTATING END AND PREMIUM HIGH-PRESSURE TUBING: Brand: ANGIOTOUCH® KIT

## (undated) DEVICE — GAUZE,SPONGE,4"X4",16PLY,XRAY,STRL,LF: Brand: MEDLINE

## (undated) DEVICE — TUBING, SUCTION, 1/4" X 12', STRAIGHT: Brand: MEDLINE

## (undated) DEVICE — SENSR O2 OXIMAX FNGR A/ 18IN NONSTR

## (undated) DEVICE — TOOL INSRT GW MTL OR PLSTC

## (undated) DEVICE — PK ENT HD AND NK 30

## (undated) DEVICE — Device

## (undated) DEVICE — NDL HYPO PRECISIONGLIDE/REG 18G 11/2 PNK

## (undated) DEVICE — THE CHANNEL CLEANING BRUSH IS A NYLON FLEXI BRUSH ATTACHED TO A FLEXIBLE PLASTIC SHEATH DESIGNED TO SAFELY REMOVE DEBRIS FROM FLEXIBLE ENDOSCOPES.

## (undated) DEVICE — VAGINAL PREP TRAY: Brand: MEDLINE INDUSTRIES, INC.

## (undated) DEVICE — PAD, DEFIB, ADULT, RADIOTRANS, PHYSIO: Brand: MEDLINE

## (undated) DEVICE — CUFF,BP,DISP,1 TUBE,ADULT,HP: Brand: MEDLINE

## (undated) DEVICE — SPONGE,NEURO,0.5"X3",XR,STRL,LF,10/PK: Brand: MEDLINE

## (undated) DEVICE — SOLIDIFIER LIQUI LOC PLUS 2000CC

## (undated) DEVICE — SOL IRR NACL 0.9PCT BT 1000ML

## (undated) DEVICE — STPCK 3/WY HP M/RA W/OFF/HNDL 1050PSI STRL

## (undated) DEVICE — Device: Brand: DEFENDO AIR/WATER/SUCTION AND BIOPSY VALVE

## (undated) DEVICE — PROTECT TEETH A/

## (undated) DEVICE — CONMED SCOPE SAVER BITE BLOCK, 20X27 MM: Brand: SCOPE SAVER

## (undated) DEVICE — GC 7F 078 JL 4: Brand: VISTA BRITE TIP

## (undated) DEVICE — SUT ETHIB 0 MO7 18IN CX41D

## (undated) DEVICE — DRSNG TRACH POLYMEM FEN 3.5X3.5IN

## (undated) DEVICE — CANN NASL ETCO2 LO/FLO A/

## (undated) DEVICE — CATH F6INF PIG 145 110CM 6SH: Brand: INFINITI

## (undated) DEVICE — PK CATH CARD 30 CA/4

## (undated) DEVICE — CONN FLX BREATHE CIRCT

## (undated) DEVICE — FR5 INFINITI MULTIPAC: Brand: INFINITI

## (undated) DEVICE — ELECTRD BLD EZ CLN MOD XLNG 2.75IN

## (undated) DEVICE — MASK,OXYGEN,MED CONC,ADLT,7' TUB, UC: Brand: PENDING

## (undated) DEVICE — FRCP BIOP ENDO CAPTURA/PRO SERR SPK 2.8MM 230CM

## (undated) DEVICE — Device: Brand: MEDEX

## (undated) DEVICE — TRAP FLD MINIVAC MEGADYNE 100ML

## (undated) DEVICE — CANN CO2/O2 NASL A/

## (undated) DEVICE — POSITIONER,HEAD,MULTIRING,36CS: Brand: MEDLINE

## (undated) DEVICE — DRP FEM/RAD W/DUAL 86X135IN

## (undated) DEVICE — COPILOT BLEEDBACK CONTROL VALVE: Brand: COPILOT

## (undated) DEVICE — SUP ARMBRD ART/LINE BLU

## (undated) DEVICE — RADIFOCUS OPTITORQUE ANGIOGRAPHIC CATHETER: Brand: OPTITORQUE

## (undated) DEVICE — TOWEL,OR,DSP,ST,BLUE,STD,4/PK,20PK/CS: Brand: MEDLINE

## (undated) DEVICE — MYNXGRIP 6F/7F: Brand: MYNXGRIP

## (undated) DEVICE — BAPTIST TURNOVER KIT: Brand: MEDLINE INDUSTRIES, INC.

## (undated) DEVICE — PINNACLE INTRODUCER SHEATH: Brand: PINNACLE

## (undated) DEVICE — PAD,NON-ADHERENT,3X8,STERILE,LF,1/PK: Brand: MEDLINE

## (undated) DEVICE — PK CATH CARD 30

## (undated) DEVICE — 4-PORT MANIFOLD: Brand: NEPTUNE 2

## (undated) DEVICE — SUT SILK 2/0 FS BLK 18IN 685G

## (undated) DEVICE — GLIDESHEATH SLENDER STAINLESS STEEL KIT: Brand: GLIDESHEATH SLENDER

## (undated) DEVICE — ELECTRD PAD DEFIB A/

## (undated) DEVICE — SUT SILK 3/0 SUTUPAK TIES 24IN SA74H

## (undated) DEVICE — TR BAND RADIAL ARTERY COMPRESSION DEVICE: Brand: TR BAND

## (undated) DEVICE — MODEL BT2000 P/N 700287-012KIT CONTENTS: MANIFOLD WITH SALINE AND CONTRAST PORTS, SALINE TUBING WITH SPIKE AND HAND SYRINGE, TRANSDUCER: Brand: BT2000 AUTOMATED MANIFOLD KIT

## (undated) DEVICE — GW STARTER FXD CORE J .035 3X150CM 3MM